# Patient Record
Sex: FEMALE | Race: WHITE | NOT HISPANIC OR LATINO | URBAN - METROPOLITAN AREA
[De-identification: names, ages, dates, MRNs, and addresses within clinical notes are randomized per-mention and may not be internally consistent; named-entity substitution may affect disease eponyms.]

---

## 2021-09-09 NOTE — PROGRESS NOTE ADULT - SUBJECTIVE AND OBJECTIVE BOX
CHIEF COMPLAINT: Transfer to CT ICU for a patient noted to have an esophageal mass at [facility]    PROCEDURE:     - EGD done at outside hospital on 9/9/21 -Large diverticula noted at 28cm filled with blood and blood clots, periphery of diverticula was injected with epinephrine, 5mm distal esophageal tear without bleeding, hiatal hernia.        ISSUES:     - Acute respiratory failure, intubated for airway protection due to hematemesis  - Esophageal diverticular bleed   - Esophageal mass  - COPD  - Afib on eliquis at home  - Hypothyroidism  - HTN      INTERVAL EVENTS:     Transferred from outside hospital for further management of esophageal mass that was noted on CT chest as well as esophageal bleed      HISTORY:   Unable to obtain, intubated, sedated    PHYSICAL EXAM:   Gen: Comfortable, No acute distress, frail elderly, kyphotic  Eyes: Sclera white, Conjunctiva normal, Eyelids normal, Pupils symmetrical   ENT: Mucous membranes moist, blood in oral cavity  Neck: Trachea midline, 7.0 ETT  CV: Rate regular, Rhythm irregular  Resp: Breath sounds clear, No accessory muscles use,   Abd: Soft, Non-distended, Non-tender, Bowel sounds normal,  ,  ,    Skin: Warm, 1+ peripheral edema of lower extremities,  ,    : bearden  Neuro: Moves ext to pain, minimally opens eyes to pain, sedated  Psych: Sedated on propofol      ASSESSMENT AND PLAN:     NEURO:    Keep sedated with fentanyl and propofol to keep RASS -1 to -2          RESPIRATORY:    Mechanical vent - AC/VC, decrease VT to 350ml, RR 18, PEEP 5, Spo2 maintained 96% with 50% fio2. Check ABG. Vent bundle, bronchodilators as needed.  Check CXR, ABG           CARDIOVASCULAR:  Hemodynamically stable - Not on pressors. Continue hemodynamic monitoring.  Eliquis held. Rate controlled afib. Betablockers if RVR.                  RENAL:  Stable - Monitor IOs and electrolytes. Keep K above 4.0 and Mg above 2.0.         GASTROINTESTINAL:  NPO  No NGT  PPI Q12h  GI consult  CT chest/abdomen in AM. CT chest was done at outside hospital however no images sent over.             HEMATOLOGIC:  Check PT/PTT, CBC. Maintain active type and screen. Off anticoagulation.  s/p Kcentra at outside hospital  DVT prophylaxis with SCDs.         INFECTIOUS DISEASE:  No signs of active infection. Will monitor for fever and leukocytosis.           ENDOCRINE:  Stable – Monitor glucose fingersticks for goal 120-180.            Pertinent clinical, laboratory, radiographic, telemetry, hemodynamic, respiratory  data and chart were reviewed by myself and analyzed frequently throughout the course of the day and night by myself.    Plan discussed at length with the CTICU staff and Attending CT Surgeon Dr. Zuñiga    Patient required critical care management.  55 minutes were spent evaluating, managing, providing, coordinating, and documenting care for this patient, exclusive of procedures.       _________________________  VITAL SIGNS:  Vital Signs Last 24 Hrs  T(C): 36.4 (10 Sep 2021 00:00), Max: 36.4 (10 Sep 2021 00:00)  T(F): 97.5 (10 Sep 2021 00:00), Max: 97.5 (10 Sep 2021 00:00)  HR: 81 (10 Sep 2021 00:00) (81 - 81)  BP: 165/93 (10 Sep 2021 00:00) (165/93 - 165/93)  BP(mean): 105 (10 Sep 2021 00:00) (105 - 105)  RR: 18 (10 Sep 2021 00:00) (18 - 18)  SpO2: 98% (10 Sep 2021 00:00) (98% - 98%)  I/Os:   I&O's Detail    09 Sep 2021 07:01  -  10 Sep 2021 01:12  --------------------------------------------------------  IN:    FentaNYL: 21.8 mL    Propofol: 32.4 mL  Total IN: 54.2 mL    OUT:    Indwelling Catheter - Urethral (mL): 150 mL  Total OUT: 150 mL    Total NET: -95.8 mL              MEDICATIONS:  MEDICATIONS  (STANDING):  albuterol/ipratropium for Nebulization 3 milliLiter(s) Nebulizer every 6 hours  chlorhexidine 0.12% Liquid 15 milliLiter(s) Oral Mucosa every 12 hours  fentaNYL   Infusion 0.5 MICROgram(s)/kG/Hr (2.7 mL/Hr) IV Continuous <Continuous>  pantoprazole  Injectable 40 milliGRAM(s) IV Push two times a day  propofol Infusion 40 MICROgram(s)/kG/Min (13 mL/Hr) IV Continuous <Continuous>    MEDICATIONS  (PRN):      LABS:  Laboratory data was independently reviewed by me today.                       RADIOLOGY:   Radiology images were independently reviewed by me today. Reports were reviewed by me today.        ________________________________________________

## 2021-09-10 ENCOUNTER — INPATIENT (INPATIENT)
Facility: HOSPITAL | Age: 81
LOS: 24 days | Discharge: NURSING, REHAB OR LONG-TERM CARE | End: 2021-10-05
Attending: INTERNAL MEDICINE | Admitting: INTERNAL MEDICINE
Payer: MEDICARE

## 2021-09-10 VITALS
HEART RATE: 81 BPM | RESPIRATION RATE: 18 BRPM | SYSTOLIC BLOOD PRESSURE: 165 MMHG | DIASTOLIC BLOOD PRESSURE: 93 MMHG | OXYGEN SATURATION: 98 % | TEMPERATURE: 98 F

## 2021-09-10 DIAGNOSIS — K92.0 HEMATEMESIS: ICD-10-CM

## 2021-09-10 DIAGNOSIS — Z95.828 PRESENCE OF OTHER VASCULAR IMPLANTS AND GRAFTS: Chronic | ICD-10-CM

## 2021-09-10 DIAGNOSIS — R58 HEMORRHAGE, NOT ELSEWHERE CLASSIFIED: ICD-10-CM

## 2021-09-10 DIAGNOSIS — R09.89 OTHER SPECIFIED SYMPTOMS AND SIGNS INVOLVING THE CIRCULATORY AND RESPIRATORY SYSTEMS: ICD-10-CM

## 2021-09-10 LAB
ALBUMIN SERPL ELPH-MCNC: 3.6 G/DL — SIGNIFICANT CHANGE UP (ref 3.3–5)
ALP SERPL-CCNC: 82 U/L — SIGNIFICANT CHANGE UP (ref 40–120)
ALT FLD-CCNC: 7 U/L — SIGNIFICANT CHANGE UP (ref 4–33)
ANION GAP SERPL CALC-SCNC: 10 MMOL/L — SIGNIFICANT CHANGE UP (ref 7–14)
ANION GAP SERPL CALC-SCNC: 10 MMOL/L — SIGNIFICANT CHANGE UP (ref 7–14)
ANION GAP SERPL CALC-SCNC: 14 MMOL/L — SIGNIFICANT CHANGE UP (ref 7–14)
APTT BLD: 28.6 SEC — SIGNIFICANT CHANGE UP (ref 27–36.3)
APTT BLD: 29.2 SEC — SIGNIFICANT CHANGE UP (ref 27–36.3)
AST SERPL-CCNC: 15 U/L — SIGNIFICANT CHANGE UP (ref 4–32)
BILIRUB SERPL-MCNC: 0.4 MG/DL — SIGNIFICANT CHANGE UP (ref 0.2–1.2)
BLD GP AB SCN SERPL QL: NEGATIVE — SIGNIFICANT CHANGE UP
BLOOD GAS ARTERIAL - LYTES,HGB,ICA,LACT RESULT: SIGNIFICANT CHANGE UP
BUN SERPL-MCNC: 25 MG/DL — HIGH (ref 7–23)
BUN SERPL-MCNC: 28 MG/DL — HIGH (ref 7–23)
BUN SERPL-MCNC: 32 MG/DL — HIGH (ref 7–23)
CALCIUM SERPL-MCNC: 8.9 MG/DL — SIGNIFICANT CHANGE UP (ref 8.4–10.5)
CALCIUM SERPL-MCNC: 9.2 MG/DL — SIGNIFICANT CHANGE UP (ref 8.4–10.5)
CALCIUM SERPL-MCNC: 9.2 MG/DL — SIGNIFICANT CHANGE UP (ref 8.4–10.5)
CHLORIDE SERPL-SCNC: 103 MMOL/L — SIGNIFICANT CHANGE UP (ref 98–107)
CHLORIDE SERPL-SCNC: 104 MMOL/L — SIGNIFICANT CHANGE UP (ref 98–107)
CHLORIDE SERPL-SCNC: 106 MMOL/L — SIGNIFICANT CHANGE UP (ref 98–107)
CO2 SERPL-SCNC: 22 MMOL/L — SIGNIFICANT CHANGE UP (ref 22–31)
CO2 SERPL-SCNC: 24 MMOL/L — SIGNIFICANT CHANGE UP (ref 22–31)
CO2 SERPL-SCNC: 25 MMOL/L — SIGNIFICANT CHANGE UP (ref 22–31)
CREAT SERPL-MCNC: 0.46 MG/DL — LOW (ref 0.5–1.3)
CREAT SERPL-MCNC: 0.54 MG/DL — SIGNIFICANT CHANGE UP (ref 0.5–1.3)
CREAT SERPL-MCNC: 0.58 MG/DL — SIGNIFICANT CHANGE UP (ref 0.5–1.3)
GLUCOSE BLDC GLUCOMTR-MCNC: 110 MG/DL — HIGH (ref 70–99)
GLUCOSE SERPL-MCNC: 119 MG/DL — HIGH (ref 70–99)
GLUCOSE SERPL-MCNC: 148 MG/DL — HIGH (ref 70–99)
GLUCOSE SERPL-MCNC: 149 MG/DL — HIGH (ref 70–99)
HCT VFR BLD CALC: 30.7 % — LOW (ref 34.5–45)
HCT VFR BLD CALC: 31.8 % — LOW (ref 34.5–45)
HCT VFR BLD CALC: 32 % — LOW (ref 34.5–45)
HCT VFR BLD CALC: 35.1 % — SIGNIFICANT CHANGE UP (ref 34.5–45)
HGB BLD-MCNC: 10.4 G/DL — LOW (ref 11.5–15.5)
HGB BLD-MCNC: 10.5 G/DL — LOW (ref 11.5–15.5)
HGB BLD-MCNC: 10.8 G/DL — LOW (ref 11.5–15.5)
HGB BLD-MCNC: 12 G/DL — SIGNIFICANT CHANGE UP (ref 11.5–15.5)
INR BLD: 1.16 RATIO — SIGNIFICANT CHANGE UP (ref 0.88–1.16)
INR BLD: 1.2 RATIO — HIGH (ref 0.88–1.16)
LACTATE BLDV-MCNC: 1.1 MMOL/L — SIGNIFICANT CHANGE UP (ref 0.5–2)
LMWH PPP CHRO-ACNC: 0.25 IU/ML — LOW (ref 0.5–1)
MAGNESIUM SERPL-MCNC: 1.9 MG/DL — SIGNIFICANT CHANGE UP (ref 1.6–2.6)
MAGNESIUM SERPL-MCNC: 2 MG/DL — SIGNIFICANT CHANGE UP (ref 1.6–2.6)
MAGNESIUM SERPL-MCNC: 2 MG/DL — SIGNIFICANT CHANGE UP (ref 1.6–2.6)
MCHC RBC-ENTMCNC: 30 PG — SIGNIFICANT CHANGE UP (ref 27–34)
MCHC RBC-ENTMCNC: 30.3 PG — SIGNIFICANT CHANGE UP (ref 27–34)
MCHC RBC-ENTMCNC: 30.6 PG — SIGNIFICANT CHANGE UP (ref 27–34)
MCHC RBC-ENTMCNC: 30.7 PG — SIGNIFICANT CHANGE UP (ref 27–34)
MCHC RBC-ENTMCNC: 33 GM/DL — SIGNIFICANT CHANGE UP (ref 32–36)
MCHC RBC-ENTMCNC: 33.8 GM/DL — SIGNIFICANT CHANGE UP (ref 32–36)
MCHC RBC-ENTMCNC: 33.9 GM/DL — SIGNIFICANT CHANGE UP (ref 32–36)
MCHC RBC-ENTMCNC: 34.2 GM/DL — SIGNIFICANT CHANGE UP (ref 32–36)
MCV RBC AUTO: 89.5 FL — SIGNIFICANT CHANGE UP (ref 80–100)
MCV RBC AUTO: 89.5 FL — SIGNIFICANT CHANGE UP (ref 80–100)
MCV RBC AUTO: 90.9 FL — SIGNIFICANT CHANGE UP (ref 80–100)
MCV RBC AUTO: 90.9 FL — SIGNIFICANT CHANGE UP (ref 80–100)
NRBC # BLD: 0 /100 WBCS — SIGNIFICANT CHANGE UP
NRBC # FLD: 0 K/UL — SIGNIFICANT CHANGE UP
PHOSPHATE SERPL-MCNC: 2.6 MG/DL — SIGNIFICANT CHANGE UP (ref 2.5–4.5)
PHOSPHATE SERPL-MCNC: 3.2 MG/DL — SIGNIFICANT CHANGE UP (ref 2.5–4.5)
PHOSPHATE SERPL-MCNC: 3.3 MG/DL — SIGNIFICANT CHANGE UP (ref 2.5–4.5)
PLATELET # BLD AUTO: 200 K/UL — SIGNIFICANT CHANGE UP (ref 150–400)
PLATELET # BLD AUTO: 205 K/UL — SIGNIFICANT CHANGE UP (ref 150–400)
PLATELET # BLD AUTO: 207 K/UL — SIGNIFICANT CHANGE UP (ref 150–400)
PLATELET # BLD AUTO: 212 K/UL — SIGNIFICANT CHANGE UP (ref 150–400)
POTASSIUM SERPL-MCNC: 3.4 MMOL/L — LOW (ref 3.5–5.3)
POTASSIUM SERPL-MCNC: 3.7 MMOL/L — SIGNIFICANT CHANGE UP (ref 3.5–5.3)
POTASSIUM SERPL-MCNC: 4.3 MMOL/L — SIGNIFICANT CHANGE UP (ref 3.5–5.3)
POTASSIUM SERPL-SCNC: 3.4 MMOL/L — LOW (ref 3.5–5.3)
POTASSIUM SERPL-SCNC: 3.7 MMOL/L — SIGNIFICANT CHANGE UP (ref 3.5–5.3)
POTASSIUM SERPL-SCNC: 4.3 MMOL/L — SIGNIFICANT CHANGE UP (ref 3.5–5.3)
PROT SERPL-MCNC: 5.6 G/DL — LOW (ref 6–8.3)
PROTHROM AB SERPL-ACNC: 13.2 SEC — SIGNIFICANT CHANGE UP (ref 10.6–13.6)
PROTHROM AB SERPL-ACNC: 13.7 SEC — HIGH (ref 10.6–13.6)
RBC # BLD: 3.43 M/UL — LOW (ref 3.8–5.2)
RBC # BLD: 3.5 M/UL — LOW (ref 3.8–5.2)
RBC # BLD: 3.52 M/UL — LOW (ref 3.8–5.2)
RBC # BLD: 3.92 M/UL — SIGNIFICANT CHANGE UP (ref 3.8–5.2)
RBC # FLD: 16.2 % — HIGH (ref 10.3–14.5)
RBC # FLD: 16.3 % — HIGH (ref 10.3–14.5)
RBC # FLD: 16.4 % — HIGH (ref 10.3–14.5)
RBC # FLD: 16.5 % — HIGH (ref 10.3–14.5)
RH IG SCN BLD-IMP: POSITIVE — SIGNIFICANT CHANGE UP
SODIUM SERPL-SCNC: 139 MMOL/L — SIGNIFICANT CHANGE UP (ref 135–145)
SODIUM SERPL-SCNC: 139 MMOL/L — SIGNIFICANT CHANGE UP (ref 135–145)
SODIUM SERPL-SCNC: 140 MMOL/L — SIGNIFICANT CHANGE UP (ref 135–145)
WBC # BLD: 10 K/UL — SIGNIFICANT CHANGE UP (ref 3.8–10.5)
WBC # BLD: 12.09 K/UL — HIGH (ref 3.8–10.5)
WBC # BLD: 12.5 K/UL — HIGH (ref 3.8–10.5)
WBC # BLD: 15.65 K/UL — HIGH (ref 3.8–10.5)
WBC # FLD AUTO: 10 K/UL — SIGNIFICANT CHANGE UP (ref 3.8–10.5)
WBC # FLD AUTO: 12.09 K/UL — HIGH (ref 3.8–10.5)
WBC # FLD AUTO: 12.5 K/UL — HIGH (ref 3.8–10.5)
WBC # FLD AUTO: 15.65 K/UL — HIGH (ref 3.8–10.5)

## 2021-09-10 PROCEDURE — 99222 1ST HOSP IP/OBS MODERATE 55: CPT

## 2021-09-10 PROCEDURE — 36620 INSERTION CATHETER ARTERY: CPT

## 2021-09-10 PROCEDURE — 99292 CRITICAL CARE ADDL 30 MIN: CPT | Mod: 25

## 2021-09-10 PROCEDURE — 71045 X-RAY EXAM CHEST 1 VIEW: CPT | Mod: 26

## 2021-09-10 PROCEDURE — 99291 CRITICAL CARE FIRST HOUR: CPT

## 2021-09-10 PROCEDURE — 71045 X-RAY EXAM CHEST 1 VIEW: CPT | Mod: 26,77

## 2021-09-10 PROCEDURE — 99222 1ST HOSP IP/OBS MODERATE 55: CPT | Mod: GC

## 2021-09-10 RX ORDER — POTASSIUM CHLORIDE 20 MEQ
10 PACKET (EA) ORAL
Refills: 0 | Status: COMPLETED | OUTPATIENT
Start: 2021-09-10 | End: 2021-09-10

## 2021-09-10 RX ORDER — PANTOPRAZOLE SODIUM 20 MG/1
40 TABLET, DELAYED RELEASE ORAL
Refills: 0 | Status: DISCONTINUED | OUTPATIENT
Start: 2021-09-10 | End: 2021-09-10

## 2021-09-10 RX ORDER — PROPOFOL 10 MG/ML
40 INJECTION, EMULSION INTRAVENOUS
Qty: 1000 | Refills: 0 | Status: DISCONTINUED | OUTPATIENT
Start: 2021-09-10 | End: 2021-09-25

## 2021-09-10 RX ORDER — FENTANYL CITRATE 50 UG/ML
0.5 INJECTION INTRAVENOUS
Qty: 2500 | Refills: 0 | Status: DISCONTINUED | OUTPATIENT
Start: 2021-09-10 | End: 2021-09-10

## 2021-09-10 RX ORDER — NOREPINEPHRINE BITARTRATE/D5W 8 MG/250ML
0.05 PLASTIC BAG, INJECTION (ML) INTRAVENOUS
Qty: 8 | Refills: 0 | Status: DISCONTINUED | OUTPATIENT
Start: 2021-09-10 | End: 2021-09-11

## 2021-09-10 RX ORDER — FENTANYL CITRATE 50 UG/ML
25 INJECTION INTRAVENOUS
Refills: 0 | Status: DISCONTINUED | OUTPATIENT
Start: 2021-09-10 | End: 2021-09-11

## 2021-09-10 RX ORDER — PIPERACILLIN AND TAZOBACTAM 4; .5 G/20ML; G/20ML
3.38 INJECTION, POWDER, LYOPHILIZED, FOR SOLUTION INTRAVENOUS EVERY 8 HOURS
Refills: 0 | Status: DISCONTINUED | OUTPATIENT
Start: 2021-09-10 | End: 2021-09-15

## 2021-09-10 RX ORDER — PANTOPRAZOLE SODIUM 20 MG/1
8 TABLET, DELAYED RELEASE ORAL
Qty: 80 | Refills: 0 | Status: DISCONTINUED | OUTPATIENT
Start: 2021-09-10 | End: 2021-09-14

## 2021-09-10 RX ORDER — CHLORHEXIDINE GLUCONATE 213 G/1000ML
15 SOLUTION TOPICAL EVERY 12 HOURS
Refills: 0 | Status: DISCONTINUED | OUTPATIENT
Start: 2021-09-10 | End: 2021-10-05

## 2021-09-10 RX ORDER — ALBUTEROL 90 UG/1
2 AEROSOL, METERED ORAL EVERY 6 HOURS
Refills: 0 | Status: DISCONTINUED | OUTPATIENT
Start: 2021-09-10 | End: 2021-10-05

## 2021-09-10 RX ORDER — PIPERACILLIN AND TAZOBACTAM 4; .5 G/20ML; G/20ML
3.38 INJECTION, POWDER, LYOPHILIZED, FOR SOLUTION INTRAVENOUS ONCE
Refills: 0 | Status: COMPLETED | OUTPATIENT
Start: 2021-09-10 | End: 2021-09-10

## 2021-09-10 RX ORDER — MAGNESIUM SULFATE 500 MG/ML
1 VIAL (ML) INJECTION ONCE
Refills: 0 | Status: DISCONTINUED | OUTPATIENT
Start: 2021-09-10 | End: 2021-09-10

## 2021-09-10 RX ORDER — LEVOTHYROXINE SODIUM 125 MCG
112 TABLET ORAL AT BEDTIME
Refills: 0 | Status: DISCONTINUED | OUTPATIENT
Start: 2021-09-10 | End: 2021-09-26

## 2021-09-10 RX ORDER — DEXMEDETOMIDINE HYDROCHLORIDE IN 0.9% SODIUM CHLORIDE 4 UG/ML
0.2 INJECTION INTRAVENOUS
Qty: 400 | Refills: 0 | Status: DISCONTINUED | OUTPATIENT
Start: 2021-09-10 | End: 2021-09-28

## 2021-09-10 RX ORDER — TIOTROPIUM BROMIDE 18 UG/1
1 CAPSULE ORAL; RESPIRATORY (INHALATION)
Qty: 0 | Refills: 0 | DISCHARGE

## 2021-09-10 RX ORDER — BACLOFEN 100 %
1 POWDER (GRAM) MISCELLANEOUS
Qty: 0 | Refills: 0 | DISCHARGE

## 2021-09-10 RX ORDER — BUDESONIDE AND FORMOTEROL FUMARATE DIHYDRATE 160; 4.5 UG/1; UG/1
2 AEROSOL RESPIRATORY (INHALATION)
Refills: 0 | Status: DISCONTINUED | OUTPATIENT
Start: 2021-09-10 | End: 2021-10-05

## 2021-09-10 RX ORDER — SIMVASTATIN 20 MG/1
1 TABLET, FILM COATED ORAL
Qty: 0 | Refills: 0 | DISCHARGE

## 2021-09-10 RX ORDER — BUDESONIDE AND FORMOTEROL FUMARATE DIHYDRATE 160; 4.5 UG/1; UG/1
2 AEROSOL RESPIRATORY (INHALATION)
Qty: 0 | Refills: 0 | DISCHARGE

## 2021-09-10 RX ORDER — PROPOFOL 10 MG/ML
40 INJECTION, EMULSION INTRAVENOUS
Qty: 500 | Refills: 0 | Status: DISCONTINUED | OUTPATIENT
Start: 2021-09-10 | End: 2021-09-10

## 2021-09-10 RX ORDER — CHLORHEXIDINE GLUCONATE 213 G/1000ML
15 SOLUTION TOPICAL EVERY 12 HOURS
Refills: 0 | Status: DISCONTINUED | OUTPATIENT
Start: 2021-09-10 | End: 2021-09-10

## 2021-09-10 RX ORDER — IPRATROPIUM/ALBUTEROL SULFATE 18-103MCG
3 AEROSOL WITH ADAPTER (GRAM) INHALATION EVERY 6 HOURS
Refills: 0 | Status: DISCONTINUED | OUTPATIENT
Start: 2021-09-10 | End: 2021-09-10

## 2021-09-10 RX ORDER — CISATRACURIUM BESYLATE 2 MG/ML
10 INJECTION INTRAVENOUS ONCE
Refills: 0 | Status: COMPLETED | OUTPATIENT
Start: 2021-09-10 | End: 2021-09-10

## 2021-09-10 RX ADMIN — CHLORHEXIDINE GLUCONATE 15 MILLILITER(S): 213 SOLUTION TOPICAL at 05:31

## 2021-09-10 RX ADMIN — ALBUTEROL 2 PUFF(S): 90 AEROSOL, METERED ORAL at 22:28

## 2021-09-10 RX ADMIN — PANTOPRAZOLE SODIUM 40 MILLIGRAM(S): 20 TABLET, DELAYED RELEASE ORAL at 17:02

## 2021-09-10 RX ADMIN — FENTANYL CITRATE 25 MICROGRAM(S): 50 INJECTION INTRAVENOUS at 19:00

## 2021-09-10 RX ADMIN — CHLORHEXIDINE GLUCONATE 15 MILLILITER(S): 213 SOLUTION TOPICAL at 17:01

## 2021-09-10 RX ADMIN — FENTANYL CITRATE 25 MICROGRAM(S): 50 INJECTION INTRAVENOUS at 18:23

## 2021-09-10 RX ADMIN — PANTOPRAZOLE SODIUM 40 MILLIGRAM(S): 20 TABLET, DELAYED RELEASE ORAL at 06:02

## 2021-09-10 RX ADMIN — PIPERACILLIN AND TAZOBACTAM 200 GRAM(S): 4; .5 INJECTION, POWDER, LYOPHILIZED, FOR SOLUTION INTRAVENOUS at 13:03

## 2021-09-10 RX ADMIN — PIPERACILLIN AND TAZOBACTAM 25 GRAM(S): 4; .5 INJECTION, POWDER, LYOPHILIZED, FOR SOLUTION INTRAVENOUS at 21:44

## 2021-09-10 RX ADMIN — Medication 100 MILLIEQUIVALENT(S): at 23:44

## 2021-09-10 RX ADMIN — BUDESONIDE AND FORMOTEROL FUMARATE DIHYDRATE 2 PUFF(S): 160; 4.5 AEROSOL RESPIRATORY (INHALATION) at 22:28

## 2021-09-10 RX ADMIN — Medication 112 MICROGRAM(S): at 23:43

## 2021-09-10 RX ADMIN — Medication 100 MILLIEQUIVALENT(S): at 21:44

## 2021-09-10 RX ADMIN — Medication 100 MILLIEQUIVALENT(S): at 22:41

## 2021-09-10 RX ADMIN — Medication 100 MILLIEQUIVALENT(S): at 06:43

## 2021-09-10 RX ADMIN — Medication 100 MILLIEQUIVALENT(S): at 08:56

## 2021-09-10 RX ADMIN — DEXMEDETOMIDINE HYDROCHLORIDE IN 0.9% SODIUM CHLORIDE 2.7 MICROGRAM(S)/KG/HR: 4 INJECTION INTRAVENOUS at 08:56

## 2021-09-10 RX ADMIN — CISATRACURIUM BESYLATE 10 MILLIGRAM(S): 2 INJECTION INTRAVENOUS at 18:23

## 2021-09-10 RX ADMIN — Medication 100 MILLIEQUIVALENT(S): at 10:18

## 2021-09-10 NOTE — CONSULT NOTE ADULT - SUBJECTIVE AND OBJECTIVE BOX
HPI:     Allergies:  Sulfur Colliod (Rash)  Tylenol (Swelling)      Home Medications:    Hospital Medications:  albuterol/ipratropium for Nebulization 3 milliLiter(s) Nebulizer every 6 hours  chlorhexidine 0.12% Liquid 15 milliLiter(s) Oral Mucosa every 12 hours  dexMEDEtomidine Infusion 0.2 MICROgram(s)/kG/Hr IV Continuous <Continuous>  norepinephrine Infusion 0.05 MICROgram(s)/kG/Min IV Continuous <Continuous>  pantoprazole  Injectable 40 milliGRAM(s) IV Push two times a day  piperacillin/tazobactam IVPB.. 3.375 Gram(s) IV Intermittent every 8 hours  propofol Infusion 40 MICROgram(s)/kG/Min IV Continuous <Continuous>      PMHX/PSHX:      Family history:      Denies family history of colon cancer/polyps, stomach cancer/polyps, pancreatic cancer/masses, liver cancer/disease, ovarian cancer and endometrial cancer.    Social History:     Tob: Denies  EtOH: Denies  Illicit Drugs: Denies    ROS:     General:  No wt loss, fevers, chills, night sweats, fatigue  Eyes:  Good vision, no reported pain  ENT:  No sore throat, pain, runny nose, dysphagia  CV:  No pain, palpitations, hypo/hypertension  Pulm:  No dyspnea, cough, tachypnea, wheezing  GI:  No pain, No nausea, No vomiting, No diarrhea, No constipation, No weight loss, No fever, No pruritis, No rectal bleeding, No tarry stools, No dysphagia,  :  No pain, bleeding, incontinence, nocturia  Muscle:  No pain, weakness  Neuro:  No weakness, tingling, memory problems  Psych:  No fatigue, insomnia, mood problems, depression  Endocrine:  No polyuria, polydipsia, cold/heat intolerance  Heme:  No petechiae, ecchymosis, easy bruisability  Skin:  No rash, tattoos, scars, edema    PHYSICAL EXAM:     GENERAL:  No acute distress  HEENT:  Normocephalic/atraumatic, no scleral icterus  CHEST:  Clear to auscultation bilaterally, no wheezes/rales/ronchi, no accessory muscle use  HEART:  Regular rate and rhythm, no murmurs/rubs/gallops  ABDOMEN:  Soft, non-tender, non-distended, normoactive bowel sounds,  no masses, no hepato-splenomegaly, no signs of chronic liver disease  EXTREMITIES: No cyanosis, clubbing, or edema  SKIN:  No rash/erythema/ecchymoses/petechiae/wounds/abscess/warm/dry  NEURO:  Alert and oriented x 3, no asterixis    Vital Signs:  Vital Signs Last 24 Hrs  T(C): 36.5 (10 Sep 2021 08:00), Max: 36.5 (10 Sep 2021 08:00)  T(F): 97.7 (10 Sep 2021 08:00), Max: 97.7 (10 Sep 2021 08:00)  HR: 81 (10 Sep 2021 13:00) (81 - 99)  BP: 102/56 (10 Sep 2021 10:00) (83/65 - 165/93)  BP(mean): 67 (10 Sep 2021 10:00) (64 - 105)  RR: 18 (10 Sep 2021 13:00) (18 - 18)  SpO2: 92% (10 Sep 2021 13:00) (92% - 99%)  Daily     Daily     LABS:                        10.8   12.09 )-----------( 200      ( 10 Sep 2021 11:21 )             32.0     Mean Cell Volume: 90.9 fL (09-10-21 @ 11:21)    09-10    139  |  104  |  32<H>  ----------------------------<  119<H>  4.3   |  25  |  0.54    Ca    9.2      10 Sep 2021 11:21  Phos  3.2     09-10  Mg     2.00     09-10    TPro  5.6<L>  /  Alb  3.6  /  TBili  0.4  /  DBili  x   /  AST  15  /  ALT  7   /  AlkPhos  82  09-10    LIVER FUNCTIONS - ( 10 Sep 2021 01:28 )  Alb: 3.6 g/dL / Pro: 5.6 g/dL / ALK PHOS: 82 U/L / ALT: 7 U/L / AST: 15 U/L / GGT: x           PT/INR - ( 10 Sep 2021 11:21 )   PT: 13.2 sec;   INR: 1.16 ratio         PTT - ( 10 Sep 2021 11:21 )  PTT:28.6 sec                            10.8   12.09 )-----------( 200      ( 10 Sep 2021 11:21 )             32.0                         12.0   10.00 )-----------( 212      ( 10 Sep 2021 01:28 )             35.1       Imaging:             HPI: 81F resident @ assisted nursing facility Hx known esophageal diverticulum (since 9/2020) + food impactions, afib on eliquis, IVC filter (further Hx unknown), GERD, COPD, hypothyroidism, HTN, multiple thoracic spine compression fractures who presented as transfer from [facility] after hematemesis 2/2 UGI bleed s/p EGD showing esophageal diverticula w/ large clots s/p epinephrine.     Presented to [facility] on 9/8 having multiple episodes of hematemesis after eating dinner -- no prior Hx of similar episodes.     From OSH notes, pt had been HD stable with Hb stable in 10s --> s/p 2U pRBCs. Underwent CT a/p which showed 1. dilated fluid filled mid esophagus to 3.8cm w/ debris 2. soft tissue mass of mid-lower esophagus 8.1 by 6.5cm w/ right aspect of mass showing with ?area of active bleeding / air anteriorly ? for areas of necrosis.     Underwent EGD (9/9) which showed 1. large clots in esophageal diverticula s/p epi injection 2. small 5mm esophageal tear 3. clots in stomach -- otherwise normal stomach lumen + duodenum. Pt was intubated during EGD 2/2 bleeding that was found.     Per discussion with [facility] + son -- pt has Hx of 2 prior food impactions w/ known Hx of esophageal diverticulum dating back to 9/2020. No mass was seen on EGD (9/2020) reportedly, only esophageal diverticulum.       Currently on levo 0.02 --> increased to 0.04 during the day. Remains intubated + sedated. No melena, hematochezia since hospitalization. Hb 12 --> 10.8 --> 10.4.            Allergies:  Sulfur Colliod (Rash)  Tylenol (Swelling)      Home Medications:    Hospital Medications:  albuterol/ipratropium for Nebulization 3 milliLiter(s) Nebulizer every 6 hours  chlorhexidine 0.12% Liquid 15 milliLiter(s) Oral Mucosa every 12 hours  dexMEDEtomidine Infusion 0.2 MICROgram(s)/kG/Hr IV Continuous <Continuous>  norepinephrine Infusion 0.05 MICROgram(s)/kG/Min IV Continuous <Continuous>  pantoprazole  Injectable 40 milliGRAM(s) IV Push two times a day  piperacillin/tazobactam IVPB.. 3.375 Gram(s) IV Intermittent every 8 hours  propofol Infusion 40 MICROgram(s)/kG/Min IV Continuous <Continuous>      PMHX/PSHX:      Family history:      Denies family history of colon cancer/polyps, stomach cancer/polyps, pancreatic cancer/masses, liver cancer/disease, ovarian cancer and endometrial cancer.    Social History:     Tob: Denies  EtOH: Denies  Illicit Drugs: Denies    ROS:     General:  No wt loss, fevers, chills, night sweats, fatigue  Eyes:  Good vision, no reported pain  ENT:  No sore throat, pain, runny nose, dysphagia  CV:  No pain, palpitations, hypo/hypertension  Pulm:  No dyspnea, cough, tachypnea, wheezing  GI:  see above  :  No pain, bleeding, incontinence, nocturia  Muscle:  No pain, weakness  Neuro:  No weakness, tingling, memory problems  Psych:  No fatigue, insomnia, mood problems, depression  Endocrine:  No polyuria, polydipsia, cold/heat intolerance  Heme:  No petechiae, ecchymosis, easy bruisability  Skin:  No rash, tattoos, scars, edema    PHYSICAL EXAM:     GENERAL:  No acute distress, intubated, sedated, +minimal blood in the in-line tracheal suction   HEENT:  Normocephalic/atraumatic, no scleral icterus  CHEST:  Clear to auscultation bilaterally, no wheezes/rales/ronchi, no accessory muscle use  HEART:  Regular rate and rhythm, no murmurs/rubs/gallops  ABDOMEN:  Soft, non-tender, non-distended, normoactive bowel sounds  EXTREMITIES: No cyanosis, clubbing, or edema  SKIN:  No rash/dry  NEURO:  Alert and oriented x 0    Vital Signs:  Vital Signs Last 24 Hrs  T(C): 36.5 (10 Sep 2021 08:00), Max: 36.5 (10 Sep 2021 08:00)  T(F): 97.7 (10 Sep 2021 08:00), Max: 97.7 (10 Sep 2021 08:00)  HR: 81 (10 Sep 2021 13:00) (81 - 99)  BP: 102/56 (10 Sep 2021 10:00) (83/65 - 165/93)  BP(mean): 67 (10 Sep 2021 10:00) (64 - 105)  RR: 18 (10 Sep 2021 13:00) (18 - 18)  SpO2: 92% (10 Sep 2021 13:00) (92% - 99%)  Daily     Daily     LABS:                        10.8   12.09 )-----------( 200      ( 10 Sep 2021 11:21 )             32.0     Mean Cell Volume: 90.9 fL (09-10-21 @ 11:21)    09-10    139  |  104  |  32<H>  ----------------------------<  119<H>  4.3   |  25  |  0.54    Ca    9.2      10 Sep 2021 11:21  Phos  3.2     09-10  Mg     2.00     09-10    TPro  5.6<L>  /  Alb  3.6  /  TBili  0.4  /  DBili  x   /  AST  15  /  ALT  7   /  AlkPhos  82  09-10    LIVER FUNCTIONS - ( 10 Sep 2021 01:28 )  Alb: 3.6 g/dL / Pro: 5.6 g/dL / ALK PHOS: 82 U/L / ALT: 7 U/L / AST: 15 U/L / GGT: x           PT/INR - ( 10 Sep 2021 11:21 )   PT: 13.2 sec;   INR: 1.16 ratio         PTT - ( 10 Sep 2021 11:21 )  PTT:28.6 sec                            10.8   12.09 )-----------( 200      ( 10 Sep 2021 11:21 )             32.0                         12.0   10.00 )-----------( 212      ( 10 Sep 2021 01:28 )             35.1       Imaging:

## 2021-09-10 NOTE — H&P ADULT - ATTENDING COMMENTS
Patient seen and examined agree with above note as modified, where appropriate, by me. 81 year old with upper GI bleed from esophagus, ?etiology. No evidence of bleeding at this time. Will hold Eliquis for now. will likely need repeat endoscopy.

## 2021-09-10 NOTE — H&P ADULT - NSICDXPASTMEDICALHX_GEN_ALL_CORE_FT
PAST MEDICAL HISTORY:  Afib     Esophageal diverticulum     GERD (gastroesophageal reflux disease)     History of COPD     HTN (hypertension)     Hypothyroid

## 2021-09-10 NOTE — CONSULT NOTE ADULT - ASSESSMENT
Impression:   #UGI bleed 2/2 esophageal diverticulum     Recommendations:   - NPO   - Trend CBC q8hr, transfuse Hb < 7  - Active T&S  - Monitor for further bleeding  - No urgent plans for endoscopy at this time, will possibly need a second look   - Full note to follow       Thank you for involving us in the care of this patient, please reach out if any further questions.     Ankit Martinez MD  Gastroenterology Fellow, PGY5    Available on [employer] Teams  [phone] ([facility])  [id number] ([facility])  Please contact on call fellow weekdays after 5pm-7am and weekends: [phone]   81F resident @ assisted nursing facility Hx known esophageal diverticulum (since 9/2020) + food impactions, afib on eliquis, IVC filter (further Hx unknown), GERD, COPD, hypothyroidism, HTN, multiple thoracic spine compression fractures who presented as transfer from [facility] after hematemesis 2/2 UGI bleed s/p EGD showing esophageal diverticula w/ large clots s/p epinephrine.       Impression:   #UGI bleed: p/w hematemesis to OSH, found to have ?esophageal (soft tissue) mass on CT a/p. EGD showed large diverticula at 28cm with bloody clots s/p epi injection (not removed 2/2 concern regarding what may be underlying clot, ie large vessel potentially) + no other significant sources of hematemesis found. Unclear if 1. bleeding from within diverticula or 2. there is some underlying esophageal mass (as seen on CT a/p) in the area which is underneath the clot.           Recommendations:   - NPO   - Trend CBC q8hr, transfuse Hb < 7  - Active T&S  - PPI gtt  - Monitor for further bleeding  - No urgent plans for endoscopy at this time, pt will need second look endoscopy  - Appreciate CT surgery back up during time of endoscopy   - Tentatively will plan for EGD Monday (if pt remains HD stable without significant bleeding)   - Please call back GI fellow over the weekend if significant bleeding/melena/Hb drop and worsening hemodynamics           Thank you for involving us in the care of this patient, please reach out if any further questions.     Ankit Martinez MD  Gastroenterology Fellow, PGY5    Available on [employer] Teams  [phone] ([facility])  [id number] ([facility])  Please contact on call fellow weekdays after 5pm-7am and weekends: [phone]   81F resident @ assisted nursing facility Hx known esophageal diverticulum (since 9/2020) + food impactions, afib on eliquis, IVC filter (further Hx unknown), GERD, COPD, hypothyroidism, HTN, multiple thoracic spine compression fractures who presented as transfer from [facility] after hematemesis 2/2 UGI bleed s/p EGD showing esophageal diverticula w/ large clots s/p epinephrine.       Impression:   #UGI bleed: p/w hematemesis to OSH, found to have ?esophageal (soft tissue) mass on CT a/p. EGD showed large diverticula at 28cm with bloody clots s/p epi injection (not removed 2/2 concern regarding what may be underlying clot, ie large vessel potentially) + no other significant sources of hematemesis found. Unclear if 1. bleeding from within diverticula or 2. there is some underlying esophageal mass (as seen on CT a/p) in the area which is underneath the clot.           Recommendations:   - NPO   - Trend CBC q8hr, transfuse Hb < 7  - Active T&S  - PPI gtt  - Monitor for further bleeding  - No urgent plans for endoscopy at this time, pt will need second look endoscopy  - Appreciate CT surgery back up during time of endoscopy   - Tentatively will plan for EGD Monday (if pt remains HD stable without significant bleeding)   - Please obtain COVID PCR swab to have on file for potential EGD  - Please call back GI fellow over the weekend if significant bleeding/melena/Hb drop and worsening hemodynamics           Thank you for involving us in the care of this patient, please reach out if any further questions.     Ankit Martinez MD  Gastroenterology Fellow, PGY5    Available on [employer] Teams  [phone] ([facility])  [phone] ([facility])  Please contact on call fellow weekdays after 5pm-7am and weekends: [phone]

## 2021-09-10 NOTE — PROGRESS NOTE ADULT - SUBJECTIVE AND OBJECTIVE BOX
CHIEF COMPLAINT: Follow up in CTICU for intubated for hypoxic respiratory failure with upper GI bleed    PROCEDURE:   - EGD done at outside hospital on 9/9/21 -Large diverticula noted at 28cm filled with blood and blood clots, periphery of diverticula was injected with epinephrine, 5mm distal esophageal tear without bleeding, hiatal hernia.        ISSUES:   Acute hypoxic respiratory failure s/p intubation (9/9)  Esophageal diverticular bleed   Acute blood loss anemia  Hypotension requiring IV pressors  Esophageal mass  COPD  Chronic Afib on Apixaban  Hypothyroidism  HTN      INTERVAL EVENTS:   Received patient on mechanical ventilation.   Called to the bedside for hypoxia while pt on mechanical ventilation PEEP 5, 50%. Acute desaturation. No secretions with suctioning.  PEEP increased to 8 with improvement of saturation. CT chest from outside hospital reviewed and showed LLL atelectasis and PNA. Pt started on zosyn.  CBC repeated with slight decrease. Trending. Discussed case with GI consult team about timing of repeat endoscopy.  Discussed outside CT and outside prior EGD findings with GI fellow.  Received patient on vasopressors - levophed. This was titrated by me to maintain MAP 65.      HISTORY:   Unable to obtain, intubated, sedated    PHYSICAL EXAM:   Gen: Comfortable, No acute distress, frail elderly, kyphotic  Eyes: Sclera white, Conjunctiva normal, Eyelids normal, Pupils symmetrical   ENT: Mucous membranes moist, blood in oral cavity  Neck: Trachea midline, 7.0 ETT  CV: Rate regular, Rhythm irregular  Resp: Breath sounds clear, No accessory muscles use,   Abd: Soft, Non-distended, Non-tender, Bowel sounds normal,  ,  ,    Skin: Warm, 1+ peripheral edema of lower extremities,  ,    : bearden  Neuro: Moves ext to pain, minimally opens eyes to pain, sedated  Psych: RASS -3      ASSESSMENT AND PLAN:     NEURO:  Vent sedation - Continue propofol and precedex for ventilator synchrony.       RESPIRATORY:    Acute respiratory failure secondary to aspiration PNA in setting of hematemesis - Mechanical ventilation. Monitor ABG. Wean FIO2 for goal O2sat above 92. Vent bundle. Zosyn.           CARDIOVASCULAR:  Hypotension requiring IV pressors - wean pressors for MAP goal of 65  Telemetry (medical test) - Reviewed by me today independently. Rate controlled atrial fibrillation.    Chronic Afib - stable. Hold anticoagulation        HTN - currently hypotensive. hold home antihypertensives.            RENAL:  Stable - Monitor IOs and electrolytes. Keep K above 4.0 and Mg above 2.0.         GASTROINTESTINAL:  NPO  No NGT    Esophageal mass vs blood filled diverticulum - Unchanged. Pantoprazole IV Q12h. GI consult for timing of EGD.           HEMATOLOGIC:  Check PT/PTT, CBC. Maintain active type and screen. Off anticoagulation.  s/p Kcentra at outside hospital  DVT prophylaxis with SCDs.         INFECTIOUS DISEASE:  Aspiration PNA - Worsening. Zosyn IV q8h.   Follow up cultures        ENDOCRINE:  Stable – Monitor glucose fingersticks for goal 120-180.     Hypothyroidism - stable. Continue synthroid IV.         Pertinent clinical, laboratory, radiographic, telemetry, hemodynamic, respiratory  data and chart were reviewed by myself and analyzed frequently throughout the course of the day and night by myself.    Plan discussed at length with the CTICU staff and Attending CT Surgeon Dr. Cedrick Zuñiga    Patient required critical care management.  35 minutes were spent evaluating, managing, providing, coordinating, and documenting care for this patient, exclusive of procedures from  7AM to 1159PM.      _________________________  VITAL SIGNS:  Vital Signs Last 24 Hrs  T(C): 36.5 (10 Sep 2021 12:00), Max: 36.5 (10 Sep 2021 08:00)  T(F): 97.7 (10 Sep 2021 12:00), Max: 97.7 (10 Sep 2021 08:00)  HR: 85 (10 Sep 2021 14:00) (81 - 99)  BP: 102/56 (10 Sep 2021 10:00) (83/65 - 165/93)  BP(mean): 67 (10 Sep 2021 10:00) (64 - 105)  RR: 18 (10 Sep 2021 14:00) (18 - 18)  SpO2: 97% (10 Sep 2021 14:00) (92% - 99%)  I/Os:   I&O's Detail    09 Sep 2021 07:01  -  10 Sep 2021 07:00  --------------------------------------------------------  IN:    FentaNYL: 48.8 mL    Norepinephrine: 14 mL    Propofol: 100.4 mL  Total IN: 163.2 mL    OUT:    Indwelling Catheter - Urethral (mL): 380 mL  Total OUT: 380 mL    Total NET: -216.8 mL      10 Sep 2021 07:01  -  10 Sep 2021 15:11  --------------------------------------------------------  IN:    Dexmedetomidine: 16.2 mL    FentaNYL: 5.4 mL    Norepinephrine: 19 mL    Propofol: 87.7 mL  Total IN: 128.3 mL    OUT:    Indwelling Catheter - Urethral (mL): 235 mL  Total OUT: 235 mL    Total NET: -106.7 mL          Mode: AC/ CMV (Assist Control/ Continuous Mandatory Ventilation)  RR (machine): 18  TV (machine): 350  FiO2: 50  PEEP: 5  ITime: 0.76  MAP: 9  PIP: 24      MEDICATIONS:  MEDICATIONS  (STANDING):  albuterol/ipratropium for Nebulization 3 milliLiter(s) Nebulizer every 6 hours  chlorhexidine 0.12% Liquid 15 milliLiter(s) Oral Mucosa every 12 hours  dexMEDEtomidine Infusion 0.2 MICROgram(s)/kG/Hr (2.7 mL/Hr) IV Continuous <Continuous>  norepinephrine Infusion 0.05 MICROgram(s)/kG/Min (5.06 mL/Hr) IV Continuous <Continuous>  pantoprazole  Injectable 40 milliGRAM(s) IV Push two times a day  piperacillin/tazobactam IVPB.. 3.375 Gram(s) IV Intermittent every 8 hours  propofol Infusion 40 MICROgram(s)/kG/Min (13 mL/Hr) IV Continuous <Continuous>    MEDICATIONS  (PRN):      LABS:  Laboratory data was independently reviewed by me today.                           10.8   12.09 )-----------( 200      ( 10 Sep 2021 11:21 )             32.0     09-10    139  |  104  |  32<H>  ----------------------------<  119<H>  4.3   |  25  |  0.54    Ca    9.2      10 Sep 2021 11:21  Phos  3.2     09-10  Mg     2.00     09-10    TPro  5.6<L>  /  Alb  3.6  /  TBili  0.4  /  DBili  x   /  AST  15  /  ALT  7   /  AlkPhos  82  09-10    LIVER FUNCTIONS - ( 10 Sep 2021 01:28 )  Alb: 3.6 g/dL / Pro: 5.6 g/dL / ALK PHOS: 82 U/L / ALT: 7 U/L / AST: 15 U/L / GGT: x           PT/INR - ( 10 Sep 2021 11:21 )   PT: 13.2 sec;   INR: 1.16 ratio         PTT - ( 10 Sep 2021 11:21 )  PTT:28.6 sec  ABG - ( 10 Sep 2021 11:21 )  pH, Arterial: 7.42  pH, Blood: x     /  pCO2: 40    /  pO2: 65    / HCO3: 26    / Base Excess: 1.3   /  SaO2: 91.8                  RADIOLOGY:   Radiology images were independently reviewed by me today. Reports were reviewed by me today.    Xray Chest 1 View- PORTABLE-Urgent:   EXAM:  XR CHEST PORTABLE URGENT 1V        PROCEDURE DATE:  Sep 10 2021         INTERPRETATION:  TIME OF EXAM: September 10, 2021 at 1:59 AM    CLINICAL INFORMATION: Status post intubation; UGI bleed    TECHNIQUE:   Portable chest    INTERPRETATION:    An endotracheal tube is present. The right lung is clear.    On the left side, hemidiaphragm is obscured possibly small effusion with underlying atelectasis. Left upper lobe is clear. The heart is not enlarged.      COMPARISON:  July 10, 2014      IMPRESSION:  Intubated with probable small left effusion.      --- End of Report ---              ONUR AVILES MD; Attending Radiologist  This document has been electronically signed. Sep 10 2021  7:57AM (09-10-21 @ 02:00)

## 2021-09-10 NOTE — H&P ADULT - HISTORY OF PRESENT ILLNESS
81 y. o. female BIBA to [facility] from nursing facility c/o hematemesis.  According to pt it was the first episode and happened while she was eating dinner. Followed by multiple episodes of N/V with bright blood and epigastric cramps.  She was intubated for airway protection and underwent EGD that revealed large diverticula at 28 cm filled with blood and 5 mm tear at distal esophagus, a hiatal hernia and large amount of blood in the fundus.  Diverticula was injected with Epinephrine.  Patient was transferred to [facility] for further management.

## 2021-09-11 LAB
ANION GAP SERPL CALC-SCNC: 11 MMOL/L — SIGNIFICANT CHANGE UP (ref 7–14)
ANION GAP SERPL CALC-SCNC: 12 MMOL/L — SIGNIFICANT CHANGE UP (ref 7–14)
APTT BLD: 27.4 SEC — SIGNIFICANT CHANGE UP (ref 27–36.3)
BASE EXCESS BLDA CALC-SCNC: 1 MMOL/L — SIGNIFICANT CHANGE UP (ref -2–3)
BLOOD GAS ARTERIAL - LYTES,HGB,ICA,LACT RESULT: SIGNIFICANT CHANGE UP
BUN SERPL-MCNC: 20 MG/DL — SIGNIFICANT CHANGE UP (ref 7–23)
BUN SERPL-MCNC: 22 MG/DL — SIGNIFICANT CHANGE UP (ref 7–23)
CALCIUM SERPL-MCNC: 8.9 MG/DL — SIGNIFICANT CHANGE UP (ref 8.4–10.5)
CALCIUM SERPL-MCNC: 9 MG/DL — SIGNIFICANT CHANGE UP (ref 8.4–10.5)
CHLORIDE SERPL-SCNC: 105 MMOL/L — SIGNIFICANT CHANGE UP (ref 98–107)
CHLORIDE SERPL-SCNC: 105 MMOL/L — SIGNIFICANT CHANGE UP (ref 98–107)
CO2 BLDA-SCNC: 26 MMOL/L — HIGH (ref 19–24)
CO2 SERPL-SCNC: 24 MMOL/L — SIGNIFICANT CHANGE UP (ref 22–31)
CO2 SERPL-SCNC: 24 MMOL/L — SIGNIFICANT CHANGE UP (ref 22–31)
COVID-19 SPIKE DOMAIN AB INTERP: POSITIVE
COVID-19 SPIKE DOMAIN ANTIBODY RESULT: >250 U/ML — HIGH
CREAT SERPL-MCNC: 0.5 MG/DL — SIGNIFICANT CHANGE UP (ref 0.5–1.3)
CREAT SERPL-MCNC: 0.56 MG/DL — SIGNIFICANT CHANGE UP (ref 0.5–1.3)
GLUCOSE BLDC GLUCOMTR-MCNC: 103 MG/DL — HIGH (ref 70–99)
GLUCOSE BLDC GLUCOMTR-MCNC: 107 MG/DL — HIGH (ref 70–99)
GLUCOSE BLDC GLUCOMTR-MCNC: 115 MG/DL — HIGH (ref 70–99)
GLUCOSE SERPL-MCNC: 131 MG/DL — HIGH (ref 70–99)
GLUCOSE SERPL-MCNC: 147 MG/DL — HIGH (ref 70–99)
GRAM STN FLD: SIGNIFICANT CHANGE UP
HCO3 BLDA-SCNC: 25 MMOL/L — SIGNIFICANT CHANGE UP (ref 21–28)
HCT VFR BLD CALC: 30.7 % — LOW (ref 34.5–45)
HCT VFR BLD CALC: 31 % — LOW (ref 34.5–45)
HGB BLD-MCNC: 10.2 G/DL — LOW (ref 11.5–15.5)
HGB BLD-MCNC: 10.2 G/DL — LOW (ref 11.5–15.5)
INR BLD: 1.2 RATIO — HIGH (ref 0.88–1.16)
LMWH PPP CHRO-ACNC: 0.18 IU/ML — LOW (ref 0.5–1)
MAGNESIUM SERPL-MCNC: 1.8 MG/DL — SIGNIFICANT CHANGE UP (ref 1.6–2.6)
MAGNESIUM SERPL-MCNC: 2 MG/DL — SIGNIFICANT CHANGE UP (ref 1.6–2.6)
MCHC RBC-ENTMCNC: 30.1 PG — SIGNIFICANT CHANGE UP (ref 27–34)
MCHC RBC-ENTMCNC: 30.2 PG — SIGNIFICANT CHANGE UP (ref 27–34)
MCHC RBC-ENTMCNC: 32.9 GM/DL — SIGNIFICANT CHANGE UP (ref 32–36)
MCHC RBC-ENTMCNC: 33.2 GM/DL — SIGNIFICANT CHANGE UP (ref 32–36)
MCV RBC AUTO: 90.8 FL — SIGNIFICANT CHANGE UP (ref 80–100)
MCV RBC AUTO: 91.4 FL — SIGNIFICANT CHANGE UP (ref 80–100)
NRBC # BLD: 0 /100 WBCS — SIGNIFICANT CHANGE UP
NRBC # BLD: 0 /100 WBCS — SIGNIFICANT CHANGE UP
NRBC # FLD: 0 K/UL — SIGNIFICANT CHANGE UP
NRBC # FLD: 0 K/UL — SIGNIFICANT CHANGE UP
PCO2 BLDA: 38 MMHG — HIGH (ref 32–35)
PH BLDA: 7.43 — SIGNIFICANT CHANGE UP (ref 7.35–7.45)
PHOSPHATE SERPL-MCNC: 2.5 MG/DL — SIGNIFICANT CHANGE UP (ref 2.5–4.5)
PHOSPHATE SERPL-MCNC: 2.6 MG/DL — SIGNIFICANT CHANGE UP (ref 2.5–4.5)
PLATELET # BLD AUTO: 180 K/UL — SIGNIFICANT CHANGE UP (ref 150–400)
PLATELET # BLD AUTO: 189 K/UL — SIGNIFICANT CHANGE UP (ref 150–400)
PO2 BLDA: 96 MMHG — SIGNIFICANT CHANGE UP (ref 83–108)
POTASSIUM SERPL-MCNC: 3.6 MMOL/L — SIGNIFICANT CHANGE UP (ref 3.5–5.3)
POTASSIUM SERPL-MCNC: 4.1 MMOL/L — SIGNIFICANT CHANGE UP (ref 3.5–5.3)
POTASSIUM SERPL-SCNC: 3.6 MMOL/L — SIGNIFICANT CHANGE UP (ref 3.5–5.3)
POTASSIUM SERPL-SCNC: 4.1 MMOL/L — SIGNIFICANT CHANGE UP (ref 3.5–5.3)
PROTHROM AB SERPL-ACNC: 13.7 SEC — HIGH (ref 10.6–13.6)
RBC # BLD: 3.38 M/UL — LOW (ref 3.8–5.2)
RBC # BLD: 3.39 M/UL — LOW (ref 3.8–5.2)
RBC # FLD: 15.9 % — HIGH (ref 10.3–14.5)
RBC # FLD: 15.9 % — HIGH (ref 10.3–14.5)
SAO2 % BLDA: 96.5 % — SIGNIFICANT CHANGE UP (ref 94–98)
SARS-COV-2 IGG+IGM SERPL QL IA: >250 U/ML — HIGH
SARS-COV-2 IGG+IGM SERPL QL IA: POSITIVE
SODIUM SERPL-SCNC: 140 MMOL/L — SIGNIFICANT CHANGE UP (ref 135–145)
SODIUM SERPL-SCNC: 141 MMOL/L — SIGNIFICANT CHANGE UP (ref 135–145)
SPECIMEN SOURCE: SIGNIFICANT CHANGE UP
WBC # BLD: 12.71 K/UL — HIGH (ref 3.8–10.5)
WBC # BLD: 15.82 K/UL — HIGH (ref 3.8–10.5)
WBC # FLD AUTO: 12.71 K/UL — HIGH (ref 3.8–10.5)
WBC # FLD AUTO: 15.82 K/UL — HIGH (ref 3.8–10.5)

## 2021-09-11 PROCEDURE — 71045 X-RAY EXAM CHEST 1 VIEW: CPT | Mod: 26

## 2021-09-11 PROCEDURE — 71045 X-RAY EXAM CHEST 1 VIEW: CPT | Mod: 26,77

## 2021-09-11 PROCEDURE — 99291 CRITICAL CARE FIRST HOUR: CPT

## 2021-09-11 PROCEDURE — 36556 INSERT NON-TUNNEL CV CATH: CPT

## 2021-09-11 RX ORDER — FENTANYL CITRATE 50 UG/ML
50 INJECTION INTRAVENOUS ONCE
Refills: 0 | Status: DISCONTINUED | OUTPATIENT
Start: 2021-09-11 | End: 2021-09-11

## 2021-09-11 RX ORDER — PHENYLEPHRINE HYDROCHLORIDE 10 MG/ML
1 INJECTION INTRAVENOUS
Qty: 40 | Refills: 0 | Status: DISCONTINUED | OUTPATIENT
Start: 2021-09-11 | End: 2021-09-11

## 2021-09-11 RX ORDER — NOREPINEPHRINE BITARTRATE/D5W 8 MG/250ML
0.05 PLASTIC BAG, INJECTION (ML) INTRAVENOUS
Qty: 16 | Refills: 0 | Status: DISCONTINUED | OUTPATIENT
Start: 2021-09-11 | End: 2021-09-26

## 2021-09-11 RX ORDER — SODIUM CHLORIDE 9 MG/ML
10 INJECTION INTRAMUSCULAR; INTRAVENOUS; SUBCUTANEOUS
Refills: 0 | Status: DISCONTINUED | OUTPATIENT
Start: 2021-09-11 | End: 2021-09-14

## 2021-09-11 RX ORDER — PHENYLEPHRINE HYDROCHLORIDE 10 MG/ML
1 INJECTION INTRAVENOUS
Qty: 160 | Refills: 0 | Status: DISCONTINUED | OUTPATIENT
Start: 2021-09-11 | End: 2021-09-11

## 2021-09-11 RX ORDER — CHLORHEXIDINE GLUCONATE 213 G/1000ML
1 SOLUTION TOPICAL
Refills: 0 | Status: DISCONTINUED | OUTPATIENT
Start: 2021-09-11 | End: 2021-10-05

## 2021-09-11 RX ORDER — ALBUMIN HUMAN 25 %
250 VIAL (ML) INTRAVENOUS ONCE
Refills: 0 | Status: COMPLETED | OUTPATIENT
Start: 2021-09-11 | End: 2021-09-11

## 2021-09-11 RX ORDER — FENTANYL CITRATE 50 UG/ML
0.5 INJECTION INTRAVENOUS
Qty: 2500 | Refills: 0 | Status: DISCONTINUED | OUTPATIENT
Start: 2021-09-11 | End: 2021-09-18

## 2021-09-11 RX ADMIN — BUDESONIDE AND FORMOTEROL FUMARATE DIHYDRATE 2 PUFF(S): 160; 4.5 AEROSOL RESPIRATORY (INHALATION) at 23:04

## 2021-09-11 RX ADMIN — DEXMEDETOMIDINE HYDROCHLORIDE IN 0.9% SODIUM CHLORIDE 2.7 MICROGRAM(S)/KG/HR: 4 INJECTION INTRAVENOUS at 15:00

## 2021-09-11 RX ADMIN — PROPOFOL 13 MICROGRAM(S)/KG/MIN: 10 INJECTION, EMULSION INTRAVENOUS at 17:31

## 2021-09-11 RX ADMIN — DEXMEDETOMIDINE HYDROCHLORIDE IN 0.9% SODIUM CHLORIDE 2.7 MICROGRAM(S)/KG/HR: 4 INJECTION INTRAVENOUS at 11:17

## 2021-09-11 RX ADMIN — PIPERACILLIN AND TAZOBACTAM 25 GRAM(S): 4; .5 INJECTION, POWDER, LYOPHILIZED, FOR SOLUTION INTRAVENOUS at 05:01

## 2021-09-11 RX ADMIN — FENTANYL CITRATE 2.7 MICROGRAM(S)/KG/HR: 50 INJECTION INTRAVENOUS at 14:53

## 2021-09-11 RX ADMIN — Medication 112 MICROGRAM(S): at 23:37

## 2021-09-11 RX ADMIN — CHLORHEXIDINE GLUCONATE 15 MILLILITER(S): 213 SOLUTION TOPICAL at 17:28

## 2021-09-11 RX ADMIN — ALBUTEROL 2 PUFF(S): 90 AEROSOL, METERED ORAL at 09:00

## 2021-09-11 RX ADMIN — PHENYLEPHRINE HYDROCHLORIDE 20.3 MICROGRAM(S)/KG/MIN: 10 INJECTION INTRAVENOUS at 13:40

## 2021-09-11 RX ADMIN — BUDESONIDE AND FORMOTEROL FUMARATE DIHYDRATE 2 PUFF(S): 160; 4.5 AEROSOL RESPIRATORY (INHALATION) at 09:03

## 2021-09-11 RX ADMIN — FENTANYL CITRATE 2.7 MICROGRAM(S)/KG/HR: 50 INJECTION INTRAVENOUS at 21:12

## 2021-09-11 RX ADMIN — FENTANYL CITRATE 50 MICROGRAM(S): 50 INJECTION INTRAVENOUS at 12:45

## 2021-09-11 RX ADMIN — ALBUTEROL 2 PUFF(S): 90 AEROSOL, METERED ORAL at 03:26

## 2021-09-11 RX ADMIN — CHLORHEXIDINE GLUCONATE 15 MILLILITER(S): 213 SOLUTION TOPICAL at 05:01

## 2021-09-11 RX ADMIN — PIPERACILLIN AND TAZOBACTAM 25 GRAM(S): 4; .5 INJECTION, POWDER, LYOPHILIZED, FOR SOLUTION INTRAVENOUS at 13:38

## 2021-09-11 RX ADMIN — PROPOFOL 13 MICROGRAM(S)/KG/MIN: 10 INJECTION, EMULSION INTRAVENOUS at 21:11

## 2021-09-11 RX ADMIN — PROPOFOL 13 MICROGRAM(S)/KG/MIN: 10 INJECTION, EMULSION INTRAVENOUS at 11:48

## 2021-09-11 RX ADMIN — ALBUTEROL 2 PUFF(S): 90 AEROSOL, METERED ORAL at 16:14

## 2021-09-11 RX ADMIN — DEXMEDETOMIDINE HYDROCHLORIDE IN 0.9% SODIUM CHLORIDE 2.7 MICROGRAM(S)/KG/HR: 4 INJECTION INTRAVENOUS at 21:12

## 2021-09-11 RX ADMIN — PIPERACILLIN AND TAZOBACTAM 25 GRAM(S): 4; .5 INJECTION, POWDER, LYOPHILIZED, FOR SOLUTION INTRAVENOUS at 21:40

## 2021-09-11 RX ADMIN — PANTOPRAZOLE SODIUM 10 MG/HR: 20 TABLET, DELAYED RELEASE ORAL at 15:21

## 2021-09-11 RX ADMIN — Medication 2.53 MICROGRAM(S)/KG/MIN: at 21:40

## 2021-09-11 RX ADMIN — Medication 125 MILLILITER(S): at 12:45

## 2021-09-11 RX ADMIN — FENTANYL CITRATE 50 MICROGRAM(S): 50 INJECTION INTRAVENOUS at 12:30

## 2021-09-11 NOTE — PROGRESS NOTE ADULT - SUBJECTIVE AND OBJECTIVE BOX
CHIEF COMPLAINT: Transfer to CT ICU for a patient noted to have an esophageal mass at [facility]    PROCEDURE:     - EGD done at outside hospital on 9/9/21 -Large diverticula noted at 28cm filled with blood and blood clots, periphery of diverticula was injected with epinephrine, 5mm distal esophageal tear without bleeding, hiatal hernia.      ISSUES:     - Acute hypoxemic respiratory failure, intubated for airway protection due to hematemesis  - Esophageal diverticular bleed   - Esophageal mass  - COPD  - Afib on eliquis at home  - Hypothyroidism  - HTN      INTERVAL EVENTS:     Transferred from outside hospital for further management of esophageal mass that was noted on CT chest as well as esophageal bleed, esophageal diverticulum      HISTORY:   Unable to obtain, intubated, sedated    PHYSICAL EXAM:   Gen: Comfortable, No acute distress, frail elderly, kyphotic  Eyes: Sclera white, Conjunctiva normal, Eyelids normal, Pupils symmetrical   ENT: Mucous membranes moist, blood in oral cavity  Neck: Trachea midline, 7.0 ETT  CV: Rate regular, Rhythm irregular  Resp: Breath sounds clear, No accessory muscles use,   Abd: Soft, Non-distended, Non-tender, Bowel sounds normal,  ,  ,    Skin: Warm, 1+ peripheral edema of lower extremities,  ,    : bearden  Neuro: Moves ext to pain, minimally opens eyes to pain, sedated  Psych: Sedated on propofol      ASSESSMENT AND PLAN:     NEURO:    Keep sedated with precedex, propofol to keep RASS -1 to -2. Fentanyl PRN.          RESPIRATORY:    Mechanical vent - AC/VC, Vt  350ml, RR 18, PEEP 8, Spo2 maintained 96% with 40% fio2. PIP 22, Pplat 17. Vent bundle, bronchodilators as needed.  Check CXR, ABG.    On Zosyn for likely aspiration PNA.         CARDIOVASCULAR:  Hemodynamically stable - on low dose norepinephrine. Continue hemodynamic monitoring.  Eliquis held. Rate controlled afib.               RENAL:  Stable - Monitor IOs and electrolytes. Keep K above 4.0 and Mg above 2.0.         GASTROINTESTINAL:  NPO  No NGT  PPI Q12h  GI to f/u for EGD Monday in OR with thoracic surgery team for back up. Check COVID swab tomorrow prior to OR.            HEMATOLOGIC:  Check PT/PTT, CBC. Maintain active type and screen. Off anticoagulation.  s/p Kcentra at outside hospital  DVT prophylaxis with SCDs.         INFECTIOUS DISEASE:  No signs of active infection. Will monitor for fever and leukocytosis. Continue zosyn empirically Day 2 today.           ENDOCRINE:  Stable – Monitor glucose fingersticks for goal 120-180.            Pertinent clinical, laboratory, radiographic, telemetry, hemodynamic, respiratory  data and chart were reviewed by myself and analyzed frequently throughout the course of the day and night by myself.    Plan discussed at length with the CTICU staff and Attending CT Surgeon Dr. Lizarraga    Patient required critical care management.  45 minutes were spent evaluating, managing, providing, coordinating, and documenting care for this patient, exclusive of procedures.      _________________________  VITAL SIGNS:  Vital Signs Last 24 Hrs  T(C): 36.6 (11 Sep 2021 10:00), Max: 36.9 (11 Sep 2021 04:00)  T(F): 97.9 (11 Sep 2021 10:00), Max: 98.5 (11 Sep 2021 04:00)  HR: 77 (11 Sep 2021 11:11) (68 - 89)  BP: 94/54 (11 Sep 2021 11:00) (88/58 - 131/71)  BP(mean): 63 (11 Sep 2021 11:00) (59 - 87)  RR: 19 (11 Sep 2021 11:00) (17 - 19)  SpO2: 97% (11 Sep 2021 11:11) (92% - 100%)  I/Os:   I&O's Detail    10 Sep 2021 07:01  -  11 Sep 2021 07:00  --------------------------------------------------------  IN:    Dexmedetomidine: 273.3 mL    FentaNYL: 5.4 mL    IV PiggyBack: 500 mL    Norepinephrine: 80.4 mL    Pantoprazole: 120 mL    Propofol: 327.9 mL  Total IN: 1307 mL    OUT:    Indwelling Catheter - Urethral (mL): 990 mL  Total OUT: 990 mL    Total NET: 317 mL      11 Sep 2021 07:01  -  11 Sep 2021 11:31  --------------------------------------------------------  IN:    Dexmedetomidine: 67.6 mL    Norepinephrine: 14 mL    Pantoprazole: 40 mL    Propofol: 51.8 mL  Total IN: 173.4 mL    OUT:    Indwelling Catheter - Urethral (mL): 380 mL  Total OUT: 380 mL    Total NET: -206.6 mL          Mode: AC/ CMV (Assist Control/ Continuous Mandatory Ventilation)  RR (machine): 18  TV (machine): 350  FiO2: 40  PEEP: 8  ITime: 0.72  MAP: 11  PIP: 24      MEDICATIONS:  MEDICATIONS  (STANDING):  ALBUTerol    90 MICROgram(s) HFA Inhaler 2 Puff(s) Inhalation every 6 hours  budesonide  80 MICROgram(s)/formoterol 4.5 MICROgram(s) Inhaler 2 Puff(s) Inhalation two times a day  chlorhexidine 0.12% Liquid 15 milliLiter(s) Oral Mucosa every 12 hours  dexMEDEtomidine Infusion 0.2 MICROgram(s)/kG/Hr (2.7 mL/Hr) IV Continuous <Continuous>  levothyroxine Injectable 112 MICROGram(s) IV Push at bedtime  norepinephrine Infusion 0.05 MICROgram(s)/kG/Min (5.06 mL/Hr) IV Continuous <Continuous>  pantoprazole Infusion 8 mG/Hr (10 mL/Hr) IV Continuous <Continuous>  piperacillin/tazobactam IVPB.. 3.375 Gram(s) IV Intermittent every 8 hours  propofol Infusion 40 MICROgram(s)/kG/Min (13 mL/Hr) IV Continuous <Continuous>    MEDICATIONS  (PRN):  fentaNYL    Injectable 25 MICROGram(s) IV Push every 2 hours PRN ventilator dysynchrony      LABS:  Laboratory data was independently reviewed by me today.                           10.2   15.82 )-----------( 189      ( 11 Sep 2021 03:21 )             30.7     09-11    140  |  105  |  22  ----------------------------<  147<H>  4.1   |  24  |  0.50    Ca    9.0      11 Sep 2021 03:21  Phos  2.5     09-11  Mg     2.00     09-11    TPro  5.6<L>  /  Alb  3.6  /  TBili  0.4  /  DBili  x   /  AST  15  /  ALT  7   /  AlkPhos  82  09-10    LIVER FUNCTIONS - ( 10 Sep 2021 01:28 )  Alb: 3.6 g/dL / Pro: 5.6 g/dL / ALK PHOS: 82 U/L / ALT: 7 U/L / AST: 15 U/L / GGT: x           PT/INR - ( 11 Sep 2021 03:21 )   PT: 13.7 sec;   INR: 1.20 ratio         PTT - ( 11 Sep 2021 03:21 )  PTT:27.4 sec  ABG - ( 11 Sep 2021 10:40 )  pH, Arterial: 7.43  pH, Blood: x     /  pCO2: 38    /  pO2: 96    / HCO3: 25    / Base Excess: 1.0   /  SaO2: 96.5                  RADIOLOGY:   Radiology images were independently reviewed by me today. Reports were reviewed by me today.    Xray Chest 1 View- PORTABLE-Urgent:   EXAM:  XR CHEST PORTABLE URGENT 1V        PROCEDURE DATE:  Sep 10 2021         INTERPRETATION:  DATE OF STUDY: 9/10/21 at 6:33PM    PRIOR:Earlier 9/10/21 exam    CLINICAL INDICATION: S/P intubation    TECHNIQUE: portable chest.    FINDINGS:  The heart is top normal in size.  ET tube tip appropriately positioned above the mi.  Clear right lung.  Persistent left pleural effusion with associated left basilar atelectasis. No pneumothorax.    IMPRESSION:  ET tube tip appropriately positioned above the mi.    --- End of Report ---              EDYTA HERNANDEZ MD; Attending Radiologist  This document has been electronically signed. Sep 11 2021  8:39AM (09-10-21 @ 18:34)  Xray Chest 1 View- PORTABLE-Urgent:   EXAM:  XR CHEST PORTABLE URGENT 1V        PROCEDURE DATE:  Sep 10 2021         INTERPRETATION:  TIME OF EXAM: September 10, 2021 at 1:59 AM    CLINICAL INFORMATION: Status post intubation; UGI bleed    TECHNIQUE:   Portable chest    INTERPRETATION:    An endotracheal tube is present. The right lung is clear.    On the left side, hemidiaphragm is obscured possibly small effusion with underlying atelectasis. Left upper lobe is clear. The heart is not enlarged.      COMPARISON:  July 10, 2014      IMPRESSION:  Intubated with probable small left effusion.      --- End of Report ---              ONUR AVILES MD; Attending Radiologist  This document has been electronically signed. Sep 10 2021  7:57AM (09-10-21 @ 02:00)   CHIEF COMPLAINT: Transfer to CT ICU for a patient noted to have an esophageal mass at [facility]    PROCEDURE:     - EGD done at outside hospital on 9/9/21 -Large diverticula noted at 28cm filled with blood and blood clots, periphery of diverticula was injected with epinephrine, 5mm distal esophageal tear without bleeding, hiatal hernia.      ISSUES:     - Acute hypoxemic respiratory failure, intubated for airway protection due to hematemesis  - Esophageal diverticular bleed   - Esophageal mass  - COPD  - Afib on eliquis at home  - Hypothyroidism  - HTN      INTERVAL EVENTS:     -Transferred from outside hospital for further management of esophageal mass that was noted on CT chest as well as esophageal bleed, esophageal diverticulum  -Remains intubated, sedated awaiting further work up for esophageal bleeding and mass.  -Noted to desat overnight requiring 100% fio2, nimbex 10mg iv x 1 and increased PEEP. Improved with paralysis, PEEP, suctioning. Fio2 titrated down to 40%, PEEP 8.    HISTORY:   Unable to obtain, intubated, sedated    PHYSICAL EXAM:   Gen: Comfortable, No acute distress, frail elderly, kyphotic  Eyes: Sclera white, Conjunctiva normal, Eyelids normal, Pupils symmetrical   ENT: Mucous membranes moist, blood in oral cavity  Neck: Trachea midline, 7.0 ETT  CV: Rate regular, Rhythm irregular  Resp: Breath sounds clear, No accessory muscles use,   Abd: Soft, Non-distended, Non-tender, Bowel sounds normal,  ,  ,    Skin: Warm, 1+ peripheral edema of lower extremities,  ,    : bearden  Neuro: Moves ext to pain, minimally opens eyes to pain, sedated  Psych: Sedated on propofol      ASSESSMENT AND PLAN:     NEURO:    Keep sedated with precedex, propofol to keep RASS -1 to -2. Fentanyl PRN.          RESPIRATORY:    Mechanical vent - AC/VC, Vt  350ml, RR 18, PEEP 8, Spo2 maintained 96% with 40% fio2. PIP 22, Pplat 17. Vent bundle, bronchodilators as needed.  Check CXR, ABG.    On Zosyn for likely aspiration PNA.         CARDIOVASCULAR:  Hemodynamically stable - on low dose norepinephrine. Continue hemodynamic monitoring.  Eliquis held. Rate controlled afib.               RENAL:  Stable - Monitor IOs and electrolytes. Keep K above 4.0 and Mg above 2.0.         GASTROINTESTINAL:  NPO  No NGT  PPI Q12h  GI to f/u for EGD Monday in OR with thoracic surgery team for back up. Check COVID swab tomorrow prior to OR.            HEMATOLOGIC:  Check PT/PTT, CBC. Maintain active type and screen. Off anticoagulation.  s/p Kcentra at outside hospital  DVT prophylaxis with SCDs.         INFECTIOUS DISEASE:  No signs of active infection. Will monitor for fever and leukocytosis. Continue zosyn empirically Day 2 today.           ENDOCRINE:  Stable – Monitor glucose fingersticks for goal 120-180.            Pertinent clinical, laboratory, radiographic, telemetry, hemodynamic, respiratory  data and chart were reviewed by myself and analyzed frequently throughout the course of the day and night by myself.    Plan discussed at length with the CTICU staff and Attending CT Surgeon Dr. Lizarraga    Patient required critical care management.  45 minutes were spent evaluating, managing, providing, coordinating, and documenting care for this patient, exclusive of procedures.      _________________________  VITAL SIGNS:  Vital Signs Last 24 Hrs  T(C): 36.6 (11 Sep 2021 10:00), Max: 36.9 (11 Sep 2021 04:00)  T(F): 97.9 (11 Sep 2021 10:00), Max: 98.5 (11 Sep 2021 04:00)  HR: 77 (11 Sep 2021 11:11) (68 - 89)  BP: 94/54 (11 Sep 2021 11:00) (88/58 - 131/71)  BP(mean): 63 (11 Sep 2021 11:00) (59 - 87)  RR: 19 (11 Sep 2021 11:00) (17 - 19)  SpO2: 97% (11 Sep 2021 11:11) (92% - 100%)  I/Os:   I&O's Detail    10 Sep 2021 07:01  -  11 Sep 2021 07:00  --------------------------------------------------------  IN:    Dexmedetomidine: 273.3 mL    FentaNYL: 5.4 mL    IV PiggyBack: 500 mL    Norepinephrine: 80.4 mL    Pantoprazole: 120 mL    Propofol: 327.9 mL  Total IN: 1307 mL    OUT:    Indwelling Catheter - Urethral (mL): 990 mL  Total OUT: 990 mL    Total NET: 317 mL      11 Sep 2021 07:01  -  11 Sep 2021 11:31  --------------------------------------------------------  IN:    Dexmedetomidine: 67.6 mL    Norepinephrine: 14 mL    Pantoprazole: 40 mL    Propofol: 51.8 mL  Total IN: 173.4 mL    OUT:    Indwelling Catheter - Urethral (mL): 380 mL  Total OUT: 380 mL    Total NET: -206.6 mL          Mode: AC/ CMV (Assist Control/ Continuous Mandatory Ventilation)  RR (machine): 18  TV (machine): 350  FiO2: 40  PEEP: 8  ITime: 0.72  MAP: 11  PIP: 24      MEDICATIONS:  MEDICATIONS  (STANDING):  ALBUTerol    90 MICROgram(s) HFA Inhaler 2 Puff(s) Inhalation every 6 hours  budesonide  80 MICROgram(s)/formoterol 4.5 MICROgram(s) Inhaler 2 Puff(s) Inhalation two times a day  chlorhexidine 0.12% Liquid 15 milliLiter(s) Oral Mucosa every 12 hours  dexMEDEtomidine Infusion 0.2 MICROgram(s)/kG/Hr (2.7 mL/Hr) IV Continuous <Continuous>  levothyroxine Injectable 112 MICROGram(s) IV Push at bedtime  norepinephrine Infusion 0.05 MICROgram(s)/kG/Min (5.06 mL/Hr) IV Continuous <Continuous>  pantoprazole Infusion 8 mG/Hr (10 mL/Hr) IV Continuous <Continuous>  piperacillin/tazobactam IVPB.. 3.375 Gram(s) IV Intermittent every 8 hours  propofol Infusion 40 MICROgram(s)/kG/Min (13 mL/Hr) IV Continuous <Continuous>    MEDICATIONS  (PRN):  fentaNYL    Injectable 25 MICROGram(s) IV Push every 2 hours PRN ventilator dysynchrony      LABS:  Laboratory data was independently reviewed by me today.                           10.2   15.82 )-----------( 189      ( 11 Sep 2021 03:21 )             30.7     09-11    140  |  105  |  22  ----------------------------<  147<H>  4.1   |  24  |  0.50    Ca    9.0      11 Sep 2021 03:21  Phos  2.5     09-11  Mg     2.00     09-11    TPro  5.6<L>  /  Alb  3.6  /  TBili  0.4  /  DBili  x   /  AST  15  /  ALT  7   /  AlkPhos  82  09-10    LIVER FUNCTIONS - ( 10 Sep 2021 01:28 )  Alb: 3.6 g/dL / Pro: 5.6 g/dL / ALK PHOS: 82 U/L / ALT: 7 U/L / AST: 15 U/L / GGT: x           PT/INR - ( 11 Sep 2021 03:21 )   PT: 13.7 sec;   INR: 1.20 ratio         PTT - ( 11 Sep 2021 03:21 )  PTT:27.4 sec  ABG - ( 11 Sep 2021 10:40 )  pH, Arterial: 7.43  pH, Blood: x     /  pCO2: 38    /  pO2: 96    / HCO3: 25    / Base Excess: 1.0   /  SaO2: 96.5                  RADIOLOGY:   Radiology images were independently reviewed by me today. Reports were reviewed by me today.    Xray Chest 1 View- PORTABLE-Urgent:   EXAM:  XR CHEST PORTABLE URGENT 1V        PROCEDURE DATE:  Sep 10 2021         INTERPRETATION:  DATE OF STUDY: 9/10/21 at 6:33PM    PRIOR:Earlier 9/10/21 exam    CLINICAL INDICATION: S/P intubation    TECHNIQUE: portable chest.    FINDINGS:  The heart is top normal in size.  ET tube tip appropriately positioned above the mi.  Clear right lung.  Persistent left pleural effusion with associated left basilar atelectasis. No pneumothorax.    IMPRESSION:  ET tube tip appropriately positioned above the mi.    --- End of Report ---              EDYTA HERNANDEZ MD; Attending Radiologist  This document has been electronically signed. Sep 11 2021  8:39AM (09-10-21 @ 18:34)  Xray Chest 1 View- PORTABLE-Urgent:   EXAM:  XR CHEST PORTABLE URGENT 1V        PROCEDURE DATE:  Sep 10 2021         INTERPRETATION:  TIME OF EXAM: September 10, 2021 at 1:59 AM    CLINICAL INFORMATION: Status post intubation; UGI bleed    TECHNIQUE:   Portable chest    INTERPRETATION:    An endotracheal tube is present. The right lung is clear.    On the left side, hemidiaphragm is obscured possibly small effusion with underlying atelectasis. Left upper lobe is clear. The heart is not enlarged.      COMPARISON:  July 10, 2014      IMPRESSION:  Intubated with probable small left effusion.      --- End of Report ---              ONUR AVILES MD; Attending Radiologist  This document has been electronically signed. Sep 10 2021  7:57AM (09-10-21 @ 02:00)

## 2021-09-12 LAB
ANION GAP SERPL CALC-SCNC: 12 MMOL/L — SIGNIFICANT CHANGE UP (ref 7–14)
ANION GAP SERPL CALC-SCNC: 12 MMOL/L — SIGNIFICANT CHANGE UP (ref 7–14)
ANION GAP SERPL CALC-SCNC: 9 MMOL/L — SIGNIFICANT CHANGE UP (ref 7–14)
APTT BLD: 27.7 SEC — SIGNIFICANT CHANGE UP (ref 27–36.3)
BASOPHILS # BLD AUTO: 0.08 K/UL — SIGNIFICANT CHANGE UP (ref 0–0.2)
BASOPHILS NFR BLD AUTO: 0.6 % — SIGNIFICANT CHANGE UP (ref 0–2)
BLOOD GAS ARTERIAL COMPREHENSIVE RESULT: SIGNIFICANT CHANGE UP
BLOOD GAS ARTERIAL COMPREHENSIVE RESULT: SIGNIFICANT CHANGE UP
BUN SERPL-MCNC: 10 MG/DL — SIGNIFICANT CHANGE UP (ref 7–23)
BUN SERPL-MCNC: 10 MG/DL — SIGNIFICANT CHANGE UP (ref 7–23)
BUN SERPL-MCNC: 14 MG/DL — SIGNIFICANT CHANGE UP (ref 7–23)
CALCIUM SERPL-MCNC: 8.9 MG/DL — SIGNIFICANT CHANGE UP (ref 8.4–10.5)
CALCIUM SERPL-MCNC: 9 MG/DL — SIGNIFICANT CHANGE UP (ref 8.4–10.5)
CALCIUM SERPL-MCNC: 9 MG/DL — SIGNIFICANT CHANGE UP (ref 8.4–10.5)
CHLORIDE SERPL-SCNC: 103 MMOL/L — SIGNIFICANT CHANGE UP (ref 98–107)
CHLORIDE SERPL-SCNC: 103 MMOL/L — SIGNIFICANT CHANGE UP (ref 98–107)
CHLORIDE SERPL-SCNC: 107 MMOL/L — SIGNIFICANT CHANGE UP (ref 98–107)
CO2 SERPL-SCNC: 23 MMOL/L — SIGNIFICANT CHANGE UP (ref 22–31)
CO2 SERPL-SCNC: 24 MMOL/L — SIGNIFICANT CHANGE UP (ref 22–31)
CO2 SERPL-SCNC: 25 MMOL/L — SIGNIFICANT CHANGE UP (ref 22–31)
CREAT SERPL-MCNC: 0.44 MG/DL — LOW (ref 0.5–1.3)
CREAT SERPL-MCNC: 0.44 MG/DL — LOW (ref 0.5–1.3)
CREAT SERPL-MCNC: 0.46 MG/DL — LOW (ref 0.5–1.3)
CULTURE RESULTS: SIGNIFICANT CHANGE UP
EOSINOPHIL # BLD AUTO: 0.26 K/UL — SIGNIFICANT CHANGE UP (ref 0–0.5)
EOSINOPHIL NFR BLD AUTO: 1.8 % — SIGNIFICANT CHANGE UP (ref 0–6)
GLUCOSE BLDC GLUCOMTR-MCNC: 107 MG/DL — HIGH (ref 70–99)
GLUCOSE BLDC GLUCOMTR-MCNC: 124 MG/DL — HIGH (ref 70–99)
GLUCOSE SERPL-MCNC: 127 MG/DL — HIGH (ref 70–99)
GLUCOSE SERPL-MCNC: 131 MG/DL — HIGH (ref 70–99)
GLUCOSE SERPL-MCNC: 144 MG/DL — HIGH (ref 70–99)
HCT VFR BLD CALC: 30 % — LOW (ref 34.5–45)
HCT VFR BLD CALC: 30 % — LOW (ref 34.5–45)
HCT VFR BLD CALC: 30.5 % — LOW (ref 34.5–45)
HGB BLD-MCNC: 10.2 G/DL — LOW (ref 11.5–15.5)
HGB BLD-MCNC: 9.7 G/DL — LOW (ref 11.5–15.5)
HGB BLD-MCNC: 9.9 G/DL — LOW (ref 11.5–15.5)
IANC: 11.27 K/UL — HIGH (ref 1.5–8.5)
IMM GRANULOCYTES NFR BLD AUTO: 0.4 % — SIGNIFICANT CHANGE UP (ref 0–1.5)
INR BLD: 1.38 RATIO — HIGH (ref 0.88–1.16)
LYMPHOCYTES # BLD AUTO: 1.37 K/UL — SIGNIFICANT CHANGE UP (ref 1–3.3)
LYMPHOCYTES # BLD AUTO: 9.6 % — LOW (ref 13–44)
MAGNESIUM SERPL-MCNC: 1.8 MG/DL — SIGNIFICANT CHANGE UP (ref 1.6–2.6)
MAGNESIUM SERPL-MCNC: 1.9 MG/DL — SIGNIFICANT CHANGE UP (ref 1.6–2.6)
MAGNESIUM SERPL-MCNC: 2 MG/DL — SIGNIFICANT CHANGE UP (ref 1.6–2.6)
MCHC RBC-ENTMCNC: 30 PG — SIGNIFICANT CHANGE UP (ref 27–34)
MCHC RBC-ENTMCNC: 30.8 PG — SIGNIFICANT CHANGE UP (ref 27–34)
MCHC RBC-ENTMCNC: 30.9 PG — SIGNIFICANT CHANGE UP (ref 27–34)
MCHC RBC-ENTMCNC: 32.3 GM/DL — SIGNIFICANT CHANGE UP (ref 32–36)
MCHC RBC-ENTMCNC: 33 GM/DL — SIGNIFICANT CHANGE UP (ref 32–36)
MCHC RBC-ENTMCNC: 33.4 GM/DL — SIGNIFICANT CHANGE UP (ref 32–36)
MCV RBC AUTO: 92.4 FL — SIGNIFICANT CHANGE UP (ref 80–100)
MCV RBC AUTO: 92.9 FL — SIGNIFICANT CHANGE UP (ref 80–100)
MCV RBC AUTO: 93.5 FL — SIGNIFICANT CHANGE UP (ref 80–100)
MONOCYTES # BLD AUTO: 1.25 K/UL — HIGH (ref 0–0.9)
MONOCYTES NFR BLD AUTO: 8.8 % — SIGNIFICANT CHANGE UP (ref 2–14)
NEUTROPHILS # BLD AUTO: 11.27 K/UL — HIGH (ref 1.8–7.4)
NEUTROPHILS NFR BLD AUTO: 78.8 % — HIGH (ref 43–77)
NRBC # BLD: 0 /100 WBCS — SIGNIFICANT CHANGE UP
NRBC # FLD: 0 K/UL — SIGNIFICANT CHANGE UP
PHOSPHATE SERPL-MCNC: 2.1 MG/DL — LOW (ref 2.5–4.5)
PHOSPHATE SERPL-MCNC: 2.4 MG/DL — LOW (ref 2.5–4.5)
PHOSPHATE SERPL-MCNC: 3.5 MG/DL — SIGNIFICANT CHANGE UP (ref 2.5–4.5)
PLATELET # BLD AUTO: 195 K/UL — SIGNIFICANT CHANGE UP (ref 150–400)
PLATELET # BLD AUTO: 206 K/UL — SIGNIFICANT CHANGE UP (ref 150–400)
PLATELET # BLD AUTO: 210 K/UL — SIGNIFICANT CHANGE UP (ref 150–400)
POTASSIUM SERPL-MCNC: 3.2 MMOL/L — LOW (ref 3.5–5.3)
POTASSIUM SERPL-MCNC: 3.3 MMOL/L — LOW (ref 3.5–5.3)
POTASSIUM SERPL-MCNC: 4.5 MMOL/L — SIGNIFICANT CHANGE UP (ref 3.5–5.3)
POTASSIUM SERPL-SCNC: 3.2 MMOL/L — LOW (ref 3.5–5.3)
POTASSIUM SERPL-SCNC: 3.3 MMOL/L — LOW (ref 3.5–5.3)
POTASSIUM SERPL-SCNC: 4.5 MMOL/L — SIGNIFICANT CHANGE UP (ref 3.5–5.3)
PROTHROM AB SERPL-ACNC: 15.5 SEC — HIGH (ref 10.6–13.6)
RBC # BLD: 3.21 M/UL — LOW (ref 3.8–5.2)
RBC # BLD: 3.23 M/UL — LOW (ref 3.8–5.2)
RBC # BLD: 3.3 M/UL — LOW (ref 3.8–5.2)
RBC # FLD: 15.5 % — HIGH (ref 10.3–14.5)
RBC # FLD: 15.6 % — HIGH (ref 10.3–14.5)
RBC # FLD: 15.8 % — HIGH (ref 10.3–14.5)
SARS-COV-2 RNA SPEC QL NAA+PROBE: SIGNIFICANT CHANGE UP
SODIUM SERPL-SCNC: 139 MMOL/L — SIGNIFICANT CHANGE UP (ref 135–145)
SODIUM SERPL-SCNC: 139 MMOL/L — SIGNIFICANT CHANGE UP (ref 135–145)
SODIUM SERPL-SCNC: 140 MMOL/L — SIGNIFICANT CHANGE UP (ref 135–145)
SPECIMEN SOURCE: SIGNIFICANT CHANGE UP
WBC # BLD: 12.39 K/UL — HIGH (ref 3.8–10.5)
WBC # BLD: 12.46 K/UL — HIGH (ref 3.8–10.5)
WBC # BLD: 14.28 K/UL — HIGH (ref 3.8–10.5)
WBC # FLD AUTO: 12.39 K/UL — HIGH (ref 3.8–10.5)
WBC # FLD AUTO: 12.46 K/UL — HIGH (ref 3.8–10.5)
WBC # FLD AUTO: 14.28 K/UL — HIGH (ref 3.8–10.5)

## 2021-09-12 PROCEDURE — 99291 CRITICAL CARE FIRST HOUR: CPT

## 2021-09-12 PROCEDURE — 71045 X-RAY EXAM CHEST 1 VIEW: CPT | Mod: 26

## 2021-09-12 PROCEDURE — 93970 EXTREMITY STUDY: CPT | Mod: 26

## 2021-09-12 RX ORDER — POTASSIUM PHOSPHATE, MONOBASIC POTASSIUM PHOSPHATE, DIBASIC 236; 224 MG/ML; MG/ML
15 INJECTION, SOLUTION INTRAVENOUS ONCE
Refills: 0 | Status: COMPLETED | OUTPATIENT
Start: 2021-09-12 | End: 2021-09-12

## 2021-09-12 RX ORDER — POTASSIUM CHLORIDE 20 MEQ
10 PACKET (EA) ORAL
Refills: 0 | Status: COMPLETED | OUTPATIENT
Start: 2021-09-12 | End: 2021-09-12

## 2021-09-12 RX ORDER — POTASSIUM CHLORIDE 20 MEQ
20 PACKET (EA) ORAL ONCE
Refills: 0 | Status: DISCONTINUED | OUTPATIENT
Start: 2021-09-12 | End: 2021-09-12

## 2021-09-12 RX ORDER — DEXTROSE MONOHYDRATE, SODIUM CHLORIDE, AND POTASSIUM CHLORIDE 50; .745; 4.5 G/1000ML; G/1000ML; G/1000ML
1000 INJECTION, SOLUTION INTRAVENOUS
Refills: 0 | Status: DISCONTINUED | OUTPATIENT
Start: 2021-09-12 | End: 2021-09-14

## 2021-09-12 RX ORDER — MAGNESIUM SULFATE 500 MG/ML
1 VIAL (ML) INJECTION ONCE
Refills: 0 | Status: COMPLETED | OUTPATIENT
Start: 2021-09-12 | End: 2021-09-12

## 2021-09-12 RX ORDER — PHYTONADIONE (VIT K1) 5 MG
10 TABLET ORAL ONCE
Refills: 0 | Status: COMPLETED | OUTPATIENT
Start: 2021-09-12 | End: 2021-09-12

## 2021-09-12 RX ADMIN — CHLORHEXIDINE GLUCONATE 15 MILLILITER(S): 213 SOLUTION TOPICAL at 17:38

## 2021-09-12 RX ADMIN — PANTOPRAZOLE SODIUM 10 MG/HR: 20 TABLET, DELAYED RELEASE ORAL at 09:54

## 2021-09-12 RX ADMIN — Medication 100 MILLIEQUIVALENT(S): at 17:21

## 2021-09-12 RX ADMIN — CHLORHEXIDINE GLUCONATE 15 MILLILITER(S): 213 SOLUTION TOPICAL at 05:16

## 2021-09-12 RX ADMIN — FENTANYL CITRATE 2.7 MICROGRAM(S)/KG/HR: 50 INJECTION INTRAVENOUS at 20:53

## 2021-09-12 RX ADMIN — Medication 100 MILLIEQUIVALENT(S): at 20:53

## 2021-09-12 RX ADMIN — BUDESONIDE AND FORMOTEROL FUMARATE DIHYDRATE 2 PUFF(S): 160; 4.5 AEROSOL RESPIRATORY (INHALATION) at 22:22

## 2021-09-12 RX ADMIN — Medication 100 MILLIEQUIVALENT(S): at 07:40

## 2021-09-12 RX ADMIN — Medication 100 MILLIEQUIVALENT(S): at 11:30

## 2021-09-12 RX ADMIN — ALBUTEROL 2 PUFF(S): 90 AEROSOL, METERED ORAL at 22:22

## 2021-09-12 RX ADMIN — DEXTROSE MONOHYDRATE, SODIUM CHLORIDE, AND POTASSIUM CHLORIDE 20 MILLILITER(S): 50; .745; 4.5 INJECTION, SOLUTION INTRAVENOUS at 20:54

## 2021-09-12 RX ADMIN — Medication 112 MICROGRAM(S): at 22:59

## 2021-09-12 RX ADMIN — Medication 100 MILLIEQUIVALENT(S): at 18:57

## 2021-09-12 RX ADMIN — PROPOFOL 13 MICROGRAM(S)/KG/MIN: 10 INJECTION, EMULSION INTRAVENOUS at 09:14

## 2021-09-12 RX ADMIN — CHLORHEXIDINE GLUCONATE 1 APPLICATION(S): 213 SOLUTION TOPICAL at 05:16

## 2021-09-12 RX ADMIN — ALBUTEROL 2 PUFF(S): 90 AEROSOL, METERED ORAL at 03:20

## 2021-09-12 RX ADMIN — PIPERACILLIN AND TAZOBACTAM 25 GRAM(S): 4; .5 INJECTION, POWDER, LYOPHILIZED, FOR SOLUTION INTRAVENOUS at 22:59

## 2021-09-12 RX ADMIN — ALBUTEROL 2 PUFF(S): 90 AEROSOL, METERED ORAL at 10:46

## 2021-09-12 RX ADMIN — Medication 102 MILLIGRAM(S): at 09:16

## 2021-09-12 RX ADMIN — PANTOPRAZOLE SODIUM 10 MG/HR: 20 TABLET, DELAYED RELEASE ORAL at 20:53

## 2021-09-12 RX ADMIN — BUDESONIDE AND FORMOTEROL FUMARATE DIHYDRATE 2 PUFF(S): 160; 4.5 AEROSOL RESPIRATORY (INHALATION) at 10:46

## 2021-09-12 RX ADMIN — Medication 2.53 MICROGRAM(S)/KG/MIN: at 20:53

## 2021-09-12 RX ADMIN — DEXMEDETOMIDINE HYDROCHLORIDE IN 0.9% SODIUM CHLORIDE 2.7 MICROGRAM(S)/KG/HR: 4 INJECTION INTRAVENOUS at 20:54

## 2021-09-12 RX ADMIN — DEXTROSE MONOHYDRATE, SODIUM CHLORIDE, AND POTASSIUM CHLORIDE 20 MILLILITER(S): 50; .745; 4.5 INJECTION, SOLUTION INTRAVENOUS at 10:30

## 2021-09-12 RX ADMIN — Medication 100 GRAM(S): at 10:31

## 2021-09-12 RX ADMIN — PROPOFOL 13 MICROGRAM(S)/KG/MIN: 10 INJECTION, EMULSION INTRAVENOUS at 17:22

## 2021-09-12 RX ADMIN — ALBUTEROL 2 PUFF(S): 90 AEROSOL, METERED ORAL at 15:06

## 2021-09-12 RX ADMIN — POTASSIUM PHOSPHATE, MONOBASIC POTASSIUM PHOSPHATE, DIBASIC 62.5 MILLIMOLE(S): 236; 224 INJECTION, SOLUTION INTRAVENOUS at 17:38

## 2021-09-12 RX ADMIN — Medication 100 MILLIEQUIVALENT(S): at 09:53

## 2021-09-12 RX ADMIN — PIPERACILLIN AND TAZOBACTAM 25 GRAM(S): 4; .5 INJECTION, POWDER, LYOPHILIZED, FOR SOLUTION INTRAVENOUS at 13:01

## 2021-09-12 RX ADMIN — PROPOFOL 13 MICROGRAM(S)/KG/MIN: 10 INJECTION, EMULSION INTRAVENOUS at 20:53

## 2021-09-12 RX ADMIN — PIPERACILLIN AND TAZOBACTAM 25 GRAM(S): 4; .5 INJECTION, POWDER, LYOPHILIZED, FOR SOLUTION INTRAVENOUS at 05:16

## 2021-09-12 RX ADMIN — DEXMEDETOMIDINE HYDROCHLORIDE IN 0.9% SODIUM CHLORIDE 2.7 MICROGRAM(S)/KG/HR: 4 INJECTION INTRAVENOUS at 13:01

## 2021-09-13 LAB
ANION GAP SERPL CALC-SCNC: 12 MMOL/L — SIGNIFICANT CHANGE UP (ref 7–14)
APTT BLD: 26.4 SEC — LOW (ref 27–36.3)
BLD GP AB SCN SERPL QL: NEGATIVE — SIGNIFICANT CHANGE UP
BLOOD GAS ARTERIAL COMPREHENSIVE RESULT: SIGNIFICANT CHANGE UP
BUN SERPL-MCNC: 9 MG/DL — SIGNIFICANT CHANGE UP (ref 7–23)
CALCIUM SERPL-MCNC: 9.1 MG/DL — SIGNIFICANT CHANGE UP (ref 8.4–10.5)
CHLORIDE SERPL-SCNC: 103 MMOL/L — SIGNIFICANT CHANGE UP (ref 98–107)
CO2 SERPL-SCNC: 24 MMOL/L — SIGNIFICANT CHANGE UP (ref 22–31)
CREAT SERPL-MCNC: 0.4 MG/DL — LOW (ref 0.5–1.3)
GLUCOSE BLDC GLUCOMTR-MCNC: 137 MG/DL — HIGH (ref 70–99)
GLUCOSE BLDC GLUCOMTR-MCNC: 140 MG/DL — HIGH (ref 70–99)
GLUCOSE SERPL-MCNC: 141 MG/DL — HIGH (ref 70–99)
HCT VFR BLD CALC: 29.4 % — LOW (ref 34.5–45)
HGB BLD-MCNC: 9.7 G/DL — LOW (ref 11.5–15.5)
INR BLD: 1.32 RATIO — HIGH (ref 0.88–1.16)
MAGNESIUM SERPL-MCNC: 1.8 MG/DL — SIGNIFICANT CHANGE UP (ref 1.6–2.6)
MCHC RBC-ENTMCNC: 31.2 PG — SIGNIFICANT CHANGE UP (ref 27–34)
MCHC RBC-ENTMCNC: 33 GM/DL — SIGNIFICANT CHANGE UP (ref 32–36)
MCV RBC AUTO: 94.5 FL — SIGNIFICANT CHANGE UP (ref 80–100)
NRBC # BLD: 0 /100 WBCS — SIGNIFICANT CHANGE UP
NRBC # FLD: 0 K/UL — SIGNIFICANT CHANGE UP
PHOSPHATE SERPL-MCNC: 2.6 MG/DL — SIGNIFICANT CHANGE UP (ref 2.5–4.5)
PLATELET # BLD AUTO: 223 K/UL — SIGNIFICANT CHANGE UP (ref 150–400)
POTASSIUM SERPL-MCNC: 4 MMOL/L — SIGNIFICANT CHANGE UP (ref 3.5–5.3)
POTASSIUM SERPL-SCNC: 4 MMOL/L — SIGNIFICANT CHANGE UP (ref 3.5–5.3)
PROTHROM AB SERPL-ACNC: 15 SEC — HIGH (ref 10.6–13.6)
RBC # BLD: 3.11 M/UL — LOW (ref 3.8–5.2)
RBC # FLD: 15.6 % — HIGH (ref 10.3–14.5)
RH IG SCN BLD-IMP: POSITIVE — SIGNIFICANT CHANGE UP
SODIUM SERPL-SCNC: 139 MMOL/L — SIGNIFICANT CHANGE UP (ref 135–145)
WBC # BLD: 10.69 K/UL — HIGH (ref 3.8–10.5)
WBC # FLD AUTO: 10.69 K/UL — HIGH (ref 3.8–10.5)

## 2021-09-13 PROCEDURE — 74177 CT ABD & PELVIS W/CONTRAST: CPT | Mod: 26

## 2021-09-13 PROCEDURE — 99291 CRITICAL CARE FIRST HOUR: CPT

## 2021-09-13 PROCEDURE — 99232 SBSQ HOSP IP/OBS MODERATE 35: CPT | Mod: GC

## 2021-09-13 PROCEDURE — 71260 CT THORAX DX C+: CPT | Mod: 26

## 2021-09-13 PROCEDURE — 71045 X-RAY EXAM CHEST 1 VIEW: CPT | Mod: 26

## 2021-09-13 RX ORDER — ALBUMIN HUMAN 25 %
250 VIAL (ML) INTRAVENOUS ONCE
Refills: 0 | Status: COMPLETED | OUTPATIENT
Start: 2021-09-13 | End: 2021-09-13

## 2021-09-13 RX ORDER — MAGNESIUM SULFATE 500 MG/ML
2 VIAL (ML) INJECTION ONCE
Refills: 0 | Status: COMPLETED | OUTPATIENT
Start: 2021-09-13 | End: 2021-09-13

## 2021-09-13 RX ORDER — POTASSIUM CHLORIDE 20 MEQ
20 PACKET (EA) ORAL
Refills: 0 | Status: DISCONTINUED | OUTPATIENT
Start: 2021-09-13 | End: 2021-09-13

## 2021-09-13 RX ORDER — POTASSIUM CHLORIDE 20 MEQ
10 PACKET (EA) ORAL
Refills: 0 | Status: COMPLETED | OUTPATIENT
Start: 2021-09-13 | End: 2021-09-13

## 2021-09-13 RX ORDER — FUROSEMIDE 40 MG
40 TABLET ORAL ONCE
Refills: 0 | Status: COMPLETED | OUTPATIENT
Start: 2021-09-13 | End: 2021-09-13

## 2021-09-13 RX ADMIN — DEXMEDETOMIDINE HYDROCHLORIDE IN 0.9% SODIUM CHLORIDE 2.7 MICROGRAM(S)/KG/HR: 4 INJECTION INTRAVENOUS at 22:05

## 2021-09-13 RX ADMIN — Medication 2.53 MICROGRAM(S)/KG/MIN: at 22:05

## 2021-09-13 RX ADMIN — PIPERACILLIN AND TAZOBACTAM 25 GRAM(S): 4; .5 INJECTION, POWDER, LYOPHILIZED, FOR SOLUTION INTRAVENOUS at 22:04

## 2021-09-13 RX ADMIN — PANTOPRAZOLE SODIUM 10 MG/HR: 20 TABLET, DELAYED RELEASE ORAL at 17:29

## 2021-09-13 RX ADMIN — Medication 50 GRAM(S): at 06:47

## 2021-09-13 RX ADMIN — ALBUTEROL 2 PUFF(S): 90 AEROSOL, METERED ORAL at 23:35

## 2021-09-13 RX ADMIN — Medication 100 MILLIEQUIVALENT(S): at 08:49

## 2021-09-13 RX ADMIN — CHLORHEXIDINE GLUCONATE 15 MILLILITER(S): 213 SOLUTION TOPICAL at 17:12

## 2021-09-13 RX ADMIN — PANTOPRAZOLE SODIUM 10 MG/HR: 20 TABLET, DELAYED RELEASE ORAL at 22:08

## 2021-09-13 RX ADMIN — PIPERACILLIN AND TAZOBACTAM 25 GRAM(S): 4; .5 INJECTION, POWDER, LYOPHILIZED, FOR SOLUTION INTRAVENOUS at 13:11

## 2021-09-13 RX ADMIN — Medication 112 MICROGRAM(S): at 22:26

## 2021-09-13 RX ADMIN — ALBUTEROL 2 PUFF(S): 90 AEROSOL, METERED ORAL at 15:23

## 2021-09-13 RX ADMIN — BUDESONIDE AND FORMOTEROL FUMARATE DIHYDRATE 2 PUFF(S): 160; 4.5 AEROSOL RESPIRATORY (INHALATION) at 23:35

## 2021-09-13 RX ADMIN — ALBUTEROL 2 PUFF(S): 90 AEROSOL, METERED ORAL at 09:42

## 2021-09-13 RX ADMIN — Medication 2.53 MICROGRAM(S)/KG/MIN: at 17:29

## 2021-09-13 RX ADMIN — PIPERACILLIN AND TAZOBACTAM 25 GRAM(S): 4; .5 INJECTION, POWDER, LYOPHILIZED, FOR SOLUTION INTRAVENOUS at 05:39

## 2021-09-13 RX ADMIN — FENTANYL CITRATE 2.7 MICROGRAM(S)/KG/HR: 50 INJECTION INTRAVENOUS at 18:34

## 2021-09-13 RX ADMIN — FENTANYL CITRATE 2.7 MICROGRAM(S)/KG/HR: 50 INJECTION INTRAVENOUS at 22:06

## 2021-09-13 RX ADMIN — CHLORHEXIDINE GLUCONATE 15 MILLILITER(S): 213 SOLUTION TOPICAL at 05:39

## 2021-09-13 RX ADMIN — Medication 40 MILLIGRAM(S): at 08:49

## 2021-09-13 RX ADMIN — Medication 100 MILLIEQUIVALENT(S): at 10:52

## 2021-09-13 RX ADMIN — DEXTROSE MONOHYDRATE, SODIUM CHLORIDE, AND POTASSIUM CHLORIDE 20 MILLILITER(S): 50; .745; 4.5 INJECTION, SOLUTION INTRAVENOUS at 22:04

## 2021-09-13 RX ADMIN — Medication 125 MILLILITER(S): at 16:00

## 2021-09-13 RX ADMIN — CHLORHEXIDINE GLUCONATE 1 APPLICATION(S): 213 SOLUTION TOPICAL at 05:34

## 2021-09-13 RX ADMIN — Medication 100 MILLIEQUIVALENT(S): at 12:04

## 2021-09-13 RX ADMIN — Medication 1 ENEMA: at 20:00

## 2021-09-13 RX ADMIN — DEXMEDETOMIDINE HYDROCHLORIDE IN 0.9% SODIUM CHLORIDE 2.7 MICROGRAM(S)/KG/HR: 4 INJECTION INTRAVENOUS at 13:11

## 2021-09-13 RX ADMIN — BUDESONIDE AND FORMOTEROL FUMARATE DIHYDRATE 2 PUFF(S): 160; 4.5 AEROSOL RESPIRATORY (INHALATION) at 09:42

## 2021-09-13 RX ADMIN — PROPOFOL 13 MICROGRAM(S)/KG/MIN: 10 INJECTION, EMULSION INTRAVENOUS at 22:05

## 2021-09-13 RX ADMIN — ALBUTEROL 2 PUFF(S): 90 AEROSOL, METERED ORAL at 03:17

## 2021-09-13 RX ADMIN — PROPOFOL 13 MICROGRAM(S)/KG/MIN: 10 INJECTION, EMULSION INTRAVENOUS at 12:11

## 2021-09-13 RX ADMIN — PROPOFOL 13 MICROGRAM(S)/KG/MIN: 10 INJECTION, EMULSION INTRAVENOUS at 18:33

## 2021-09-13 NOTE — DIETITIAN INITIAL EVALUATION ADULT. - OTHER INFO
81 y. o. female BIBA to [facility] from nursing facility c/o hematemesis.  According to pt it was the first episode and happened while she was eating dinner. Followed by multiple episodes of N/V with bright blood and epigastric cramps.  She was intubated for airway protection and underwent EGD that revealed large diverticula at 28 cm filled with blood and 5 mm tear at distal esophagus, a hiatal hernia and large amount of blood in the fundus.  Diverticula was injected with Epinephrine.  Patient was transferred to [facility] for further management.

## 2021-09-13 NOTE — DIETITIAN INITIAL EVALUATION ADULT. - ORAL INTAKE PTA/DIET HISTORY
Unable to obtain nutrition hx at this time.  Pt intubated/sedated.  Extensive chart review conducted to obtain nutrition related information.  Spoke w/RN and PA.  Noted unable to place NGT access at this time.  PN being considered.  Alerted Nutrition Support PA for the initiation of PN.

## 2021-09-13 NOTE — PROGRESS NOTE ADULT - ATTENDING COMMENTS
No overt bleeding reported.   Patient remains intubated and sedated in ICU. Cachectic appearing.   Continue high-dose PPI.   Agree with repeat CT imaging as recommended by CT surgery.   Will coordinate with CT surgery, Dr. Zuñiga, prior to endoscopic evaluation (likely Tuesday AM or Wednesday AM).

## 2021-09-13 NOTE — DIETITIAN INITIAL EVALUATION ADULT. - ADD RECOMMEND
1) Monitor weights, labs, BM's, skin integrity; 2)  Nutrition Support Team consult for initiation of PN

## 2021-09-13 NOTE — PROGRESS NOTE ADULT - ASSESSMENT
81F resident @ assisted nursing facility Hx known esophageal diverticulum (since 9/2020) + food impactions, afib on eliquis, IVC filter (further Hx unknown), GERD, COPD, hypothyroidism, HTN, multiple thoracic spine compression fractures who presented as transfer from [facility] after hematemesis 2/2 UGI bleed s/p EGD showing esophageal diverticula w/ large clots s/p epinephrine.     Impression:   #UGI bleed: p/w hematemesis to OSH, found to have ?esophageal (soft tissue) mass on CT a/p. EGD showed large diverticula at 28cm with bloody clots s/p epi injection (not removed 2/2 concern regarding what may be underlying clot, ie large vessel potentially) + no other significant sources of hematemesis found. Unclear if 1. bleeding from within diverticula or 2. there is some underlying esophageal mass (as seen on CT a/p) in the area which is underneath the clot.     Recommendations:  -trend vitals, CBC, and monitor for clinical signs of bleeding  -maintain active type and screen  -transfusion goal to maintain hemoglobin >/= 7.0  -PPI gtt  -keep NPO at midnight for tentative EGD either Tuesday or Wednesday pending medical optimization, however need to coordinate with CT surgery and perform in the morning should patient require surgical intervention      Harlan Almeida, PGY-4  GI/Hepatology Fellow    MONDAY-FRIDAY 8AM-5PM:  Pager# [phone] ([facility]) or [phone] ([facility])  GI Phone# [phone] ([facility])    NON-URGENT CONSULTS:  Please email [email or web address]  AT NIGHT AND ON WEEKENDS:  Contact on-call GI fellow via answering service ([phone]) from 5pm-8am and on weekends/holidays     81F resident @ assisted nursing facility Hx known esophageal diverticulum (since 9/2020) + food impactions, afib on eliquis, IVC filter (further Hx unknown), GERD, COPD, hypothyroidism, HTN, multiple thoracic spine compression fractures who presented as transfer from [facility] after hematemesis 2/2 UGI bleed s/p EGD showing esophageal diverticula w/ large clots s/p epinephrine.     Impression:   #UGI bleed: p/w hematemesis to OSH, found to have ?esophageal (soft tissue) mass on CT a/p. EGD showed large diverticula at 28cm with bloody clots s/p epi injection (not removed 2/2 concern regarding what may be underlying clot, ie large vessel potentially) + no other significant sources of hematemesis found. Unclear if 1. bleeding from within diverticula or 2. there is some underlying esophageal mass (as seen on CT a/p) in the area which is underneath the clot.     Recommendations:  -trend vitals, CBC, and monitor for clinical signs of bleeding  -maintain active type and screen  -transfusion goal to maintain hemoglobin >/= 7.0  -PPI gtt  -keep NPO at midnight for tentative EGD either Tuesday or Wednesday pending medical optimization and endoscopic schedule, however need to coordinate with CT surgery and perform in the morning should patient require surgical intervention      Harlan Almeida, PGY-4  GI/Hepatology Fellow    MONDAY-FRIDAY 8AM-5PM:  Pager# [phone] ([facility]) or [phone] ([facility])  GI Phone# [phone] ([facility])    NON-URGENT CONSULTS:  Please email [email or web address]  AT NIGHT AND ON WEEKENDS:  Contact on-call GI fellow via answering service ([phone]) from 5pm-8am and on weekends/holidays

## 2021-09-13 NOTE — PROGRESS NOTE ADULT - SUBJECTIVE AND OBJECTIVE BOX
CHIEF COMPLAINT: Transfer to CT ICU for a patient who was noted to have an esophageal mass at [facility]    PROCEDURE:     EGD done at outside hospital on 9/9/21 -Large diverticula noted at 28cm filled with blood and blood clots, periphery of diverticula was injected with epinephrine, 5mm distal esophageal tear without bleeding, hiatal hernia.      ISSUES:     - Acute hypoxemic respiratory failure, intubated for airway protection due to hematemesis  - Esophageal diverticular bleed   - Esophageal mass  - Aspiration PNA  - Hypotension requiring pressors, in setting of sedation.  - COPD  - Afib on eliquis at home  - Hypothyroidism  - HTN      INTERVAL EVENTS:     - Asynchronous when sedation decreased. Remains on mechanical ventilation on 40% fio2 PEEP 8  - On Norepinephrine @0.09mcg/kg/min to maintain MAPs with ongoing sedation  - Afebrile now, fever to 101 on 9/11  - ~1.5liters positive fluid balance    HISTORY:   Unable to obtain, intubated, sedated    PHYSICAL EXAM:   Gen: Comfortable, No acute distress, frail elderly, kyphotic  Eyes: Sclera white, Conjunctiva normal, Eyelids normal, Pupils symmetrical   ENT: Mucous membranes moist, blood in oral cavity  Neck: Trachea midline, 7.0 ETT, LIJ CVC in place  CV: Rate regular, Rhythm irregular  Resp: Breath sounds clear, No accessory muscles use,   Abd: Soft, Non-distended, Non-tender, Bowel sounds normal,  ,  ,    Skin: Warm, 1+ peripheral edema of lower extremities,  ,    : bearden  Neuro: Moves ext to pain, minimally opens eyes to pain, sedated  Psych: Sedated on propofol      ASSESSMENT AND PLAN:     NEURO:    Keep sedated with precedex, propofol, fentanyl to keep RASS -1 to -2.           RESPIRATORY:    Mechanical vent - AC/VC, Vt  350ml, RR 18, PEEP 8, Spo2 maintained 96% with 40% fio2. PIP 22, Pplat 17. Vent bundle, bronchodilators as needed.  Check CXR, ABG.    On Zosyn for likely aspiration PNA.         CARDIOVASCULAR:  Hemodynamically stable - on low dose norepinephrine. Continue hemodynamic monitoring.  Eliquis held. Rate controlled afib.     Give lasix 40mg iv, fluid overloaded.               RENAL:  Stable - Monitor IOs and electrolytes. Keep K above 4.0 and Mg above 2.0.         GASTROINTESTINAL:    NPO  No NGT  PPI Q12h  GI f/u ?EGD  CT chest/abdo pelvis with contrast            HEMATOLOGIC:    Monitor CBC. Maintain active type and screen. Off anticoagulation.  s/p Kcentra at outside hospital  DVT prophylaxis with SCDs.         INFECTIOUS DISEASE:  No signs of active infection. Will monitor for fever and leukocytosis. Continue zosyn empirically.           ENDOCRINE:  Stable – Monitor glucose fingersticks for goal 120-180.    Nutrition: consult nutrition for TPN, NPO without enteral access awaiting further conley for esophageal mass.          Pertinent clinical, laboratory, radiographic, telemetry, hemodynamic, respiratory  data and chart were reviewed by myself and analyzed frequently throughout the course of the day and night by myself.    Plan discussed at length with the CTICU staff and Attending CT Surgeon Dr. Zuñiga    Patient required critical care management.  45 minutes were spent evaluating, managing, providing, coordinating, and documenting care for this patient, exclusive of procedures.     Plan discussed with son at bedside.    _________________________  VITAL SIGNS:  Vital Signs Last 24 Hrs  T(C): 37.4 (13 Sep 2021 08:00), Max: 37.4 (13 Sep 2021 08:00)  T(F): 99.4 (13 Sep 2021 08:00), Max: 99.4 (13 Sep 2021 08:00)  HR: 82 (13 Sep 2021 09:00) (76 - 100)  BP: --  BP(mean): --  RR: 18 (13 Sep 2021 09:00) (17 - 19)  SpO2: 100% (13 Sep 2021 09:00) (99% - 100%)  I/Os:   I&O's Detail    12 Sep 2021 07:01  -  13 Sep 2021 07:00  --------------------------------------------------------  IN:    Dexmedetomidine: 272 mL    dextrose 5% + lactated ringers w/ Additives: 440 mL    FentaNYL: 104.5 mL    IV PiggyBack: 1350 mL    Norepinephrine: 103.4 mL    Pantoprazole: 240 mL    Propofol: 265 mL  Total IN: 2774.9 mL    OUT:    Indwelling Catheter - Urethral (mL): 1320 mL  Total OUT: 1320 mL    Total NET: 1454.9 mL      13 Sep 2021 07:01  -  13 Sep 2021 09:56  --------------------------------------------------------  IN:    Dexmedetomidine: 21.8 mL    dextrose 5% + lactated ringers w/ Additives: 40 mL    FentaNYL: 8.6 mL    IV PiggyBack: 100 mL    Norepinephrine: 9 mL    Pantoprazole: 20 mL    Propofol: 22.6 mL  Total IN: 222 mL    OUT:    Indwelling Catheter - Urethral (mL): 140 mL  Total OUT: 140 mL    Total NET: 82 mL          Mode: AC/ CMV (Assist Control/ Continuous Mandatory Ventilation)  RR (machine): 18  TV (machine): 350  FiO2: 40  PEEP: 8  ITime: 0.73  MAP: 12  PIP: 21      MEDICATIONS:  MEDICATIONS  (STANDING):  ALBUTerol    90 MICROgram(s) HFA Inhaler 2 Puff(s) Inhalation every 6 hours  budesonide  80 MICROgram(s)/formoterol 4.5 MICROgram(s) Inhaler 2 Puff(s) Inhalation two times a day  chlorhexidine 0.12% Liquid 15 milliLiter(s) Oral Mucosa every 12 hours  chlorhexidine 4% Liquid 1 Application(s) Topical <User Schedule>  dexMEDEtomidine Infusion 0.2 MICROgram(s)/kG/Hr (2.7 mL/Hr) IV Continuous <Continuous>  dextrose 5% + lactated ringers with potassium chloride 40 mEq/L 1000 milliLiter(s) (20 mL/Hr) IV Continuous <Continuous>  fentaNYL   Infusion. 0.5 MICROgram(s)/kG/Hr (2.7 mL/Hr) IV Continuous <Continuous>  levothyroxine Injectable 112 MICROGram(s) IV Push at bedtime  norepinephrine Infusion 0.05 MICROgram(s)/kG/Min (2.53 mL/Hr) IV Continuous <Continuous>  pantoprazole Infusion 8 mG/Hr (10 mL/Hr) IV Continuous <Continuous>  piperacillin/tazobactam IVPB.. 3.375 Gram(s) IV Intermittent every 8 hours  potassium chloride  10 mEq/100 mL IVPB 10 milliEquivalent(s) IV Intermittent every 1 hour  propofol Infusion 40 MICROgram(s)/kG/Min (13 mL/Hr) IV Continuous <Continuous>    MEDICATIONS  (PRN):  sodium chloride 0.9% lock flush 10 milliLiter(s) IV Push every 1 hour PRN Pre/post blood products, medications, blood draw, and to maintain line patency      LABS:  Laboratory data was independently reviewed by me today.                           9.7    10.69 )-----------( 223      ( 13 Sep 2021 04:46 )             29.4     09-13    139  |  103  |  9   ----------------------------<  141<H>  4.0   |  24  |  0.40<L>    Ca    9.1      13 Sep 2021 04:46  Phos  2.6     09-13  Mg     1.80     09-13        PT/INR - ( 13 Sep 2021 04:46 )   PT: 15.0 sec;   INR: 1.32 ratio         PTT - ( 13 Sep 2021 04:46 )  PTT:26.4 sec  ABG - ( 13 Sep 2021 04:46 )  pH, Arterial: 7.41  pH, Blood: x     /  pCO2: 47    /  pO2: 152   / HCO3: 30    / Base Excess: 4.5   /  SaO2: 97.2                  RADIOLOGY:   Radiology images were independently reviewed by me today. Reports were reviewed by me today.    Xray Chest 1 View- PORTABLE-Routine:   EXAM:  XR CHEST PORTABLE ROUTINE 1V        PROCEDURE DATE:  Sep 12 2021         INTERPRETATION:  Chest one view    HISTORY: Hematemesis    COMPARISON STUDY: 9/11/2021    Rotated expiratory view of the chest shows the heart to be similar in size. Endotracheal tube, left jugular line, IVC filter and right humerus hardware are again noted.    The lungs show progression of left infiltrate and left effusion and there is no evidence of pneumothorax nor right pleural effusion.    IMPRESSION:  Progression of left infiltrate/effusion.      Thank you for the courtesy of this referral.    --- End of Report ---              JESSICA GAMBOA MD; Attending Interventional Radiologist  This document has been electronically signed. Sep 12 2021 11:04AM (09-12-21 @ 04:45)  Xray Chest 1 View- PORTABLE-Urgent:   EXAM:  XR CHEST PORTABLE URGENT 1V        PROCEDURE DATE:  Sep 11 2021         INTERPRETATION:  DATE OF STUDY: 9/11/21 at 2:44PM    PRIOR:Earlier 9/11/21 exam    CLINICAL INDICATION: Assess CVP line.    TECHNIQUE: portable chest.    IMPRESSION:  ETtube tip appropriately positioned above the mi.  Left IJ central venous catheter tip overlies SVC.  The heart is similar in size.  Small left pleural effusion persists with associated left basilar atelectasis. The right lung is clear.  No pneumothorax.  Right humeral shaft orthopedic hardware in situ.    --- End of Report ---              EDYTA HERNANDEZ MD; Attending Radiologist  This document has been electronically signed. Sep 11 2021  3:06PM (09-11-21 @ 14:47)  Xray Chest 1 View- PORTABLE-Routine:   EXAM:  XR CHEST PORTABLE ROUTINE 1V        PROCEDURE DATE:  Sep 11 2021         INTERPRETATION:  Chest one view    HISTORY: Intubation    COMPARISON STUDY: 9/10/2021    Frontal expiratory view of the chest shows the heart to be similar in size. Endotracheal tube reaches the distal trachea. Right humerus hardware is present.    The lungs show similar left effusion and there is no evidence of pneumothorax nor right pleural effusion. IVC filter is again noted.    IMPRESSION:  Endotracheal tube to distal trachea.      Thank you for the courtesy of this referral.    --- End of Report ---              JESSICA GAMBOA MD; Attending Interventional Radiologist  This document has been electronically signed. Sep 11 2021 12:38PM (09-11-21 @ 05:27)

## 2021-09-13 NOTE — PROGRESS NOTE ADULT - SUBJECTIVE AND OBJECTIVE BOX
Patient is a 81y old  Female who presents with a chief complaint of Esophageal mass (13 Sep 2021 12:01)      Interval Events:   No acute events overnight.  Patient denies any acute symptoms at this time.    ROS:   A 12-point ROS was performed and negative except as noted in HPI.    Hospital Medications:  ALBUTerol    90 MICROgram(s) HFA Inhaler 2 Puff(s) Inhalation every 6 hours  budesonide  80 MICROgram(s)/formoterol 4.5 MICROgram(s) Inhaler 2 Puff(s) Inhalation two times a day  chlorhexidine 0.12% Liquid 15 milliLiter(s) Oral Mucosa every 12 hours  chlorhexidine 4% Liquid 1 Application(s) Topical <User Schedule>  dexMEDEtomidine Infusion 0.2 MICROgram(s)/kG/Hr IV Continuous <Continuous>  dextrose 5% + lactated ringers with potassium chloride 40 mEq/L 1000 milliLiter(s) IV Continuous <Continuous>  fentaNYL   Infusion. 0.5 MICROgram(s)/kG/Hr IV Continuous <Continuous>  levothyroxine Injectable 112 MICROGram(s) IV Push at bedtime  norepinephrine Infusion 0.05 MICROgram(s)/kG/Min IV Continuous <Continuous>  pantoprazole Infusion 8 mG/Hr IV Continuous <Continuous>  piperacillin/tazobactam IVPB.. 3.375 Gram(s) IV Intermittent every 8 hours  propofol Infusion 40 MICROgram(s)/kG/Min IV Continuous <Continuous>  sodium chloride 0.9% lock flush 10 milliLiter(s) IV Push every 1 hour PRN      PHYSICAL EXAM:   Vital Signs:  Vital Signs Last 24 Hrs  T(C): 37.4 (13 Sep 2021 08:00), Max: 37.4 (13 Sep 2021 08:00)  T(F): 99.4 (13 Sep 2021 08:00), Max: 99.4 (13 Sep 2021 08:00)  HR: 79 (13 Sep 2021 15:18) (79 - 98)  BP: --  BP(mean): --  RR: 18 (13 Sep 2021 13:00) (17 - 19)  SpO2: 100% (13 Sep 2021 15:18) (99% - 100%)  Daily     Daily     GENERAL: intubated, in CTICU  NEURO: not alert  HEENT: anicteric sclera, no conjunctival pallor appreciated  CHEST: intubated, mechanical breath sounds  CARDIAC: regular rate, +S1/S2  ABDOMEN: soft, nondistended, nontender, no rebound or guarding  EXTREMITIES: warm, well perfused, no edema  SKIN: no lesions noted    LABS: reviewed                        9.7    10.69 )-----------( 223      ( 13 Sep 2021 04:46 )             29.4     09-13    139  |  103  |  9   ----------------------------<  141<H>  4.0   |  24  |  0.40<L>    Ca    9.1      13 Sep 2021 04:46  Phos  2.6     09-13  Mg     1.80     09-13          Interval Diagnostic Studies: see sunrise for full report   Interval Events:   No acute events overnight.  Patient unable to provide history.     ROS: Unable to obtain    Hospital Medications:  ALBUTerol    90 MICROgram(s) HFA Inhaler 2 Puff(s) Inhalation every 6 hours  budesonide  80 MICROgram(s)/formoterol 4.5 MICROgram(s) Inhaler 2 Puff(s) Inhalation two times a day  chlorhexidine 0.12% Liquid 15 milliLiter(s) Oral Mucosa every 12 hours  chlorhexidine 4% Liquid 1 Application(s) Topical <User Schedule>  dexMEDEtomidine Infusion 0.2 MICROgram(s)/kG/Hr IV Continuous <Continuous>  dextrose 5% + lactated ringers with potassium chloride 40 mEq/L 1000 milliLiter(s) IV Continuous <Continuous>  fentaNYL   Infusion. 0.5 MICROgram(s)/kG/Hr IV Continuous <Continuous>  levothyroxine Injectable 112 MICROGram(s) IV Push at bedtime  norepinephrine Infusion 0.05 MICROgram(s)/kG/Min IV Continuous <Continuous>  pantoprazole Infusion 8 mG/Hr IV Continuous <Continuous>  piperacillin/tazobactam IVPB.. 3.375 Gram(s) IV Intermittent every 8 hours  propofol Infusion 40 MICROgram(s)/kG/Min IV Continuous <Continuous>  sodium chloride 0.9% lock flush 10 milliLiter(s) IV Push every 1 hour PRN      PHYSICAL EXAM:   Vital Signs:  Vital Signs Last 24 Hrs  T(C): 37.4 (13 Sep 2021 08:00), Max: 37.4 (13 Sep 2021 08:00)  T(F): 99.4 (13 Sep 2021 08:00), Max: 99.4 (13 Sep 2021 08:00)  HR: 79 (13 Sep 2021 15:18) (79 - 98)  BP: --  BP(mean): --  RR: 18 (13 Sep 2021 13:00) (17 - 19)  SpO2: 100% (13 Sep 2021 15:18) (99% - 100%)  Daily     Daily     GENERAL: intubated, in CTICU  NEURO: not alert  HEENT: anicteric sclera, no conjunctival pallor appreciated  CHEST: intubated, mechanical breath sounds  CARDIAC: regular rate, +S1/S2  ABDOMEN: soft, nondistended, nontender, no rebound or guarding  EXTREMITIES: warm, well perfused, no edema  SKIN: no lesions noted    LABS: reviewed                        9.7    10.69 )-----------( 223      ( 13 Sep 2021 04:46 )             29.4     09-13    139  |  103  |  9   ----------------------------<  141<H>  4.0   |  24  |  0.40<L>    Ca    9.1      13 Sep 2021 04:46  Phos  2.6     09-13  Mg     1.80     09-13          Interval Diagnostic Studies: see sunrise for full report

## 2021-09-14 LAB
ANION GAP SERPL CALC-SCNC: 10 MMOL/L — SIGNIFICANT CHANGE UP (ref 7–14)
ANION GAP SERPL CALC-SCNC: 12 MMOL/L — SIGNIFICANT CHANGE UP (ref 7–14)
ANION GAP SERPL CALC-SCNC: 14 MMOL/L — SIGNIFICANT CHANGE UP (ref 7–14)
ANION GAP SERPL CALC-SCNC: 9 MMOL/L — SIGNIFICANT CHANGE UP (ref 7–14)
APTT BLD: 29.2 SEC — SIGNIFICANT CHANGE UP (ref 27–36.3)
BASE EXCESS BLDA CALC-SCNC: 5.4 MMOL/L — HIGH (ref -2–3)
BLOOD GAS ARTERIAL - LYTES,HGB,ICA,LACT RESULT: SIGNIFICANT CHANGE UP
BLOOD GAS ARTERIAL - LYTES,HGB,ICA,LACT RESULT: SIGNIFICANT CHANGE UP
BLOOD GAS ARTERIAL COMPREHENSIVE RESULT: SIGNIFICANT CHANGE UP
BUN SERPL-MCNC: 11 MG/DL — SIGNIFICANT CHANGE UP (ref 7–23)
BUN SERPL-MCNC: 12 MG/DL — SIGNIFICANT CHANGE UP (ref 7–23)
BUN SERPL-MCNC: 14 MG/DL — SIGNIFICANT CHANGE UP (ref 7–23)
BUN SERPL-MCNC: 9 MG/DL — SIGNIFICANT CHANGE UP (ref 7–23)
CALCIUM SERPL-MCNC: 9 MG/DL — SIGNIFICANT CHANGE UP (ref 8.4–10.5)
CALCIUM SERPL-MCNC: 9.1 MG/DL — SIGNIFICANT CHANGE UP (ref 8.4–10.5)
CALCIUM SERPL-MCNC: 9.4 MG/DL — SIGNIFICANT CHANGE UP (ref 8.4–10.5)
CALCIUM SERPL-MCNC: 9.5 MG/DL — SIGNIFICANT CHANGE UP (ref 8.4–10.5)
CHLORIDE SERPL-SCNC: 101 MMOL/L — SIGNIFICANT CHANGE UP (ref 98–107)
CHLORIDE SERPL-SCNC: 96 MMOL/L — LOW (ref 98–107)
CHLORIDE SERPL-SCNC: 99 MMOL/L — SIGNIFICANT CHANGE UP (ref 98–107)
CHLORIDE SERPL-SCNC: 99 MMOL/L — SIGNIFICANT CHANGE UP (ref 98–107)
CO2 BLDA-SCNC: 31 MMOL/L — HIGH (ref 19–24)
CO2 SERPL-SCNC: 26 MMOL/L — SIGNIFICANT CHANGE UP (ref 22–31)
CO2 SERPL-SCNC: 27 MMOL/L — SIGNIFICANT CHANGE UP (ref 22–31)
CO2 SERPL-SCNC: 29 MMOL/L — SIGNIFICANT CHANGE UP (ref 22–31)
CO2 SERPL-SCNC: 30 MMOL/L — SIGNIFICANT CHANGE UP (ref 22–31)
CREAT SERPL-MCNC: 0.39 MG/DL — LOW (ref 0.5–1.3)
CREAT SERPL-MCNC: 0.4 MG/DL — LOW (ref 0.5–1.3)
CREAT SERPL-MCNC: 0.41 MG/DL — LOW (ref 0.5–1.3)
CREAT SERPL-MCNC: 0.79 MG/DL — SIGNIFICANT CHANGE UP (ref 0.5–1.3)
GLUCOSE BLDC GLUCOMTR-MCNC: 135 MG/DL — HIGH (ref 70–99)
GLUCOSE SERPL-MCNC: 139 MG/DL — HIGH (ref 70–99)
GLUCOSE SERPL-MCNC: 141 MG/DL — HIGH (ref 70–99)
GLUCOSE SERPL-MCNC: 154 MG/DL — HIGH (ref 70–99)
GLUCOSE SERPL-MCNC: 156 MG/DL — HIGH (ref 70–99)
HCO3 BLDA-SCNC: 30 MMOL/L — HIGH (ref 21–28)
HCT VFR BLD CALC: 30.5 % — LOW (ref 34.5–45)
HCT VFR BLD CALC: 30.7 % — LOW (ref 34.5–45)
HCT VFR BLD CALC: 39 % — SIGNIFICANT CHANGE UP (ref 34.5–45)
HGB BLD-MCNC: 10 G/DL — LOW (ref 11.5–15.5)
HGB BLD-MCNC: 10.1 G/DL — LOW (ref 11.5–15.5)
HGB BLD-MCNC: 12.6 G/DL — SIGNIFICANT CHANGE UP (ref 11.5–15.5)
INR BLD: 1.24 RATIO — HIGH (ref 0.88–1.16)
MAGNESIUM SERPL-MCNC: 1.7 MG/DL — SIGNIFICANT CHANGE UP (ref 1.6–2.6)
MAGNESIUM SERPL-MCNC: 1.8 MG/DL — SIGNIFICANT CHANGE UP (ref 1.6–2.6)
MAGNESIUM SERPL-MCNC: 1.8 MG/DL — SIGNIFICANT CHANGE UP (ref 1.6–2.6)
MAGNESIUM SERPL-MCNC: 2 MG/DL — SIGNIFICANT CHANGE UP (ref 1.6–2.6)
MCHC RBC-ENTMCNC: 28.5 PG — SIGNIFICANT CHANGE UP (ref 27–34)
MCHC RBC-ENTMCNC: 30.2 PG — SIGNIFICANT CHANGE UP (ref 27–34)
MCHC RBC-ENTMCNC: 30.7 PG — SIGNIFICANT CHANGE UP (ref 27–34)
MCHC RBC-ENTMCNC: 32.3 GM/DL — SIGNIFICANT CHANGE UP (ref 32–36)
MCHC RBC-ENTMCNC: 32.6 GM/DL — SIGNIFICANT CHANGE UP (ref 32–36)
MCHC RBC-ENTMCNC: 33.1 GM/DL — SIGNIFICANT CHANGE UP (ref 32–36)
MCV RBC AUTO: 88.2 FL — SIGNIFICANT CHANGE UP (ref 80–100)
MCV RBC AUTO: 92.7 FL — SIGNIFICANT CHANGE UP (ref 80–100)
MCV RBC AUTO: 92.7 FL — SIGNIFICANT CHANGE UP (ref 80–100)
NRBC # BLD: 0 /100 WBCS — SIGNIFICANT CHANGE UP
NRBC # FLD: 0 K/UL — SIGNIFICANT CHANGE UP
PCO2 BLDA: 42 MMHG — HIGH (ref 32–35)
PH BLDA: 7.46 — HIGH (ref 7.35–7.45)
PHOSPHATE SERPL-MCNC: 2.3 MG/DL — LOW (ref 2.5–4.5)
PHOSPHATE SERPL-MCNC: 3 MG/DL — SIGNIFICANT CHANGE UP (ref 2.5–4.5)
PHOSPHATE SERPL-MCNC: 3.8 MG/DL — SIGNIFICANT CHANGE UP (ref 2.5–4.5)
PHOSPHATE SERPL-MCNC: 5 MG/DL — HIGH (ref 2.5–4.5)
PLATELET # BLD AUTO: 148 K/UL — LOW (ref 150–400)
PLATELET # BLD AUTO: 292 K/UL — SIGNIFICANT CHANGE UP (ref 150–400)
PLATELET # BLD AUTO: 298 K/UL — SIGNIFICANT CHANGE UP (ref 150–400)
PO2 BLDA: 182 MMHG — HIGH (ref 83–108)
POTASSIUM SERPL-MCNC: 3.5 MMOL/L — SIGNIFICANT CHANGE UP (ref 3.5–5.3)
POTASSIUM SERPL-MCNC: 3.5 MMOL/L — SIGNIFICANT CHANGE UP (ref 3.5–5.3)
POTASSIUM SERPL-MCNC: 3.8 MMOL/L — SIGNIFICANT CHANGE UP (ref 3.5–5.3)
POTASSIUM SERPL-MCNC: 4.2 MMOL/L — SIGNIFICANT CHANGE UP (ref 3.5–5.3)
POTASSIUM SERPL-SCNC: 3.5 MMOL/L — SIGNIFICANT CHANGE UP (ref 3.5–5.3)
POTASSIUM SERPL-SCNC: 3.5 MMOL/L — SIGNIFICANT CHANGE UP (ref 3.5–5.3)
POTASSIUM SERPL-SCNC: 3.8 MMOL/L — SIGNIFICANT CHANGE UP (ref 3.5–5.3)
POTASSIUM SERPL-SCNC: 4.2 MMOL/L — SIGNIFICANT CHANGE UP (ref 3.5–5.3)
PROTHROM AB SERPL-ACNC: 14.1 SEC — HIGH (ref 10.6–13.6)
RBC # BLD: 3.29 M/UL — LOW (ref 3.8–5.2)
RBC # BLD: 3.31 M/UL — LOW (ref 3.8–5.2)
RBC # BLD: 4.42 M/UL — SIGNIFICANT CHANGE UP (ref 3.8–5.2)
RBC # FLD: 14.5 % — SIGNIFICANT CHANGE UP (ref 10.3–14.5)
RBC # FLD: 15.2 % — HIGH (ref 10.3–14.5)
RBC # FLD: 15.3 % — HIGH (ref 10.3–14.5)
SAO2 % BLDA: 97 % — SIGNIFICANT CHANGE UP (ref 94–98)
SODIUM SERPL-SCNC: 136 MMOL/L — SIGNIFICANT CHANGE UP (ref 135–145)
SODIUM SERPL-SCNC: 138 MMOL/L — SIGNIFICANT CHANGE UP (ref 135–145)
SODIUM SERPL-SCNC: 138 MMOL/L — SIGNIFICANT CHANGE UP (ref 135–145)
SODIUM SERPL-SCNC: 140 MMOL/L — SIGNIFICANT CHANGE UP (ref 135–145)
WBC # BLD: 12.07 K/UL — HIGH (ref 3.8–10.5)
WBC # BLD: 12.61 K/UL — HIGH (ref 3.8–10.5)
WBC # BLD: 13.73 K/UL — HIGH (ref 3.8–10.5)
WBC # FLD AUTO: 12.07 K/UL — HIGH (ref 3.8–10.5)
WBC # FLD AUTO: 12.61 K/UL — HIGH (ref 3.8–10.5)
WBC # FLD AUTO: 13.73 K/UL — HIGH (ref 3.8–10.5)

## 2021-09-14 PROCEDURE — 43235 EGD DIAGNOSTIC BRUSH WASH: CPT | Mod: GC

## 2021-09-14 PROCEDURE — 99232 SBSQ HOSP IP/OBS MODERATE 35: CPT | Mod: 25

## 2021-09-14 PROCEDURE — 71045 X-RAY EXAM CHEST 1 VIEW: CPT | Mod: 26

## 2021-09-14 PROCEDURE — 99291 CRITICAL CARE FIRST HOUR: CPT

## 2021-09-14 PROCEDURE — 99292 CRITICAL CARE ADDL 30 MIN: CPT

## 2021-09-14 RX ORDER — MAGNESIUM SULFATE 500 MG/ML
2 VIAL (ML) INJECTION ONCE
Refills: 0 | Status: COMPLETED | OUTPATIENT
Start: 2021-09-14 | End: 2021-09-14

## 2021-09-14 RX ORDER — KETAMINE HYDROCHLORIDE 100 MG/ML
0.5 INJECTION INTRAMUSCULAR; INTRAVENOUS
Qty: 1000 | Refills: 0 | Status: DISCONTINUED | OUTPATIENT
Start: 2021-09-14 | End: 2021-09-14

## 2021-09-14 RX ORDER — POTASSIUM CHLORIDE 20 MEQ
20 PACKET (EA) ORAL ONCE
Refills: 0 | Status: DISCONTINUED | OUTPATIENT
Start: 2021-09-14 | End: 2021-09-14

## 2021-09-14 RX ORDER — MAGNESIUM SULFATE 500 MG/ML
2 VIAL (ML) INJECTION ONCE
Refills: 0 | Status: DISCONTINUED | OUTPATIENT
Start: 2021-09-14 | End: 2021-09-15

## 2021-09-14 RX ORDER — VANCOMYCIN HCL 1 G
1000 VIAL (EA) INTRAVENOUS ONCE
Refills: 0 | Status: COMPLETED | OUTPATIENT
Start: 2021-09-14 | End: 2021-09-14

## 2021-09-14 RX ORDER — CALCIUM GLUCONATE 100 MG/ML
1 VIAL (ML) INTRAVENOUS ONCE
Refills: 0 | Status: DISCONTINUED | OUTPATIENT
Start: 2021-09-14 | End: 2021-09-14

## 2021-09-14 RX ORDER — ELECTROLYTE SOLUTION,INJ
1 VIAL (ML) INTRAVENOUS
Refills: 0 | Status: DISCONTINUED | OUTPATIENT
Start: 2021-09-14 | End: 2021-09-14

## 2021-09-14 RX ORDER — ALBUMIN HUMAN 25 %
250 VIAL (ML) INTRAVENOUS ONCE
Refills: 0 | Status: COMPLETED | OUTPATIENT
Start: 2021-09-14 | End: 2021-09-14

## 2021-09-14 RX ORDER — MAGNESIUM SULFATE 500 MG/ML
2 VIAL (ML) INJECTION ONCE
Refills: 0 | Status: DISCONTINUED | OUTPATIENT
Start: 2021-09-14 | End: 2021-09-14

## 2021-09-14 RX ORDER — PANTOPRAZOLE SODIUM 20 MG/1
40 TABLET, DELAYED RELEASE ORAL
Refills: 0 | Status: DISCONTINUED | OUTPATIENT
Start: 2021-09-14 | End: 2021-09-27

## 2021-09-14 RX ORDER — CISATRACURIUM BESYLATE 2 MG/ML
20 INJECTION INTRAVENOUS ONCE
Refills: 0 | Status: COMPLETED | OUTPATIENT
Start: 2021-09-14 | End: 2021-09-14

## 2021-09-14 RX ORDER — ALBUMIN HUMAN 25 %
250 VIAL (ML) INTRAVENOUS ONCE
Refills: 0 | Status: DISCONTINUED | OUTPATIENT
Start: 2021-09-14 | End: 2021-09-14

## 2021-09-14 RX ORDER — I.V. FAT EMULSION 20 G/100ML
6.7 EMULSION INTRAVENOUS
Qty: 16 | Refills: 0 | Status: DISCONTINUED | OUTPATIENT
Start: 2021-09-14 | End: 2021-09-15

## 2021-09-14 RX ORDER — POTASSIUM CHLORIDE 20 MEQ
20 PACKET (EA) ORAL
Refills: 0 | Status: COMPLETED | OUTPATIENT
Start: 2021-09-14 | End: 2021-09-15

## 2021-09-14 RX ORDER — POTASSIUM CHLORIDE 20 MEQ
20 PACKET (EA) ORAL ONCE
Refills: 0 | Status: COMPLETED | OUTPATIENT
Start: 2021-09-14 | End: 2021-09-14

## 2021-09-14 RX ADMIN — ALBUTEROL 2 PUFF(S): 90 AEROSOL, METERED ORAL at 15:29

## 2021-09-14 RX ADMIN — BUDESONIDE AND FORMOTEROL FUMARATE DIHYDRATE 2 PUFF(S): 160; 4.5 AEROSOL RESPIRATORY (INHALATION) at 21:18

## 2021-09-14 RX ADMIN — CHLORHEXIDINE GLUCONATE 15 MILLILITER(S): 213 SOLUTION TOPICAL at 05:03

## 2021-09-14 RX ADMIN — Medication 100 MILLIEQUIVALENT(S): at 17:56

## 2021-09-14 RX ADMIN — I.V. FAT EMULSION 6.7 ML/HR: 20 EMULSION INTRAVENOUS at 17:53

## 2021-09-14 RX ADMIN — I.V. FAT EMULSION 6.7 ML/HR: 20 EMULSION INTRAVENOUS at 22:26

## 2021-09-14 RX ADMIN — Medication 125 MILLILITER(S): at 16:41

## 2021-09-14 RX ADMIN — PROPOFOL 13 MICROGRAM(S)/KG/MIN: 10 INJECTION, EMULSION INTRAVENOUS at 22:24

## 2021-09-14 RX ADMIN — PIPERACILLIN AND TAZOBACTAM 25 GRAM(S): 4; .5 INJECTION, POWDER, LYOPHILIZED, FOR SOLUTION INTRAVENOUS at 22:21

## 2021-09-14 RX ADMIN — Medication 2.53 MICROGRAM(S)/KG/MIN: at 17:53

## 2021-09-14 RX ADMIN — PIPERACILLIN AND TAZOBACTAM 25 GRAM(S): 4; .5 INJECTION, POWDER, LYOPHILIZED, FOR SOLUTION INTRAVENOUS at 05:03

## 2021-09-14 RX ADMIN — Medication 50 GRAM(S): at 16:02

## 2021-09-14 RX ADMIN — Medication 2.53 MICROGRAM(S)/KG/MIN: at 22:26

## 2021-09-14 RX ADMIN — PIPERACILLIN AND TAZOBACTAM 25 GRAM(S): 4; .5 INJECTION, POWDER, LYOPHILIZED, FOR SOLUTION INTRAVENOUS at 16:01

## 2021-09-14 RX ADMIN — Medication 112 MICROGRAM(S): at 22:47

## 2021-09-14 RX ADMIN — PANTOPRAZOLE SODIUM 40 MILLIGRAM(S): 20 TABLET, DELAYED RELEASE ORAL at 17:55

## 2021-09-14 RX ADMIN — ALBUTEROL 2 PUFF(S): 90 AEROSOL, METERED ORAL at 10:08

## 2021-09-14 RX ADMIN — CHLORHEXIDINE GLUCONATE 1 APPLICATION(S): 213 SOLUTION TOPICAL at 05:04

## 2021-09-14 RX ADMIN — CHLORHEXIDINE GLUCONATE 15 MILLILITER(S): 213 SOLUTION TOPICAL at 17:54

## 2021-09-14 RX ADMIN — DEXMEDETOMIDINE HYDROCHLORIDE IN 0.9% SODIUM CHLORIDE 2.7 MICROGRAM(S)/KG/HR: 4 INJECTION INTRAVENOUS at 22:26

## 2021-09-14 RX ADMIN — FENTANYL CITRATE 2.7 MICROGRAM(S)/KG/HR: 50 INJECTION INTRAVENOUS at 22:27

## 2021-09-14 RX ADMIN — Medication 250 MILLIGRAM(S): at 09:43

## 2021-09-14 RX ADMIN — Medication 1 EACH: at 17:52

## 2021-09-14 RX ADMIN — ALBUTEROL 2 PUFF(S): 90 AEROSOL, METERED ORAL at 04:13

## 2021-09-14 RX ADMIN — CISATRACURIUM BESYLATE 20 MILLIGRAM(S): 2 INJECTION INTRAVENOUS at 16:04

## 2021-09-14 RX ADMIN — Medication 1 EACH: at 22:25

## 2021-09-14 RX ADMIN — KETAMINE HYDROCHLORIDE 2.7 MG/KG/HR: 100 INJECTION INTRAMUSCULAR; INTRAVENOUS at 09:38

## 2021-09-14 RX ADMIN — BUDESONIDE AND FORMOTEROL FUMARATE DIHYDRATE 2 PUFF(S): 160; 4.5 AEROSOL RESPIRATORY (INHALATION) at 10:09

## 2021-09-14 RX ADMIN — ALBUTEROL 2 PUFF(S): 90 AEROSOL, METERED ORAL at 21:18

## 2021-09-14 RX ADMIN — Medication 125 MILLILITER(S): at 09:39

## 2021-09-14 NOTE — CONSULT NOTE ADULT - SUBJECTIVE AND OBJECTIVE BOX
Nutrition Support Consult Note    HPI:  82 y/o female transferred to CT ICU for a patient who was noted to have an esophageal mass at [facility]. EGD done at outside hospital on 21 -Large diverticula noted at 28cm filled with blood and blood clots, periphery of diverticula was injected with epinephrine, 5mm distal esophageal tear without bleeding, hiatal hernia. Patient meets criteria for severe malnutrition in the context of acute illness. Patient's signs/symptoms as evidenced by severe loss of muscle mass and fat stores, 1+ edema, NPO >3 days.  Nutrition support service consulted for initiation of TPN/lipids via central line in view of malnutrition and anticipated prolonged NPO status.     Allergies  Sulfur Colliod (Rash)  Tylenol (Swelling)    PAST MEDICAL & SURGICAL HISTORY:  Afib  History of COPD  HTN (hypertension)  Hypothyroid  GERD (gastroesophageal reflux disease)  Esophageal diverticulum  Presence of IVC filter    ICU Vital Signs Last 24 Hrs  T(C): 36.6 (14 Sep 2021 08:00), Max: 37.7 (14 Sep 2021 00:00)  T(F): 97.8 (14 Sep 2021 08:00), Max: 99.8 (14 Sep 2021 00:00)  HR: 98 (14 Sep 2021 11:31) (63 - 101)  ABP: 110/70 (14 Sep 2021 11:00) (88/50 - 167/75)  ABP(mean): 84 (14 Sep 2021 11:00) (64 - 109)  RR: 18 (14 Sep 2021 11:00) (18 - 22)  SpO2: 97% (14 Sep 2021 11:31) (97% - 100%)    MEDICATIONS  (STANDING):  albumin human  5% IVPB 250 milliLiter(s) IV Intermittent once  ALBUTerol    90 MICROgram(s) HFA Inhaler 2 Puff(s) Inhalation every 6 hours  artificial tears (preservative free) Ophthalmic Solution 1 Drop(s) Both EYES two times a day  budesonide  80 MICROgram(s)/formoterol 4.5 MICROgram(s) Inhaler 2 Puff(s) Inhalation two times a day  chlorhexidine 0.12% Liquid 15 milliLiter(s) Oral Mucosa every 12 hours  chlorhexidine 4% Liquid 1 Application(s) Topical <User Schedule>  dexMEDEtomidine Infusion 0.2 MICROgram(s)/kG/Hr (2.7 mL/Hr) IV Continuous <Continuous>  dextrose 5% + lactated ringers with potassium chloride 40 mEq/L 1000 milliLiter(s) (20 mL/Hr) IV Continuous <Continuous>  fat emulsion (Fish Oil and Plant Based) 20% Infusion 6.7 mL/Hr (6.7 mL/Hr) IV Continuous <Continuous>  fentaNYL   Infusion. 0.5 MICROgram(s)/kG/Hr (2.7 mL/Hr) IV Continuous <Continuous>  levothyroxine Injectable 112 MICROGram(s) IV Push at bedtime  magnesium sulfate  IVPB 2 Gram(s) IV Intermittent once  norepinephrine Infusion 0.05 MICROgram(s)/kG/Min (2.53 mL/Hr) IV Continuous <Continuous>  pantoprazole Infusion 8 mG/Hr (10 mL/Hr) IV Continuous <Continuous>  Parenteral Nutrition - Adult 1 Each (42 mL/Hr) TPN Continuous <Continuous>  piperacillin/tazobactam IVPB.. 3.375 Gram(s) IV Intermittent every 8 hours  potassium chloride  20 mEq/100 mL IVPB 20 milliEquivalent(s) IV Intermittent once  propofol Infusion 40 MICROgram(s)/kG/Min (13 mL/Hr) IV Continuous <Continuous>    I&O's Detail    13 Sep 2021 07:01  -  14 Sep 2021 07:00  --------------------------------------------------------  IN:    Albumin 5%  - 250 mL: 250 mL    Dexmedetomidine: 280 mL    dextrose 5% + lactated ringers w/ Additives: 480 mL    FentaNYL: 114 mL    IV PiggyBack: 600 mL    Norepinephrine: 203.6 mL    Pantoprazole: 240 mL    Propofol: 298.4 mL  Total IN: 2466 mL    OUT:    Indwelling Catheter - Urethral (mL): 3655 mL  Total OUT: 3655 mL    Total NET: -1189 mL    POCT Blood Glucose.: 135 mg/dL (14 Sep 2021 06:59)  POCT Blood Glucose.: 137 mg/dL (13 Sep 2021 22:11)  POCT Blood Glucose.: 140 mg/dL (13 Sep 2021 12:30)    Daily Weight in k.3 (14 Sep 2021 04:00)    Drug Dosing Weight  Weight (kg): 54 (10 Sep 2021 00:39)    PHYSICAL EXAM:  Gen: Comfortable, No acute distress, frail elderly, sedated   Resp: mechanica breath sounds   Abd: Soft, Non-distended   Skin: Warm, 1+ peripheral edema of lower extremities,  ,      Mode: AC/ CMV (Assist Control/ Continuous Mandatory Ventilation)  RR (machine): 18  TV (machine): 350  FiO2: 40  PEEP: 8  ITime: 0.73  MAP: 11  PIP: 22    LABS:                        10.1   12.61 )-----------( 292      ( 14 Sep 2021 03:05 )             30.5     09-14    136  |  96<L>  |  9   ----------------------------<  154<H>  3.5   |  26  |  0.40<L>    Ca    9.4      14 Sep 2021 03:05  Phos  3.0     09-14  Mg     1.80     09-14    POCT Blood Glucose.: 135 mg/dL (14 Sep 2021 06:59)    PT/INR - ( 14 Sep 2021 03:05 )   PT: 14.1 sec;   INR: 1.24 ratio      PTT - ( 14 Sep 2021 03:05 )  PTT:29.2 sec  ABG - ( 14 Sep 2021 03:05 )  pH, Arterial: 7.46  pH, Blood: x     /  pCO2: 42    /  pO2: 182   / HCO3: 30    / Base Excess: 5.4   /  SaO2: 97.0      Current Weight: 54 kG  Ideal Body Weight: 54 kG  Current Diet: [ x ]NPO    CLINICAL INDICATORS  Severe protein calorie malnutrition in acute illness/ injury; secondary to poor appetite, weight loss >5% in 1 month and/or >7.5% in 3 months, caloric Intake <50% of nutrition needs >= 5 days, temporal wasting, severe loss of muscle mass/atrophy and loss of body fat stores.     Metabolic Requirements:  The patient will require:  ______25_______ kilocalories / kg / day  Diagnosis:  [  ] Mild protein malnutrition  [  ] Moderate protein calorie malnutrition in acute illness/ injury  [ x ] Severe protein calorie malnutrition in acute illness/ injury  [  ] Moderate protein calorie malnutrition in chronic illness  [  ] Severe protein calorie malnutrition in chronic illness    Nutritional Requirements  Carbohydrates: 1 gram = 3.4 kcal   Lipids: 1 gram = 10 kcal  Proteins: 1 gram = 4 kcal     Plan:  [ x ] Initiate TPN formula via central line, will increase infusion volume and calories tomorrow    Carbohydrates:__210____grams, Amino Acid:__80____grams  SMOF Lipids:___32____ grams, Total volume:___1500____mL.  1. Dedicated Central line must be placed for TPN.  2. Strict Intake and Output.  3. Weights three times a week  4. Monitor BMP, Mg+, Ionized Ca++, Phosphorus daily  5. Monitor Triglycerides monthly  6. Pre-albumin weekly.  7. Fingersticks to monitor glucose every 6 hours until stable then may be decreased to twice a day.    Nutrition Support [id number]

## 2021-09-14 NOTE — PROGRESS NOTE ADULT - SUBJECTIVE AND OBJECTIVE BOX
CHIEF COMPLAINT: Follow up in CTICU for intubated for hypoxic respiratory failure with upper GI bleed    PROCEDURE:   - EGD done at outside hospital on 9/9/21 -Large diverticula noted at 28cm filled with blood and blood clots, periphery of diverticula was injected with epinephrine, 5mm distal esophageal tear without bleeding, hiatal hernia.  - EGD, removal of blood clots 9/14      ISSUES:   Acute hypoxic respiratory failure s/p intubation (9/9)  Esophageal diverticular bleed   Acute blood loss anemia  Hypotension requiring IV pressors  COPD  Chronic Afib -- previously on Apixaban  Hypothyroidism  HTN      INTERVAL EVENTS:   Received patient on mechanical ventilation. Maintained on PEEP 8 and 40% FIO2. PEEP decreased to 5 as blood gas improving.  Received patient on levophed pressors via central line. Patient had increased levophed requirements  EGD performed by GI which found large blood clot in diverticulum of esophagus and suggestion of esophageal motility issue. No signs of active bleeding. CBC remains stable.      HISTORY:   Unable to obtain, intubated, sedated    PHYSICAL EXAM:   Gen: Comfortable, No acute distress, frail elderly, kyphotic  Eyes: Sclera white, Conjunctiva normal, Eyelids normal, Pupils symmetrical   ENT: Mucous membranes moist  Neck: Trachea midline, 7.0 ETT  CV: Rate regular, Rhythm irregular  Resp: Breath sounds clear, No accessory muscles use,   Abd: Soft, Non-distended, Non-tender, Bowel sounds normal,  ,  ,    Skin: Warm, 1+ peripheral edema of lower extremities,  ,    : bearden  Neuro: Moves ext to pain, minimally opens eyes to pain, sedated  Psych: RASS -3      ASSESSMENT AND PLAN:     NEURO:  Vent sedation - Continue propofol and precedex and fentanyl for ventilator synchrony.       RESPIRATORY:    Acute respiratory failure secondary to aspiration PNA in setting of hematemesis - Mechanical ventilation. Monitor ABG. Wean FIO2 for goal O2sat above 92. Vent bundle. Zosyn.           CARDIOVASCULAR:  Hypotension requiring IV pressors - wean pressors for MAP goal of 65  Telemetry (medical test) - Reviewed by me today independently. Rate controlled atrial fibrillation.    Chronic Afib - stable. Hold anticoagulation        HTN - currently hypotensive. hold home antihypertensives.            RENAL:  Stable - Monitor IOs and electrolytes. Keep K above 4.0 and Mg above 2.0.         GASTROINTESTINAL:  NPO  No NGT    Blood filled diverticulum - Improved. Pantoprazole IV Q12h. GI consult for timing of EGD.  no signs of esophageal mass on EGD.           HEMATOLOGIC:  Trend PT/PTT, CBC. Maintain active type and screen. Off anticoagulation.  s/p Kcentra at outside hospital  DVT prophylaxis with SCDs.         INFECTIOUS DISEASE:  Aspiration PNA - Improving. Zosyn IV q8h.   Follow up cultures        ENDOCRINE:  Stable – Monitor glucose fingersticks for goal 120-180.     Hypothyroidism - stable. Continue synthroid IV.         Pertinent clinical, laboratory, radiographic, telemetry, hemodynamic, respiratory  data and chart were reviewed by myself and analyzed frequently throughout the course of the day and night by myself.    Plan discussed at length with the CTICU staff and Attending CT Surgeon - Dr. Cedrick Zuñiga    Patient required critical care management.  75 minutes were spent evaluating, managing, providing, coordinating, and documenting care for this patient, exclusive of procedures from  7AM to 1159PM.      _________________________  VITAL SIGNS:  Vital Signs Last 24 Hrs  T(C): 36.6 (14 Sep 2021 08:00), Max: 37.7 (14 Sep 2021 00:00)  T(F): 97.8 (14 Sep 2021 08:00), Max: 99.8 (14 Sep 2021 00:00)  HR: 100 (14 Sep 2021 15:26) (63 - 151)  BP: --  BP(mean): --  RR: 18 (14 Sep 2021 13:48) (18 - 22)  SpO2: 100% (14 Sep 2021 15:26) (97% - 100%)  I/Os:   I&O's Detail    13 Sep 2021 07:01  -  14 Sep 2021 07:00  --------------------------------------------------------  IN:    Albumin 5%  - 250 mL: 250 mL    Dexmedetomidine: 280 mL    dextrose 5% + lactated ringers w/ Additives: 480 mL    FentaNYL: 114 mL    IV PiggyBack: 600 mL    Norepinephrine: 203.6 mL    Pantoprazole: 240 mL    Propofol: 298.4 mL  Total IN: 2466 mL    OUT:    Indwelling Catheter - Urethral (mL): 3655 mL  Total OUT: 3655 mL    Total NET: -1189 mL          Mode: AC/ CMV (Assist Control/ Continuous Mandatory Ventilation)  RR (machine): 18  TV (machine): 350  FiO2: 40  PEEP: 8  ITime: 0.76  MAP: 13  PIP: 27      MEDICATIONS:  MEDICATIONS  (STANDING):  albumin human  5% IVPB 250 milliLiter(s) IV Intermittent once  ALBUTerol    90 MICROgram(s) HFA Inhaler 2 Puff(s) Inhalation every 6 hours  artificial tears (preservative free) Ophthalmic Solution 1 Drop(s) Both EYES two times a day  budesonide  80 MICROgram(s)/formoterol 4.5 MICROgram(s) Inhaler 2 Puff(s) Inhalation two times a day  chlorhexidine 0.12% Liquid 15 milliLiter(s) Oral Mucosa every 12 hours  chlorhexidine 4% Liquid 1 Application(s) Topical <User Schedule>  cisatracurium Injectable 20 milliGRAM(s) IV Push once  dexMEDEtomidine Infusion 0.2 MICROgram(s)/kG/Hr (2.7 mL/Hr) IV Continuous <Continuous>  dextrose 5% + lactated ringers with potassium chloride 40 mEq/L 1000 milliLiter(s) (20 mL/Hr) IV Continuous <Continuous>  fat emulsion (Fish Oil and Plant Based) 20% Infusion 6.7 mL/Hr (6.7 mL/Hr) IV Continuous <Continuous>  fentaNYL   Infusion. 0.5 MICROgram(s)/kG/Hr (2.7 mL/Hr) IV Continuous <Continuous>  levothyroxine Injectable 112 MICROGram(s) IV Push at bedtime  magnesium sulfate  IVPB 2 Gram(s) IV Intermittent once  norepinephrine Infusion 0.05 MICROgram(s)/kG/Min (2.53 mL/Hr) IV Continuous <Continuous>  pantoprazole  Injectable 40 milliGRAM(s) IV Push two times a day  Parenteral Nutrition - Adult 1 Each (42 mL/Hr) TPN Continuous <Continuous>  piperacillin/tazobactam IVPB.. 3.375 Gram(s) IV Intermittent every 8 hours  potassium chloride  20 mEq/100 mL IVPB 20 milliEquivalent(s) IV Intermittent once  propofol Infusion 40 MICROgram(s)/kG/Min (13 mL/Hr) IV Continuous <Continuous>    MEDICATIONS  (PRN):      LABS:  Laboratory data was independently reviewed by me today.                           10.0   12.07 )-----------( 298      ( 14 Sep 2021 14:56 )             30.7     09-14    140  |  99  |  12  ----------------------------<  141<H>  3.8   |  29  |  0.41<L>    Ca    9.5      14 Sep 2021 14:56  Phos  3.8     09-14  Mg     1.80     09-14        PT/INR - ( 14 Sep 2021 03:05 )   PT: 14.1 sec;   INR: 1.24 ratio         PTT - ( 14 Sep 2021 03:05 )  PTT:29.2 sec  ABG - ( 14 Sep 2021 14:56 )  pH, Arterial: 7.44  pH, Blood: x     /  pCO2: 41    /  pO2: 209   / HCO3: 28    / Base Excess: 3.3   /  SaO2: 97.4                  RADIOLOGY:   Radiology images were independently reviewed by me today. Reports were reviewed by me today.    Xray Chest 1 View- PORTABLE-Routine:   EXAM:  XR CHEST PORTABLE ROUTINE 1V        PROCEDURE DATE:  Sep 14 2021         INTERPRETATION:  TIME OF EXAM: September 14, 2021 at 5:14 AM    CLINICAL INFORMATION: Intubated    TECHNIQUE:   Portable chest    INTERPRETATION:    Tip of the endotracheal tube is just above the mi. Left IJ line unchanged.    Bilateral layering pleural effusions left greater than right. Visualized lungs are clear. The heart is not enlarged. No pneumothorax.      COMPARISON:  September 13, 2021      IMPRESSION:Endotracheal tube with tip above the mi. Bilateral pleural effusions.    --- End of Report ---              ONUR AVILES MD; Attending Radiologist  This document has been electronically signed. Sep 14 2021  7:24AM (09-14-21 @ 05:17)  Xray Chest 1 View- PORTABLE-Urgent:   EXAM:  XR CHEST PORTABLE URGENT 1V        PROCEDURE DATE:  Sep 13 2021         INTERPRETATION:  TIME OF EXAM: September 13, 2021 at 6:52 AM    CLINICAL INFORMATION: Intubated    TECHNIQUE:   Portable chest    INTERPRETATION:    Rotation into an LAOprojection. Tip of the endotracheal tube above the im. Left IJ line with its tip at the origin of the SVC.    No focal consolidations. The heart is not enlarged. No effusions or pneumothorax.      COMPARISON:  September 12      IMPRESSION:  Clearlungs with endotracheal and central line in place.    --- End of Report ---              ONUR AVILES MD; Attending Radiologist  This document has been electronically signed. Sep 13 2021 10:21AM (09-13-21 @ 06:53)  Xray Chest 1 View- PORTABLE-Routine:   EXAM:  XR CHEST PORTABLE ROUTINE 1V        PROCEDURE DATE:  Sep 12 2021         INTERPRETATION:  Chest one view    HISTORY: Hematemesis    COMPARISON STUDY: 9/11/2021    Rotated expiratory view of the chest shows the heart to be similar in size. Endotracheal tube, left jugular line, IVC filter and right humerus hardware are again noted.    The lungs show progression of left infiltrate and left effusion and there is no evidence of pneumothorax nor right pleural effusion.    IMPRESSION:  Progression of left infiltrate/effusion.      Thank you for the courtesy of this referral.    --- End of Report ---              JESSICA GAMBOA MD; Attending Interventional Radiologist  This document has been electronically signed. Sep 12 2021 11:04AM (09-12-21 @ 04:45)       CHIEF COMPLAINT: Follow up in CTICU for intubated for hypoxic respiratory failure with upper GI bleed    PROCEDURE:   - EGD done at outside hospital on 9/9/21 -Large diverticula noted at 28cm filled with blood and blood clots, periphery of diverticula was injected with epinephrine, 5mm distal esophageal tear without bleeding, hiatal hernia.  - EGD, removal of blood clots 9/14      ISSUES:   Acute hypoxic respiratory failure s/p intubation (9/9)  Esophageal diverticular bleed   Acute blood loss anemia  Hypotension requiring IV pressors  COPD  Chronic Afib -- previously on Apixaban  Hypothyroidism  HTN      INTERVAL EVENTS:   Received patient on mechanical ventilation. Maintained on PEEP 8 and 40% FIO2. PEEP decreased to 5 as blood gas improving.  Received patient on levophed pressors via central line. Patient had increased levophed requirements  EGD performed by GI which found large blood clot in diverticulum of esophagus and suggestion of esophageal motility issue. No signs of active bleeding. CBC remains stable.      HISTORY:   Unable to obtain, intubated, sedated    PHYSICAL EXAM:   Gen: Comfortable, No acute distress, frail elderly, kyphotic  Eyes: Sclera white, Conjunctiva normal, Eyelids normal, Pupils symmetrical   ENT: Mucous membranes moist  Neck: Trachea midline, 7.0 ETT  CV: Rate regular, Rhythm irregular  Resp: Breath sounds clear, No accessory muscles use,   Abd: Soft, Non-distended, Non-tender, Bowel sounds normal,  ,  ,    Skin: Warm, 1+ peripheral edema of lower extremities,  ,    : bearden  Neuro: Moves ext to pain, minimally opens eyes to pain, sedated  Psych: RASS -3      ASSESSMENT AND PLAN:     NEURO:  Vent sedation - Continue propofol and precedex and fentanyl for ventilator synchrony.       RESPIRATORY:    Acute respiratory failure secondary to aspiration PNA in setting of hematemesis - Mechanical ventilation. Monitor ABG. Wean FIO2 for goal O2sat above 92. Vent bundle. Zosyn.           CARDIOVASCULAR:  Hypotension requiring IV pressors - wean pressors for MAP goal of 65  Telemetry (medical test) - Reviewed by me today independently. Rate controlled atrial fibrillation.    Chronic Afib - stable. Hold anticoagulation        HTN - currently hypotensive. hold home antihypertensives.            RENAL:  Stable - Monitor IOs and electrolytes. Keep K above 4.0 and Mg above 2.0.         GASTROINTESTINAL:  NPO  No NGT  TPN for nutrition    Blood filled diverticulum - Improved. Pantoprazole IV Q12h. GI consult for timing of EGD.  no signs of esophageal mass on EGD.           HEMATOLOGIC:  Trend PT/PTT, CBC. Maintain active type and screen. Off anticoagulation.  s/p Kcentra at outside hospital  DVT prophylaxis with SCDs.         INFECTIOUS DISEASE:  Aspiration PNA - Improving. Zosyn IV q8h.   Follow up cultures        ENDOCRINE:  Stable – Monitor glucose fingersticks for goal 120-180.     Hypothyroidism - stable. Continue synthroid IV.         Pertinent clinical, laboratory, radiographic, telemetry, hemodynamic, respiratory  data and chart were reviewed by myself and analyzed frequently throughout the course of the day and night by myself.    Plan discussed at length with the CTICU staff and Attending CT Surgeon - Dr. Cedrick Zuñiga    Patient required critical care management.  75 minutes were spent evaluating, managing, providing, coordinating, and documenting care for this patient, exclusive of procedures from  7AM to 1159PM.      _________________________  VITAL SIGNS:  Vital Signs Last 24 Hrs  T(C): 36.6 (14 Sep 2021 08:00), Max: 37.7 (14 Sep 2021 00:00)  T(F): 97.8 (14 Sep 2021 08:00), Max: 99.8 (14 Sep 2021 00:00)  HR: 100 (14 Sep 2021 15:26) (63 - 151)  BP: --  BP(mean): --  RR: 18 (14 Sep 2021 13:48) (18 - 22)  SpO2: 100% (14 Sep 2021 15:26) (97% - 100%)  I/Os:   I&O's Detail    13 Sep 2021 07:01  -  14 Sep 2021 07:00  --------------------------------------------------------  IN:    Albumin 5%  - 250 mL: 250 mL    Dexmedetomidine: 280 mL    dextrose 5% + lactated ringers w/ Additives: 480 mL    FentaNYL: 114 mL    IV PiggyBack: 600 mL    Norepinephrine: 203.6 mL    Pantoprazole: 240 mL    Propofol: 298.4 mL  Total IN: 2466 mL    OUT:    Indwelling Catheter - Urethral (mL): 3655 mL  Total OUT: 3655 mL    Total NET: -1189 mL          Mode: AC/ CMV (Assist Control/ Continuous Mandatory Ventilation)  RR (machine): 18  TV (machine): 350  FiO2: 40  PEEP: 8  ITime: 0.76  MAP: 13  PIP: 27      MEDICATIONS:  MEDICATIONS  (STANDING):  albumin human  5% IVPB 250 milliLiter(s) IV Intermittent once  ALBUTerol    90 MICROgram(s) HFA Inhaler 2 Puff(s) Inhalation every 6 hours  artificial tears (preservative free) Ophthalmic Solution 1 Drop(s) Both EYES two times a day  budesonide  80 MICROgram(s)/formoterol 4.5 MICROgram(s) Inhaler 2 Puff(s) Inhalation two times a day  chlorhexidine 0.12% Liquid 15 milliLiter(s) Oral Mucosa every 12 hours  chlorhexidine 4% Liquid 1 Application(s) Topical <User Schedule>  cisatracurium Injectable 20 milliGRAM(s) IV Push once  dexMEDEtomidine Infusion 0.2 MICROgram(s)/kG/Hr (2.7 mL/Hr) IV Continuous <Continuous>  dextrose 5% + lactated ringers with potassium chloride 40 mEq/L 1000 milliLiter(s) (20 mL/Hr) IV Continuous <Continuous>  fat emulsion (Fish Oil and Plant Based) 20% Infusion 6.7 mL/Hr (6.7 mL/Hr) IV Continuous <Continuous>  fentaNYL   Infusion. 0.5 MICROgram(s)/kG/Hr (2.7 mL/Hr) IV Continuous <Continuous>  levothyroxine Injectable 112 MICROGram(s) IV Push at bedtime  magnesium sulfate  IVPB 2 Gram(s) IV Intermittent once  norepinephrine Infusion 0.05 MICROgram(s)/kG/Min (2.53 mL/Hr) IV Continuous <Continuous>  pantoprazole  Injectable 40 milliGRAM(s) IV Push two times a day  Parenteral Nutrition - Adult 1 Each (42 mL/Hr) TPN Continuous <Continuous>  piperacillin/tazobactam IVPB.. 3.375 Gram(s) IV Intermittent every 8 hours  potassium chloride  20 mEq/100 mL IVPB 20 milliEquivalent(s) IV Intermittent once  propofol Infusion 40 MICROgram(s)/kG/Min (13 mL/Hr) IV Continuous <Continuous>    MEDICATIONS  (PRN):      LABS:  Laboratory data was independently reviewed by me today.                           10.0   12.07 )-----------( 298      ( 14 Sep 2021 14:56 )             30.7     09-14    140  |  99  |  12  ----------------------------<  141<H>  3.8   |  29  |  0.41<L>    Ca    9.5      14 Sep 2021 14:56  Phos  3.8     09-14  Mg     1.80     09-14        PT/INR - ( 14 Sep 2021 03:05 )   PT: 14.1 sec;   INR: 1.24 ratio         PTT - ( 14 Sep 2021 03:05 )  PTT:29.2 sec  ABG - ( 14 Sep 2021 14:56 )  pH, Arterial: 7.44  pH, Blood: x     /  pCO2: 41    /  pO2: 209   / HCO3: 28    / Base Excess: 3.3   /  SaO2: 97.4                  RADIOLOGY:   Radiology images were independently reviewed by me today. Reports were reviewed by me today.    Xray Chest 1 View- PORTABLE-Routine:   EXAM:  XR CHEST PORTABLE ROUTINE 1V        PROCEDURE DATE:  Sep 14 2021         INTERPRETATION:  TIME OF EXAM: September 14, 2021 at 5:14 AM    CLINICAL INFORMATION: Intubated    TECHNIQUE:   Portable chest    INTERPRETATION:    Tip of the endotracheal tube is just above the mi. Left IJ line unchanged.    Bilateral layering pleural effusions left greater than right. Visualized lungs are clear. The heart is not enlarged. No pneumothorax.      COMPARISON:  September 13, 2021      IMPRESSION:Endotracheal tube with tip above the mi. Bilateral pleural effusions.    --- End of Report ---              ONUR AVILES MD; Attending Radiologist  This document has been electronically signed. Sep 14 2021  7:24AM (09-14-21 @ 05:17)  Xray Chest 1 View- PORTABLE-Urgent:   EXAM:  XR CHEST PORTABLE URGENT 1V        PROCEDURE DATE:  Sep 13 2021         INTERPRETATION:  TIME OF EXAM: September 13, 2021 at 6:52 AM    CLINICAL INFORMATION: Intubated    TECHNIQUE:   Portable chest    INTERPRETATION:    Rotation into an LAOprojection. Tip of the endotracheal tube above the mi. Left IJ line with its tip at the origin of the SVC.    No focal consolidations. The heart is not enlarged. No effusions or pneumothorax.      COMPARISON:  September 12      IMPRESSION:  Clearlungs with endotracheal and central line in place.    --- End of Report ---              ONUR AVILES MD; Attending Radiologist  This document has been electronically signed. Sep 13 2021 10:21AM (09-13-21 @ 06:53)  Xray Chest 1 View- PORTABLE-Routine:   EXAM:  XR CHEST PORTABLE ROUTINE 1V        PROCEDURE DATE:  Sep 12 2021         INTERPRETATION:  Chest one view    HISTORY: Hematemesis    COMPARISON STUDY: 9/11/2021    Rotated expiratory view of the chest shows the heart to be similar in size. Endotracheal tube, left jugular line, IVC filter and right humerus hardware are again noted.    The lungs show progression of left infiltrate and left effusion and there is no evidence of pneumothorax nor right pleural effusion.    IMPRESSION:  Progression of left infiltrate/effusion.      Thank you for the courtesy of this referral.    --- End of Report ---              JESSICA GAMBOA MD; Attending Interventional Radiologist  This document has been electronically signed. Sep 12 2021 11:04AM (09-12-21 @ 04:45)

## 2021-09-15 LAB
ALBUMIN SERPL ELPH-MCNC: 3.3 G/DL — SIGNIFICANT CHANGE UP (ref 3.3–5)
ALP SERPL-CCNC: 73 U/L — SIGNIFICANT CHANGE UP (ref 40–120)
ALT FLD-CCNC: 5 U/L — SIGNIFICANT CHANGE UP (ref 4–33)
ANION GAP SERPL CALC-SCNC: 12 MMOL/L — SIGNIFICANT CHANGE UP (ref 7–14)
AST SERPL-CCNC: 16 U/L — SIGNIFICANT CHANGE UP (ref 4–32)
BASOPHILS # BLD AUTO: 0.07 K/UL — SIGNIFICANT CHANGE UP (ref 0–0.2)
BASOPHILS NFR BLD AUTO: 0.6 % — SIGNIFICANT CHANGE UP (ref 0–2)
BILIRUB SERPL-MCNC: 0.5 MG/DL — SIGNIFICANT CHANGE UP (ref 0.2–1.2)
BLOOD GAS ARTERIAL - LYTES,HGB,ICA,LACT RESULT: SIGNIFICANT CHANGE UP
BUN SERPL-MCNC: 12 MG/DL — SIGNIFICANT CHANGE UP (ref 7–23)
CA-I BLD-SCNC: 1.22 MMOL/L — SIGNIFICANT CHANGE UP (ref 1.15–1.29)
CALCIUM SERPL-MCNC: 9.3 MG/DL — SIGNIFICANT CHANGE UP (ref 8.4–10.5)
CHLORIDE SERPL-SCNC: 100 MMOL/L — SIGNIFICANT CHANGE UP (ref 98–107)
CO2 SERPL-SCNC: 26 MMOL/L — SIGNIFICANT CHANGE UP (ref 22–31)
CREAT SERPL-MCNC: 0.39 MG/DL — LOW (ref 0.5–1.3)
EOSINOPHIL # BLD AUTO: 0.37 K/UL — SIGNIFICANT CHANGE UP (ref 0–0.5)
EOSINOPHIL NFR BLD AUTO: 3.4 % — SIGNIFICANT CHANGE UP (ref 0–6)
GLUCOSE BLDC GLUCOMTR-MCNC: 152 MG/DL — HIGH (ref 70–99)
GLUCOSE SERPL-MCNC: 179 MG/DL — HIGH (ref 70–99)
GRAM STN FLD: SIGNIFICANT CHANGE UP
HCT VFR BLD CALC: 27.3 % — LOW (ref 34.5–45)
HCT VFR BLD CALC: 29.8 % — LOW (ref 34.5–45)
HGB BLD-MCNC: 9.3 G/DL — LOW (ref 11.5–15.5)
HGB BLD-MCNC: 9.7 G/DL — LOW (ref 11.5–15.5)
IANC: 7.92 K/UL — SIGNIFICANT CHANGE UP (ref 1.5–8.5)
IMM GRANULOCYTES NFR BLD AUTO: 0.6 % — SIGNIFICANT CHANGE UP (ref 0–1.5)
LYMPHOCYTES # BLD AUTO: 1.15 K/UL — SIGNIFICANT CHANGE UP (ref 1–3.3)
LYMPHOCYTES # BLD AUTO: 10.6 % — LOW (ref 13–44)
MAGNESIUM SERPL-MCNC: 2 MG/DL — SIGNIFICANT CHANGE UP (ref 1.6–2.6)
MCHC RBC-ENTMCNC: 30.3 PG — SIGNIFICANT CHANGE UP (ref 27–34)
MCHC RBC-ENTMCNC: 31.2 PG — SIGNIFICANT CHANGE UP (ref 27–34)
MCHC RBC-ENTMCNC: 32.6 GM/DL — SIGNIFICANT CHANGE UP (ref 32–36)
MCHC RBC-ENTMCNC: 34.1 GM/DL — SIGNIFICANT CHANGE UP (ref 32–36)
MCV RBC AUTO: 91.6 FL — SIGNIFICANT CHANGE UP (ref 80–100)
MCV RBC AUTO: 93.1 FL — SIGNIFICANT CHANGE UP (ref 80–100)
MONOCYTES # BLD AUTO: 1.28 K/UL — HIGH (ref 0–0.9)
MONOCYTES NFR BLD AUTO: 11.8 % — SIGNIFICANT CHANGE UP (ref 2–14)
NEUTROPHILS # BLD AUTO: 7.92 K/UL — HIGH (ref 1.8–7.4)
NEUTROPHILS NFR BLD AUTO: 73 % — SIGNIFICANT CHANGE UP (ref 43–77)
NRBC # BLD: 0 /100 WBCS — SIGNIFICANT CHANGE UP
NRBC # BLD: 0 /100 WBCS — SIGNIFICANT CHANGE UP
NRBC # FLD: 0 K/UL — SIGNIFICANT CHANGE UP
NRBC # FLD: 0 K/UL — SIGNIFICANT CHANGE UP
PHOSPHATE SERPL-MCNC: 1.7 MG/DL — LOW (ref 2.5–4.5)
PLATELET # BLD AUTO: 287 K/UL — SIGNIFICANT CHANGE UP (ref 150–400)
PLATELET # BLD AUTO: 306 K/UL — SIGNIFICANT CHANGE UP (ref 150–400)
POTASSIUM SERPL-MCNC: 3.8 MMOL/L — SIGNIFICANT CHANGE UP (ref 3.5–5.3)
POTASSIUM SERPL-SCNC: 3.8 MMOL/L — SIGNIFICANT CHANGE UP (ref 3.5–5.3)
PROT SERPL-MCNC: 5.9 G/DL — LOW (ref 6–8.3)
RBC # BLD: 2.98 M/UL — LOW (ref 3.8–5.2)
RBC # BLD: 3.2 M/UL — LOW (ref 3.8–5.2)
RBC # FLD: 15 % — HIGH (ref 10.3–14.5)
RBC # FLD: 15.1 % — HIGH (ref 10.3–14.5)
SODIUM SERPL-SCNC: 138 MMOL/L — SIGNIFICANT CHANGE UP (ref 135–145)
SPECIMEN SOURCE: SIGNIFICANT CHANGE UP
TRIGL SERPL-MCNC: 103 MG/DL — SIGNIFICANT CHANGE UP
WBC # BLD: 10.33 K/UL — SIGNIFICANT CHANGE UP (ref 3.8–10.5)
WBC # BLD: 10.85 K/UL — HIGH (ref 3.8–10.5)
WBC # FLD AUTO: 10.33 K/UL — SIGNIFICANT CHANGE UP (ref 3.8–10.5)
WBC # FLD AUTO: 10.85 K/UL — HIGH (ref 3.8–10.5)

## 2021-09-15 PROCEDURE — 99232 SBSQ HOSP IP/OBS MODERATE 35: CPT

## 2021-09-15 PROCEDURE — 99291 CRITICAL CARE FIRST HOUR: CPT

## 2021-09-15 PROCEDURE — 71045 X-RAY EXAM CHEST 1 VIEW: CPT | Mod: 26

## 2021-09-15 PROCEDURE — 93306 TTE W/DOPPLER COMPLETE: CPT | Mod: 26

## 2021-09-15 PROCEDURE — 99292 CRITICAL CARE ADDL 30 MIN: CPT

## 2021-09-15 PROCEDURE — 36569 INSJ PICC 5 YR+ W/O IMAGING: CPT

## 2021-09-15 PROCEDURE — 99232 SBSQ HOSP IP/OBS MODERATE 35: CPT | Mod: GC

## 2021-09-15 RX ORDER — POTASSIUM PHOSPHATE, MONOBASIC POTASSIUM PHOSPHATE, DIBASIC 236; 224 MG/ML; MG/ML
15 INJECTION, SOLUTION INTRAVENOUS ONCE
Refills: 0 | Status: COMPLETED | OUTPATIENT
Start: 2021-09-15 | End: 2021-09-15

## 2021-09-15 RX ORDER — POTASSIUM PHOSPHATE, MONOBASIC POTASSIUM PHOSPHATE, DIBASIC 236; 224 MG/ML; MG/ML
30 INJECTION, SOLUTION INTRAVENOUS ONCE
Refills: 0 | Status: COMPLETED | OUTPATIENT
Start: 2021-09-15 | End: 2021-09-15

## 2021-09-15 RX ORDER — VANCOMYCIN HCL 1 G
VIAL (EA) INTRAVENOUS
Refills: 0 | Status: DISCONTINUED | OUTPATIENT
Start: 2021-09-15 | End: 2021-09-17

## 2021-09-15 RX ORDER — MEROPENEM 1 G/30ML
1000 INJECTION INTRAVENOUS ONCE
Refills: 0 | Status: COMPLETED | OUTPATIENT
Start: 2021-09-15 | End: 2021-09-15

## 2021-09-15 RX ORDER — MEROPENEM 1 G/30ML
1000 INJECTION INTRAVENOUS EVERY 8 HOURS
Refills: 0 | Status: COMPLETED | OUTPATIENT
Start: 2021-09-15 | End: 2021-09-22

## 2021-09-15 RX ORDER — MEROPENEM 1 G/30ML
INJECTION INTRAVENOUS
Refills: 0 | Status: COMPLETED | OUTPATIENT
Start: 2021-09-15 | End: 2021-09-22

## 2021-09-15 RX ORDER — VANCOMYCIN HCL 1 G
1000 VIAL (EA) INTRAVENOUS EVERY 12 HOURS
Refills: 0 | Status: DISCONTINUED | OUTPATIENT
Start: 2021-09-15 | End: 2021-09-15

## 2021-09-15 RX ORDER — VANCOMYCIN HCL 1 G
750 VIAL (EA) INTRAVENOUS EVERY 12 HOURS
Refills: 0 | Status: DISCONTINUED | OUTPATIENT
Start: 2021-09-16 | End: 2021-09-17

## 2021-09-15 RX ORDER — VANCOMYCIN HCL 1 G
750 VIAL (EA) INTRAVENOUS ONCE
Refills: 0 | Status: COMPLETED | OUTPATIENT
Start: 2021-09-15 | End: 2021-09-15

## 2021-09-15 RX ORDER — ELECTROLYTE SOLUTION,INJ
1 VIAL (ML) INTRAVENOUS
Refills: 0 | Status: DISCONTINUED | OUTPATIENT
Start: 2021-09-15 | End: 2021-09-15

## 2021-09-15 RX ORDER — VASOPRESSIN 20 [USP'U]/ML
0.04 INJECTION INTRAVENOUS
Qty: 50 | Refills: 0 | Status: DISCONTINUED | OUTPATIENT
Start: 2021-09-15 | End: 2021-09-25

## 2021-09-15 RX ORDER — I.V. FAT EMULSION 20 G/100ML
13.3 EMULSION INTRAVENOUS
Qty: 32 | Refills: 0 | Status: DISCONTINUED | OUTPATIENT
Start: 2021-09-15 | End: 2021-09-16

## 2021-09-15 RX ADMIN — Medication 1 DROP(S): at 17:30

## 2021-09-15 RX ADMIN — CHLORHEXIDINE GLUCONATE 1 APPLICATION(S): 213 SOLUTION TOPICAL at 05:12

## 2021-09-15 RX ADMIN — VASOPRESSIN 2.4 UNIT(S)/MIN: 20 INJECTION INTRAVENOUS at 13:29

## 2021-09-15 RX ADMIN — Medication 50 MILLIEQUIVALENT(S): at 00:56

## 2021-09-15 RX ADMIN — POTASSIUM PHOSPHATE, MONOBASIC POTASSIUM PHOSPHATE, DIBASIC 83.33 MILLIMOLE(S): 236; 224 INJECTION, SOLUTION INTRAVENOUS at 10:53

## 2021-09-15 RX ADMIN — MEROPENEM 100 MILLIGRAM(S): 1 INJECTION INTRAVENOUS at 23:06

## 2021-09-15 RX ADMIN — CHLORHEXIDINE GLUCONATE 15 MILLILITER(S): 213 SOLUTION TOPICAL at 17:29

## 2021-09-15 RX ADMIN — BUDESONIDE AND FORMOTEROL FUMARATE DIHYDRATE 2 PUFF(S): 160; 4.5 AEROSOL RESPIRATORY (INHALATION) at 10:25

## 2021-09-15 RX ADMIN — POTASSIUM PHOSPHATE, MONOBASIC POTASSIUM PHOSPHATE, DIBASIC 62.5 MILLIMOLE(S): 236; 224 INJECTION, SOLUTION INTRAVENOUS at 06:31

## 2021-09-15 RX ADMIN — ALBUTEROL 2 PUFF(S): 90 AEROSOL, METERED ORAL at 16:07

## 2021-09-15 RX ADMIN — Medication 1 EACH: at 18:11

## 2021-09-15 RX ADMIN — ALBUTEROL 2 PUFF(S): 90 AEROSOL, METERED ORAL at 03:10

## 2021-09-15 RX ADMIN — MEROPENEM 100 MILLIGRAM(S): 1 INJECTION INTRAVENOUS at 13:15

## 2021-09-15 RX ADMIN — PANTOPRAZOLE SODIUM 40 MILLIGRAM(S): 20 TABLET, DELAYED RELEASE ORAL at 17:30

## 2021-09-15 RX ADMIN — CHLORHEXIDINE GLUCONATE 15 MILLILITER(S): 213 SOLUTION TOPICAL at 05:07

## 2021-09-15 RX ADMIN — Medication 50 MILLIEQUIVALENT(S): at 03:00

## 2021-09-15 RX ADMIN — FENTANYL CITRATE 2.7 MICROGRAM(S)/KG/HR: 50 INJECTION INTRAVENOUS at 12:41

## 2021-09-15 RX ADMIN — ALBUTEROL 2 PUFF(S): 90 AEROSOL, METERED ORAL at 23:04

## 2021-09-15 RX ADMIN — PIPERACILLIN AND TAZOBACTAM 25 GRAM(S): 4; .5 INJECTION, POWDER, LYOPHILIZED, FOR SOLUTION INTRAVENOUS at 05:11

## 2021-09-15 RX ADMIN — PANTOPRAZOLE SODIUM 40 MILLIGRAM(S): 20 TABLET, DELAYED RELEASE ORAL at 05:08

## 2021-09-15 RX ADMIN — Medication 112 MICROGRAM(S): at 23:05

## 2021-09-15 RX ADMIN — BUDESONIDE AND FORMOTEROL FUMARATE DIHYDRATE 2 PUFF(S): 160; 4.5 AEROSOL RESPIRATORY (INHALATION) at 23:04

## 2021-09-15 RX ADMIN — Medication 250 MILLIGRAM(S): at 13:29

## 2021-09-15 RX ADMIN — ALBUTEROL 2 PUFF(S): 90 AEROSOL, METERED ORAL at 10:25

## 2021-09-15 RX ADMIN — Medication 1 DROP(S): at 05:11

## 2021-09-15 RX ADMIN — DEXMEDETOMIDINE HYDROCHLORIDE IN 0.9% SODIUM CHLORIDE 2.7 MICROGRAM(S)/KG/HR: 4 INJECTION INTRAVENOUS at 05:11

## 2021-09-15 RX ADMIN — Medication 2.53 MICROGRAM(S)/KG/MIN: at 06:26

## 2021-09-15 NOTE — PROGRESS NOTE ADULT - ATTENDING COMMENTS
I agree with the above history, physical examination, chief complaint/diagnosis, and plan, which I have reviewed and edited where appropriate.  I agree with notes/assessment and detailed interval history of health care providers on my service.  I have seen and examined the patient.  I reviewed the laboratory and available data and agree with the history, physical assessment and plan.  I reviewed and discussed with all consultants, house staff and PA's.  The Nutrition Support Team (NST) discusses on an ongoing basis with the primary team and all consultants, House staff and PA's to have a permanent risk benefit analyses on all decisions and coordinating care.  I was physically present for the key portions of the evaluation and management (E/M) service provided.  80 y/o female with esophageal tear without bleeding with severe CP malnutrition in the context of acute illness receiving TPN/lipids v  Frail elderly, sedated   Resp:coarse bs  Abd: Soft, Non-distended   Skin: Warm, 1+ peripheral edema  labs reviewed - electrolytes adjusted  TPN infusion volume increased to 1.5 L/1345 kcal/day

## 2021-09-15 NOTE — CONSULT NOTE ADULT - SUBJECTIVE AND OBJECTIVE BOX
Patient is a 81y old  Female who presents with a chief complaint of Hematemesis (15 Sep 2021 12:44)      HPI:  81 y. o. female BIBA to [facility] from nursing facility c/o hematemesis.  According to pt it was the first episode and happened while she was eating dinner. Followed by multiple episodes of N/V with bright blood and epigastric cramps.  She was intubated for airway protection and underwent EGD that revealed large diverticula at 28 cm filled with blood and 5 mm tear at distal esophagus, a hiatal hernia and large amount of blood in the fundus.  Diverticula was injected with Epinephrine.  Patient was transferred to [facility] for further management. (10 Sep 2021 15:04)    Pt followed by GI, s/p repeat EGD on 9/14 -  showing significant clot in large esophageal diverticula -- without obvious source of etiology of bleeding (ie no vessel) + no esophageal mass seen.  Pt on pressors, thought to be unlikely hemorrhagic given stable Hb.    Pt with fever, on pressors.  Pt on empiric abx, ID consulted for further abx managment.       REVIEW OF SYSTEMS:    CONSTITUTIONAL: No fever, weight loss, or fatigue  EYES: No eye pain, visual disturbances, or discharge  ENMT:  No sore throat  NECK: No pain or stiffness  RESPIRATORY: No cough, wheezing, chills or hemoptysis; No shortness of breath  CARDIOVASCULAR: No chest pain, palpitations, dizziness, or leg swelling  GASTROINTESTINAL: No abdominal or epigastric pain. No nausea, vomiting, or hematemesis; No diarrhea or constipation. No melena or hematochezia.  GENITOURINARY: No dysuria, frequency, hematuria, or incontinence  NEUROLOGICAL: No headaches, memory loss, loss of strength, numbness, or tremors  SKIN: No itching, burning, rashes, or lesions   LYMPH NODES: No enlarged glands  MUSCULOSKELETAL: No joint pain or swelling; No muscle, back, or extremity pain      PAST MEDICAL & SURGICAL HISTORY:  Afib    History of COPD    HTN (hypertension)    Hypothyroid    GERD (gastroesophageal reflux disease)    Esophageal diverticulum    Presence of IVC filter        Allergies    Sulfur Colliod (Rash)  Tylenol (Swelling)    Intolerances        FAMILY HISTORY:      SOCIAL HISTORY:        MEDICATIONS  (STANDING):  ALBUTerol    90 MICROgram(s) HFA Inhaler 2 Puff(s) Inhalation every 6 hours  artificial tears (preservative free) Ophthalmic Solution 1 Drop(s) Both EYES two times a day  budesonide  80 MICROgram(s)/formoterol 4.5 MICROgram(s) Inhaler 2 Puff(s) Inhalation two times a day  chlorhexidine 0.12% Liquid 15 milliLiter(s) Oral Mucosa every 12 hours  chlorhexidine 4% Liquid 1 Application(s) Topical <User Schedule>  dexMEDEtomidine Infusion 0.2 MICROgram(s)/kG/Hr (2.7 mL/Hr) IV Continuous <Continuous>  fat emulsion (Fish Oil and Plant Based) 20% Infusion 13.3 mL/Hr (13.3 mL/Hr) IV Continuous <Continuous>  fentaNYL   Infusion. 0.5 MICROgram(s)/kG/Hr (2.7 mL/Hr) IV Continuous <Continuous>  levothyroxine Injectable 112 MICROGram(s) IV Push at bedtime  meropenem  IVPB      meropenem  IVPB 1000 milliGRAM(s) IV Intermittent every 8 hours  norepinephrine Infusion 0.05 MICROgram(s)/kG/Min (2.53 mL/Hr) IV Continuous <Continuous>  pantoprazole  Injectable 40 milliGRAM(s) IV Push two times a day  Parenteral Nutrition - Adult 1 Each (42 mL/Hr) TPN Continuous <Continuous>  Parenteral Nutrition - Adult 1 Each (63 mL/Hr) TPN Continuous <Continuous>  propofol Infusion 40 MICROgram(s)/kG/Min (13 mL/Hr) IV Continuous <Continuous>  vancomycin  IVPB      vasopressin Infusion 0.04 Unit(s)/Min (2.4 mL/Hr) IV Continuous <Continuous>    MEDICATIONS  (PRN):      Vital Signs Last 24 Hrs  T(C): 38.3 (15 Sep 2021 13:30), Max: 38.9 (15 Sep 2021 02:00)  T(F): 100.9 (15 Sep 2021 13:30), Max: 102.1 (15 Sep 2021 02:00)  HR: 85 (15 Sep 2021 14:00) (85 - 702)  BP: --  BP(mean): --  RR: 18 (15 Sep 2021 14:00) (17 - 21)  SpO2: 100% (15 Sep 2021 14:00) (93% - 100%)    PHYSICAL EXAM:    GENERAL: NAD, well-groomed  HEAD:  Atraumatic, Normocephalic  EYES: EOMI, PERRLA, conjunctiva and sclera clear  ENMT: No tonsillar erythema, exudates, or enlargement; Moist mucous membranes  NECK: Supple, No JVD  CHEST/LUNG: Clear to percussion bilaterally; No rales, rhonchi, wheezing, or rubs  HEART: Regular rate and rhythm; No murmurs, rubs, or gallops  ABDOMEN: Soft, Nontender, Nondistended; Bowel sounds present  EXTREMITIES:  2+ Peripheral Pulses, No clubbing, cyanosis, or edema  LYMPH: No lymphadenopathy noted  SKIN: No rashes or lesions    LABS:  CBC Full  -  ( 15 Sep 2021 04:13 )  WBC Count : 10.85 K/uL  RBC Count : 3.20 M/uL  Hemoglobin : 9.7 g/dL  Hematocrit : 29.8 %  Platelet Count - Automated : 306 K/uL  Mean Cell Volume : 93.1 fL  Mean Cell Hemoglobin : 30.3 pg  Mean Cell Hemoglobin Concentration : 32.6 gm/dL  Auto Neutrophil # : 7.92 K/uL  Auto Lymphocyte # : 1.15 K/uL  Auto Monocyte # : 1.28 K/uL  Auto Eosinophil # : 0.37 K/uL  Auto Basophil # : 0.07 K/uL  Auto Neutrophil % : 73.0 %  Auto Lymphocyte % : 10.6 %  Auto Monocyte % : 11.8 %  Auto Eosinophil % : 3.4 %  Auto Basophil % : 0.6 %      09-15    138  |  100  |  12  ----------------------------<  179<H>  3.8   |  26  |  0.39<L>    Ca    9.3      15 Sep 2021 04:13  Phos  1.7     09-15  Mg     2.00     09-15    TPro  5.9<L>  /  Alb  3.3  /  TBili  0.5  /  DBili  x   /  AST  16  /  ALT  5   /  AlkPhos  73  09-15      LIVER FUNCTIONS - ( 15 Sep 2021 04:13 )  Alb: 3.3 g/dL / Pro: 5.9 g/dL / ALK PHOS: 73 U/L / ALT: 5 U/L / AST: 16 U/L / GGT: x                               MICROBIOLOGY:    Culture - Sputum . (09.10.21 @ 19:12)   Gram Stain:   Rare polymorphonuclear leukocytes per low power field   No Squamous epithelial cells per low power field   No organisms seen per oil power field   Specimen Source: .Sputum Sputum   Culture Results:   Normal Respiratory Consuelo present     COVID-19 PCR . (09.12.21 @ 16:29)   COVID-19 PCR: NotDetec: You can help in the fight against COVID-19. [facility] may contact   you to see if you are interested in voluntarily participating in one of   our clinical trials.   Testing is performed using polymerase chain reaction (PCR) or   transcription mediated amplification (TMA). This COVID-19 (SARS-CoV-2)   nucleic acid amplification test was validated by [facility] and is   in use under the FDA Emergency Use Authorization (EUA) for clinical labs   CLIA-certified to perform high complexity testing. Test results should be   correlated with clinical presentation, patient history, and epidemiology.                 RADIOLOGY:    < from: Xray Chest 1 View- PORTABLE-Routine (Xray Chest 1 View- PORTABLE-Routine in AM.) (09.15.21 @ 06:22) >    IMPRESSION:  Follow-up with endotracheal tube in place and left effusion.    < end of copied text >        < from: CT Chest w/ IV Cont (09.13.21 @ 14:53) >  IMPRESSION:    A 7.2 cm masslike structure distending the mid to distal esophagus, suspicious for malignancy. Recommend tissue sampling.    Bilateral tree-in-bud nodular opacities, predominantly in the left lower lobe, new/increased from 9/9/2021, raising concern for infection. Heterogeneously enhancing left lower lobe consolidative opacity, possible combination of atelectasis and pneumonia.    Scattered subcentimeter hypodensities in the liver are too small to characterize. In the setting of suspicion for malignancy, metastases cannot be entirely excluded. More definitive characterization may be obtained with contrast enhanced liver MR as clinically warranted.    Large rectalstool burden with presacral edema. Correlate clinically for stercoral colitis.    Age indeterminate vertebral body compression deformities including a severe compression deformity of L1. Correlate clinically for point tenderness.    < end of copied text >      < from: CT Abdomen and Pelvis w/ IV Cont (09.13.21 @ 14:52) >    IMPRESSION:    A 7.2 cm masslike structure distending the mid to distal esophagus, suspicious for malignancy. Recommend tissue sampling.    Bilateral tree-in-bud nodular opacities, predominantly in the left lower lobe, new/increased from 9/9/2021, raising concern for infection. Heterogeneously enhancing left lower lobe consolidative opacity, possible combination of atelectasis and pneumonia.    Scattered subcentimeter hypodensities in the liver are too small to characterize. In the setting of suspicion for malignancy, metastases cannot be entirely excluded. More definitive characterization may be obtained with contrast enhanced liver MR as clinically warranted.    Large rectalstool burden with presacral edema. Correlate clinically for stercoral colitis.    Age indeterminate vertebral body compression deformities including a severe compression deformity of L1. Correlate clinically for point tenderness.    < end of copied text >      < from: Xray Chest 1 View- PORTABLE-Urgent (Xray Chest 1 View- PORTABLE-Urgent .) (09.13.21 @ 06:53) >  INTERPRETATION:    Rotation into an LAOprojection. Tip of the endotracheal tube above the mi. Left IJ line with its tip at the origin of the SVC.    No focal consolidations. The heart is not enlarged. No effusions or pneumothorax.      COMPARISON:  September 12      IMPRESSION:  Clearlungs with endotracheal and central line in place.    < end of copied text >             Patient is a 81y old  Female who presents with a chief complaint of Hematemesis (15 Sep 2021 12:44)      HPI:  81 y. o. female BIBA to [facility] from nursing facility c/o hematemesis.  According to pt it was the first episode and happened while she was eating dinner. Followed by multiple episodes of N/V with bright blood and epigastric cramps.  She was intubated for airway protection and underwent EGD that revealed large diverticula at 28 cm filled with blood and 5 mm tear at distal esophagus, a hiatal hernia and large amount of blood in the fundus.  Diverticula was injected with Epinephrine.  Patient was transferred to [facility] for further management. (10 Sep 2021 15:04)    Pt followed by GI, s/p repeat EGD on 9/14 -  showing significant clot in large esophageal diverticula -- without obvious source of etiology of bleeding (ie no vessel) + no esophageal mass seen.  Pt on pressors, thought to be unlikely hemorrhagic given stable Hb.    9/15: WBC 10.8, sputum cx (9/10) with nl resp tawana. CT c/a/p shows a 7.2 cm masslike structure distending the mid to distal esophagus, suspicious for malignancy.  Bilateral tree-in-bud nodular opacities, predominantly in the left lower lobe, new/increased from 9/9/2021, raising concern for infection.  Left lower lobe consolidative opacity, possible combination of atelectasis and pneumonia.    (+) hypodensities in the liver suspicion for malignancy, and stercoral colitis.    Pt with fever, on pressors.  Pt on empiric abx, ID consulted for further abx managment.         REVIEW OF SYSTEMS:    Pt intubated, sedated.  Unable to assess        PAST MEDICAL & SURGICAL HISTORY:  Afib    History of COPD    HTN (hypertension)    Hypothyroid    GERD (gastroesophageal reflux disease)    Esophageal diverticulum    Presence of IVC filter        Allergies    Sulfur Colliod (Rash)  Tylenol (Swelling)    Intolerances        FAMILY HISTORY:  No pertinent fam hx in 1st degree relatives    SOCIAL HISTORY:  No smoking, ivdu, etoh      MEDICATIONS  (STANDING):  ALBUTerol    90 MICROgram(s) HFA Inhaler 2 Puff(s) Inhalation every 6 hours  artificial tears (preservative free) Ophthalmic Solution 1 Drop(s) Both EYES two times a day  budesonide  80 MICROgram(s)/formoterol 4.5 MICROgram(s) Inhaler 2 Puff(s) Inhalation two times a day  chlorhexidine 0.12% Liquid 15 milliLiter(s) Oral Mucosa every 12 hours  chlorhexidine 4% Liquid 1 Application(s) Topical <User Schedule>  dexMEDEtomidine Infusion 0.2 MICROgram(s)/kG/Hr (2.7 mL/Hr) IV Continuous <Continuous>  fat emulsion (Fish Oil and Plant Based) 20% Infusion 13.3 mL/Hr (13.3 mL/Hr) IV Continuous <Continuous>  fentaNYL   Infusion. 0.5 MICROgram(s)/kG/Hr (2.7 mL/Hr) IV Continuous <Continuous>  levothyroxine Injectable 112 MICROGram(s) IV Push at bedtime  meropenem  IVPB      meropenem  IVPB 1000 milliGRAM(s) IV Intermittent every 8 hours  norepinephrine Infusion 0.05 MICROgram(s)/kG/Min (2.53 mL/Hr) IV Continuous <Continuous>  pantoprazole  Injectable 40 milliGRAM(s) IV Push two times a day  Parenteral Nutrition - Adult 1 Each (42 mL/Hr) TPN Continuous <Continuous>  Parenteral Nutrition - Adult 1 Each (63 mL/Hr) TPN Continuous <Continuous>  propofol Infusion 40 MICROgram(s)/kG/Min (13 mL/Hr) IV Continuous <Continuous>  vancomycin  IVPB      vasopressin Infusion 0.04 Unit(s)/Min (2.4 mL/Hr) IV Continuous <Continuous>    MEDICATIONS  (PRN):      Vital Signs Last 24 Hrs  T(C): 38.3 (15 Sep 2021 13:30), Max: 38.9 (15 Sep 2021 02:00)  T(F): 100.9 (15 Sep 2021 13:30), Max: 102.1 (15 Sep 2021 02:00)  HR: 85 (15 Sep 2021 14:00) (85 - 702)  BP: --  BP(mean): --  RR: 18 (15 Sep 2021 14:00) (17 - 21)  SpO2: 100% (15 Sep 2021 14:00) (93% - 100%)    PHYSICAL EXAM:    GENERAL: Pt intubated, sedated on pressor support  HEAD:  Atraumatic, Normocephalic  EYES: EOMI, PERRLA, conjunctiva and sclera clear  ENMT: No tonsillar erythema, exudates, or enlargement; Moist mucous membranes  NECK: Supple, No JVD  CHEST/LUNG: Clear to percussion bilaterally; No rales, rhonchi, wheezing, or rubs  HEART: Regular rate and rhythm; No murmurs, rubs, or gallops  ABDOMEN: Soft, Nontender, Nondistended; Bowel sounds present,   EXTREMITIES:  2+ Peripheral Pulses, No clubbing, cyanosis, or edema  LYMPH: No lymphadenopathy noted  SKIN: L IJ TLC  : bearden    LABS:  CBC Full  -  ( 15 Sep 2021 04:13 )  WBC Count : 10.85 K/uL  RBC Count : 3.20 M/uL  Hemoglobin : 9.7 g/dL  Hematocrit : 29.8 %  Platelet Count - Automated : 306 K/uL  Mean Cell Volume : 93.1 fL  Mean Cell Hemoglobin : 30.3 pg  Mean Cell Hemoglobin Concentration : 32.6 gm/dL  Auto Neutrophil # : 7.92 K/uL  Auto Lymphocyte # : 1.15 K/uL  Auto Monocyte # : 1.28 K/uL  Auto Eosinophil # : 0.37 K/uL  Auto Basophil # : 0.07 K/uL  Auto Neutrophil % : 73.0 %  Auto Lymphocyte % : 10.6 %  Auto Monocyte % : 11.8 %  Auto Eosinophil % : 3.4 %  Auto Basophil % : 0.6 %      09-15    138  |  100  |  12  ----------------------------<  179<H>  3.8   |  26  |  0.39<L>    Ca    9.3      15 Sep 2021 04:13  Phos  1.7     09-15  Mg     2.00     09-15    TPro  5.9<L>  /  Alb  3.3  /  TBili  0.5  /  DBili  x   /  AST  16  /  ALT  5   /  AlkPhos  73  09-15      LIVER FUNCTIONS - ( 15 Sep 2021 04:13 )  Alb: 3.3 g/dL / Pro: 5.9 g/dL / ALK PHOS: 73 U/L / ALT: 5 U/L / AST: 16 U/L / GGT: x                               MICROBIOLOGY:    Culture - Sputum . (09.10.21 @ 19:12)   Gram Stain:   Rare polymorphonuclear leukocytes per low power field   No Squamous epithelial cells per low power field   No organisms seen per oil power field   Specimen Source: .Sputum Sputum   Culture Results:   Normal Respiratory Tawana present     COVID-19 PCR . (09.12.21 @ 16:29)   COVID-19 PCR: NotDetec: You can help in the fight against COVID-19. [facility] may contact   you to see if you are interested in voluntarily participating in one of   our clinical trials.   Testing is performed using polymerase chain reaction (PCR) or   transcription mediated amplification (TMA). This COVID-19 (SARS-CoV-2)   nucleic acid amplification test was validated by [facility] and is   in use under the FDA Emergency Use Authorization (EUA) for clinical labs   CLIA-certified to perform high complexity testing. Test results should be   correlated with clinical presentation, patient history, and epidemiology.                 RADIOLOGY:    < from: Xray Chest 1 View- PORTABLE-Routine (Xray Chest 1 View- PORTABLE-Routine in AM.) (09.15.21 @ 06:22) >    IMPRESSION:  Follow-up with endotracheal tube in place and left effusion.    < end of copied text >        < from: CT Chest w/ IV Cont (09.13.21 @ 14:53) >  IMPRESSION:    A 7.2 cm masslike structure distending the mid to distal esophagus, suspicious for malignancy. Recommend tissue sampling.    Bilateral tree-in-bud nodular opacities, predominantly in the left lower lobe, new/increased from 9/9/2021, raising concern for infection. Heterogeneously enhancing left lower lobe consolidative opacity, possible combination of atelectasis and pneumonia.    Scattered subcentimeter hypodensities in the liver are too small to characterize. In the setting of suspicion for malignancy, metastases cannot be entirely excluded. More definitive characterization may be obtained with contrast enhanced liver MR as clinically warranted.    Large rectalstool burden with presacral edema. Correlate clinically for stercoral colitis.    Age indeterminate vertebral body compression deformities including a severe compression deformity of L1. Correlate clinically for point tenderness.    < end of copied text >      < from: CT Abdomen and Pelvis w/ IV Cont (09.13.21 @ 14:52) >    IMPRESSION:    A 7.2 cm masslike structure distending the mid to distal esophagus, suspicious for malignancy. Recommend tissue sampling.    Bilateral tree-in-bud nodular opacities, predominantly in the left lower lobe, new/increased from 9/9/2021, raising concern for infection. Heterogeneously enhancing left lower lobe consolidative opacity, possible combination of atelectasis and pneumonia.    Scattered subcentimeter hypodensities in the liver are too small to characterize. In the setting of suspicion for malignancy, metastases cannot be entirely excluded. More definitive characterization may be obtained with contrast enhanced liver MR as clinically warranted.    Large rectalstool burden with presacral edema. Correlate clinically for stercoral colitis.    Age indeterminate vertebral body compression deformities including a severe compression deformity of L1. Correlate clinically for point tenderness.    < end of copied text >      < from: Xray Chest 1 View- PORTABLE-Urgent (Xray Chest 1 View- PORTABLE-Urgent .) (09.13.21 @ 06:53) >  INTERPRETATION:    Rotation into an LAOprojection. Tip of the endotracheal tube above the mi. Left IJ line with its tip at the origin of the SVC.    No focal consolidations. The heart is not enlarged. No effusions or pneumothorax.      COMPARISON:  September 12      IMPRESSION:  Clearlungs with endotracheal and central line in place.    < end of copied text >

## 2021-09-15 NOTE — PROGRESS NOTE ADULT - SUBJECTIVE AND OBJECTIVE BOX
NUTRITION NOTE  [id number] NAGI  ===============================    Interval events - Parenteral nutrition was initiated on 9/14/21 via central line, pt was febrile upto 102.1F overnight; awaiting results of blood cultures     ROS - unable to obtain, pt is on vent support     Allergies  Sulfur Colliod (Rash)  Tylenol (Swelling)    PAST MEDICAL & SURGICAL HISTORY:  Afib  History of COPD  HTN (hypertension)  Hypothyroid  GERD (gastroesophageal reflux disease)  Esophageal diverticulum  Presence of IVC filter    ICU Vital Signs Last 24 Hrs  T(C): 38.4 (15 Sep 2021 04:00), Max: 38.9 (15 Sep 2021 02:00)  T(F): 101.2 (15 Sep 2021 04:00), Max: 102.1 (15 Sep 2021 02:00)  HR: 94 (15 Sep 2021 10:25) (90 - 702)  ABP: 112/71 (15 Sep 2021 10:00) (91/53 - 145/87)  ABP(mean): 86 (15 Sep 2021 10:00) (67 - 109)  RR: 18 (15 Sep 2021 10:00) (17 - 21)  SpO2: 100% (15 Sep 2021 10:00) (93% - 100%)    MEDICATIONS  (STANDING):  ALBUTerol    90 MICROgram(s) HFA Inhaler 2 Puff(s) Inhalation every 6 hours  artificial tears (preservative free) Ophthalmic Solution 1 Drop(s) Both EYES two times a day  budesonide  80 MICROgram(s)/formoterol 4.5 MICROgram(s) Inhaler 2 Puff(s) Inhalation two times a day  chlorhexidine 0.12% Liquid 15 milliLiter(s) Oral Mucosa every 12 hours  chlorhexidine 4% Liquid 1 Application(s) Topical <User Schedule>  dexMEDEtomidine Infusion 0.2 MICROgram(s)/kG/Hr (2.7 mL/Hr) IV Continuous <Continuous>  fat emulsion (Fish Oil and Plant Based) 20% Infusion 13.3 mL/Hr (13.3 mL/Hr) IV Continuous <Continuous>  fentaNYL   Infusion. 0.5 MICROgram(s)/kG/Hr (2.7 mL/Hr) IV Continuous <Continuous>  levothyroxine Injectable 112 MICROGram(s) IV Push at bedtime  norepinephrine Infusion 0.05 MICROgram(s)/kG/Min (2.53 mL/Hr) IV Continuous <Continuous>  pantoprazole  Injectable 40 milliGRAM(s) IV Push two times a day  Parenteral Nutrition - Adult 1 Each (42 mL/Hr) TPN Continuous <Continuous>  Parenteral Nutrition - Adult 1 Each (63 mL/Hr) TPN Continuous <Continuous>  piperacillin/tazobactam IVPB.. 3.375 Gram(s) IV Intermittent every 8 hours  propofol Infusion 40 MICROgram(s)/kG/Min (13 mL/Hr) IV Continuous <Continuous>  vancomycin  IVPB 1000 milliGRAM(s) IV Intermittent every 12 hours    I&O's Detail    14 Sep 2021 07:01  -  15 Sep 2021 07:00  --------------------------------------------------------  IN:    Dexmedetomidine: 96 mL    dextrose 5% + lactated ringers w/ Additives: 300 mL    Fat Emulsion (Fish Oil &amp; Plant Based) 20% Infusion: 67 mL    FentaNYL: 78 mL    IV PiggyBack: 350 mL    Norepinephrine: 164.4 mL    Propofol: 38.4 mL  Total IN: 1093.8 mL    OUT:    Indwelling Catheter - Urethral (mL): 740 mL  Total OUT: 740 mL    Total NET: 353.8 mL      15 Sep 2021 07:01  -  15 Sep 2021 11:46  --------------------------------------------------------  IN:    Dexmedetomidine: 12 mL    FentaNYL: 12 mL    Norepinephrine: 34 mL    Propofol: 6 mL  Total IN: 64 mL    OUT:    Indwelling Catheter - Urethral (mL): 100 mL  Total OUT: 100 mL    Total NET: -36 mL    Weight (kg): 54 (10 Sep 2021 00:39)    PHYSICAL EXAM:  Gen: Comfortable, No acute distress, frail elderly, sedated   Resp: mechanica breath sounds   Abd: Soft, Non-distended   Skin: Warm, 1+ peripheral edema of lower extremities    Mode: AC/ CMV (Assist Control/ Continuous Mandatory Ventilation)  RR (machine): 18  TV (machine): 350  FiO2: 40  PEEP: 5  ITime: 0.74  MAP: 9  PIP: 21    Diet: NPO and TPN/lipids (started on 9/14/21)    LABORATORY                        9.7    10.85 )-----------( 306      ( 15 Sep 2021 04:13 )             29.8   09-15    138  |  100  |  12  ----------------------------<  179<H>  3.8   |  26  |  0.39<L>    Ca    9.3      15 Sep 2021 04:13  Phos  1.7     09-15  Mg     2.00     09-15    TPro  5.9<L>  /  Alb  3.3  /  TBili  0.5  /  DBili  x   /  AST  16  /  ALT  5   /  AlkPhos  73  09-15    LIVER FUNCTIONS - ( 15 Sep 2021 04:13 )  Alb: 3.3 g/dL / Pro: 5.9 g/dL / ALK PHOS: 73 U/L / ALT: 5 U/L / AST: 16 U/L / GGT: x           09-15 Chol -- LDL -- HDL -- Trig 103    ASSESSMENT/PLAN:  82 y/o female transferred to CT ICU for a patient who was noted to have an esophageal mass at [facility]. EGD done at outside hospital on 9/9/21 -Large diverticula noted at 28cm filled with blood and blood clots, periphery of diverticula was injected with epinephrine, 5mm distal esophageal tear without bleeding, hiatal hernia. Patient meets criteria for severe malnutrition in the context of acute illness. Patient's signs/symptoms as evidenced by severe loss of muscle mass and fat stores, 1+ edema, NPO >3 days.  Nutrition support service consulted for initiation of TPN/lipids via central line in view of malnutrition and anticipated prolonged NPO status.     TPN infusion volume increased to 1.5 L, TPN will provide 1345 kcal/day    labs reviewed - electrolytes adjusted in TPN bag     monitor fingersticks, obtain daily weights     continue parenteral nutrition at this time, will follow up with primary team on plan - monitor for fevers and follow up blood cultures     1.  Severe protein calorie malnutrition being optimized with TPN: CHO [205] gm.  AA [82] gm. SMOF Lipids [32] gm.  2.  Hyperglycemia managed with: [0] units of regular insulin    3.  Check fluid balance daily.  Strict I/O  [ ] [ ]   4.  Daily BMP, Ionized Calcium, Magnesium and Phosphorous   5.  Triglycerides at initiation of TPN and monthly [ ] [ ]     Nutrition Support [id number]

## 2021-09-15 NOTE — CONSULT NOTE ADULT - ASSESSMENT
81 y. o. female BIBA to [facility] from nursing facility c/o hematemesis.  According to pt it was the first episode and happened while she was eating dinner. Followed by multiple episodes of N/V with bright blood and epigastric cramps.  She was intubated for airway protection and underwent EGD that revealed large diverticula at 28 cm filled with blood and 5 mm tear at distal esophagus, a hiatal hernia and large amount of blood in the fundus.  Diverticula was injected with Epinephrine.  Patient was transferred to [facility] for further management. (10 Sep 2021 15:04)    Pt followed by GI, s/p repeat EGD on 9/14 -  showing significant clot in large esophageal diverticula -- without obvious source of etiology of bleeding (ie no vessel) + no esophageal mass seen.  Pt on pressors, thought to be unlikely hemorrhagic given stable Hb.    9/15: WBC 10.8, sputum cx (9/10) with nl resp tawana. CT c/a/p shows a 7.2 cm masslike structure distending the mid to distal esophagus, suspicious for malignancy.  Bilateral tree-in-bud nodular opacities, predominantly in the left lower lobe, new/increased from 9/9/2021, raising concern for infection.  Left lower lobe consolidative opacity, possible combination of atelectasis and pneumonia.  (+) hypodensities in the liver suspicion for malignancy, and stercoral colitis.    Pt with fever, on pressors.  Pt is NPO on TPN.   Pt on empiric abx, ID consulted for further abx managment.     Fever/sepsis:    - pt with fever, on pressor support.  Abx broadened to vanco and meropenem  - CT c/a/p noted, increased LLL consolidation, possible aspiration.  f/u repeat sputum cx.    - Check bcx x 2  - Check vanco trough  - f/u WBC and fever curve.    Will follow,    Karen Casillas  [phone]

## 2021-09-15 NOTE — PROGRESS NOTE ADULT - SUBJECTIVE AND OBJECTIVE BOX
Interval Events:   - On levo 0.35 (uptitrated from 0.15)   - Hb stable at 9.7 <-- 9.3 <-- 12.6 (likely aberrant lab value) <-- 10  - S/p EGD at bedside (9/14) showing significant clot in large esophageal diverticula -- without obvious source of etiology of bleeding (ie no vessel) + no esophageal mass seen    ROS: Unable to obtain    Hospital Medications:  ALBUTerol    90 MICROgram(s) HFA Inhaler 2 Puff(s) Inhalation every 6 hours  budesonide  80 MICROgram(s)/formoterol 4.5 MICROgram(s) Inhaler 2 Puff(s) Inhalation two times a day  chlorhexidine 0.12% Liquid 15 milliLiter(s) Oral Mucosa every 12 hours  chlorhexidine 4% Liquid 1 Application(s) Topical <User Schedule>  dexMEDEtomidine Infusion 0.2 MICROgram(s)/kG/Hr IV Continuous <Continuous>  dextrose 5% + lactated ringers with potassium chloride 40 mEq/L 1000 milliLiter(s) IV Continuous <Continuous>  fentaNYL   Infusion. 0.5 MICROgram(s)/kG/Hr IV Continuous <Continuous>  levothyroxine Injectable 112 MICROGram(s) IV Push at bedtime  norepinephrine Infusion 0.05 MICROgram(s)/kG/Min IV Continuous <Continuous>  pantoprazole Infusion 8 mG/Hr IV Continuous <Continuous>  piperacillin/tazobactam IVPB.. 3.375 Gram(s) IV Intermittent every 8 hours  propofol Infusion 40 MICROgram(s)/kG/Min IV Continuous <Continuous>  sodium chloride 0.9% lock flush 10 milliLiter(s) IV Push every 1 hour PRN      PHYSICAL EXAM:   Vital Signs Last 24 Hrs  T(C): 38.4 (15 Sep 2021 04:00), Max: 38.9 (15 Sep 2021 02:00)  T(F): 101.2 (15 Sep 2021 04:00), Max: 102.1 (15 Sep 2021 02:00)  HR: 94 (15 Sep 2021 10:25) (90 - 702)  BP: --  BP(mean): --  RR: 18 (15 Sep 2021 10:00) (17 - 21)  SpO2: 100% (15 Sep 2021 10:00) (93% - 100%)    GENERAL: intubated, in CTICU  NEURO: not alert  HEENT: anicteric sclera, no conjunctival pallor appreciated  CHEST: intubated, mechanical breath sounds  CARDIAC: regular rate, +S1/S2  ABDOMEN: soft, nondistended, nontender, no rebound or guarding  EXTREMITIES: warm, well perfused, no edema  SKIN: no lesions noted    LABS:                        9.7    10.85 )-----------( 306      ( 15 Sep 2021 04:13 )             29.8     09-15    138  |  100  |  12  ----------------------------<  179<H>  3.8   |  26  |  0.39<L>    Ca    9.3      15 Sep 2021 04:13  Phos  1.7     09-15  Mg     2.00     09-15    TPro  5.9<L>  /  Alb  3.3  /  TBili  0.5  /  DBili  x   /  AST  16  /  ALT  5   /  AlkPhos  73  09-15    LIVER FUNCTIONS - ( 15 Sep 2021 04:13 )  Alb: 3.3 g/dL / Pro: 5.9 g/dL / ALK PHOS: 73 U/L / ALT: 5 U/L / AST: 16 U/L / GGT: x           PT/INR - ( 14 Sep 2021 03:05 )   PT: 14.1 sec;   INR: 1.24 ratio         PTT - ( 14 Sep 2021 03:05 )  PTT:29.2 sec                    Interval Diagnostic Studies: see sunrise for full report

## 2021-09-15 NOTE — PROGRESS NOTE ADULT - ATTENDING COMMENTS
No overt bleeding.   Hgb downtrended to 9s, but stable on repeat.   Suspect likely bleeding from esophageal diverticulum in setting of A/C as no other source found.   Continue to monitor for further bleeding.

## 2021-09-15 NOTE — PROGRESS NOTE ADULT - ASSESSMENT
81F resident @ assisted nursing facility Hx known esophageal diverticulum (since 9/2020) + food impactions, afib on eliquis, IVC filter (further Hx unknown), GERD, COPD, hypothyroidism, HTN, multiple thoracic spine compression fractures who presented as transfer from [facility] hospital after hematemesis 2/2 UGI bleed s/p repeat EGD (9/14) showing esophageal diverticula w/ large clots.     Impression:   #UGI bleed: p/w hematemesis to OSH, found to have ?esophageal (soft tissue) mass on CT a/p. EGD showed large diverticula at 28cm with bloody clots s/p epi injection (not removed 2/2 concern regarding what may be underlying clot, ie large vessel potentially) + no other significant sources of hematemesis found. Initially unclear if 1. bleeding from within diverticula or 2. there is some underlying esophageal mass (as seen on CT a/p) in the area which is underneath the clot. Underwent repeat EGD (9/14) which only showed large esophageal diverticula with significantly friable tissue w/ pigmented material + large clots within in -- no vessel or mass seen to be the source of bleeding, unclear if pt has been oozing from the tissue within the diverticula itself.   #Shock: remains on pressors -- unlikely hemorrhagic considering endoscopy findings + stable Hb vs ?septic (fever 101 on 9/11 + leukocytosis, on empiric zosyn, no BCx on file) vs in setting of sedation as well.     Recommendations:  - Monitor for further bleeding   - Trend CBC, transfuse Hb < 7  - Active T&S  - Please call GI service back for any further assistance   - Pt too critically ill at this time to discuss offering any long-term endoscopic or surgical tx of esophageal diverticula (also has been reported to have refused x 2 in the past per [facility] GI team)           Thank you for involving us in the care of this patient, please reach out if any further questions.     Ankit Martinez MD  Gastroenterology Fellow, PGY5    Available on Microsoft Teams  [phone] ([facility])  [phone] ([facility])  Please contact on call fellow weekdays after 5pm-7am and weekends: [phone]       81F resident @ assisted nursing facility Hx known esophageal diverticulum (since 9/2020) + food impactions, afib on eliquis, IVC filter (further Hx unknown), GERD, COPD, hypothyroidism, HTN, multiple thoracic spine compression fractures who presented as transfer from [facility] hospital after hematemesis 2/2 UGI bleed s/p repeat EGD (9/14) showing esophageal diverticula w/ large clots.     Impression:   #UGI bleed: p/w hematemesis to OSH, found to have ?esophageal (soft tissue) mass on CT a/p. EGD showed large diverticula at 28cm with bloody clots s/p epi injection (not removed 2/2 concern regarding what may be underlying clot, ie large vessel potentially) + no other significant sources of hematemesis found. Initially unclear if 1. bleeding from within diverticula or 2. there is some underlying esophageal mass (as seen on CT a/p) in the area which is underneath the clot. Underwent repeat EGD (9/14) which only showed large esophageal diverticula with significantly friable tissue w/ pigmented material + large clots within in -- no vessel or mass seen to be the source of bleeding, unclear if pt has been oozing from the tissue within the diverticula itself.   #Shock: remains on pressors -- unlikely hemorrhagic considering endoscopy findings + stable Hb vs ?septic (fever 101 on 9/11 + leukocytosis, on empiric zosyn, no BCx on file) vs in setting of sedation as well.     Recommendations:  - Monitor for further bleeding   - Trend CBC, transfuse Hb < 7  - Active T&S  - Please call GI service back for any further assistance   - Pt too critically ill at this time to discuss offering any long-term endoscopic or surgical tx of esophageal diverticula (also has been reported to have refused x 2 in the past per [facility] GI team)   - Reasonable to continue daily PPI for now        Thank you for involving us in the care of this patient, please reach out if any further questions.     Ankit Martinez MD  Gastroenterology Fellow, PGY5    Available on [employer] Teams  [phone] ([facility])  [phone] ([facility])  Please contact on call fellow weekdays after 5pm-7am and weekends: [phone]

## 2021-09-16 LAB
ANION GAP SERPL CALC-SCNC: 12 MMOL/L — SIGNIFICANT CHANGE UP (ref 7–14)
APPEARANCE UR: CLEAR — SIGNIFICANT CHANGE UP
BACTERIA # UR AUTO: NEGATIVE — SIGNIFICANT CHANGE UP
BASE EXCESS BLDA CALC-SCNC: 5.4 MMOL/L — HIGH (ref -2–3)
BILIRUB UR-MCNC: NEGATIVE — SIGNIFICANT CHANGE UP
BUN SERPL-MCNC: 15 MG/DL — SIGNIFICANT CHANGE UP (ref 7–23)
CA-I BLD-SCNC: 1.18 MMOL/L — SIGNIFICANT CHANGE UP (ref 1.15–1.29)
CALCIUM SERPL-MCNC: 8.8 MG/DL — SIGNIFICANT CHANGE UP (ref 8.4–10.5)
CHLORIDE SERPL-SCNC: 100 MMOL/L — SIGNIFICANT CHANGE UP (ref 98–107)
CO2 BLDA-SCNC: 31 MMOL/L — HIGH (ref 19–24)
CO2 SERPL-SCNC: 25 MMOL/L — SIGNIFICANT CHANGE UP (ref 22–31)
COLOR SPEC: YELLOW — SIGNIFICANT CHANGE UP
CREAT SERPL-MCNC: 0.31 MG/DL — LOW (ref 0.5–1.3)
DIFF PNL FLD: NEGATIVE — SIGNIFICANT CHANGE UP
EPI CELLS # UR: 1 /HPF — SIGNIFICANT CHANGE UP (ref 0–5)
GLUCOSE BLDC GLUCOMTR-MCNC: 191 MG/DL — HIGH (ref 70–99)
GLUCOSE SERPL-MCNC: 224 MG/DL — HIGH (ref 70–99)
GLUCOSE UR QL: ABNORMAL
HCO3 BLDA-SCNC: 30 MMOL/L — HIGH (ref 21–28)
HCT VFR BLD CALC: 27.4 % — LOW (ref 34.5–45)
HGB BLD-MCNC: 9.1 G/DL — LOW (ref 11.5–15.5)
HYALINE CASTS # UR AUTO: 3 /LPF — SIGNIFICANT CHANGE UP (ref 0–7)
KETONES UR-MCNC: NEGATIVE — SIGNIFICANT CHANGE UP
LEUKOCYTE ESTERASE UR-ACNC: NEGATIVE — SIGNIFICANT CHANGE UP
MAGNESIUM SERPL-MCNC: 1.8 MG/DL — SIGNIFICANT CHANGE UP (ref 1.6–2.6)
MCHC RBC-ENTMCNC: 30.8 PG — SIGNIFICANT CHANGE UP (ref 27–34)
MCHC RBC-ENTMCNC: 33.2 GM/DL — SIGNIFICANT CHANGE UP (ref 32–36)
MCV RBC AUTO: 92.9 FL — SIGNIFICANT CHANGE UP (ref 80–100)
NITRITE UR-MCNC: NEGATIVE — SIGNIFICANT CHANGE UP
NRBC # BLD: 0 /100 WBCS — SIGNIFICANT CHANGE UP
NRBC # FLD: 0 K/UL — SIGNIFICANT CHANGE UP
PCO2 BLDA: 42 MMHG — HIGH (ref 32–35)
PH BLDA: 7.46 — HIGH (ref 7.35–7.45)
PH UR: 6 — SIGNIFICANT CHANGE UP (ref 5–8)
PHOSPHATE SERPL-MCNC: 2.4 MG/DL — LOW (ref 2.5–4.5)
PLATELET # BLD AUTO: 303 K/UL — SIGNIFICANT CHANGE UP (ref 150–400)
PO2 BLDA: 88 MMHG — SIGNIFICANT CHANGE UP (ref 83–108)
POTASSIUM SERPL-MCNC: 4.1 MMOL/L — SIGNIFICANT CHANGE UP (ref 3.5–5.3)
POTASSIUM SERPL-SCNC: 4.1 MMOL/L — SIGNIFICANT CHANGE UP (ref 3.5–5.3)
PROT UR-MCNC: ABNORMAL
RBC # BLD: 2.95 M/UL — LOW (ref 3.8–5.2)
RBC # FLD: 15 % — HIGH (ref 10.3–14.5)
RBC CASTS # UR COMP ASSIST: 3 /HPF — SIGNIFICANT CHANGE UP (ref 0–4)
SAO2 % BLDA: 96.6 % — SIGNIFICANT CHANGE UP (ref 94–98)
SARS-COV-2 RNA SPEC QL NAA+PROBE: SIGNIFICANT CHANGE UP
SODIUM SERPL-SCNC: 137 MMOL/L — SIGNIFICANT CHANGE UP (ref 135–145)
SP GR SPEC: 1.03 — SIGNIFICANT CHANGE UP (ref 1–1.05)
UROBILINOGEN FLD QL: SIGNIFICANT CHANGE UP
WBC # BLD: 10.98 K/UL — HIGH (ref 3.8–10.5)
WBC # FLD AUTO: 10.98 K/UL — HIGH (ref 3.8–10.5)
WBC UR QL: 2 /HPF — SIGNIFICANT CHANGE UP (ref 0–5)

## 2021-09-16 PROCEDURE — 99292 CRITICAL CARE ADDL 30 MIN: CPT

## 2021-09-16 PROCEDURE — 99232 SBSQ HOSP IP/OBS MODERATE 35: CPT

## 2021-09-16 PROCEDURE — 99291 CRITICAL CARE FIRST HOUR: CPT

## 2021-09-16 PROCEDURE — 71045 X-RAY EXAM CHEST 1 VIEW: CPT | Mod: 26

## 2021-09-16 RX ORDER — ELECTROLYTE SOLUTION,INJ
1 VIAL (ML) INTRAVENOUS
Refills: 0 | Status: DISCONTINUED | OUTPATIENT
Start: 2021-09-16 | End: 2021-09-16

## 2021-09-16 RX ORDER — I.V. FAT EMULSION 20 G/100ML
13.3 EMULSION INTRAVENOUS
Qty: 32 | Refills: 0 | Status: DISCONTINUED | OUTPATIENT
Start: 2021-09-16 | End: 2021-09-17

## 2021-09-16 RX ORDER — MAGNESIUM SULFATE 500 MG/ML
1 VIAL (ML) INJECTION ONCE
Refills: 0 | Status: COMPLETED | OUTPATIENT
Start: 2021-09-16 | End: 2021-09-16

## 2021-09-16 RX ORDER — POTASSIUM PHOSPHATE, MONOBASIC POTASSIUM PHOSPHATE, DIBASIC 236; 224 MG/ML; MG/ML
15 INJECTION, SOLUTION INTRAVENOUS ONCE
Refills: 0 | Status: COMPLETED | OUTPATIENT
Start: 2021-09-16 | End: 2021-09-16

## 2021-09-16 RX ADMIN — Medication 1 DROP(S): at 17:41

## 2021-09-16 RX ADMIN — CHLORHEXIDINE GLUCONATE 15 MILLILITER(S): 213 SOLUTION TOPICAL at 06:54

## 2021-09-16 RX ADMIN — Medication 250 MILLIGRAM(S): at 06:54

## 2021-09-16 RX ADMIN — Medication 250 MILLIGRAM(S): at 20:18

## 2021-09-16 RX ADMIN — CHLORHEXIDINE GLUCONATE 1 APPLICATION(S): 213 SOLUTION TOPICAL at 06:55

## 2021-09-16 RX ADMIN — ALBUTEROL 2 PUFF(S): 90 AEROSOL, METERED ORAL at 03:59

## 2021-09-16 RX ADMIN — MEROPENEM 100 MILLIGRAM(S): 1 INJECTION INTRAVENOUS at 13:52

## 2021-09-16 RX ADMIN — BUDESONIDE AND FORMOTEROL FUMARATE DIHYDRATE 2 PUFF(S): 160; 4.5 AEROSOL RESPIRATORY (INHALATION) at 07:39

## 2021-09-16 RX ADMIN — POTASSIUM PHOSPHATE, MONOBASIC POTASSIUM PHOSPHATE, DIBASIC 62.5 MILLIMOLE(S): 236; 224 INJECTION, SOLUTION INTRAVENOUS at 07:59

## 2021-09-16 RX ADMIN — Medication 1 DROP(S): at 06:54

## 2021-09-16 RX ADMIN — I.V. FAT EMULSION 13.3 ML/HR: 20 EMULSION INTRAVENOUS at 02:01

## 2021-09-16 RX ADMIN — PANTOPRAZOLE SODIUM 40 MILLIGRAM(S): 20 TABLET, DELAYED RELEASE ORAL at 06:54

## 2021-09-16 RX ADMIN — PROPOFOL 13 MICROGRAM(S)/KG/MIN: 10 INJECTION, EMULSION INTRAVENOUS at 18:03

## 2021-09-16 RX ADMIN — BUDESONIDE AND FORMOTEROL FUMARATE DIHYDRATE 2 PUFF(S): 160; 4.5 AEROSOL RESPIRATORY (INHALATION) at 21:43

## 2021-09-16 RX ADMIN — DEXMEDETOMIDINE HYDROCHLORIDE IN 0.9% SODIUM CHLORIDE 2.7 MICROGRAM(S)/KG/HR: 4 INJECTION INTRAVENOUS at 22:24

## 2021-09-16 RX ADMIN — MEROPENEM 100 MILLIGRAM(S): 1 INJECTION INTRAVENOUS at 22:25

## 2021-09-16 RX ADMIN — ALBUTEROL 2 PUFF(S): 90 AEROSOL, METERED ORAL at 21:43

## 2021-09-16 RX ADMIN — ALBUTEROL 2 PUFF(S): 90 AEROSOL, METERED ORAL at 07:38

## 2021-09-16 RX ADMIN — MEROPENEM 100 MILLIGRAM(S): 1 INJECTION INTRAVENOUS at 06:55

## 2021-09-16 RX ADMIN — PANTOPRAZOLE SODIUM 40 MILLIGRAM(S): 20 TABLET, DELAYED RELEASE ORAL at 17:41

## 2021-09-16 RX ADMIN — CHLORHEXIDINE GLUCONATE 15 MILLILITER(S): 213 SOLUTION TOPICAL at 17:41

## 2021-09-16 RX ADMIN — Medication 1 EACH: at 18:06

## 2021-09-16 RX ADMIN — ALBUTEROL 2 PUFF(S): 90 AEROSOL, METERED ORAL at 16:54

## 2021-09-16 RX ADMIN — Medication 100 GRAM(S): at 07:59

## 2021-09-16 NOTE — PROGRESS NOTE ADULT - ASSESSMENT
81 y. o. female BIBA to [facility] from nursing facility c/o hematemesis.  According to pt it was the first episode and happened while she was eating dinner. Followed by multiple episodes of N/V with bright blood and epigastric cramps.  She was intubated for airway protection and underwent EGD that revealed large diverticula at 28 cm filled with blood and 5 mm tear at distal esophagus, a hiatal hernia and large amount of blood in the fundus.  Diverticula was injected with Epinephrine.  Patient was transferred to [facility] for further management. (10 Sep 2021 15:04)    Pt followed by GI, s/p repeat EGD on 9/14 -  showing significant clot in large esophageal diverticula -- without obvious source of etiology of bleeding (ie no vessel) + no esophageal mass seen.  Pt on pressors, thought to be unlikely hemorrhagic given stable Hb.    9/15: WBC 10.8, sputum cx (9/10) with nl resp tawana. CT c/a/p shows a 7.2 cm masslike structure distending the mid to distal esophagus, suspicious for malignancy.  Bilateral tree-in-bud nodular opacities, predominantly in the left lower lobe, new/increased from 9/9/2021, raising concern for infection.  Left lower lobe consolidative opacity, possible combination of atelectasis and pneumonia.  (+) hypodensities in the liver suspicion for malignancy, and stercoral colitis.    Pt with fever, on pressors.  Pt is NPO on TPN.   Pt on empiric abx, ID consulted for further abx managment.     Fever/sepsis:    - pt with fever, on pressor support.  Abx broadened to vanco and meropenem  - CT c/a/p noted, increased LLL consolidation, possible aspiration.  f/u repeat sputum cx - nl resp tawana    - Check bcx x 2 - NGTD  - Check vanco trough  - f/u WBC and fever curve.    Will follow,    Karen Casillas  [phone]

## 2021-09-16 NOTE — CHART NOTE - NSCHARTNOTEFT_GEN_A_CORE
CT ICU PA Note    discussed with son Favio Villalpando about pt's condition. Explained to him about long term ventilation, and long term feeding management. Explained to son that pt will require tracheostomy and PEG placement.__ As per discussion with son, he believed that he is HCP and he has documentation at home, but he has to confirm with his spouse. Mr Favio Villalpando wants to discuss with his sister and spouse about long term management and GOC.   -- discussed and called palliative consult, f/u recommendations

## 2021-09-16 NOTE — PROGRESS NOTE ADULT - SUBJECTIVE AND OBJECTIVE BOX
Infectious Diseases progress note:    Subjective: (+) Fever, Tmax 102.3.  WBC 10.9.  Pt remains intubated, on pressor support.      ROS:  Unable to assess due to pt clinical condition    Allergies    Sulfur Colliod (Rash)  Tylenol (Swelling)    Intolerances        ANTIBIOTICS/RELEVANT:  antimicrobials  meropenem  IVPB 1000 milliGRAM(s) IV Intermittent every 8 hours  meropenem  IVPB      vancomycin  IVPB 750 milliGRAM(s) IV Intermittent every 12 hours  vancomycin  IVPB        immunologic:    OTHER:  ALBUTerol    90 MICROgram(s) HFA Inhaler 2 Puff(s) Inhalation every 6 hours  artificial tears (preservative free) Ophthalmic Solution 1 Drop(s) Both EYES two times a day  budesonide  80 MICROgram(s)/formoterol 4.5 MICROgram(s) Inhaler 2 Puff(s) Inhalation two times a day  chlorhexidine 0.12% Liquid 15 milliLiter(s) Oral Mucosa every 12 hours  chlorhexidine 4% Liquid 1 Application(s) Topical <User Schedule>  dexMEDEtomidine Infusion 0.2 MICROgram(s)/kG/Hr IV Continuous <Continuous>  fat emulsion (Fish Oil and Plant Based) 20% Infusion 13.3 mL/Hr IV Continuous <Continuous>  fentaNYL   Infusion. 0.5 MICROgram(s)/kG/Hr IV Continuous <Continuous>  levothyroxine Injectable 112 MICROGram(s) IV Push at bedtime  norepinephrine Infusion 0.05 MICROgram(s)/kG/Min IV Continuous <Continuous>  pantoprazole  Injectable 40 milliGRAM(s) IV Push two times a day  Parenteral Nutrition - Adult 1 Each TPN Continuous <Continuous>  Parenteral Nutrition - Adult 1 Each TPN Continuous <Continuous>  propofol Infusion 40 MICROgram(s)/kG/Min IV Continuous <Continuous>  vasopressin Infusion 0.04 Unit(s)/Min IV Continuous <Continuous>      Objective:  Vital Signs Last 24 Hrs  T(C): 37.6 (16 Sep 2021 22:00), Max: 39.1 (16 Sep 2021 17:00)  T(F): 99.6 (16 Sep 2021 22:00), Max: 102.3 (16 Sep 2021 17:00)  HR: 88 (16 Sep 2021 22:00) (86 - 107)  BP: --  BP(mean): --  RR: 21 (16 Sep 2021 22:00) (18 - 23)  SpO2: 91% (16 Sep 2021 22:00) (91% - 100%)    PHYSICAL EXAM:  Constitutional: intubated, sedated  Eyes:LISA, EOMI  Ear/Nose/Throat: no thrush, mucositis.  Moist mucous membranes	  Neck:no JVD, no lymphadenopathy, supple. L IJ tlc  Respiratory: CTA shaila  Cardiovascular: S1S2 RRR, no murmurs  Gastrointestinal:soft, nontender,  nondistended (+) BS  Extremities:no e/e/c  Skin:  no rashes, open wounds or ulcerations  : bearden        LABS:                        9.1    10.98 )-----------( 303      ( 16 Sep 2021 05:19 )             27.4     -    137  |  100  |  15  ----------------------------<  224<H>  4.1   |  25  |  0.31<L>    Ca    8.8      16 Sep 2021 05:19  Phos  2.4       Mg     1.80         TPro  5.9<L>  /  Alb  3.3  /  TBili  0.5  /  DBili  x   /  AST  16  /  ALT  5   /  AlkPhos  73  09-15      Urinalysis Basic - ( 16 Sep 2021 10:25 )    Color: Yellow / Appearance: Clear / S.027 / pH: x  Gluc: x / Ketone: Negative  / Bili: Negative / Urobili: <2 mg/dL   Blood: x / Protein: Trace / Nitrite: Negative   Leuk Esterase: Negative / RBC: 3 /HPF / WBC 2 /HPF   Sq Epi: x / Non Sq Epi: 1 /HPF / Bacteria: Negative            MICROBIOLOGY:    Culture - Blood (09.15.21 @ 18:43)   Specimen Source: .Blood Blood-Peripheral   Culture Results:   No growth to date.     Culture - Blood (09.15.21 @ 14:52)   Specimen Source: .Blood Blood-Peripheral   Culture Results:   No growth to date.     Culture - Sputum . (09.15.21 @ 10:27)   Gram Stain:   Moderate polymorphonuclear leukocytes per low power field   No Squamous epithelial cells per low power field   Rare Yeast like cells per oil power field   Specimen Source: .Sputum Sputum   Culture Results:   Normal Respiratory Consuelo present       RADIOLOGY & ADDITIONAL STUDIES:    < from: Xray Chest 1 View- PORTABLE-Routine (Xray Chest 1 View- PORTABLE-Routine in AM.) (21 @ 07:02) >  INTERPRETATION:    The endotracheal tube and left IJ line are unchanged. Bilateral small layering effusions. No focal consolidations but there are diffuse coarse interstitial opacities. No pneumothorax.      COMPARISON:  September 15 without change allowing for differencein technique.      IMPRESSION:  Small layering effusions with endotracheal tube in place.    < end of copied text >

## 2021-09-16 NOTE — PROGRESS NOTE ADULT - SUBJECTIVE AND OBJECTIVE BOX
CHIEF COMPLAINT: Follow up in CTICU for intubated for hypoxic respiratory failure with upper GI bleed    PROCEDURE:   - EGD done at outside hospital on 21 -Large diverticula noted at 28cm filled with blood and blood clots, periphery of diverticula was injected with epinephrine, 5mm distal esophageal tear without bleeding, hiatal hernia.  - EGD, removal of blood clots       ISSUES:   Acute hypoxic respiratory failure s/p intubation ()  Esophageal diverticular bleed   Acute blood loss anemia  Hypotension requiring IV pressors  COPD  Chronic Afib -- previously on Apixaban  Hypothyroidism  HTN      INTERVAL EVENTS:   Received patient on mechanical ventilation. Maintained on PEEP 5 and 40% FIO2. Unable to tolerate weaning of sedation which leads to ventilator dysynchrony.  Received patient on levophed pressors via central line. Patient had increased levophed requirements.  Febrile yesterday. broaded to meropenem and vancomycin.  Had goals of care discussion with nitza Stahl for 20 minutes on the phone.    HISTORY:   Unable to obtain, intubated, sedated    PHYSICAL EXAM:   Gen: Comfortable, No acute distress, frail elderly, kyphotic  Eyes: Sclera white, Conjunctiva normal, Eyelids normal, Pupils symmetrical   ENT: Mucous membranes moist  Neck: Trachea midline, 7.0 ETT  CV: Rate regular, Rhythm irregular  Resp: Breath sounds clear, No accessory muscles use,   Abd: Soft, Non-distended, Non-tender, Bowel sounds normal,  ,  ,    Skin: Warm, 1+ peripheral edema of lower extremities,  ,    : bearden  Neuro: Moves ext to pain, minimally opens eyes to pain, sedated  Psych: RASS -3      ASSESSMENT AND PLAN:     NEURO:  Vent sedation - Continue propofol and precedex and fentanyl for ventilator synchrony.       RESPIRATORY:    Acute respiratory failure secondary to aspiration PNA in setting of hematemesis - Mechanical ventilation. Monitor ABG. Wean FIO2 for goal O2sat above 92. Vent bundle. Zosyn.           CARDIOVASCULAR:  Hypotension requiring IV pressors - wean pressors for MAP goal of 65  Telemetry (medical test) - Reviewed by me today independently. Rate controlled atrial fibrillation.    Chronic Afib - stable. Hold anticoagulation        HTN - currently hypotensive. hold home antihypertensives.            RENAL:  Stable - Monitor IOs and electrolytes. Keep K above 4.0 and Mg above 2.0.         GASTROINTESTINAL:  NPO  No NGT  TPN for nutrition    Blood filled diverticulum - S/P EGD and removal of clot. Pantoprazole IV Q12h.  no signs of esophageal mass on EGD.           HEMATOLOGIC:  Trend PT/PTT, CBC. Maintain active type and screen. Off anticoagulation.  s/p Kcentra at outside hospital  DVT prophylaxis with SCDs.         INFECTIOUS DISEASE:  Aspiration PNA - Improving. Meropenem IV q8h.  Vancomycin. Monitor vancomycin levels.  Follow up cultures        ENDOCRINE:  Stable – Monitor glucose fingersticks for goal 120-180.     Hypothyroidism - stable. Continue synthroid IV.         Pertinent clinical, laboratory, radiographic, telemetry, hemodynamic, respiratory  data and chart were reviewed by myself and analyzed frequently throughout the course of the day and night by myself.    Plan discussed at length with the CTICU staff and Attending CT Surgeon - Dr. Brenna Sethi    Patient required critical care management.  75 minutes were spent evaluating, managing, providing, coordinating, and documenting care for this patient, exclusive of procedures from  7AM to 1159PM.          _________________________  VITAL SIGNS:  Vital Signs Last 24 Hrs  T(C): 37.1 (16 Sep 2021 08:00), Max: 37.1 (16 Sep 2021 08:00)  T(F): 98.7 (16 Sep 2021 08:00), Max: 98.7 (16 Sep 2021 08:00)  HR: 92 (16 Sep 2021 14:00) (84 - 107)  BP: --  BP(mean): --  RR: 19 (16 Sep 2021 14:00) (16 - 23)  SpO2: 100% (16 Sep 2021 14:00) (59% - 100%)  I/Os:   I&O's Detail    15 Sep 2021 07:01  -  16 Sep 2021 07:00  --------------------------------------------------------  IN:    Dexmedetomidine: 121 mL    Fat Emulsion (Fish Oil &amp; Plant Based) 20% Infusion: 26.8 mL    Fat Emulsion (Fish Oil &amp; Plant Based) 20% Infusion: 79.8 mL    FentaNYL: 161 mL    Norepinephrine: 303 mL    Propofol: 105 mL    TPN (Total Parenteral Nutrition): 819 mL    TPN (Total Parenteral Nutrition): 420 mL    Vasopressin: 43.5 mL  Total IN: 2079.1 mL    OUT:    Indwelling Catheter - Urethral (mL): 815 mL  Total OUT: 815 mL    Total NET: 1264.1 mL      16 Sep 2021 07:01  -  16 Sep 2021 15:15  --------------------------------------------------------  IN:    Dexmedetomidine: 53 mL    Fat Emulsion (Fish Oil &amp; Plant Based) 20% Infusion: 79.8 mL    FentaNYL: 37.7 mL    Norepinephrine: 37.8 mL    Propofol: 68 mL    TPN (Total Parenteral Nutrition): 378 mL    Vasopressin: 14.4 mL  Total IN: 668.7 mL    OUT:    Indwelling Catheter - Urethral (mL): 220 mL  Total OUT: 220 mL    Total NET: 448.7 mL          Mode: AC/ CMV (Assist Control/ Continuous Mandatory Ventilation)  RR (machine): 18  TV (machine): 350  FiO2: 40  PEEP: 5  ITime: 0.7  MAP: 9  PIP: 21      MEDICATIONS:  MEDICATIONS  (STANDING):  ALBUTerol    90 MICROgram(s) HFA Inhaler 2 Puff(s) Inhalation every 6 hours  artificial tears (preservative free) Ophthalmic Solution 1 Drop(s) Both EYES two times a day  budesonide  80 MICROgram(s)/formoterol 4.5 MICROgram(s) Inhaler 2 Puff(s) Inhalation two times a day  chlorhexidine 0.12% Liquid 15 milliLiter(s) Oral Mucosa every 12 hours  chlorhexidine 4% Liquid 1 Application(s) Topical <User Schedule>  dexMEDEtomidine Infusion 0.2 MICROgram(s)/kG/Hr (2.7 mL/Hr) IV Continuous <Continuous>  fat emulsion (Fish Oil and Plant Based) 20% Infusion 13.3 mL/Hr (13.3 mL/Hr) IV Continuous <Continuous>  fentaNYL   Infusion. 0.5 MICROgram(s)/kG/Hr (2.7 mL/Hr) IV Continuous <Continuous>  levothyroxine Injectable 112 MICROGram(s) IV Push at bedtime  meropenem  IVPB      meropenem  IVPB 1000 milliGRAM(s) IV Intermittent every 8 hours  norepinephrine Infusion 0.05 MICROgram(s)/kG/Min (2.53 mL/Hr) IV Continuous <Continuous>  pantoprazole  Injectable 40 milliGRAM(s) IV Push two times a day  Parenteral Nutrition - Adult 1 Each (63 mL/Hr) TPN Continuous <Continuous>  Parenteral Nutrition - Adult 1 Each (63 mL/Hr) TPN Continuous <Continuous>  propofol Infusion 40 MICROgram(s)/kG/Min (13 mL/Hr) IV Continuous <Continuous>  vancomycin  IVPB 750 milliGRAM(s) IV Intermittent every 12 hours  vancomycin  IVPB      vasopressin Infusion 0.04 Unit(s)/Min (2.4 mL/Hr) IV Continuous <Continuous>    MEDICATIONS  (PRN):      LABS:  Laboratory data was independently reviewed by me today.                           9.1    10.98 )-----------( 303      ( 16 Sep 2021 05:19 )             27.4     09-16    137  |  100  |  15  ----------------------------<  224<H>  4.1   |  25  |  0.31<L>    Ca    8.8      16 Sep 2021 05:19  Phos  2.4     09-16  Mg     1.80     09-16    TPro  5.9<L>  /  Alb  3.3  /  TBili  0.5  /  DBili  x   /  AST  16  /  ALT  5   /  AlkPhos  73  09-15    LIVER FUNCTIONS - ( 15 Sep 2021 04:13 )  Alb: 3.3 g/dL / Pro: 5.9 g/dL / ALK PHOS: 73 U/L / ALT: 5 U/L / AST: 16 U/L / GGT: x             ABG - ( 16 Sep 2021 05:19 )  pH, Arterial: 7.46  pH, Blood: x     /  pCO2: 42    /  pO2: 88    / HCO3: 30    / Base Excess: 5.4   /  SaO2: 96.6              Urinalysis Basic - ( 16 Sep 2021 10:25 )    Color: Yellow / Appearance: Clear / S.027 / pH: x  Gluc: x / Ketone: Negative  / Bili: Negative / Urobili: <2 mg/dL   Blood: x / Protein: Trace / Nitrite: Negative   Leuk Esterase: Negative / RBC: 3 /HPF / WBC 2 /HPF   Sq Epi: x / Non Sq Epi: 1 /HPF / Bacteria: Negative        RADIOLOGY:   Radiology images were independently reviewed by me today. Reports were reviewed by me today.    Xray Chest 1 View- PORTABLE-Routine:   EXAM:  XR CHEST PORTABLE ROUTINE 1V        PROCEDURE DATE:  Sep 16 2021         INTERPRETATION:  TIME OF EXAM: 2021 at 6:17 AM    CLINICAL INFORMATION: SICU follow-up; intubated.    TECHNIQUE:   Portable chest    INTERPRETATION:    The endotracheal tube and left IJ line are unchanged. Bilateral small layering effusions. No focal consolidations but there are diffuse coarse interstitial opacities. No pneumothorax.      COMPARISON:  September 15 without change allowing for differencein technique.      IMPRESSION:  Small layering effusions with endotracheal tube in place.    --- End of Report ---              ONUR AVILES MD; Attending Radiologist  This document has been electronically signed. Sep 16 2021 10:29AM (21 @ 07:02)  Xray Chest 1 View- PORTABLE-Routine:   EXAM:  XR CHEST PORTABLE ROUTINE 1V        PROCEDURE DATE:  Sep 15 2021         INTERPRETATION:  TIME OF EXAM: September 15, 2021 at 6:22 AM    CLINICAL INFORMATION: Intubated    TECHNIQUE:   Portable chest    INTERPRETATION:    The endotracheal and left IJ line are unchanged.    Small to moderate layering left effusion on this rotated image. Visible lungs are clear. No pneumothorax.      COMPARISON:        IMPRESSION:  Follow-up with endotracheal tube in place and left effusion.    --- End of Report ---              ONUR AVILES MD; Attending Radiologist  This document has been electronically signed. Sep 15 2021 12:50PM (09-15-21 @ 06:22)  Xray Chest 1 View- PORTABLE-Routine:   EXAM:  XR CHEST PORTABLE ROUTINE 1V        PROCEDURE DATE:  Sep 14 2021         INTERPRETATION:  TIME OF EXAM: 2021 at 5:14 AM    CLINICAL INFORMATION: Intubated    TECHNIQUE:   Portable chest    INTERPRETATION:    Tip of the endotracheal tube is just above the mi. Left IJ line unchanged.    Bilateral layering pleural effusions left greater than right. Visualized lungs are clear. The heart is not enlarged. No pneumothorax.      COMPARISON:  2021      IMPRESSION:Endotracheal tube with tip above the mi. Bilateral pleural effusions.    --- End of Report ---              ONUR AVILES MD; Attending Radiologist  This document has been electronically signed. Sep 14 2021  7:24AM (21 @ 05:17)

## 2021-09-16 NOTE — PROGRESS NOTE ADULT - ATTENDING COMMENTS
I agree with the above history, physical examination, chief complaint/diagnosis, and plan, which I have reviewed and edited where appropriate.  I agree with notes/assessment and detailed interval history of health care providers on my service.  I have seen and examined the patient.  I reviewed the laboratory and available data and agree with the history, physical assessment and plan.  I reviewed and discussed with all consultants, house staff and PA's.  The Nutrition Support Team (NST) discusses on an ongoing basis with the primary team and all consultants, House staff and PA's to have a permanent risk benefit analyses on all decisions and coordinating care.  I was physically present for the key portions of the evaluation and management (E/M) service provided.  82 y/o with distal esophageal tear without bleeding, hiatal hernia receiving TPN/lipids for malnutrition and anticipated prolonged NPO   sedated   Resp: coarse bs  Abd: Soft, Non-distended   Skin: Warm  labs reviewed - electrolytes adjusted  continue TPN with infusion volume of 1.5 L,/1243 kcal/day

## 2021-09-16 NOTE — PROGRESS NOTE ADULT - SUBJECTIVE AND OBJECTIVE BOX
NUTRITION NOTE  [id number] NAGI  ===============================    Interval events - Parenteral nutrition was initiated on 9/14/21 via central line, pt was afebrile overnight, awaiting results of blood cultures     ROS - unable to obtain, pt is on vent support     Allergies  Sulfur Colliod (Rash)  Tylenol (Swelling)    PAST MEDICAL & SURGICAL HISTORY:  Afib  History of COPD  HTN (hypertension)  Hypothyroid  GERD (gastroesophageal reflux disease)  Esophageal diverticulum  Presence of IVC filter    ICU Vital Signs Last 24 Hrs  T(C): 36.8 (16 Sep 2021 04:00), Max: 38.3 (15 Sep 2021 13:30)  T(F): 98.2 (16 Sep 2021 04:00), Max: 100.9 (15 Sep 2021 13:30)  HR: 107 (16 Sep 2021 09:00) (84 - 107)  ABP: 97/62 (16 Sep 2021 09:00) (97/62 - 117/75)  ABP(mean): 76 (16 Sep 2021 09:00) (75 - 300)  RR: 21 (16 Sep 2021 09:00) (16 - 23)  SpO2: 100% (16 Sep 2021 09:00) (59% - 100%)    MEDICATIONS  (STANDING):  ALBUTerol    90 MICROgram(s) HFA Inhaler 2 Puff(s) Inhalation every 6 hours  artificial tears (preservative free) Ophthalmic Solution 1 Drop(s) Both EYES two times a day  budesonide  80 MICROgram(s)/formoterol 4.5 MICROgram(s) Inhaler 2 Puff(s) Inhalation two times a day  chlorhexidine 0.12% Liquid 15 milliLiter(s) Oral Mucosa every 12 hours  chlorhexidine 4% Liquid 1 Application(s) Topical <User Schedule>  dexMEDEtomidine Infusion 0.2 MICROgram(s)/kG/Hr (2.7 mL/Hr) IV Continuous <Continuous>  fat emulsion (Fish Oil and Plant Based) 20% Infusion 13.3 mL/Hr (13.3 mL/Hr) IV Continuous <Continuous>  fentaNYL   Infusion. 0.5 MICROgram(s)/kG/Hr (2.7 mL/Hr) IV Continuous <Continuous>  levothyroxine Injectable 112 MICROGram(s) IV Push at bedtime  meropenem  IVPB      meropenem  IVPB 1000 milliGRAM(s) IV Intermittent every 8 hours  norepinephrine Infusion 0.05 MICROgram(s)/kG/Min (2.53 mL/Hr) IV Continuous <Continuous>  pantoprazole  Injectable 40 milliGRAM(s) IV Push two times a day  Parenteral Nutrition - Adult 1 Each (63 mL/Hr) TPN Continuous <Continuous>  Parenteral Nutrition - Adult 1 Each (63 mL/Hr) TPN Continuous <Continuous>  propofol Infusion 40 MICROgram(s)/kG/Min (13 mL/Hr) IV Continuous <Continuous>  vancomycin  IVPB 750 milliGRAM(s) IV Intermittent every 12 hours  vancomycin  IVPB      vasopressin Infusion 0.04 Unit(s)/Min (2.4 mL/Hr) IV Continuous <Continuous>    I&O's Detail    15 Sep 2021 07:01  -  16 Sep 2021 07:00  --------------------------------------------------------  IN:    Dexmedetomidine: 121 mL    Fat Emulsion (Fish Oil &amp; Plant Based) 20% Infusion: 26.8 mL    Fat Emulsion (Fish Oil &amp; Plant Based) 20% Infusion: 79.8 mL    FentaNYL: 161 mL    Norepinephrine: 303 mL    Propofol: 105 mL    TPN (Total Parenteral Nutrition): 819 mL    TPN (Total Parenteral Nutrition): 420 mL    Vasopressin: 43.5 mL  Total IN: 2079.1 mL    OUT:    Indwelling Catheter - Urethral (mL): 815 mL  Total OUT: 815 mL    Total NET: 1264.1 mL    POCT Blood Glucose.: 152 mg/dL (15 Sep 2021 17:04)    Weight (kg): 54 (10 Sep 2021 00:39)    PHYSICAL EXAM:  Gen: Comfortable, No acute distress, frail elderly, sedated   Resp: mechanica breath sounds   Abd: Soft, Non-distended   Skin: Warm, 1+ peripheral edema of lower extremities    Mode: AC/ CMV (Assist Control/ Continuous Mandatory Ventilation)  RR (machine): 18  TV (machine): 350  FiO2: 40  PEEP: 5  ITime: 0.7  MAP: 9  PIP: 21    Diet: NPO and TPN/lipids (started on 9/14/21)    LABORATORY                                 9.1    10.98 )-----------( 303      ( 16 Sep 2021 05:19 )             27.4   09-16    137  |  100  |  15  ----------------------------<  224<H>  4.1   |  25  |  0.31<L>    Ca    8.8      16 Sep 2021 05:19  Phos  2.4     09-16  Mg     1.80     09-16    TPro  5.9<L>  /  Alb  3.3  /  TBili  0.5  /  DBili  x   /  AST  16  /  ALT  5   /  AlkPhos  73  09-15    LIVER FUNCTIONS - ( 15 Sep 2021 04:13 )  Alb: 3.3 g/dL / Pro: 5.9 g/dL / ALK PHOS: 73 U/L / ALT: 5 U/L / AST: 16 U/L / GGT: x           09-15 Chol -- LDL -- HDL -- Trig 103    RECENT CULTURES  Culture - Sputum (collected 15 Sep 2021 10:27)  Source: .Sputum Sputum  Gram Stain (15 Sep 2021 23:18):    Moderate polymorphonuclear leukocytes per low power field    No Squamous epithelial cells per low power field    Rare Yeast like cells per oil power field  Preliminary Report (16 Sep 2021 10:03):    Normal Respiratory Consuelo present    ASSESSMENT/PLAN:  80 y/o female transferred to CT ICU for a patient who was noted to have an esophageal mass at [facility]. EGD done at outside hospital on 9/9/21 -Large diverticula noted at 28cm filled with blood and blood clots, periphery of diverticula was injected with epinephrine, 5mm distal esophageal tear without bleeding, hiatal hernia. Patient meets criteria for severe malnutrition in the context of acute illness. Patient's signs/symptoms as evidenced by severe loss of muscle mass and fat stores, 1+ edema, NPO >3 days.  Nutrition support service consulted for initiation of TPN/lipids via central line in view of malnutrition and anticipated prolonged NPO status.     continue TPN with infusion volume of 1.5 L, TPN will provide 1243 kcal/day - decreased dextrose calories in view of elevated glucose on BMP (224)    labs reviewed - electrolytes adjusted in TPN bag     monitor fingersticks, obtain daily weights     continue parenteral nutrition at this time, will follow up with primary team on plan - monitor for fevers and follow up blood cultures/ID recommendations      1.  Severe protein calorie malnutrition being optimized with TPN: CHO [175] gm.  AA [82] gm. SMOF Lipids [32] gm.  2.  Hyperglycemia managed with: [0] units of regular insulin    3.  Check fluid balance daily.  Strict I/O  [ ] [ ]   4.  Daily BMP, Ionized Calcium, Magnesium and Phosphorous   5.  Triglycerides at initiation of TPN and monthly [ ] [ ]     Nutrition Support [id number]

## 2021-09-17 DIAGNOSIS — J96.01 ACUTE RESPIRATORY FAILURE WITH HYPOXIA: ICD-10-CM

## 2021-09-17 DIAGNOSIS — Z51.5 ENCOUNTER FOR PALLIATIVE CARE: ICD-10-CM

## 2021-09-17 DIAGNOSIS — A41.01 SEPSIS DUE TO METHICILLIN SUSCEPTIBLE STAPHYLOCOCCUS AUREUS: ICD-10-CM

## 2021-09-17 LAB
ANION GAP SERPL CALC-SCNC: 9 MMOL/L — SIGNIFICANT CHANGE UP (ref 7–14)
APTT BLD: 28.8 SEC — SIGNIFICANT CHANGE UP (ref 27–36.3)
BASOPHILS # BLD AUTO: 0.08 K/UL — SIGNIFICANT CHANGE UP (ref 0–0.2)
BASOPHILS NFR BLD AUTO: 0.7 % — SIGNIFICANT CHANGE UP (ref 0–2)
BLD GP AB SCN SERPL QL: NEGATIVE — SIGNIFICANT CHANGE UP
BLOOD GAS ARTERIAL - LYTES,HGB,ICA,LACT RESULT: SIGNIFICANT CHANGE UP
BUN SERPL-MCNC: 19 MG/DL — SIGNIFICANT CHANGE UP (ref 7–23)
CA-I BLD-SCNC: 1.15 MMOL/L — SIGNIFICANT CHANGE UP (ref 1.15–1.29)
CALCIUM SERPL-MCNC: 8.9 MG/DL — SIGNIFICANT CHANGE UP (ref 8.4–10.5)
CHLORIDE SERPL-SCNC: 98 MMOL/L — SIGNIFICANT CHANGE UP (ref 98–107)
CO2 SERPL-SCNC: 25 MMOL/L — SIGNIFICANT CHANGE UP (ref 22–31)
CREAT SERPL-MCNC: 0.33 MG/DL — LOW (ref 0.5–1.3)
CULTURE RESULTS: SIGNIFICANT CHANGE UP
EOSINOPHIL # BLD AUTO: 0.29 K/UL — SIGNIFICANT CHANGE UP (ref 0–0.5)
EOSINOPHIL NFR BLD AUTO: 2.6 % — SIGNIFICANT CHANGE UP (ref 0–6)
GLUCOSE BLDC GLUCOMTR-MCNC: 150 MG/DL — HIGH (ref 70–99)
GLUCOSE BLDC GLUCOMTR-MCNC: 160 MG/DL — HIGH (ref 70–99)
GLUCOSE SERPL-MCNC: 201 MG/DL — HIGH (ref 70–99)
HCT VFR BLD CALC: 27.8 % — LOW (ref 34.5–45)
HGB BLD-MCNC: 9.1 G/DL — LOW (ref 11.5–15.5)
IANC: 8.84 K/UL — HIGH (ref 1.5–8.5)
IMM GRANULOCYTES NFR BLD AUTO: 0.9 % — SIGNIFICANT CHANGE UP (ref 0–1.5)
INR BLD: 1.28 RATIO — HIGH (ref 0.88–1.16)
LYMPHOCYTES # BLD AUTO: 0.86 K/UL — LOW (ref 1–3.3)
LYMPHOCYTES # BLD AUTO: 7.7 % — LOW (ref 13–44)
MAGNESIUM SERPL-MCNC: 2 MG/DL — SIGNIFICANT CHANGE UP (ref 1.6–2.6)
MCHC RBC-ENTMCNC: 30.8 PG — SIGNIFICANT CHANGE UP (ref 27–34)
MCHC RBC-ENTMCNC: 32.7 GM/DL — SIGNIFICANT CHANGE UP (ref 32–36)
MCV RBC AUTO: 94.2 FL — SIGNIFICANT CHANGE UP (ref 80–100)
MONOCYTES # BLD AUTO: 1.05 K/UL — HIGH (ref 0–0.9)
MONOCYTES NFR BLD AUTO: 9.4 % — SIGNIFICANT CHANGE UP (ref 2–14)
NEUTROPHILS # BLD AUTO: 8.84 K/UL — HIGH (ref 1.8–7.4)
NEUTROPHILS NFR BLD AUTO: 78.7 % — HIGH (ref 43–77)
NRBC # BLD: 0 /100 WBCS — SIGNIFICANT CHANGE UP
NRBC # FLD: 0 K/UL — SIGNIFICANT CHANGE UP
PHOSPHATE SERPL-MCNC: 3.1 MG/DL — SIGNIFICANT CHANGE UP (ref 2.5–4.5)
PLATELET # BLD AUTO: 288 K/UL — SIGNIFICANT CHANGE UP (ref 150–400)
POTASSIUM SERPL-MCNC: 4.6 MMOL/L — SIGNIFICANT CHANGE UP (ref 3.5–5.3)
POTASSIUM SERPL-SCNC: 4.6 MMOL/L — SIGNIFICANT CHANGE UP (ref 3.5–5.3)
PROTHROM AB SERPL-ACNC: 14.6 SEC — HIGH (ref 10.6–13.6)
RBC # BLD: 2.95 M/UL — LOW (ref 3.8–5.2)
RBC # FLD: 15.2 % — HIGH (ref 10.3–14.5)
RH IG SCN BLD-IMP: POSITIVE — SIGNIFICANT CHANGE UP
SODIUM SERPL-SCNC: 132 MMOL/L — LOW (ref 135–145)
SPECIMEN SOURCE: SIGNIFICANT CHANGE UP
VANCOMYCIN FLD-MCNC: 12 UG/ML — SIGNIFICANT CHANGE UP
VANCOMYCIN TROUGH SERPL-MCNC: 7 UG/ML — LOW (ref 10–20)
WBC # BLD: 11.22 K/UL — HIGH (ref 3.8–10.5)
WBC # FLD AUTO: 11.22 K/UL — HIGH (ref 3.8–10.5)

## 2021-09-17 PROCEDURE — 99223 1ST HOSP IP/OBS HIGH 75: CPT | Mod: GC

## 2021-09-17 PROCEDURE — 71045 X-RAY EXAM CHEST 1 VIEW: CPT | Mod: 26

## 2021-09-17 PROCEDURE — 71045 X-RAY EXAM CHEST 1 VIEW: CPT | Mod: 26,77

## 2021-09-17 PROCEDURE — 99292 CRITICAL CARE ADDL 30 MIN: CPT

## 2021-09-17 PROCEDURE — 99232 SBSQ HOSP IP/OBS MODERATE 35: CPT

## 2021-09-17 PROCEDURE — 99291 CRITICAL CARE FIRST HOUR: CPT

## 2021-09-17 PROCEDURE — 36566 INSERT TUNNELED CV CATH: CPT | Mod: RT

## 2021-09-17 RX ORDER — I.V. FAT EMULSION 20 G/100ML
13.3 EMULSION INTRAVENOUS
Qty: 32 | Refills: 0 | Status: DISCONTINUED | OUTPATIENT
Start: 2021-09-17 | End: 2021-09-18

## 2021-09-17 RX ORDER — FLUCONAZOLE 150 MG/1
200 TABLET ORAL ONCE
Refills: 0 | Status: COMPLETED | OUTPATIENT
Start: 2021-09-17 | End: 2021-09-17

## 2021-09-17 RX ORDER — FLUCONAZOLE 150 MG/1
200 TABLET ORAL EVERY 24 HOURS
Refills: 0 | Status: DISCONTINUED | OUTPATIENT
Start: 2021-09-18 | End: 2021-09-23

## 2021-09-17 RX ORDER — VANCOMYCIN HCL 1 G
1000 VIAL (EA) INTRAVENOUS EVERY 12 HOURS
Refills: 0 | Status: DISCONTINUED | OUTPATIENT
Start: 2021-09-17 | End: 2021-09-23

## 2021-09-17 RX ORDER — FLUCONAZOLE 150 MG/1
TABLET ORAL
Refills: 0 | Status: DISCONTINUED | OUTPATIENT
Start: 2021-09-17 | End: 2021-09-23

## 2021-09-17 RX ORDER — FLUCONAZOLE 150 MG/1
200 TABLET ORAL ONCE
Refills: 0 | Status: DISCONTINUED | OUTPATIENT
Start: 2021-09-17 | End: 2021-09-17

## 2021-09-17 RX ORDER — ELECTROLYTE SOLUTION,INJ
1 VIAL (ML) INTRAVENOUS
Refills: 0 | Status: DISCONTINUED | OUTPATIENT
Start: 2021-09-17 | End: 2021-09-18

## 2021-09-17 RX ORDER — FLUCONAZOLE 150 MG/1
TABLET ORAL
Refills: 0 | Status: DISCONTINUED | OUTPATIENT
Start: 2021-09-17 | End: 2021-09-17

## 2021-09-17 RX ADMIN — Medication 112 MICROGRAM(S): at 22:15

## 2021-09-17 RX ADMIN — PANTOPRAZOLE SODIUM 40 MILLIGRAM(S): 20 TABLET, DELAYED RELEASE ORAL at 05:23

## 2021-09-17 RX ADMIN — DEXMEDETOMIDINE HYDROCHLORIDE IN 0.9% SODIUM CHLORIDE 2.7 MICROGRAM(S)/KG/HR: 4 INJECTION INTRAVENOUS at 20:54

## 2021-09-17 RX ADMIN — Medication 1 DROP(S): at 17:09

## 2021-09-17 RX ADMIN — PANTOPRAZOLE SODIUM 40 MILLIGRAM(S): 20 TABLET, DELAYED RELEASE ORAL at 17:09

## 2021-09-17 RX ADMIN — BUDESONIDE AND FORMOTEROL FUMARATE DIHYDRATE 2 PUFF(S): 160; 4.5 AEROSOL RESPIRATORY (INHALATION) at 21:04

## 2021-09-17 RX ADMIN — BUDESONIDE AND FORMOTEROL FUMARATE DIHYDRATE 2 PUFF(S): 160; 4.5 AEROSOL RESPIRATORY (INHALATION) at 07:58

## 2021-09-17 RX ADMIN — ALBUTEROL 2 PUFF(S): 90 AEROSOL, METERED ORAL at 21:04

## 2021-09-17 RX ADMIN — Medication 250 MILLIGRAM(S): at 17:08

## 2021-09-17 RX ADMIN — MEROPENEM 100 MILLIGRAM(S): 1 INJECTION INTRAVENOUS at 14:50

## 2021-09-17 RX ADMIN — Medication 250 MILLIGRAM(S): at 06:05

## 2021-09-17 RX ADMIN — PROPOFOL 13 MICROGRAM(S)/KG/MIN: 10 INJECTION, EMULSION INTRAVENOUS at 22:06

## 2021-09-17 RX ADMIN — DEXMEDETOMIDINE HYDROCHLORIDE IN 0.9% SODIUM CHLORIDE 2.7 MICROGRAM(S)/KG/HR: 4 INJECTION INTRAVENOUS at 08:29

## 2021-09-17 RX ADMIN — Medication 1 DROP(S): at 05:24

## 2021-09-17 RX ADMIN — CHLORHEXIDINE GLUCONATE 15 MILLILITER(S): 213 SOLUTION TOPICAL at 05:24

## 2021-09-17 RX ADMIN — ALBUTEROL 2 PUFF(S): 90 AEROSOL, METERED ORAL at 07:58

## 2021-09-17 RX ADMIN — FLUCONAZOLE 100 MILLIGRAM(S): 150 TABLET ORAL at 14:50

## 2021-09-17 RX ADMIN — DEXMEDETOMIDINE HYDROCHLORIDE IN 0.9% SODIUM CHLORIDE 2.7 MICROGRAM(S)/KG/HR: 4 INJECTION INTRAVENOUS at 17:08

## 2021-09-17 RX ADMIN — DEXMEDETOMIDINE HYDROCHLORIDE IN 0.9% SODIUM CHLORIDE 2.7 MICROGRAM(S)/KG/HR: 4 INJECTION INTRAVENOUS at 22:06

## 2021-09-17 RX ADMIN — MEROPENEM 100 MILLIGRAM(S): 1 INJECTION INTRAVENOUS at 05:24

## 2021-09-17 RX ADMIN — ALBUTEROL 2 PUFF(S): 90 AEROSOL, METERED ORAL at 03:27

## 2021-09-17 RX ADMIN — DEXMEDETOMIDINE HYDROCHLORIDE IN 0.9% SODIUM CHLORIDE 2.7 MICROGRAM(S)/KG/HR: 4 INJECTION INTRAVENOUS at 14:10

## 2021-09-17 RX ADMIN — PROPOFOL 13 MICROGRAM(S)/KG/MIN: 10 INJECTION, EMULSION INTRAVENOUS at 08:29

## 2021-09-17 RX ADMIN — FENTANYL CITRATE 2.7 MICROGRAM(S)/KG/HR: 50 INJECTION INTRAVENOUS at 06:59

## 2021-09-17 RX ADMIN — FENTANYL CITRATE 2.7 MICROGRAM(S)/KG/HR: 50 INJECTION INTRAVENOUS at 17:10

## 2021-09-17 RX ADMIN — PROPOFOL 13 MICROGRAM(S)/KG/MIN: 10 INJECTION, EMULSION INTRAVENOUS at 01:13

## 2021-09-17 RX ADMIN — I.V. FAT EMULSION 13.3 ML/HR: 20 EMULSION INTRAVENOUS at 01:13

## 2021-09-17 RX ADMIN — Medication 112 MICROGRAM(S): at 00:16

## 2021-09-17 RX ADMIN — VASOPRESSIN 2.4 UNIT(S)/MIN: 20 INJECTION INTRAVENOUS at 17:00

## 2021-09-17 RX ADMIN — MEROPENEM 100 MILLIGRAM(S): 1 INJECTION INTRAVENOUS at 22:06

## 2021-09-17 RX ADMIN — CHLORHEXIDINE GLUCONATE 1 APPLICATION(S): 213 SOLUTION TOPICAL at 05:24

## 2021-09-17 RX ADMIN — CHLORHEXIDINE GLUCONATE 15 MILLILITER(S): 213 SOLUTION TOPICAL at 17:09

## 2021-09-17 RX ADMIN — ALBUTEROL 2 PUFF(S): 90 AEROSOL, METERED ORAL at 15:56

## 2021-09-17 NOTE — CONSULT NOTE ADULT - PROBLEM SELECTOR RECOMMENDATION 4
overall prognosis is very poor  spoke with son over the phone  son unrealistic about pt recovery   he has a sister Douglas that lives in [place] that helps with decision making  he states his mother's mother lived until her late 90's so she is not ready to die.  chaplaincy referral made as they are [employer]  will continue to follow

## 2021-09-17 NOTE — CONSULT NOTE ADULT - PROBLEM SELECTOR RECOMMENDATION 2
intubated, sedated  pt with h/o of "agitation"as per son  unsure of mental status, concern for some cognitive impairment  medical management as per primary team

## 2021-09-17 NOTE — CONSULT NOTE ADULT - ASSESSMENT
80 yo female admitted to [facility] for evaluation of hemetemisis- found to have esophageal tear.  Pt intubated, sedated, on pressors.  Asked to see for goc

## 2021-09-17 NOTE — PROGRESS NOTE ADULT - SUBJECTIVE AND OBJECTIVE BOX
NUTRITION NOTE  [id number] NAGI  ===============================    Interval events - Parenteral nutrition was initiated on 9/14/21 via central line; pt was febrile upto 102.3F yesterday; prelim blood cultures are negative. Per CTICU team, plan to replace central line today.     ROS - unable to obtain, pt is on vent support     Allergies  Sulfur Colliod (Rash)  Tylenol (Swelling)    PAST MEDICAL & SURGICAL HISTORY:  Afib  History of COPD  HTN (hypertension)  Hypothyroid  GERD (gastroesophageal reflux disease)  Esophageal diverticulum  Presence of IVC filter    ICU Vital Signs Last 24 Hrs  T(C): 36.2 (17 Sep 2021 04:00), Max: 39.1 (16 Sep 2021 17:00)  T(F): 97.2 (17 Sep 2021 04:00), Max: 102.3 (16 Sep 2021 17:00)  HR: 82 (17 Sep 2021 09:30) (78 - 104)  ABP: 97/60 (17 Sep 2021 09:30) (85/52 - 124/84)  ABP(mean): 73 (17 Sep 2021 09:30) (66 - 99)  RR: 23 (17 Sep 2021 09:30) (18 - 23)  SpO2: 100% (17 Sep 2021 09:30) (65% - 100%)    MEDICATIONS  (STANDING):  ALBUTerol    90 MICROgram(s) HFA Inhaler 2 Puff(s) Inhalation every 6 hours  artificial tears (preservative free) Ophthalmic Solution 1 Drop(s) Both EYES two times a day  budesonide  80 MICROgram(s)/formoterol 4.5 MICROgram(s) Inhaler 2 Puff(s) Inhalation two times a day  chlorhexidine 0.12% Liquid 15 milliLiter(s) Oral Mucosa every 12 hours  chlorhexidine 4% Liquid 1 Application(s) Topical <User Schedule>  dexMEDEtomidine Infusion 0.2 MICROgram(s)/kG/Hr (2.7 mL/Hr) IV Continuous <Continuous>  fat emulsion (Fish Oil and Plant Based) 20% Infusion 13.3 mL/Hr (13.3 mL/Hr) IV Continuous <Continuous>  fentaNYL   Infusion. 0.5 MICROgram(s)/kG/Hr (2.7 mL/Hr) IV Continuous <Continuous>  levothyroxine Injectable 112 MICROGram(s) IV Push at bedtime  meropenem  IVPB 1000 milliGRAM(s) IV Intermittent every 8 hours  meropenem  IVPB      norepinephrine Infusion 0.05 MICROgram(s)/kG/Min (2.53 mL/Hr) IV Continuous <Continuous>  pantoprazole  Injectable 40 milliGRAM(s) IV Push two times a day  Parenteral Nutrition - Adult 1 Each (63 mL/Hr) TPN Continuous <Continuous>  Parenteral Nutrition - Adult 1 Each (63 mL/Hr) TPN Continuous <Continuous>  propofol Infusion 40 MICROgram(s)/kG/Min (13 mL/Hr) IV Continuous <Continuous>  vancomycin  IVPB 1000 milliGRAM(s) IV Intermittent every 12 hours  vasopressin Infusion 0.04 Unit(s)/Min (2.4 mL/Hr) IV Continuous <Continuous>    I&O's Detail    16 Sep 2021 07:01  -  17 Sep 2021 07:00  --------------------------------------------------------  IN:    Dexmedetomidine: 210.5 mL    Fat Emulsion (Fish Oil &amp; Plant Based) 20% Infusion: 79.8 mL    Fat Emulsion (Fish Oil &amp; Plant Based) 20% Infusion: 79.8 mL    FentaNYL: 121 mL    IV PiggyBack: 100 mL    IV PiggyBack: 250 mL    Norepinephrine: 132.4 mL    Propofol: 325.4 mL    TPN (Total Parenteral Nutrition): 1512 mL    Vasopressin: 57.6 mL  Total IN: 2868.5 mL    OUT:    Indwelling Catheter - Urethral (mL): 815 mL  Total OUT: 815 mL    Total NET: 2053.5 mL      17 Sep 2021 07:01  -  17 Sep 2021 11:08  --------------------------------------------------------  IN:    Dexmedetomidine: 32 mL    Fat Emulsion (Fish Oil &amp; Plant Based) 20% Infusion: 26.6 mL    FentaNYL: 9.8 mL    Norepinephrine: 6 mL    Propofol: 25.6 mL    TPN (Total Parenteral Nutrition): 126 mL    Vasopressin: 4.8 mL  Total IN: 230.8 mL    OUT:    Indwelling Catheter - Urethral (mL): 60 mL  Total OUT: 60 mL    Total NET: 170.8 mL    POCT Blood Glucose.: 160 mg/dL (17 Sep 2021 01:09)  POCT Blood Glucose.: 191 mg/dL (16 Sep 2021 17:43)    Weight (kg): 54 (10 Sep 2021 00:39)    PHYSICAL EXAM:  Gen: Comfortable, No acute distress, frail elderly, sedated   Resp: mechanica breath sounds   Abd: Soft, Non-distended   Skin: Warm, 1+ peripheral edema of lower extremities    Mode: AC/ CMV (Assist Control/ Continuous Mandatory Ventilation)  RR (machine): 18  TV (machine): 350  FiO2: 40  PEEP: 5  ITime: 0.7  MAP: 9  PIP: 19    Diet: NPO and TPN/lipids (started on 9/14/21)    LABORATORY                        9.1    11.22 )-----------( 288      ( 17 Sep 2021 05:07 )             27.8     09-17    132<L>  |  98  |  19  ----------------------------<  201<H>  4.6   |  25  |  0.33<L>    Ca    8.9      17 Sep 2021 05:07  Phos  3.1     09-17  Mg     2.00     09-17    TPro  5.9<L>  /  Alb  3.3  /  TBili  0.5  /  DBili  x   /  AST  16  /  ALT  5   /  AlkPhos  73  09-15    LIVER FUNCTIONS - ( 15 Sep 2021 04:13 )  Alb: 3.3 g/dL / Pro: 5.9 g/dL / ALK PHOS: 73 U/L / ALT: 5 U/L / AST: 16 U/L / GGT: x           09-15 Chol -- LDL -- HDL -- Trig 103    RECENT CULTURES    Culture - Blood (collected 15 Sep 2021 18:43)  Source: .Blood Blood-Peripheral  Preliminary Report (16 Sep 2021 19:02):    No growth to date.    Culture - Blood (collected 15 Sep 2021 14:52)  Source: .Blood Blood-Peripheral  Preliminary Report (16 Sep 2021 15:02):    No growth to date.    Culture - Sputum (collected 15 Sep 2021 10:27)  Source: .Sputum Sputum  Gram Stain (15 Sep 2021 23:18):    Moderate polymorphonuclear leukocytes per low power field    No Squamous epithelial cells per low power field    Rare Yeast like cells per oil power field  Preliminary Report (16 Sep 2021 10:03):    Normal Respiratory Consuelo present    ASSESSMENT/PLAN:  80 y/o female transferred to CT ICU for a patient who was noted to have an esophageal mass at [facility]. EGD done at outside hospital on 9/9/21 -Large diverticula noted at 28cm filled with blood and blood clots, periphery of diverticula was injected with epinephrine, 5mm distal esophageal tear without bleeding, hiatal hernia. Patient meets criteria for severe malnutrition in the context of acute illness. Patient's signs/symptoms as evidenced by severe loss of muscle mass and fat stores, 1+ edema, NPO >3 days.  Nutrition support service consulted for initiation of TPN/lipids via central line in view of malnutrition and anticipated prolonged NPO status.     continue TPN with infusion volume of 1.5 L, TPN will provide 1243 kcal/day - decreased dextrose calories in view of elevated glucose on BMP (201)    labs reviewed - electrolytes adjusted in TPN bag     monitor fingersticks, obtain daily weights     continue parenteral nutrition at this time, will follow up with primary team on plan - monitor for fevers and follow up blood cultures/ID recommendations      1.  Severe protein calorie malnutrition being optimized with TPN: CHO [175] gm.  AA [82] gm. SMOF Lipids [32] gm.  2.  Hyperglycemia managed with: [0] units of regular insulin    3.  Check fluid balance daily.  Strict I/O  [ ] [ ]   4.  Daily BMP, Ionized Calcium, Magnesium and Phosphorous   5.  Triglycerides at initiation of TPN and monthly [ ] [ ]     Nutrition Support [id number]

## 2021-09-17 NOTE — CHART NOTE - NSCHARTNOTEFT_GEN_A_CORE
Source: Patient [ ]    Family [ ]     other [x ] RN, chart review    Patient seen in f/u for severe malnutrition. 80 y/o female transferred to CTICU for a patient who was noted to have an esophageal mass at [facility]. EGD done at outside hospital on 9/9/21 -Large diverticula noted at 28cm filled with blood and blood clots, periphery of diverticula was injected with epinephrine, 5mm distal esophageal tear without bleeding, hiatal hernia s/p EGD removal of blood clots 9/14 and no NGT at this time per chart. Noted propofol running @ 11.3 mL/hr. per observation providing additional 298 kcal. Patient currently on TPN for nutrition @ 63 mL/hr. TPN with propofol provides 1,541 kcal (27.4 kcal/kg) and 82 g pro (1.5 g/kg) based on ABW 56.3 kg. RN reports patient has no intolerances to PN. Noted with +2 L/R arm edema and no pressure injuries per RN flow sheet.     Diet : Diet, NPO (09-10-21 @ 06:50)    Current Weight: no new weight documented  56.3 kg (9/14)  58.5 kg (9/12)    Pertinent Medications: MEDICATIONS  (STANDING):  ALBUTerol    90 MICROgram(s) HFA Inhaler 2 Puff(s) Inhalation every 6 hours  artificial tears (preservative free) Ophthalmic Solution 1 Drop(s) Both EYES two times a day  budesonide  80 MICROgram(s)/formoterol 4.5 MICROgram(s) Inhaler 2 Puff(s) Inhalation two times a day  chlorhexidine 0.12% Liquid 15 milliLiter(s) Oral Mucosa every 12 hours  chlorhexidine 4% Liquid 1 Application(s) Topical <User Schedule>  dexMEDEtomidine Infusion 0.2 MICROgram(s)/kG/Hr (2.7 mL/Hr) IV Continuous <Continuous>  fat emulsion (Fish Oil and Plant Based) 20% Infusion 13.3 mL/Hr (13.3 mL/Hr) IV Continuous <Continuous>  fentaNYL   Infusion. 0.5 MICROgram(s)/kG/Hr (2.7 mL/Hr) IV Continuous <Continuous>  levothyroxine Injectable 112 MICROGram(s) IV Push at bedtime  meropenem  IVPB 1000 milliGRAM(s) IV Intermittent every 8 hours  meropenem  IVPB      norepinephrine Infusion 0.05 MICROgram(s)/kG/Min (2.53 mL/Hr) IV Continuous <Continuous>  pantoprazole  Injectable 40 milliGRAM(s) IV Push two times a day  Parenteral Nutrition - Adult 1 Each (63 mL/Hr) TPN Continuous <Continuous>  Parenteral Nutrition - Adult 1 Each (63 mL/Hr) TPN Continuous <Continuous>  propofol Infusion 40 MICROgram(s)/kG/Min (13 mL/Hr) IV Continuous <Continuous>  vancomycin  IVPB 1000 milliGRAM(s) IV Intermittent every 12 hours  vasopressin Infusion 0.04 Unit(s)/Min (2.4 mL/Hr) IV Continuous <Continuous>    MEDICATIONS  (PRN):    Pertinent Labs:  09-17 Na132 mmol/L<L> Glu 201 mg/dL<H> K+ 4.6 mmol/L Cr  0.33 mg/dL<L> BUN 19 mg/dL 09-17 Phos 3.1 mg/dL 09-15 Alb 3.3 g/dL 09-15 Chol --    LDL --    HDL --    Trig 103 mg/dL    Previous Nutrition Diagnosis: Severe malnutrition (acute)    Nutrition Diagnosis is [x ] ongoing  [ ] resolved [ ] not applicable     Interventions:   - Managed with TPN    Recommend  - Continue TPN as ordered  - Obtain weights to monitor for weight changes while on TPN    Monitoring and Evaluation:   [ x] Tolerance to diet prescription [x ] weights [ x] follow up per protocol Source: Patient [ ]    Family [ ]     other [x ] RN, chart review    Patient seen in f/u for severe malnutrition. 82 y/o female transferred to CTICU for a patient who was noted to have an esophageal mass at [facility]. EGD done at outside hospital on 9/9/21 -Large diverticula noted at 28cm filled with blood and blood clots, periphery of diverticula was injected with epinephrine, 5mm distal esophageal tear without bleeding, hiatal hernia s/p EGD removal of blood clots 9/14 and no NGT at this time per chart. Noted propofol running @ 11.3 mL/hr. per observation providing additional 298 kcal. Patient currently on TPN for nutrition @ 63 mL/hr. TPN with propofol provides 1,541 kcal (27.4 kcal/kg) and 82 g pro (1.5 g/kg) based on ABW 56.3 kg. RN reports patient has no intolerances to PN. Noted with +2 L/R arm edema and no pressure injuries per RN flow sheet.     Diet : Diet, NPO (09-10-21 @ 06:50)    Current Weight: no new weight documented  56.3 kg (9/14)  58.5 kg (9/12)    Pertinent Medications: MEDICATIONS  (STANDING):  ALBUTerol    90 MICROgram(s) HFA Inhaler 2 Puff(s) Inhalation every 6 hours  artificial tears (preservative free) Ophthalmic Solution 1 Drop(s) Both EYES two times a day  budesonide  80 MICROgram(s)/formoterol 4.5 MICROgram(s) Inhaler 2 Puff(s) Inhalation two times a day  chlorhexidine 0.12% Liquid 15 milliLiter(s) Oral Mucosa every 12 hours  chlorhexidine 4% Liquid 1 Application(s) Topical <User Schedule>  dexMEDEtomidine Infusion 0.2 MICROgram(s)/kG/Hr (2.7 mL/Hr) IV Continuous <Continuous>  fat emulsion (Fish Oil and Plant Based) 20% Infusion 13.3 mL/Hr (13.3 mL/Hr) IV Continuous <Continuous>  fentaNYL   Infusion. 0.5 MICROgram(s)/kG/Hr (2.7 mL/Hr) IV Continuous <Continuous>  levothyroxine Injectable 112 MICROGram(s) IV Push at bedtime  meropenem  IVPB 1000 milliGRAM(s) IV Intermittent every 8 hours  meropenem  IVPB      norepinephrine Infusion 0.05 MICROgram(s)/kG/Min (2.53 mL/Hr) IV Continuous <Continuous>  pantoprazole  Injectable 40 milliGRAM(s) IV Push two times a day  Parenteral Nutrition - Adult 1 Each (63 mL/Hr) TPN Continuous <Continuous>  Parenteral Nutrition - Adult 1 Each (63 mL/Hr) TPN Continuous <Continuous>  propofol Infusion 40 MICROgram(s)/kG/Min (13 mL/Hr) IV Continuous <Continuous>  vancomycin  IVPB 1000 milliGRAM(s) IV Intermittent every 12 hours  vasopressin Infusion 0.04 Unit(s)/Min (2.4 mL/Hr) IV Continuous <Continuous>    MEDICATIONS  (PRN):    Pertinent Labs:  09-17 Na132 mmol/L<L> Glu 201 mg/dL<H> K+ 4.6 mmol/L Cr  0.33 mg/dL<L> BUN 19 mg/dL 09-17 Phos 3.1 mg/dL 09-15 Alb 3.3 g/dL 09-15 Chol --    LDL --    HDL --    Trig 103 mg/dL  CAPILLARY BLOOD GLUCOSE  POCT Blood Glucose.: 160 mg/dL (17 Sep 2021 01:09)  POCT Blood Glucose.: 191 mg/dL (16 Sep 2021 17:43)    Previous Nutrition Diagnosis: Severe malnutrition (acute)    Nutrition Diagnosis is [x ] ongoing  [ ] resolved [ ] not applicable     Interventions:   - Managed with TPN    Recommend  - Continue TPN as ordered  - Obtain weights 3x/week to monitor for weight changes while on TPN    Monitoring and Evaluation:   [ x] Tolerance to diet prescription [x ] weights [ x] follow up per protocol Source: Patient [ ]    Family [ ]     other [x ] RN, chart review    Patient seen in f/u for severe malnutrition. 82 y/o female transferred to CTICU for a patient who was noted to have an esophageal mass at \[facility]". EGD done at outside hospital on 9/9/21 -Large diverticula noted at 28cm filled with blood and blood clots, periphery of diverticula was injected with epinephrine, 5mm distal esophageal tear without bleeding, hiatal hernia s/p EGD removal of blood clots 9/14, started on PN 9/14 and no NGT at this time per chart. Noted propofol running @ 11.3 mL/hr. per observation providing additional 298 kcal. Patient currently on TPN for nutrition @ 63 mL/hr. TPN with propofol provides 1,541 kcal (27.4 kcal/kg) and 82 g pro (1.5 g/kg) based on ABW 56.3 kg. RN reports patient has no intolerances to PN. Noted with +2 L/R arm edema and no pressure injuries per RN flow sheet.     Diet : Diet, NPO (09-10-21 @ 06:50)    Current Weight: no new weight documented  56.3 kg (9/14)  58.5 kg (9/12)    Pertinent Medications: MEDICATIONS  (STANDING):  ALBUTerol    90 MICROgram(s) HFA Inhaler 2 Puff(s) Inhalation every 6 hours  artificial tears (preservative free) Ophthalmic Solution 1 Drop(s) Both EYES two times a day  budesonide  80 MICROgram(s)/formoterol 4.5 MICROgram(s) Inhaler 2 Puff(s) Inhalation two times a day  chlorhexidine 0.12% Liquid 15 milliLiter(s) Oral Mucosa every 12 hours  chlorhexidine 4% Liquid 1 Application(s) Topical <User Schedule>  dexMEDEtomidine Infusion 0.2 MICROgram(s)/kG/Hr (2.7 mL/Hr) IV Continuous <Continuous>  fat emulsion (Fish Oil and Plant Based) 20% Infusion 13.3 mL/Hr (13.3 mL/Hr) IV Continuous <Continuous>  fentaNYL   Infusion. 0.5 MICROgram(s)/kG/Hr (2.7 mL/Hr) IV Continuous <Continuous>  levothyroxine Injectable 112 MICROGram(s) IV Push at bedtime  meropenem  IVPB 1000 milliGRAM(s) IV Intermittent every 8 hours  meropenem  IVPB      norepinephrine Infusion 0.05 MICROgram(s)/kG/Min (2.53 mL/Hr) IV Continuous <Continuous>  pantoprazole  Injectable 40 milliGRAM(s) IV Push two times a day  Parenteral Nutrition - Adult 1 Each (63 mL/Hr) TPN Continuous <Continuous>  Parenteral Nutrition - Adult 1 Each (63 mL/Hr) TPN Continuous <Continuous>  propofol Infusion 40 MICROgram(s)/kG/Min (13 mL/Hr) IV Continuous <Continuous>  vancomycin  IVPB 1000 milliGRAM(s) IV Intermittent every 12 hours  vasopressin Infusion 0.04 Unit(s)/Min (2.4 mL/Hr) IV Continuous <Continuous>    MEDICATIONS  (PRN):    Pertinent Labs:  09-17 Na132 mmol/L<L> Glu 201 mg/dL<H> K+ 4.6 mmol/L Cr  0.33 mg/dL<L> BUN 19 mg/dL 09-17 Phos 3.1 mg/dL 09-15 Alb 3.3 g/dL 09-15 Chol --    LDL --    HDL --    Trig 103 mg/dL  CAPILLARY BLOOD GLUCOSE  POCT Blood Glucose.: 160 mg/dL (17 Sep 2021 01:09)  POCT Blood Glucose.: 191 mg/dL (16 Sep 2021 17:43)    Previous Nutrition Diagnosis: Severe malnutrition (acute)    Nutrition Diagnosis is [x ] ongoing  [ ] resolved [ ] not applicable     Interventions:   - Managed with TPN    Recommend  - Continue TPN as ordered  - Obtain weights 3x/week to monitor for weight changes while on TPN    Monitoring and Evaluation:   [ x] Tolerance to diet prescription [x ] weights [ x] follow up per protocol

## 2021-09-17 NOTE — CONSULT NOTE ADULT - SUBJECTIVE AND OBJECTIVE BOX
HPI:  81 y. o. female BIBA to [facility] from nursing facility c/o hematemesis.  According to pt it was the first episode and happened while she was eating dinner. Followed by multiple episodes of N/V with bright blood and epigastric cramps.  She was intubated for airway protection and underwent EGD that revealed large diverticula at 28 cm filled with blood and 5 mm tear at distal esophagus, a hiatal hernia and large amount of blood in the fundus.  Diverticula was injected with Epinephrine.  Patient was transferred to [facility] for further management. (10 Sep 2021 15:04)    Pt seen in ICU intubated, sedated on multiple pressors, TPN.  Appears comfortable.     PERTINENT PM/SXH:   Afib    History of COPD    HTN (hypertension)    Hypothyroid    GERD (gastroesophageal reflux disease)    Esophageal diverticulum      Presence of IVC filter      FAMILY HISTORY:    ITEMS NOT CHECKED ARE NOT PRESENT    SOCIAL HISTORY:   Significant other/partner:  [ ]  Children:  [ x]  Mandaeism/Spirituality: Religion  Substance hx:  [ ]   Tobacco hx:  [ ]   Alcohol hx: [ ]   Home Opioid hx:  [ ] I-Stop Reference No:  Living Situation: [ ]Home  [ ]Long term care  [ ]Rehab [x ]Other Assisted Living    ADVANCE DIRECTIVES:    DNR  MOLST  [ ]  Living Will  [ ]   DECISION MAKER(s):  [ ] Health Care Proxy(s)  [ ] Surrogate(s)  [ ] Guardian           Name(s): Phone Number(s): Favio [phone]    BASELINE (I)ADL(s) (prior to admission):  [place]: [ ]Total  [x ] Moderate [ ]Dependent    Allergies    Sulfur Colliod (Rash)  Tylenol (Swelling)    Intolerances    MEDICATIONS  (STANDING):  ALBUTerol    90 MICROgram(s) HFA Inhaler 2 Puff(s) Inhalation every 6 hours  artificial tears (preservative free) Ophthalmic Solution 1 Drop(s) Both EYES two times a day  budesonide  80 MICROgram(s)/formoterol 4.5 MICROgram(s) Inhaler 2 Puff(s) Inhalation two times a day  chlorhexidine 0.12% Liquid 15 milliLiter(s) Oral Mucosa every 12 hours  chlorhexidine 4% Liquid 1 Application(s) Topical <User Schedule>  dexMEDEtomidine Infusion 0.2 MICROgram(s)/kG/Hr (2.7 mL/Hr) IV Continuous <Continuous>  fat emulsion (Fish Oil and Plant Based) 20% Infusion 13.3 mL/Hr (13.3 mL/Hr) IV Continuous <Continuous>  fentaNYL   Infusion. 0.5 MICROgram(s)/kG/Hr (2.7 mL/Hr) IV Continuous <Continuous>  fluconAZOLE IVPB      levothyroxine Injectable 112 MICROGram(s) IV Push at bedtime  meropenem  IVPB      meropenem  IVPB 1000 milliGRAM(s) IV Intermittent every 8 hours  norepinephrine Infusion 0.05 MICROgram(s)/kG/Min (2.53 mL/Hr) IV Continuous <Continuous>  pantoprazole  Injectable 40 milliGRAM(s) IV Push two times a day  Parenteral Nutrition - Adult 1 Each (63 mL/Hr) TPN Continuous <Continuous>  Parenteral Nutrition - Adult 1 Each (63 mL/Hr) TPN Continuous <Continuous>  propofol Infusion 40 MICROgram(s)/kG/Min (13 mL/Hr) IV Continuous <Continuous>  vancomycin  IVPB 1000 milliGRAM(s) IV Intermittent every 12 hours  vasopressin Infusion 0.04 Unit(s)/Min (2.4 mL/Hr) IV Continuous <Continuous>    MEDICATIONS  (PRN):    PRESENT SYMPTOMS: [ x]Unable to obtain due to poor mentation   Source if other than patient:  [ ]Family   [ ]Team     Pain: [ ] yes [ ] no  QOL impact -   Location -                    Aggravating factors -  Quality -  Radiation -  Timing-  Severity (0-10 scale):  Minimal acceptable level (0-10 scale):     PAIN AD Score:     [email or web address] (press ctrl +  left click to view)    Dyspnea:                           [ ]Mild [ ]Moderate [ ]Severe  Anxiety:                             [ ]Mild [ ]Moderate [ ]Severe  Fatigue:                             [ ]Mild [ ]Moderate [ ]Severe  Nausea:                             [ ]Mild [ ]Moderate [ ]Severe  Loss of appetite:              [ ]Mild [ ]Moderate [ ]Severe  Constipation:                    [ ]Mild [ ]Moderate [ ]Severe    Other Symptoms:  [ ]All other review of systems negative     Karnofsky Performance Score/Palliative Performance Status Version 2:   10     %    http://npcrc.org/files/news/palliative_performance_scale_ppsv2.pdf  PHYSICAL EXAM:  Vital Signs Last 24 Hrs  T(C): 37.7 (17 Sep 2021 12:00), Max: 37.7 (17 Sep 2021 12:00)  T(F): 99.9 (17 Sep 2021 12:00), Max: 99.9 (17 Sep 2021 12:00)  HR: 83 (17 Sep 2021 16:00) (76 - 91)  BP: --  BP(mean): --  RR: 19 (17 Sep 2021 15:00) (18 - 23)  SpO2: 98% (17 Sep 2021 15:54) (65% - 100%) I&O's Summary    16 Sep 2021 07:01  -  17 Sep 2021 07:00  --------------------------------------------------------  IN: 2868.5 mL / OUT: 815 mL / NET: 2053.5 mL    17 Sep 2021 07:01  -  17 Sep 2021 18:00  --------------------------------------------------------  IN: 1136.1 mL / OUT: 310 mL / NET: 826.1 mL    GENERAL:  [ ]Alert  [ ]Oriented x   [ ]Lethargic  [ ]Cachexia  [ ]Unarousable  [ ]Verbal  [ x]Non-Verbal  Behavioral:   [ ] Anxiety  [ ] Delirium [ ] Agitation [ ] Other  HEENT:  [ ]Normal   [ ]Dry mouth   [x ]ET Tube/Trach  [ ]Oral lesions  PULMONARY:   [ x]Clear [ ]Tachypnea  [ ]Audible excessive secretions   [ ]Rhonchi        [ ]Right [ ]Left [ ]Bilateral  [ ]Crackles        [ ]Right [ ]Left [ ]Bilateral  [ ]Wheezing     [ ]Right [ ]Left [ ]Bilateral  CARDIOVASCULAR:    [ ]Regular [ ]Irregular [ x]Tachy  [ ]Richard [ ]Murmur [ ]Other  GASTROINTESTINAL:  [x ]Soft  [ ]Distended   [ ]+BS  [ ]Non tender [ ]Tender  [ ]PEG [ x]OGT/ NGT  Last BM: GENITOURINARY:  [ ]Normal [ ] Incontinent   [ ]Oliguria/Anuria   [x ]Ren  MUSCULOSKELETAL:   [ ]Normal   [ ]Weakness  [ ]Bed/Wheelchair bound [ x]Edema  NEUROLOGIC:   [ ]No focal deficits  [ ] Cognitive impairment  [ ] Dysphagia [ ]Dysarthria [ ] Paresis [xOther sedated  SKIN:   [ x]Normal   [ ]Pressure ulcer(s)  [ ]Rash    CRITICAL CARE:  [ ] Shock Present  [ ]Septic [ ]Cardiogenic [ ]Neurologic [ ]Hypovolemic  [ ]  Vasopressors [ ]  Inotropes   [ ] Respiratory failure present [ ] mechanical ventilation [ ] non-invasive ventilatory support [ ] High flow  [ ] Acute  [ ] Chronic [ ] Hypoxic  [ ] Hypercarbic [ ] Other  [ ] Other organ failure     LABS:                        9.1    11.22 )-----------( 288      ( 17 Sep 2021 05:07 )             27.8       132<L>  |  98  |  19  ----------------------------<  201<H>  4.6   |  25  |  0.33<L>    Ca    8.9      17 Sep 2021 05:07  Phos  3.1       Mg     2.00         PT/INR - ( 17 Sep 2021 05:07 )   PT: 14.6 sec;   INR: 1.28 ratio         PTT - ( 17 Sep 2021 05:07 )  PTT:28.8 sec    Urinalysis Basic - ( 16 Sep 2021 10:25 )    Color: Yellow / Appearance: Clear / S.027 / pH: x  Gluc: x / Ketone: Negative  / Bili: Negative / Urobili: <2 mg/dL   Blood: x / Protein: Trace / Nitrite: Negative   Leuk Esterase: Negative / RBC: 3 /HPF / WBC 2 /HPF   Sq Epi: x / Non Sq Epi: 1 /HPF / Bacteria: Negative      RADIOLOGY & ADDITIONAL STUDIES:    PROTEIN CALORIE MALNUTRITION PRESENT: [ ] Yes [ ] No  [ ] PPSV2 < or = to 30% [ ] significant weight loss  [ ] poor nutritional intake [ ] catabolic state [ ] anasarca     Artificial Nutrition [ ]     REFERRALS:   [ ]Chaplaincy  [ ] Hospice  [ ]Child Life  [ ]Social Work  [ ]Case management [ ]Holistic Therapy     Goals of Care Document:

## 2021-09-17 NOTE — PROGRESS NOTE ADULT - ASSESSMENT
81 y. o. female BIBA to [facility] from nursing facility c/o hematemesis.  According to pt it was the first episode and happened while she was eating dinner. Followed by multiple episodes of N/V with bright blood and epigastric cramps.  She was intubated for airway protection and underwent EGD that revealed large diverticula at 28 cm filled with blood and 5 mm tear at distal esophagus, a hiatal hernia and large amount of blood in the fundus.  Diverticula was injected with Epinephrine.  Patient was transferred to [facility] for further management. (10 Sep 2021 15:04)    Pt followed by GI, s/p repeat EGD on 9/14 -  showing significant clot in large esophageal diverticula -- without obvious source of etiology of bleeding (ie no vessel) + no esophageal mass seen.  Pt on pressors, thought to be unlikely hemorrhagic given stable Hb.    9/15: WBC 10.8, sputum cx (9/10) with nl resp tawana. CT c/a/p shows a 7.2 cm masslike structure distending the mid to distal esophagus, suspicious for malignancy.  Bilateral tree-in-bud nodular opacities, predominantly in the left lower lobe, new/increased from 9/9/2021, raising concern for infection.  Left lower lobe consolidative opacity, possible combination of atelectasis and pneumonia.  (+) hypodensities in the liver suspicion for malignancy, and stercoral colitis.    Pt with fever, on pressors.  Pt is NPO on TPN.   Pt on empiric abx, ID consulted for further abx managment.     Fever/sepsis:    - pt with fever, on pressor support.  Abx broadened to vanco and meropenem.  Fluconazole added on 9/17 due to continued fevers.  - S/p central line change on 9/17.   - CT c/a/p noted, increased LLL consolidation, possible aspiration.  f/u repeat sputum cx - nl resp tawana    - Check bcx x 2 - NGTD  - Check vanco trough  - f/u WBC and fever curve.    * Pt seen by Palliative care service for C discussion      Dr. Vero Crawford covering on 9/18 and 9/19.  [phone]

## 2021-09-17 NOTE — PROGRESS NOTE ADULT - SUBJECTIVE AND OBJECTIVE BOX
Infectious Diseases progress note:    Subjective:  Events noted.  Pt s/p central line change due to continued fever.  Fluconazole added.     ROS:  Unable to assess due to pt clinical condition    Allergies    Sulfur Colliod (Rash)  Tylenol (Swelling)    Intolerances        ANTIBIOTICS/RELEVANT:  antimicrobials  fluconAZOLE IVPB      meropenem  IVPB      meropenem  IVPB 1000 milliGRAM(s) IV Intermittent every 8 hours  vancomycin  IVPB 1000 milliGRAM(s) IV Intermittent every 12 hours    immunologic:    OTHER:  ALBUTerol    90 MICROgram(s) HFA Inhaler 2 Puff(s) Inhalation every 6 hours  artificial tears (preservative free) Ophthalmic Solution 1 Drop(s) Both EYES two times a day  budesonide  80 MICROgram(s)/formoterol 4.5 MICROgram(s) Inhaler 2 Puff(s) Inhalation two times a day  chlorhexidine 0.12% Liquid 15 milliLiter(s) Oral Mucosa every 12 hours  chlorhexidine 4% Liquid 1 Application(s) Topical <User Schedule>  dexMEDEtomidine Infusion 0.2 MICROgram(s)/kG/Hr IV Continuous <Continuous>  fat emulsion (Fish Oil and Plant Based) 20% Infusion 13.3 mL/Hr IV Continuous <Continuous>  fentaNYL   Infusion. 0.5 MICROgram(s)/kG/Hr IV Continuous <Continuous>  levothyroxine Injectable 112 MICROGram(s) IV Push at bedtime  norepinephrine Infusion 0.05 MICROgram(s)/kG/Min IV Continuous <Continuous>  pantoprazole  Injectable 40 milliGRAM(s) IV Push two times a day  Parenteral Nutrition - Adult 1 Each TPN Continuous <Continuous>  Parenteral Nutrition - Adult 1 Each TPN Continuous <Continuous>  propofol Infusion 40 MICROgram(s)/kG/Min IV Continuous <Continuous>  vasopressin Infusion 0.04 Unit(s)/Min IV Continuous <Continuous>      Objective:  Vital Signs Last 24 Hrs  T(C): 38.3 (17 Sep 2021 17:00), Max: 38.3 (17 Sep 2021 17:00)  T(F): 100.9 (17 Sep 2021 17:00), Max: 100.9 (17 Sep 2021 17:00)  HR: 85 (17 Sep 2021 22:00) (76 - 91)  BP: --  BP(mean): --  RR: 23 (17 Sep 2021 22:00) (18 - 23)  SpO2: 99% (17 Sep 2021 22:00) (65% - 100%)    PHYSICAL EXAM:  Constitutional: Intubated, sedated  Eyes:LISA, EOMI  Ear/Nose/Throat: no thrush, mucositis.  Moist mucous membranes	  Neck:no JVD, no lymphadenopathy, supple, Rt IJ central line  Respiratory: CTA shaila  Cardiovascular: S1S2 RRR, no murmurs  Gastrointestinal:soft, nontender,  nondistended (+) BS, J tube  Extremities:no e/e/c  Skin:  no rashes, open wounds or ulcerations  : bearden        LABS:                        9.1    11.22 )-----------( 288      ( 17 Sep 2021 05:07 )             27.8         132<L>  |  98  |  19  ----------------------------<  201<H>  4.6   |  25  |  0.33<L>    Ca    8.9      17 Sep 2021 05:07  Phos  3.1       Mg     2.00           PT/INR - ( 17 Sep 2021 05:07 )   PT: 14.6 sec;   INR: 1.28 ratio         PTT - ( 17 Sep 2021 05:07 )  PTT:28.8 sec  Urinalysis Basic - ( 16 Sep 2021 10:25 )    Color: Yellow / Appearance: Clear / S.027 / pH: x  Gluc: x / Ketone: Negative  / Bili: Negative / Urobili: <2 mg/dL   Blood: x / Protein: Trace / Nitrite: Negative   Leuk Esterase: Negative / RBC: 3 /HPF / WBC 2 /HPF   Sq Epi: x / Non Sq Epi: 1 /HPF / Bacteria: Negative          Vancomycin Level, Random:  ug/mL ( @ 15:47)      Vancomycin Level, Trough: 7.0 ug/mL ( @ 05:07)              MICROBIOLOGY:    Culture - Blood (09.15.21 @ 18:43)   Specimen Source: .Blood Blood-Peripheral   Culture Results:   No growth to date.     Culture - Blood (09.15.21 @ 14:52)   Specimen Source: .Blood Blood-Peripheral   Culture Results:   No growth to date.           RADIOLOGY & ADDITIONAL STUDIES:    < from: Xray Chest 1 View- PORTABLE-Urgent (Xray Chest 1 View- PORTABLE-Urgent .) (21 @ 14:49) >  IMPRESSION:  New right IJ line with tip at SVC/right atrial junction.    Question of new minimal lateral right pneumothorax versus a skinfold. A repeatchest x-ray can be obtained if felt to be clinically indicated. Discussed with Dr. Villalobos with read back at 4:29 PM on 2021 by Dr. Delatorre.    Increased right basilar opacity, possibly an increasing layering right pleural effusion with associated passive atelectasis. Atelectasis of other cause, and/or pneumonia is not excluded.    Hazy opacity in lower half of left lung and left basilar/retrocardiac opacity, not significantly changed. Findings may be due to a layering left pleural effusion with associated passive atelectasis. Atelectasis of other cause and/or pneumonia are also possible.    < end of copied text >        < from: Xray Chest 1 View- PORTABLE-Routine (Xray Chest 1 View- PORTABLE-Routine in AM.) (21 @ 06:45) >    EXAM:  XR CHEST PORTABLE ROUTINE 1V        PROCEDURE DATE:  Sep 17 2021         INTERPRETATION:  TIME OF EXAM: 2021 at 5:50 AM    CLINICAL INFORMATION: Intubated    TECHNIQUE:   Portable chest    INTERPRETATION:    The endotracheal tube has its tip in satisfactory position above the mi. Bilateral small layering effusions unchanged. No focal consolidations. Heart size is stable and not enlarged. Retrocardiac opacity partly tortuous aorta but also likely subsegmental lower lobe atelectasis.    No pneumothorax.      COMPARISON:        IMPRESSION:  Endotracheal tube in place with bilateral layering effusions.    < end of copied text >

## 2021-09-17 NOTE — PROGRESS NOTE ADULT - SUBJECTIVE AND OBJECTIVE BOX
LINDA LAZARAN      81y   Female   MRN-[id number]         Sulfur Colliod (Rash)  Tylenol (Swelling)             Daily     Daily Drug Dosing Weight    Weight (kg): 54 (10 Sep 2021 00:39)                81 y. o. female BIBA to [facility] from nursing facility c/o hematemesis.  According to pt it was the first episode and happened while she was eating dinner. Followed by multiple episodes of N/V with bright blood and epigastric cramps.  She was intubated for airway protection and underwent EGD that revealed large diverticula at 28 cm filled with blood and 5 mm tear at distal esophagus, a hiatal hernia and large amount of blood in the fundus.  Diverticula was injected with Epinephrine.  Patient was transferred to [facility] for further management. (10 Sep 2021 15:04)                           Issues:              Acute Hypoxic Respiratory failure              Septic Shock              Esophageal diverticular bleed   Acute blood loss anemia  COPD  Chronic Afib -- previously on Apixaban  Hypothyroidism  GERD  Hypophosphatemia               Home Medications:  Abreva 10% topical cream: Apply topically to affected area once a day (10 Sep 2021 15:54)  Albuterol (Eqv-ProAir HFA) 90 mcg/inh inhalation aerosol: 2 puff(s) inhaled every 6 hours (10 Sep 2021 15:54)  allopurinol 100 mg oral tablet: 2 tab(s) orally 2 times a day (10 Sep 2021 15:54)  Aricept 10 mg oral tablet: 1 tab(s) orally once a day (at bedtime) (10 Sep 2021 15:54)  aspirin 81 mg oral tablet: 1 tab(s) orally once a day (10 Sep 2021 15:54)  baclofen 5 mg oral tablet: 1 tab(s) orally 2 times a day (10 Sep 2021 15:54)  busPIRone 15 mg oral tablet: 1 tab(s) orally once a day (at bedtime) (10 Sep 2021 15:54)  Claritin 10 mg oral tablet: 1 tab(s) orally once a day (10 Sep 2021 15:54)  Colace 100 mg oral capsule: 1 cap(s) orally 3 times a day (10 Sep 2021 15:54)  Cymbalta 60 mg oral delayed release capsule: 1 cap(s) orally once a day (10 Sep 2021 15:54)  Eliquis 2.5 mg oral tablet: 1 tab(s) orally 2 times a day (10 Sep 2021 15:54)  Lasix 40 mg oral tablet: 1 tab(s) orally once a day (10 Sep 2021 15:54)  Linzess 290 mcg oral capsule: 1 cap(s) orally once a day (10 Sep 2021 15:54)  Melatonin 3 mg oral tablet: 1 tab(s) orally once a day (at bedtime) (10 Sep 2021 15:54)  Namenda 10 mg oral tablet: 1 tab(s) orally 2 times a day (10 Sep 2021 15:54)  ondansetron 4 mg oral tablet: 1 tab(s) orally every 8 hours (10 Sep 2021 15:54)  oxyCODONE 5 mg oral tablet: 1 tab(s) orally every 8 hours (10 Sep 2021 15:54)  Senna 8.6 mg oral tablet: 1 tab(s) orally once a day (at bedtime) (10 Sep 2021 15:54)  Spiriva 18 mcg inhalation capsule: 1 cap(s) inhaled once a day (10 Sep 2021 15:54)  Symbicort 80 mcg-4.5 mcg/inh inhalation aerosol: 2 puff(s) inhaled 2 times a day (10 Sep 2021 15:54)  Synthroid 150 mcg (0.15 mg) oral tablet: 1 tab(s) orally once a day (10 Sep 2021 15:54)  traZODone 100 mg oral tablet: 1 tab(s) orally 2 times a day (10 Sep 2021 15:54)  Ventolin HFA 90 mcg/inh inhalation aerosol: 2 puff(s) inhaled every 8 hours (10 Sep 2021 15:54)  Zocor 10 mg oral tablet: 1 tab(s) orally once a day (at bedtime) (10 Sep 2021 15:54)      PAST MEDICAL & SURGICAL HISTORY:  Afib    History of COPD    HTN (hypertension)    Hypothyroid    GERD (gastroesophageal reflux disease)    Esophageal diverticulum    Presence of IVC filter      Vital Signs Last 24 Hrs  T(C): 36.2 (17 Sep 2021 04:00), Max: 39.1 (16 Sep 2021 17:00)  T(F): 97.2 (17 Sep 2021 04:00), Max: 102.3 (16 Sep 2021 17:00)  HR: 80 (17 Sep 2021 12:00) (78 - 104)  BP: --  BP(mean): --  RR: 21 (17 Sep 2021 12:00) (18 - 23)  SpO2: 100% (17 Sep 2021 12:00) (65% - 100%)  I&O's Detail    16 Sep 2021 07:01  -  17 Sep 2021 07:00  --------------------------------------------------------  IN:    Dexmedetomidine: 210.5 mL    Fat Emulsion (Fish Oil &amp; Plant Based) 20% Infusion: 79.8 mL    Fat Emulsion (Fish Oil &amp; Plant Based) 20% Infusion: 79.8 mL    FentaNYL: 121 mL    IV PiggyBack: 100 mL    IV PiggyBack: 250 mL    Norepinephrine: 132.4 mL    Propofol: 325.4 mL    TPN (Total Parenteral Nutrition): 1512 mL    Vasopressin: 57.6 mL  Total IN: 2868.5 mL    OUT:    Indwelling Catheter - Urethral (mL): 815 mL  Total OUT: 815 mL    Total NET: 2053.5 mL      17 Sep 2021 07:01  -  17 Sep 2021 13:00  --------------------------------------------------------  IN:    Dexmedetomidine: 32 mL    Fat Emulsion (Fish Oil &amp; Plant Based) 20% Infusion: 26.6 mL    FentaNYL: 9.8 mL    Norepinephrine: 6 mL    Propofol: 25.6 mL    TPN (Total Parenteral Nutrition): 126 mL    Vasopressin: 4.8 mL  Total IN: 230.8 mL    OUT:    Indwelling Catheter - Urethral (mL): 60 mL  Total OUT: 60 mL    Total NET: 170.8 mL        CAPILLARY BLOOD GLUCOSE      POCT Blood Glucose.: 160 mg/dL (17 Sep 2021 01:09)  POCT Blood Glucose.: 191 mg/dL (16 Sep 2021 17:43)    CAPILLARY BLOOD GLUCOSE      POCT Blood Glucose.: 160 mg/dL (17 Sep 2021 01:09)      Home Medications:  Abreva 10% topical cream: Apply topically to affected area once a day (10 Sep 2021 15:54)  Albuterol (Eqv-ProAir HFA) 90 mcg/inh inhalation aerosol: 2 puff(s) inhaled every 6 hours (10 Sep 2021 15:54)  allopurinol 100 mg oral tablet: 2 tab(s) orally 2 times a day (10 Sep 2021 15:54)  Aricept 10 mg oral tablet: 1 tab(s) orally once a day (at bedtime) (10 Sep 2021 15:54)  aspirin 81 mg oral tablet: 1 tab(s) orally once a day (10 Sep 2021 15:54)  baclofen 5 mg oral tablet: 1 tab(s) orally 2 times a day (10 Sep 2021 15:54)  busPIRone 15 mg oral tablet: 1 tab(s) orally once a day (at bedtime) (10 Sep 2021 15:54)  Claritin 10 mg oral tablet: 1 tab(s) orally once a day (10 Sep 2021 15:54)  Colace 100 mg oral capsule: 1 cap(s) orally 3 times a day (10 Sep 2021 15:54)  Cymbalta 60 mg oral delayed release capsule: 1 cap(s) orally once a day (10 Sep 2021 15:54)  Eliquis 2.5 mg oral tablet: 1 tab(s) orally 2 times a day (10 Sep 2021 15:54)  Lasix 40 mg oral tablet: 1 tab(s) orally once a day (10 Sep 2021 15:54)  Linzess 290 mcg oral capsule: 1 cap(s) orally once a day (10 Sep 2021 15:54)  Melatonin 3 mg oral tablet: 1 tab(s) orally once a day (at bedtime) (10 Sep 2021 15:54)  Namenda 10 mg oral tablet: 1 tab(s) orally 2 times a day (10 Sep 2021 15:54)  ondansetron 4 mg oral tablet: 1 tab(s) orally every 8 hours (10 Sep 2021 15:54)  oxyCODONE 5 mg oral tablet: 1 tab(s) orally every 8 hours (10 Sep 2021 15:54)  Senna 8.6 mg oral tablet: 1 tab(s) orally once a day (at bedtime) (10 Sep 2021 15:54)  Spiriva 18 mcg inhalation capsule: 1 cap(s) inhaled once a day (10 Sep 2021 15:54)  Symbicort 80 mcg-4.5 mcg/inh inhalation aerosol: 2 puff(s) inhaled 2 times a day (10 Sep 2021 15:54)  Synthroid 150 mcg (0.15 mg) oral tablet: 1 tab(s) orally once a day (10 Sep 2021 15:54)  traZODone 100 mg oral tablet: 1 tab(s) orally 2 times a day (10 Sep 2021 15:54)  Ventolin HFA 90 mcg/inh inhalation aerosol: 2 puff(s) inhaled every 8 hours (10 Sep 2021 15:54)  Zocor 10 mg oral tablet: 1 tab(s) orally once a day (at bedtime) (10 Sep 2021 15:54)      MEDICATIONS  (STANDING):  ALBUTerol    90 MICROgram(s) HFA Inhaler 2 Puff(s) Inhalation every 6 hours  artificial tears (preservative free) Ophthalmic Solution 1 Drop(s) Both EYES two times a day  budesonide  80 MICROgram(s)/formoterol 4.5 MICROgram(s) Inhaler 2 Puff(s) Inhalation two times a day  chlorhexidine 0.12% Liquid 15 milliLiter(s) Oral Mucosa every 12 hours  chlorhexidine 4% Liquid 1 Application(s) Topical <User Schedule>  dexMEDEtomidine Infusion 0.2 MICROgram(s)/kG/Hr (2.7 mL/Hr) IV Continuous <Continuous>  fat emulsion (Fish Oil and Plant Based) 20% Infusion 13.3 mL/Hr (13.3 mL/Hr) IV Continuous <Continuous>  fentaNYL   Infusion. 0.5 MICROgram(s)/kG/Hr (2.7 mL/Hr) IV Continuous <Continuous>  fluconAZOLE IVPB      levothyroxine Injectable 112 MICROGram(s) IV Push at bedtime  meropenem  IVPB 1000 milliGRAM(s) IV Intermittent every 8 hours  meropenem  IVPB      norepinephrine Infusion 0.05 MICROgram(s)/kG/Min (2.53 mL/Hr) IV Continuous <Continuous>  pantoprazole  Injectable 40 milliGRAM(s) IV Push two times a day  Parenteral Nutrition - Adult 1 Each (63 mL/Hr) TPN Continuous <Continuous>  Parenteral Nutrition - Adult 1 Each (63 mL/Hr) TPN Continuous <Continuous>  propofol Infusion 40 MICROgram(s)/kG/Min (13 mL/Hr) IV Continuous <Continuous>  vancomycin  IVPB 1000 milliGRAM(s) IV Intermittent every 12 hours  vasopressin Infusion 0.04 Unit(s)/Min (2.4 mL/Hr) IV Continuous <Continuous>    MEDICATIONS  (PRN):      Mode: AC/ CMV (Assist Control/ Continuous Mandatory Ventilation)  RR (machine): 18  TV (machine): 350  FiO2: 40  PEEP: 5  ITime: 0.7  MAP: 10  PIP: 21      Physical exam:     General:               Pt is intubated, sedated                          Neuro:                  Could not assess                          Cardiovascular:   S1 & S2, regular                           Respiratory:         Air entry is fair and equal on both sides, has bilateral conducted sounds                           GI:                          Soft, nondistended and nontender, Bowel sounds active                            Ext:                        No cyanosis, has upper ext  edema                            Labs:                                                                           9.1    11.22 )-----------( 288      ( 17 Sep 2021 05:07 )             27.8             09-17    132<L>  |  98  |  19  ----------------------------<  201<H>  4.6   |  25  |  0.33<L>    Ca    8.9      17 Sep 2021 05:07  Phos  3.1     09-17  Mg     2.00     09-17                    PT/INR - ( 17 Sep 2021 05:07 )   PT: 14.6 sec;   INR: 1.28 ratio         PTT - ( 17 Sep 2021 05:07 )  PTT:28.8 sec    Urinalysis Basic - ( 16 Sep 2021 10:25 )    Color: Yellow / Appearance: Clear / S.027 / pH: x  Gluc: x / Ketone: Negative  / Bili: Negative / Urobili: <2 mg/dL   Blood: x / Protein: Trace / Nitrite: Negative   Leuk Esterase: Negative / RBC: 3 /HPF / WBC 2 /HPF   Sq Epi: x / Non Sq Epi: 1 /HPF / Bacteria: Negative        Culture - Blood (collected 15 Sep 2021 18:43)  Source: .Blood Blood-Peripheral  Preliminary Report (16 Sep 2021 19:02):    No growth to date.    Culture - Blood (collected 15 Sep 2021 14:52)  Source: .Blood Blood-Peripheral  Preliminary Report (16 Sep 2021 15:02):    No growth to date.    Culture - Sputum (collected 15 Sep 2021 07:50)  Source: .Sputum Sputum  Gram Stain (15 Sep 2021 23:18):    Moderate polymorphonuclear leukocytes per low power field    No Squamous epithelial cells per low power field    Rare Yeast like cells per oil power field  Final Report (17 Sep 2021 12:05):    Normal Respiratory Consuelo present        CXR:  < from: Xray Chest 1 View- PORTABLE-Routine (Xray Chest 1 View- PORTABLE-Routine in AM.) (21 @ 06:45) >  The endotracheal tube has its tip in satisfactory position above the mi. Bilateral small layering effusions unchanged. No focal consolidations. Heart size is stable and not enlarged. Retrocardiac opacity partly tortuous aorta but also likely subsegmental lower lobe atelectasis.    No pneumothorax.    COMPARISON:      IMPRESSION:  Endotracheal tube in place with bilateral layering effusions.        Plan:    General: 81 y. o. female BIBA to [facility] from nursing facility c/o hematemesis.  According to pt it was the first episode and happened while she was eating dinner. Followed by multiple episodes of N/V with bright blood and epigastric cramps.  She was intubated for airway protection and underwent EGD that revealed large diverticula at 28 cm filled with blood and 5 mm tear at distal esophagus, a hiatal hernia and large amount of blood in the fundus.  Diverticula was injected with Epinephrine.  Patient was transferred to [facility] for further management. (10 Sep 2021 15:04)                              Neuro:                                         Pain control with Fentanyl  /  Tylenol PRN    Sedated on Propofol. D/C Precedex.                            Cardiovascular:                                           Septic shock: Continue hemodynamic monitoring.  Levo requirement is coming down. Continue Vaso 0.04uts  Adequately fluid resuscitated, IVC is 2.1cm today with minimal respiratory variability                                         Titrate Levo / Vaso to MAP>65.                             Respiratory:                                         Pt is on full mechanical vent support. LP--40-5                                         Appears comfortable, not in any distress.                                         Continue bronchodilators, pulmonary toilet - PRN     Consider bronch     Less likely to be weaned off vent and extubated.   Will discuss with family regarding trach / PEG and goals of care  Palliative care                                                                    GI                                         NPO with TPN                                         Continue Zofran / Reglan for nausea - PRN  	    Monitor Hct and transfuse for Hb<8  G.I f/u                                                                 Renal:     Replace K/Mg/Phos                                        Monitor I/Os and electrolytes                                                                                        Hem/ Onc:                                         Acute blood loss anemia     Monitor Hct and transfuse for Hb<8                                          Follow CBC in AM                           Infectious disease:                                          Sepsis: Continue antibiotics. Added Diflucan                                           Follow cultures      Bronch today                                          Monitor for fever / leukocytosis.       D/C [facility] TLC. Has new [facility] TLC      I.D f/u                                                                    Endocrine                                             DM-2 / Hyperglycemia: Continue Accu-Checks with coverage    Pt is on SQ Heparin / Lovenox  and Venodyne boots for DVT prophylaxis.     Pertinent clinical, laboratory, radiographic, hemodynamic, echocardiographic, respiratory data, microbiologic data and chart were reviewed and analyzed frequently throughout the course of the day and night  Patient seen, examined and plan discussed with Dr. Zuñiga /   CTICU team during rounds.    I have spent  80  minutes of critical care time with this pt between  7am and 11.59pm.             Matty Villalobos MD

## 2021-09-18 LAB
ALBUMIN SERPL ELPH-MCNC: 2.9 G/DL — LOW (ref 3.3–5)
ALP SERPL-CCNC: 72 U/L — SIGNIFICANT CHANGE UP (ref 40–120)
ALT FLD-CCNC: 8 U/L — SIGNIFICANT CHANGE UP (ref 4–33)
ANION GAP SERPL CALC-SCNC: 8 MMOL/L — SIGNIFICANT CHANGE UP (ref 7–14)
ANION GAP SERPL CALC-SCNC: 8 MMOL/L — SIGNIFICANT CHANGE UP (ref 7–14)
AST SERPL-CCNC: 25 U/L — SIGNIFICANT CHANGE UP (ref 4–32)
BILIRUB SERPL-MCNC: 0.4 MG/DL — SIGNIFICANT CHANGE UP (ref 0.2–1.2)
BLOOD GAS ARTERIAL COMPREHENSIVE RESULT: SIGNIFICANT CHANGE UP
BUN SERPL-MCNC: 25 MG/DL — HIGH (ref 7–23)
BUN SERPL-MCNC: 26 MG/DL — HIGH (ref 7–23)
CALCIUM SERPL-MCNC: 8.7 MG/DL — SIGNIFICANT CHANGE UP (ref 8.4–10.5)
CALCIUM SERPL-MCNC: 9 MG/DL — SIGNIFICANT CHANGE UP (ref 8.4–10.5)
CHLORIDE SERPL-SCNC: 100 MMOL/L — SIGNIFICANT CHANGE UP (ref 98–107)
CHLORIDE SERPL-SCNC: 101 MMOL/L — SIGNIFICANT CHANGE UP (ref 98–107)
CO2 SERPL-SCNC: 26 MMOL/L — SIGNIFICANT CHANGE UP (ref 22–31)
CO2 SERPL-SCNC: 26 MMOL/L — SIGNIFICANT CHANGE UP (ref 22–31)
CREAT BLDA-MCNC: SIGNIFICANT CHANGE UP MG/DL (ref 0.5–1.3)
CREAT SERPL-MCNC: 0.41 MG/DL — LOW (ref 0.5–1.3)
CREAT SERPL-MCNC: 0.42 MG/DL — LOW (ref 0.5–1.3)
GLUCOSE BLDC GLUCOMTR-MCNC: 165 MG/DL — HIGH (ref 70–99)
GLUCOSE BLDC GLUCOMTR-MCNC: 176 MG/DL — HIGH (ref 70–99)
GLUCOSE BLDC GLUCOMTR-MCNC: 206 MG/DL — HIGH (ref 70–99)
GLUCOSE SERPL-MCNC: 187 MG/DL — HIGH (ref 70–99)
GLUCOSE SERPL-MCNC: 189 MG/DL — HIGH (ref 70–99)
HCT VFR BLD CALC: 26.9 % — LOW (ref 34.5–45)
HGB BLD-MCNC: 8.7 G/DL — LOW (ref 11.5–15.5)
MAGNESIUM SERPL-MCNC: 2.2 MG/DL — SIGNIFICANT CHANGE UP (ref 1.6–2.6)
MCHC RBC-ENTMCNC: 30.2 PG — SIGNIFICANT CHANGE UP (ref 27–34)
MCHC RBC-ENTMCNC: 32.3 GM/DL — SIGNIFICANT CHANGE UP (ref 32–36)
MCV RBC AUTO: 93.4 FL — SIGNIFICANT CHANGE UP (ref 80–100)
NRBC # BLD: 0 /100 WBCS — SIGNIFICANT CHANGE UP
NRBC # FLD: 0.02 K/UL — HIGH
PHOSPHATE SERPL-MCNC: 3.8 MG/DL — SIGNIFICANT CHANGE UP (ref 2.5–4.5)
PLATELET # BLD AUTO: 325 K/UL — SIGNIFICANT CHANGE UP (ref 150–400)
POTASSIUM SERPL-MCNC: 4.8 MMOL/L — SIGNIFICANT CHANGE UP (ref 3.5–5.3)
POTASSIUM SERPL-MCNC: 5.1 MMOL/L — SIGNIFICANT CHANGE UP (ref 3.5–5.3)
POTASSIUM SERPL-SCNC: 4.8 MMOL/L — SIGNIFICANT CHANGE UP (ref 3.5–5.3)
POTASSIUM SERPL-SCNC: 5.1 MMOL/L — SIGNIFICANT CHANGE UP (ref 3.5–5.3)
PROT SERPL-MCNC: 5.2 G/DL — LOW (ref 6–8.3)
RBC # BLD: 2.88 M/UL — LOW (ref 3.8–5.2)
RBC # FLD: 15.3 % — HIGH (ref 10.3–14.5)
SODIUM SERPL-SCNC: 134 MMOL/L — LOW (ref 135–145)
SODIUM SERPL-SCNC: 135 MMOL/L — SIGNIFICANT CHANGE UP (ref 135–145)
WBC # BLD: 10.27 K/UL — SIGNIFICANT CHANGE UP (ref 3.8–10.5)
WBC # FLD AUTO: 10.27 K/UL — SIGNIFICANT CHANGE UP (ref 3.8–10.5)

## 2021-09-18 PROCEDURE — 99291 CRITICAL CARE FIRST HOUR: CPT

## 2021-09-18 PROCEDURE — 71045 X-RAY EXAM CHEST 1 VIEW: CPT | Mod: 26

## 2021-09-18 PROCEDURE — 99232 SBSQ HOSP IP/OBS MODERATE 35: CPT

## 2021-09-18 PROCEDURE — 99292 CRITICAL CARE ADDL 30 MIN: CPT

## 2021-09-18 RX ORDER — GLUCAGON INJECTION, SOLUTION 0.5 MG/.1ML
1 INJECTION, SOLUTION SUBCUTANEOUS ONCE
Refills: 0 | Status: DISCONTINUED | OUTPATIENT
Start: 2021-09-18 | End: 2021-09-29

## 2021-09-18 RX ORDER — HEPARIN SODIUM 5000 [USP'U]/ML
2500 INJECTION INTRAVENOUS; SUBCUTANEOUS EVERY 12 HOURS
Refills: 0 | Status: DISCONTINUED | OUTPATIENT
Start: 2021-09-18 | End: 2021-09-20

## 2021-09-18 RX ORDER — SODIUM CHLORIDE 9 MG/ML
1000 INJECTION, SOLUTION INTRAVENOUS
Refills: 0 | Status: DISCONTINUED | OUTPATIENT
Start: 2021-09-18 | End: 2021-09-29

## 2021-09-18 RX ORDER — DEXTROSE 50 % IN WATER 50 %
25 SYRINGE (ML) INTRAVENOUS ONCE
Refills: 0 | Status: DISCONTINUED | OUTPATIENT
Start: 2021-09-18 | End: 2021-10-05

## 2021-09-18 RX ORDER — I.V. FAT EMULSION 20 G/100ML
13.3 EMULSION INTRAVENOUS
Qty: 32 | Refills: 0 | Status: DISCONTINUED | OUTPATIENT
Start: 2021-09-18 | End: 2021-09-19

## 2021-09-18 RX ORDER — DEXTROSE 50 % IN WATER 50 %
25 SYRINGE (ML) INTRAVENOUS ONCE
Refills: 0 | Status: DISCONTINUED | OUTPATIENT
Start: 2021-09-18 | End: 2021-09-29

## 2021-09-18 RX ORDER — ACETAMINOPHEN 500 MG
750 TABLET ORAL ONCE
Refills: 0 | Status: COMPLETED | OUTPATIENT
Start: 2021-09-18 | End: 2021-09-18

## 2021-09-18 RX ORDER — ELECTROLYTE SOLUTION,INJ
1 VIAL (ML) INTRAVENOUS
Refills: 0 | Status: DISCONTINUED | OUTPATIENT
Start: 2021-09-18 | End: 2021-09-19

## 2021-09-18 RX ORDER — DOBUTAMINE HCL 250MG/20ML
2.5 VIAL (ML) INTRAVENOUS
Qty: 500 | Refills: 0 | Status: DISCONTINUED | OUTPATIENT
Start: 2021-09-18 | End: 2021-09-27

## 2021-09-18 RX ORDER — INSULIN LISPRO 100/ML
VIAL (ML) SUBCUTANEOUS
Refills: 0 | Status: DISCONTINUED | OUTPATIENT
Start: 2021-09-18 | End: 2021-09-23

## 2021-09-18 RX ORDER — FUROSEMIDE 40 MG
5 TABLET ORAL ONCE
Refills: 0 | Status: COMPLETED | OUTPATIENT
Start: 2021-09-18 | End: 2021-09-18

## 2021-09-18 RX ORDER — DEXTROSE 50 % IN WATER 50 %
12.5 SYRINGE (ML) INTRAVENOUS ONCE
Refills: 0 | Status: DISCONTINUED | OUTPATIENT
Start: 2021-09-18 | End: 2021-09-29

## 2021-09-18 RX ORDER — FUROSEMIDE 40 MG
10 TABLET ORAL ONCE
Refills: 0 | Status: COMPLETED | OUTPATIENT
Start: 2021-09-18 | End: 2021-09-18

## 2021-09-18 RX ORDER — DEXTROSE 50 % IN WATER 50 %
15 SYRINGE (ML) INTRAVENOUS ONCE
Refills: 0 | Status: DISCONTINUED | OUTPATIENT
Start: 2021-09-18 | End: 2021-09-29

## 2021-09-18 RX ADMIN — ALBUTEROL 2 PUFF(S): 90 AEROSOL, METERED ORAL at 03:22

## 2021-09-18 RX ADMIN — FENTANYL CITRATE 2.7 MICROGRAM(S)/KG/HR: 50 INJECTION INTRAVENOUS at 09:51

## 2021-09-18 RX ADMIN — CHLORHEXIDINE GLUCONATE 15 MILLILITER(S): 213 SOLUTION TOPICAL at 17:08

## 2021-09-18 RX ADMIN — DEXMEDETOMIDINE HYDROCHLORIDE IN 0.9% SODIUM CHLORIDE 2.7 MICROGRAM(S)/KG/HR: 4 INJECTION INTRAVENOUS at 19:44

## 2021-09-18 RX ADMIN — PROPOFOL 13 MICROGRAM(S)/KG/MIN: 10 INJECTION, EMULSION INTRAVENOUS at 19:44

## 2021-09-18 RX ADMIN — Medication 4.05 MICROGRAM(S)/KG/MIN: at 09:50

## 2021-09-18 RX ADMIN — PANTOPRAZOLE SODIUM 40 MILLIGRAM(S): 20 TABLET, DELAYED RELEASE ORAL at 05:09

## 2021-09-18 RX ADMIN — DEXMEDETOMIDINE HYDROCHLORIDE IN 0.9% SODIUM CHLORIDE 2.7 MICROGRAM(S)/KG/HR: 4 INJECTION INTRAVENOUS at 09:51

## 2021-09-18 RX ADMIN — MEROPENEM 100 MILLIGRAM(S): 1 INJECTION INTRAVENOUS at 22:50

## 2021-09-18 RX ADMIN — Medication 250 MILLIGRAM(S): at 05:08

## 2021-09-18 RX ADMIN — FENTANYL CITRATE 2.7 MICROGRAM(S)/KG/HR: 50 INJECTION INTRAVENOUS at 19:45

## 2021-09-18 RX ADMIN — HEPARIN SODIUM 2500 UNIT(S): 5000 INJECTION INTRAVENOUS; SUBCUTANEOUS at 17:09

## 2021-09-18 RX ADMIN — Medication 750 MILLIGRAM(S): at 22:14

## 2021-09-18 RX ADMIN — VASOPRESSIN 2.4 UNIT(S)/MIN: 20 INJECTION INTRAVENOUS at 09:51

## 2021-09-18 RX ADMIN — VASOPRESSIN 2.4 UNIT(S)/MIN: 20 INJECTION INTRAVENOUS at 03:52

## 2021-09-18 RX ADMIN — DEXMEDETOMIDINE HYDROCHLORIDE IN 0.9% SODIUM CHLORIDE 2.7 MICROGRAM(S)/KG/HR: 4 INJECTION INTRAVENOUS at 05:02

## 2021-09-18 RX ADMIN — Medication 2.53 MICROGRAM(S)/KG/MIN: at 09:51

## 2021-09-18 RX ADMIN — I.V. FAT EMULSION 13.3 ML/HR: 20 EMULSION INTRAVENOUS at 09:54

## 2021-09-18 RX ADMIN — Medication 1 EACH: at 17:07

## 2021-09-18 RX ADMIN — BUDESONIDE AND FORMOTEROL FUMARATE DIHYDRATE 2 PUFF(S): 160; 4.5 AEROSOL RESPIRATORY (INHALATION) at 10:50

## 2021-09-18 RX ADMIN — CHLORHEXIDINE GLUCONATE 1 APPLICATION(S): 213 SOLUTION TOPICAL at 05:09

## 2021-09-18 RX ADMIN — FLUCONAZOLE 100 MILLIGRAM(S): 150 TABLET ORAL at 16:06

## 2021-09-18 RX ADMIN — MEROPENEM 100 MILLIGRAM(S): 1 INJECTION INTRAVENOUS at 06:28

## 2021-09-18 RX ADMIN — I.V. FAT EMULSION 13.3 ML/HR: 20 EMULSION INTRAVENOUS at 17:07

## 2021-09-18 RX ADMIN — Medication 10 MILLIGRAM(S): at 15:01

## 2021-09-18 RX ADMIN — Medication 112 MICROGRAM(S): at 22:50

## 2021-09-18 RX ADMIN — Medication 250 MILLIGRAM(S): at 17:08

## 2021-09-18 RX ADMIN — Medication 750 MILLIGRAM(S): at 09:55

## 2021-09-18 RX ADMIN — Medication 1: at 16:25

## 2021-09-18 RX ADMIN — Medication 1 DROP(S): at 17:07

## 2021-09-18 RX ADMIN — PANTOPRAZOLE SODIUM 40 MILLIGRAM(S): 20 TABLET, DELAYED RELEASE ORAL at 17:07

## 2021-09-18 RX ADMIN — DEXMEDETOMIDINE HYDROCHLORIDE IN 0.9% SODIUM CHLORIDE 2.7 MICROGRAM(S)/KG/HR: 4 INJECTION INTRAVENOUS at 03:03

## 2021-09-18 RX ADMIN — Medication 300 MILLIGRAM(S): at 09:19

## 2021-09-18 RX ADMIN — ALBUTEROL 2 PUFF(S): 90 AEROSOL, METERED ORAL at 16:59

## 2021-09-18 RX ADMIN — MEROPENEM 100 MILLIGRAM(S): 1 INJECTION INTRAVENOUS at 14:34

## 2021-09-18 RX ADMIN — Medication 5 MILLIGRAM(S): at 10:09

## 2021-09-18 RX ADMIN — ALBUTEROL 2 PUFF(S): 90 AEROSOL, METERED ORAL at 21:10

## 2021-09-18 RX ADMIN — Medication 300 MILLIGRAM(S): at 22:12

## 2021-09-18 RX ADMIN — I.V. FAT EMULSION 13.3 ML/HR: 20 EMULSION INTRAVENOUS at 03:03

## 2021-09-18 RX ADMIN — ALBUTEROL 2 PUFF(S): 90 AEROSOL, METERED ORAL at 10:48

## 2021-09-18 RX ADMIN — CHLORHEXIDINE GLUCONATE 15 MILLILITER(S): 213 SOLUTION TOPICAL at 05:09

## 2021-09-18 RX ADMIN — PROPOFOL 13 MICROGRAM(S)/KG/MIN: 10 INJECTION, EMULSION INTRAVENOUS at 09:50

## 2021-09-18 RX ADMIN — BUDESONIDE AND FORMOTEROL FUMARATE DIHYDRATE 2 PUFF(S): 160; 4.5 AEROSOL RESPIRATORY (INHALATION) at 21:10

## 2021-09-18 RX ADMIN — Medication 1 EACH: at 09:52

## 2021-09-18 RX ADMIN — Medication 1 DROP(S): at 05:09

## 2021-09-18 RX ADMIN — Medication 4.05 MICROGRAM(S)/KG/MIN: at 19:42

## 2021-09-18 RX ADMIN — VASOPRESSIN 2.4 UNIT(S)/MIN: 20 INJECTION INTRAVENOUS at 19:45

## 2021-09-18 NOTE — CONSULT NOTE ADULT - SUBJECTIVE AND OBJECTIVE BOX
CHIEF COMPLAINT:    HISTORY OF PRESENT ILLNESS:81 y. o. female  atrial fibrillation, hypothyroidism BIBA to [facility] from nursing facility c/o hematemesis c/b Acute hypoxemic respiratory failure underwent EGD that revealed large diverticula at 28 cm filled with blood and 5 mm tear at distal esophagus, a hiatal hernia and large amount of blood in the fundus.  Diverticula was injected with Epinephrine . Patient was transferred to [facility] for further management c/b pna .  Echo on 9/15  revealed Ejection Fraction (Visual Estimate): 20%Severe global left ventricular systolic dysfunction. severe right atrial enlargement. Right ventricular enlargement with decreased right ventricular systolic function. On Abx for antibiotic  On IV pressor for Septic  vs cardiogenic shock. Cardiogy consulted for  cardiogenic shock         Allergies    Sulfur Colliod (Rash)  Tylenol (Swelling)    Intolerances	    MEDICATIONS:  DOBUTamine Infusion 2.5 MICROgram(s)/kG/Min IV Continuous <Continuous>  heparin   Injectable 2500 Unit(s) SubCutaneous every 12 hours  norepinephrine Infusion 0.05 MICROgram(s)/kG/Min IV Continuous <Continuous>  fluconAZOLE IVPB 200 milliGRAM(s) IV Intermittent every 24 hours  fluconAZOLE IVPB      meropenem  IVPB 1000 milliGRAM(s) IV Intermittent every 8 hours  meropenem  IVPB      vancomycin  IVPB 1000 milliGRAM(s) IV Intermittent every 12 hours  ALBUTerol    90 MICROgram(s) HFA Inhaler 2 Puff(s) Inhalation every 6 hours  budesonide  80 MICROgram(s)/formoterol 4.5 MICROgram(s) Inhaler 2 Puff(s) Inhalation two times a day  acetaminophen  IVPB .. 750 milliGRAM(s) IV Intermittent once  dexMEDEtomidine Infusion 0.2 MICROgram(s)/kG/Hr IV Continuous <Continuous>  fentaNYL   Infusion. 0.5 MICROgram(s)/kG/Hr IV Continuous <Continuous>  propofol Infusion 40 MICROgram(s)/kG/Min IV Continuous <Continuous>  pantoprazole  Injectable 40 milliGRAM(s) IV Push two times a day  dextrose 40% Gel 15 Gram(s) Oral once  dextrose 50% Injectable 25 Gram(s) IV Push once  dextrose 50% Injectable 12.5 Gram(s) IV Push once  dextrose 50% Injectable 25 Gram(s) IV Push once  glucagon  Injectable 1 milliGRAM(s) IntraMuscular once  insulin lispro (ADMELOG) corrective regimen sliding scale   SubCutaneous three times a day before meals  levothyroxine Injectable 112 MICROGram(s) IV Push at bedtime  vasopressin Infusion 0.04 Unit(s)/Min IV Continuous <Continuous>  artificial tears (preservative free) Ophthalmic Solution 1 Drop(s) Both EYES two times a day  chlorhexidine 0.12% Liquid 15 milliLiter(s) Oral Mucosa every 12 hours  chlorhexidine 4% Liquid 1 Application(s) Topical <User Schedule>  dextrose 5%. 1000 milliLiter(s) IV Continuous <Continuous>  dextrose 5%. 1000 milliLiter(s) IV Continuous <Continuous>  fat emulsion (Fish Oil and Plant Based) 20% Infusion 13.3 mL/Hr IV Continuous <Continuous>  Parenteral Nutrition - Adult 1 Each TPN Continuous <Continuous>  Parenteral Nutrition - Adult 1 Each TPN Continuous <Continuous>      PAST MEDICAL & SURGICAL HISTORY:  Afib  History of COPD  HTN (hypertension)  Hypothyroid  GERD (gastroesophageal reflux disease)  Esophageal diverticulum  Presence of IVC filter        F      REVIEW OF SYSTEMS:  General: no fatigue/malaise, weight loss/gain.  Skin: no rashes.  Ophthalmologic: no blurred vision, no loss of vision. 	  ENT: no sore throat, rhinorrhea, sinus congestion.  Cardiovascular:no chest pain ,no palpitation,no dizziness,no diaphoresis,no edema  Respiratory: no SOB, cough or wheeze.  Gastrointestinal:  no N/V/D, no melena/hematemesis/hematochezia.  Genitourinary: no dysuria/hesitancy or hematuria.  Musculoskeletal: no myalgias or arthralgias.  Neurological: no changes in vision or hearing, no lightheadedness/dizziness, no syncope/near syncope	  Psychiatric: no unusual stress/anxiety.       PHYSICAL EXAM:  T(C): 37.1 (09-18-21 @ 16:00), Max: 39.4 (09-18-21 @ 08:00)  HR: 93 (09-18-21 @ 17:02) (81 - 117)  BP: --  RR: 18 (09-18-21 @ 17:00) (18 - 26)  SpO2: 100% (09-18-21 @ 17:02) (93% - 100%)  Wt(kg): --  I&O's Summary    17 Sep 2021 07:01  -  18 Sep 2021 07:00  --------------------------------------------------------  IN: 3066.4 mL / OUT: 810 mL / NET: 2256.4 mL    18 Sep 2021 07:01  -  18 Sep 2021 18:00  --------------------------------------------------------  IN: 1671.7 mL / OUT: 1145 mL / NET: 526.7 mL        Appearance: Normal	  HEENT:   Normal oral mucosa, PERRL, EOMI	  Lymphatic: No lymphadenopathy  Cardiovascular: Normal S1 S2, No JVD, No murmurs, No edema  Respiratory: Lungs clear to auscultation	  Psychiatry: A & O x 3, Mood & affect appropriate  Gastrointestinal:  Soft, Non-tender, + BS	  Skin: No rashes, No ecchymoses, No cyanosis	  Neurologic: Non-focal  Extremities: Normal range of motion, No clubbing, cyanosis or edema  Vascular: Peripheral pulses palpable 2+ bilaterally        LABS:	 	    CBC Full  -  ( 18 Sep 2021 05:19 )  WBC Count : 10.27 K/uL  Hemoglobin : 8.7 g/dL  Hematocrit : 26.9 %  Platelet Count - Automated : 325 K/uL  Mean Cell Volume : 93.4 fL  Mean Cell Hemoglobin : 30.2 pg  Mean Cell Hemoglobin Concentration : 32.3 gm/dL  Auto Neutrophil # : x  Auto Lymphocyte # : x  Auto Monocyte # : x  Auto Eosinophil # : x  Auto Basophil # : x  Auto Neutrophil % : x  Auto Lymphocyte % : x  Auto Monocyte % : x  Auto Eosinophil % : x  Auto Basophil % : x    09-18    134<L>  |  100  |  26<H>  ----------------------------<  187<H>  4.8   |  26  |  0.42<L>  09-18    135  |  101  |  25<H>  ----------------------------<  189<H>  5.1   |  26  |  0.41<L>    Ca    8.7      18 Sep 2021 12:52  Ca    9.0      18 Sep 2021 05:19  Phos  3.8     09-18  Phos  3.1     09-17  Mg     2.20     09-18  Mg     2.00     09-17    TPro  5.2<L>  /  Alb  2.9<L>  /  TBili  0.4  /  DBili  x   /  AST  25  /  ALT  8   /  AlkPhos  72  09-18      proBNP:   Lipid Profile:   HgA1c:   TSH:     High sensitive trop      CARDIAC MARKERS:          High sensitive trop      TELEMETRY: 	    ECG:  	  RADIOLOGY:  OTHER: 	    PREVIOUS DIAGNOSTIC TESTING:    [ ] Echocardiogram:  [ ]  Catheterization:  [ ] Stress Test:  	  	  ASSESSMENT/PLAN: 	       CHIEF COMPLAINT:    HISTORY OF PRESENT ILLNESS:81 y. o. female  atrial fibrillation, hypothyroidism, COPD, IVC filter , BIBA to [facility] from nursing facility c/o hematemesis c/b Acute hypoxemic respiratory failure underwent EGD that revealed large diverticula at 28 cm filled with blood and 5 mm tear at distal esophagus, a hiatal hernia and large amount of blood in the fundus. Patient was transferred to [facility] for further management c/b pna .  CT c/a/p shows a 7.2 cm masslike structure distending the mid to distal esophagus, suspicious for malignancy and  Bilateral tree-in-bud nodular opacities, predominantly in the left lower lobe, new/increased from 9/9/2021, raising concern for infection.  Left lower lobe consolidative opacity, possible combination of atelectasis and pneumonia.  (+) hypodensities in the liver suspicion for malignancy, and stercoral colitis. On Abx for antibiotic  On IV pressor for Septic  shock. Echo on 9/15  revealed Ejection Fraction (Visual Estimate): 20%Severe global left ventricular systolic dysfunction. Severe right atrial enlargement. Right ventricular enlargement with decreased right ventricular systolic function.  Received lasix 15mg IVP x1 and started on Dobutamine drip today for concern cardiogenic shock and cardiology was consulted. Patient on vasopressin, and dobutamin at 2. 5mcg, currently off vasopressin.       Allergies  Sulfur Colliod (Rash)  Tylenol (Swelling)        MEDICATIONS:  DOBUTamine Infusion 2.5 MICROgram(s)/kG/Min IV Continuous <Continuous>  heparin   Injectable 2500 Unit(s) SubCutaneous every 12 hours  norepinephrine Infusion 0.05 MICROgram(s)/kG/Min IV Continuous <Continuous>  fluconAZOLE IVPB 200 milliGRAM(s) IV Intermittent every 24 hours  fluconAZOLE IVPB      meropenem  IVPB 1000 milliGRAM(s) IV Intermittent every 8 hours  meropenem  IVPB      vancomycin  IVPB 1000 milliGRAM(s) IV Intermittent every 12 hours  ALBUTerol    90 MICROgram(s) HFA Inhaler 2 Puff(s) Inhalation every 6 hours  budesonide  80 MICROgram(s)/formoterol 4.5 MICROgram(s) Inhaler 2 Puff(s) Inhalation two times a day  acetaminophen  IVPB .. 750 milliGRAM(s) IV Intermittent once  dexMEDEtomidine Infusion 0.2 MICROgram(s)/kG/Hr IV Continuous <Continuous>  fentaNYL   Infusion. 0.5 MICROgram(s)/kG/Hr IV Continuous <Continuous>  propofol Infusion 40 MICROgram(s)/kG/Min IV Continuous <Continuous>  pantoprazole  Injectable 40 milliGRAM(s) IV Push two times a day  dextrose 40% Gel 15 Gram(s) Oral once  dextrose 50% Injectable 25 Gram(s) IV Push once  dextrose 50% Injectable 12.5 Gram(s) IV Push once  dextrose 50% Injectable 25 Gram(s) IV Push once  glucagon  Injectable 1 milliGRAM(s) IntraMuscular once  insulin lispro (ADMELOG) corrective regimen sliding scale   SubCutaneous three times a day before meals  levothyroxine Injectable 112 MICROGram(s) IV Push at bedtime  vasopressin Infusion 0.04 Unit(s)/Min IV Continuous <Continuous>  artificial tears (preservative free) Ophthalmic Solution 1 Drop(s) Both EYES two times a day  chlorhexidine 0.12% Liquid 15 milliLiter(s) Oral Mucosa every 12 hours  chlorhexidine 4% Liquid 1 Application(s) Topical <User Schedule>  dextrose 5%. 1000 milliLiter(s) IV Continuous <Continuous>  dextrose 5%. 1000 milliLiter(s) IV Continuous <Continuous>  fat emulsion (Fish Oil and Plant Based) 20% Infusion 13.3 mL/Hr IV Continuous <Continuous>  Parenteral Nutrition - Adult 1 Each TPN Continuous <Continuous>  Parenteral Nutrition - Adult 1 Each TPN Continuous <Continuous>      PAST MEDICAL & SURGICAL HISTORY:  Afib  History of COPD  HTN (hypertension)  Hypothyroid  GERD (gastroesophageal reflux disease)  Esophageal diverticulum  Presence of IVC filter        REVIEW OF SYSTEMS:  unable to obtain 2/2 pt  intubated and sedated      PHYSICAL EXAM:  T(C): 37.1 (09-18-21 @ 16:00), Max: 39.4 (09-18-21 @ 08:00)  HR: 93 (09-18-21 @ 17:02) (81 - 117)  BP: --  RR: 18 (09-18-21 @ 17:00) (18 - 26)  SpO2: 100% (09-18-21 @ 17:02) (93% - 100%)  Wt(kg): --  I&O's Summary    17 Sep 2021 07:01  -  18 Sep 2021 07:00  --------------------------------------------------------  IN: 3066.4 mL / OUT: 810 mL / NET: 2256.4 mL    18 Sep 2021 07:01  -  18 Sep 2021 18:00  --------------------------------------------------------  IN: 1671.7 mL / OUT: 1145 mL / NET: 526.7 mL        Appearance: sedated   HEENT:   Normal oral mucosa, PERRL, EOMI	  Cardiovascular: Normal S1 S2, No JVD, No murmurs, No pedal  edema  Respiratory: b/l Lungs diminished BS	  Psychiatry: sedated  Gastrointestinal:  Soft, Non-tender, + BS	  Skin: No rashes, No ecchymoses, No cyanosis, +warm	  Neurologic: sedated  Extremities: depend no pitting on b/l arm  Vascular: Peripheral pulses palpable 1+ bilaterally        LABS:	 	    CBC Full  -  ( 18 Sep 2021 05:19 )  WBC Count : 10.27 K/uL  Hemoglobin : 8.7 g/dL  Hematocrit : 26.9 %  Platelet Count - Automated : 325 K/uL  Mean Cell Volume : 93.4 fL  Mean Cell Hemoglobin : 30.2 pg  Mean Cell Hemoglobin Concentration : 32.3 gm/dL  Auto Neutrophil # : x  Auto Lymphocyte # : x  Auto Monocyte # : x  Auto Eosinophil # : x  Auto Basophil # : x  Auto Neutrophil % : x  Auto Lymphocyte % : x  Auto Monocyte % : x  Auto Eosinophil % : x  Auto Basophil % : x    09-18    134<L>  |  100  |  26<H>  ----------------------------<  187<H>  4.8   |  26  |  0.42<L>  09-18    135  |  101  |  25<H>  ----------------------------<  189<H>  5.1   |  26  |  0.41<L>    Ca    8.7      18 Sep 2021 12:52  Ca    9.0      18 Sep 2021 05:19  Phos  3.8     09-18  Phos  3.1     09-17  Mg     2.20     09-18  Mg     2.00     09-17    TPro  5.2<L>  /  Alb  2.9<L>  /  TBili  0.4  /  DBili  x   /  AST  25  /  ALT  8   /  AlkPhos  72  09-18      proBNP:   Lipid Profile:   HgA1c:   TSH:           CARDIAC MARKERS:      High sensitive trop      TELEMETRY: 	    ECG:  	  RADIOLOGY:  OTHER: 	    PREVIOUS DIAGNOSTIC TESTING:    [ ] Echocardiogram:< from: Transthoracic Echocardiogram (09.15.21 @ 16:07) >  LA:     5.2 cm    2.0 - 4.0 cm  Ao:     4.1 cm    2.0 - 3.8 cm  SEPTUM: 1.0 cm    0.6 - 1.2 cm  PWT:    0.9 cm    0.6 - 1.1 cm  LVIDd:  4.3 cm    3.0 - 5.6 cm  LVIDs:    ---     1.8 - 4.0 cm  Derived Variables:  LVMI: 87 g/m2  RWT: 0.41  Ejection Fraction (Visual Estimate): 20 %  ------------------------------------------------------------------------  OBSERVATIONS:  Mitral Valve: Mitral annular calcification, tethered mitral  valve. Moderate mitral regurgitation.  Aortic Root: Aortic Root: 4.1 cm.  Aortic Valve: Calcified trileaflet aortic valve with normal  opening.  Left Atrium: Severely dilated left atrium.  LA volume index  = 59 cc/m2.  Left Ventricle: Severe global left ventricular systolic  dysfunction. Basal lateral and basal anterior walls are  best preserved. Normal left ventricular internal dimensions  and wall thicknesses.  Right Heart: Severe right atrial enlargement. Right  ventricular enlargement with decreased right ventricular  systolic function. Normal tricuspid valve. Moderate-severe  tricuspid regurgitation. Normal pulmonic valve.  Minimal  pulmonic regurgitation.  Pericardium/PleuraNormal pericardium with no pericardial  effusion.  Hemodynamic: Estimated right ventricular systolic pressure  equals 40 mm Hg, assuming right atrial pressure equals 10  mm Hg, consistent with mild pulmonary hypertension.    < end of copied text >    [ ]  Catheterization:  [ ] Stress Test:  	  	  ASSESSMENT/PLAN: 81 y. o. female  atrial fibrillation, hypothyroidism, COPD, IVC filter , BIBA to [facility] from nursing facility c/o hematemesis c/b Acute hypoxemic respiratory failure underwent EGD that revealed large diverticula at 28 cm filled with blood and 5 mm tear at distal esophagus, a hiatal hernia and large amount of blood in the fundus c/b   fever/pna/sepsis/ hypotension on IV  pressor and midodrine .Echo showed BiV failure with reduce EF. Started on Dobutamine drip.   Patient warm to touch with good  peripheral perfusion with good pulse pressure, Lactate normal prior to dobutamine drip, creat low 0.4-> less likely cardiogenic shock     Plan  Please transduce CVP to assess fluid status and for further diuresis if need, CVP goal 6-10  - send central venous O2 sat to calculate CO/CI   - cardiology to follow     CHIEF COMPLAINT:    HISTORY OF PRESENT ILLNESS:81 y. o. female  atrial fibrillation, hypothyroidism, COPD, IVC filter , BIBA to [facility] from nursing facility c/o hematemesis c/b Acute hypoxemic respiratory failure underwent EGD that revealed large diverticula at 28 cm filled with blood and 5 mm tear at distal esophagus, a hiatal hernia and large amount of blood in the fundus. Patient was transferred to [facility] for further management c/b pna .  CT c/a/p shows a 7.2 cm masslike structure distending the mid to distal esophagus, suspicious for malignancy and  Bilateral tree-in-bud nodular opacities, predominantly in the left lower lobe, new/increased from 9/9/2021, raising concern for infection.  Left lower lobe consolidative opacity, possible combination of atelectasis and pneumonia.  (+) hypodensities in the liver suspicion for malignancy, and stercoral colitis. On Abx for antibiotic  On IV pressor for Septic  shock. Echo on 9/15  revealed Ejection Fraction (Visual Estimate): 20%Severe global left ventricular systolic dysfunction. Severe right atrial enlargement. Right ventricular enlargement with decreased right ventricular systolic function.  Received lasix 15mg IVP x1 and started on Dobutamine drip today for concern cardiogenic shock and cardiology was consulted. Patient on vasopressin, and dobutamin at 2. 5mcg, currently off vasopressin.       Allergies  Sulfur Colliod (Rash)  Tylenol (Swelling)        MEDICATIONS:  DOBUTamine Infusion 2.5 MICROgram(s)/kG/Min IV Continuous <Continuous>  heparin   Injectable 2500 Unit(s) SubCutaneous every 12 hours  norepinephrine Infusion 0.05 MICROgram(s)/kG/Min IV Continuous <Continuous>  fluconAZOLE IVPB 200 milliGRAM(s) IV Intermittent every 24 hours  fluconAZOLE IVPB      meropenem  IVPB 1000 milliGRAM(s) IV Intermittent every 8 hours  meropenem  IVPB      vancomycin  IVPB 1000 milliGRAM(s) IV Intermittent every 12 hours  ALBUTerol    90 MICROgram(s) HFA Inhaler 2 Puff(s) Inhalation every 6 hours  budesonide  80 MICROgram(s)/formoterol 4.5 MICROgram(s) Inhaler 2 Puff(s) Inhalation two times a day  acetaminophen  IVPB .. 750 milliGRAM(s) IV Intermittent once  dexMEDEtomidine Infusion 0.2 MICROgram(s)/kG/Hr IV Continuous <Continuous>  fentaNYL   Infusion. 0.5 MICROgram(s)/kG/Hr IV Continuous <Continuous>  propofol Infusion 40 MICROgram(s)/kG/Min IV Continuous <Continuous>  pantoprazole  Injectable 40 milliGRAM(s) IV Push two times a day  dextrose 40% Gel 15 Gram(s) Oral once  dextrose 50% Injectable 25 Gram(s) IV Push once  dextrose 50% Injectable 12.5 Gram(s) IV Push once  dextrose 50% Injectable 25 Gram(s) IV Push once  glucagon  Injectable 1 milliGRAM(s) IntraMuscular once  insulin lispro (ADMELOG) corrective regimen sliding scale   SubCutaneous three times a day before meals  levothyroxine Injectable 112 MICROGram(s) IV Push at bedtime  vasopressin Infusion 0.04 Unit(s)/Min IV Continuous <Continuous>  artificial tears (preservative free) Ophthalmic Solution 1 Drop(s) Both EYES two times a day  chlorhexidine 0.12% Liquid 15 milliLiter(s) Oral Mucosa every 12 hours  chlorhexidine 4% Liquid 1 Application(s) Topical <User Schedule>  dextrose 5%. 1000 milliLiter(s) IV Continuous <Continuous>  dextrose 5%. 1000 milliLiter(s) IV Continuous <Continuous>  fat emulsion (Fish Oil and Plant Based) 20% Infusion 13.3 mL/Hr IV Continuous <Continuous>  Parenteral Nutrition - Adult 1 Each TPN Continuous <Continuous>  Parenteral Nutrition - Adult 1 Each TPN Continuous <Continuous>      PAST MEDICAL & SURGICAL HISTORY:  Afib  History of COPD  HTN (hypertension)  Hypothyroid  GERD (gastroesophageal reflux disease)  Esophageal diverticulum  Presence of IVC filter        REVIEW OF SYSTEMS:  unable to obtain 2/2 pt  intubated and sedated      PHYSICAL EXAM:  T(C): 37.1 (09-18-21 @ 16:00), Max: 39.4 (09-18-21 @ 08:00)  HR: 93 (09-18-21 @ 17:02) (81 - 117)  BP: --  RR: 18 (09-18-21 @ 17:00) (18 - 26)  SpO2: 100% (09-18-21 @ 17:02) (93% - 100%)  Wt(kg): --  I&O's Summary    17 Sep 2021 07:01  -  18 Sep 2021 07:00  --------------------------------------------------------  IN: 3066.4 mL / OUT: 810 mL / NET: 2256.4 mL    18 Sep 2021 07:01  -  18 Sep 2021 18:00  --------------------------------------------------------  IN: 1671.7 mL / OUT: 1145 mL / NET: 526.7 mL        Appearance: sedated   HEENT:   Normal oral mucosa, PERRL, EOMI	  Cardiovascular: Normal S1 S2, No JVD, No murmurs, No pedal  edema  Respiratory: b/l Lungs diminished BS	  Psychiatry: sedated  Gastrointestinal:  Soft, Non-tender, + BS	  Skin: No rashes, No ecchymoses, No cyanosis, +warm	  Neurologic: sedated  Extremities: depend no pitting on b/l arm  Vascular: Peripheral pulses palpable 1+ bilaterally        LABS:	 	    CBC Full  -  ( 18 Sep 2021 05:19 )  WBC Count : 10.27 K/uL  Hemoglobin : 8.7 g/dL  Hematocrit : 26.9 %  Platelet Count - Automated : 325 K/uL  Mean Cell Volume : 93.4 fL  Mean Cell Hemoglobin : 30.2 pg  Mean Cell Hemoglobin Concentration : 32.3 gm/dL  Auto Neutrophil # : x  Auto Lymphocyte # : x  Auto Monocyte # : x  Auto Eosinophil # : x  Auto Basophil # : x  Auto Neutrophil % : x  Auto Lymphocyte % : x  Auto Monocyte % : x  Auto Eosinophil % : x  Auto Basophil % : x    09-18    134<L>  |  100  |  26<H>  ----------------------------<  187<H>  4.8   |  26  |  0.42<L>  09-18    135  |  101  |  25<H>  ----------------------------<  189<H>  5.1   |  26  |  0.41<L>    Ca    8.7      18 Sep 2021 12:52  Ca    9.0      18 Sep 2021 05:19  Phos  3.8     09-18  Phos  3.1     09-17  Mg     2.20     09-18  Mg     2.00     09-17    TPro  5.2<L>  /  Alb  2.9<L>  /  TBili  0.4  /  DBili  x   /  AST  25  /  ALT  8   /  AlkPhos  72  09-18      proBNP:   Lipid Profile:   HgA1c:   TSH:           CARDIAC MARKERS:      High sensitive trop      TELEMETRY: 	    ECG:  	  RADIOLOGY:  OTHER: 	    PREVIOUS DIAGNOSTIC TESTING:    [ ] Echocardiogram:< from: Transthoracic Echocardiogram (09.15.21 @ 16:07) >  LA:     5.2 cm    2.0 - 4.0 cm  Ao:     4.1 cm    2.0 - 3.8 cm  SEPTUM: 1.0 cm    0.6 - 1.2 cm  PWT:    0.9 cm    0.6 - 1.1 cm  LVIDd:  4.3 cm    3.0 - 5.6 cm  LVIDs:    ---     1.8 - 4.0 cm  Derived Variables:  LVMI: 87 g/m2  RWT: 0.41  Ejection Fraction (Visual Estimate): 20 %  ------------------------------------------------------------------------  OBSERVATIONS:  Mitral Valve: Mitral annular calcification, tethered mitral  valve. Moderate mitral regurgitation.  Aortic Root: Aortic Root: 4.1 cm.  Aortic Valve: Calcified trileaflet aortic valve with normal  opening.  Left Atrium: Severely dilated left atrium.  LA volume index  = 59 cc/m2.  Left Ventricle: Severe global left ventricular systolic  dysfunction. Basal lateral and basal anterior walls are  best preserved. Normal left ventricular internal dimensions  and wall thicknesses.  Right Heart: Severe right atrial enlargement. Right  ventricular enlargement with decreased right ventricular  systolic function. Normal tricuspid valve. Moderate-severe  tricuspid regurgitation. Normal pulmonic valve.  Minimal  pulmonic regurgitation.  Pericardium/PleuraNormal pericardium with no pericardial  effusion.  Hemodynamic: Estimated right ventricular systolic pressure  equals 40 mm Hg, assuming right atrial pressure equals 10  mm Hg, consistent with mild pulmonary hypertension.    < end of copied text >    [ ]  Catheterization:  [ ] Stress Test:  	  	  ASSESSMENT/PLAN: 81 y. o. female  atrial fibrillation, hypothyroidism, COPD, IVC filter , BIBA to [facility] from nursing facility c/o hematemesis c/b Acute hypoxemic respiratory failure underwent EGD that revealed large diverticula at 28 cm filled with blood and 5 mm tear at distal esophagus, a hiatal hernia and large amount of blood in the fundus c/b   fever/pna/sepsis/ hypotension on IV  pressor and midodrine .Echo showed BiV failure with reduce EF. Started on Dobutamine drip.   Patient warm to touch with good  peripheral perfusion with good pulse pressure, Lactate normal prior to dobutamine drip, creat low 0.4-> less likely cardiogenic shock     Plan  Please transduce CVP to assess fluid status and for further diuresis if need, CVP goal 6-10  - send central venous O2 sat from cental catheter to calculate CO/CI   - cardiology to follow     CHIEF COMPLAINT:    HISTORY OF PRESENT ILLNESS:81 y. o. female  atrial fibrillation, hypothyroidism, COPD, IVC filter , BIBA to [facility] from nursing facility c/o hematemesis c/b Acute hypoxemic respiratory failure underwent EGD that revealed large diverticula at 28 cm filled with blood and 5 mm tear at distal esophagus, a hiatal hernia and large amount of blood in the fundus. Patient was transferred to [facility] for further management c/b pna .  CT c/a/p shows a 7.2 cm masslike structure distending the mid to distal esophagus, suspicious for malignancy and  Bilateral tree-in-bud nodular opacities, predominantly in the left lower lobe, new/increased from 9/9/2021, raising concern for infection.  Left lower lobe consolidative opacity, possible combination of atelectasis and pneumonia.  (+) hypodensities in the liver suspicion for malignancy, and stercoral colitis. On Abx for antibiotic  On IV pressor for Septic  shock. Echo on 9/15  revealed Ejection Fraction (Visual Estimate): 20%Severe global left ventricular systolic dysfunction. Severe right atrial enlargement. Right ventricular enlargement with decreased right ventricular systolic function. Chest Xray showed some pulmonary congestion. Received lasix 15mg IVP x1 and  started on Dobutamine drip today for concern cardiogenic shock and cardiology was consulted. Patient on vasopressin, and dobutamin at 2. 5mcg, currently off vasopressin.       Allergies  Sulfur Colliod (Rash)  Tylenol (Swelling)        MEDICATIONS:  DOBUTamine Infusion 2.5 MICROgram(s)/kG/Min IV Continuous <Continuous>  heparin   Injectable 2500 Unit(s) SubCutaneous every 12 hours  norepinephrine Infusion 0.05 MICROgram(s)/kG/Min IV Continuous <Continuous>  fluconAZOLE IVPB 200 milliGRAM(s) IV Intermittent every 24 hours  fluconAZOLE IVPB      meropenem  IVPB 1000 milliGRAM(s) IV Intermittent every 8 hours  meropenem  IVPB      vancomycin  IVPB 1000 milliGRAM(s) IV Intermittent every 12 hours  ALBUTerol    90 MICROgram(s) HFA Inhaler 2 Puff(s) Inhalation every 6 hours  budesonide  80 MICROgram(s)/formoterol 4.5 MICROgram(s) Inhaler 2 Puff(s) Inhalation two times a day  acetaminophen  IVPB .. 750 milliGRAM(s) IV Intermittent once  dexMEDEtomidine Infusion 0.2 MICROgram(s)/kG/Hr IV Continuous <Continuous>  fentaNYL   Infusion. 0.5 MICROgram(s)/kG/Hr IV Continuous <Continuous>  propofol Infusion 40 MICROgram(s)/kG/Min IV Continuous <Continuous>  pantoprazole  Injectable 40 milliGRAM(s) IV Push two times a day  dextrose 40% Gel 15 Gram(s) Oral once  dextrose 50% Injectable 25 Gram(s) IV Push once  dextrose 50% Injectable 12.5 Gram(s) IV Push once  dextrose 50% Injectable 25 Gram(s) IV Push once  glucagon  Injectable 1 milliGRAM(s) IntraMuscular once  insulin lispro (ADMELOG) corrective regimen sliding scale   SubCutaneous three times a day before meals  levothyroxine Injectable 112 MICROGram(s) IV Push at bedtime  vasopressin Infusion 0.04 Unit(s)/Min IV Continuous <Continuous>  artificial tears (preservative free) Ophthalmic Solution 1 Drop(s) Both EYES two times a day  chlorhexidine 0.12% Liquid 15 milliLiter(s) Oral Mucosa every 12 hours  chlorhexidine 4% Liquid 1 Application(s) Topical <User Schedule>  dextrose 5%. 1000 milliLiter(s) IV Continuous <Continuous>  dextrose 5%. 1000 milliLiter(s) IV Continuous <Continuous>  fat emulsion (Fish Oil and Plant Based) 20% Infusion 13.3 mL/Hr IV Continuous <Continuous>  Parenteral Nutrition - Adult 1 Each TPN Continuous <Continuous>  Parenteral Nutrition - Adult 1 Each TPN Continuous <Continuous>      PAST MEDICAL & SURGICAL HISTORY:  Afib  History of COPD  HTN (hypertension)  Hypothyroid  GERD (gastroesophageal reflux disease)  Esophageal diverticulum  Presence of IVC filter        REVIEW OF SYSTEMS:  unable to obtain 2/2 pt  intubated and sedated      PHYSICAL EXAM:  T(C): 37.1 (09-18-21 @ 16:00), Max: 39.4 (09-18-21 @ 08:00)  HR: 93 (09-18-21 @ 17:02) (81 - 117)  BP: --  RR: 18 (09-18-21 @ 17:00) (18 - 26)  SpO2: 100% (09-18-21 @ 17:02) (93% - 100%)  Wt(kg): --  I&O's Summary    17 Sep 2021 07:01  -  18 Sep 2021 07:00  --------------------------------------------------------  IN: 3066.4 mL / OUT: 810 mL / NET: 2256.4 mL    18 Sep 2021 07:01  -  18 Sep 2021 18:00  --------------------------------------------------------  IN: 1671.7 mL / OUT: 1145 mL / NET: 526.7 mL        Appearance: sedated   HEENT:   Normal oral mucosa, PERRL, EOMI	  Cardiovascular: Normal S1 S2, No JVD, No murmurs, No pedal  edema  Respiratory: b/l Lungs diminished BS	  Psychiatry: sedated  Gastrointestinal:  Soft, Non-tender, + BS	  Skin: No rashes, No ecchymoses, No cyanosis, +warm	  Neurologic: sedated  Extremities: depend no pitting on b/l arm  Vascular: Peripheral pulses palpable 1+ bilaterally        LABS:	 	    CBC Full  -  ( 18 Sep 2021 05:19 )  WBC Count : 10.27 K/uL  Hemoglobin : 8.7 g/dL  Hematocrit : 26.9 %  Platelet Count - Automated : 325 K/uL  Mean Cell Volume : 93.4 fL  Mean Cell Hemoglobin : 30.2 pg  Mean Cell Hemoglobin Concentration : 32.3 gm/dL  Auto Neutrophil # : x  Auto Lymphocyte # : x  Auto Monocyte # : x  Auto Eosinophil # : x  Auto Basophil # : x  Auto Neutrophil % : x  Auto Lymphocyte % : x  Auto Monocyte % : x  Auto Eosinophil % : x  Auto Basophil % : x    09-18    134<L>  |  100  |  26<H>  ----------------------------<  187<H>  4.8   |  26  |  0.42<L>  09-18    135  |  101  |  25<H>  ----------------------------<  189<H>  5.1   |  26  |  0.41<L>    Ca    8.7      18 Sep 2021 12:52  Ca    9.0      18 Sep 2021 05:19  Phos  3.8     09-18  Phos  3.1     09-17  Mg     2.20     09-18  Mg     2.00     09-17    TPro  5.2<L>  /  Alb  2.9<L>  /  TBili  0.4  /  DBili  x   /  AST  25  /  ALT  8   /  AlkPhos  72  09-18      proBNP:   Lipid Profile:   HgA1c:   TSH:           CARDIAC MARKERS:      High sensitive trop      TELEMETRY: 	    ECG:  	  RADIOLOGY:  OTHER: 	    PREVIOUS DIAGNOSTIC TESTING:    [ ] Echocardiogram:< from: Transthoracic Echocardiogram (09.15.21 @ 16:07) >  LA:     5.2 cm    2.0 - 4.0 cm  Ao:     4.1 cm    2.0 - 3.8 cm  SEPTUM: 1.0 cm    0.6 - 1.2 cm  PWT:    0.9 cm    0.6 - 1.1 cm  LVIDd:  4.3 cm    3.0 - 5.6 cm  LVIDs:    ---     1.8 - 4.0 cm  Derived Variables:  LVMI: 87 g/m2  RWT: 0.41  Ejection Fraction (Visual Estimate): 20 %  ------------------------------------------------------------------------  OBSERVATIONS:  Mitral Valve: Mitral annular calcification, tethered mitral  valve. Moderate mitral regurgitation.  Aortic Root: Aortic Root: 4.1 cm.  Aortic Valve: Calcified trileaflet aortic valve with normal  opening.  Left Atrium: Severely dilated left atrium.  LA volume index  = 59 cc/m2.  Left Ventricle: Severe global left ventricular systolic  dysfunction. Basal lateral and basal anterior walls are  best preserved. Normal left ventricular internal dimensions  and wall thicknesses.  Right Heart: Severe right atrial enlargement. Right  ventricular enlargement with decreased right ventricular  systolic function. Normal tricuspid valve. Moderate-severe  tricuspid regurgitation. Normal pulmonic valve.  Minimal  pulmonic regurgitation.  Pericardium/PleuraNormal pericardium with no pericardial  effusion.  Hemodynamic: Estimated right ventricular systolic pressure  equals 40 mm Hg, assuming right atrial pressure equals 10  mm Hg, consistent with mild pulmonary hypertension.    < end of copied text >    [ ]  Catheterization:  [ ] Stress Test:  	  	  ASSESSMENT/PLAN: 81 y. o. female  atrial fibrillation, hypothyroidism, COPD, IVC filter , BIBA to [facility] from nursing facility c/o hematemesis c/b Acute hypoxemic respiratory failure underwent EGD that revealed large diverticula at 28 cm filled with blood and 5 mm tear at distal esophagus, a hiatal hernia and large amount of blood in the fundus c/b  fever/pna/sepsis/ hypotension on IV  pressor and midodrine .Echo showed BiV failure with reduce EF. Started on Dobutamine drip.     Patient warm to touch with good  peripheral perfusion with good pulse pressure, Lactate normal prior to dobutamine drip, creat low 0.4-> less likely cardiogenic shock .Mild  pulmonary congestion noted on chest Xray    Plan  Please transduce CVP to assess fluid status and for further diuresis if need, CVP goal 6-10  - send central venous O2 sat from cental catheter to calculate  CO/CI   - cardiology to follow     CHIEF COMPLAINT:    HISTORY OF PRESENT ILLNESS:81 y. o. female  atrial fibrillation, hypothyroidism, COPD, IVC filter , BIBA to [facility] from nursing facility c/o hematemesis c/b Acute hypoxemic respiratory failure underwent EGD that revealed large diverticula at 28 cm filled with blood and 5 mm tear at distal esophagus, a hiatal hernia and large amount of blood in the fundus. Patient was transferred to [facility] for further management c/b pna .  CT c/a/p shows a 7.2 cm masslike structure distending the mid to distal esophagus, suspicious for malignancy and  Bilateral tree-in-bud nodular opacities, predominantly in the left lower lobe, new/increased from 9/9/2021, raising concern for infection.  Left lower lobe consolidative opacity, possible combination of atelectasis and pneumonia.  (+) hypodensities in the liver suspicion for malignancy, and stercoral colitis. On Abx for antibiotic  On IV pressor for Septic  shock. Echo on 9/15  revealed Ejection Fraction (Visual Estimate): 20%Severe global left ventricular systolic dysfunction. Severe right atrial enlargement. Right ventricular enlargement with decreased right ventricular systolic function. Chest Xray showed some pulmonary congestion. Received lasix 15mg IVP x1 and  started on Dobutamine drip today for concern cardiogenic shock and cardiology was consulted. Patient on vasopressin, and dobutamin at 2. 5mcg, currently off vasopressin.       Allergies  Sulfur Colliod (Rash)  Tylenol (Swelling)        MEDICATIONS:  DOBUTamine Infusion 2.5 MICROgram(s)/kG/Min IV Continuous <Continuous>  heparin   Injectable 2500 Unit(s) SubCutaneous every 12 hours  norepinephrine Infusion 0.05 MICROgram(s)/kG/Min IV Continuous <Continuous>  fluconAZOLE IVPB 200 milliGRAM(s) IV Intermittent every 24 hours  fluconAZOLE IVPB      meropenem  IVPB 1000 milliGRAM(s) IV Intermittent every 8 hours  meropenem  IVPB      vancomycin  IVPB 1000 milliGRAM(s) IV Intermittent every 12 hours  ALBUTerol    90 MICROgram(s) HFA Inhaler 2 Puff(s) Inhalation every 6 hours  budesonide  80 MICROgram(s)/formoterol 4.5 MICROgram(s) Inhaler 2 Puff(s) Inhalation two times a day  acetaminophen  IVPB .. 750 milliGRAM(s) IV Intermittent once  dexMEDEtomidine Infusion 0.2 MICROgram(s)/kG/Hr IV Continuous <Continuous>  fentaNYL   Infusion. 0.5 MICROgram(s)/kG/Hr IV Continuous <Continuous>  propofol Infusion 40 MICROgram(s)/kG/Min IV Continuous <Continuous>  pantoprazole  Injectable 40 milliGRAM(s) IV Push two times a day  dextrose 40% Gel 15 Gram(s) Oral once  dextrose 50% Injectable 25 Gram(s) IV Push once  dextrose 50% Injectable 12.5 Gram(s) IV Push once  dextrose 50% Injectable 25 Gram(s) IV Push once  glucagon  Injectable 1 milliGRAM(s) IntraMuscular once  insulin lispro (ADMELOG) corrective regimen sliding scale   SubCutaneous three times a day before meals  levothyroxine Injectable 112 MICROGram(s) IV Push at bedtime  vasopressin Infusion 0.04 Unit(s)/Min IV Continuous <Continuous>  artificial tears (preservative free) Ophthalmic Solution 1 Drop(s) Both EYES two times a day  chlorhexidine 0.12% Liquid 15 milliLiter(s) Oral Mucosa every 12 hours  chlorhexidine 4% Liquid 1 Application(s) Topical <User Schedule>  dextrose 5%. 1000 milliLiter(s) IV Continuous <Continuous>  dextrose 5%. 1000 milliLiter(s) IV Continuous <Continuous>  fat emulsion (Fish Oil and Plant Based) 20% Infusion 13.3 mL/Hr IV Continuous <Continuous>  Parenteral Nutrition - Adult 1 Each TPN Continuous <Continuous>  Parenteral Nutrition - Adult 1 Each TPN Continuous <Continuous>      PAST MEDICAL & SURGICAL HISTORY:  Afib  History of COPD  HTN (hypertension)  Hypothyroid  GERD (gastroesophageal reflux disease)  Esophageal diverticulum  Presence of IVC filter        REVIEW OF SYSTEMS:  unable to obtain 2/2 pt  intubated and sedated      PHYSICAL EXAM:  T(C): 37.1 (09-18-21 @ 16:00), Max: 39.4 (09-18-21 @ 08:00)  HR: 93 (09-18-21 @ 17:02) (81 - 117)  BP: --  RR: 18 (09-18-21 @ 17:00) (18 - 26)  SpO2: 100% (09-18-21 @ 17:02) (93% - 100%)  Wt(kg): --  I&O's Summary    17 Sep 2021 07:01  -  18 Sep 2021 07:00  --------------------------------------------------------  IN: 3066.4 mL / OUT: 810 mL / NET: 2256.4 mL    18 Sep 2021 07:01  -  18 Sep 2021 18:00  --------------------------------------------------------  IN: 1671.7 mL / OUT: 1145 mL / NET: 526.7 mL        Appearance: sedated   HEENT:   Normal oral mucosa, PERRL, EOMI	  Cardiovascular: Normal S1 S2, No JVD, No murmurs, No pedal  edema  Respiratory: b/l Lungs diminished BS	  Psychiatry: sedated  Gastrointestinal:  Soft, Non-tender, + BS	  Skin: No rashes, No ecchymoses, No cyanosis, +warm	  Neurologic: sedated  Extremities: depend no pitting on b/l arm  Vascular: Peripheral pulses palpable 1+ bilaterally        LABS:	 	    CBC Full  -  ( 18 Sep 2021 05:19 )  WBC Count : 10.27 K/uL  Hemoglobin : 8.7 g/dL  Hematocrit : 26.9 %  Platelet Count - Automated : 325 K/uL  Mean Cell Volume : 93.4 fL  Mean Cell Hemoglobin : 30.2 pg  Mean Cell Hemoglobin Concentration : 32.3 gm/dL  Auto Neutrophil # : x  Auto Lymphocyte # : x  Auto Monocyte # : x  Auto Eosinophil # : x  Auto Basophil # : x  Auto Neutrophil % : x  Auto Lymphocyte % : x  Auto Monocyte % : x  Auto Eosinophil % : x  Auto Basophil % : x    09-18    134<L>  |  100  |  26<H>  ----------------------------<  187<H>  4.8   |  26  |  0.42<L>  09-18    135  |  101  |  25<H>  ----------------------------<  189<H>  5.1   |  26  |  0.41<L>    Ca    8.7      18 Sep 2021 12:52  Ca    9.0      18 Sep 2021 05:19  Phos  3.8     09-18  Phos  3.1     09-17  Mg     2.20     09-18  Mg     2.00     09-17    TPro  5.2<L>  /  Alb  2.9<L>  /  TBili  0.4  /  DBili  x   /  AST  25  /  ALT  8   /  AlkPhos  72  09-18        TELEMETRY:  Afib  ECG:  	  RADIOLOGY:  OTHER: 	    PREVIOUS DIAGNOSTIC TESTING:    [ ] Echocardiogram:< from: Transthoracic Echocardiogram (09.15.21 @ 16:07) >  LA:     5.2 cm    2.0 - 4.0 cm  Ao:     4.1 cm    2.0 - 3.8 cm  SEPTUM: 1.0 cm    0.6 - 1.2 cm  PWT:    0.9 cm    0.6 - 1.1 cm  LVIDd:  4.3 cm    3.0 - 5.6 cm  LVIDs:    ---     1.8 - 4.0 cm  Derived Variables:  LVMI: 87 g/m2  RWT: 0.41  Ejection Fraction (Visual Estimate): 20 %  ------------------------------------------------------------------------  OBSERVATIONS:  Mitral Valve: Mitral annular calcification, tethered mitral  valve. Moderate mitral regurgitation.  Aortic Root: Aortic Root: 4.1 cm.  Aortic Valve: Calcified trileaflet aortic valve with normal  opening.  Left Atrium: Severely dilated left atrium.  LA volume index  = 59 cc/m2.  Left Ventricle: Severe global left ventricular systolic  dysfunction. Basal lateral and basal anterior walls are  best preserved. Normal left ventricular internal dimensions  and wall thicknesses.  Right Heart: Severe right atrial enlargement. Right  ventricular enlargement with decreased right ventricular  systolic function. Normal tricuspid valve. Moderate-severe  tricuspid regurgitation. Normal pulmonic valve.  Minimal  pulmonic regurgitation.  Pericardium/PleuraNormal pericardium with no pericardial  effusion.  Hemodynamic: Estimated right ventricular systolic pressure  equals 40 mm Hg, assuming right atrial pressure equals 10  mm Hg, consistent with mild pulmonary hypertension.    < end of copied text >    [ ]  Catheterization:  [ ] Stress Test:  	  	  ASSESSMENT/PLAN: 81 y. o. female  atrial fibrillation, hypothyroidism, COPD, IVC filter , BIBA to [facility] from nursing facility c/o hematemesis c/b Acute hypoxemic respiratory failure underwent EGD that revealed large diverticula at 28 cm filled with blood and 5 mm tear at distal esophagus, a hiatal hernia and large amount of blood in the fundus c/b  fever/pna/sepsis/ hypotension on IV  pressor and midodrine .Echo showed BiV failure with reduce EF. Started on Dobutamine drip.     Patient warm to touch with good  peripheral perfusion with good pulse pressure, Lactate normal prior to dobutamine drip, creat low 0.4-> less likely cardiogenic shock. [place] VOL 2/2  mutliple IVF drip and medication.  Mild  pulmonary congestion noted on chest Xray     Plan  Please transduce CVP to assess fluid status and for further diuresis if need, CVP goal 6-10  - send central venous O2 sat from cental catheter to calculate  CO/CI   - cardiology to follow     CHIEF COMPLAINT:    HISTORY OF PRESENT ILLNESS:81 y. o. female  atrial fibrillation, hypothyroidism, COPD, IVC filter , BIBA to [facility] from nursing facility c/o hematemesis c/b Acute hypoxemic respiratory failure underwent EGD that revealed large diverticula at 28 cm filled with blood and 5 mm tear at distal esophagus, a hiatal hernia and large amount of blood in the fundus. Patient was transferred to [facility] for further management c/b pna .  CT c/a/p shows a 7.2 cm masslike structure distending the mid to distal esophagus, suspicious for malignancy and  Bilateral tree-in-bud nodular opacities, predominantly in the left lower lobe, new/increased from 9/9/2021, raising concern for infection.  Left lower lobe consolidative opacity, possible combination of atelectasis and pneumonia.  (+) hypodensities in the liver suspicion for malignancy, and stercoral colitis. On Abx for antibiotic  On IV pressor for Septic  shock. Echo on 9/15  revealed Ejection Fraction (Visual Estimate): 20%Severe global left ventricular systolic dysfunction. Severe right atrial enlargement. Right ventricular enlargement with decreased right ventricular systolic function. Chest Xray showed some pulmonary congestion. Received lasix 15mg IVP x1 and  started on Dobutamine drip today for concern cardiogenic shock and cardiology was consulted. Patient on vasopressin, and dobutamin at 2. 5mcg, currently off vasopressin.       Allergies  Sulfur Colliod (Rash)  Tylenol (Swelling)        MEDICATIONS:  DOBUTamine Infusion 2.5 MICROgram(s)/kG/Min IV Continuous <Continuous>  heparin   Injectable 2500 Unit(s) SubCutaneous every 12 hours  norepinephrine Infusion 0.05 MICROgram(s)/kG/Min IV Continuous <Continuous>  fluconAZOLE IVPB 200 milliGRAM(s) IV Intermittent every 24 hours  fluconAZOLE IVPB      meropenem  IVPB 1000 milliGRAM(s) IV Intermittent every 8 hours  meropenem  IVPB      vancomycin  IVPB 1000 milliGRAM(s) IV Intermittent every 12 hours  ALBUTerol    90 MICROgram(s) HFA Inhaler 2 Puff(s) Inhalation every 6 hours  budesonide  80 MICROgram(s)/formoterol 4.5 MICROgram(s) Inhaler 2 Puff(s) Inhalation two times a day  acetaminophen  IVPB .. 750 milliGRAM(s) IV Intermittent once  dexMEDEtomidine Infusion 0.2 MICROgram(s)/kG/Hr IV Continuous <Continuous>  fentaNYL   Infusion. 0.5 MICROgram(s)/kG/Hr IV Continuous <Continuous>  propofol Infusion 40 MICROgram(s)/kG/Min IV Continuous <Continuous>  pantoprazole  Injectable 40 milliGRAM(s) IV Push two times a day  dextrose 40% Gel 15 Gram(s) Oral once  dextrose 50% Injectable 25 Gram(s) IV Push once  dextrose 50% Injectable 12.5 Gram(s) IV Push once  dextrose 50% Injectable 25 Gram(s) IV Push once  glucagon  Injectable 1 milliGRAM(s) IntraMuscular once  insulin lispro (ADMELOG) corrective regimen sliding scale   SubCutaneous three times a day before meals  levothyroxine Injectable 112 MICROGram(s) IV Push at bedtime  vasopressin Infusion 0.04 Unit(s)/Min IV Continuous <Continuous>  artificial tears (preservative free) Ophthalmic Solution 1 Drop(s) Both EYES two times a day  chlorhexidine 0.12% Liquid 15 milliLiter(s) Oral Mucosa every 12 hours  chlorhexidine 4% Liquid 1 Application(s) Topical <User Schedule>  dextrose 5%. 1000 milliLiter(s) IV Continuous <Continuous>  dextrose 5%. 1000 milliLiter(s) IV Continuous <Continuous>  fat emulsion (Fish Oil and Plant Based) 20% Infusion 13.3 mL/Hr IV Continuous <Continuous>  Parenteral Nutrition - Adult 1 Each TPN Continuous <Continuous>  Parenteral Nutrition - Adult 1 Each TPN Continuous <Continuous>      PAST MEDICAL & SURGICAL HISTORY:  Afib  History of COPD  HTN (hypertension)  Hypothyroid  GERD (gastroesophageal reflux disease)  Esophageal diverticulum  Presence of IVC filter        REVIEW OF SYSTEMS:  unable to obtain 2/2 pt  intubated and sedated      PHYSICAL EXAM:  T(C): 37.1 (09-18-21 @ 16:00), Max: 39.4 (09-18-21 @ 08:00)  HR: 93 (09-18-21 @ 17:02) (81 - 117)  BP: --  RR: 18 (09-18-21 @ 17:00) (18 - 26)  SpO2: 100% (09-18-21 @ 17:02) (93% - 100%)  Wt(kg): --  I&O's Summary    17 Sep 2021 07:01  -  18 Sep 2021 07:00  --------------------------------------------------------  IN: 3066.4 mL / OUT: 810 mL / NET: 2256.4 mL    18 Sep 2021 07:01  -  18 Sep 2021 18:00  --------------------------------------------------------  IN: 1671.7 mL / OUT: 1145 mL / NET: 526.7 mL        Appearance: sedated   HEENT:   Normal oral mucosa, PERRL, EOMI	  Cardiovascular: Normal S1 S2, No JVD, No murmurs, No pedal  edema  Respiratory: b/l Lungs diminished BS	  Psychiatry: sedated  Gastrointestinal:  Soft, Non-tender, + BS	  Skin: No rashes, No ecchymoses, No cyanosis, +warm	  Neurologic: sedated  Extremities: depend no pitting on b/l arm  Vascular: Peripheral pulses palpable 1+ bilaterally        LABS:	 	    CBC Full  -  ( 18 Sep 2021 05:19 )  WBC Count : 10.27 K/uL  Hemoglobin : 8.7 g/dL  Hematocrit : 26.9 %  Platelet Count - Automated : 325 K/uL  Mean Cell Volume : 93.4 fL  Mean Cell Hemoglobin : 30.2 pg  Mean Cell Hemoglobin Concentration : 32.3 gm/dL  Auto Neutrophil # : x  Auto Lymphocyte # : x  Auto Monocyte # : x  Auto Eosinophil # : x  Auto Basophil # : x  Auto Neutrophil % : x  Auto Lymphocyte % : x  Auto Monocyte % : x  Auto Eosinophil % : x  Auto Basophil % : x    09-18    134<L>  |  100  |  26<H>  ----------------------------<  187<H>  4.8   |  26  |  0.42<L>  09-18    135  |  101  |  25<H>  ----------------------------<  189<H>  5.1   |  26  |  0.41<L>    Ca    8.7      18 Sep 2021 12:52  Ca    9.0      18 Sep 2021 05:19  Phos  3.8     09-18  Phos  3.1     09-17  Mg     2.20     09-18  Mg     2.00     09-17    TPro  5.2<L>  /  Alb  2.9<L>  /  TBili  0.4  /  DBili  x   /  AST  25  /  ALT  8   /  AlkPhos  72  09-18        TELEMETRY:  Afib  ECG:  	  RADIOLOGY:  OTHER: 	    PREVIOUS DIAGNOSTIC TESTING:    [ ] Echocardiogram:< from: Transthoracic Echocardiogram (09.15.21 @ 16:07) >  LA:     5.2 cm    2.0 - 4.0 cm  Ao:     4.1 cm    2.0 - 3.8 cm  SEPTUM: 1.0 cm    0.6 - 1.2 cm  PWT:    0.9 cm    0.6 - 1.1 cm  LVIDd:  4.3 cm    3.0 - 5.6 cm  LVIDs:    ---     1.8 - 4.0 cm  Derived Variables:  LVMI: 87 g/m2  RWT: 0.41  Ejection Fraction (Visual Estimate): 20 %  ------------------------------------------------------------------------  OBSERVATIONS:  Mitral Valve: Mitral annular calcification, tethered mitral  valve. Moderate mitral regurgitation.  Aortic Root: Aortic Root: 4.1 cm.  Aortic Valve: Calcified trileaflet aortic valve with normal  opening.  Left Atrium: Severely dilated left atrium.  LA volume index  = 59 cc/m2.  Left Ventricle: Severe global left ventricular systolic  dysfunction. Basal lateral and basal anterior walls are  best preserved. Normal left ventricular internal dimensions  and wall thicknesses.  Right Heart: Severe right atrial enlargement. Right  ventricular enlargement with decreased right ventricular  systolic function. Normal tricuspid valve. Moderate-severe  tricuspid regurgitation. Normal pulmonic valve.  Minimal  pulmonic regurgitation.  Pericardium/PleuraNormal pericardium with no pericardial  effusion.  Hemodynamic: Estimated right ventricular systolic pressure  equals 40 mm Hg, assuming right atrial pressure equals 10  mm Hg, consistent with mild pulmonary hypertension.    < end of copied text >    [ ]  Catheterization:  [ ] Stress Test:  	  	  ASSESSMENT/PLAN: 81 y. o. female  atrial fibrillation, hypothyroidism, COPD, IVC filter , BIBA to [facility] from nursing facility c/o hematemesis c/b Acute hypoxemic respiratory failure underwent EGD that revealed large diverticula at 28 cm filled with blood and 5 mm tear at distal esophagus, a hiatal hernia and large amount of blood in the fundus c/b  fever/pna/sepsis/ hypotension on IV  pressor and midodrine .Echo showed BiV failure with reduce EF. Started on Dobutamine drip.     Patient warm to touch with good  peripheral perfusion with good pulse pressure, Lactate normal prior to dobutamine drip, creat low 0.4-> less likely cardiogenic shock. [place] VOL 2/2  mutliple IVF drip and medication.  Mild  pulmonary congestion noted on chest Xray     Plan  Please transduce CVP to assess fluid status and for further diuresis if need, CVP goal 6-10  - send central venous O2 sat from cental catheter to calculate estimated CO/CI   - cardiology to follow

## 2021-09-18 NOTE — PROGRESS NOTE ADULT - SUBJECTIVE AND OBJECTIVE BOX
[facility], Division of Infectious Diseases  JULIANE Edwards Y. Patel, S. Shah  [phone]  after hours and weekends [phone]  for Dr Casillas    Name: ROXIE LAZAR  Age: 81y  Gender: Female  MRN: [id number]    Interval History--  Notes reviewed  intubated    Allergies    Sulfur Colliod (Rash)  Tylenol (Swelling)    Intolerances        Medications--  Antibiotics:  fluconAZOLE IVPB 200 milliGRAM(s) IV Intermittent every 24 hours  fluconAZOLE IVPB      meropenem  IVPB 1000 milliGRAM(s) IV Intermittent every 8 hours  meropenem  IVPB      vancomycin  IVPB 1000 milliGRAM(s) IV Intermittent every 12 hours    Immunologic:    Other:  acetaminophen  IVPB ..  ALBUTerol    90 MICROgram(s) HFA Inhaler  artificial tears (preservative free) Ophthalmic Solution  budesonide  80 MICROgram(s)/formoterol 4.5 MICROgram(s) Inhaler  chlorhexidine 0.12% Liquid  chlorhexidine 4% Liquid  dexMEDEtomidine Infusion  dextrose 40% Gel  dextrose 5%.  dextrose 5%.  dextrose 50% Injectable  dextrose 50% Injectable  dextrose 50% Injectable  DOBUTamine Infusion  fat emulsion (Fish Oil and Plant Based) 20% Infusion  fentaNYL   Infusion.  glucagon  Injectable  heparin   Injectable  insulin lispro (ADMELOG) corrective regimen sliding scale  levothyroxine Injectable  norepinephrine Infusion  pantoprazole  Injectable  Parenteral Nutrition - Adult  Parenteral Nutrition - Adult  propofol Infusion  vasopressin Infusion      Review of Systems--  A 10-point review of systems was obtained.   unable to obtain   Review of systems otherwise negative except as previously noted.    Physical Examination--  Vital Signs: T(F): 102.9 (09-18-21 @ 12:00), Max: 103 (09-18-21 @ 08:00)  HR: 104 (09-18-21 @ 15:00)  BP: --  RR: 21 (09-18-21 @ 15:00)  SpO2: 100% (09-18-21 @ 15:00)  Wt(kg): --  General:vent   HEENT:+Ett   Neck: right ij cvp .  Nodes: None palpable.  Lungs: Clear bilaterally without rales, wheezing or rhonchi  Heart: Regular rate and rhythm.  Abdomen: Bowel sounds present and normoactive. Soft. ..   Extremities: No cyanosis or clubbing. No edema.           Laboratory Studies--  CBC                        8.7    10.27 )-----------( 325      ( 18 Sep 2021 05:19 )             26.9       Chemistries  09-18    134<L>  |  100  |  26<H>  ----------------------------<  187<H>  4.8   |  26  |  0.42<L>    Ca    8.7      18 Sep 2021 12:52  Phos  3.8     09-18  Mg     2.20     09-18    TPro  5.2<L>  /  Alb  2.9<L>  /  TBili  0.4  /  DBili  x   /  AST  25  /  ALT  8   /  AlkPhos  72  09-18      Culture Data    Culture - Blood (collected 15 Sep 2021 18:43)  Source: .Blood Blood-Peripheral  Preliminary Report (16 Sep 2021 19:02):    No growth to date.    Culture - Blood (collected 15 Sep 2021 14:52)  Source: .Blood Blood-Peripheral  Preliminary Report (16 Sep 2021 15:02):    No growth to date.    Culture - Sputum (collected 15 Sep 2021 07:50)  Source: .Sputum Sputum  Gram Stain (15 Sep 2021 23:18):    Moderate polymorphonuclear leukocytes per low power field    No Squamous epithelial cells per low power field    Rare Yeast like cells per oil power field  Final Report (17 Sep 2021 12:05):    Normal Respiratory Consuelo present          vVancomycin Level, Trough: 7.0: Vancomycin Level, Random: 12.0

## 2021-09-18 NOTE — PROGRESS NOTE ADULT - SUBJECTIVE AND OBJECTIVE BOX
NUTRITION NOTE  [id number] NAGI  ===============================    Interval events - Parenteral nutrition was initiated on 9/14/21 via central line; pt was febrile upto 100.9F yesterday; prelim blood cultures remain negative.     ROS - unable to obtain, pt is on vent support     Allergies  Sulfur Colliod (Rash)  Tylenol (Swelling)    PAST MEDICAL & SURGICAL HISTORY:  Afib  History of COPD  HTN (hypertension)  Hypothyroid  GERD (gastroesophageal reflux disease)  Esophageal diverticulum  Presence of IVC filter    ICU Vital Signs Last 24 Hrs  T(C): 36.3 (18 Sep 2021 08:00), Max: 38.3 (17 Sep 2021 17:00)  T(F): 97.4 (18 Sep 2021 08:00), Max: 100.9 (17 Sep 2021 17:00)  HR: 90 (18 Sep 2021 07:30) (76 - 93)  ABP: 92/58 (18 Sep 2021 07:30) (90/57 - 112/68)  ABP(mean): 70 (18 Sep 2021 07:30) (69 - 89)  RR: 23 (18 Sep 2021 07:30) (18 - 26)  SpO2: 100% (18 Sep 2021 07:30) (93% - 100%)    MEDICATIONS  (STANDING):  ALBUTerol    90 MICROgram(s) HFA Inhaler 2 Puff(s) Inhalation every 6 hours  artificial tears (preservative free) Ophthalmic Solution 1 Drop(s) Both EYES two times a day  budesonide  80 MICROgram(s)/formoterol 4.5 MICROgram(s) Inhaler 2 Puff(s) Inhalation two times a day  chlorhexidine 0.12% Liquid 15 milliLiter(s) Oral Mucosa every 12 hours  chlorhexidine 4% Liquid 1 Application(s) Topical <User Schedule>  dexMEDEtomidine Infusion 0.2 MICROgram(s)/kG/Hr (2.7 mL/Hr) IV Continuous <Continuous>  fat emulsion (Fish Oil and Plant Based) 20% Infusion 13.3 mL/Hr (13.3 mL/Hr) IV Continuous <Continuous>  fentaNYL   Infusion. 0.5 MICROgram(s)/kG/Hr (2.7 mL/Hr) IV Continuous <Continuous>  fluconAZOLE IVPB 200 milliGRAM(s) IV Intermittent every 24 hours  fluconAZOLE IVPB      levothyroxine Injectable 112 MICROGram(s) IV Push at bedtime  meropenem  IVPB 1000 milliGRAM(s) IV Intermittent every 8 hours  meropenem  IVPB      norepinephrine Infusion 0.05 MICROgram(s)/kG/Min (2.53 mL/Hr) IV Continuous <Continuous>  pantoprazole  Injectable 40 milliGRAM(s) IV Push two times a day  Parenteral Nutrition - Adult 1 Each (63 mL/Hr) TPN Continuous <Continuous>  Parenteral Nutrition - Adult 1 Each (63 mL/Hr) TPN Continuous <Continuous>  propofol Infusion 40 MICROgram(s)/kG/Min (13 mL/Hr) IV Continuous <Continuous>  vancomycin  IVPB 1000 milliGRAM(s) IV Intermittent every 12 hours  vasopressin Infusion 0.04 Unit(s)/Min (2.4 mL/Hr) IV Continuous <Continuous>    I&O's Detail    17 Sep 2021 07:01  -  18 Sep 2021 07:00  --------------------------------------------------------  IN:    Dexmedetomidine: 487.1 mL    Fat Emulsion (Fish Oil &amp; Plant Based) 20% Infusion: 26.6 mL    Fat Emulsion (Fish Oil &amp; Plant Based) 20% Infusion: 92.2 mL    FentaNYL: 112.7 mL    IV PiggyBack: 150 mL    IV PiggyBack: 250 mL    IV PiggyBack: 100 mL    Norepinephrine: 87 mL    Propofol: 256.6 mL    TPN (Total Parenteral Nutrition): 1449 mL    Vasopressin: 55.2 mL  Total IN: 3066.4 mL    OUT:    Indwelling Catheter - Urethral (mL): 810 mL  Total OUT: 810 mL    Total NET: 2256.4 mL    POCT Blood Glucose.: 165 mg/dL (18 Sep 2021 00:56)  POCT Blood Glucose.: 150 mg/dL (17 Sep 2021 17:12)    Weight (kg): 54 (10 Sep 2021 00:39)    PHYSICAL EXAM:  Gen: Comfortable, No acute distress, frail elderly, sedated   Resp: mechanica breath sounds   Abd: Soft, Non-distended   Skin: Warm, 1+ peripheral edema of lower extremities    Mode: AC/ CMV (Assist Control/ Continuous Mandatory Ventilation)  RR (machine): 18  TV (machine): 350  FiO2: 40  PEEP: 5  ITime: 0.62  MAP: 10  PIP: 26    Diet: NPO and TPN/lipids (started on 9/14/21)    LABORATORY                                 8.7    10.27 )-----------( 325      ( 18 Sep 2021 05:19 )             26.9   09-18    135  |  101  |  25<H>  ----------------------------<  189<H>  5.1   |  26  |  0.41<L>    Ca    9.0      18 Sep 2021 05:19  Phos  3.8     09-18  Mg     2.20     09-18    TPro  5.9<L>  /  Alb  3.3  /  TBili  0.5  /  DBili  x   /  AST  16  /  ALT  5   /  AlkPhos  73  09-15    LIVER FUNCTIONS - ( 15 Sep 2021 04:13 )  Alb: 3.3 g/dL / Pro: 5.9 g/dL / ALK PHOS: 73 U/L / ALT: 5 U/L / AST: 16 U/L / GGT: x           09-15 Chol -- LDL -- HDL -- Trig 103    RECENT CULTURES    Culture - Blood (collected 15 Sep 2021 18:43)  Source: .Blood Blood-Peripheral  Preliminary Report (16 Sep 2021 19:02):    No growth to date.    Culture - Blood (collected 15 Sep 2021 14:52)  Source: .Blood Blood-Peripheral  Preliminary Report (16 Sep 2021 15:02):    No growth to date.    Culture - Sputum (collected 15 Sep 2021 10:27)  Source: .Sputum Sputum  Gram Stain (15 Sep 2021 23:18):    Moderate polymorphonuclear leukocytes per low power field    No Squamous epithelial cells per low power field    Rare Yeast like cells per oil power field  Preliminary Report (16 Sep 2021 10:03):    Normal Respiratory Consuelo present    ASSESSMENT/PLAN:  82 y/o female transferred to CT ICU for a patient who was noted to have an esophageal mass at [facility]. EGD done at outside hospital on 9/9/21 -Large diverticula noted at 28cm filled with blood and blood clots, periphery of diverticula was injected with epinephrine, 5mm distal esophageal tear without bleeding, hiatal hernia. Patient meets criteria for severe malnutrition in the context of acute illness. Patient's signs/symptoms as evidenced by severe loss of muscle mass and fat stores, 1+ edema, NPO >3 days.  Nutrition support service consulted for initiation of TPN/lipids via central line in view of malnutrition and anticipated prolonged NPO status.     continue TPN with infusion volume of 1.5 L, TPN will provide 1243 kcal/day    labs reviewed - K 5.1; electrolytes adjusted in TPN bag     monitor fingersticks, obtain daily weights     continue parenteral nutrition at this time, will follow up with primary team on plan - monitor for fevers and follow up blood cultures/ID recommendations      1.  Severe protein calorie malnutrition being optimized with TPN: CHO [175] gm.  AA [82] gm. SMOF Lipids [32] gm.  2.  Hyperglycemia managed with: [0] units of regular insulin    3.  Check fluid balance daily.  Strict I/O  [ ] [ ]   4.  Daily BMP, Ionized Calcium, Magnesium and Phosphorous   5.  Triglycerides at initiation of TPN and monthly [ ] [ ]     Nutrition Support [id number]

## 2021-09-18 NOTE — PROGRESS NOTE ADULT - ASSESSMENT
81 y. o. female BIBA to [facility] from nursing facility c/o hematemesis.  According to pt it was the first episode and happened while she was eating dinner. Followed by multiple episodes of N/V with bright blood and epigastric cramps.  She was intubated for airway protection and underwent EGD that revealed large diverticula at 28 cm filled with blood and 5 mm tear at distal esophagus, a hiatal hernia and large amount of blood in the fundus.  Diverticula was injected with Epinephrine.  Patient was transferred to [facility] for further management. (10 Sep 2021 15:04)    Pt followed by GI, s/p repeat EGD on 9/14 -  showing significant clot in large esophageal diverticula -- without obvious source of etiology of bleeding (ie no vessel) + no esophageal mass seen.  Pt on pressors, thought to be unlikely hemorrhagic given stable Hb.    9/15: WBC 10.8, sputum cx (9/10) with nl resp tawana. CT c/a/p shows a 7.2 cm masslike structure distending the mid to distal esophagus, suspicious for malignancy.  Bilateral tree-in-bud nodular opacities, predominantly in the left lower lobe, new/increased from 9/9/2021, raising concern for infection.  Left lower lobe consolidative opacity, possible combination of atelectasis and pneumonia.  (+) hypodensities in the liver suspicion for malignancy, and stercoral colitis.    Pt with fever, on pressors.  Pt is NPO on TPN.   Pt on empiric abx, ID consulted for further abx managment.     Fever/sepsis:    - pt with fever, on pressor support.  Abx broadened to vanco and meropenem.  Fluconazole added on 9/17 due to continued fevers.  - S/p central line change on 9/17.   - CT c/a/p noted, increased LLL consolidation, possible aspiration.  f/u repeat sputum cx - nl resp tawana    - Check bcx x 2 - NGTD  - Check vanco trough  - f/u WBC and fever curve.    * Pt seen by Palliative care service for C discussion    9/18 critically ill with resp failure and shock requiring pressors, all cx neg   cont current management   would repeat vanc trough as ordered prematurely prior to third dose  would  obtain with am labs at 4 am  prior to am dose on 9/19 scheduled at 5 am

## 2021-09-18 NOTE — CONSULT NOTE ADULT - ATTENDING COMMENTS
I agree with the above history, physical, and plan, which I have reviewed and edited where appropriate.  I agree with notes/assessment and detailed interval history of health care providers on my service.  I have seen and examined the patient.  I reviewed the laboratory and available data and agree with the history, physical assessment and plan.  I reviewed and discussed with all consultants, house staff and PA's.  The Nutrition Support Team (NST) discusses on an ongoing basis with the primary team and all consultants, House staff and PA's to have a permanent risk benefit analyses on all decisions and coordinating care.  I was physically present for the key portions of the evaluation and management (E/M) service provided.  82 y/o female with distal esophageal tear without bleeding, hiatal hernia. Patient meets criteria for severe malnutrition in the context of acute illness.   Nutrition support service consulted for initiation of TPN/lipids.  sedated   Resp: coarse bs  Abd: Soft, Non-distended   Skin: Warm, 1+ peripheral edema  The patient will require:  25 kilocalories / kg / day  Diagnosis: Severe protein calorie malnutrition in acute illness/ injury
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
Patient seen/examined during morning rounds.  Assessment and recommendations reviewed with Cardiology/CCU NP as well as CTICU team at bedside, and as outlined above.

## 2021-09-18 NOTE — PROGRESS NOTE ADULT - SUBJECTIVE AND OBJECTIVE BOX
ROXIE LAZAR                     [id number]    HPI:  81 y. o. female BIBA to [facility] from nursing facility c/o hematemesis.  According to pt it was the first episode and happened while she was eating dinner. Followed by multiple episodes of N/V with bright blood and epigastric cramps.  She was intubated for airway protection and underwent EGD that revealed large diverticula at 28 cm filled with blood and 5 mm tear at distal esophagus, a hiatal hernia and large amount of blood in the fundus.  Diverticula was injected with Epinephrine.  Patient was transferred to [facility] for further management. 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%, On                Acute Hypoxic Respiratory failure, on vent support              Septic Shock, on double pressors               Esophageal diverticular bleed /GIB  Acute blood loss anemia  COPD  Chronic Afib -- previously on Apixaban  Hypothyroidism  GERD  Hypophosphatemia               Home Medications:  Abreva 10% topical cream: Apply topically to affected area once a day (10 Sep 2021 15:54)  Albuterol (Eqv-ProAir HFA) 90 mcg/inh inhalation aerosol: 2 puff(s) inhaled every 6 hours (10 Sep 2021 15:54)  allopurinol 100 mg oral tablet: 2 tab(s) orally 2 times a day (10 Sep 2021 15:54)  Aricept 10 mg oral tablet: 1 tab(s) orally once a day (at bedtime) (10 Sep 2021 15:54)  aspirin 81 mg oral tablet: 1 tab(s) orally once a day (10 Sep 2021 15:54)  baclofen 5 mg oral tablet: 1 tab(s) orally 2 times a day (10 Sep 2021 15:54)  busPIRone 15 mg oral tablet: 1 tab(s) orally once a day (at bedtime) (10 Sep 2021 15:54)  Claritin 10 mg oral tablet: 1 tab(s) orally once a day (10 Sep 2021 15:54)  Colace 100 mg oral capsule: 1 cap(s) orally 3 times a day (10 Sep 2021 15:54)  Cymbalta 60 mg oral delayed release capsule: 1 cap(s) orally once a day (10 Sep 2021 15:54)  Eliquis 2.5 mg oral tablet: 1 tab(s) orally 2 times a day (10 Sep 2021 15:54)  Lasix 40 mg oral tablet: 1 tab(s) orally once a day (10 Sep 2021 15:54)  Linzess 290 mcg oral capsule: 1 cap(s) orally once a day (10 Sep 2021 15:54)  Melatonin 3 mg oral tablet: 1 tab(s) orally once a day (at bedtime) (10 Sep 2021 15:54)  Namenda 10 mg oral tablet: 1 tab(s) orally 2 times a day (10 Sep 2021 15:54)  ondansetron 4 mg oral tablet: 1 tab(s) orally every 8 hours (10 Sep 2021 15:54)  oxyCODONE 5 mg oral tablet: 1 tab(s) orally every 8 hours (10 Sep 2021 15:54)  Senna 8.6 mg oral tablet: 1 tab(s) orally once a day (at bedtime) (10 Sep 2021 15:54)  Spiriva 18 mcg inhalation capsule: 1 cap(s) inhaled once a day (10 Sep 2021 15:54)  Symbicort 80 mcg-4.5 mcg/inh inhalation aerosol: 2 puff(s) inhaled 2 times a day (10 Sep 2021 15:54)  Synthroid 150 mcg (0.15 mg) oral tablet: 1 tab(s) orally once a day (10 Sep 2021 15:54)  traZODone 100 mg oral tablet: 1 tab(s) orally 2 times a day (10 Sep 2021 15:54)  Ventolin HFA 90 mcg/inh inhalation aerosol: 2 puff(s) inhaled every 8 hours (10 Sep 2021 15:54)  Zocor 10 mg oral tablet: 1 tab(s) orally once a day (at bedtime) (10 Sep 2021 15:54)    PAST MEDICAL & SURGICAL HISTORY:  Afib    History of COPD    HTN (hypertension)    Hypothyroid    GERD (gastroesophageal reflux disease)    Esophageal diverticulum    Presence of IVC filter      VITAL SIGNS:  Vital Signs Last 24 Hrs  T(C): 39.4 (18 Sep 2021 12:00), Max: 39.4 (18 Sep 2021 08:00)  T(F): 102.9 (18 Sep 2021 12:00), Max: 103 (18 Sep 2021 08:00)  HR: 110 (18 Sep 2021 12:00) (76 - 117)  BP: --  BP(mean): --  RR: 23 (18 Sep 2021 12:00) (18 - 26)  SpO2: 100% (18 Sep 2021 12:00) (93% - 100%)    I/Os:   I&O's Detail    17 Sep 2021 07:01  -  18 Sep 2021 07:00  --------------------------------------------------------  IN:    Dexmedetomidine: 487.1 mL    Fat Emulsion (Fish Oil &amp; Plant Based) 20% Infusion: 92.2 mL    Fat Emulsion (Fish Oil &amp; Plant Based) 20% Infusion: 26.6 mL    FentaNYL: 112.7 mL    IV PiggyBack: 150 mL    IV PiggyBack: 250 mL    IV PiggyBack: 100 mL    Norepinephrine: 87 mL    Propofol: 256.6 mL    TPN (Total Parenteral Nutrition): 1449 mL    Vasopressin: 55.2 mL  Total IN: 3066.4 mL    OUT:    Indwelling Catheter - Urethral (mL): 810 mL  Total OUT: 810 mL    Total NET: 2256.4 mL      18 Sep 2021 07:01  -  18 Sep 2021 13:12  --------------------------------------------------------  IN:    Dexmedetomidine: 113.4 mL    DOBUTamine: 16 mL    Fat Emulsion (Fish Oil &amp; Plant Based) 20% Infusion: 79.8 mL    FentaNYL: 29.4 mL    IV PiggyBack: 75 mL    Norepinephrine: 24 mL    Propofol: 67.8 mL    TPN (Total Parenteral Nutrition): 378 mL    Vasopressin: 14.4 mL  Total IN: 797.8 mL    OUT:    Indwelling Catheter - Urethral (mL): 450 mL  Total OUT: 450 mL    Total NET: 347.8 mL          CAPILLARY BLOOD GLUCOSE      POCT Blood Glucose.: 206 mg/dL (18 Sep 2021 08:45)  POCT Blood Glucose.: 165 mg/dL (18 Sep 2021 00:56)  POCT Blood Glucose.: 150 mg/dL (17 Sep 2021 17:12)      =======================MEDICATIONS===================  MEDICATIONS  (STANDING):  ALBUTerol    90 MICROgram(s) HFA Inhaler 2 Puff(s) Inhalation every 6 hours  artificial tears (preservative free) Ophthalmic Solution 1 Drop(s) Both EYES two times a day  budesonide  80 MICROgram(s)/formoterol 4.5 MICROgram(s) Inhaler 2 Puff(s) Inhalation two times a day  chlorhexidine 0.12% Liquid 15 milliLiter(s) Oral Mucosa every 12 hours  chlorhexidine 4% Liquid 1 Application(s) Topical <User Schedule>  dexMEDEtomidine Infusion 0.2 MICROgram(s)/kG/Hr (2.7 mL/Hr) IV Continuous <Continuous>  DOBUTamine Infusion 2.5 MICROgram(s)/kG/Min (4.05 mL/Hr) IV Continuous <Continuous>  fat emulsion (Fish Oil and Plant Based) 20% Infusion 13.3 mL/Hr (13.3 mL/Hr) IV Continuous <Continuous>  fentaNYL   Infusion. 0.5 MICROgram(s)/kG/Hr (2.7 mL/Hr) IV Continuous <Continuous>  fluconAZOLE IVPB 200 milliGRAM(s) IV Intermittent every 24 hours  fluconAZOLE IVPB      heparin   Injectable 2500 Unit(s) SubCutaneous every 12 hours  levothyroxine Injectable 112 MICROGram(s) IV Push at bedtime  meropenem  IVPB 1000 milliGRAM(s) IV Intermittent every 8 hours  meropenem  IVPB      norepinephrine Infusion 0.05 MICROgram(s)/kG/Min (2.53 mL/Hr) IV Continuous <Continuous>  pantoprazole  Injectable 40 milliGRAM(s) IV Push two times a day  Parenteral Nutrition - Adult 1 Each (63 mL/Hr) TPN Continuous <Continuous>  Parenteral Nutrition - Adult 1 Each (63 mL/Hr) TPN Continuous <Continuous>  propofol Infusion 40 MICROgram(s)/kG/Min (13 mL/Hr) IV Continuous <Continuous>  vancomycin  IVPB 1000 milliGRAM(s) IV Intermittent every 12 hours  vasopressin Infusion 0.04 Unit(s)/Min (2.4 mL/Hr) IV Continuous <Continuous>    MEDICATIONS  (PRN):      =======================VENTILATOR SETTINGS===================  Mode: AC/ CMV (Assist Control/ Continuous Mandatory Ventilation)  RR (machine): 18  TV (machine): 350  FiO2: 40  PEEP: 5  ITime: 0.62  MAP: 10  PIP: 25      PHYSICAL EXAM============================  General:                         Awake, alert, not in any distress  Neuro:                            Moving all extremities to commands.   Respiratory:	Air entry fair and  bilateral conducted sounds                                           Effort even and unlabored.  CV:		Regular rate and rhythm. Normal S1/S2                                          Distal pulses present.  Abdomen:	                     Soft, non-distended. Bowel sounds present   Skin:		No rash.  Extremities:	Warm, no cyanosis or edema.  Palpable pulses    ============================LABS=========================                        8.7    10.27 )-----------( 325      ( 18 Sep 2021 05:19 )             26.9     09-18    135  |  101  |  25<H>  ----------------------------<  189<H>  5.1   |  26  |  0.41<L>    Ca    9.0      18 Sep 2021 05:19  Phos  3.8     18  Mg     2.20     -18        PT/INR - ( 17 Sep 2021 05:07 )   PT: 14.6 sec;   INR: 1.28 ratio         PTT - ( 17 Sep 2021 05:07 )  PTT:28.8 sec  ABG - ( 18 Sep 2021 13:02 )  pH, Arterial: 7.39  pH, Blood: x     /  pCO2: 46    /  pO2: 115   / HCO3: 28    / Base Excess: 2.4   /  SaO2: 97.1          Historical Values  Culture - Blood (09.15.21 @ 18:43)   Specimen Source: .Blood Blood-Peripheral   Culture Results:   No growth to date.   Culture - Blood (09.15.21 @ 14:52)   Specimen Source: .Blood Blood-Peripheral   Culture Results:   No growth to date.       Historical Values  Culture Results:   No growth to date. (09.15.21 @ 18:43)   Culture Results:   No growth to date. (09.15.21 @ 14:52)   Culture Results:   Normal Respiratory Consuelo present (09.15.21 @ 07:50)   Culture Results:   Normal Respiratory Consuelo present (09.10.21 @ 19:12)     A/P:  81 y. o. female BIBA to [facility] from nursing facility c/o hematemesis.  According to pt it was the first episode and happened while she was eating dinner. Followed by multiple episodes of N/V with bright blood and epigastric cramps.  She was intubated for airway protection and underwent EGD that revealed large diverticula at 28 cm filled with blood and 5 mm tear at distal esophagus, a hiatal hernia and large amount of blood in the fundus.  Diverticula was injected with Epinephrine.  Patient was transferred to [facility] for further management. (10 Sep 2021 15:04)                              Neuro:                                         Pain control with Fentanyl  /  Tylenol PRN                                     Sedated on Propofol/ Precedex                            Cardiovascular:                                          cardiogenic/ Septic shock: Continue hemodynamic monitoring. ECHO: BiVent dysfunction. Started on  gtt, Cardiology consulted   Levo requirement is coming down. Continue Vaso 0.04uts, titrate pressores to MAP>65  Adequately fluid resuscitated, IVC is 2.1cm today with minimal respiratory variability                                         Titrate Levo / Vaso to MAP>65.                             Respiratory:                                         Pt is on full mechanical vent support. KC--40-5, well sedated                                          Appears comfortable, not in any distress.                                         Continue bronchodilators, pulmonary toilet - PRN     PNA: On IV abxs     Less likely to be weaned off vent and extubated.   Will discuss with family regarding trach / PEG and goals of care  Palliative care on board                                                                   GI                                         NPO with TPN, new central lines                                          Continue Zofran / Reglan for nausea - PRN  	    Monitor Hct and transfuse for Hb<8  G.I f/u                                                                 Renal:     Replace K/Mg/Phos                                        Monitor I/Os and electrolytes                                                                                        Hem/ Onc:                                         Acute blood loss anemia     Monitor Hct and transfuse as indicated                                         Follow CBC in AM                           Infectious disease:                                          Sepsis with shock, on double pressors  Continue support and IV antibiotics. Added Diflucan                                           Follow cultures      Bronch today                                          Monitor for fever / leukocytosis.       D/C J TLC. Has new RIJ TLC      I.D f/u                                                                    Endocrine                                             DM-2 / Hyperglycemia: Continue Accu-Checks with coverage    Pt is on SQ Heparin / Lovenox  and Venodyne boots for DVT prophylaxis.     Pertinent clinical, laboratory, radiographic, hemodynamic, echocardiographic, respiratory data, microbiologic data and chart were reviewed and analyzed frequently throughout the course of the day and night  Patient seen, examined and plan discussed with Dr. Zuñiga /   CTICU team during rounds.    I have spent  75 minutes of critical care time with this pt between  7am and 11.59pm.           Inder Lepe DO, FACEP

## 2021-09-19 LAB
ALBUMIN SERPL ELPH-MCNC: 2.5 G/DL — LOW (ref 3.3–5)
ALP SERPL-CCNC: 75 U/L — SIGNIFICANT CHANGE UP (ref 40–120)
ALT FLD-CCNC: 8 U/L — SIGNIFICANT CHANGE UP (ref 4–33)
ANION GAP SERPL CALC-SCNC: 10 MMOL/L — SIGNIFICANT CHANGE UP (ref 7–14)
AST SERPL-CCNC: 27 U/L — SIGNIFICANT CHANGE UP (ref 4–32)
BASE EXCESS BLDV CALC-SCNC: 4.5 MMOL/L — HIGH (ref -2–3)
BASOPHILS # BLD AUTO: 0.08 K/UL — SIGNIFICANT CHANGE UP (ref 0–0.2)
BASOPHILS NFR BLD AUTO: 0.6 % — SIGNIFICANT CHANGE UP (ref 0–2)
BILIRUB SERPL-MCNC: 0.4 MG/DL — SIGNIFICANT CHANGE UP (ref 0.2–1.2)
BLOOD GAS ARTERIAL COMPREHENSIVE RESULT: SIGNIFICANT CHANGE UP
BLOOD GAS ARTERIAL COMPREHENSIVE RESULT: SIGNIFICANT CHANGE UP
BLOOD GAS VENOUS COMPREHENSIVE RESULT: SIGNIFICANT CHANGE UP
BUN SERPL-MCNC: 23 MG/DL — SIGNIFICANT CHANGE UP (ref 7–23)
CA-I BLD-SCNC: 1.14 MMOL/L — LOW (ref 1.15–1.29)
CALCIUM SERPL-MCNC: 9 MG/DL — SIGNIFICANT CHANGE UP (ref 8.4–10.5)
CHLORIDE BLDV-SCNC: 100 MMOL/L — SIGNIFICANT CHANGE UP (ref 96–108)
CHLORIDE SERPL-SCNC: 97 MMOL/L — LOW (ref 98–107)
CO2 BLDV-SCNC: 33.3 MMOL/L — HIGH (ref 22–26)
CO2 SERPL-SCNC: 26 MMOL/L — SIGNIFICANT CHANGE UP (ref 22–31)
CORTIS AM PEAK SERPL-MCNC: 14.6 UG/DL — SIGNIFICANT CHANGE UP (ref 6–18.4)
CREAT SERPL-MCNC: 0.31 MG/DL — LOW (ref 0.5–1.3)
EOSINOPHIL # BLD AUTO: 0.23 K/UL — SIGNIFICANT CHANGE UP (ref 0–0.5)
EOSINOPHIL NFR BLD AUTO: 1.7 % — SIGNIFICANT CHANGE UP (ref 0–6)
GAS PNL BLDV: 132 MMOL/L — LOW (ref 136–145)
GLUCOSE BLDC GLUCOMTR-MCNC: 148 MG/DL — HIGH (ref 70–99)
GLUCOSE BLDC GLUCOMTR-MCNC: 152 MG/DL — HIGH (ref 70–99)
GLUCOSE BLDC GLUCOMTR-MCNC: 162 MG/DL — HIGH (ref 70–99)
GLUCOSE BLDV-MCNC: 197 MG/DL — HIGH (ref 70–99)
GLUCOSE SERPL-MCNC: 181 MG/DL — HIGH (ref 70–99)
HCO3 BLDV-SCNC: 31 MMOL/L — HIGH (ref 22–29)
HCT VFR BLD CALC: 27.9 % — LOW (ref 34.5–45)
HCT VFR BLDA CALC: 24 % — LOW (ref 34.5–46.5)
HGB BLD CALC-MCNC: 7.9 G/DL — LOW (ref 11.5–15.5)
HGB BLD-MCNC: 8.8 G/DL — LOW (ref 11.5–15.5)
IANC: 10.57 K/UL — HIGH (ref 1.5–8.5)
IMM GRANULOCYTES NFR BLD AUTO: 1.3 % — SIGNIFICANT CHANGE UP (ref 0–1.5)
LACTATE BLDV-MCNC: 1 MMOL/L — SIGNIFICANT CHANGE UP (ref 0.5–2)
LYMPHOCYTES # BLD AUTO: 0.96 K/UL — LOW (ref 1–3.3)
LYMPHOCYTES # BLD AUTO: 7.2 % — LOW (ref 13–44)
MAGNESIUM SERPL-MCNC: 2.2 MG/DL — SIGNIFICANT CHANGE UP (ref 1.6–2.6)
MCHC RBC-ENTMCNC: 30.2 PG — SIGNIFICANT CHANGE UP (ref 27–34)
MCHC RBC-ENTMCNC: 31.5 GM/DL — LOW (ref 32–36)
MCV RBC AUTO: 95.9 FL — SIGNIFICANT CHANGE UP (ref 80–100)
MONOCYTES # BLD AUTO: 1.4 K/UL — HIGH (ref 0–0.9)
MONOCYTES NFR BLD AUTO: 10.4 % — SIGNIFICANT CHANGE UP (ref 2–14)
NEUTROPHILS # BLD AUTO: 10.57 K/UL — HIGH (ref 1.8–7.4)
NEUTROPHILS NFR BLD AUTO: 78.8 % — HIGH (ref 43–77)
NRBC # BLD: 0 /100 WBCS — SIGNIFICANT CHANGE UP
NRBC # FLD: 0.05 K/UL — HIGH
PCO2 BLDV: 61 MMHG — HIGH (ref 39–42)
PH BLDV: 7.32 — SIGNIFICANT CHANGE UP (ref 7.32–7.43)
PHOSPHATE SERPL-MCNC: 3.5 MG/DL — SIGNIFICANT CHANGE UP (ref 2.5–4.5)
PLATELET # BLD AUTO: 278 K/UL — SIGNIFICANT CHANGE UP (ref 150–400)
PO2 BLDV: 38 MMHG — SIGNIFICANT CHANGE UP
POTASSIUM BLDV-SCNC: 3.6 MMOL/L — SIGNIFICANT CHANGE UP (ref 3.5–5.1)
POTASSIUM SERPL-MCNC: 4.8 MMOL/L — SIGNIFICANT CHANGE UP (ref 3.5–5.3)
POTASSIUM SERPL-SCNC: 4.8 MMOL/L — SIGNIFICANT CHANGE UP (ref 3.5–5.3)
PROT SERPL-MCNC: 5.3 G/DL — LOW (ref 6–8.3)
RBC # BLD: 2.91 M/UL — LOW (ref 3.8–5.2)
RBC # FLD: 15.7 % — HIGH (ref 10.3–14.5)
SAO2 % BLDV: 58.9 % — SIGNIFICANT CHANGE UP
SODIUM SERPL-SCNC: 133 MMOL/L — LOW (ref 135–145)
T4 FREE SERPL-MCNC: 1.6 NG/DL — SIGNIFICANT CHANGE UP (ref 0.9–1.8)
TSH SERPL-MCNC: <0.1 UIU/ML — LOW (ref 0.27–4.2)
VANCOMYCIN TROUGH SERPL-MCNC: 9.7 UG/ML — LOW (ref 10–20)
WBC # BLD: 13.42 K/UL — HIGH (ref 3.8–10.5)
WBC # FLD AUTO: 13.42 K/UL — HIGH (ref 3.8–10.5)

## 2021-09-19 PROCEDURE — 99232 SBSQ HOSP IP/OBS MODERATE 35: CPT

## 2021-09-19 PROCEDURE — 71045 X-RAY EXAM CHEST 1 VIEW: CPT | Mod: 26

## 2021-09-19 PROCEDURE — 99292 CRITICAL CARE ADDL 30 MIN: CPT

## 2021-09-19 PROCEDURE — 99291 CRITICAL CARE FIRST HOUR: CPT

## 2021-09-19 RX ORDER — FUROSEMIDE 40 MG
10 TABLET ORAL ONCE
Refills: 0 | Status: COMPLETED | OUTPATIENT
Start: 2021-09-19 | End: 2021-09-19

## 2021-09-19 RX ORDER — ELECTROLYTE SOLUTION,INJ
1 VIAL (ML) INTRAVENOUS
Refills: 0 | Status: DISCONTINUED | OUTPATIENT
Start: 2021-09-19 | End: 2021-09-19

## 2021-09-19 RX ORDER — FUROSEMIDE 40 MG
2.5 TABLET ORAL
Qty: 500 | Refills: 0 | Status: DISCONTINUED | OUTPATIENT
Start: 2021-09-19 | End: 2021-09-21

## 2021-09-19 RX ORDER — I.V. FAT EMULSION 20 G/100ML
13.3 EMULSION INTRAVENOUS
Qty: 32 | Refills: 0 | Status: DISCONTINUED | OUTPATIENT
Start: 2021-09-19 | End: 2021-09-20

## 2021-09-19 RX ORDER — FENTANYL CITRATE 50 UG/ML
0.9 INJECTION INTRAVENOUS
Qty: 2500 | Refills: 0 | Status: DISCONTINUED | OUTPATIENT
Start: 2021-09-19 | End: 2021-09-26

## 2021-09-19 RX ADMIN — VASOPRESSIN 2.4 UNIT(S)/MIN: 20 INJECTION INTRAVENOUS at 09:33

## 2021-09-19 RX ADMIN — MEROPENEM 100 MILLIGRAM(S): 1 INJECTION INTRAVENOUS at 22:24

## 2021-09-19 RX ADMIN — CHLORHEXIDINE GLUCONATE 1 APPLICATION(S): 213 SOLUTION TOPICAL at 05:23

## 2021-09-19 RX ADMIN — Medication 1 EACH: at 07:42

## 2021-09-19 RX ADMIN — ALBUTEROL 2 PUFF(S): 90 AEROSOL, METERED ORAL at 10:20

## 2021-09-19 RX ADMIN — ALBUTEROL 2 PUFF(S): 90 AEROSOL, METERED ORAL at 21:10

## 2021-09-19 RX ADMIN — MEROPENEM 100 MILLIGRAM(S): 1 INJECTION INTRAVENOUS at 14:18

## 2021-09-19 RX ADMIN — FENTANYL CITRATE 4.86 MICROGRAM(S)/KG/HR: 50 INJECTION INTRAVENOUS at 09:20

## 2021-09-19 RX ADMIN — HEPARIN SODIUM 2500 UNIT(S): 5000 INJECTION INTRAVENOUS; SUBCUTANEOUS at 05:23

## 2021-09-19 RX ADMIN — Medication 10 MILLIGRAM(S): at 18:52

## 2021-09-19 RX ADMIN — I.V. FAT EMULSION 13.3 ML/HR: 20 EMULSION INTRAVENOUS at 19:50

## 2021-09-19 RX ADMIN — Medication 1 EACH: at 19:50

## 2021-09-19 RX ADMIN — Medication 1: at 11:40

## 2021-09-19 RX ADMIN — Medication 250 MILLIGRAM(S): at 17:05

## 2021-09-19 RX ADMIN — Medication 1 DROP(S): at 05:24

## 2021-09-19 RX ADMIN — ALBUTEROL 2 PUFF(S): 90 AEROSOL, METERED ORAL at 15:15

## 2021-09-19 RX ADMIN — DEXMEDETOMIDINE HYDROCHLORIDE IN 0.9% SODIUM CHLORIDE 2.7 MICROGRAM(S)/KG/HR: 4 INJECTION INTRAVENOUS at 07:42

## 2021-09-19 RX ADMIN — PROPOFOL 13 MICROGRAM(S)/KG/MIN: 10 INJECTION, EMULSION INTRAVENOUS at 07:44

## 2021-09-19 RX ADMIN — Medication 4.05 MICROGRAM(S)/KG/MIN: at 07:44

## 2021-09-19 RX ADMIN — PROPOFOL 13 MICROGRAM(S)/KG/MIN: 10 INJECTION, EMULSION INTRAVENOUS at 19:51

## 2021-09-19 RX ADMIN — Medication 250 MILLIGRAM(S): at 06:01

## 2021-09-19 RX ADMIN — PANTOPRAZOLE SODIUM 40 MILLIGRAM(S): 20 TABLET, DELAYED RELEASE ORAL at 17:05

## 2021-09-19 RX ADMIN — DEXMEDETOMIDINE HYDROCHLORIDE IN 0.9% SODIUM CHLORIDE 2.7 MICROGRAM(S)/KG/HR: 4 INJECTION INTRAVENOUS at 09:37

## 2021-09-19 RX ADMIN — FLUCONAZOLE 100 MILLIGRAM(S): 150 TABLET ORAL at 15:19

## 2021-09-19 RX ADMIN — BUDESONIDE AND FORMOTEROL FUMARATE DIHYDRATE 2 PUFF(S): 160; 4.5 AEROSOL RESPIRATORY (INHALATION) at 10:19

## 2021-09-19 RX ADMIN — Medication 4.05 MICROGRAM(S)/KG/MIN: at 19:51

## 2021-09-19 RX ADMIN — DEXMEDETOMIDINE HYDROCHLORIDE IN 0.9% SODIUM CHLORIDE 2.7 MICROGRAM(S)/KG/HR: 4 INJECTION INTRAVENOUS at 19:52

## 2021-09-19 RX ADMIN — Medication 1 EACH: at 17:03

## 2021-09-19 RX ADMIN — VASOPRESSIN 2.4 UNIT(S)/MIN: 20 INJECTION INTRAVENOUS at 07:43

## 2021-09-19 RX ADMIN — BUDESONIDE AND FORMOTEROL FUMARATE DIHYDRATE 2 PUFF(S): 160; 4.5 AEROSOL RESPIRATORY (INHALATION) at 21:10

## 2021-09-19 RX ADMIN — VASOPRESSIN 2.4 UNIT(S)/MIN: 20 INJECTION INTRAVENOUS at 19:52

## 2021-09-19 RX ADMIN — MEROPENEM 100 MILLIGRAM(S): 1 INJECTION INTRAVENOUS at 05:24

## 2021-09-19 RX ADMIN — CHLORHEXIDINE GLUCONATE 15 MILLILITER(S): 213 SOLUTION TOPICAL at 05:23

## 2021-09-19 RX ADMIN — FENTANYL CITRATE 4.86 MICROGRAM(S)/KG/HR: 50 INJECTION INTRAVENOUS at 19:52

## 2021-09-19 RX ADMIN — PANTOPRAZOLE SODIUM 40 MILLIGRAM(S): 20 TABLET, DELAYED RELEASE ORAL at 05:24

## 2021-09-19 RX ADMIN — I.V. FAT EMULSION 13.3 ML/HR: 20 EMULSION INTRAVENOUS at 17:04

## 2021-09-19 RX ADMIN — Medication 1 DROP(S): at 17:05

## 2021-09-19 RX ADMIN — ALBUTEROL 2 PUFF(S): 90 AEROSOL, METERED ORAL at 04:36

## 2021-09-19 RX ADMIN — Medication 1: at 05:54

## 2021-09-19 RX ADMIN — Medication 112 MICROGRAM(S): at 22:28

## 2021-09-19 RX ADMIN — HEPARIN SODIUM 2500 UNIT(S): 5000 INJECTION INTRAVENOUS; SUBCUTANEOUS at 17:07

## 2021-09-19 RX ADMIN — CHLORHEXIDINE GLUCONATE 15 MILLILITER(S): 213 SOLUTION TOPICAL at 17:00

## 2021-09-19 NOTE — PROGRESS NOTE ADULT - SUBJECTIVE AND OBJECTIVE BOX
NAGI ROXIE      81y   Female   MRN-[id number]         Sulfur Colliod (Rash)  Tylenol (Swelling)             Daily     Daily Drug Dosing Weight    Weight (kg): 54 (10 Sep 2021 00:39)      81 y. o. female BIBA to [facility] from nursing facility c/o hematemesis.  According to pt it was the first episode and happened while she was eating dinner. Followed by multiple episodes of N/V with bright blood and epigastric cramps.  She was intubated for airway protection and underwent EGD that revealed large diverticula at 28 cm filled with blood and 5 mm tear at distal esophagus, a hiatal hernia and large amount of blood in the fundus.  Diverticula was injected with Epinephrine.  Patient was transferred to [facility] for further management. (10 Sep 2021 15:04)                           Issues:              Acute Hypoxic Respiratory failure  Chronic systolic heart failure  Moderate MR  Severe TR              Septic Shock              Esophageal diverticular bleed.   Acute blood loss anemia  COPD  Chronic Afib -- previously on Apixaban  Hypothyroidism  GERD  Hypophosphatemia                Home Medications:  Abreva 10% topical cream: Apply topically to affected area once a day (10 Sep 2021 15:54)  Albuterol (Eqv-ProAir HFA) 90 mcg/inh inhalation aerosol: 2 puff(s) inhaled every 6 hours (10 Sep 2021 15:54)  allopurinol 100 mg oral tablet: 2 tab(s) orally 2 times a day (10 Sep 2021 15:54)  Aricept 10 mg oral tablet: 1 tab(s) orally once a day (at bedtime) (10 Sep 2021 15:54)  aspirin 81 mg oral tablet: 1 tab(s) orally once a day (10 Sep 2021 15:54)  baclofen 5 mg oral tablet: 1 tab(s) orally 2 times a day (10 Sep 2021 15:54)  busPIRone 15 mg oral tablet: 1 tab(s) orally once a day (at bedtime) (10 Sep 2021 15:54)  Claritin 10 mg oral tablet: 1 tab(s) orally once a day (10 Sep 2021 15:54)  Colace 100 mg oral capsule: 1 cap(s) orally 3 times a day (10 Sep 2021 15:54)  Cymbalta 60 mg oral delayed release capsule: 1 cap(s) orally once a day (10 Sep 2021 15:54)  Eliquis 2.5 mg oral tablet: 1 tab(s) orally 2 times a day (10 Sep 2021 15:54)  Lasix 40 mg oral tablet: 1 tab(s) orally once a day (10 Sep 2021 15:54)  Linzess 290 mcg oral capsule: 1 cap(s) orally once a day (10 Sep 2021 15:54)  Melatonin 3 mg oral tablet: 1 tab(s) orally once a day (at bedtime) (10 Sep 2021 15:54)  Namenda 10 mg oral tablet: 1 tab(s) orally 2 times a day (10 Sep 2021 15:54)  ondansetron 4 mg oral tablet: 1 tab(s) orally every 8 hours (10 Sep 2021 15:54)  oxyCODONE 5 mg oral tablet: 1 tab(s) orally every 8 hours (10 Sep 2021 15:54)  Senna 8.6 mg oral tablet: 1 tab(s) orally once a day (at bedtime) (10 Sep 2021 15:54)  Spiriva 18 mcg inhalation capsule: 1 cap(s) inhaled once a day (10 Sep 2021 15:54)  Symbicort 80 mcg-4.5 mcg/inh inhalation aerosol: 2 puff(s) inhaled 2 times a day (10 Sep 2021 15:54)  Synthroid 150 mcg (0.15 mg) oral tablet: 1 tab(s) orally once a day (10 Sep 2021 15:54)  traZODone 100 mg oral tablet: 1 tab(s) orally 2 times a day (10 Sep 2021 15:54)  Ventolin HFA 90 mcg/inh inhalation aerosol: 2 puff(s) inhaled every 8 hours (10 Sep 2021 15:54)  Zocor 10 mg oral tablet: 1 tab(s) orally once a day (at bedtime) (10 Sep 2021 15:54)    PAST MEDICAL & SURGICAL HISTORY:  Afib    History of COPD    HTN (hypertension)    Hypothyroid    GERD (gastroesophageal reflux disease)    Esophageal diverticulum    Presence of IVC filter      Vital Signs Last 24 Hrs  T(C): 35.9 (19 Sep 2021 08:00), Max: 39.4 (18 Sep 2021 12:00)  T(F): 96.7 (19 Sep 2021 08:00), Max: 102.9 (18 Sep 2021 12:00)  HR: 87 (19 Sep 2021 10:24) (76 - 115)  BP: --  BP(mean): --  RR: 20 (19 Sep 2021 10:00) (18 - 24)  SpO2: 100% (19 Sep 2021 10:24) (90% - 100%)  I&O's Detail    18 Sep 2021 07:01  -  19 Sep 2021 07:00  --------------------------------------------------------  IN:    Dexmedetomidine: 453.6 mL    DOBUTamine: 118.6 mL    Fat Emulsion (Fish Oil &amp; Plant Based) 20% Infusion: 279.3 mL    FentaNYL: 93.1 mL    IV PiggyBack: 500 mL    IV PiggyBack: 50 mL    IV PiggyBack: 100 mL    IV PiggyBack: 250 mL    Norepinephrine: 35.5 mL    Propofol: 271.2 mL    TPN (Total Parenteral Nutrition): 1512 mL    Vasopressin: 75.2 mL  Total IN: 3738.5 mL    OUT:    Indwelling Catheter - Urethral (mL): 2390 mL  Total OUT: 2390 mL    Total NET: 1348.5 mL      19 Sep 2021 07:01  -  19 Sep 2021 10:48  --------------------------------------------------------  IN:    Dexmedetomidine: 18.9 mL    DOBUTamine: 5.7 mL    Propofol: 33.9 mL    TPN (Total Parenteral Nutrition): 189 mL    Vasopressin: 13.8 mL  Total IN: 261.3 mL    OUT:    Indwelling Catheter - Urethral (mL): 220 mL    Norepinephrine: 0 mL  Total OUT: 220 mL    Total NET: 41.3 mL        CAPILLARY BLOOD GLUCOSE      POCT Blood Glucose.: 162 mg/dL (19 Sep 2021 05:21)  POCT Blood Glucose.: 176 mg/dL (18 Sep 2021 16:17)    Home Medications:  Abreva 10% topical cream: Apply topically to affected area once a day (10 Sep 2021 15:54)  Albuterol (Eqv-ProAir HFA) 90 mcg/inh inhalation aerosol: 2 puff(s) inhaled every 6 hours (10 Sep 2021 15:54)  allopurinol 100 mg oral tablet: 2 tab(s) orally 2 times a day (10 Sep 2021 15:54)  Aricept 10 mg oral tablet: 1 tab(s) orally once a day (at bedtime) (10 Sep 2021 15:54)  aspirin 81 mg oral tablet: 1 tab(s) orally once a day (10 Sep 2021 15:54)  baclofen 5 mg oral tablet: 1 tab(s) orally 2 times a day (10 Sep 2021 15:54)  busPIRone 15 mg oral tablet: 1 tab(s) orally once a day (at bedtime) (10 Sep 2021 15:54)  Claritin 10 mg oral tablet: 1 tab(s) orally once a day (10 Sep 2021 15:54)  Colace 100 mg oral capsule: 1 cap(s) orally 3 times a day (10 Sep 2021 15:54)  Cymbalta 60 mg oral delayed release capsule: 1 cap(s) orally once a day (10 Sep 2021 15:54)  Eliquis 2.5 mg oral tablet: 1 tab(s) orally 2 times a day (10 Sep 2021 15:54)  Lasix 40 mg oral tablet: 1 tab(s) orally once a day (10 Sep 2021 15:54)  Linzess 290 mcg oral capsule: 1 cap(s) orally once a day (10 Sep 2021 15:54)  Melatonin 3 mg oral tablet: 1 tab(s) orally once a day (at bedtime) (10 Sep 2021 15:54)  Namenda 10 mg oral tablet: 1 tab(s) orally 2 times a day (10 Sep 2021 15:54)  ondansetron 4 mg oral tablet: 1 tab(s) orally every 8 hours (10 Sep 2021 15:54)  oxyCODONE 5 mg oral tablet: 1 tab(s) orally every 8 hours (10 Sep 2021 15:54)  Senna 8.6 mg oral tablet: 1 tab(s) orally once a day (at bedtime) (10 Sep 2021 15:54)  Spiriva 18 mcg inhalation capsule: 1 cap(s) inhaled once a day (10 Sep 2021 15:54)  Symbicort 80 mcg-4.5 mcg/inh inhalation aerosol: 2 puff(s) inhaled 2 times a day (10 Sep 2021 15:54)  Synthroid 150 mcg (0.15 mg) oral tablet: 1 tab(s) orally once a day (10 Sep 2021 15:54)  traZODone 100 mg oral tablet: 1 tab(s) orally 2 times a day (10 Sep 2021 15:54)  Ventolin HFA 90 mcg/inh inhalation aerosol: 2 puff(s) inhaled every 8 hours (10 Sep 2021 15:54)  Zocor 10 mg oral tablet: 1 tab(s) orally once a day (at bedtime) (10 Sep 2021 15:54)    MEDICATIONS  (STANDING):  ALBUTerol    90 MICROgram(s) HFA Inhaler 2 Puff(s) Inhalation every 6 hours  artificial tears (preservative free) Ophthalmic Solution 1 Drop(s) Both EYES two times a day  budesonide  80 MICROgram(s)/formoterol 4.5 MICROgram(s) Inhaler 2 Puff(s) Inhalation two times a day  chlorhexidine 0.12% Liquid 15 milliLiter(s) Oral Mucosa every 12 hours  chlorhexidine 4% Liquid 1 Application(s) Topical <User Schedule>  dexMEDEtomidine Infusion 0.2 MICROgram(s)/kG/Hr (2.7 mL/Hr) IV Continuous <Continuous>  dextrose 40% Gel 15 Gram(s) Oral once  dextrose 5%. 1000 milliLiter(s) (50 mL/Hr) IV Continuous <Continuous>  dextrose 5%. 1000 milliLiter(s) (100 mL/Hr) IV Continuous <Continuous>  dextrose 50% Injectable 25 Gram(s) IV Push once  dextrose 50% Injectable 12.5 Gram(s) IV Push once  dextrose 50% Injectable 25 Gram(s) IV Push once  DOBUTamine Infusion 2.5 MICROgram(s)/kG/Min (4.05 mL/Hr) IV Continuous <Continuous>  fat emulsion (Fish Oil and Plant Based) 20% Infusion 13.3 mL/Hr (13.3 mL/Hr) IV Continuous <Continuous>  fentaNYL   Infusion. 0.9 MICROgram(s)/kG/Hr (4.86 mL/Hr) IV Continuous <Continuous>  fluconAZOLE IVPB 200 milliGRAM(s) IV Intermittent every 24 hours  fluconAZOLE IVPB      glucagon  Injectable 1 milliGRAM(s) IntraMuscular once  heparin   Injectable 2500 Unit(s) SubCutaneous every 12 hours  insulin lispro (ADMELOG) corrective regimen sliding scale   SubCutaneous three times a day before meals  levothyroxine Injectable 112 MICROGram(s) IV Push at bedtime  meropenem  IVPB 1000 milliGRAM(s) IV Intermittent every 8 hours  meropenem  IVPB      norepinephrine Infusion 0.05 MICROgram(s)/kG/Min (2.53 mL/Hr) IV Continuous <Continuous>  pantoprazole  Injectable 40 milliGRAM(s) IV Push two times a day  Parenteral Nutrition - Adult 1 Each (63 mL/Hr) TPN Continuous <Continuous>  Parenteral Nutrition - Adult 1 Each (63 mL/Hr) TPN Continuous <Continuous>  propofol Infusion 40 MICROgram(s)/kG/Min (13 mL/Hr) IV Continuous <Continuous>  vancomycin  IVPB 1000 milliGRAM(s) IV Intermittent every 12 hours  vasopressin Infusion 0.04 Unit(s)/Min (2.4 mL/Hr) IV Continuous <Continuous>    MEDICATIONS  (PRN):    Mode: AC/ CMV (Assist Control/ Continuous Mandatory Ventilation)  RR (machine): 18  TV (machine): 350  FiO2: 40  PEEP: 5  ITime: 0.62  MAP: 8  PIP: 14      Physical exam:   General:               Pt is intubated, sedated                          Neuro:                  Could not assess                          Cardiovascular:   S1 & S2, regular                           Respiratory:         Air entry is fair and equal on both sides, has bilateral conducted sounds                           GI:                          Soft, nondistended and nontender, Bowel sounds active                            Ext:                        No cyanosis, has upper ext  edema                              Labs:                                                                           8.8    13.42 )-----------( 278      ( 19 Sep 2021 04:21 )             27.9             09-19    133<L>  |  97<L>  |  23  ----------------------------<  181<H>  4.8   |  26  |  0.31<L>    Ca    9.0      19 Sep 2021 04:21  Phos  3.5     09-19  Mg     2.20     09-19    TPro  5.3<L>  /  Alb  2.5<L>  /  TBili  0.4  /  DBili  x   /  AST  27  /  ALT  8   /  AlkPhos  75  09-19                    LIVER FUNCTIONS - ( 19 Sep 2021 04:21 )  Alb: 2.5 g/dL / Pro: 5.3 g/dL / ALK PHOS: 75 U/L / ALT: 8 U/L / AST: 27 U/L / GGT: x               Culture - Blood (collected 17 Sep 2021 20:30)  Source: .Blood Blood  Preliminary Report (18 Sep 2021 21:01):    No growth to date.      CXR: < from: Xray Chest 1 View- PORTABLE-Routine (09.18.21 @ 05:04) >  Rotation into an SCHOFIELD projection limits evaluation. The endotracheal tube has its tip just above the mi.    Left IJ line has been removed and a right IJ placed with tip in the SVC.    Small pneumothorax suggested on the last exam not appreciated onthis study.    Bilateral layering effusions suggested. Mild vascular congestion.      COMPARISON:  September 17      IMPRESSION:  Mild interstitial edema with endotracheal and right IJ line in place. No pneumothorax.          Plan:  General: 81 y. o. female BIBA to [facility] from nursing facility c/o hematemesis.  According to pt it was the first episode and happened while she was eating dinner. Followed by multiple episodes of N/V with bright blood and epigastric cramps.  She was intubated for airway protection and underwent EGD that revealed large diverticula at 28 cm filled with blood and 5 mm tear at distal esophagus, a hiatal hernia and large amount of blood in the fundus.  Diverticula was injected with Epinephrine.  Patient was transferred to [facility] for further management. (10 Sep 2021 15:04)                              Neuro:                                         Pain control with Fentanyl  /  Tylenol PRN    Sedated on Propofol. D/C Precedex.                            Cardiovascular:                                          Chronic systolic heart failure with severe TR / Moderate MR  Continue Dobutamine drip  Gentle diuresis to keep I/Os even to negative  CVP is 12 SVO2  Cardiology f/u      Septic shock: Continue hemodynamic monitoring.  Levo is off and  Continue Vaso 0.08uts                                         Titrate  Vaso to MAP>65.                             Respiratory:                                         Pt is on full mechanical vent support. TX--40-5                                         Appears comfortable, not in any distress.                                         Continue bronchodilators, pulmonary toilet - PRN     Consider bronch     Less likely to be weaned off vent and extubated.   Will discuss with family regarding trach / PEG and goals of care  Palliative care f/u                                                                   GI                                         NPO with TPN                                         Continue Zofran / Reglan for nausea - PRN  	    Monitor Hct and transfuse for Hb<8  G.I f/u                                                                 Renal:     Replace K/Mg/Phos                                        Monitor I/Os and electrolytes                                                                                        Hem/ Onc:                                         Acute blood loss anemia     Monitor Hct and transfuse for Hb<8                                          Follow CBC in AM                           Infectious disease:                                          Sepsis with Enterococci and Candida in the pleural fluid: Continue antibiotics  Vanco / Meropenem /  Diflucan                                           Follow cultures      Monitor for fever / leukocytosis.       Has new RIJ TLC      I.D f/u                                                                    Endocrine                                             DM-2 / Hyperglycemia: Continue Accu-Checks with coverage    Pt is on SQ Heparin / Lovenox  and Venodyne boots for DVT prophylaxis.     Pertinent clinical, laboratory, radiographic, hemodynamic, echocardiographic, respiratory data, microbiologic data and chart were reviewed and analyzed frequently throughout the course of the day and night  Patient seen, examined and plan discussed with Dr. Zuñiga /   CTICU team during rounds.    I have spent  80  minutes of critical care time with this pt between  7am and 11.59pm.           Matty Villalobos MD

## 2021-09-19 NOTE — PROGRESS NOTE ADULT - SUBJECTIVE AND OBJECTIVE BOX
NUTRITION NOTE  [id number] NAGI  ===============================    Interval events - Parenteral nutrition was initiated on 9/14/21 via central line; pt with intermittent fevers, prelim blood cultures remain negative.     ROS - unable to obtain, pt is on vent support     Allergies  Sulfur Colliod (Rash)  Tylenol (Swelling)    PAST MEDICAL & SURGICAL HISTORY:  Afib  History of COPD  HTN (hypertension)  Hypothyroid  GERD (gastroesophageal reflux disease)  Esophageal diverticulum  Presence of IVC filter    ICU Vital Signs Last 24 Hrs  T(C): 35.9 (19 Sep 2021 08:00), Max: 39.4 (18 Sep 2021 12:00)  T(F): 96.7 (19 Sep 2021 08:00), Max: 102.9 (18 Sep 2021 12:00)  HR: 86 (19 Sep 2021 08:00) (76 - 117)  ABP: 117/63 (19 Sep 2021 08:00) (86/71 - 119/64)  ABP(mean): 83 (19 Sep 2021 08:00) (72 - 94)  RR: 19 (19 Sep 2021 08:00) (18 - 25)  SpO2: 98% (19 Sep 2021 08:00) (94% - 100%)    MEDICATIONS  (STANDING):  ALBUTerol    90 MICROgram(s) HFA Inhaler 2 Puff(s) Inhalation every 6 hours  artificial tears (preservative free) Ophthalmic Solution 1 Drop(s) Both EYES two times a day  budesonide  80 MICROgram(s)/formoterol 4.5 MICROgram(s) Inhaler 2 Puff(s) Inhalation two times a day  chlorhexidine 0.12% Liquid 15 milliLiter(s) Oral Mucosa every 12 hours  chlorhexidine 4% Liquid 1 Application(s) Topical <User Schedule>  dexMEDEtomidine Infusion 0.2 MICROgram(s)/kG/Hr (2.7 mL/Hr) IV Continuous <Continuous>  dextrose 40% Gel 15 Gram(s) Oral once  dextrose 5%. 1000 milliLiter(s) (100 mL/Hr) IV Continuous <Continuous>  dextrose 5%. 1000 milliLiter(s) (50 mL/Hr) IV Continuous <Continuous>  dextrose 50% Injectable 25 Gram(s) IV Push once  dextrose 50% Injectable 12.5 Gram(s) IV Push once  dextrose 50% Injectable 25 Gram(s) IV Push once  DOBUTamine Infusion 2.5 MICROgram(s)/kG/Min (4.05 mL/Hr) IV Continuous <Continuous>  fat emulsion (Fish Oil and Plant Based) 20% Infusion 13.3 mL/Hr (13.3 mL/Hr) IV Continuous <Continuous>  fentaNYL   Infusion. 0.9 MICROgram(s)/kG/Hr (4.86 mL/Hr) IV Continuous <Continuous>  fluconAZOLE IVPB 200 milliGRAM(s) IV Intermittent every 24 hours  fluconAZOLE IVPB      glucagon  Injectable 1 milliGRAM(s) IntraMuscular once  heparin   Injectable 2500 Unit(s) SubCutaneous every 12 hours  insulin lispro (ADMELOG) corrective regimen sliding scale   SubCutaneous three times a day before meals  levothyroxine Injectable 112 MICROGram(s) IV Push at bedtime  meropenem  IVPB 1000 milliGRAM(s) IV Intermittent every 8 hours  meropenem  IVPB      norepinephrine Infusion 0.05 MICROgram(s)/kG/Min (2.53 mL/Hr) IV Continuous <Continuous>  pantoprazole  Injectable 40 milliGRAM(s) IV Push two times a day  Parenteral Nutrition - Adult 1 Each (63 mL/Hr) TPN Continuous <Continuous>  Parenteral Nutrition - Adult 1 Each (63 mL/Hr) TPN Continuous <Continuous>  propofol Infusion 40 MICROgram(s)/kG/Min (13 mL/Hr) IV Continuous <Continuous>  vancomycin  IVPB 1000 milliGRAM(s) IV Intermittent every 12 hours  vasopressin Infusion 0.04 Unit(s)/Min (2.4 mL/Hr) IV Continuous <Continuous>    I&O's Detail    18 Sep 2021 07:01  -  19 Sep 2021 07:00  --------------------------------------------------------  IN:    Dexmedetomidine: 453.6 mL    DOBUTamine: 118.6 mL    Fat Emulsion (Fish Oil &amp; Plant Based) 20% Infusion: 279.3 mL    FentaNYL: 93.1 mL    IV PiggyBack: 500 mL    IV PiggyBack: 50 mL    IV PiggyBack: 100 mL    IV PiggyBack: 250 mL    Norepinephrine: 35.5 mL    Propofol: 271.2 mL    TPN (Total Parenteral Nutrition): 1512 mL    Vasopressin: 75.2 mL  Total IN: 3738.5 mL    OUT:    Indwelling Catheter - Urethral (mL): 2390 mL  Total OUT: 2390 mL    Total NET: 1348.5 mL      19 Sep 2021 07:01  -  19 Sep 2021 09:53  --------------------------------------------------------  IN:    Dexmedetomidine: 18.9 mL    DOBUTamine: 5.7 mL    Propofol: 11.3 mL    TPN (Total Parenteral Nutrition): 63 mL    Vasopressin: 4.6 mL  Total IN: 103.5 mL    OUT:    Indwelling Catheter - Urethral (mL): 100 mL    Norepinephrine: 0 mL  Total OUT: 100 mL    Total NET: 3.5 mL    POCT Blood Glucose.: 162 mg/dL (19 Sep 2021 05:21)  POCT Blood Glucose.: 176 mg/dL (18 Sep 2021 16:17)    Weight (kg): 54 (10 Sep 2021 00:39)    PHYSICAL EXAM:  Gen: Comfortable, No acute distress, frail elderly, sedated   Resp: mechanica breath sounds   Abd: Soft, Non-distended   Skin: Warm, 1+ peripheral edema of lower extremities    Mode: AC/ CMV (Assist Control/ Continuous Mandatory Ventilation)  RR (machine): 18  TV (machine): 350  FiO2: 40  PEEP: 5  ITime: 0.62  MAP: 10  PIP: 24    Diet: NPO and TPN/lipids (started on 9/14/21)    LABORATORY                                          8.8    13.42 )-----------( 278      ( 19 Sep 2021 04:21 )             27.9   09-19    133<L>  |  97<L>  |  23  ----------------------------<  181<H>  4.8   |  26  |  0.31<L>    Ca    9.0      19 Sep 2021 04:21  Phos  3.5     09-19  Mg     2.20     09-19    TPro  5.3<L>  /  Alb  2.5<L>  /  TBili  0.4  /  DBili  x   /  AST  27  /  ALT  8   /  AlkPhos  75  09-19    LIVER FUNCTIONS - ( 19 Sep 2021 04:21 )  Alb: 2.5 g/dL / Pro: 5.3 g/dL / ALK PHOS: 75 U/L / ALT: 8 U/L / AST: 27 U/L / GGT: x           09-15 Chol -- LDL -- HDL -- Trig 103    RECENT CULTURES    Culture - Blood (collected 17 Sep 2021 20:30)  Source: .Blood Blood  Preliminary Report (18 Sep 2021 21:01):    No growth to date.    Culture - Sputum (collected 15 Sep 2021 10:27)  Source: .Sputum Sputum  Gram Stain (15 Sep 2021 23:18):    Moderate polymorphonuclear leukocytes per low power field    No Squamous epithelial cells per low power field    Rare Yeast like cells per oil power field  Preliminary Report (16 Sep 2021 10:03):    Normal Respiratory Consuelo present    ASSESSMENT/PLAN:  82 y/o female transferred to CT ICU for a patient who was noted to have an esophageal mass at [facility]. EGD done at outside hospital on 9/9/21 -Large diverticula noted at 28cm filled with blood and blood clots, periphery of diverticula was injected with epinephrine, 5mm distal esophageal tear without bleeding, hiatal hernia. Patient meets criteria for severe malnutrition in the context of acute illness. Patient's signs/symptoms as evidenced by severe loss of muscle mass and fat stores, 1+ edema, NPO >3 days.  Nutrition support service consulted for initiation of TPN/lipids via central line in view of malnutrition and anticipated prolonged NPO status.     continue TPN with infusion volume of 1.5 L, TPN will provide 1243 kcal/day    labs reviewed - electrolytes adjusted in TPN bag     monitor fingersticks, obtain daily weights     continue parenteral nutrition at this time, will follow up with primary team on plan - monitor for fevers and follow up blood cultures/ID recommendations      1.  Severe protein calorie malnutrition being optimized with TPN: CHO [175] gm.  AA [82] gm. SMOF Lipids [32] gm.  2.  Hyperglycemia managed with: [0] units of regular insulin    3.  Check fluid balance daily.  Strict I/O  [ ] [ ]   4.  Daily BMP, Ionized Calcium, Magnesium and Phosphorous   5.  Triglycerides at initiation of TPN and monthly [ ] [ ]     Nutrition Support [id number]

## 2021-09-20 LAB
ALBUMIN SERPL ELPH-MCNC: 2.7 G/DL — LOW (ref 3.3–5)
ALBUMIN SERPL ELPH-MCNC: 2.8 G/DL — LOW (ref 3.3–5)
ALP SERPL-CCNC: 73 U/L — SIGNIFICANT CHANGE UP (ref 40–120)
ALP SERPL-CCNC: 75 U/L — SIGNIFICANT CHANGE UP (ref 40–120)
ALT FLD-CCNC: 11 U/L — SIGNIFICANT CHANGE UP (ref 4–33)
ALT FLD-CCNC: 16 U/L — SIGNIFICANT CHANGE UP (ref 4–33)
ANION GAP SERPL CALC-SCNC: 6 MMOL/L — LOW (ref 7–14)
ANION GAP SERPL CALC-SCNC: 8 MMOL/L — SIGNIFICANT CHANGE UP (ref 7–14)
AST SERPL-CCNC: 32 U/L — SIGNIFICANT CHANGE UP (ref 4–32)
AST SERPL-CCNC: 40 U/L — HIGH (ref 4–32)
BASE EXCESS BLDV CALC-SCNC: 8.2 MMOL/L — HIGH (ref -2–3)
BILIRUB SERPL-MCNC: 0.3 MG/DL — SIGNIFICANT CHANGE UP (ref 0.2–1.2)
BILIRUB SERPL-MCNC: 0.6 MG/DL — SIGNIFICANT CHANGE UP (ref 0.2–1.2)
BLD GP AB SCN SERPL QL: NEGATIVE — SIGNIFICANT CHANGE UP
BLOOD GAS ARTERIAL COMPREHENSIVE RESULT: SIGNIFICANT CHANGE UP
BLOOD GAS ARTERIAL COMPREHENSIVE RESULT: SIGNIFICANT CHANGE UP
BUN SERPL-MCNC: 15 MG/DL — SIGNIFICANT CHANGE UP (ref 7–23)
BUN SERPL-MCNC: 17 MG/DL — SIGNIFICANT CHANGE UP (ref 7–23)
CA-I BLD-SCNC: 1.08 MMOL/L — LOW (ref 1.15–1.29)
CALCIUM SERPL-MCNC: 8.9 MG/DL — SIGNIFICANT CHANGE UP (ref 8.4–10.5)
CALCIUM SERPL-MCNC: 9.1 MG/DL — SIGNIFICANT CHANGE UP (ref 8.4–10.5)
CHLORIDE SERPL-SCNC: 91 MMOL/L — LOW (ref 98–107)
CHLORIDE SERPL-SCNC: 94 MMOL/L — LOW (ref 98–107)
CHOLEST SERPL-MCNC: 115 MG/DL — SIGNIFICANT CHANGE UP
CO2 BLDV-SCNC: 37.3 MMOL/L — HIGH (ref 22–26)
CO2 SERPL-SCNC: 32 MMOL/L — HIGH (ref 22–31)
CO2 SERPL-SCNC: 37 MMOL/L — HIGH (ref 22–31)
CREAT SERPL-MCNC: 0.22 MG/DL — LOW (ref 0.5–1.3)
CREAT SERPL-MCNC: 0.27 MG/DL — LOW (ref 0.5–1.3)
CULTURE RESULTS: SIGNIFICANT CHANGE UP
CULTURE RESULTS: SIGNIFICANT CHANGE UP
FERRITIN SERPL-MCNC: 171 NG/ML — HIGH (ref 15–150)
GAS PNL BLDV: SIGNIFICANT CHANGE UP
GLUCOSE BLDC GLUCOMTR-MCNC: 124 MG/DL — HIGH (ref 70–99)
GLUCOSE BLDC GLUCOMTR-MCNC: 125 MG/DL — HIGH (ref 70–99)
GLUCOSE BLDC GLUCOMTR-MCNC: 153 MG/DL — HIGH (ref 70–99)
GLUCOSE SERPL-MCNC: 167 MG/DL — HIGH (ref 70–99)
GLUCOSE SERPL-MCNC: 193 MG/DL — HIGH (ref 70–99)
HAPTOGLOB SERPL-MCNC: 281 MG/DL — HIGH (ref 34–200)
HCO3 BLDV-SCNC: 36 MMOL/L — HIGH (ref 22–29)
HCT VFR BLD CALC: 24.4 % — LOW (ref 34.5–45)
HCT VFR BLD CALC: 25.5 % — LOW (ref 34.5–45)
HDLC SERPL-MCNC: 20 MG/DL — LOW
HGB BLD-MCNC: 7.9 G/DL — LOW (ref 11.5–15.5)
HGB BLD-MCNC: 8.2 G/DL — LOW (ref 11.5–15.5)
IRON SATN MFR SERPL: 20 UG/DL — LOW (ref 30–160)
IRON SATN MFR SERPL: 8 % — LOW (ref 14–50)
LDH SERPL L TO P-CCNC: 624 U/L — HIGH (ref 135–225)
LIPID PNL WITH DIRECT LDL SERPL: 69 MG/DL — SIGNIFICANT CHANGE UP
MAGNESIUM SERPL-MCNC: 1.7 MG/DL — SIGNIFICANT CHANGE UP (ref 1.6–2.6)
MAGNESIUM SERPL-MCNC: 1.8 MG/DL — SIGNIFICANT CHANGE UP (ref 1.6–2.6)
MCHC RBC-ENTMCNC: 30.6 PG — SIGNIFICANT CHANGE UP (ref 27–34)
MCHC RBC-ENTMCNC: 30.7 PG — SIGNIFICANT CHANGE UP (ref 27–34)
MCHC RBC-ENTMCNC: 32.2 GM/DL — SIGNIFICANT CHANGE UP (ref 32–36)
MCHC RBC-ENTMCNC: 32.4 GM/DL — SIGNIFICANT CHANGE UP (ref 32–36)
MCV RBC AUTO: 94.6 FL — SIGNIFICANT CHANGE UP (ref 80–100)
MCV RBC AUTO: 95.5 FL — SIGNIFICANT CHANGE UP (ref 80–100)
NON HDL CHOLESTEROL: 95 MG/DL — SIGNIFICANT CHANGE UP
NRBC # BLD: 0 /100 WBCS — SIGNIFICANT CHANGE UP
NRBC # BLD: 0 /100 WBCS — SIGNIFICANT CHANGE UP
NRBC # FLD: 0 K/UL — SIGNIFICANT CHANGE UP
NRBC # FLD: 0.02 K/UL — HIGH
PCO2 BLDV: 60 MMHG — HIGH (ref 39–42)
PH BLDV: 7.38 — SIGNIFICANT CHANGE UP (ref 7.32–7.43)
PHOSPHATE SERPL-MCNC: 2.6 MG/DL — SIGNIFICANT CHANGE UP (ref 2.5–4.5)
PHOSPHATE SERPL-MCNC: 2.6 MG/DL — SIGNIFICANT CHANGE UP (ref 2.5–4.5)
PLATELET # BLD AUTO: 273 K/UL — SIGNIFICANT CHANGE UP (ref 150–400)
PLATELET # BLD AUTO: 284 K/UL — SIGNIFICANT CHANGE UP (ref 150–400)
PO2 BLDV: 46 MMHG — SIGNIFICANT CHANGE UP
POTASSIUM SERPL-MCNC: 2.7 MMOL/L — CRITICAL LOW (ref 3.5–5.3)
POTASSIUM SERPL-MCNC: 3 MMOL/L — LOW (ref 3.5–5.3)
POTASSIUM SERPL-SCNC: 2.7 MMOL/L — CRITICAL LOW (ref 3.5–5.3)
POTASSIUM SERPL-SCNC: 3 MMOL/L — LOW (ref 3.5–5.3)
PROT SERPL-MCNC: 5.3 G/DL — LOW (ref 6–8.3)
PROT SERPL-MCNC: 5.5 G/DL — LOW (ref 6–8.3)
RBC # BLD: 2.58 M/UL — LOW (ref 3.8–5.2)
RBC # BLD: 2.67 M/UL — LOW (ref 3.8–5.2)
RBC # FLD: 15.6 % — HIGH (ref 10.3–14.5)
RBC # FLD: 15.7 % — HIGH (ref 10.3–14.5)
RETICS #: 151.4 K/UL — HIGH (ref 25–125)
RETICS/RBC NFR: 5.7 % — HIGH (ref 0.5–2.5)
RH IG SCN BLD-IMP: POSITIVE — SIGNIFICANT CHANGE UP
SAO2 % BLDV: 75.4 % — SIGNIFICANT CHANGE UP
SODIUM SERPL-SCNC: 134 MMOL/L — LOW (ref 135–145)
SODIUM SERPL-SCNC: 134 MMOL/L — LOW (ref 135–145)
SPECIMEN SOURCE: SIGNIFICANT CHANGE UP
SPECIMEN SOURCE: SIGNIFICANT CHANGE UP
TIBC SERPL-MCNC: 256 UG/DL — SIGNIFICANT CHANGE UP (ref 220–430)
TRIGL SERPL-MCNC: 129 MG/DL — SIGNIFICANT CHANGE UP
UIBC SERPL-MCNC: 236 UG/DL — SIGNIFICANT CHANGE UP (ref 110–370)
VANCOMYCIN TROUGH SERPL-MCNC: 8.3 UG/ML — LOW (ref 10–20)
WBC # BLD: 8.47 K/UL — SIGNIFICANT CHANGE UP (ref 3.8–10.5)
WBC # BLD: 9.52 K/UL — SIGNIFICANT CHANGE UP (ref 3.8–10.5)
WBC # FLD AUTO: 8.47 K/UL — SIGNIFICANT CHANGE UP (ref 3.8–10.5)
WBC # FLD AUTO: 9.52 K/UL — SIGNIFICANT CHANGE UP (ref 3.8–10.5)

## 2021-09-20 PROCEDURE — 99291 CRITICAL CARE FIRST HOUR: CPT

## 2021-09-20 PROCEDURE — 71045 X-RAY EXAM CHEST 1 VIEW: CPT | Mod: 26

## 2021-09-20 PROCEDURE — 99232 SBSQ HOSP IP/OBS MODERATE 35: CPT

## 2021-09-20 RX ORDER — I.V. FAT EMULSION 20 G/100ML
13.3 EMULSION INTRAVENOUS
Qty: 32 | Refills: 0 | Status: DISCONTINUED | OUTPATIENT
Start: 2021-09-20 | End: 2021-09-21

## 2021-09-20 RX ORDER — POTASSIUM CHLORIDE 20 MEQ
20 PACKET (EA) ORAL
Refills: 0 | Status: COMPLETED | OUTPATIENT
Start: 2021-09-20 | End: 2021-09-20

## 2021-09-20 RX ORDER — ELECTROLYTE SOLUTION,INJ
1 VIAL (ML) INTRAVENOUS
Refills: 0 | Status: DISCONTINUED | OUTPATIENT
Start: 2021-09-20 | End: 2021-09-20

## 2021-09-20 RX ORDER — MAGNESIUM SULFATE 500 MG/ML
1 VIAL (ML) INJECTION ONCE
Refills: 0 | Status: COMPLETED | OUTPATIENT
Start: 2021-09-20 | End: 2021-09-20

## 2021-09-20 RX ORDER — POTASSIUM CHLORIDE 20 MEQ
20 PACKET (EA) ORAL ONCE
Refills: 0 | Status: COMPLETED | OUTPATIENT
Start: 2021-09-20 | End: 2021-09-20

## 2021-09-20 RX ORDER — HEPARIN SODIUM 5000 [USP'U]/ML
5000 INJECTION INTRAVENOUS; SUBCUTANEOUS EVERY 12 HOURS
Refills: 0 | Status: DISCONTINUED | OUTPATIENT
Start: 2021-09-20 | End: 2021-10-05

## 2021-09-20 RX ORDER — POTASSIUM CHLORIDE 20 MEQ
20 PACKET (EA) ORAL ONCE
Refills: 0 | Status: COMPLETED | OUTPATIENT
Start: 2021-09-20 | End: 2021-09-21

## 2021-09-20 RX ADMIN — MEROPENEM 100 MILLIGRAM(S): 1 INJECTION INTRAVENOUS at 14:36

## 2021-09-20 RX ADMIN — Medication 250 MILLIGRAM(S): at 05:16

## 2021-09-20 RX ADMIN — Medication 1 DROP(S): at 18:06

## 2021-09-20 RX ADMIN — BUDESONIDE AND FORMOTEROL FUMARATE DIHYDRATE 2 PUFF(S): 160; 4.5 AEROSOL RESPIRATORY (INHALATION) at 21:47

## 2021-09-20 RX ADMIN — Medication 100 MILLIEQUIVALENT(S): at 06:18

## 2021-09-20 RX ADMIN — DEXMEDETOMIDINE HYDROCHLORIDE IN 0.9% SODIUM CHLORIDE 2.7 MICROGRAM(S)/KG/HR: 4 INJECTION INTRAVENOUS at 12:17

## 2021-09-20 RX ADMIN — BUDESONIDE AND FORMOTEROL FUMARATE DIHYDRATE 2 PUFF(S): 160; 4.5 AEROSOL RESPIRATORY (INHALATION) at 09:36

## 2021-09-20 RX ADMIN — MEROPENEM 100 MILLIGRAM(S): 1 INJECTION INTRAVENOUS at 21:10

## 2021-09-20 RX ADMIN — MEROPENEM 100 MILLIGRAM(S): 1 INJECTION INTRAVENOUS at 05:16

## 2021-09-20 RX ADMIN — Medication 1 DROP(S): at 05:15

## 2021-09-20 RX ADMIN — ALBUTEROL 2 PUFF(S): 90 AEROSOL, METERED ORAL at 09:36

## 2021-09-20 RX ADMIN — DEXMEDETOMIDINE HYDROCHLORIDE IN 0.9% SODIUM CHLORIDE 2.7 MICROGRAM(S)/KG/HR: 4 INJECTION INTRAVENOUS at 18:33

## 2021-09-20 RX ADMIN — FLUCONAZOLE 100 MILLIGRAM(S): 150 TABLET ORAL at 16:27

## 2021-09-20 RX ADMIN — PROPOFOL 13 MICROGRAM(S)/KG/MIN: 10 INJECTION, EMULSION INTRAVENOUS at 12:16

## 2021-09-20 RX ADMIN — ALBUTEROL 2 PUFF(S): 90 AEROSOL, METERED ORAL at 03:27

## 2021-09-20 RX ADMIN — Medication 250 MILLIGRAM(S): at 22:07

## 2021-09-20 RX ADMIN — CHLORHEXIDINE GLUCONATE 1 APPLICATION(S): 213 SOLUTION TOPICAL at 05:16

## 2021-09-20 RX ADMIN — Medication 100 MILLIEQUIVALENT(S): at 08:35

## 2021-09-20 RX ADMIN — VASOPRESSIN 2.4 UNIT(S)/MIN: 20 INJECTION INTRAVENOUS at 12:17

## 2021-09-20 RX ADMIN — Medication 112 MICROGRAM(S): at 21:10

## 2021-09-20 RX ADMIN — ALBUTEROL 2 PUFF(S): 90 AEROSOL, METERED ORAL at 15:14

## 2021-09-20 RX ADMIN — HEPARIN SODIUM 2500 UNIT(S): 5000 INJECTION INTRAVENOUS; SUBCUTANEOUS at 05:15

## 2021-09-20 RX ADMIN — CHLORHEXIDINE GLUCONATE 15 MILLILITER(S): 213 SOLUTION TOPICAL at 17:46

## 2021-09-20 RX ADMIN — Medication 2.5 MG/HR: at 00:45

## 2021-09-20 RX ADMIN — PANTOPRAZOLE SODIUM 40 MILLIGRAM(S): 20 TABLET, DELAYED RELEASE ORAL at 05:15

## 2021-09-20 RX ADMIN — ALBUTEROL 2 PUFF(S): 90 AEROSOL, METERED ORAL at 21:47

## 2021-09-20 RX ADMIN — Medication 1 EACH: at 17:44

## 2021-09-20 RX ADMIN — HEPARIN SODIUM 5000 UNIT(S): 5000 INJECTION INTRAVENOUS; SUBCUTANEOUS at 17:46

## 2021-09-20 RX ADMIN — Medication 100 MILLIEQUIVALENT(S): at 22:05

## 2021-09-20 RX ADMIN — I.V. FAT EMULSION 13.3 ML/HR: 20 EMULSION INTRAVENOUS at 17:45

## 2021-09-20 RX ADMIN — Medication 100 GRAM(S): at 06:58

## 2021-09-20 RX ADMIN — PANTOPRAZOLE SODIUM 40 MILLIGRAM(S): 20 TABLET, DELAYED RELEASE ORAL at 17:46

## 2021-09-20 RX ADMIN — CHLORHEXIDINE GLUCONATE 15 MILLILITER(S): 213 SOLUTION TOPICAL at 05:15

## 2021-09-20 NOTE — PROGRESS NOTE ADULT - ATTENDING COMMENTS
I agree with the above history, physical examination, chief complaint/diagnosis, and plan, which I have reviewed and edited where appropriate.  I agree with notes/assessment and detailed interval history of health care providers on my service.  I have seen and examined the patient.  I reviewed the laboratory and available data and agree with the history, physical assessment and plan.  I reviewed and discussed with all consultants, house staff and PA's.  The Nutrition Support Team (NST) discusses on an ongoing basis with the primary team and all consultants, House staff and PA's to have a permanent risk benefit analyses on all decisions and coordinating care.  I was physically present for the key portions of the evaluation and management (E/M) service provided.  82 y/o female with severe CP malnutrition in the context of acute illness receiving TPN/lipids for prolonged NPO status.   distress, frail elderly, sedated   Resp: coarse bs  Abd: Soft, Non-distended   Skin: Warm, 1+ peripheral edema   labs reviewed - electrolytes adjusted  continue TPN with infusion volume of 1.5 L/1243 kcal/day

## 2021-09-20 NOTE — PROGRESS NOTE ADULT - SUBJECTIVE AND OBJECTIVE BOX
Patient seen and examined at bedside.    Overnight Events: No events. Patient intubated, sedated. Not responding.     Review Of Systems: No chest pain, shortness of breath, or palpitations            Current Meds:  ALBUTerol    90 MICROgram(s) HFA Inhaler 2 Puff(s) Inhalation every 6 hours  artificial tears (preservative free) Ophthalmic Solution 1 Drop(s) Both EYES two times a day  budesonide  80 MICROgram(s)/formoterol 4.5 MICROgram(s) Inhaler 2 Puff(s) Inhalation two times a day  chlorhexidine 0.12% Liquid 15 milliLiter(s) Oral Mucosa every 12 hours  chlorhexidine 4% Liquid 1 Application(s) Topical <User Schedule>  dexMEDEtomidine Infusion 0.2 MICROgram(s)/kG/Hr IV Continuous <Continuous>  dextrose 40% Gel 15 Gram(s) Oral once  dextrose 5%. 1000 milliLiter(s) IV Continuous <Continuous>  dextrose 5%. 1000 milliLiter(s) IV Continuous <Continuous>  dextrose 50% Injectable 25 Gram(s) IV Push once  dextrose 50% Injectable 12.5 Gram(s) IV Push once  dextrose 50% Injectable 25 Gram(s) IV Push once  DOBUTamine Infusion 2.5 MICROgram(s)/kG/Min IV Continuous <Continuous>  fat emulsion (Fish Oil and Plant Based) 20% Infusion 13.3 mL/Hr IV Continuous <Continuous>  fentaNYL   Infusion. 0.9 MICROgram(s)/kG/Hr IV Continuous <Continuous>  fluconAZOLE IVPB 200 milliGRAM(s) IV Intermittent every 24 hours  fluconAZOLE IVPB      furosemide Infusion 2.5 mG/Hr IV Continuous <Continuous>  glucagon  Injectable 1 milliGRAM(s) IntraMuscular once  heparin   Injectable 5000 Unit(s) SubCutaneous every 12 hours  insulin lispro (ADMELOG) corrective regimen sliding scale   SubCutaneous three times a day before meals  levothyroxine Injectable 112 MICROGram(s) IV Push at bedtime  meropenem  IVPB 1000 milliGRAM(s) IV Intermittent every 8 hours  meropenem  IVPB      norepinephrine Infusion 0.05 MICROgram(s)/kG/Min IV Continuous <Continuous>  pantoprazole  Injectable 40 milliGRAM(s) IV Push two times a day  Parenteral Nutrition - Adult 1 Each TPN Continuous <Continuous>  Parenteral Nutrition - Adult 1 Each TPN Continuous <Continuous>  propofol Infusion 40 MICROgram(s)/kG/Min IV Continuous <Continuous>  vancomycin  IVPB 1000 milliGRAM(s) IV Intermittent every 12 hours  vasopressin Infusion 0.04 Unit(s)/Min IV Continuous <Continuous>      Vitals:  T(F): 98.6 (), Max: 100.4 ()  HR: 79 () (69 - 101)  BP: --  RR: 21 ()  SpO2: 100% ()  I&O's Summary    19 Sep 2021 07:01  -  20 Sep 2021 07:00  --------------------------------------------------------  IN: 3812.8 mL / OUT: 3580 mL / NET: 232.8 mL    20 Sep 2021 07:01  -  20 Sep 2021 18:02  --------------------------------------------------------  IN: 1326.8 mL / OUT: 1780 mL / NET: -453.2 mL        Physical Exam:  HEENT:   Normal oral mucosa, near pinpoint pupils b/l  Cardiovascular: Normal S1 S2, No JVD, No murmurs.  Respiratory: b/l Lungs diminished BS	  Psychiatry: sedated  Gastrointestinal:  Soft, Non-tender, + BS	  Skin: No rashes, No ecchymoses, No cyanosis, +warm	  Neurologic: sedated and unresponsive   Extremities: b/l pitting edema at upper shins  Vascular: Peripheral pulses palpable 1+ bilaterally                          7.9    8.47  )-----------( 273      ( 20 Sep 2021 12:53 )             24.4         134<L>  |  94<L>  |  17  ----------------------------<  167<H>  3.0<L>   |  32<H>  |  0.27<L>    Ca    9.1      20 Sep 2021 04:56  Phos  2.6       Mg     1.80     09-20    TPro  5.5<L>  /  Alb  2.8<L>  /  TBili  0.3  /  DBili  x   /  AST  32  /  ALT  11  /  AlkPhos  73  09-20            Total Cholesterol: 115  LDL: --  HDL: 20  T        New ECG(s): Personally reviewed    Echo:  CONCLUSIONS:  1. Mitral annular calcification, tethered mitral valve.  Moderate mitral regurgitation.  2. Calcified trileaflet aortic valve with normal opening.  3. Severely dilated left atrium. LA volume index = 59  cc/m2.  4. Severe global left ventricular systolic dysfunction.  Basal lateral and basal anterior walls are best preserved.  5. Severe right atrial enlargement.  6. Right ventricular enlargement with decreased right  ventricular systolic function.  7. Normal tricuspid valve. Moderate-severe tricuspid  regurgitation.  8. Estimated pulmonary artery systolic pressure equals 40  mm Hg, assuming right atrial pressure equals 10  mm Hg,  consistent with mild pulmonary hypertension.

## 2021-09-20 NOTE — CHART NOTE - NSCHARTNOTEFT_GEN_A_CORE
MICU PA POCUS NOTE     : AVA Van     INDICATION: Biventricular Failure     PROCEDURE:  [ x] LIMITED ECHO  [ x] LIMITED CHEST  [ ] LIMITED RETROPERITONEAL  [ ] LIMITED ABDOMINAL  [ ] LIMITED DVT  [ ] NEEDLE GUIDANCE VASCULAR  [ ] NEEDLE GUIDANCE THORACENTESIS  [ ] NEEDLE GUIDANCE PARACENTESIS  [ ] NEEDLE GUIDANCE PERICARDIOCENTESIS  [ ] OTHER    FINDINGS:  Thickened irregular pleural bilaterally with B on A line predominant bilaterally with BL LL consolidated lung.   Small bilateral simple pleural effusions noted   Moderate to severe left ventricular systolic dysfunction with slight improvement in overall     INTERPRETATION:  Consolidated lung likely in setting of atelectasis vs aspiration pneumonia   Moderate to severe left ventricular systolic dysfunction     Ben Van PA-C  Department of Critical Care   In house [facility] [id number]

## 2021-09-20 NOTE — CHART NOTE - NSCHARTNOTEFT_GEN_A_CORE
ICU PA NOTE     82 YO F with PMHx of COPD/ Emphysema, Hypothyroidism, HTN, GERD, HF, Moderate MR, Severe TR, AFIB on Eliquis and Esophageal Diverticula from [facility] presented to [facility] with Hematemesis. Patient intubated 9/9 for airway protection. CT CHEST @ OSH noted with questionable esophageal mass and s/p EGD 9/9 with large esophageal blood filled diverticula and esophageal tear. She was started on empiric therapy for possible aspiration, continued on PPI GTT and ultimately transferred to [facility] 9/10 for thoracic evaluation and underwent an EGD 9/14 with GI noted with clot in diverticulum that was cleared with no active bleeding. Course further complicated with high fever spikes and biventricular heart failure with EF of 20%. ABX broadened to Meropenem, Vancomycin and Diflucan. Cultures remained negative. Dobutamine and Lasix GTT started and Palliative care called. Patient current remains FULL CODE.     Case discussed with Son, Favio Villalpando ([phone]) at length today and current medical condition discussed at length. Ms. Villalpando is noted with malnutrition, failure to thrive, severe heart failure, poor lung function given hx of Emphysema and COPD, and overall poor prognosis for recovery. Favio expressed comprehension of overall poor prognosis, but remains hopeful for recovery. He has a sister Douglas that lives in [place] of whom helps with decision making and mutually they wish for Ms. Villalpando to remain FULL CODE. Given prolonged hospitalization and vent/ ETT time, discussion for Tracheostomy and PEG started including but not limited to risk vs benefits. Favio expressed comprehension again and is open to Tracheostomy and PEG placement if necessary. Will continue to monitor patient. Palliative care following.     ROGERS KangC  Department of Critical Care   In house [place] [id number] ICU PA NOTE     82 YO F with PMHx of COPD/ Emphysema, Hypothyroidism, HTN, GERD, HF, Moderate MR, Severe TR, AFIB on Eliquis and Esophageal Diverticula from [facility] presented to [facility] with Hematemesis. Patient intubated 9/9 for airway protection. CT CHEST @ OSH noted with questionable esophageal mass and s/p EGD 9/9 with large esophageal blood filled diverticula s/p epinephrine injection into diverticula and possible esophageal tear. She was started on empiric therapy for possible aspiration, continued on PPI GTT and ultimately transferred to [facility] 9/10 for thoracic evaluation and underwent an EGD 9/14 with GI noted with large clot in diverticulum that was cleared with no source or active bleeding. Course further complicated with high fever spikes and biventricular heart failure with EF of 20%. ABX broadened to Meropenem, Vancomycin and Diflucan. Cultures remained negative. Dobutamine and Lasix GTT started and Palliative care called. Patient current remains FULL CODE.     Case discussed with Son, Favio Villalpando ([phone]) at length today and current medical condition discussed at length. Ms. Villalpando is noted with malnutrition, failure to thrive, severe heart failure, poor lung function given hx of Emphysema and COPD, and overall poor prognosis for recovery. Favio expressed comprehension of overall poor prognosis, but remains hopeful for recovery. He has a sister Douglas that lives in [place] of whom helps with decision making and mutually they wish for Ms. Villalpando to remain FULL CODE. Given prolonged hospitalization and vent/ ETT time, discussion for Tracheostomy and PEG started including but not limited to risk vs benefits. Favio expressed comprehension again and is open to Tracheostomy and PEG placement if necessary. Will continue to monitor patient. Palliative care following.     Ben Van PA-C  Department of Critical Care   In house [place] [phone]

## 2021-09-20 NOTE — PROGRESS NOTE ADULT - SUBJECTIVE AND OBJECTIVE BOX
ROXIE LAZAR                     [id number]    HPI:  81 y. o. female BIBA to [facility] from nursing facility c/o hematemesis.  According to pt it was the first episode and happened while she was eating dinner. Followed by multiple episodes of N/V with bright blood and epigastric cramps.  She was intubated for airway protection and underwent EGD that revealed large diverticula at 28 cm filled with blood and 5 mm tear at distal esophagus, a hiatal hernia and large amount of blood in the fundus.  Diverticula was injected with Epinephrine.  Patient was transferred to [facility] for further management. (10 Sep 2021 15:04)      Issues:              Acute Hypoxic Respiratory failure  Chronic systolic heart failure  Moderate MR  Severe TR              Septic Shock              Esophageal diverticular bleed.   Acute blood loss anemia  COPD  Chronic Afib -- previously on Apixaban  Hypothyroidism  GERD                  PAST MEDICAL & SURGICAL HISTORY:  Afib    History of COPD    HTN (hypertension)    Hypothyroid    GERD (gastroesophageal reflux disease)    Esophageal diverticulum    Presence of IVC filter              VITAL SIGNS:  Vital Signs Last 24 Hrs  T(C): 37.6 (20 Sep 2021 11:00), Max: 38 (20 Sep 2021 04:00)  T(F): 99.7 (20 Sep 2021 11:00), Max: 100.4 (20 Sep 2021 04:00)  HR: 86 (20 Sep 2021 11:11) (79 - 101)  BP: --  BP(mean): --  RR: 21 (20 Sep 2021 11:00) (20 - 28)  SpO2: 98% (20 Sep 2021 11:11) (98% - 100%)    I/Os:   I&O's Detail    19 Sep 2021 07:01  -  20 Sep 2021 07:00  --------------------------------------------------------  IN:    Dexmedetomidine: 453.6 mL    DOBUTamine: 136.8 mL    Fat Emulsion (Fish Oil &amp; Plant Based) 20% Infusion: 133 mL    FentaNYL: 123.6 mL    Furosemide: 16 mL    IV PiggyBack: 700 mL    IV PiggyBack: 100 mL    IV PiggyBack: 250 mL    Propofol: 277.4 mL    TPN (Total Parenteral Nutrition): 1512 mL    Vasopressin: 110.4 mL  Total IN: 3812.8 mL    OUT:    Indwelling Catheter - Urethral (mL): 3580 mL    Norepinephrine: 0 mL  Total OUT: 3580 mL    Total NET: 232.8 mL      20 Sep 2021 07:01  -  20 Sep 2021 11:58  --------------------------------------------------------  IN:    Dexmedetomidine: 75.6 mL    DOBUTamine: 21.2 mL    FentaNYL: 21.6 mL    Furosemide: 5.2 mL    IV PiggyBack: 20 mL    Propofol: 52 mL    TPN (Total Parenteral Nutrition): 252 mL    Vasopressin: 18.4 mL  Total IN: 466 mL    OUT:    Indwelling Catheter - Urethral (mL): 740 mL    Norepinephrine: 0 mL  Total OUT: 740 mL    Total NET: -274 mL          CAPILLARY BLOOD GLUCOSE      POCT Blood Glucose.: 124 mg/dL (20 Sep 2021 08:33)  POCT Blood Glucose.: 148 mg/dL (19 Sep 2021 15:38)      =======================MEDICATIONS===================  MEDICATIONS  (STANDING):  ALBUTerol    90 MICROgram(s) HFA Inhaler 2 Puff(s) Inhalation every 6 hours  artificial tears (preservative free) Ophthalmic Solution 1 Drop(s) Both EYES two times a day  budesonide  80 MICROgram(s)/formoterol 4.5 MICROgram(s) Inhaler 2 Puff(s) Inhalation two times a day  chlorhexidine 0.12% Liquid 15 milliLiter(s) Oral Mucosa every 12 hours  chlorhexidine 4% Liquid 1 Application(s) Topical <User Schedule>  dexMEDEtomidine Infusion 0.2 MICROgram(s)/kG/Hr (2.7 mL/Hr) IV Continuous <Continuous>  dextrose 40% Gel 15 Gram(s) Oral once  dextrose 5%. 1000 milliLiter(s) (50 mL/Hr) IV Continuous <Continuous>  dextrose 5%. 1000 milliLiter(s) (100 mL/Hr) IV Continuous <Continuous>  dextrose 50% Injectable 25 Gram(s) IV Push once  dextrose 50% Injectable 25 Gram(s) IV Push once  dextrose 50% Injectable 12.5 Gram(s) IV Push once  DOBUTamine Infusion 2.5 MICROgram(s)/kG/Min (4.05 mL/Hr) IV Continuous <Continuous>  fat emulsion (Fish Oil and Plant Based) 20% Infusion 13.3 mL/Hr (13.3 mL/Hr) IV Continuous <Continuous>  fentaNYL   Infusion. 0.9 MICROgram(s)/kG/Hr (4.86 mL/Hr) IV Continuous <Continuous>  fluconAZOLE IVPB 200 milliGRAM(s) IV Intermittent every 24 hours  fluconAZOLE IVPB      furosemide Infusion 2.5 mG/Hr (1.25 mL/Hr) IV Continuous <Continuous>  glucagon  Injectable 1 milliGRAM(s) IntraMuscular once  heparin   Injectable 5000 Unit(s) SubCutaneous every 12 hours  insulin lispro (ADMELOG) corrective regimen sliding scale   SubCutaneous three times a day before meals  levothyroxine Injectable 112 MICROGram(s) IV Push at bedtime  meropenem  IVPB      meropenem  IVPB 1000 milliGRAM(s) IV Intermittent every 8 hours  norepinephrine Infusion 0.05 MICROgram(s)/kG/Min (2.53 mL/Hr) IV Continuous <Continuous>  pantoprazole  Injectable 40 milliGRAM(s) IV Push two times a day  Parenteral Nutrition - Adult 1 Each (63 mL/Hr) TPN Continuous <Continuous>  Parenteral Nutrition - Adult 1 Each (63 mL/Hr) TPN Continuous <Continuous>  propofol Infusion 40 MICROgram(s)/kG/Min (13 mL/Hr) IV Continuous <Continuous>  vancomycin  IVPB 1000 milliGRAM(s) IV Intermittent every 12 hours  vasopressin Infusion 0.04 Unit(s)/Min (2.4 mL/Hr) IV Continuous <Continuous>    MEDICATIONS  (PRN):      =======================VENTILATOR SETTINGS===================  Mode: AC/ CMV (Assist Control/ Continuous Mandatory Ventilation)  RR (machine): 18  TV (machine): 350  FiO2: 40  PEEP: 5  ITime: 0.76  MAP: 8  PIP: 13    PHYSICAL EXAM============================                     General:               Pt is intubated, sedated                          Neuro:                  Could not assess                          Cardiovascular:   S1 & S2, regular                           Respiratory:         Air entry is fair and equal on both sides, has bilateral conducted sounds                           GI:                          Soft, nondistended and nontender, Bowel sounds active                            Ext:                        No cyanosis, +  edema, warm, well perfused                             ============================LABS=========================                        8.2    9.52  )-----------( 284      ( 20 Sep 2021 04:56 )             25.5     09-20    134<L>  |  94<L>  |  17  ----------------------------<  167<H>  3.0<L>   |  32<H>  |  0.27<L>    Ca    9.1      20 Sep 2021 04:56  Phos  2.6     09-20  Mg     1.80     09-20    TPro  5.5<L>  /  Alb  2.8<L>  /  TBili  0.3  /  DBili  x   /  AST  32  /  ALT  11  /  AlkPhos  73  09-20    LIVER FUNCTIONS - ( 20 Sep 2021 04:56 )  Alb: 2.8 g/dL / Pro: 5.5 g/dL / ALK PHOS: 73 U/L / ALT: 11 U/L / AST: 32 U/L / GGT: x             ABG - ( 20 Sep 2021 04:56 )  pH, Arterial: 7.45  pH, Blood: x     /  pCO2: 49    /  pO2: 166   / HCO3: 34    / Base Excess: 9.1   /  SaO2: 97.2      Culture - Blood (collected 17 Sep 2021 20:30)  Source: .Blood Blood  Preliminary Report (18 Sep 2021 21:01):    No growth to date.    A/P:   81 y. o. female BIBA to [facility] from nursing facility c/o hematemesis.  According to pt it was the first episode and happened while she was eating dinner. Followed by multiple episodes of N/V with bright blood and epigastric cramps.  She was intubated for airway protection and underwent EGD that revealed large diverticula at 28 cm filled with blood and 5 mm tear at distal esophagus, a hiatal hernia and large amount of blood in the fundus.  Diverticula was injected with Epinephrine.  Patient was transferred to [facility] for further management. (10 Sep 2021 15:04)                              Neuro:                                         Pain control with Fentanyl  /  Tylenol PRN    Sedated on Propofol, sedation weaning/Holiday                            Cardiovascular:                                          Chronic systolic heart failure with severe TR / Moderate MR  Continue Dobutamine drip, wean as tolerated   Gentle diuresis to keep I/Os even to negative  Monitor CVP/ SVO2  Cardiology f/u      Septic shock: Continue hemodynamic monitoring.  Levo is off and  Continue Vaso                                          Titrate  Vaso to MAP>65.                             Respiratory:                                         Pt is on full mechanical vent support. ZU--40-5                                         Appears comfortable, not in any distress.                                         Continue bronchodilators, pulmonary toilet - PRN     Consider bronch     Less likely to be weaned off vent and extubated.   Will discuss with family regarding trach / PEG and goals of care  Palliative care f/u                                                                   GI                                         NPO with TPN                                         Continue Zofran / Reglan for nausea - PRN  	    Monitor Hct and transfuse for Hb<8  G.I f/u                                                                 Renal:     Replace K/Mg/Phos                                        Monitor I/Os and electrolytes                                                                                        Hem/ Onc:                                         Acute blood loss anemia     Monitor Hct and transfuse for Hb<8                                          Follow CBC in AM                           Infectious disease:                                          Sepsis with Enterococci and Candida in the pleural fluid: Continue antibiotics  Vanco / Meropenem /  Diflucan                                           Follow cultures      Monitor for fever / leukocytosis.       Has new RIJ TLC      I.D f/u                                                                    Endocrine                                             DM-2 / Hyperglycemia: Continue Accu-Checks with coverage    Pt is on SQ Heparin / Lovenox  and Venodyne boots for DVT prophylaxis.     Pertinent clinical, laboratory, radiographic, hemodynamic, echocardiographic, respiratory data, microbiologic data and chart were reviewed and analyzed frequently throughout the course of the day and night  Patient seen, examined and plan discussed with Dr. Zuñiga /   CTICU team during rounds.    I have spent 75 minutes of critical care time with this pt between  7am and 11.59pm.     Inder DE PAZP

## 2021-09-20 NOTE — PROGRESS NOTE ADULT - SUBJECTIVE AND OBJECTIVE BOX
NUTRITION NOTE  [id number] NAGI  ===============================    Interval events - Parenteral nutrition was initiated on 9/14/21 via central line; pt with intermittent fevers, prelim blood cultures remain negative.     ROS - unable to obtain, pt is on vent support     Allergies  Sulfur Colliod (Rash)  Tylenol (Swelling)    PAST MEDICAL & SURGICAL HISTORY:  Afib  History of COPD  HTN (hypertension)  Hypothyroid  GERD (gastroesophageal reflux disease)  Esophageal diverticulum  Presence of IVC filter    ICU Vital Signs Last 24 Hrs  T(C): 38 (20 Sep 2021 04:00), Max: 38 (20 Sep 2021 04:00)  T(F): 100.4 (20 Sep 2021 04:00), Max: 100.4 (20 Sep 2021 04:00)  HR: 93 (20 Sep 2021 09:37) (83 - 101)  ABP: 99/81 (20 Sep 2021 07:00) (99/81 - 129/86)  ABP(mean): 89 (20 Sep 2021 07:00) (73 - 101)  RR: 22 (20 Sep 2021 07:00) (20 - 28)  SpO2: 100% (20 Sep 2021 09:37) (98% - 100%)    MEDICATIONS  (STANDING):  ALBUTerol    90 MICROgram(s) HFA Inhaler 2 Puff(s) Inhalation every 6 hours  artificial tears (preservative free) Ophthalmic Solution 1 Drop(s) Both EYES two times a day  budesonide  80 MICROgram(s)/formoterol 4.5 MICROgram(s) Inhaler 2 Puff(s) Inhalation two times a day  chlorhexidine 0.12% Liquid 15 milliLiter(s) Oral Mucosa every 12 hours  chlorhexidine 4% Liquid 1 Application(s) Topical <User Schedule>  dexMEDEtomidine Infusion 0.2 MICROgram(s)/kG/Hr (2.7 mL/Hr) IV Continuous <Continuous>  dextrose 40% Gel 15 Gram(s) Oral once  dextrose 5%. 1000 milliLiter(s) (50 mL/Hr) IV Continuous <Continuous>  dextrose 5%. 1000 milliLiter(s) (100 mL/Hr) IV Continuous <Continuous>  dextrose 50% Injectable 25 Gram(s) IV Push once  dextrose 50% Injectable 25 Gram(s) IV Push once  dextrose 50% Injectable 12.5 Gram(s) IV Push once  DOBUTamine Infusion 2.5 MICROgram(s)/kG/Min (4.05 mL/Hr) IV Continuous <Continuous>  fat emulsion (Fish Oil and Plant Based) 20% Infusion 13.3 mL/Hr (13.3 mL/Hr) IV Continuous <Continuous>  fentaNYL   Infusion. 0.9 MICROgram(s)/kG/Hr (4.86 mL/Hr) IV Continuous <Continuous>  fluconAZOLE IVPB 200 milliGRAM(s) IV Intermittent every 24 hours  fluconAZOLE IVPB      furosemide Infusion 2.5 mG/Hr (1.25 mL/Hr) IV Continuous <Continuous>  glucagon  Injectable 1 milliGRAM(s) IntraMuscular once  heparin   Injectable 5000 Unit(s) SubCutaneous every 12 hours  insulin lispro (ADMELOG) corrective regimen sliding scale   SubCutaneous three times a day before meals  levothyroxine Injectable 112 MICROGram(s) IV Push at bedtime  meropenem  IVPB      meropenem  IVPB 1000 milliGRAM(s) IV Intermittent every 8 hours  norepinephrine Infusion 0.05 MICROgram(s)/kG/Min (2.53 mL/Hr) IV Continuous <Continuous>  pantoprazole  Injectable 40 milliGRAM(s) IV Push two times a day  Parenteral Nutrition - Adult 1 Each (63 mL/Hr) TPN Continuous <Continuous>  Parenteral Nutrition - Adult 1 Each (63 mL/Hr) TPN Continuous <Continuous>  propofol Infusion 40 MICROgram(s)/kG/Min (13 mL/Hr) IV Continuous <Continuous>  vancomycin  IVPB 1000 milliGRAM(s) IV Intermittent every 12 hours  vasopressin Infusion 0.04 Unit(s)/Min (2.4 mL/Hr) IV Continuous <Continuous>    I&O's Detail    19 Sep 2021 07:01  -  20 Sep 2021 07:00  --------------------------------------------------------  IN:    Dexmedetomidine: 453.6 mL    DOBUTamine: 136.8 mL    Fat Emulsion (Fish Oil &amp; Plant Based) 20% Infusion: 133 mL    FentaNYL: 123.6 mL    Furosemide: 16 mL    IV PiggyBack: 100 mL    IV PiggyBack: 250 mL    IV PiggyBack: 695 mL    Propofol: 277.4 mL    TPN (Total Parenteral Nutrition): 1512 mL    Vasopressin: 110.4 mL  Total IN: 3807.8 mL    OUT:    Indwelling Catheter - Urethral (mL): 3395 mL    Norepinephrine: 0 mL  Total OUT: 3395 mL    Total NET: 412.8 mL    POCT Blood Glucose.: 124 mg/dL (20 Sep 2021 08:33)  POCT Blood Glucose.: 148 mg/dL (19 Sep 2021 15:38)  POCT Blood Glucose.: 152 mg/dL (19 Sep 2021 11:37)    Weight (kg): 54 (10 Sep 2021 00:39)    PHYSICAL EXAM:  Gen: Comfortable, No acute distress, frail elderly, sedated   Resp: mechanica breath sounds   Abd: Soft, Non-distended   Skin: Warm, 1+ peripheral edema of lower extremities    Mode: AC/ CMV (Assist Control/ Continuous Mandatory Ventilation)  RR (machine): 18  TV (machine): 350  FiO2: 40  PEEP: 5  ITime: 0.76  MAP: 8  PIP: 13    Diet: NPO and TPN/lipids (started on 9/14/21)    LABORATORY                                          8.2    9.52  )-----------( 284      ( 20 Sep 2021 04:56 )             25.5     09-20    134<L>  |  94<L>  |  17  ----------------------------<  167<H>  3.0<L>   |  32<H>  |  0.27<L>    Ca    9.1      20 Sep 2021 04:56  Phos  2.6     09-20  Mg     1.80     09-20    TPro  5.5<L>  /  Alb  2.8<L>  /  TBili  0.3  /  DBili  x   /  AST  32  /  ALT  11  /  AlkPhos  73  09-20    LIVER FUNCTIONS - ( 20 Sep 2021 04:56 )  Alb: 2.8 g/dL / Pro: 5.5 g/dL / ALK PHOS: 73 U/L / ALT: 11 U/L / AST: 32 U/L / GGT: x           09-20 Chol 115 LDL -- HDL 20<L> Trig 129, 09-15 Chol -- LDL -- HDL -- Trig 103    RECENT CULTURES    Culture - Blood (collected 18 Sep 2021 14:48)  Source: .Blood Blood-Venous  Preliminary Report (19 Sep 2021 15:01):    No growth to date.    Culture - Blood (collected 17 Sep 2021 20:30)  Source: .Blood Blood  Preliminary Report (18 Sep 2021 21:01):    No growth to date.    ASSESSMENT/PLAN:  82 y/o female transferred to CT ICU for a patient who was noted to have an esophageal mass at \[facility]\"". EGD done at outside hospital on 9/9/21 -Large diverticula noted at 28cm filled with blood and blood clots, periphery of diverticula was injected with epinephrine, 5mm distal esophageal tear without bleeding, hiatal hernia. Patient meets criteria for severe malnutrition in the context of acute illness. Patient's signs/symptoms as evidenced by severe loss of muscle mass and fat stores, 1+ edema, NPO >3 days.  Nutrition support service consulted for initiation of TPN/lipids via central line in view of malnutrition and anticipated prolonged NPO status.     continue TPN with infusion volume of 1.5 L, TPN will provide 1243 kcal/day    labs reviewed - electrolytes adjusted in TPN bag     monitor fingersticks, obtain daily weights     continue parenteral nutrition at this time, will follow up with primary team on plan - monitor for fevers and follow up blood cultures/ID recommendations      1.  Severe protein calorie malnutrition being optimized with TPN: CHO [175] gm.  AA [82] gm. SMOF Lipids [32] gm.  2.  Hyperglycemia managed with: [0] units of regular insulin    3.  Check fluid balance daily.  Strict I/O  [ ] [ ]   4.  Daily BMP, Ionized Calcium, Magnesium and Phosphorous   5.  Triglycerides at initiation of TPN and monthly [ ] [ ]     Nutrition Support [id number]

## 2021-09-20 NOTE — CHART NOTE - NSCHARTNOTEFT_GEN_A_CORE
Goals remain the same.  If needed, would be amendable to trach/peg despite overall poor prognosis.  will sign off.  if goals or symptoms change, please reconsult.  Craig Gant DO

## 2021-09-20 NOTE — PROGRESS NOTE ADULT - ASSESSMENT
81F hx of atrial fibrillation, COPD who presents from nursing facility with hematemesis, hospital course c/b acute hypoxic respiratory failure. Cardiology consulted due to concern for cardiogenic shock. Started on dobutamine gtt for inotropy and lasix gtt. SpO2 75% on venous gas, if true mixed venous, then CI 4.5 on dobutamine. Patient is -500cc for shift. Lasix gtt and dobutamine slowly weaning down. Vasopressin for pressor support (weaned off levophed). Overall prognosis guarded. GOC discussions are ongoing but tentative plan appears to be trach/peg.     #Acute hypoxic respiratory failure - plan for trach, patients mental status is unknown with concern for airway protection   #Shock - cardiogenic vs hypovolemic    Plan:  -Can likely wean off dobutamine tomorrow and repeat mixed venous gas from [facility] to reassess CI/CO (please obtain height for accurate measurements).  -Wean vasopressin, goal MAP in such severe HF around 60-65.   -Continue lasix gtt, goal net negative at least 1L daily     Gayathri Ramirez MD  Cardiology Fellow - PGY 4  For all New Consults and Questions:  [email or web address]   Login: [employer]in[employer]

## 2021-09-21 PROBLEM — I48.91 UNSPECIFIED ATRIAL FIBRILLATION: Chronic | Status: ACTIVE | Noted: 2021-09-10

## 2021-09-21 PROBLEM — I10 ESSENTIAL (PRIMARY) HYPERTENSION: Chronic | Status: ACTIVE | Noted: 2021-09-10

## 2021-09-21 PROBLEM — K21.9 GASTRO-ESOPHAGEAL REFLUX DISEASE WITHOUT ESOPHAGITIS: Chronic | Status: ACTIVE | Noted: 2021-09-10

## 2021-09-21 PROBLEM — Q39.6 CONGENITAL DIVERTICULUM OF ESOPHAGUS: Chronic | Status: ACTIVE | Noted: 2021-09-10

## 2021-09-21 PROBLEM — Z87.09 PERSONAL HISTORY OF OTHER DISEASES OF THE RESPIRATORY SYSTEM: Chronic | Status: ACTIVE | Noted: 2021-09-10

## 2021-09-21 PROBLEM — E03.9 HYPOTHYROIDISM, UNSPECIFIED: Chronic | Status: ACTIVE | Noted: 2021-09-10

## 2021-09-21 LAB
ALBUMIN SERPL ELPH-MCNC: 2.8 G/DL — LOW (ref 3.3–5)
ALBUMIN SERPL ELPH-MCNC: 3 G/DL — LOW (ref 3.3–5)
ALP SERPL-CCNC: 79 U/L — SIGNIFICANT CHANGE UP (ref 40–120)
ALP SERPL-CCNC: 80 U/L — SIGNIFICANT CHANGE UP (ref 40–120)
ALT FLD-CCNC: 14 U/L — SIGNIFICANT CHANGE UP (ref 4–33)
ALT FLD-CCNC: 16 U/L — SIGNIFICANT CHANGE UP (ref 4–33)
ANION GAP SERPL CALC-SCNC: 10 MMOL/L — SIGNIFICANT CHANGE UP (ref 7–14)
ANION GAP SERPL CALC-SCNC: 12 MMOL/L — SIGNIFICANT CHANGE UP (ref 7–14)
ANION GAP SERPL CALC-SCNC: 6 MMOL/L — LOW (ref 7–14)
AST SERPL-CCNC: 37 U/L — HIGH (ref 4–32)
AST SERPL-CCNC: 39 U/L — HIGH (ref 4–32)
B PERT IGG+IGM PNL SER: ABNORMAL
BILIRUB DIRECT SERPL-MCNC: 0.2 MG/DL — SIGNIFICANT CHANGE UP (ref 0–0.2)
BILIRUB INDIRECT FLD-MCNC: 0.2 MG/DL — SIGNIFICANT CHANGE UP (ref 0–1)
BILIRUB SERPL-MCNC: 0.4 MG/DL — SIGNIFICANT CHANGE UP (ref 0.2–1.2)
BILIRUB SERPL-MCNC: 0.5 MG/DL — SIGNIFICANT CHANGE UP (ref 0.2–1.2)
BLOOD GAS ARTERIAL COMPREHENSIVE RESULT: SIGNIFICANT CHANGE UP
BUN SERPL-MCNC: 15 MG/DL — SIGNIFICANT CHANGE UP (ref 7–23)
BUN SERPL-MCNC: 17 MG/DL — SIGNIFICANT CHANGE UP (ref 7–23)
BUN SERPL-MCNC: 18 MG/DL — SIGNIFICANT CHANGE UP (ref 7–23)
CALCIUM SERPL-MCNC: 9.3 MG/DL — SIGNIFICANT CHANGE UP (ref 8.4–10.5)
CALCIUM SERPL-MCNC: 9.4 MG/DL — SIGNIFICANT CHANGE UP (ref 8.4–10.5)
CALCIUM SERPL-MCNC: 9.4 MG/DL — SIGNIFICANT CHANGE UP (ref 8.4–10.5)
CHLORIDE SERPL-SCNC: 88 MMOL/L — LOW (ref 98–107)
CHLORIDE SERPL-SCNC: 91 MMOL/L — LOW (ref 98–107)
CHLORIDE SERPL-SCNC: 97 MMOL/L — LOW (ref 98–107)
CO2 SERPL-SCNC: 26 MMOL/L — SIGNIFICANT CHANGE UP (ref 22–31)
CO2 SERPL-SCNC: 33 MMOL/L — HIGH (ref 22–31)
CO2 SERPL-SCNC: 33 MMOL/L — HIGH (ref 22–31)
COLOR FLD: YELLOW
CREAT SERPL-MCNC: 0.21 MG/DL — LOW (ref 0.5–1.3)
CREAT SERPL-MCNC: 0.23 MG/DL — LOW (ref 0.5–1.3)
CREAT SERPL-MCNC: 0.23 MG/DL — LOW (ref 0.5–1.3)
EOSINOPHIL # FLD: 0 % — SIGNIFICANT CHANGE UP
FLUID INTAKE SUBSTANCE CLASS: SIGNIFICANT CHANGE UP
FLUID SEGMENTED GRANULOCYTES: 37 % — SIGNIFICANT CHANGE UP
FOLATE SERPL-MCNC: 20 NG/ML — HIGH (ref 3.1–17.5)
FOLATE+VIT B12 SERBLD-IMP: 0 % — SIGNIFICANT CHANGE UP
GLUCOSE BLDC GLUCOMTR-MCNC: 124 MG/DL — HIGH (ref 70–99)
GLUCOSE BLDC GLUCOMTR-MCNC: 143 MG/DL — HIGH (ref 70–99)
GLUCOSE BLDC GLUCOMTR-MCNC: 144 MG/DL — HIGH (ref 70–99)
GLUCOSE BLDC GLUCOMTR-MCNC: 167 MG/DL — HIGH (ref 70–99)
GLUCOSE FLD-MCNC: 188 MG/DL — SIGNIFICANT CHANGE UP
GLUCOSE SERPL-MCNC: 168 MG/DL — HIGH (ref 70–99)
GLUCOSE SERPL-MCNC: 181 MG/DL — HIGH (ref 70–99)
GLUCOSE SERPL-MCNC: 203 MG/DL — HIGH (ref 70–99)
GRAM STN FLD: SIGNIFICANT CHANGE UP
HCT VFR BLD CALC: 27.9 % — LOW (ref 34.5–45)
HGB BLD-MCNC: 9.2 G/DL — LOW (ref 11.5–15.5)
LDH SERPL L TO P-CCNC: 147 U/L — SIGNIFICANT CHANGE UP
LDH SERPL L TO P-CCNC: 248 U/L — HIGH (ref 135–225)
LYMPHOCYTES # FLD: 60 % — SIGNIFICANT CHANGE UP
MAGNESIUM SERPL-MCNC: 1.7 MG/DL — SIGNIFICANT CHANGE UP (ref 1.6–2.6)
MAGNESIUM SERPL-MCNC: 2.3 MG/DL — SIGNIFICANT CHANGE UP (ref 1.6–2.6)
MAGNESIUM SERPL-MCNC: 2.3 MG/DL — SIGNIFICANT CHANGE UP (ref 1.6–2.6)
MCHC RBC-ENTMCNC: 30.4 PG — SIGNIFICANT CHANGE UP (ref 27–34)
MCHC RBC-ENTMCNC: 33 GM/DL — SIGNIFICANT CHANGE UP (ref 32–36)
MCV RBC AUTO: 92.1 FL — SIGNIFICANT CHANGE UP (ref 80–100)
MESOTHL CELL # FLD: 0 % — SIGNIFICANT CHANGE UP
MONOS+MACROS # FLD: 3 % — SIGNIFICANT CHANGE UP
NRBC # BLD: 0 /100 WBCS — SIGNIFICANT CHANGE UP
NRBC # FLD: 0 K/UL — SIGNIFICANT CHANGE UP
OTHER CELLS FLD MANUAL: 0 % — SIGNIFICANT CHANGE UP
PHOSPHATE SERPL-MCNC: 2.6 MG/DL — SIGNIFICANT CHANGE UP (ref 2.5–4.5)
PHOSPHATE SERPL-MCNC: 2.8 MG/DL — SIGNIFICANT CHANGE UP (ref 2.5–4.5)
PHOSPHATE SERPL-MCNC: 2.8 MG/DL — SIGNIFICANT CHANGE UP (ref 2.5–4.5)
PLATELET # BLD AUTO: 288 K/UL — SIGNIFICANT CHANGE UP (ref 150–400)
POTASSIUM SERPL-MCNC: 2.9 MMOL/L — CRITICAL LOW (ref 3.5–5.3)
POTASSIUM SERPL-MCNC: 3 MMOL/L — LOW (ref 3.5–5.3)
POTASSIUM SERPL-MCNC: 3.4 MMOL/L — LOW (ref 3.5–5.3)
POTASSIUM SERPL-SCNC: 2.9 MMOL/L — CRITICAL LOW (ref 3.5–5.3)
POTASSIUM SERPL-SCNC: 3 MMOL/L — LOW (ref 3.5–5.3)
POTASSIUM SERPL-SCNC: 3.4 MMOL/L — LOW (ref 3.5–5.3)
PREALB SERPL-MCNC: 9 MG/DL — LOW (ref 20–40)
PROT FLD-MCNC: 1.8 G/DL — SIGNIFICANT CHANGE UP
PROT SERPL-MCNC: 5.5 G/DL — LOW (ref 6–8.3)
PROT SERPL-MCNC: 5.7 G/DL — LOW (ref 6–8.3)
RBC # BLD: 3.03 M/UL — LOW (ref 3.8–5.2)
RBC # FLD: 15.8 % — HIGH (ref 10.3–14.5)
RCV VOL RI: <1000 CELLS/UL — HIGH (ref 0–5)
SODIUM SERPL-SCNC: 130 MMOL/L — LOW (ref 135–145)
SODIUM SERPL-SCNC: 133 MMOL/L — LOW (ref 135–145)
SODIUM SERPL-SCNC: 133 MMOL/L — LOW (ref 135–145)
SPECIMEN SOURCE: SIGNIFICANT CHANGE UP
TOTAL CELLS COUNTED, BODY FLUID: 100 CELLS — SIGNIFICANT CHANGE UP
TOTAL NUCLEATED CELL COUNT, BODY FLUID: 298 CELLS/UL — HIGH (ref 0–5)
TRIGL SERPL-MCNC: 113 MG/DL — SIGNIFICANT CHANGE UP
TUBE TYPE: SIGNIFICANT CHANGE UP
VIT B12 SERPL-MCNC: 788 PG/ML — SIGNIFICANT CHANGE UP (ref 200–900)
WBC # BLD: 9.11 K/UL — SIGNIFICANT CHANGE UP (ref 3.8–10.5)
WBC # FLD AUTO: 9.11 K/UL — SIGNIFICANT CHANGE UP (ref 3.8–10.5)

## 2021-09-21 PROCEDURE — 71045 X-RAY EXAM CHEST 1 VIEW: CPT | Mod: 26

## 2021-09-21 PROCEDURE — 32556 INSERT CATH PLEURA W/O IMAGE: CPT

## 2021-09-21 PROCEDURE — 99232 SBSQ HOSP IP/OBS MODERATE 35: CPT

## 2021-09-21 PROCEDURE — 93306 TTE W/DOPPLER COMPLETE: CPT | Mod: 26

## 2021-09-21 PROCEDURE — 99292 CRITICAL CARE ADDL 30 MIN: CPT

## 2021-09-21 PROCEDURE — 99291 CRITICAL CARE FIRST HOUR: CPT

## 2021-09-21 RX ORDER — POTASSIUM CHLORIDE 20 MEQ
20 PACKET (EA) ORAL
Refills: 0 | Status: COMPLETED | OUTPATIENT
Start: 2021-09-21 | End: 2021-09-21

## 2021-09-21 RX ORDER — POTASSIUM CHLORIDE 20 MEQ
20 PACKET (EA) ORAL ONCE
Refills: 0 | Status: COMPLETED | OUTPATIENT
Start: 2021-09-21 | End: 2021-09-21

## 2021-09-21 RX ORDER — I.V. FAT EMULSION 20 G/100ML
13.3 EMULSION INTRAVENOUS
Qty: 32 | Refills: 0 | Status: DISCONTINUED | OUTPATIENT
Start: 2021-09-21 | End: 2021-09-22

## 2021-09-21 RX ORDER — MAGNESIUM SULFATE 500 MG/ML
2 VIAL (ML) INJECTION ONCE
Refills: 0 | Status: COMPLETED | OUTPATIENT
Start: 2021-09-21 | End: 2021-09-21

## 2021-09-21 RX ORDER — ELECTROLYTE SOLUTION,INJ
1 VIAL (ML) INTRAVENOUS
Refills: 0 | Status: DISCONTINUED | OUTPATIENT
Start: 2021-09-21 | End: 2021-09-22

## 2021-09-21 RX ORDER — ACETAZOLAMIDE 250 MG/1
250 TABLET ORAL EVERY 12 HOURS
Refills: 0 | Status: COMPLETED | OUTPATIENT
Start: 2021-09-21 | End: 2021-09-21

## 2021-09-21 RX ADMIN — MEROPENEM 100 MILLIGRAM(S): 1 INJECTION INTRAVENOUS at 05:11

## 2021-09-21 RX ADMIN — Medication 100 MILLIEQUIVALENT(S): at 17:03

## 2021-09-21 RX ADMIN — BUDESONIDE AND FORMOTEROL FUMARATE DIHYDRATE 2 PUFF(S): 160; 4.5 AEROSOL RESPIRATORY (INHALATION) at 09:30

## 2021-09-21 RX ADMIN — CHLORHEXIDINE GLUCONATE 15 MILLILITER(S): 213 SOLUTION TOPICAL at 17:19

## 2021-09-21 RX ADMIN — FLUCONAZOLE 100 MILLIGRAM(S): 150 TABLET ORAL at 14:56

## 2021-09-21 RX ADMIN — MEROPENEM 100 MILLIGRAM(S): 1 INJECTION INTRAVENOUS at 16:20

## 2021-09-21 RX ADMIN — VASOPRESSIN 2.4 UNIT(S)/MIN: 20 INJECTION INTRAVENOUS at 14:57

## 2021-09-21 RX ADMIN — Medication 100 MILLIEQUIVALENT(S): at 06:53

## 2021-09-21 RX ADMIN — Medication 50 GRAM(S): at 06:57

## 2021-09-21 RX ADMIN — PANTOPRAZOLE SODIUM 40 MILLIGRAM(S): 20 TABLET, DELAYED RELEASE ORAL at 17:18

## 2021-09-21 RX ADMIN — CHLORHEXIDINE GLUCONATE 15 MILLILITER(S): 213 SOLUTION TOPICAL at 05:10

## 2021-09-21 RX ADMIN — PANTOPRAZOLE SODIUM 40 MILLIGRAM(S): 20 TABLET, DELAYED RELEASE ORAL at 05:11

## 2021-09-21 RX ADMIN — DEXMEDETOMIDINE HYDROCHLORIDE IN 0.9% SODIUM CHLORIDE 2.7 MICROGRAM(S)/KG/HR: 4 INJECTION INTRAVENOUS at 10:18

## 2021-09-21 RX ADMIN — ALBUTEROL 2 PUFF(S): 90 AEROSOL, METERED ORAL at 21:35

## 2021-09-21 RX ADMIN — Medication 1 DROP(S): at 05:21

## 2021-09-21 RX ADMIN — ALBUTEROL 2 PUFF(S): 90 AEROSOL, METERED ORAL at 09:30

## 2021-09-21 RX ADMIN — MEROPENEM 100 MILLIGRAM(S): 1 INJECTION INTRAVENOUS at 21:20

## 2021-09-21 RX ADMIN — Medication 1 DROP(S): at 17:35

## 2021-09-21 RX ADMIN — I.V. FAT EMULSION 13.3 ML/HR: 20 EMULSION INTRAVENOUS at 17:33

## 2021-09-21 RX ADMIN — BUDESONIDE AND FORMOTEROL FUMARATE DIHYDRATE 2 PUFF(S): 160; 4.5 AEROSOL RESPIRATORY (INHALATION) at 21:35

## 2021-09-21 RX ADMIN — Medication 100 MILLIEQUIVALENT(S): at 00:12

## 2021-09-21 RX ADMIN — HEPARIN SODIUM 5000 UNIT(S): 5000 INJECTION INTRAVENOUS; SUBCUTANEOUS at 05:11

## 2021-09-21 RX ADMIN — CHLORHEXIDINE GLUCONATE 1 APPLICATION(S): 213 SOLUTION TOPICAL at 05:21

## 2021-09-21 RX ADMIN — Medication 1 EACH: at 17:33

## 2021-09-21 RX ADMIN — Medication 250 MILLIGRAM(S): at 21:20

## 2021-09-21 RX ADMIN — Medication 112 MICROGRAM(S): at 21:21

## 2021-09-21 RX ADMIN — Medication 250 MILLIGRAM(S): at 10:18

## 2021-09-21 RX ADMIN — Medication 100 MILLIEQUIVALENT(S): at 22:50

## 2021-09-21 RX ADMIN — Medication 100 MILLIEQUIVALENT(S): at 21:28

## 2021-09-21 RX ADMIN — ACETAZOLAMIDE 250 MILLIGRAM(S): 250 TABLET ORAL at 17:19

## 2021-09-21 RX ADMIN — ALBUTEROL 2 PUFF(S): 90 AEROSOL, METERED ORAL at 03:19

## 2021-09-21 RX ADMIN — ALBUTEROL 2 PUFF(S): 90 AEROSOL, METERED ORAL at 14:56

## 2021-09-21 RX ADMIN — ACETAZOLAMIDE 250 MILLIGRAM(S): 250 TABLET ORAL at 10:18

## 2021-09-21 RX ADMIN — Medication 100 MILLIEQUIVALENT(S): at 19:20

## 2021-09-21 RX ADMIN — HEPARIN SODIUM 5000 UNIT(S): 5000 INJECTION INTRAVENOUS; SUBCUTANEOUS at 17:18

## 2021-09-21 NOTE — CHART NOTE - NSCHARTNOTEFT_GEN_A_CORE
CTICU PA POCUS NOTE     : AVA Carballo and Dr. Villalobos     INDICATION: Follow up     PROCEDURE:  [ x] LIMITED ECHO  [ x] LIMITED CHEST  [ ] LIMITED RETROPERITONEAL  [ ] LIMITED ABDOMINAL  [ ] LIMITED DVT  [ ] NEEDLE GUIDANCE VASCULAR  [ ] NEEDLE GUIDANCE THORACENTESIS  [ ] NEEDLE GUIDANCE PARACENTESIS  [ ] NEEDLE GUIDANCE PERICARDIOCENTESIS  [ ] OTHER    FINDINGS:  BL lung sliding with thickened pleura.   B on A lines predominantly   Consolidated lung bilaterally   Bilateral pleural effusion R>L   IVC 1.6cm   Moderate to Severe LVSD   Poor windows for thorough cardiac evaluation     INTERPRETATION:  Consolidations likely atelectasis vs pneumonia   BL Pleural Effusions   Volume down.     Ben Van PA-C  Department of Critical Care   In house [place] [phone]

## 2021-09-21 NOTE — PROGRESS NOTE ADULT - SUBJECTIVE AND OBJECTIVE BOX
Patient seen and examined at bedside.    Overnight Events: No events. GOC discussion pending. Lasix gtt off, UOP has decreased significantly.     Review Of Systems: unable to assess given mental status.         Current Meds:  acetaZOLAMIDE Injectable 250 milliGRAM(s) IV Push every 12 hours  ALBUTerol    90 MICROgram(s) HFA Inhaler 2 Puff(s) Inhalation every 6 hours  artificial tears (preservative free) Ophthalmic Solution 1 Drop(s) Both EYES two times a day  budesonide  80 MICROgram(s)/formoterol 4.5 MICROgram(s) Inhaler 2 Puff(s) Inhalation two times a day  chlorhexidine 0.12% Liquid 15 milliLiter(s) Oral Mucosa every 12 hours  chlorhexidine 4% Liquid 1 Application(s) Topical <User Schedule>  dexMEDEtomidine Infusion 0.2 MICROgram(s)/kG/Hr IV Continuous <Continuous>  dextrose 40% Gel 15 Gram(s) Oral once  dextrose 5%. 1000 milliLiter(s) IV Continuous <Continuous>  dextrose 5%. 1000 milliLiter(s) IV Continuous <Continuous>  dextrose 50% Injectable 25 Gram(s) IV Push once  dextrose 50% Injectable 12.5 Gram(s) IV Push once  dextrose 50% Injectable 25 Gram(s) IV Push once  DOBUTamine Infusion 2.5 MICROgram(s)/kG/Min IV Continuous <Continuous>  fat emulsion (Fish Oil and Plant Based) 20% Infusion 13.3 mL/Hr IV Continuous <Continuous>  fentaNYL   Infusion. 0.9 MICROgram(s)/kG/Hr IV Continuous <Continuous>  fluconAZOLE IVPB 200 milliGRAM(s) IV Intermittent every 24 hours  fluconAZOLE IVPB      furosemide Infusion 2.5 mG/Hr IV Continuous <Continuous>  glucagon  Injectable 1 milliGRAM(s) IntraMuscular once  heparin   Injectable 5000 Unit(s) SubCutaneous every 12 hours  insulin lispro (ADMELOG) corrective regimen sliding scale   SubCutaneous three times a day before meals  levothyroxine Injectable 112 MICROGram(s) IV Push at bedtime  meropenem  IVPB      meropenem  IVPB 1000 milliGRAM(s) IV Intermittent every 8 hours  norepinephrine Infusion 0.05 MICROgram(s)/kG/Min IV Continuous <Continuous>  pantoprazole  Injectable 40 milliGRAM(s) IV Push two times a day  Parenteral Nutrition - Adult 1 Each TPN Continuous <Continuous>  Parenteral Nutrition - Adult 1 Each TPN Continuous <Continuous>  propofol Infusion 40 MICROgram(s)/kG/Min IV Continuous <Continuous>  vancomycin  IVPB 1000 milliGRAM(s) IV Intermittent every 12 hours  vasopressin Infusion 0.04 Unit(s)/Min IV Continuous <Continuous>      Vitals:  T(F): 100.1 (), Max: 100.1 ()  HR: 73 () (60 - 88)  BP: 124/78 () (112/72 - 125/80)  RR: 21 ()  SpO2: 96% ()  I&O's Summary    20 Sep 2021 07:01  -  21 Sep 2021 07:00  --------------------------------------------------------  IN: 3942.7 mL / OUT: 4535 mL / NET: -592.3 mL    21 Sep 2021 07:01  -  21 Sep 2021 10:52  --------------------------------------------------------  IN: 0 mL / OUT: 225 mL / NET: -225 mL        Physical Exam:  HEENT:   Normal oral mucosa, near pinpoint pupils b/l  Cardiovascular: Normal S1 S2, No JVD, No murmurs.  Respiratory: b/l Lungs diminished BS	  Psychiatry: sedated  Gastrointestinal:  Soft, Non-tender, + BS	  Skin: No rashes, No ecchymoses, No cyanosis, +warm	  Neurologic: sedated and unresponsive   Extremities: b/l pitting edema at upper shins  Vascular: Peripheral pulses palpable 1+ bilaterally                          9.2    9.11  )-----------( 288      ( 21 Sep 2021 04:46 )             27.9         133<L>  |  88<L>  |  15  ----------------------------<  181<H>  3.0<L>   |  33<H>  |  0.21<L>    Ca    9.4      21 Sep 2021 04:46  Phos  2.6       Mg     1.70         TPro  5.5<L>  /  Alb  2.8<L>  /  TBili  0.5  /  DBili  x   /  AST  39<H>  /  ALT  14  /  AlkPhos  79              Total Cholesterol: --  LDL: --  HDL: --  T

## 2021-09-21 NOTE — PROGRESS NOTE ADULT - SUBJECTIVE AND OBJECTIVE BOX
Infectious Diseases progress note:    Subjective: Coverage appreciated, events noted.  Pt with intermittent fever.  WBC 9.1.  Pt remains intubated/sedated on pressor support.  Pt NPO on TPN    ROS:  Unable to assess due to pt clinical condition    Allergies    Sulfur Colliod (Rash)  Tylenol (Swelling)    Intolerances        ANTIBIOTICS/RELEVANT:  antimicrobials  fluconAZOLE IVPB 200 milliGRAM(s) IV Intermittent every 24 hours  fluconAZOLE IVPB      meropenem  IVPB      meropenem  IVPB 1000 milliGRAM(s) IV Intermittent every 8 hours  vancomycin  IVPB 1000 milliGRAM(s) IV Intermittent every 12 hours    immunologic:    OTHER:  acetaZOLAMIDE Injectable 250 milliGRAM(s) IV Push every 12 hours  ALBUTerol    90 MICROgram(s) HFA Inhaler 2 Puff(s) Inhalation every 6 hours  artificial tears (preservative free) Ophthalmic Solution 1 Drop(s) Both EYES two times a day  budesonide  80 MICROgram(s)/formoterol 4.5 MICROgram(s) Inhaler 2 Puff(s) Inhalation two times a day  chlorhexidine 0.12% Liquid 15 milliLiter(s) Oral Mucosa every 12 hours  chlorhexidine 4% Liquid 1 Application(s) Topical <User Schedule>  dexMEDEtomidine Infusion 0.2 MICROgram(s)/kG/Hr IV Continuous <Continuous>  dextrose 40% Gel 15 Gram(s) Oral once  dextrose 5%. 1000 milliLiter(s) IV Continuous <Continuous>  dextrose 5%. 1000 milliLiter(s) IV Continuous <Continuous>  dextrose 50% Injectable 25 Gram(s) IV Push once  dextrose 50% Injectable 12.5 Gram(s) IV Push once  dextrose 50% Injectable 25 Gram(s) IV Push once  DOBUTamine Infusion 2.5 MICROgram(s)/kG/Min IV Continuous <Continuous>  fat emulsion (Fish Oil and Plant Based) 20% Infusion 13.3 mL/Hr IV Continuous <Continuous>  fentaNYL   Infusion. 0.9 MICROgram(s)/kG/Hr IV Continuous <Continuous>  furosemide Infusion 2.5 mG/Hr IV Continuous <Continuous>  glucagon  Injectable 1 milliGRAM(s) IntraMuscular once  heparin   Injectable 5000 Unit(s) SubCutaneous every 12 hours  insulin lispro (ADMELOG) corrective regimen sliding scale   SubCutaneous three times a day before meals  levothyroxine Injectable 112 MICROGram(s) IV Push at bedtime  norepinephrine Infusion 0.05 MICROgram(s)/kG/Min IV Continuous <Continuous>  pantoprazole  Injectable 40 milliGRAM(s) IV Push two times a day  Parenteral Nutrition - Adult 1 Each TPN Continuous <Continuous>  Parenteral Nutrition - Adult 1 Each TPN Continuous <Continuous>  propofol Infusion 40 MICROgram(s)/kG/Min IV Continuous <Continuous>  vasopressin Infusion 0.04 Unit(s)/Min IV Continuous <Continuous>      Objective:  Vital Signs Last 24 Hrs  T(C): 37.8 (21 Sep 2021 07:00), Max: 37.8 (21 Sep 2021 07:00)  T(F): 100.1 (21 Sep 2021 07:00), Max: 100.1 (21 Sep 2021 07:00)  HR: 73 (21 Sep 2021 10:00) (60 - 88)  BP: 124/78 (21 Sep 2021 10:00) (112/72 - 125/80)  BP(mean): 85 (21 Sep 2021 08:00) (77 - 90)  RR: 21 (21 Sep 2021 10:00) (18 - 24)  SpO2: 96% (21 Sep 2021 10:00) (96% - 100%)    PHYSICAL EXAM:    Constitutional: Intubated, sedated  Eyes:LISA, EOMI  Ear/Nose/Throat: no thrush, mucositis.  Moist mucous membranes	  Neck: Rt IJ TLC  Respiratory: CTA shaila  Cardiovascular: S1S2 RRR, no murmurs  Gastrointestinal:soft, nontender,  nondistended (+) BS  Extremities:no e/e/c  Skin:  no rashes, open wounds or ulcerations  :  bearden in place        LABS:                        9.2    9.11  )-----------( 288      ( 21 Sep 2021 04:46 )             27.9     09-21    133<L>  |  88<L>  |  15  ----------------------------<  181<H>  3.0<L>   |  33<H>  |  0.21<L>    Ca    9.4      21 Sep 2021 04:46  Phos  2.6     09-21  Mg     1.70     09-21    TPro  5.5<L>  /  Alb  2.8<L>  /  TBili  0.5  /  DBili  x   /  AST  39<H>  /  ALT  14  /  AlkPhos  79  09-21            Vancomycin Level, Random:  ug/mL (09-17 @ 15:47)      Vancomycin Level, Trough: 8.3 ug/mL (09-20 @ 20:15)  Vancomycin Level, Trough: 9.7 ug/mL (09-19 @ 04:21)  Vancomycin Level, Trough: 7.0 ug/mL (09-17 @ 05:07)              MICROBIOLOGY:    Culture - Blood (09.18.21 @ 14:48)   Specimen Source: .Blood Blood-Venous   Culture Results:   No growth to date.     Culture - Blood (09.17.21 @ 20:30)   Specimen Source: .Blood Blood   Culture Results:   No growth to date.     Culture - Blood (09.15.21 @ 16:15)   Specimen Source: .Blood Blood-Peripheral   Culture Results:   No Growth Final     Culture - Blood (09.15.21 @ 10:12)   Specimen Source: .Blood Blood-Peripheral   Culture Results:   No Growth Final     Culture - Sputum . (09.15.21 @ 07:50)   Gram Stain:   Moderate polymorphonuclear leukocytes per low power field   No Squamous epithelial cells per low power field   Rare Yeast like cells per oil power field   Specimen Source: .Sputum Sputum   Culture Results:   Normal Respiratory Consuelo present       RADIOLOGY & ADDITIONAL STUDIES:    < from: Xray Chest 1 View- PORTABLE-Routine (Xray Chest 1 View- PORTABLE-Routine in AM.) (09.21.21 @ 05:12) >  INTERPRETATION:    Endotracheal tube and right IJ line unchanged. Heart size is stable and bilateral effusions again seen with congestion unchanged. No pneumothoraces appreciated.      COMPARISON:  September 20      IMPRESSION:  CHF unchanged.    < end of copied text >        < from: Xray Chest 1 View- PORTABLE-Routine (Xray Chest 1 View- PORTABLE-Routine in AM.) (09.20.21 @ 05:47) >  COMPARISON:  September 19      IMPRESSION:  Bilateral layering pleural effusions appears decreased on the left side may be due to CHF.    < end of copied text >      < from: Xray Chest 1 View- PORTABLE-Routine (Xray Chest 1 View- PORTABLE-Routine in AM.) (09.19.21 @ 06:17) >  IMPRESSION:  1. No focal consolidation to indicate pneumonia.  2. Interstitial edema with bilateral effusions and endotracheal tube in place.    < end of copied text >

## 2021-09-21 NOTE — PROGRESS NOTE ADULT - SUBJECTIVE AND OBJECTIVE BOX
NUTRITION NOTE  [id number] NAGI  ===============================    Interval events - Parenteral nutrition was initiated on 9/14/21 via central line; pt continues to have intermittent fevers, prelim blood cultures remain negative.     ROS - unable to obtain, pt is on vent support     Allergies  Sulfur Colliod (Rash)  Tylenol (Swelling)    PAST MEDICAL & SURGICAL HISTORY:  Afib  History of COPD  HTN (hypertension)  Hypothyroid  GERD (gastroesophageal reflux disease)  Esophageal diverticulum  Presence of IVC filter    ICU Vital Signs Last 24 Hrs  T(C): 37.8 (21 Sep 2021 11:00), Max: 37.8 (21 Sep 2021 07:00)  T(F): 100 (21 Sep 2021 11:00), Max: 100.1 (21 Sep 2021 07:00)  HR: 74 (21 Sep 2021 11:00) (60 - 82)  BP: 124/71 (21 Sep 2021 11:00) (112/72 - 125/80)  BP(mean): 85 (21 Sep 2021 08:00) (77 - 90)  ABP: 137/68 (21 Sep 2021 04:00) (105/74 - 143/72)  ABP(mean): 118 (21 Sep 2021 06:00) (73 - 118)  RR: 24 (21 Sep 2021 11:00) (18 - 24)  SpO2: 95% (21 Sep 2021 11:00) (95% - 100%)    MEDICATIONS  (STANDING):  acetaZOLAMIDE Injectable 250 milliGRAM(s) IV Push every 12 hours  ALBUTerol    90 MICROgram(s) HFA Inhaler 2 Puff(s) Inhalation every 6 hours  artificial tears (preservative free) Ophthalmic Solution 1 Drop(s) Both EYES two times a day  budesonide  80 MICROgram(s)/formoterol 4.5 MICROgram(s) Inhaler 2 Puff(s) Inhalation two times a day  chlorhexidine 0.12% Liquid 15 milliLiter(s) Oral Mucosa every 12 hours  chlorhexidine 4% Liquid 1 Application(s) Topical <User Schedule>  dexMEDEtomidine Infusion 0.2 MICROgram(s)/kG/Hr (2.7 mL/Hr) IV Continuous <Continuous>  dextrose 40% Gel 15 Gram(s) Oral once  dextrose 5%. 1000 milliLiter(s) (50 mL/Hr) IV Continuous <Continuous>  dextrose 5%. 1000 milliLiter(s) (100 mL/Hr) IV Continuous <Continuous>  dextrose 50% Injectable 25 Gram(s) IV Push once  dextrose 50% Injectable 12.5 Gram(s) IV Push once  dextrose 50% Injectable 25 Gram(s) IV Push once  DOBUTamine Infusion 2.5 MICROgram(s)/kG/Min (4.05 mL/Hr) IV Continuous <Continuous>  fat emulsion (Fish Oil and Plant Based) 20% Infusion 13.3 mL/Hr (13.3 mL/Hr) IV Continuous <Continuous>  fentaNYL   Infusion. 0.9 MICROgram(s)/kG/Hr (4.86 mL/Hr) IV Continuous <Continuous>  fluconAZOLE IVPB 200 milliGRAM(s) IV Intermittent every 24 hours  fluconAZOLE IVPB      furosemide Infusion 2.5 mG/Hr (1.25 mL/Hr) IV Continuous <Continuous>  glucagon  Injectable 1 milliGRAM(s) IntraMuscular once  heparin   Injectable 5000 Unit(s) SubCutaneous every 12 hours  insulin lispro (ADMELOG) corrective regimen sliding scale   SubCutaneous three times a day before meals  levothyroxine Injectable 112 MICROGram(s) IV Push at bedtime  meropenem  IVPB      meropenem  IVPB 1000 milliGRAM(s) IV Intermittent every 8 hours  norepinephrine Infusion 0.05 MICROgram(s)/kG/Min (2.53 mL/Hr) IV Continuous <Continuous>  pantoprazole  Injectable 40 milliGRAM(s) IV Push two times a day  Parenteral Nutrition - Adult 1 Each (63 mL/Hr) TPN Continuous <Continuous>  Parenteral Nutrition - Adult 1 Each (63 mL/Hr) TPN Continuous <Continuous>  propofol Infusion 40 MICROgram(s)/kG/Min (13 mL/Hr) IV Continuous <Continuous>  vancomycin  IVPB 1000 milliGRAM(s) IV Intermittent every 12 hours  vasopressin Infusion 0.04 Unit(s)/Min (2.4 mL/Hr) IV Continuous <Continuous>    I&O's Detail    20 Sep 2021 07:01  -  21 Sep 2021 07:00  --------------------------------------------------------  IN:    Dexmedetomidine: 453.6 mL    DOBUTamine: 103.2 mL    Fat Emulsion (Fish Oil &amp; Plant Based) 20% Infusion: 199.5 mL    FentaNYL: 37.8 mL    Furosemide: 27.3 mL    IV PiggyBack: 515 mL    IV PiggyBack: 100 mL    IV PiggyBack: 250 mL    IV PiggyBack: 200 mL    PRBCs (Packed Red Blood Cells): 300 mL    Propofol: 201.5 mL    TPN (Total Parenteral Nutrition): 1449 mL    Vasopressin: 105.8 mL  Total IN: 3942.7 mL    OUT:    Indwelling Catheter - Urethral (mL): 4535 mL    Norepinephrine: 0 mL  Total OUT: 4535 mL    Total NET: -592.3 mL      21 Sep 2021 07:01  -  21 Sep 2021 11:23  --------------------------------------------------------  IN:    Dexmedetomidine: 75.6 mL    DOBUTamine: 16.4 mL    Fat Emulsion (Fish Oil &amp; Plant Based) 20% Infusion: 13.3 mL    IV PiggyBack: 250 mL    IV PiggyBack: 20 mL    Propofol: 26 mL    TPN (Total Parenteral Nutrition): 252 mL    Vasopressin: 18.4 mL  Total IN: 671.7 mL    OUT:    FentaNYL: 0 mL    Furosemide: 0 mL    Indwelling Catheter - Urethral (mL): 225 mL    Norepinephrine: 0 mL  Total OUT: 225 mL    Total NET: 446.7 mL    POCT Blood Glucose.: 144 mg/dL (21 Sep 2021 08:21)  POCT Blood Glucose.: 143 mg/dL (21 Sep 2021 06:00)  POCT Blood Glucose.: 125 mg/dL (20 Sep 2021 16:29)  POCT Blood Glucose.: 153 mg/dL (20 Sep 2021 12:05)    Weight (kg): 54 (10 Sep 2021 00:39)    PHYSICAL EXAM:  Gen: Comfortable, No acute distress, frail elderly, sedated   Resp: mechanica breath sounds   Abd: Soft, Non-distended   Skin: Warm, 1+ peripheral edema of lower extremities    Mode: AC/ CMV (Assist Control/ Continuous Mandatory Ventilation)  RR (machine): 18  TV (machine): 350  FiO2: 40  PEEP: 5  ITime: 0.76  MAP: 10  PIP: 20    Diet: NPO and TPN/lipids (started on 9/14/21)    LABORATORY                                         9.2    9.11  )-----------( 288      ( 21 Sep 2021 04:46 )             27.9   09-21    133<L>  |  88<L>  |  15  ----------------------------<  181<H>  3.0<L>   |  33<H>  |  0.21<L>    Ca    9.4      21 Sep 2021 04:46  Phos  2.6     09-21  Mg     1.70     09-21    TPro  5.5<L>  /  Alb  2.8<L>  /  TBili  0.5  /  DBili  x   /  AST  39<H>  /  ALT  14  /  AlkPhos  79  09-21    LIVER FUNCTIONS - ( 21 Sep 2021 04:46 )  Alb: 2.8 g/dL / Pro: 5.5 g/dL / ALK PHOS: 79 U/L / ALT: 14 U/L / AST: 39 U/L / GGT: x           09-21 Chol -- LDL -- HDL -- Trig 113, 09-20 Chol 115 LDL -- HDL 20<L> Trig 129, 09-15 Chol -- LDL -- HDL -- Trig 103    RECENT CULTURES  Culture - Blood (collected 18 Sep 2021 14:48)  Source: .Blood Blood-Venous  Preliminary Report (19 Sep 2021 15:01):    No growth to date.    ASSESSMENT/PLAN:  82 y/o female transferred to CT ICU for a patient who was noted to have an esophageal mass at [facility]. EGD done at outside hospital on 9/9/21 -Large diverticula noted at 28cm filled with blood and blood clots, periphery of diverticula was injected with epinephrine, 5mm distal esophageal tear without bleeding, hiatal hernia. Patient meets criteria for severe malnutrition in the context of acute illness. Patient's signs/symptoms as evidenced by severe loss of muscle mass and fat stores, 1+ edema, NPO >3 days.  Nutrition support service consulted for initiation of TPN/lipids via central line in view of malnutrition and anticipated prolonged NPO status. Pt is s/p central line change on 9/17.    continue TPN with infusion volume of 1.5 L, TPN will provide 1243 kcal/day    labs reviewed - electrolytes adjusted in TPN bag, Na is at max in TPN bag      monitor fingersticks, obtain daily weights     continue parenteral nutrition at this time, will follow up with primary team on plan - monitor for fevers and follow up blood cultures/ID recommendations      1.  Severe protein calorie malnutrition being optimized with TPN: CHO [175] gm.  AA [82] gm. SMOF Lipids [32] gm.  2.  Hyperglycemia managed with: [0] units of regular insulin    3.  Check fluid balance daily.  Strict I/O  [ ] [ ]   4.  Daily BMP, Ionized Calcium, Magnesium and Phosphorous   5.  Triglycerides at initiation of TPN and monthly [ ] [ ]     Nutrition Support [id number]

## 2021-09-21 NOTE — PROGRESS NOTE ADULT - SUBJECTIVE AND OBJECTIVE BOX
NAGI ROXIE      81y   Female   MRN-[id number]         Sulfur Colliod (Rash)  Tylenol (Swelling)             Daily     Daily Drug Dosing Weight    Weight (kg): 54 (10 Sep 2021 00:39)    81 y. o. female BIBA to [facility] from nursing facility c/o hematemesis.  According to pt it was the first episode and happened while she was eating dinner. Followed by multiple episodes of N/V with bright blood and epigastric cramps.  She was intubated for airway protection and underwent EGD that revealed large diverticula at 28 cm filled with blood and 5 mm tear at distal esophagus, a hiatal hernia and large amount of blood in the fundus.  Diverticula was injected with Epinephrine.  Patient was transferred to [facility] for further management. (10 Sep 2021 15:04)                           Issues:              Acute Hypoxic Respiratory failure  Chronic systolic heart failure  Moderate MR  Severe TR              Septic Shock              Esophageal diverticular bleed.   Acute blood loss anemia  COPD  Chronic Afib -- previously on Apixaban  Hypothyroidism  GERD  Pleural effusion  Protein calorie malnutrition                Home Medications:  Abreva 10% topical cream: Apply topically to affected area once a day (10 Sep 2021 15:54)  Albuterol (Eqv-ProAir HFA) 90 mcg/inh inhalation aerosol: 2 puff(s) inhaled every 6 hours (10 Sep 2021 15:54)  allopurinol 100 mg oral tablet: 2 tab(s) orally 2 times a day (10 Sep 2021 15:54)  Aricept 10 mg oral tablet: 1 tab(s) orally once a day (at bedtime) (10 Sep 2021 15:54)  aspirin 81 mg oral tablet: 1 tab(s) orally once a day (10 Sep 2021 15:54)  baclofen 5 mg oral tablet: 1 tab(s) orally 2 times a day (10 Sep 2021 15:54)  busPIRone 15 mg oral tablet: 1 tab(s) orally once a day (at bedtime) (10 Sep 2021 15:54)  Claritin 10 mg oral tablet: 1 tab(s) orally once a day (10 Sep 2021 15:54)  Colace 100 mg oral capsule: 1 cap(s) orally 3 times a day (10 Sep 2021 15:54)  Cymbalta 60 mg oral delayed release capsule: 1 cap(s) orally once a day (10 Sep 2021 15:54)  Eliquis 2.5 mg oral tablet: 1 tab(s) orally 2 times a day (10 Sep 2021 15:54)  Lasix 40 mg oral tablet: 1 tab(s) orally once a day (10 Sep 2021 15:54)  Linzess 290 mcg oral capsule: 1 cap(s) orally once a day (10 Sep 2021 15:54)  Melatonin 3 mg oral tablet: 1 tab(s) orally once a day (at bedtime) (10 Sep 2021 15:54)  Namenda 10 mg oral tablet: 1 tab(s) orally 2 times a day (10 Sep 2021 15:54)  ondansetron 4 mg oral tablet: 1 tab(s) orally every 8 hours (10 Sep 2021 15:54)  oxyCODONE 5 mg oral tablet: 1 tab(s) orally every 8 hours (10 Sep 2021 15:54)  Senna 8.6 mg oral tablet: 1 tab(s) orally once a day (at bedtime) (10 Sep 2021 15:54)  Spiriva 18 mcg inhalation capsule: 1 cap(s) inhaled once a day (10 Sep 2021 15:54)  Symbicort 80 mcg-4.5 mcg/inh inhalation aerosol: 2 puff(s) inhaled 2 times a day (10 Sep 2021 15:54)  Synthroid 150 mcg (0.15 mg) oral tablet: 1 tab(s) orally once a day (10 Sep 2021 15:54)  traZODone 100 mg oral tablet: 1 tab(s) orally 2 times a day (10 Sep 2021 15:54)  Ventolin HFA 90 mcg/inh inhalation aerosol: 2 puff(s) inhaled every 8 hours (10 Sep 2021 15:54)  Zocor 10 mg oral tablet: 1 tab(s) orally once a day (at bedtime) (10 Sep 2021 15:54)    PAST MEDICAL & SURGICAL HISTORY:  Afib    History of COPD    HTN (hypertension)    Hypothyroid    GERD (gastroesophageal reflux disease)    Esophageal diverticulum    Presence of IVC filter      Vital Signs Last 24 Hrs  T(C): 37.8 (21 Sep 2021 11:00), Max: 37.8 (21 Sep 2021 07:00)  T(F): 100 (21 Sep 2021 11:00), Max: 100.1 (21 Sep 2021 07:00)  HR: 57 (21 Sep 2021 15:00) (55 - 82)  BP: 124/71 (21 Sep 2021 11:00) (112/72 - 125/80)  BP(mean): 85 (21 Sep 2021 08:00) (77 - 90)  RR: 18 (21 Sep 2021 14:00) (18 - 24)  SpO2: 100% (21 Sep 2021 15:00) (95% - 100%)  I&O's Detail    20 Sep 2021 07:01  -  21 Sep 2021 07:00  --------------------------------------------------------  IN:    Dexmedetomidine: 453.6 mL    DOBUTamine: 103.2 mL    Fat Emulsion (Fish Oil &amp; Plant Based) 20% Infusion: 199.5 mL    FentaNYL: 37.8 mL    Furosemide: 27.3 mL    IV PiggyBack: 515 mL    IV PiggyBack: 100 mL    IV PiggyBack: 250 mL    IV PiggyBack: 200 mL    PRBCs (Packed Red Blood Cells): 300 mL    Propofol: 201.5 mL    TPN (Total Parenteral Nutrition): 1449 mL    Vasopressin: 105.8 mL  Total IN: 3942.7 mL    OUT:    Indwelling Catheter - Urethral (mL): 4535 mL    Norepinephrine: 0 mL  Total OUT: 4535 mL    Total NET: -592.3 mL      21 Sep 2021 07:01  -  21 Sep 2021 15:05  --------------------------------------------------------  IN:    Dexmedetomidine: 132.3 mL    DOBUTamine: 32.8 mL    Fat Emulsion (Fish Oil &amp; Plant Based) 20% Infusion: 13.3 mL    IV PiggyBack: 250 mL    IV PiggyBack: 40 mL    IV PiggyBack: 100 mL    Propofol: 45.5 mL    TPN (Total Parenteral Nutrition): 504 mL    Vasopressin: 36.8 mL  Total IN: 1154.7 mL    OUT:    FentaNYL: 0 mL    Furosemide: 0 mL    Indwelling Catheter - Urethral (mL): 1000 mL    Norepinephrine: 0 mL  Total OUT: 1000 mL    Total NET: 154.7 mL        CAPILLARY BLOOD GLUCOSE      POCT Blood Glucose.: 167 mg/dL (21 Sep 2021 11:25)  POCT Blood Glucose.: 144 mg/dL (21 Sep 2021 08:21)  POCT Blood Glucose.: 143 mg/dL (21 Sep 2021 06:00)  POCT Blood Glucose.: 125 mg/dL (20 Sep 2021 16:29)    Home Medications:  Abreva 10% topical cream: Apply topically to affected area once a day (10 Sep 2021 15:54)  Albuterol (Eqv-ProAir HFA) 90 mcg/inh inhalation aerosol: 2 puff(s) inhaled every 6 hours (10 Sep 2021 15:54)  allopurinol 100 mg oral tablet: 2 tab(s) orally 2 times a day (10 Sep 2021 15:54)  Aricept 10 mg oral tablet: 1 tab(s) orally once a day (at bedtime) (10 Sep 2021 15:54)  aspirin 81 mg oral tablet: 1 tab(s) orally once a day (10 Sep 2021 15:54)  baclofen 5 mg oral tablet: 1 tab(s) orally 2 times a day (10 Sep 2021 15:54)  busPIRone 15 mg oral tablet: 1 tab(s) orally once a day (at bedtime) (10 Sep 2021 15:54)  Claritin 10 mg oral tablet: 1 tab(s) orally once a day (10 Sep 2021 15:54)  Colace 100 mg oral capsule: 1 cap(s) orally 3 times a day (10 Sep 2021 15:54)  Cymbalta 60 mg oral delayed release capsule: 1 cap(s) orally once a day (10 Sep 2021 15:54)  Eliquis 2.5 mg oral tablet: 1 tab(s) orally 2 times a day (10 Sep 2021 15:54)  Lasix 40 mg oral tablet: 1 tab(s) orally once a day (10 Sep 2021 15:54)  Linzess 290 mcg oral capsule: 1 cap(s) orally once a day (10 Sep 2021 15:54)  Melatonin 3 mg oral tablet: 1 tab(s) orally once a day (at bedtime) (10 Sep 2021 15:54)  Namenda 10 mg oral tablet: 1 tab(s) orally 2 times a day (10 Sep 2021 15:54)  ondansetron 4 mg oral tablet: 1 tab(s) orally every 8 hours (10 Sep 2021 15:54)  oxyCODONE 5 mg oral tablet: 1 tab(s) orally every 8 hours (10 Sep 2021 15:54)  Senna 8.6 mg oral tablet: 1 tab(s) orally once a day (at bedtime) (10 Sep 2021 15:54)  Spiriva 18 mcg inhalation capsule: 1 cap(s) inhaled once a day (10 Sep 2021 15:54)  Symbicort 80 mcg-4.5 mcg/inh inhalation aerosol: 2 puff(s) inhaled 2 times a day (10 Sep 2021 15:54)  Synthroid 150 mcg (0.15 mg) oral tablet: 1 tab(s) orally once a day (10 Sep 2021 15:54)  traZODone 100 mg oral tablet: 1 tab(s) orally 2 times a day (10 Sep 2021 15:54)  Ventolin HFA 90 mcg/inh inhalation aerosol: 2 puff(s) inhaled every 8 hours (10 Sep 2021 15:54)  Zocor 10 mg oral tablet: 1 tab(s) orally once a day (at bedtime) (10 Sep 2021 15:54)    MEDICATIONS  (STANDING):  acetaZOLAMIDE Injectable 250 milliGRAM(s) IV Push every 12 hours  ALBUTerol    90 MICROgram(s) HFA Inhaler 2 Puff(s) Inhalation every 6 hours  artificial tears (preservative free) Ophthalmic Solution 1 Drop(s) Both EYES two times a day  budesonide  80 MICROgram(s)/formoterol 4.5 MICROgram(s) Inhaler 2 Puff(s) Inhalation two times a day  chlorhexidine 0.12% Liquid 15 milliLiter(s) Oral Mucosa every 12 hours  chlorhexidine 4% Liquid 1 Application(s) Topical <User Schedule>  dexMEDEtomidine Infusion 0.2 MICROgram(s)/kG/Hr (2.7 mL/Hr) IV Continuous <Continuous>  dextrose 40% Gel 15 Gram(s) Oral once  dextrose 5%. 1000 milliLiter(s) (50 mL/Hr) IV Continuous <Continuous>  dextrose 5%. 1000 milliLiter(s) (100 mL/Hr) IV Continuous <Continuous>  dextrose 50% Injectable 25 Gram(s) IV Push once  dextrose 50% Injectable 12.5 Gram(s) IV Push once  dextrose 50% Injectable 25 Gram(s) IV Push once  DOBUTamine Infusion 2.5 MICROgram(s)/kG/Min (4.05 mL/Hr) IV Continuous <Continuous>  fat emulsion (Fish Oil and Plant Based) 20% Infusion 13.3 mL/Hr (13.3 mL/Hr) IV Continuous <Continuous>  fentaNYL   Infusion. 0.9 MICROgram(s)/kG/Hr (4.86 mL/Hr) IV Continuous <Continuous>  fluconAZOLE IVPB 200 milliGRAM(s) IV Intermittent every 24 hours  fluconAZOLE IVPB      furosemide Infusion 2.5 mG/Hr (1.25 mL/Hr) IV Continuous <Continuous>  glucagon  Injectable 1 milliGRAM(s) IntraMuscular once  heparin   Injectable 5000 Unit(s) SubCutaneous every 12 hours  insulin lispro (ADMELOG) corrective regimen sliding scale   SubCutaneous three times a day before meals  levothyroxine Injectable 112 MICROGram(s) IV Push at bedtime  meropenem  IVPB 1000 milliGRAM(s) IV Intermittent every 8 hours  meropenem  IVPB      norepinephrine Infusion 0.05 MICROgram(s)/kG/Min (2.53 mL/Hr) IV Continuous <Continuous>  pantoprazole  Injectable 40 milliGRAM(s) IV Push two times a day  Parenteral Nutrition - Adult 1 Each (63 mL/Hr) TPN Continuous <Continuous>  Parenteral Nutrition - Adult 1 Each (63 mL/Hr) TPN Continuous <Continuous>  propofol Infusion 40 MICROgram(s)/kG/Min (13 mL/Hr) IV Continuous <Continuous>  vancomycin  IVPB 1000 milliGRAM(s) IV Intermittent every 12 hours  vasopressin Infusion 0.04 Unit(s)/Min (2.4 mL/Hr) IV Continuous <Continuous>    MEDICATIONS  (PRN):    Mode: AC/ CMV (Assist Control/ Continuous Mandatory Ventilation)  RR (machine): 18  TV (machine): 350  FiO2: 40  PEEP: 5  ITime: 0.75  MAP: 8  PIP: 19      Physical exam:     General:               Pt is intubated, sedated                          Neuro:                  Could not assess                          Cardiovascular:   S1 & S2, regular                           Respiratory:         Air entry is fair and equal on both sides, has bilateral conducted sounds                           GI:                          Soft, nondistended and nontender, Bowel sounds active                            Ext:                        No cyanosis, has upper ext  edema                     Labs:                                                                           9.2    9.11  )-----------( 288      ( 21 Sep 2021 04:46 )             27.9             09-21    133<L>  |  88<L>  |  15  ----------------------------<  181<H>  3.0<L>   |  33<H>  |  0.21<L>    Ca    9.4      21 Sep 2021 04:46  Phos  2.6     09-21  Mg     1.70     09-21    TPro  5.5<L>  /  Alb  2.8<L>  /  TBili  0.5  /  DBili  x   /  AST  39<H>  /  ALT  14  /  AlkPhos  79  09-21                    LIVER FUNCTIONS - ( 21 Sep 2021 04:46 )  Alb: 2.8 g/dL / Pro: 5.5 g/dL / ALK PHOS: 79 U/L / ALT: 14 U/L / AST: 39 U/L / GGT: x             CXR:  < from: Xray Chest 1 View- PORTABLE-Routine (Xray Chest 1 View- PORTABLE-Routine in AM.) (09.21.21 @ 05:12) >  Endotracheal tube and right IJ line unchanged. Heart size is stable and bilateral effusions again seen with congestion unchanged. No pneumothoraces appreciated.    COMPARISON:  September 20    IMPRESSION:  CHF unchanged.        Plan:  General: 81 y. o. female BIBA to [facility] from nursing facility c/o hematemesis.  According to pt it was the first episode and happened while she was eating dinner. Followed by multiple episodes of N/V with bright blood and epigastric cramps.  She was intubated for airway protection and underwent EGD that revealed large diverticula at 28 cm filled with blood and 5 mm tear at distal esophagus, a hiatal hernia and large amount of blood in the fundus.  Diverticula was injected with Epinephrine.  Patient was transferred to [facility] for further management. (10 Sep 2021 15:04)                              Neuro:                                         Pain control with Fentanyl  /  Tylenol PRN    Sedated on Propofol /  Precedex.                            Cardiovascular:                                          Chronic systolic heart failure with severe TR / Moderate MR  Continue Dobutamine drip  Gentle diuresis to keep I/Os  negative  Cardiology f/u      Septic shock: Continue hemodynamic monitoring.  Titrate pressors to MAP>65                             Respiratory:                                         Pt is on full mechanical vent support. FP--40-5                                         Appears comfortable, not in any distress.                                         Continue bronchodilators, pulmonary toilet - PRN     Bilateral pleural effusions R>>L  S/P Right pigtail catheter - drained 1000cc serous fluid     Less likely to be weaned off vent and extubated - Scheduled for Trach / PEG tomorrow  Palliative care f/u                                                                   GI                                         NPO with TPN  For PEG tomorrow                                         Continue Zofran / Reglan for nausea - PRN  	    Monitor Hct and transfuse for Hb<8  G.I f/u                                                                 Renal:     Replace K/Mg/Phos                                        Monitor I/Os and electrolytes                                                                                        Hem/ Onc:                                         Acute blood loss anemia     Monitor Hct and transfuse for Hb<8                                          Follow CBC in AM                           Infectious disease:                                          Sepsis / Shock: Continue antibiotics  Vanco / Meropenem /  Diflucan                                           Cultures - ngtd      Monitor for fever / leukocytosis.       Has new RIJ TLC, follow pleural cultures      I.D f/u                                                                    Endocrine                                             DM-2 / Hyperglycemia: Continue Accu-Checks with coverage    Pt is on SQ Heparin / Lovenox  and Venodyne boots for DVT prophylaxis.     Pertinent clinical, laboratory, radiographic, hemodynamic, echocardiographic, respiratory data, microbiologic data and chart were reviewed and analyzed frequently throughout the course of the day and night  Patient seen, examined and plan discussed with Dr. Zuñiga /   CTICU team during rounds.    I have spent  80  minutes of critical care time with this pt between  7am and 11.59pm.             Matty Villalobos MD

## 2021-09-21 NOTE — CHART NOTE - NSCHARTNOTEFT_GEN_A_CORE
Source: Patient [ ]    Family [ ]     other [ x] RN, chart review     Nutrition f/u for severe protein calorie malnutrition. 82 y/o F who underwent EGD that revealed large diverticula at 28 cm filled with blood and 5 mm tear at distal esophagus, a hiatal hernia and large amount of blood in the fundus.  Diverticula was injected with Epinephrine.    Interval nutrition events:  -Past 24 hrs Propofol volume 202 mL = 222 aiden   -Pt has been on TPN since 9/14.  -Per chart, plan for PEG tomorrow.   -Per RN, GI WNL.     Diet, NPO (09-10-21 @ 06:50)        Enteral /Parenteral Nutrition: 9/21 prescription provides 1243 total calories:  CHO [175] gm.  AA [82] gm. SMOF Lipids [32] gm.    Current Weight: n/a (no new weights)   9/14: 56.3 kg   Weight (kg): 54 (09-10)    Edema: 3+ generalized   Pressure Injuries: none noted.     __________________ Pertinent Medications__________________   MEDICATIONS  (STANDING):  acetaZOLAMIDE Injectable 250 milliGRAM(s) IV Push every 12 hours  ALBUTerol    90 MICROgram(s) HFA Inhaler 2 Puff(s) Inhalation every 6 hours  artificial tears (preservative free) Ophthalmic Solution 1 Drop(s) Both EYES two times a day  budesonide  80 MICROgram(s)/formoterol 4.5 MICROgram(s) Inhaler 2 Puff(s) Inhalation two times a day  chlorhexidine 0.12% Liquid 15 milliLiter(s) Oral Mucosa every 12 hours  chlorhexidine 4% Liquid 1 Application(s) Topical <User Schedule>  dexMEDEtomidine Infusion 0.2 MICROgram(s)/kG/Hr (2.7 mL/Hr) IV Continuous <Continuous>  dextrose 40% Gel 15 Gram(s) Oral once  dextrose 5%. 1000 milliLiter(s) (50 mL/Hr) IV Continuous <Continuous>  dextrose 5%. 1000 milliLiter(s) (100 mL/Hr) IV Continuous <Continuous>  dextrose 50% Injectable 25 Gram(s) IV Push once  dextrose 50% Injectable 12.5 Gram(s) IV Push once  dextrose 50% Injectable 25 Gram(s) IV Push once  DOBUTamine Infusion 2.5 MICROgram(s)/kG/Min (4.05 mL/Hr) IV Continuous <Continuous>  fat emulsion (Fish Oil and Plant Based) 20% Infusion 13.3 mL/Hr (13.3 mL/Hr) IV Continuous <Continuous>  fentaNYL   Infusion. 0.9 MICROgram(s)/kG/Hr (4.86 mL/Hr) IV Continuous <Continuous>  fluconAZOLE IVPB 200 milliGRAM(s) IV Intermittent every 24 hours  fluconAZOLE IVPB      furosemide Infusion 2.5 mG/Hr (1.25 mL/Hr) IV Continuous <Continuous>  glucagon  Injectable 1 milliGRAM(s) IntraMuscular once  heparin   Injectable 5000 Unit(s) SubCutaneous every 12 hours  insulin lispro (ADMELOG) corrective regimen sliding scale   SubCutaneous three times a day before meals  levothyroxine Injectable 112 MICROGram(s) IV Push at bedtime  meropenem  IVPB 1000 milliGRAM(s) IV Intermittent every 8 hours  meropenem  IVPB      norepinephrine Infusion 0.05 MICROgram(s)/kG/Min (2.53 mL/Hr) IV Continuous <Continuous>  pantoprazole  Injectable 40 milliGRAM(s) IV Push two times a day  Parenteral Nutrition - Adult 1 Each (63 mL/Hr) TPN Continuous <Continuous>  Parenteral Nutrition - Adult 1 Each (63 mL/Hr) TPN Continuous <Continuous>  potassium chloride  20 mEq/100 mL IVPB 20 milliEquivalent(s) IV Intermittent once  potassium chloride  20 mEq/100 mL IVPB 20 milliEquivalent(s) IV Intermittent once  propofol Infusion 40 MICROgram(s)/kG/Min (13 mL/Hr) IV Continuous <Continuous>  vancomycin  IVPB 1000 milliGRAM(s) IV Intermittent every 12 hours  vasopressin Infusion 0.04 Unit(s)/Min (2.4 mL/Hr) IV Continuous <Continuous>    MEDICATIONS  (PRN):      __________________ Pertinent Labs__________________   09-21 Na130 mmol/L<L> Glu 203 mg/dL<H> K+ 2.9 mmol/L<LL> Cr  0.23 mg/dL<L> BUN 17 mg/dL 09-21 Phos 2.8 mg/dL 09-21 Alb 3.0 g/dL<L> 09-21 PAB 9 mg/dL<L> 09-21 Chol --    LDL --    HDL --    Trig 113 mg/dL            Estimated Needs:   [x ] no change since previous assessment with wt 54 kg   25-30 aiden/kg= 6988-8014 aiden/d  1.2-1.5 gm pro/kg= 65-81 gm pro/d        Previous Nutrition Diagnosis: severe protein calorie malnutrition     Nutrition Diagnosis is [x ] ongoing  [ ] resolved [ ] not applicable       Recommendations:  1. Pt meeting estimated protein-energy needs with current TPN order. Continue TPN as medically feasible.         Monitoring and Evaluation:      [ x] Tolerance to EN/PN  [x ] weights [x ] follow up per protocol  [ ] other:

## 2021-09-21 NOTE — PROGRESS NOTE ADULT - ASSESSMENT
81 y. o. female BIBA to [facility] from nursing facility c/o hematemesis.  According to pt it was the first episode and happened while she was eating dinner. Followed by multiple episodes of N/V with bright blood and epigastric cramps.  She was intubated for airway protection and underwent EGD that revealed large diverticula at 28 cm filled with blood and 5 mm tear at distal esophagus, a hiatal hernia and large amount of blood in the fundus.  Diverticula was injected with Epinephrine.  Patient was transferred to [facility] for further management. (10 Sep 2021 15:04)    Pt followed by GI, s/p repeat EGD on 9/14 -  showing significant clot in large esophageal diverticula -- without obvious source of etiology of bleeding (ie no vessel) + no esophageal mass seen.  Pt on pressors, thought to be unlikely hemorrhagic given stable Hb.    9/15: WBC 10.8, sputum cx (9/10) with nl resp tawana. CT c/a/p shows a 7.2 cm masslike structure distending the mid to distal esophagus, suspicious for malignancy.  Bilateral tree-in-bud nodular opacities, predominantly in the left lower lobe, new/increased from 9/9/2021, raising concern for infection.  Left lower lobe consolidative opacity, possible combination of atelectasis and pneumonia.  (+) hypodensities in the liver suspicion for malignancy, and stercoral colitis.    Pt with fever, on pressors.  Pt is NPO on TPN.   Pt on empiric abx, ID consulted for further abx managment.     Fever/sepsis:    - pt with fever, on pressor support.  Abx broadened to vanco and meropenem.  Fluconazole added on 9/17 due to continued fevers.  - S/p central line change on 9/17.   - CT c/a/p noted, increased LLL consolidation, possible aspiration.  f/u repeat sputum cx - nl resp tawana    - Check bcx x 2 - NGTD.  Sputum cx no resp tawana  - Check vanco trough - subtherapeutic, increase to 1250mg iv bid and repeat next trough prior to 4th dose.   - f/u WBC and fever curve.  If pt respikes, check repeat bcx x 2.  Consider repeat CT c/a/p    * Pt seen by Palliative care service for GOC discussion - pt remains full code.  Pt for possibel trach/peg    Karen Casillas  [phone]

## 2021-09-21 NOTE — PROGRESS NOTE ADULT - ASSESSMENT
81F hx of atrial fibrillation, COPD who presents from nursing facility with hematemesis, hospital course c/b acute hypoxic respiratory failure. Cardiology consulted due to concern for cardiogenic shock. Started on dobutamine gtt for inotropy and lasix gtt. SpO2 75% on venous gas, if true mixed venous, then CI 4.5 on dobutamine. Patient is -500cc for shift. Lasix gtt and dobutamine slowly weaning down. Vasopressin for pressor support (weaned off levophed). Overall prognosis guarded. GOC discussions are ongoing but tentative plan appears to be trach/peg.     #Acute hypoxic respiratory failure - plan for trach, patients mental status is unknown with concern for airway protection   #Shock - cardiogenic vs hypovolemic    Plan:  -Can likely wean off dobutamine and repeat mixed venous (if able to obtain) to reassess CI/CO (please obtain height for accurate measurements).  -Wean vasopressin, goal MAP in such severe HF around 60-65.   -If UOP <75cc/hr, would restart lasix gtt     Gayathri Ramirez MD  Cardiology Fellow - PGY 4  For all New Consults and Questions:  [email or web address]   Login: [employer]

## 2021-09-21 NOTE — PROGRESS NOTE ADULT - ATTENDING COMMENTS
I agree with the above history, physical examination, chief complaint/diagnosis, and plan, which I have reviewed and edited where appropriate.  I agree with notes/assessment and detailed interval history of health care providers on my service.  I have seen and examined the patient.  I reviewed the laboratory and available data and agree with the history, physical assessment and plan.  I reviewed and discussed with all consultants, house staff and PA's.  The Nutrition Support Team (NST) discusses on an ongoing basis with the primary team and all consultants, House staff and PA's to have a permanent risk benefit analyses on all decisions and coordinating care.  I was physically present for the key portions of the evaluation and management (E/M) service provided.  82 y/o female with severe PC malnutrition in the context of acute illness. Receiving TPN/lipids secondary prolonged NPO status.   frail elderly, sedated   Resp: coarse bs  Abd: Soft, Non-distended   Skin: Warm, 1+ peripheral edema   labs reviewed - electrolytes adjusted  continue TPN with infusion volume of 1.5 L/1243 kcal/day

## 2021-09-22 LAB
ALBUMIN SERPL ELPH-MCNC: 2.6 G/DL — LOW (ref 3.3–5)
ALP SERPL-CCNC: 78 U/L — SIGNIFICANT CHANGE UP (ref 40–120)
ALT FLD-CCNC: 15 U/L — SIGNIFICANT CHANGE UP (ref 4–33)
ANION GAP SERPL CALC-SCNC: 12 MMOL/L — SIGNIFICANT CHANGE UP (ref 7–14)
AST SERPL-CCNC: 31 U/L — SIGNIFICANT CHANGE UP (ref 4–32)
BASE EXCESS BLDA CALC-SCNC: 3.4 MMOL/L — HIGH (ref -2–3)
BILIRUB DIRECT SERPL-MCNC: <0.2 MG/DL — SIGNIFICANT CHANGE UP (ref 0–0.2)
BILIRUB INDIRECT FLD-MCNC: >0.2 MG/DL — SIGNIFICANT CHANGE UP (ref 0–1)
BILIRUB SERPL-MCNC: 0.4 MG/DL — SIGNIFICANT CHANGE UP (ref 0.2–1.2)
BUN SERPL-MCNC: 20 MG/DL — SIGNIFICANT CHANGE UP (ref 7–23)
CALCIUM SERPL-MCNC: 9.8 MG/DL — SIGNIFICANT CHANGE UP (ref 8.4–10.5)
CHLORIDE SERPL-SCNC: 97 MMOL/L — LOW (ref 98–107)
CO2 BLDA-SCNC: 30 MMOL/L — HIGH (ref 19–24)
CO2 SERPL-SCNC: 23 MMOL/L — SIGNIFICANT CHANGE UP (ref 22–31)
CREAT SERPL-MCNC: 0.24 MG/DL — LOW (ref 0.5–1.3)
CULTURE RESULTS: SIGNIFICANT CHANGE UP
GLUCOSE BLDC GLUCOMTR-MCNC: 124 MG/DL — HIGH (ref 70–99)
GLUCOSE BLDC GLUCOMTR-MCNC: 156 MG/DL — HIGH (ref 70–99)
GLUCOSE BLDC GLUCOMTR-MCNC: 187 MG/DL — HIGH (ref 70–99)
GLUCOSE SERPL-MCNC: 177 MG/DL — HIGH (ref 70–99)
HCO3 BLDA-SCNC: 28 MMOL/L — SIGNIFICANT CHANGE UP (ref 21–28)
HCT VFR BLD CALC: 27.6 % — LOW (ref 34.5–45)
HGB BLD-MCNC: 8.9 G/DL — LOW (ref 11.5–15.5)
MAGNESIUM SERPL-MCNC: 2.3 MG/DL — SIGNIFICANT CHANGE UP (ref 1.6–2.6)
MCHC RBC-ENTMCNC: 30 PG — SIGNIFICANT CHANGE UP (ref 27–34)
MCHC RBC-ENTMCNC: 32.2 GM/DL — SIGNIFICANT CHANGE UP (ref 32–36)
MCV RBC AUTO: 92.9 FL — SIGNIFICANT CHANGE UP (ref 80–100)
NRBC # BLD: 0 /100 WBCS — SIGNIFICANT CHANGE UP
NRBC # FLD: 0 K/UL — SIGNIFICANT CHANGE UP
PCO2 BLDA: 44 MMHG — HIGH (ref 32–35)
PH BLDA: 7.42 — SIGNIFICANT CHANGE UP (ref 7.35–7.45)
PH FLD: 8.3 — SIGNIFICANT CHANGE UP
PHOSPHATE SERPL-MCNC: 2.9 MG/DL — SIGNIFICANT CHANGE UP (ref 2.5–4.5)
PLATELET # BLD AUTO: 315 K/UL — SIGNIFICANT CHANGE UP (ref 150–400)
PO2 BLDA: 187 MMHG — HIGH (ref 83–108)
POTASSIUM SERPL-MCNC: 3.5 MMOL/L — SIGNIFICANT CHANGE UP (ref 3.5–5.3)
POTASSIUM SERPL-SCNC: 3.5 MMOL/L — SIGNIFICANT CHANGE UP (ref 3.5–5.3)
PROT SERPL-MCNC: 5.4 G/DL — LOW (ref 6–8.3)
RBC # BLD: 2.97 M/UL — LOW (ref 3.8–5.2)
RBC # FLD: 16.2 % — HIGH (ref 10.3–14.5)
SAO2 % BLDA: 97.9 % — SIGNIFICANT CHANGE UP (ref 94–98)
SODIUM SERPL-SCNC: 132 MMOL/L — LOW (ref 135–145)
SPECIMEN SOURCE: SIGNIFICANT CHANGE UP
TRIGL SERPL-MCNC: 99 MG/DL — SIGNIFICANT CHANGE UP
WBC # BLD: 8.55 K/UL — SIGNIFICANT CHANGE UP (ref 3.8–10.5)
WBC # FLD AUTO: 8.55 K/UL — SIGNIFICANT CHANGE UP (ref 3.8–10.5)

## 2021-09-22 PROCEDURE — 71045 X-RAY EXAM CHEST 1 VIEW: CPT | Mod: 26

## 2021-09-22 PROCEDURE — 99232 SBSQ HOSP IP/OBS MODERATE 35: CPT

## 2021-09-22 PROCEDURE — 99291 CRITICAL CARE FIRST HOUR: CPT

## 2021-09-22 RX ORDER — POTASSIUM CHLORIDE 20 MEQ
20 PACKET (EA) ORAL
Refills: 0 | Status: COMPLETED | OUTPATIENT
Start: 2021-09-22 | End: 2021-09-22

## 2021-09-22 RX ORDER — I.V. FAT EMULSION 20 G/100ML
13.3 EMULSION INTRAVENOUS
Qty: 32 | Refills: 0 | Status: DISCONTINUED | OUTPATIENT
Start: 2021-09-22 | End: 2021-09-23

## 2021-09-22 RX ORDER — ELECTROLYTE SOLUTION,INJ
1 VIAL (ML) INTRAVENOUS
Refills: 0 | Status: DISCONTINUED | OUTPATIENT
Start: 2021-09-22 | End: 2021-09-22

## 2021-09-22 RX ADMIN — ALBUTEROL 2 PUFF(S): 90 AEROSOL, METERED ORAL at 22:52

## 2021-09-22 RX ADMIN — I.V. FAT EMULSION 13.3 ML/HR: 20 EMULSION INTRAVENOUS at 17:37

## 2021-09-22 RX ADMIN — Medication 1 EACH: at 17:35

## 2021-09-22 RX ADMIN — Medication 250 MILLIGRAM(S): at 21:43

## 2021-09-22 RX ADMIN — HEPARIN SODIUM 5000 UNIT(S): 5000 INJECTION INTRAVENOUS; SUBCUTANEOUS at 17:19

## 2021-09-22 RX ADMIN — PANTOPRAZOLE SODIUM 40 MILLIGRAM(S): 20 TABLET, DELAYED RELEASE ORAL at 05:16

## 2021-09-22 RX ADMIN — CHLORHEXIDINE GLUCONATE 15 MILLILITER(S): 213 SOLUTION TOPICAL at 17:17

## 2021-09-22 RX ADMIN — ALBUTEROL 2 PUFF(S): 90 AEROSOL, METERED ORAL at 11:49

## 2021-09-22 RX ADMIN — Medication 1 DROP(S): at 05:37

## 2021-09-22 RX ADMIN — Medication 1 DROP(S): at 17:19

## 2021-09-22 RX ADMIN — Medication 1: at 06:06

## 2021-09-22 RX ADMIN — HEPARIN SODIUM 5000 UNIT(S): 5000 INJECTION INTRAVENOUS; SUBCUTANEOUS at 05:17

## 2021-09-22 RX ADMIN — ALBUTEROL 2 PUFF(S): 90 AEROSOL, METERED ORAL at 03:16

## 2021-09-22 RX ADMIN — BUDESONIDE AND FORMOTEROL FUMARATE DIHYDRATE 2 PUFF(S): 160; 4.5 AEROSOL RESPIRATORY (INHALATION) at 22:53

## 2021-09-22 RX ADMIN — CHLORHEXIDINE GLUCONATE 1 APPLICATION(S): 213 SOLUTION TOPICAL at 05:12

## 2021-09-22 RX ADMIN — MEROPENEM 100 MILLIGRAM(S): 1 INJECTION INTRAVENOUS at 15:51

## 2021-09-22 RX ADMIN — Medication 250 MILLIGRAM(S): at 10:23

## 2021-09-22 RX ADMIN — Medication 4.05 MICROGRAM(S)/KG/MIN: at 07:49

## 2021-09-22 RX ADMIN — MEROPENEM 100 MILLIGRAM(S): 1 INJECTION INTRAVENOUS at 05:13

## 2021-09-22 RX ADMIN — PROPOFOL 13 MICROGRAM(S)/KG/MIN: 10 INJECTION, EMULSION INTRAVENOUS at 07:49

## 2021-09-22 RX ADMIN — CHLORHEXIDINE GLUCONATE 15 MILLILITER(S): 213 SOLUTION TOPICAL at 05:12

## 2021-09-22 RX ADMIN — PANTOPRAZOLE SODIUM 40 MILLIGRAM(S): 20 TABLET, DELAYED RELEASE ORAL at 17:17

## 2021-09-22 RX ADMIN — Medication 1 EACH: at 07:50

## 2021-09-22 RX ADMIN — ALBUTEROL 2 PUFF(S): 90 AEROSOL, METERED ORAL at 15:33

## 2021-09-22 RX ADMIN — Medication 1: at 12:00

## 2021-09-22 RX ADMIN — I.V. FAT EMULSION 13.3 ML/HR: 20 EMULSION INTRAVENOUS at 07:50

## 2021-09-22 RX ADMIN — FLUCONAZOLE 100 MILLIGRAM(S): 150 TABLET ORAL at 14:26

## 2021-09-22 RX ADMIN — BUDESONIDE AND FORMOTEROL FUMARATE DIHYDRATE 2 PUFF(S): 160; 4.5 AEROSOL RESPIRATORY (INHALATION) at 11:50

## 2021-09-22 RX ADMIN — Medication 100 MILLIEQUIVALENT(S): at 07:48

## 2021-09-22 RX ADMIN — Medication 112 MICROGRAM(S): at 22:10

## 2021-09-22 RX ADMIN — Medication 100 MILLIEQUIVALENT(S): at 06:37

## 2021-09-22 NOTE — PROGRESS NOTE ADULT - SUBJECTIVE AND OBJECTIVE BOX
ROXIE LAZAR                     [id number]    HPI:  81 y. o. female BIBA to [facility] from nursing facility c/o hematemesis.  According to pt it was the first episode and happened while she was eating dinner. Followed by multiple episodes of N/V with bright blood and epigastric cramps.  She was intubated for airway protection and underwent EGD that revealed large diverticula at 28 cm filled with blood and 5 mm tear at distal esophagus, a hiatal hernia and large amount of blood in the fundus.  Diverticula was injected with Epinephrine.  Patient was transferred to [facility] for further management. (10 Sep 2021 15:04)      Issues:              Acute Hypoxic Respiratory failure  Chronic systolic heart failure  Moderate MR  Severe TR              Septic Shock              Esophageal diverticular bleed.   Acute blood loss anemia  COPD  Chronic Afib -- previously on Apixaban  Hypothyroidism  GERD  Pleural effusion  Protein calorie malnutrition                Home Medications:  Abreva 10% topical cream: Apply topically to affected area once a day (10 Sep 2021 15:54)  Albuterol (Eqv-ProAir HFA) 90 mcg/inh inhalation aerosol: 2 puff(s) inhaled every 6 hours (10 Sep 2021 15:54)  allopurinol 100 mg oral tablet: 2 tab(s) orally 2 times a day (10 Sep 2021 15:54)  Aricept 10 mg oral tablet: 1 tab(s) orally once a day (at bedtime) (10 Sep 2021 15:54)  aspirin 81 mg oral tablet: 1 tab(s) orally once a day (10 Sep 2021 15:54)  baclofen 5 mg oral tablet: 1 tab(s) orally 2 times a day (10 Sep 2021 15:54)  busPIRone 15 mg oral tablet: 1 tab(s) orally once a day (at bedtime) (10 Sep 2021 15:54)  Claritin 10 mg oral tablet: 1 tab(s) orally once a day (10 Sep 2021 15:54)  Colace 100 mg oral capsule: 1 cap(s) orally 3 times a day (10 Sep 2021 15:54)  Cymbalta 60 mg oral delayed release capsule: 1 cap(s) orally once a day (10 Sep 2021 15:54)  Eliquis 2.5 mg oral tablet: 1 tab(s) orally 2 times a day (10 Sep 2021 15:54)  Lasix 40 mg oral tablet: 1 tab(s) orally once a day (10 Sep 2021 15:54)  Linzess 290 mcg oral capsule: 1 cap(s) orally once a day (10 Sep 2021 15:54)    PAST MEDICAL & SURGICAL HISTORY:  Afib    History of COPD    HTN (hypertension)    Hypothyroid    GERD (gastroesophageal reflux disease)    Esophageal diverticulum    Presence of IVC filter              VITAL SIGNS:  Vital Signs Last 24 Hrs  T(C): 36.6 (22 Sep 2021 12:00), Max: 36.8 (22 Sep 2021 04:00)  T(F): 97.8 (22 Sep 2021 12:00), Max: 98.3 (22 Sep 2021 04:00)  HR: 69 (22 Sep 2021 12:00) (55 - 76)  BP: --  BP(mean): --  RR: 26 (22 Sep 2021 12:00) (18 - 26)  SpO2: 100% (22 Sep 2021 12:00) (98% - 100%)    I/Os:   I&O's Detail    21 Sep 2021 07:01  -  22 Sep 2021 07:00  --------------------------------------------------------  IN:    Dexmedetomidine: 267.1 mL    DOBUTamine: 94.3 mL    Fat Emulsion (Fish Oil &amp; Plant Based) 20% Infusion: 146.3 mL    Fat Emulsion (Fish Oil &amp; Plant Based) 20% Infusion: 13.3 mL    IV PiggyBack: 455 mL    IV PiggyBack: 500 mL    IV PiggyBack: 250 mL    IV PiggyBack: 100 mL    Propofol: 117 mL    TPN (Total Parenteral Nutrition): 1449 mL    Vasopressin: 105.8 mL  Total IN: 3497.8 mL    OUT:    Chest Tube (mL): 595 mL    FentaNYL: 0 mL    Furosemide: 0 mL    Indwelling Catheter - Urethral (mL): 2700 mL    Norepinephrine: 0 mL  Total OUT: 3295 mL    Total NET: 202.7 mL      22 Sep 2021 07:01  -  22 Sep 2021 13:08  --------------------------------------------------------  IN:    Dexmedetomidine: 78 mL    DOBUTamine: 24.4 mL    Fat Emulsion (Fish Oil &amp; Plant Based) 20% Infusion: 13.3 mL    Fat Emulsion (Fish Oil &amp; Plant Based) 20% Infusion: 39.3 mL    IV PiggyBack: 250 mL    Propofol: 51.8 mL    TPN (Total Parenteral Nutrition): 378 mL    Vasopressin: 17.5 mL  Total IN: 852.3 mL    OUT:    Indwelling Catheter - Urethral (mL): 585 mL  Total OUT: 585 mL    Total NET: 267.3 mL          CAPILLARY BLOOD GLUCOSE      POCT Blood Glucose.: 187 mg/dL (22 Sep 2021 11:12)  POCT Blood Glucose.: 156 mg/dL (22 Sep 2021 05:24)  POCT Blood Glucose.: 124 mg/dL (21 Sep 2021 17:13)      =======================MEDICATIONS===================  MEDICATIONS  (STANDING):  ALBUTerol    90 MICROgram(s) HFA Inhaler 2 Puff(s) Inhalation every 6 hours  artificial tears (preservative free) Ophthalmic Solution 1 Drop(s) Both EYES two times a day  budesonide  80 MICROgram(s)/formoterol 4.5 MICROgram(s) Inhaler 2 Puff(s) Inhalation two times a day  chlorhexidine 0.12% Liquid 15 milliLiter(s) Oral Mucosa every 12 hours  chlorhexidine 4% Liquid 1 Application(s) Topical <User Schedule>  dexMEDEtomidine Infusion 0.2 MICROgram(s)/kG/Hr (2.7 mL/Hr) IV Continuous <Continuous>  dextrose 40% Gel 15 Gram(s) Oral once  dextrose 5%. 1000 milliLiter(s) (50 mL/Hr) IV Continuous <Continuous>  dextrose 5%. 1000 milliLiter(s) (100 mL/Hr) IV Continuous <Continuous>  dextrose 50% Injectable 25 Gram(s) IV Push once  dextrose 50% Injectable 12.5 Gram(s) IV Push once  dextrose 50% Injectable 25 Gram(s) IV Push once  DOBUTamine Infusion 2.5 MICROgram(s)/kG/Min (4.05 mL/Hr) IV Continuous <Continuous>  fat emulsion (Fish Oil and Plant Based) 20% Infusion 13.3 mL/Hr (13.3 mL/Hr) IV Continuous <Continuous>  fentaNYL   Infusion. 0.9 MICROgram(s)/kG/Hr (4.86 mL/Hr) IV Continuous <Continuous>  fluconAZOLE IVPB 200 milliGRAM(s) IV Intermittent every 24 hours  fluconAZOLE IVPB      glucagon  Injectable 1 milliGRAM(s) IntraMuscular once  heparin   Injectable 5000 Unit(s) SubCutaneous every 12 hours  insulin lispro (ADMELOG) corrective regimen sliding scale   SubCutaneous three times a day before meals  levothyroxine Injectable 112 MICROGram(s) IV Push at bedtime  meropenem  IVPB 1000 milliGRAM(s) IV Intermittent every 8 hours  meropenem  IVPB      norepinephrine Infusion 0.05 MICROgram(s)/kG/Min (2.53 mL/Hr) IV Continuous <Continuous>  pantoprazole  Injectable 40 milliGRAM(s) IV Push two times a day  Parenteral Nutrition - Adult 1 Each (63 mL/Hr) TPN Continuous <Continuous>  Parenteral Nutrition - Adult 1 Each (63 mL/Hr) TPN Continuous <Continuous>  propofol Infusion 40 MICROgram(s)/kG/Min (13 mL/Hr) IV Continuous <Continuous>  vancomycin  IVPB 1000 milliGRAM(s) IV Intermittent every 12 hours  vasopressin Infusion 0.04 Unit(s)/Min (2.4 mL/Hr) IV Continuous <Continuous>    MEDICATIONS  (PRN):      =======================VENTILATOR SETTINGS===================  Mode: AC/ CMV (Assist Control/ Continuous Mandatory Ventilation)  RR (machine): 18  TV (machine): 350  FiO2: 40  PEEP: 5  ITime: 0.72  MAP: 11  PIP: 28    PHYSICAL EXAM============================  General:                    Sedated, Vented  Neuro:                   Sedated  Respiratory:	Air entry fair and  bilateral conducted sounds                                     ++ thick secreations  CV:		Regular rate and rhythm. Normal S1/S2                                          Distal pulses present.  Abdomen:	                     Soft, non-distended. Bowel sounds present   Skin:		No rash.  Extremities:	Warm, no cyanosis or edema.  Palpable pulses    ============================LABS=========================                        8.9    8.55  )-----------( 315      ( 22 Sep 2021 04:36 )             27.6     09-22    132<L>  |  97<L>  |  20  ----------------------------<  177<H>  3.5   |  23  |  0.24<L>    Ca    9.8      22 Sep 2021 04:36  Phos  2.9     09-  Mg     2.30     -    TPro  5.4<L>  /  Alb  2.6<L>  /  TBili  0.4  /  DBili  <0.2  /  AST  31  /  ALT  15  /  AlkPhos  78  09-22    LIVER FUNCTIONS - ( 22 Sep 2021 04:36 )  Alb: 2.6 g/dL / Pro: 5.4 g/dL / ALK PHOS: 78 U/L / ALT: 15 U/L / AST: 31 U/L / GGT: x             ABG - ( 22 Sep 2021 04:36 )  pH, Arterial: 7.42  pH, Blood: x     /  pCO2: 44    /  pO2: 187   / HCO3: 28    / Base Excess: 3.4   /  SaO2: 97.9      A/P:   81 y. o. female BIBA to [facility] from nursing facility c/o hematemesis.  According to pt it was the first episode and happened while she was eating dinner. Followed by multiple episodes of N/V with bright blood and epigastric cramps.  She was intubated for airway protection and underwent EGD that revealed large diverticula at 28 cm filled with blood and 5 mm tear at distal esophagus, a hiatal hernia and large amount of blood in the fundus.  Diverticula was injected with Epinephrine.  Patient was transferred to [facility] for further management. (10 Sep 2021 15:04)                              Neuro:                                         Pain control with Fentanyl  /  Tylenol PRN    Sedated on Propofol /  Precedex.                            Cardiovascular:                                          Chronic systolic heart failure with severe TR / Moderate MR  Continue Dobutamine drip, Repeat ECHO reviewed. EF 25% while on    Gentle diuresis to keep I/Os  negative  Cardiology f/u for severe BiVENT dysfunction     Septic shock: Continue hemodynamic monitoring.  Titrate pressors to MAP>65                             Respiratory:                                         Pt is on full mechanical vent support. CZ--40-5, Plan for Trach/PEG                                         Appears comfortable, not in any distress.                                         Continue bronchodilators, pulmonary toilet - PRN     Bilateral pleural effusions R>>L  S/P Right pigtail catheter - drained 1000cc serous fluid     Less likely to be weaned off vent and extubated - Scheduled for Trach / PEG tomorrow  Palliative care f/u                                                                   GI                                         NPO with TPN  Scheduled for PEG                                          Continue Zofran / Reglan for nausea - PRN  	    Monitor Hct and transfuse for Hb<8  G.I f/u                                                                 Renal:     Replace K/Mg/Phos                                        Monitor I/Os and electrolytes                                                                                        Hem/ Onc:                                         Acute blood loss anemia     Monitor Hct and transfuse for Hb<8                                          Follow CBC in AM                           Infectious disease:                                          Sepsis / Shock: Continue antibiotics  Vanco / Meropenem /  Diflucan                                           Cultures - ngtd      Monitor for fever / leukocytosis.       Has new RIJ TLC, follow pleural cultures      I.D f/u                                                                    Endocrine                                             DM-2 / Hyperglycemia: Continue Accu-Checks with coverage    Pt is on SQ Heparin / Lovenox  and Venodyne boots for DVT prophylaxis.     Pertinent clinical, laboratory, radiographic, hemodynamic, echocardiographic, respiratory data, microbiologic data and chart were reviewed and analyzed frequently throughout the course of the day and night  Patient seen, examined and plan discussed with Dr. Zuñiga /   CTICU team during rounds.    I have spent 35 minutes of critical care time with this pt between  7am and 11.59pm.       Inder Lepe DO, FACEP

## 2021-09-22 NOTE — PROGRESS NOTE ADULT - ASSESSMENT
81F hx of atrial fibrillation, COPD who presents from nursing facility with hematemesis, hospital course c/b acute hypoxic respiratory failure. Cardiology consulted due to concern for cardiogenic shock. Started on dobutamine gtt for inotropy and lasix gtt. SpO2 75% on venous gas, if true mixed venous, then CI 4.5 on dobutamine. Patient is -500cc for shift. Lasix gtt and dobutamine slowly weaning down. Vasopressin for pressor support (weaned off levophed). Overall prognosis guarded. GOC discussions are ongoing but tentative plan appears to be trach/peg.     #Acute hypoxic respiratory failure - plan for trach, patients mental status is unknown with concern for airway protection   #Shock - cardiogenic vs hypovolemic    Plan:  -Continue dobutamine for inotropic support   -goal MAP in such severe HF around 60-65  -If UOP <75cc/hr, would restart lasix gtt   -If any further questions, please contact us    Gayathri Ramirez MD  Cardiology Fellow - PGY 4  For all New Consults and Questions:  [email or web address]   Login: [email or web address]

## 2021-09-22 NOTE — PROGRESS NOTE ADULT - ATTENDING COMMENTS
I agree with the detailed interval history, physical, and plan, which I have reviewed and edited where appropriate'; also agree with notes/assessment with my team on service.  I have personally examined the patient.  I was physically present for the key portions of the evaluation and management (E/M) service provided.  I reviewed all the pertinent data.  The patient is a critical care patient with life threatening hemodynamic and metabolic instability in SICU.  The SICU team has a constant risk benefit analyzes discussion and coordinating care with the primary team and all consultants.   The patient is in SICU with the chief complaint and diagnosis mentioned in the note.   The plan will be specified in the note.  82 y/o female with severe CP malnutrition in the context of acute illness receiving TPN/lipids   sedated   Resp: coarse bs  Abd: Soft, Non-distended   Skin: Warm, 1+ peripheral edema  labs reviewed - electrolytes adjusted  continue TPN with infusion volume of 1.5 L/ 1243 kcal/day

## 2021-09-22 NOTE — PROGRESS NOTE ADULT - SUBJECTIVE AND OBJECTIVE BOX
NUTRITION NOTE  [id number] NAGI  ===============================    Interval events - Parenteral nutrition was initiated on 9/14/21 via central line; pt remains on TPN at this time, plan for PEG placement     ROS - unable to obtain, pt is on vent support     Allergies  Sulfur Colliod (Rash)  Tylenol (Swelling)    PAST MEDICAL & SURGICAL HISTORY:  Afib  History of COPD  HTN (hypertension)  Hypothyroid  GERD (gastroesophageal reflux disease)  Esophageal diverticulum  Presence of IVC filter    ICU Vital Signs Last 24 Hrs  T(C): 36.5 (22 Sep 2021 08:00), Max: 36.8 (22 Sep 2021 04:00)  T(F): 97.7 (22 Sep 2021 08:00), Max: 98.3 (22 Sep 2021 04:00)  HR: 73 (22 Sep 2021 10:00) (55 - 73)  ABP: 144/72 (22 Sep 2021 10:00) (130/63 - 147/73)  ABP(mean): 100 (22 Sep 2021 10:00) (89 - 101)  RR: 20 (22 Sep 2021 10:00) (18 - 26)  SpO2: 99% (22 Sep 2021 10:00) (97% - 100%)    MEDICATIONS  (STANDING):  ALBUTerol    90 MICROgram(s) HFA Inhaler 2 Puff(s) Inhalation every 6 hours  artificial tears (preservative free) Ophthalmic Solution 1 Drop(s) Both EYES two times a day  budesonide  80 MICROgram(s)/formoterol 4.5 MICROgram(s) Inhaler 2 Puff(s) Inhalation two times a day  chlorhexidine 0.12% Liquid 15 milliLiter(s) Oral Mucosa every 12 hours  chlorhexidine 4% Liquid 1 Application(s) Topical <User Schedule>  dexMEDEtomidine Infusion 0.2 MICROgram(s)/kG/Hr (2.7 mL/Hr) IV Continuous <Continuous>  dextrose 40% Gel 15 Gram(s) Oral once  dextrose 5%. 1000 milliLiter(s) (50 mL/Hr) IV Continuous <Continuous>  dextrose 5%. 1000 milliLiter(s) (100 mL/Hr) IV Continuous <Continuous>  dextrose 50% Injectable 25 Gram(s) IV Push once  dextrose 50% Injectable 12.5 Gram(s) IV Push once  dextrose 50% Injectable 25 Gram(s) IV Push once  DOBUTamine Infusion 2.5 MICROgram(s)/kG/Min (4.05 mL/Hr) IV Continuous <Continuous>  fat emulsion (Fish Oil and Plant Based) 20% Infusion 13.3 mL/Hr (13.3 mL/Hr) IV Continuous <Continuous>  fentaNYL   Infusion. 0.9 MICROgram(s)/kG/Hr (4.86 mL/Hr) IV Continuous <Continuous>  fluconAZOLE IVPB 200 milliGRAM(s) IV Intermittent every 24 hours  fluconAZOLE IVPB      glucagon  Injectable 1 milliGRAM(s) IntraMuscular once  heparin   Injectable 5000 Unit(s) SubCutaneous every 12 hours  insulin lispro (ADMELOG) corrective regimen sliding scale   SubCutaneous three times a day before meals  levothyroxine Injectable 112 MICROGram(s) IV Push at bedtime  meropenem  IVPB      meropenem  IVPB 1000 milliGRAM(s) IV Intermittent every 8 hours  norepinephrine Infusion 0.05 MICROgram(s)/kG/Min (2.53 mL/Hr) IV Continuous <Continuous>  pantoprazole  Injectable 40 milliGRAM(s) IV Push two times a day  Parenteral Nutrition - Adult 1 Each (63 mL/Hr) TPN Continuous <Continuous>  Parenteral Nutrition - Adult 1 Each (63 mL/Hr) TPN Continuous <Continuous>  propofol Infusion 40 MICROgram(s)/kG/Min (13 mL/Hr) IV Continuous <Continuous>  vancomycin  IVPB 1000 milliGRAM(s) IV Intermittent every 12 hours  vasopressin Infusion 0.04 Unit(s)/Min (2.4 mL/Hr) IV Continuous <Continuous>    I&O's Detail    21 Sep 2021 07:01  -  22 Sep 2021 07:00  --------------------------------------------------------  IN:    Dexmedetomidine: 267.1 mL    DOBUTamine: 94.3 mL    Fat Emulsion (Fish Oil &amp; Plant Based) 20% Infusion: 146.3 mL    Fat Emulsion (Fish Oil &amp; Plant Based) 20% Infusion: 13.3 mL    IV PiggyBack: 455 mL    IV PiggyBack: 500 mL    IV PiggyBack: 250 mL    IV PiggyBack: 100 mL    Propofol: 117 mL    TPN (Total Parenteral Nutrition): 1449 mL    Vasopressin: 105.8 mL  Total IN: 3497.8 mL    OUT:    Chest Tube (mL): 595 mL    FentaNYL: 0 mL    Furosemide: 0 mL    Indwelling Catheter - Urethral (mL): 2700 mL    Norepinephrine: 0 mL  Total OUT: 3295 mL    Total NET: 202.7 mL      22 Sep 2021 07:01  -  22 Sep 2021 11:00  --------------------------------------------------------  IN:    Dexmedetomidine: 39 mL    DOBUTamine: 12.3 mL    Fat Emulsion (Fish Oil &amp; Plant Based) 20% Infusion: 39.3 mL    Fat Emulsion (Fish Oil &amp; Plant Based) 20% Infusion: 13.3 mL    Propofol: 22.7 mL    TPN (Total Parenteral Nutrition): 189 mL    Vasopressin: 12.7 mL  Total IN: 328.3 mL    OUT:    Indwelling Catheter - Urethral (mL): 435 mL  Total OUT: 435 mL    Total NET: -106.7 mL    POCT Blood Glucose.: 156 mg/dL (22 Sep 2021 05:24)  POCT Blood Glucose.: 124 mg/dL (21 Sep 2021 17:13)  POCT Blood Glucose.: 167 mg/dL (21 Sep 2021 11:25)    Weight (kg): 54 (10 Sep 2021 00:39)    PHYSICAL EXAM:  Gen: Comfortable, No acute distress, frail elderly, sedated   Resp: mechanica breath sounds   Abd: Soft, Non-distended   Skin: Warm, 1+ peripheral edema of lower extremities    Mode: AC/ CMV (Assist Control/ Continuous Mandatory Ventilation)  RR (machine): 18  TV (machine): 350  FiO2: 40  PEEP: 5  ITime: 0.72  MAP: 9  PIP: 23    Diet: NPO and TPN/lipids (started on 9/14/21)    LABORATORY                                                  8.9    8.55  )-----------( 315      ( 22 Sep 2021 04:36 )             27.6   09-22    132<L>  |  97<L>  |  20  ----------------------------<  177<H>  3.5   |  23  |  0.24<L>    Ca    9.8      22 Sep 2021 04:36  Phos  2.9     09-22  Mg     2.30     09-22    TPro  5.4<L>  /  Alb  2.6<L>  /  TBili  0.4  /  DBili  <0.2  /  AST  31  /  ALT  15  /  AlkPhos  78  09-22    LIVER FUNCTIONS - ( 22 Sep 2021 04:36 )  Alb: 2.6 g/dL / Pro: 5.4 g/dL / ALK PHOS: 78 U/L / ALT: 15 U/L / AST: 31 U/L / GGT: x           09-22 Chol -- LDL -- HDL -- Trig 99, 09-21 Chol -- LDL -- HDL -- Trig 113, 09-20 Chol 115 LDL -- HDL 20<L> Trig 129, 09-15 Chol -- LDL -- HDL -- Trig 103    RECENT CULTURES    Culture - Body Fluid with Gram Stain (collected 21 Sep 2021 18:29)  Source: .Body Fluid Pleural Fluid  Gram Stain (21 Sep 2021 22:42):    polymorphonuclear leukocytes seen    No organisms seen    by cytocentrifuge    Culture - Blood (collected 18 Sep 2021 14:48)  Source: .Blood Blood-Venous  Preliminary Report (19 Sep 2021 15:01):    No growth to date.    ASSESSMENT/PLAN:  82 y/o female transferred to CT ICU for a patient who was noted to have an esophageal mass at [facility]". EGD done at outside hospital on 9/9/21 -Large diverticula noted at 28cm filled with blood and blood clots, periphery of diverticula was injected with epinephrine, 5mm distal esophageal tear without bleeding, hiatal hernia. Patient meets criteria for severe malnutrition in the context of acute illness. Patient's signs/symptoms as evidenced by severe loss of muscle mass and fat stores, 1+ edema, NPO >3 days.  Nutrition support service consulted for initiation of TPN/lipids via central line in view of malnutrition and anticipated prolonged NPO status. Pt is s/p central line change on 9/17.    continue TPN with infusion volume of 1.5 L, TPN will provide 1243 kcal/day    labs reviewed - electrolytes adjusted in TPN bag, Na is at max in TPN bag      monitor fingersticks, obtain daily weights     continue parenteral nutrition at this time, will follow up with primary team on plan - monitor for fevers and follow up blood cultures/ID recommendations      1.  Severe protein calorie malnutrition being optimized with TPN: CHO [175] gm.  AA [82] gm. SMOF Lipids [32] gm.  2.  Hyperglycemia managed with: [0] units of regular insulin    3.  Check fluid balance daily.  Strict I/O  [ ] [ ]   4.  Daily BMP, Ionized Calcium, Magnesium and Phosphorous   5.  Triglycerides at initiation of TPN and monthly [ ] [ ]     Nutrition Support [id number]

## 2021-09-22 NOTE — PROGRESS NOTE ADULT - SUBJECTIVE AND OBJECTIVE BOX
Patient seen and examined at bedside.    Overnight Events: No events. Likely PEG trach tomorrow.     Review Of Systems: No chest pain, shortness of breath, or palpitations            Current Meds:  ALBUTerol    90 MICROgram(s) HFA Inhaler 2 Puff(s) Inhalation every 6 hours  artificial tears (preservative free) Ophthalmic Solution 1 Drop(s) Both EYES two times a day  budesonide  80 MICROgram(s)/formoterol 4.5 MICROgram(s) Inhaler 2 Puff(s) Inhalation two times a day  chlorhexidine 0.12% Liquid 15 milliLiter(s) Oral Mucosa every 12 hours  chlorhexidine 4% Liquid 1 Application(s) Topical <User Schedule>  dexMEDEtomidine Infusion 0.2 MICROgram(s)/kG/Hr IV Continuous <Continuous>  dextrose 40% Gel 15 Gram(s) Oral once  dextrose 5%. 1000 milliLiter(s) IV Continuous <Continuous>  dextrose 5%. 1000 milliLiter(s) IV Continuous <Continuous>  dextrose 50% Injectable 25 Gram(s) IV Push once  dextrose 50% Injectable 12.5 Gram(s) IV Push once  dextrose 50% Injectable 25 Gram(s) IV Push once  DOBUTamine Infusion 2.5 MICROgram(s)/kG/Min IV Continuous <Continuous>  fat emulsion (Fish Oil and Plant Based) 20% Infusion 13.3 mL/Hr IV Continuous <Continuous>  fentaNYL   Infusion. 0.9 MICROgram(s)/kG/Hr IV Continuous <Continuous>  fluconAZOLE IVPB 200 milliGRAM(s) IV Intermittent every 24 hours  fluconAZOLE IVPB      glucagon  Injectable 1 milliGRAM(s) IntraMuscular once  heparin   Injectable 5000 Unit(s) SubCutaneous every 12 hours  insulin lispro (ADMELOG) corrective regimen sliding scale   SubCutaneous three times a day before meals  levothyroxine Injectable 112 MICROGram(s) IV Push at bedtime  norepinephrine Infusion 0.05 MICROgram(s)/kG/Min IV Continuous <Continuous>  pantoprazole  Injectable 40 milliGRAM(s) IV Push two times a day  Parenteral Nutrition - Adult 1 Each TPN Continuous <Continuous>  Parenteral Nutrition - Adult 1 Each TPN Continuous <Continuous>  propofol Infusion 40 MICROgram(s)/kG/Min IV Continuous <Continuous>  vancomycin  IVPB 1000 milliGRAM(s) IV Intermittent every 12 hours  vasopressin Infusion 0.04 Unit(s)/Min IV Continuous <Continuous>      Vitals:  T(F): 97.8 (09-22), Max: 98.3 ()  HR: 62 () (60 - 76)  BP: --  RR: 20 ()  SpO2: 100% ()  I&O's Summary    21 Sep 2021 07:01  -  22 Sep 2021 07:00  --------------------------------------------------------  IN: 3497.8 mL / OUT: 3295 mL / NET: 202.7 mL    22 Sep 2021 07:  -  22 Sep 2021 16:13  --------------------------------------------------------  IN: 978.3 mL / OUT: 835 mL / NET: 143.3 mL        Physical Exam:  HEENT:   Normal oral mucosa, near pinpoint pupils b/l  Cardiovascular: Normal S1 S2, No JVD, No murmurs.  Respiratory: b/l Lungs diminished BS	  Psychiatry: sedated  Gastrointestinal:  Soft, Non-tender, + BS	  Skin: No rashes, No ecchymoses, No cyanosis, +warm	  Neurologic: sedated and unresponsive   Extremities: b/l pitting edema at upper shins  Vascular: Peripheral pulses palpable 1+ bilaterally                          8.9    8.55  )-----------( 315      ( 22 Sep 2021 04:36 )             27.6         132<L>  |  97<L>  |  20  ----------------------------<  177<H>  3.5   |  23  |  0.24<L>    Ca    9.8      22 Sep 2021 04:36  Phos  2.9       Mg     2.30         TPro  5.4<L>  /  Alb  2.6<L>  /  TBili  0.4  /  DBili  <0.2  /  AST  31  /  ALT  15  /  AlkPhos  78              Total Cholesterol: --  LDL: --  HDL: --  T        New ECG(s): Personally reviewed    Echo:  CONCLUSIONS:  1. Mitral annular calcification, otherwise normal mitral  valve. Mild mitral regurgitation.  2. Severely dilated left atrium.  LA volume index = 86  cc/m2.  3. Normal left ventricular internal dimensions and wall  thicknesses.  4. Severe segmental left ventricular systolic dysfunction.  Hypokinesis of the apex, mid to distal septum, and mid to  distal anterior wall.  5. Severe right atrial enlargement.  6. The right ventricle is not well visualized; grossly  normal right ventricular systolic function.  7. Estimated right ventricular systolic pressure equals 47  mm Hg, assumingright atrial pressure equals 10 mm Hg,  consistent with mild pulmonary hypertension.  *** Compared with echocardiogram of 9/15/2021, less mitral  regurgitation was seen on the current study.

## 2021-09-23 ENCOUNTER — TRANSCRIPTION ENCOUNTER (OUTPATIENT)
Age: 81
End: 2021-09-23

## 2021-09-23 LAB
ANION GAP SERPL CALC-SCNC: 10 MMOL/L — SIGNIFICANT CHANGE UP (ref 7–14)
ANION GAP SERPL CALC-SCNC: 9 MMOL/L — SIGNIFICANT CHANGE UP (ref 7–14)
BASOPHILS # BLD AUTO: 0.05 K/UL — SIGNIFICANT CHANGE UP (ref 0–0.2)
BASOPHILS NFR BLD AUTO: 0.8 % — SIGNIFICANT CHANGE UP (ref 0–2)
BUN SERPL-MCNC: 21 MG/DL — SIGNIFICANT CHANGE UP (ref 7–23)
BUN SERPL-MCNC: 22 MG/DL — SIGNIFICANT CHANGE UP (ref 7–23)
CA-I BLD-SCNC: 1.39 MMOL/L — HIGH (ref 1.15–1.29)
CALCIUM SERPL-MCNC: 9.8 MG/DL — SIGNIFICANT CHANGE UP (ref 8.4–10.5)
CALCIUM SERPL-MCNC: 9.9 MG/DL — SIGNIFICANT CHANGE UP (ref 8.4–10.5)
CHLORIDE SERPL-SCNC: 100 MMOL/L — SIGNIFICANT CHANGE UP (ref 98–107)
CHLORIDE SERPL-SCNC: 102 MMOL/L — SIGNIFICANT CHANGE UP (ref 98–107)
CO2 SERPL-SCNC: 24 MMOL/L — SIGNIFICANT CHANGE UP (ref 22–31)
CO2 SERPL-SCNC: 27 MMOL/L — SIGNIFICANT CHANGE UP (ref 22–31)
CREAT SERPL-MCNC: 0.23 MG/DL — LOW (ref 0.5–1.3)
CREAT SERPL-MCNC: 0.25 MG/DL — LOW (ref 0.5–1.3)
CULTURE RESULTS: SIGNIFICANT CHANGE UP
EOSINOPHIL # BLD AUTO: 0.33 K/UL — SIGNIFICANT CHANGE UP (ref 0–0.5)
EOSINOPHIL NFR BLD AUTO: 5.5 % — SIGNIFICANT CHANGE UP (ref 0–6)
GLUCOSE BLDC GLUCOMTR-MCNC: 125 MG/DL — HIGH (ref 70–99)
GLUCOSE BLDC GLUCOMTR-MCNC: 136 MG/DL — HIGH (ref 70–99)
GLUCOSE BLDC GLUCOMTR-MCNC: 143 MG/DL — HIGH (ref 70–99)
GLUCOSE SERPL-MCNC: 141 MG/DL — HIGH (ref 70–99)
GLUCOSE SERPL-MCNC: 141 MG/DL — HIGH (ref 70–99)
HCT VFR BLD CALC: 27.5 % — LOW (ref 34.5–45)
HGB BLD-MCNC: 8.9 G/DL — LOW (ref 11.5–15.5)
IANC: 4.43 K/UL — SIGNIFICANT CHANGE UP (ref 1.5–8.5)
IMM GRANULOCYTES NFR BLD AUTO: 0.7 % — SIGNIFICANT CHANGE UP (ref 0–1.5)
LYMPHOCYTES # BLD AUTO: 0.58 K/UL — LOW (ref 1–3.3)
LYMPHOCYTES # BLD AUTO: 9.7 % — LOW (ref 13–44)
MAGNESIUM SERPL-MCNC: 2.2 MG/DL — SIGNIFICANT CHANGE UP (ref 1.6–2.6)
MAGNESIUM SERPL-MCNC: 2.2 MG/DL — SIGNIFICANT CHANGE UP (ref 1.6–2.6)
MCHC RBC-ENTMCNC: 30.4 PG — SIGNIFICANT CHANGE UP (ref 27–34)
MCHC RBC-ENTMCNC: 32.4 GM/DL — SIGNIFICANT CHANGE UP (ref 32–36)
MCV RBC AUTO: 93.9 FL — SIGNIFICANT CHANGE UP (ref 80–100)
MONOCYTES # BLD AUTO: 0.57 K/UL — SIGNIFICANT CHANGE UP (ref 0–0.9)
MONOCYTES NFR BLD AUTO: 9.5 % — SIGNIFICANT CHANGE UP (ref 2–14)
NEUTROPHILS # BLD AUTO: 4.43 K/UL — SIGNIFICANT CHANGE UP (ref 1.8–7.4)
NEUTROPHILS NFR BLD AUTO: 73.8 % — SIGNIFICANT CHANGE UP (ref 43–77)
NRBC # BLD: 0 /100 WBCS — SIGNIFICANT CHANGE UP
NRBC # FLD: 0 K/UL — SIGNIFICANT CHANGE UP
PHOSPHATE SERPL-MCNC: 3.2 MG/DL — SIGNIFICANT CHANGE UP (ref 2.5–4.5)
PHOSPHATE SERPL-MCNC: 3.8 MG/DL — SIGNIFICANT CHANGE UP (ref 2.5–4.5)
PLATELET # BLD AUTO: 311 K/UL — SIGNIFICANT CHANGE UP (ref 150–400)
POTASSIUM SERPL-MCNC: 3.4 MMOL/L — LOW (ref 3.5–5.3)
POTASSIUM SERPL-MCNC: 4.1 MMOL/L — SIGNIFICANT CHANGE UP (ref 3.5–5.3)
POTASSIUM SERPL-SCNC: 3.4 MMOL/L — LOW (ref 3.5–5.3)
POTASSIUM SERPL-SCNC: 4.1 MMOL/L — SIGNIFICANT CHANGE UP (ref 3.5–5.3)
RBC # BLD: 2.93 M/UL — LOW (ref 3.8–5.2)
RBC # FLD: 16.4 % — HIGH (ref 10.3–14.5)
SARS-COV-2 RNA SPEC QL NAA+PROBE: SIGNIFICANT CHANGE UP
SODIUM SERPL-SCNC: 134 MMOL/L — LOW (ref 135–145)
SODIUM SERPL-SCNC: 138 MMOL/L — SIGNIFICANT CHANGE UP (ref 135–145)
SPECIMEN SOURCE: SIGNIFICANT CHANGE UP
WBC # BLD: 6 K/UL — SIGNIFICANT CHANGE UP (ref 3.8–10.5)
WBC # FLD AUTO: 6 K/UL — SIGNIFICANT CHANGE UP (ref 3.8–10.5)

## 2021-09-23 PROCEDURE — 71045 X-RAY EXAM CHEST 1 VIEW: CPT | Mod: 26

## 2021-09-23 PROCEDURE — 99291 CRITICAL CARE FIRST HOUR: CPT

## 2021-09-23 PROCEDURE — 99232 SBSQ HOSP IP/OBS MODERATE 35: CPT

## 2021-09-23 RX ORDER — POTASSIUM CHLORIDE 20 MEQ
10 PACKET (EA) ORAL
Refills: 0 | Status: DISCONTINUED | OUTPATIENT
Start: 2021-09-23 | End: 2021-09-23

## 2021-09-23 RX ORDER — ELECTROLYTE SOLUTION,INJ
1 VIAL (ML) INTRAVENOUS
Refills: 0 | Status: DISCONTINUED | OUTPATIENT
Start: 2021-09-23 | End: 2021-09-23

## 2021-09-23 RX ORDER — I.V. FAT EMULSION 20 G/100ML
13.3 EMULSION INTRAVENOUS
Qty: 32 | Refills: 0 | Status: DISCONTINUED | OUTPATIENT
Start: 2021-09-23 | End: 2021-09-24

## 2021-09-23 RX ORDER — POTASSIUM CHLORIDE 20 MEQ
20 PACKET (EA) ORAL ONCE
Refills: 0 | Status: DISCONTINUED | OUTPATIENT
Start: 2021-09-23 | End: 2021-09-23

## 2021-09-23 RX ORDER — POTASSIUM CHLORIDE 20 MEQ
20 PACKET (EA) ORAL ONCE
Refills: 0 | Status: COMPLETED | OUTPATIENT
Start: 2021-09-23 | End: 2021-09-23

## 2021-09-23 RX ORDER — POTASSIUM CHLORIDE 20 MEQ
20 PACKET (EA) ORAL
Refills: 0 | Status: COMPLETED | OUTPATIENT
Start: 2021-09-23 | End: 2021-09-23

## 2021-09-23 RX ORDER — INSULIN LISPRO 100/ML
VIAL (ML) SUBCUTANEOUS EVERY 6 HOURS
Refills: 0 | Status: DISCONTINUED | OUTPATIENT
Start: 2021-09-23 | End: 2021-10-05

## 2021-09-23 RX ORDER — FUROSEMIDE 40 MG
40 TABLET ORAL ONCE
Refills: 0 | Status: COMPLETED | OUTPATIENT
Start: 2021-09-23 | End: 2021-09-23

## 2021-09-23 RX ADMIN — CHLORHEXIDINE GLUCONATE 15 MILLILITER(S): 213 SOLUTION TOPICAL at 06:49

## 2021-09-23 RX ADMIN — BUDESONIDE AND FORMOTEROL FUMARATE DIHYDRATE 2 PUFF(S): 160; 4.5 AEROSOL RESPIRATORY (INHALATION) at 09:59

## 2021-09-23 RX ADMIN — Medication 112 MICROGRAM(S): at 21:25

## 2021-09-23 RX ADMIN — BUDESONIDE AND FORMOTEROL FUMARATE DIHYDRATE 2 PUFF(S): 160; 4.5 AEROSOL RESPIRATORY (INHALATION) at 22:33

## 2021-09-23 RX ADMIN — ALBUTEROL 2 PUFF(S): 90 AEROSOL, METERED ORAL at 14:57

## 2021-09-23 RX ADMIN — HEPARIN SODIUM 5000 UNIT(S): 5000 INJECTION INTRAVENOUS; SUBCUTANEOUS at 06:48

## 2021-09-23 RX ADMIN — CHLORHEXIDINE GLUCONATE 15 MILLILITER(S): 213 SOLUTION TOPICAL at 17:05

## 2021-09-23 RX ADMIN — Medication 100 MILLIEQUIVALENT(S): at 06:48

## 2021-09-23 RX ADMIN — Medication 1 DROP(S): at 06:49

## 2021-09-23 RX ADMIN — Medication 100 MILLIEQUIVALENT(S): at 09:15

## 2021-09-23 RX ADMIN — PANTOPRAZOLE SODIUM 40 MILLIGRAM(S): 20 TABLET, DELAYED RELEASE ORAL at 06:49

## 2021-09-23 RX ADMIN — PANTOPRAZOLE SODIUM 40 MILLIGRAM(S): 20 TABLET, DELAYED RELEASE ORAL at 17:06

## 2021-09-23 RX ADMIN — ALBUTEROL 2 PUFF(S): 90 AEROSOL, METERED ORAL at 03:48

## 2021-09-23 RX ADMIN — Medication 100 MILLIEQUIVALENT(S): at 11:00

## 2021-09-23 RX ADMIN — Medication 40 MILLIGRAM(S): at 10:14

## 2021-09-23 RX ADMIN — HEPARIN SODIUM 5000 UNIT(S): 5000 INJECTION INTRAVENOUS; SUBCUTANEOUS at 17:05

## 2021-09-23 RX ADMIN — ALBUTEROL 2 PUFF(S): 90 AEROSOL, METERED ORAL at 09:59

## 2021-09-23 RX ADMIN — Medication 1 DROP(S): at 17:05

## 2021-09-23 RX ADMIN — CHLORHEXIDINE GLUCONATE 1 APPLICATION(S): 213 SOLUTION TOPICAL at 07:01

## 2021-09-23 RX ADMIN — ALBUTEROL 2 PUFF(S): 90 AEROSOL, METERED ORAL at 22:33

## 2021-09-23 RX ADMIN — DEXMEDETOMIDINE HYDROCHLORIDE IN 0.9% SODIUM CHLORIDE 2.7 MICROGRAM(S)/KG/HR: 4 INJECTION INTRAVENOUS at 23:30

## 2021-09-23 NOTE — PROGRESS NOTE ADULT - SUBJECTIVE AND OBJECTIVE BOX
CHIEF COMPLAINT: Transfer to CT ICU for a patient who was noted to have an esophageal mass at [facility]    PROCEDURE:     EGD done at outside hospital on 9/9/21 -Large diverticula noted at 28cm filled with blood and blood clots, periphery of diverticula was injected with epinephrine, 5mm distal esophageal tear without bleeding, hiatal hernia.                EGD here  (09.14.21) Diverticulum in the middle third of the esophagus                        filled with large amount of clot. No obvious vessel or                        source of bleed. The clot likely correlates with "mass"                        on recent imaging.        ISSUES:     - Acute hypoxemic respiratory failure, intubated for airway protection due to hematemesis  - Esophageal diverticular bleed, GI bleed  - Biventricular failure  - Aspiration PNA  - Hypotension requiring pressors, in setting of sedation.  - COPD  - Afib on eliquis at home  - Hypothyroidism  - HTN  - Severe protein malnutrition  - Severe kyphosis      INTERVAL EVENTS:     -Remains intubated on minimal vent settings  -On dobutamine @2.5mcg/kg/min and vasopressin 0.03 units/h. Warm ext, making urine  -On TPN, NPO  -Palliative care following, plan for trach/peg tomorrow    HISTORY:   Unable to obtain, intubated, sedated    PHYSICAL EXAM:   Gen: Comfortable, No acute distress, frail elderly, kyphotic  Eyes: Sclera white, Conjunctiva normal, Eyelids normal, Pupils symmetrical   ENT: Mucous membranes moist, blood in oral cavity  Neck: Trachea midline, 7.0 ETT, RIJ CVC in place without redness  CV: Rate regular, Rhythm irregular  Resp: Breath sounds clear, No accessory muscles use,   Abd: Soft, Non-distended, Non-tender, Bowel sounds normal,  ,  ,    Skin: Warm, 2+ peripheral edema of lower extremities,  ,    : bearden  Neuro: Moves ext to pain, minimally opens eyes to pain, sedated  Psych: Sedated on propofol      ASSESSMENT AND PLAN:     NEURO:    Keep sedated with precedex, propofol, to keep RASS -1 to -2.           RESPIRATORY:    Mechanical vent - AC/VC, Vt  350ml, RR 18, PEEP 8, Spo2 maintained 96% with 40% fio2. Vent bundle, bronchodilators as needed.  Check CXR, ABG.    Completed ABX with vanco, fluconazole, meropenem.         CARDIOVASCULAR:    Hemodynamically stable - on low dose norepinephrine. Continue hemodynamic monitoring.  Eliquis held. Rate controlled afib.     Give lasix 40mg iv to maintain even to negative balamce              RENAL:  Stable - Monitor IOs and electrolytes. Keep K above 4.0 and Mg above 2.0.  Repeat BMP to check for hypokalemia, repleted      GASTROINTESTINAL:    NPO  No NGT  PPI Q12h            HEMATOLOGIC:    Monitor CBC. Maintain active type and screen. Off anticoagulation.  s/p Kcentra at outside hospital  DVT prophylaxis with Heparin SC         INFECTIOUS DISEASE:  No signs of active infection. Will monitor for fever and leukocytosis. Monitor off ABX, discussed with ID.            ENDOCRINE:  Stable – Monitor glucose fingersticks for goal 120-180.    Nutrition:  TPN, NPO without enteral access awaiting further conley for esophageal mass.          Pertinent clinical, laboratory, radiographic, telemetry, hemodynamic, respiratory  data and chart were reviewed by myself and analyzed frequently throughout the course of the day and night by myself.    Plan discussed at length with the CTICU staff and Attending CT Surgeon Dr. Zuñiga    Patient required critical care management.  45 minutes were spent evaluating, managing, providing, coordinating, and documenting care for this patient, exclusive of procedures.     _________________________  VITAL SIGNS:  Vital Signs Last 24 Hrs  T(C): 35.6 (23 Sep 2021 08:00), Max: 36.1 (22 Sep 2021 16:00)  T(F): 96 (23 Sep 2021 08:00), Max: 97 (22 Sep 2021 16:00)  HR: 70 (23 Sep 2021 13:00) (62 - 80)  BP: --  BP(mean): --  RR: 21 (23 Sep 2021 13:00) (18 - 26)  SpO2: 100% (23 Sep 2021 13:00) (96% - 100%)  I/Os:   I&O's Detail    22 Sep 2021 07:01  -  23 Sep 2021 07:00  --------------------------------------------------------  IN:    Dexmedetomidine: 299 mL    DOBUTamine: 92.4 mL    Fat Emulsion (Fish Oil &amp; Plant Based) 20% Infusion: 13.3 mL    Fat Emulsion (Fish Oil &amp; Plant Based) 20% Infusion: 198.9 mL    IV PiggyBack: 350 mL    Propofol: 216.7 mL    TPN (Total Parenteral Nutrition): 1449 mL    Vasopressin: 54.4 mL  Total IN: 2673.7 mL    OUT:    Chest Tube (mL): 255 mL    FentaNYL: 0 mL    Indwelling Catheter - Urethral (mL): 2340 mL    Norepinephrine: 0 mL  Total OUT: 2595 mL    Total NET: 78.7 mL      23 Sep 2021 07:01  -  23 Sep 2021 13:15  --------------------------------------------------------  IN:    Dexmedetomidine: 91 mL    DOBUTamine: 28 mL    IV PiggyBack: 35 mL    IV PiggyBack: 200 mL    Propofol: 67.9 mL    TPN (Total Parenteral Nutrition): 441 mL    Vasopressin: 12.6 mL  Total IN: 875.5 mL    OUT:    Chest Tube (mL): 20 mL    Indwelling Catheter - Urethral (mL): 1235 mL  Total OUT: 1255 mL    Total NET: -379.5 mL          Mode: AC/ CMV (Assist Control/ Continuous Mandatory Ventilation)  RR (machine): 18  TV (machine): 350  FiO2: 40  PEEP: 5  ITime: 0.7  MAP: 9  PIP: 19      MEDICATIONS:  MEDICATIONS  (STANDING):  ALBUTerol    90 MICROgram(s) HFA Inhaler 2 Puff(s) Inhalation every 6 hours  artificial tears (preservative free) Ophthalmic Solution 1 Drop(s) Both EYES two times a day  budesonide  80 MICROgram(s)/formoterol 4.5 MICROgram(s) Inhaler 2 Puff(s) Inhalation two times a day  chlorhexidine 0.12% Liquid 15 milliLiter(s) Oral Mucosa every 12 hours  chlorhexidine 4% Liquid 1 Application(s) Topical <User Schedule>  dexMEDEtomidine Infusion 0.2 MICROgram(s)/kG/Hr (2.7 mL/Hr) IV Continuous <Continuous>  dextrose 40% Gel 15 Gram(s) Oral once  dextrose 5%. 1000 milliLiter(s) (50 mL/Hr) IV Continuous <Continuous>  dextrose 5%. 1000 milliLiter(s) (100 mL/Hr) IV Continuous <Continuous>  dextrose 50% Injectable 25 Gram(s) IV Push once  dextrose 50% Injectable 12.5 Gram(s) IV Push once  dextrose 50% Injectable 25 Gram(s) IV Push once  DOBUTamine Infusion 2.5 MICROgram(s)/kG/Min (4.05 mL/Hr) IV Continuous <Continuous>  fat emulsion (Fish Oil and Plant Based) 20% Infusion 13.3 mL/Hr (13.3 mL/Hr) IV Continuous <Continuous>  fentaNYL   Infusion. 0.9 MICROgram(s)/kG/Hr (4.86 mL/Hr) IV Continuous <Continuous>  glucagon  Injectable 1 milliGRAM(s) IntraMuscular once  heparin   Injectable 5000 Unit(s) SubCutaneous every 12 hours  insulin lispro (ADMELOG) corrective regimen sliding scale   SubCutaneous every 6 hours  levothyroxine Injectable 112 MICROGram(s) IV Push at bedtime  norepinephrine Infusion 0.05 MICROgram(s)/kG/Min (2.53 mL/Hr) IV Continuous <Continuous>  pantoprazole  Injectable 40 milliGRAM(s) IV Push two times a day  Parenteral Nutrition - Adult 1 Each (63 mL/Hr) TPN Continuous <Continuous>  Parenteral Nutrition - Adult 1 Each (63 mL/Hr) TPN Continuous <Continuous>  propofol Infusion 40 MICROgram(s)/kG/Min (13 mL/Hr) IV Continuous <Continuous>  vasopressin Infusion 0.04 Unit(s)/Min (2.4 mL/Hr) IV Continuous <Continuous>    MEDICATIONS  (PRN):      LABS:  Laboratory data was independently reviewed by kerry hawkins.                           8.9    6.00  )-----------( 311      ( 23 Sep 2021 05:04 )             27.5     09-23    134<L>  |  100  |  21  ----------------------------<  141<H>  3.4<L>   |  24  |  0.23<L>    Ca    9.9      23 Sep 2021 05:04  Phos  3.2     09-23  Mg     2.20     09-23    TPro  5.4<L>  /  Alb  2.6<L>  /  TBili  0.4  /  DBili  <0.2  /  AST  31  /  ALT  15  /  AlkPhos  78  09-22    LIVER FUNCTIONS - ( 22 Sep 2021 04:36 )  Alb: 2.6 g/dL / Pro: 5.4 g/dL / ALK PHOS: 78 U/L / ALT: 15 U/L / AST: 31 U/L / GGT: x             ABG - ( 22 Sep 2021 04:36 )  pH, Arterial: 7.42  pH, Blood: x     /  pCO2: 44    /  pO2: 187   / HCO3: 28    / Base Excess: 3.4   /  SaO2: 97.9                  RADIOLOGY:   Radiology images were independently reviewed by me today. Reports were reviewed by me today.    Xray Chest 1 View- PORTABLE-Routine:   EXAM:  XR CHEST PORTABLE ROUTINE 1V        PROCEDURE DATE:  Sep 23 2021         INTERPRETATION:  TIME OF EXAM: September 23, 2021 at 4:44 AM    CLINICAL INFORMATION: Follow-up pigtail catheter placement    TECHNIQUE:   Portable chest    INTERPRETATION:    Pigtail catheter again seen overlying the right hemithorax with a more pronounced pigtail at the tip.    Endotracheal tube unchanged. Hazy left hemithorax has the appearance of a moderate layering effusion. Lungs are clear. No pneumothorax.      COMPARISON:  September 22      IMPRESSION:  Follow-up with right-sided pigtail catheter in place, clear lungs and left pleural effusion.    --- End of Report ---              ONUR AVILES MD; Attending Radiologist  This document has been electronically signed. Sep 23 2021 12:34PM (09-23-21 @ 05:22)  Xray Chest 1 View- PORTABLE-Routine:   EXAM:  XR CHEST PORTABLE URGENT 1V      EXAM:  XR CHEST PORTABLE ROUTINE 1V        PROCEDURE DATE:  Sep 22 2021         INTERPRETATION:  TIME OF EXAM: September 21, 2021 at 2:21 PM and September 22 at 5:25 AM    CLINICAL INFORMATION: Follow-up studies post pigtail catheter placement and hematemesis.    TECHNIQUE:   Portable chest    INTERPRETATION:    September 21 at 2:21 PM:  Since the last study, right-sided pigtail catheter has been placed. Decreased right layering effusion. No pneumothorax appreciated. Layering left effusion is present as well. The endotracheal tube remains in position.    September 22 at 5:25 AM:  Endotracheal and right chest tube unchanged. Further decrease in the layering right effusion. Persistent left effusion. No definite pneumothorax.      COMPARISON:  September 21 at 4:53 AM    IMPRESSION: Follow-up studies post right pigtail catheter placement for pleural effusion with decrease on the most recent exam.    --- End of Report ---              ONUR AVILES MD; Attending Radiologist  This document has been electronically signed. Sep 22 2021 12:17PM (09-22-21 @ 05:51)  Xray Chest 1 View- PORTABLE-Urgent:   EXAM:  XR CHEST PORTABLE URGENT 1V      EXAM:  XR CHEST PORTABLE ROUTINE 1V        PROCEDURE DATE:  Sep 22 2021         INTERPRETATION:  TIME OF EXAM: September 21, 2021 at 2:21 PM and September 22 at 5:25 AM    CLINICAL INFORMATION: Follow-up studies post pigtail catheter placement and hematemesis.    TECHNIQUE:   Portable chest    INTERPRETATION:    September 21 at 2:21 PM:  Since the last study, right-sided pigtail catheter has been placed. Decreased right layering effusion. No pneumothorax appreciated. Layering left effusion is present as well. The endotracheal tube remains in position.    September 22 at 5:25 AM:  Endotracheal and right chest tube unchanged. Further decrease in the layering right effusion. Persistent left effusion. No definite pneumothorax.      COMPARISON:  September 21 at 4:53 AM    IMPRESSION: Follow-up studies post right pigtail catheter placement for pleural effusion with decrease on the most recent exam.    --- End of Report ---              ONUR AVILES MD; Attending Radiologist  This document has been electronically signed. Sep 22 2021 12:17PM (09-21-21 @ 15:21)

## 2021-09-23 NOTE — PROVIDER CONTACT NOTE (CHANGE IN STATUS NOTIFICATION) - ASSESSMENT
Pt on 2.5 , .03 u/h vaso, 30mcg/kg/min diprivan, 1 mcg/kg/hr precedex. Receiving TPN and fat emulsion. Pupils are equal and reactive. Pulses palpable in all extremities. Pt does not withdraw from pain in any extremities and does not respond to vigorous stimulation. Cough, gag, and corneal reflexes present. Pt overbreathing the ventilator by 2 breaths per min. HR 72, /66, SPO2 100%, Temp 96.2, RR 20.

## 2021-09-23 NOTE — PROGRESS NOTE ADULT - SUBJECTIVE AND OBJECTIVE BOX
Infectious Diseases progress note:    Subjective:  Events noted, pt remains intubated, sedated and on pressor support.  Afebrile, s/p completion of course of broad spectrum abx    ROS:  Unable to assess due to pt clinical condition    Allergies    Sulfur Colliod (Rash)  Tylenol (Swelling)    Intolerances        ANTIBIOTICS/RELEVANT:  antimicrobials    immunologic:    OTHER:  ALBUTerol    90 MICROgram(s) HFA Inhaler 2 Puff(s) Inhalation every 6 hours  artificial tears (preservative free) Ophthalmic Solution 1 Drop(s) Both EYES two times a day  budesonide  80 MICROgram(s)/formoterol 4.5 MICROgram(s) Inhaler 2 Puff(s) Inhalation two times a day  chlorhexidine 0.12% Liquid 15 milliLiter(s) Oral Mucosa every 12 hours  chlorhexidine 4% Liquid 1 Application(s) Topical <User Schedule>  dexMEDEtomidine Infusion 0.2 MICROgram(s)/kG/Hr IV Continuous <Continuous>  dextrose 40% Gel 15 Gram(s) Oral once  dextrose 5%. 1000 milliLiter(s) IV Continuous <Continuous>  dextrose 5%. 1000 milliLiter(s) IV Continuous <Continuous>  dextrose 50% Injectable 25 Gram(s) IV Push once  dextrose 50% Injectable 12.5 Gram(s) IV Push once  dextrose 50% Injectable 25 Gram(s) IV Push once  DOBUTamine Infusion 2.5 MICROgram(s)/kG/Min IV Continuous <Continuous>  fat emulsion (Fish Oil and Plant Based) 20% Infusion 13.3 mL/Hr IV Continuous <Continuous>  fentaNYL   Infusion. 0.9 MICROgram(s)/kG/Hr IV Continuous <Continuous>  glucagon  Injectable 1 milliGRAM(s) IntraMuscular once  heparin   Injectable 5000 Unit(s) SubCutaneous every 12 hours  insulin lispro (ADMELOG) corrective regimen sliding scale   SubCutaneous every 6 hours  levothyroxine Injectable 112 MICROGram(s) IV Push at bedtime  norepinephrine Infusion 0.05 MICROgram(s)/kG/Min IV Continuous <Continuous>  pantoprazole  Injectable 40 milliGRAM(s) IV Push two times a day  Parenteral Nutrition - Adult 1 Each TPN Continuous <Continuous>  Parenteral Nutrition - Adult 1 Each TPN Continuous <Continuous>  propofol Infusion 40 MICROgram(s)/kG/Min IV Continuous <Continuous>  vasopressin Infusion 0.04 Unit(s)/Min IV Continuous <Continuous>      Objective:  Vital Signs Last 24 Hrs  T(C): 36.7 (23 Sep 2021 16:00), Max: 36.7 (23 Sep 2021 16:00)  T(F): 98 (23 Sep 2021 16:00), Max: 98 (23 Sep 2021 16:00)  HR: 79 (23 Sep 2021 19:00) (63 - 79)  BP: --  BP(mean): --  RR: 19 (23 Sep 2021 19:00) (18 - 23)  SpO2: 100% (23 Sep 2021 19:00) (96% - 100%)    PHYSICAL EXAM:  Constitutional: Intubated, sedated  Eyes:LISA, EOMI  Ear/Nose/Throat: no thrush, mucositis.  Moist mucous membranes, ET tube in place	  Neck:no JVD, no lymphadenopathy, supple, rt IJ TLC  Respiratory: CTA shaila, rt sided chest tube  Cardiovascular: S1S2 RRR, no murmurs  Gastrointestinal:soft, nontender,  nondistended (+) BS  Extremities:no e/e/c  Skin:  no rashes, open wounds or ulcerations  : bearden        LABS:                        8.9    6.00  )-----------( 311      ( 23 Sep 2021 05:04 )             27.5     09-23    138  |  102  |  22  ----------------------------<  141<H>  4.1   |  27  |  0.25<L>    Ca    9.8      23 Sep 2021 17:09  Phos  3.8     09-23  Mg     2.20     09-23    TPro  5.4<L>  /  Alb  2.6<L>  /  TBili  0.4  /  DBili  <0.2  /  AST  31  /  ALT  15  /  AlkPhos  78  09-22                Vancomycin Level, Trough: 8.3 ug/mL (09-20 @ 20:15)  Vancomycin Level, Trough: 9.7 ug/mL (09-19 @ 04:21)              MICROBIOLOGY:    Culture - Body Fluid with Gram Stain (09.21.21 @ 18:29)   Gram Stain:   polymorphonuclear leukocytes seen   No organisms seen   by cytocentrifuge   Specimen Source: .Body Fluid Pleural Fluid   Culture Results:   No growth     Culture - Blood (09.18.21 @ 12:55)   Specimen Source: .Blood Blood-Venous   Culture Results:   No Growth Final     Culture - Blood (09.17.21 @ 13:20)   Specimen Source: .Blood Blood   Culture Results:   No Growth Final       Culture - Blood (09.15.21 @ 16:15)   Specimen Source: .Blood Blood-Peripheral   Culture Results:   No Growth Final     RADIOLOGY & ADDITIONAL STUDIES:    < from: Xray Chest 1 View- PORTABLE-Routine (Xray Chest 1 View- PORTABLE-Routine in AM.) (09.23.21 @ 05:22) >  INTERPRETATION:    Pigtail catheter again seen overlying the right hemithorax with a more pronounced pigtail at the tip.    Endotracheal tube unchanged. Hazy left hemithorax has the appearance of a moderate layering effusion. Lungs are clear. No pneumothorax.      COMPARISON:  September 22      IMPRESSION:  Follow-up with right-sided pigtail catheter in place, clear lungs and left pleural effusion.    < end of copied text >

## 2021-09-23 NOTE — PROGRESS NOTE ADULT - ASSESSMENT
81 y. o. female BIBA to [facility] from nursing facility c/o hematemesis.  According to pt it was the first episode and happened while she was eating dinner. Followed by multiple episodes of N/V with bright blood and epigastric cramps.  She was intubated for airway protection and underwent EGD that revealed large diverticula at 28 cm filled with blood and 5 mm tear at distal esophagus, a hiatal hernia and large amount of blood in the fundus.  Diverticula was injected with Epinephrine.  Patient was transferred to [facility] for further management. (10 Sep 2021 15:04)    Pt followed by GI, s/p repeat EGD on 9/14 -  showing significant clot in large esophageal diverticula -- without obvious source of etiology of bleeding (ie no vessel) + no esophageal mass seen.  Pt on pressors, thought to be unlikely hemorrhagic given stable Hb.    9/15: WBC 10.8, sputum cx (9/10) with nl resp tawana. CT c/a/p shows a 7.2 cm masslike structure distending the mid to distal esophagus, suspicious for malignancy.  Bilateral tree-in-bud nodular opacities, predominantly in the left lower lobe, new/increased from 9/9/2021, raising concern for infection.  Left lower lobe consolidative opacity, possible combination of atelectasis and pneumonia.  (+) hypodensities in the liver suspicion for malignancy, and stercoral colitis.    Pt with fever, on pressors.  Pt is NPO on TPN.   Pt on empiric abx, ID consulted for further abx managment.     Fever/sepsis:    - pt with fever, on pressor support.  Abx broadened to vanco and meropenem.  Fluconazole added on 9/17 due to continued fevers.  - S/p central line change on 9/17.   - CT c/a/p noted, increased LLL consolidation, possible aspiration.  f/u repeat sputum cx - nl resp tawana    - Check bcx x 2 - NGTD.  Sputum cx no resp tawana  - f/u WBC and fever curve.  Improving, abx course completed after 7 days, d/c'd on 9/23.  Cont to monitor pt off abx.     * Pt seen by Palliative care service for GOC discussion - pt remains full code.  Pt for possibel trach/peg    d/w CTU team.     Karen Casillas  [phone]

## 2021-09-23 NOTE — PROVIDER CONTACT NOTE (CHANGE IN STATUS NOTIFICATION) - BACKGROUND
PMHx: COPD, Emphysema, Hypothyroid, Hypertension, GERD, HF, MR, TR, Afib, Esophageal diverticula. Admitted on 9/10 from [facility] after hemoptysis and N/V present, intubated for airway protection at [facility]. At [facility], bronched and underwent thoracic evaluation

## 2021-09-23 NOTE — PROGRESS NOTE ADULT - SUBJECTIVE AND OBJECTIVE BOX
NUTRITION NOTE  [id number] NAGI  ===============================    Interval events - Parenteral nutrition was initiated on 21 via central line; pt remains on TPN at this time, plan for PEG placement     ROS - unable to obtain, pt is on vent support     Allergies  Sulfur Colliod (Rash)  Tylenol (Swelling)    PAST MEDICAL & SURGICAL HISTORY:  Afib  History of COPD  HTN (hypertension)  Hypothyroid  GERD (gastroesophageal reflux disease)  Esophageal diverticulum  Presence of IVC filter    ICU Vital Signs Last 24 Hrs  T(C): 35.6 (23 Sep 2021 08:00), Max: 36.1 (22 Sep 2021 16:00)  T(F): 96 (23 Sep 2021 08:00), Max: 97 (22 Sep 2021 16:00)  HR: 68 (23 Sep 2021 12:00) (62 - 80)  ABP: 118/61 (23 Sep 2021 12:00) (95/62 - 145/73)  ABP(mean): 83 (23 Sep 2021 12:00) (70 - 101)  RR: 20 (23 Sep 2021 12:00) (18 - 26)  SpO2: 100% (23 Sep 2021 12:00) (96% - 100%)    MEDICATIONS  (STANDING):  ALBUTerol    90 MICROgram(s) HFA Inhaler 2 Puff(s) Inhalation every 6 hours  artificial tears (preservative free) Ophthalmic Solution 1 Drop(s) Both EYES two times a day  budesonide  80 MICROgram(s)/formoterol 4.5 MICROgram(s) Inhaler 2 Puff(s) Inhalation two times a day  chlorhexidine 0.12% Liquid 15 milliLiter(s) Oral Mucosa every 12 hours  chlorhexidine 4% Liquid 1 Application(s) Topical <User Schedule>  dexMEDEtomidine Infusion 0.2 MICROgram(s)/kG/Hr (2.7 mL/Hr) IV Continuous <Continuous>  dextrose 40% Gel 15 Gram(s) Oral once  dextrose 5%. 1000 milliLiter(s) (50 mL/Hr) IV Continuous <Continuous>  dextrose 5%. 1000 milliLiter(s) (100 mL/Hr) IV Continuous <Continuous>  dextrose 50% Injectable 25 Gram(s) IV Push once  dextrose 50% Injectable 12.5 Gram(s) IV Push once  dextrose 50% Injectable 25 Gram(s) IV Push once  DOBUTamine Infusion 2.5 MICROgram(s)/kG/Min (4.05 mL/Hr) IV Continuous <Continuous>  fat emulsion (Fish Oil and Plant Based) 20% Infusion 13.3 mL/Hr (13.3 mL/Hr) IV Continuous <Continuous>  fentaNYL   Infusion. 0.9 MICROgram(s)/kG/Hr (4.86 mL/Hr) IV Continuous <Continuous>  glucagon  Injectable 1 milliGRAM(s) IntraMuscular once  heparin   Injectable 5000 Unit(s) SubCutaneous every 12 hours  insulin lispro (ADMELOG) corrective regimen sliding scale   SubCutaneous every 6 hours  levothyroxine Injectable 112 MICROGram(s) IV Push at bedtime  norepinephrine Infusion 0.05 MICROgram(s)/kG/Min (2.53 mL/Hr) IV Continuous <Continuous>  pantoprazole  Injectable 40 milliGRAM(s) IV Push two times a day  Parenteral Nutrition - Adult 1 Each (63 mL/Hr) TPN Continuous <Continuous>  Parenteral Nutrition - Adult 1 Each (63 mL/Hr) TPN Continuous <Continuous>  potassium chloride  20 mEq/100 mL IVPB 20 milliEquivalent(s) IV Intermittent every 2 hours  propofol Infusion 40 MICROgram(s)/kG/Min (13 mL/Hr) IV Continuous <Continuous>  vasopressin Infusion 0.04 Unit(s)/Min (2.4 mL/Hr) IV Continuous <Continuous>    I&O's Detail    22 Sep 2021 07:01  -  23 Sep 2021 07:00  --------------------------------------------------------  IN:    Dexmedetomidine: 299 mL    DOBUTamine: 92.4 mL    Fat Emulsion (Fish Oil &amp; Plant Based) 20% Infusion: 13.3 mL    Fat Emulsion (Fish Oil &amp; Plant Based) 20% Infusion: 198.9 mL    IV PiggyBack: 350 mL    Propofol: 216.7 mL    TPN (Total Parenteral Nutrition): 1449 mL    Vasopressin: 54.4 mL  Total IN: 2673.7 mL    OUT:    Chest Tube (mL): 255 mL    FentaNYL: 0 mL    Indwelling Catheter - Urethral (mL): 2340 mL    Norepinephrine: 0 mL  Total OUT: 2595 mL    Total NET: 78.7 mL      23 Sep 2021 07:01  -  23 Sep 2021 12:15  --------------------------------------------------------  IN:    Dexmedetomidine: 65 mL    DOBUTamine: 20 mL    IV PiggyBack: 25 mL    IV PiggyBack: 200 mL    Propofol: 48.5 mL    TPN (Total Parenteral Nutrition): 315 mL    Vasopressin: 9 mL  Total IN: 682.5 mL    OUT:    Chest Tube (mL): 0 mL    Indwelling Catheter - Urethral (mL): 785 mL  Total OUT: 785 mL    Total NET: -102.5 mL    POCT Blood Glucose.: 143 mg/dL (23 Sep 2021 04:58)  POCT Blood Glucose.: 124 mg/dL (22 Sep 2021 16:21)    Daily Weight in k.1 (23 Sep 2021 04:00)    Weight (kg): 54 (10 Sep 2021 00:39)    PHYSICAL EXAM:  Gen: Comfortable, No acute distress, frail elderly, sedated   Resp: mechanica breath sounds   Abd: Soft, Non-distended   Skin: Warm, 1+ peripheral edema of lower extremities    Mode: AC/ CMV (Assist Control/ Continuous Mandatory Ventilation)  RR (machine): 18  TV (machine): 350  FiO2: 40  PEEP: 5  ITime: 0.7  MAP: 9  PIP: 19    Diet: NPO and TPN/lipids (started on 21)    LABORATORY                                                     8.9    6.00  )-----------( 311      ( 23 Sep 2021 05:04 )             27.5   09-    134<L>  |  100  |  21  ----------------------------<  141<H>  3.4<L>   |  24  |  0.23<L>    Ca    9.9      23 Sep 2021 05:04  Phos  3.2     09-  Mg     2.20     09-23    TPro  5.4<L>  /  Alb  2.6<L>  /  TBili  0.4  /  DBili  <0.2  /  AST  31  /  ALT  15  /  AlkPhos  78  09-22    LIVER FUNCTIONS - ( 22 Sep 2021 04:36 )  Alb: 2.6 g/dL / Pro: 5.4 g/dL / ALK PHOS: 78 U/L / ALT: 15 U/L / AST: 31 U/L / GGT: x            Chol -- LDL -- HDL -- Trig 99,  Chol -- LDL -- HDL -- Trig 113,  Chol 115 LDL -- HDL 20<L> Trig 129, 09-15 Chol -- LDL -- HDL -- Trig 103    RECENT CULTURES    Culture - Body Fluid with Gram Stain (collected 21 Sep 2021 18:29)  Source: .Body Fluid Pleural Fluid  Gram Stain (21 Sep 2021 22:42):    polymorphonuclear leukocytes seen    No organisms seen    by cytocentrifuge  Preliminary Report (22 Sep 2021 18:00):    No growth    Culture - Blood (collected 18 Sep 2021 14:48)  Source: .Blood Blood-Venous  Preliminary Report (19 Sep 2021 15:01):    No growth to date.    ASSESSMENT/PLAN:  80 y/o female transferred to CT ICU for a patient who was noted to have an esophageal mass at [facility]. EGD done at outside hospital on 21 -Large diverticula noted at 28cm filled with blood and blood clots, periphery of diverticula was injected with epinephrine, 5mm distal esophageal tear without bleeding, hiatal hernia. Patient meets criteria for severe malnutrition in the context of acute illness. Patient's signs/symptoms as evidenced by severe loss of muscle mass and fat stores, 1+ edema, NPO >3 days.  Nutrition support service consulted for initiation of TPN/lipids via central line in view of malnutrition and anticipated prolonged NPO status. Pt is s/p central line change on .    continue TPN with infusion volume of 1.5 L, TPN will provide 1243 kcal/day    labs reviewed - electrolytes adjusted in TPN bag, Na is at max in TPN bag      monitor fingersticks, obtain daily weights     continue parenteral nutrition at this time, will follow up with primary team on plan - monitor for fevers and follow up blood cultures/ID recommendations      1.  Severe protein calorie malnutrition being optimized with TPN: CHO [175] gm.  AA [82] gm. SMOF Lipids [32] gm.  2.  Hyperglycemia managed with: [0] units of regular insulin    3.  Check fluid balance daily.  Strict I/O  [ ] [ ]   4.  Daily BMP, Ionized Calcium, Magnesium and Phosphorous   5.  Triglycerides at initiation of TPN and monthly [ ] [ ]     Nutrition Support [id number]

## 2021-09-24 ENCOUNTER — APPOINTMENT (OUTPATIENT)
Dept: THORACIC SURGERY | Facility: HOSPITAL | Age: 81
End: 2021-09-24

## 2021-09-24 LAB
A1C WITH ESTIMATED AVERAGE GLUCOSE RESULT: 5.1 % — SIGNIFICANT CHANGE UP (ref 4–5.6)
ALBUMIN SERPL ELPH-MCNC: 3 G/DL — LOW (ref 3.3–5)
ALP SERPL-CCNC: 89 U/L — SIGNIFICANT CHANGE UP (ref 40–120)
ALT FLD-CCNC: 12 U/L — SIGNIFICANT CHANGE UP (ref 4–33)
ANION GAP SERPL CALC-SCNC: 9 MMOL/L — SIGNIFICANT CHANGE UP (ref 7–14)
ANION GAP SERPL CALC-SCNC: 9 MMOL/L — SIGNIFICANT CHANGE UP (ref 7–14)
AST SERPL-CCNC: 21 U/L — SIGNIFICANT CHANGE UP (ref 4–32)
BASOPHILS # BLD AUTO: 0.05 K/UL — SIGNIFICANT CHANGE UP (ref 0–0.2)
BASOPHILS NFR BLD AUTO: 0.9 % — SIGNIFICANT CHANGE UP (ref 0–2)
BILIRUB SERPL-MCNC: 0.5 MG/DL — SIGNIFICANT CHANGE UP (ref 0.2–1.2)
BLD GP AB SCN SERPL QL: NEGATIVE — SIGNIFICANT CHANGE UP
BUN SERPL-MCNC: 21 MG/DL — SIGNIFICANT CHANGE UP (ref 7–23)
BUN SERPL-MCNC: 22 MG/DL — SIGNIFICANT CHANGE UP (ref 7–23)
CA-I BLD-SCNC: 1.34 MMOL/L — HIGH (ref 1.15–1.29)
CALCIUM SERPL-MCNC: 10 MG/DL — SIGNIFICANT CHANGE UP (ref 8.4–10.5)
CALCIUM SERPL-MCNC: 9.9 MG/DL — SIGNIFICANT CHANGE UP (ref 8.4–10.5)
CHLORIDE SERPL-SCNC: 103 MMOL/L — SIGNIFICANT CHANGE UP (ref 98–107)
CHLORIDE SERPL-SCNC: 105 MMOL/L — SIGNIFICANT CHANGE UP (ref 98–107)
CO2 SERPL-SCNC: 26 MMOL/L — SIGNIFICANT CHANGE UP (ref 22–31)
CO2 SERPL-SCNC: 26 MMOL/L — SIGNIFICANT CHANGE UP (ref 22–31)
CREAT SERPL-MCNC: 0.24 MG/DL — LOW (ref 0.5–1.3)
CREAT SERPL-MCNC: 0.25 MG/DL — LOW (ref 0.5–1.3)
EOSINOPHIL # BLD AUTO: 0.3 K/UL — SIGNIFICANT CHANGE UP (ref 0–0.5)
EOSINOPHIL NFR BLD AUTO: 5.2 % — SIGNIFICANT CHANGE UP (ref 0–6)
ESTIMATED AVERAGE GLUCOSE: 100 — SIGNIFICANT CHANGE UP
GAS PNL BLDA: SIGNIFICANT CHANGE UP
GLUCOSE BLDC GLUCOMTR-MCNC: 133 MG/DL — HIGH (ref 70–99)
GLUCOSE BLDC GLUCOMTR-MCNC: 137 MG/DL — HIGH (ref 70–99)
GLUCOSE BLDC GLUCOMTR-MCNC: 158 MG/DL — HIGH (ref 70–99)
GLUCOSE BLDC GLUCOMTR-MCNC: 168 MG/DL — HIGH (ref 70–99)
GLUCOSE SERPL-MCNC: 140 MG/DL — HIGH (ref 70–99)
GLUCOSE SERPL-MCNC: 186 MG/DL — HIGH (ref 70–99)
HCT VFR BLD CALC: 28 % — LOW (ref 34.5–45)
HCT VFR BLD CALC: 30.8 % — LOW (ref 34.5–45)
HGB BLD-MCNC: 9 G/DL — LOW (ref 11.5–15.5)
HGB BLD-MCNC: 9.8 G/DL — LOW (ref 11.5–15.5)
IANC: 4.22 K/UL — SIGNIFICANT CHANGE UP (ref 1.5–8.5)
IMM GRANULOCYTES NFR BLD AUTO: 0.5 % — SIGNIFICANT CHANGE UP (ref 0–1.5)
LYMPHOCYTES # BLD AUTO: 0.68 K/UL — LOW (ref 1–3.3)
LYMPHOCYTES # BLD AUTO: 11.7 % — LOW (ref 13–44)
MAGNESIUM SERPL-MCNC: 2.2 MG/DL — SIGNIFICANT CHANGE UP (ref 1.6–2.6)
MCHC RBC-ENTMCNC: 30 PG — SIGNIFICANT CHANGE UP (ref 27–34)
MCHC RBC-ENTMCNC: 30.1 PG — SIGNIFICANT CHANGE UP (ref 27–34)
MCHC RBC-ENTMCNC: 31.8 GM/DL — LOW (ref 32–36)
MCHC RBC-ENTMCNC: 32.1 GM/DL — SIGNIFICANT CHANGE UP (ref 32–36)
MCV RBC AUTO: 93.3 FL — SIGNIFICANT CHANGE UP (ref 80–100)
MCV RBC AUTO: 94.5 FL — SIGNIFICANT CHANGE UP (ref 80–100)
MONOCYTES # BLD AUTO: 0.53 K/UL — SIGNIFICANT CHANGE UP (ref 0–0.9)
MONOCYTES NFR BLD AUTO: 9.1 % — SIGNIFICANT CHANGE UP (ref 2–14)
NEUTROPHILS # BLD AUTO: 4.22 K/UL — SIGNIFICANT CHANGE UP (ref 1.8–7.4)
NEUTROPHILS NFR BLD AUTO: 72.6 % — SIGNIFICANT CHANGE UP (ref 43–77)
NRBC # BLD: 0 /100 WBCS — SIGNIFICANT CHANGE UP
NRBC # BLD: 0 /100 WBCS — SIGNIFICANT CHANGE UP
NRBC # FLD: 0 K/UL — SIGNIFICANT CHANGE UP
NRBC # FLD: 0 K/UL — SIGNIFICANT CHANGE UP
PHOSPHATE SERPL-MCNC: 3.7 MG/DL — SIGNIFICANT CHANGE UP (ref 2.5–4.5)
PLATELET # BLD AUTO: 369 K/UL — SIGNIFICANT CHANGE UP (ref 150–400)
PLATELET # BLD AUTO: 405 K/UL — HIGH (ref 150–400)
POTASSIUM SERPL-MCNC: 4.2 MMOL/L — SIGNIFICANT CHANGE UP (ref 3.5–5.3)
POTASSIUM SERPL-MCNC: 4.2 MMOL/L — SIGNIFICANT CHANGE UP (ref 3.5–5.3)
POTASSIUM SERPL-SCNC: 4.2 MMOL/L — SIGNIFICANT CHANGE UP (ref 3.5–5.3)
POTASSIUM SERPL-SCNC: 4.2 MMOL/L — SIGNIFICANT CHANGE UP (ref 3.5–5.3)
PROT SERPL-MCNC: 5.8 G/DL — LOW (ref 6–8.3)
RBC # BLD: 3 M/UL — LOW (ref 3.8–5.2)
RBC # BLD: 3.26 M/UL — LOW (ref 3.8–5.2)
RBC # FLD: 16.3 % — HIGH (ref 10.3–14.5)
RBC # FLD: 16.4 % — HIGH (ref 10.3–14.5)
RH IG SCN BLD-IMP: POSITIVE — SIGNIFICANT CHANGE UP
SODIUM SERPL-SCNC: 138 MMOL/L — SIGNIFICANT CHANGE UP (ref 135–145)
SODIUM SERPL-SCNC: 140 MMOL/L — SIGNIFICANT CHANGE UP (ref 135–145)
WBC # BLD: 5.81 K/UL — SIGNIFICANT CHANGE UP (ref 3.8–10.5)
WBC # BLD: 9.52 K/UL — SIGNIFICANT CHANGE UP (ref 3.8–10.5)
WBC # FLD AUTO: 5.81 K/UL — SIGNIFICANT CHANGE UP (ref 3.8–10.5)
WBC # FLD AUTO: 9.52 K/UL — SIGNIFICANT CHANGE UP (ref 3.8–10.5)

## 2021-09-24 PROCEDURE — 99232 SBSQ HOSP IP/OBS MODERATE 35: CPT

## 2021-09-24 PROCEDURE — 31600 PLANNED TRACHEOSTOMY: CPT

## 2021-09-24 PROCEDURE — 31622 DX BRONCHOSCOPE/WASH: CPT | Mod: 59

## 2021-09-24 PROCEDURE — 43246 EGD PLACE GASTROSTOMY TUBE: CPT

## 2021-09-24 PROCEDURE — 71045 X-RAY EXAM CHEST 1 VIEW: CPT | Mod: 26

## 2021-09-24 PROCEDURE — 71045 X-RAY EXAM CHEST 1 VIEW: CPT | Mod: 26,77

## 2021-09-24 PROCEDURE — 99291 CRITICAL CARE FIRST HOUR: CPT

## 2021-09-24 RX ORDER — I.V. FAT EMULSION 20 G/100ML
13.3 EMULSION INTRAVENOUS
Qty: 32 | Refills: 0 | Status: DISCONTINUED | OUTPATIENT
Start: 2021-09-24 | End: 2021-09-25

## 2021-09-24 RX ORDER — FENTANYL CITRATE 50 UG/ML
50 INJECTION INTRAVENOUS ONCE
Refills: 0 | Status: DISCONTINUED | OUTPATIENT
Start: 2021-09-24 | End: 2021-09-25

## 2021-09-24 RX ORDER — FENTANYL CITRATE 50 UG/ML
50 INJECTION INTRAVENOUS ONCE
Refills: 0 | Status: DISCONTINUED | OUTPATIENT
Start: 2021-09-24 | End: 2021-09-24

## 2021-09-24 RX ORDER — ELECTROLYTE SOLUTION,INJ
1 VIAL (ML) INTRAVENOUS
Refills: 0 | Status: DISCONTINUED | OUTPATIENT
Start: 2021-09-24 | End: 2021-09-25

## 2021-09-24 RX ORDER — CEFAZOLIN SODIUM 1 G
2000 VIAL (EA) INJECTION EVERY 8 HOURS
Refills: 0 | Status: COMPLETED | OUTPATIENT
Start: 2021-09-24 | End: 2021-09-25

## 2021-09-24 RX ORDER — FUROSEMIDE 40 MG
20 TABLET ORAL ONCE
Refills: 0 | Status: COMPLETED | OUTPATIENT
Start: 2021-09-24 | End: 2021-09-24

## 2021-09-24 RX ADMIN — Medication 112 MICROGRAM(S): at 23:37

## 2021-09-24 RX ADMIN — CHLORHEXIDINE GLUCONATE 15 MILLILITER(S): 213 SOLUTION TOPICAL at 05:31

## 2021-09-24 RX ADMIN — DEXMEDETOMIDINE HYDROCHLORIDE IN 0.9% SODIUM CHLORIDE 2.7 MICROGRAM(S)/KG/HR: 4 INJECTION INTRAVENOUS at 21:28

## 2021-09-24 RX ADMIN — HEPARIN SODIUM 5000 UNIT(S): 5000 INJECTION INTRAVENOUS; SUBCUTANEOUS at 05:17

## 2021-09-24 RX ADMIN — Medication 4.05 MICROGRAM(S)/KG/MIN: at 19:36

## 2021-09-24 RX ADMIN — BUDESONIDE AND FORMOTEROL FUMARATE DIHYDRATE 2 PUFF(S): 160; 4.5 AEROSOL RESPIRATORY (INHALATION) at 07:19

## 2021-09-24 RX ADMIN — ALBUTEROL 2 PUFF(S): 90 AEROSOL, METERED ORAL at 03:48

## 2021-09-24 RX ADMIN — ALBUTEROL 2 PUFF(S): 90 AEROSOL, METERED ORAL at 21:59

## 2021-09-24 RX ADMIN — Medication 20 MILLIGRAM(S): at 19:07

## 2021-09-24 RX ADMIN — ALBUTEROL 2 PUFF(S): 90 AEROSOL, METERED ORAL at 16:31

## 2021-09-24 RX ADMIN — PANTOPRAZOLE SODIUM 40 MILLIGRAM(S): 20 TABLET, DELAYED RELEASE ORAL at 19:07

## 2021-09-24 RX ADMIN — BUDESONIDE AND FORMOTEROL FUMARATE DIHYDRATE 2 PUFF(S): 160; 4.5 AEROSOL RESPIRATORY (INHALATION) at 21:59

## 2021-09-24 RX ADMIN — HEPARIN SODIUM 5000 UNIT(S): 5000 INJECTION INTRAVENOUS; SUBCUTANEOUS at 19:07

## 2021-09-24 RX ADMIN — ALBUTEROL 2 PUFF(S): 90 AEROSOL, METERED ORAL at 07:20

## 2021-09-24 RX ADMIN — PROPOFOL 13 MICROGRAM(S)/KG/MIN: 10 INJECTION, EMULSION INTRAVENOUS at 05:18

## 2021-09-24 RX ADMIN — Medication 100 MILLIGRAM(S): at 21:28

## 2021-09-24 RX ADMIN — PROPOFOL 13 MICROGRAM(S)/KG/MIN: 10 INJECTION, EMULSION INTRAVENOUS at 21:29

## 2021-09-24 RX ADMIN — Medication 1 DROP(S): at 05:41

## 2021-09-24 RX ADMIN — CHLORHEXIDINE GLUCONATE 15 MILLILITER(S): 213 SOLUTION TOPICAL at 19:06

## 2021-09-24 RX ADMIN — PANTOPRAZOLE SODIUM 40 MILLIGRAM(S): 20 TABLET, DELAYED RELEASE ORAL at 05:17

## 2021-09-24 RX ADMIN — CHLORHEXIDINE GLUCONATE 1 APPLICATION(S): 213 SOLUTION TOPICAL at 05:18

## 2021-09-24 RX ADMIN — Medication 1 DROP(S): at 19:06

## 2021-09-24 NOTE — PROGRESS NOTE ADULT - SUBJECTIVE AND OBJECTIVE BOX
NUTRITION NOTE  [id number] NGAI  ===============================    Interval events - Parenteral nutrition was initiated on 21 via central line; pt remains on TPN at this time, plan for PEG placement     ROS - unable to obtain, pt is on vent support     Allergies  Sulfur Colliod (Rash)  Tylenol (Swelling)    PAST MEDICAL & SURGICAL HISTORY:  Afib  History of COPD  HTN (hypertension)  Hypothyroid  GERD (gastroesophageal reflux disease)  Esophageal diverticulum  Presence of IVC filter    ICU Vital Signs Last 24 Hrs  T(C): 36.5 (24 Sep 2021 06:00), Max: 37 (24 Sep 2021 03:00)  T(F): 97.7 (24 Sep 2021 06:00), Max: 98.6 (24 Sep 2021 03:00)  HR: 70 (24 Sep 2021 11:00) (63 - 82)  ABP: 137/62 (24 Sep 2021 11:00) (94/40 - 137/62)  ABP(mean): 92 (24 Sep 2021 11:00) (61 - 92)  RR: 18 (24 Sep 2021 11:00) (18 - 22)  SpO2: 100% (24 Sep 2021 11:00) (100% - 100%)    MEDICATIONS  (STANDING):  ALBUTerol    90 MICROgram(s) HFA Inhaler 2 Puff(s) Inhalation every 6 hours  artificial tears (preservative free) Ophthalmic Solution 1 Drop(s) Both EYES two times a day  budesonide  80 MICROgram(s)/formoterol 4.5 MICROgram(s) Inhaler 2 Puff(s) Inhalation two times a day  chlorhexidine 0.12% Liquid 15 milliLiter(s) Oral Mucosa every 12 hours  chlorhexidine 4% Liquid 1 Application(s) Topical <User Schedule>  dexMEDEtomidine Infusion 0.2 MICROgram(s)/kG/Hr (2.7 mL/Hr) IV Continuous <Continuous>  dextrose 40% Gel 15 Gram(s) Oral once  dextrose 5%. 1000 milliLiter(s) (50 mL/Hr) IV Continuous <Continuous>  dextrose 5%. 1000 milliLiter(s) (100 mL/Hr) IV Continuous <Continuous>  dextrose 50% Injectable 25 Gram(s) IV Push once  dextrose 50% Injectable 12.5 Gram(s) IV Push once  dextrose 50% Injectable 25 Gram(s) IV Push once  DOBUTamine Infusion 2.5 MICROgram(s)/kG/Min (4.05 mL/Hr) IV Continuous <Continuous>  fat emulsion (Fish Oil and Plant Based) 20% Infusion 13.3 mL/Hr (13.3 mL/Hr) IV Continuous <Continuous>  fentaNYL   Infusion. 0.9 MICROgram(s)/kG/Hr (4.86 mL/Hr) IV Continuous <Continuous>  glucagon  Injectable 1 milliGRAM(s) IntraMuscular once  heparin   Injectable 5000 Unit(s) SubCutaneous every 12 hours  insulin lispro (ADMELOG) corrective regimen sliding scale   SubCutaneous every 6 hours  levothyroxine Injectable 112 MICROGram(s) IV Push at bedtime  norepinephrine Infusion 0.05 MICROgram(s)/kG/Min (2.53 mL/Hr) IV Continuous <Continuous>  pantoprazole  Injectable 40 milliGRAM(s) IV Push two times a day  Parenteral Nutrition - Adult 1 Each (63 mL/Hr) TPN Continuous <Continuous>  Parenteral Nutrition - Adult 1 Each (63 mL/Hr) TPN Continuous <Continuous>  propofol Infusion 40 MICROgram(s)/kG/Min (13 mL/Hr) IV Continuous <Continuous>  vasopressin Infusion 0.04 Unit(s)/Min (2.4 mL/Hr) IV Continuous <Continuous>    I&O's Detail    23 Sep 2021 07:  -  24 Sep 2021 07:00  --------------------------------------------------------  IN:    Dexmedetomidine: 318 mL    DOBUTamine: 97.2 mL    Fat Emulsion (Fish Oil &amp; Plant Based) 20% Infusion: 159.6 mL    IV PiggyBack: 200 mL    IV PiggyBack: 60 mL    Propofol: 232.8 mL    TPN (Total Parenteral Nutrition): 1512 mL    Vasopressin: 41.4 mL  Total IN: 2621 mL    OUT:    Chest Tube (mL): 150 mL    Indwelling Catheter - Urethral (mL): 3755 mL  Total OUT: 3905 mL    Total NET: -1284 mL      24 Sep 2021 07:  -  24 Sep 2021 11:16  --------------------------------------------------------  IN:  Total IN: 0 mL    OUT:    Chest Tube (mL): 20 mL    Indwelling Catheter - Urethral (mL): 210 mL  Total OUT: 230 mL    Total NET: -230 mL    POCT Blood Glucose.: 137 mg/dL (24 Sep 2021 05:40)  POCT Blood Glucose.: 136 mg/dL (23 Sep 2021 23:28)  POCT Blood Glucose.: 125 mg/dL (23 Sep 2021 13:31)    Daily Weight in k.1 (23 Sep 2021 04:00)    Weight (kg): 54 (10 Sep 2021 00:39)    PHYSICAL EXAM:  Gen: Comfortable, No acute distress, frail elderly, sedated   Resp: mechanica breath sounds   Abd: Soft, Non-distended   Skin: Warm, 1+ peripheral edema of lower extremities    Mode: AC/ CMV (Assist Control/ Continuous Mandatory Ventilation)  RR (machine): 18  TV (machine): 350  FiO2: 40  PEEP: 5  ITime: 0.76  MAP: 9  PIP: 17    Diet: NPO and TPN/lipids (started on 21)    LABORATORY                        9.0    5.81  )-----------( 369      ( 24 Sep 2021 03:51 )             28.0   09-24    138  |  103  |  21  ----------------------------<  140<H>  4.2   |  26  |  0.25<L>    Ca    10.0      24 Sep 2021 03:51  Phos  3.7       Mg     2.20         TPro  5.4<L>  /  Alb  2.6<L>  /  TBili  0.4  /  DBili  <0.2  /  AST  31  /  ALT  15  /  AlkPhos  78      LIVER FUNCTIONS - ( 22 Sep 2021 04:36 )  Alb: 2.6 g/dL / Pro: 5.4 g/dL / ALK PHOS: 78 U/L / ALT: 15 U/L / AST: 31 U/L / GGT: x            Chol -- LDL -- HDL -- Trig 99,  Chol -- LDL -- HDL -- Trig 113,  Chol 115 LDL -- HDL 20<L> Trig 129, 09-15 Chol -- LDL -- HDL -- Trig 103    RECENT CULTURES    Culture - Body Fluid with Gram Stain (collected 21 Sep 2021 18:29)  Source: .Body Fluid Pleural Fluid  Gram Stain (21 Sep 2021 22:42):    polymorphonuclear leukocytes seen    No organisms seen    by cytocentrifuge  Preliminary Report (22 Sep 2021 18:00):    No growth    Culture - Blood (collected 18 Sep 2021 14:48)  Source: .Blood Blood-Venous  Preliminary Report (19 Sep 2021 15:01):    No growth to date.    ASSESSMENT/PLAN:  80 y/o female transferred to CT ICU for a patient who was noted to have an esophageal mass at [facility]. EGD done at outside hospital on 21 -Large diverticula noted at 28cm filled with blood and blood clots, periphery of diverticula was injected with epinephrine, 5mm distal esophageal tear without bleeding, hiatal hernia. Patient meets criteria for severe malnutrition in the context of acute illness. Patient's signs/symptoms as evidenced by severe loss of muscle mass and fat stores, 1+ edema, NPO >3 days.  Nutrition support service consulted for initiation of TPN/lipids via central line in view of malnutrition and anticipated prolonged NPO status. Pt is s/p central line change on .    continue TPN with infusion volume of 1.5 L, TPN will provide 1243 kcal/day    labs reviewed - electrolytes adjusted in TPN bag, Na is at max in TPN bag      monitor fingersticks, obtain daily weights     continue parenteral nutrition at this time, will follow up with primary team on plan - monitor for fevers and follow up blood cultures/ID recommendations      1.  Severe protein calorie malnutrition being optimized with TPN: CHO [175] gm.  AA [82] gm. SMOF Lipids [32] gm.  2.  Hyperglycemia managed with: [0] units of regular insulin    3.  Check fluid balance daily.  Strict I/O  [ ] [ ]   4.  Daily BMP, Ionized Calcium, Magnesium and Phosphorous   5.  Triglycerides at initiation of TPN and monthly [ ] [ ]     Nutrition Support [id number]

## 2021-09-24 NOTE — PROGRESS NOTE ADULT - ATTENDING COMMENTS
I agree with the above history, physical examination, chief complaint/diagnosis, and plan, which I have reviewed and edited where appropriate.  I agree with notes/assessment and detailed interval history of health care providers on my service.  I have seen and examined the patient.  I reviewed the laboratory and available data and agree with the history, physical assessment and plan.  I reviewed and discussed with all consultants, house staff and PA's.  The Nutrition Support Team (NST) discusses on an ongoing basis with the primary team and all consultants, House staff and PA's to have a permanent risk benefit analyses on all decisions and coordinating care.  I was physically present for the key portions of the evaluation and management (E/M) service provided.  80 y/o female with severe CP malnutrition in the context of acute illness receiving TPN/lipids   sedated   Resp: coarse bs  Abd: Soft, Non-distended   Skin: Warm, 1+   labs reviewed - electrolytes adjusted  continue TPN with infusion volume of 1.5 L, TPN will provide 1243 kcal/day I agree with the above history, physical examination, chief complaint/diagnosis, and plan, which I have reviewed and edited where appropriate.  I agree with notes/assessment and detailed interval history of health care providers on my service.  I have seen and examined the patient.  I reviewed the laboratory and available data and agree with the history, physical assessment and plan.  I reviewed and discussed with all consultants, house staff and PA's.  The Nutrition Support Team (NST) discusses on an ongoing basis with the primary team and all consultants, House staff and PA's to have a permanent risk benefit analyses on all decisions and coordinating care.  I was physically present for the key portions of the evaluation and management (E/M) service provided.  80 y/o female with severe CP malnutrition in the context of acute illness receiving TPN/lipids   sedated   Resp: coarse   Abd: Soft, Non-distended   Skin: Warm, 1+   labs reviewed - electrolytes adjusted  continue TPN with infusion volume of 1.5 L, TPN will provide 1243 kcal/day

## 2021-09-24 NOTE — PROGRESS NOTE ADULT - ASSESSMENT
81 y. o. female BIBA to [facility] from nursing facility c/o hematemesis.  According to pt it was the first episode and happened while she was eating dinner. Followed by multiple episodes of N/V with bright blood and epigastric cramps.  She was intubated for airway protection and underwent EGD that revealed large diverticula at 28 cm filled with blood and 5 mm tear at distal esophagus, a hiatal hernia and large amount of blood in the fundus.  Diverticula was injected with Epinephrine.  Patient was transferred to [facility] for further management. (10 Sep 2021 15:04)    Pt followed by GI, s/p repeat EGD on 9/14 -  showing significant clot in large esophageal diverticula -- without obvious source of etiology of bleeding (ie no vessel) + no esophageal mass seen.  Pt on pressors, thought to be unlikely hemorrhagic given stable Hb.    9/15: WBC 10.8, sputum cx (9/10) with nl resp tawana. CT c/a/p shows a 7.2 cm masslike structure distending the mid to distal esophagus, suspicious for malignancy.  Bilateral tree-in-bud nodular opacities, predominantly in the left lower lobe, new/increased from 9/9/2021, raising concern for infection.  Left lower lobe consolidative opacity, possible combination of atelectasis and pneumonia.  (+) hypodensities in the liver suspicion for malignancy, and stercoral colitis.    Pt with fever, on pressors.  Pt is NPO on TPN.   Pt on empiric abx, ID consulted for further abx managment.     Fever/sepsis:    - pt with fever, on pressor support.  Abx broadened to vanco and meropenem.  Fluconazole added on 9/17 due to continued fevers.  - S/p central line change on 9/17.   - CT c/a/p noted, increased LLL consolidation, possible aspiration.  f/u repeat sputum cx - nl resp tawana    - Check bcx x 2 - NGTD.  Sputum cx no resp tawana  - f/u WBC and fever curve.  Improving, abx course completed after 7 days, d/c'd on 9/23.  Cont to monitor pt off abx.     * Pt seen by Palliative care service for GOC discussion - pt remains full code.  Pt for  trach/peg today.  On prophylactic abx    d/w CTU team.     Karen Casillas  [phone]

## 2021-09-24 NOTE — PROGRESS NOTE ADULT - SUBJECTIVE AND OBJECTIVE BOX
ROXIE LAZAR                     MRN-[id number]    HPI:  81 y. o. female BIBA to [facility] from nursing facility c/o hematemesis.  According to pt it was the first episode and happened while she was eating dinner. Followed by multiple episodes of N/V with bright blood and epigastric cramps.  She was intubated for airway protection and underwent EGD that revealed large diverticula at 28 cm filled with blood and 5 mm tear at distal esophagus, a hiatal hernia and large amount of blood in the fundus.  Diverticula was injected with Epinephrine.  Patient was transferred to [facility] for further management. (10 Sep 2021 15:04)    Issues:              Acute Hypoxic Respiratory failure  Chronic systolic heart failure  Moderate MR  Severe TR              Septic Shock              Esophageal diverticular bleed.   Acute blood loss anemia  COPD  Chronic Afib -- previously on Apixaban  Hypothyroidism  GERD  Pleural effusion  Protein calorie malnutrition                Home Medications:  Abreva 10% topical cream: Apply topically to affected area once a day (10 Sep 2021 15:54)  Albuterol (Eqv-ProAir HFA) 90 mcg/inh inhalation aerosol: 2 puff(s) inhaled every 6 hours (10 Sep 2021 15:54)  allopurinol 100 mg oral tablet: 2 tab(s) orally 2 times a day (10 Sep 2021 15:54)  Aricept 10 mg oral tablet: 1 tab(s) orally once a day (at bedtime) (10 Sep 2021 15:54)  aspirin 81 mg oral tablet: 1 tab(s) orally once a day (10 Sep 2021 15:54)  baclofen 5 mg oral tablet: 1 tab(s) orally 2 times a day (10 Sep 2021 15:54)  busPIRone 15 mg oral tablet: 1 tab(s) orally once a day (at bedtime) (10 Sep 2021 15:54)  Claritin 10 mg oral tablet: 1 tab(s) orally once a day (10 Sep 2021 15:54)  Colace 100 mg oral capsule: 1 cap(s) orally 3 times a day (10 Sep 2021 15:54)  Cymbalta 60 mg oral delayed release capsule: 1 cap(s) orally once a day (10 Sep 2021 15:54)  Eliquis 2.5 mg oral tablet: 1 tab(s) orally 2 times a day (10 Sep 2021 15:54)  Lasix 40 mg oral tablet: 1 tab(s) orally once a day (10 Sep 2021 15:54)  Linzess 290 mcg oral capsule: 1 cap(s) orally once a day (10 Sep 2021 15:54)      PAST MEDICAL & SURGICAL HISTORY:  Afib    History of COPD    HTN (hypertension)    Hypothyroid    GERD (gastroesophageal reflux disease)    Esophageal diverticulum    Presence of IVC filter              VITAL SIGNS:  Vital Signs Last 24 Hrs  T(C): 36.1 (24 Sep 2021 12:00), Max: 37 (24 Sep 2021 03:00)  T(F): 97 (24 Sep 2021 12:00), Max: 98.6 (24 Sep 2021 03:00)  HR: 73 (24 Sep 2021 13:00) (63 - 82)  BP: --  BP(mean): --  RR: 20 (24 Sep 2021 13:00) (18 - 22)  SpO2: 100% (24 Sep 2021 13:00) (100% - 100%)    I/Os:   I&O's Detail    23 Sep 2021 07:01  -  24 Sep 2021 07:00  --------------------------------------------------------  IN:    Dexmedetomidine: 318 mL    DOBUTamine: 97.2 mL    Fat Emulsion (Fish Oil &amp; Plant Based) 20% Infusion: 159.6 mL    IV PiggyBack: 200 mL    IV PiggyBack: 60 mL    Propofol: 232.8 mL    TPN (Total Parenteral Nutrition): 1512 mL    Vasopressin: 41.4 mL  Total IN: 2621 mL    OUT:    Chest Tube (mL): 150 mL    Indwelling Catheter - Urethral (mL): 3755 mL  Total OUT: 3905 mL    Total NET: -1284 mL      24 Sep 2021 07:01  -  24 Sep 2021 13:29  --------------------------------------------------------  IN:    Dexmedetomidine: 81 mL    DOBUTamine: 24.6 mL    Propofol: 58.2 mL    TPN (Total Parenteral Nutrition): 378 mL    Vasopressin: 10.8 mL  Total IN: 552.6 mL    OUT:    Chest Tube (mL): 20 mL    Indwelling Catheter - Urethral (mL): 535 mL  Total OUT: 555 mL    Total NET: -2.4 mL          CAPILLARY BLOOD GLUCOSE      POCT Blood Glucose.: 133 mg/dL (24 Sep 2021 13:19)  POCT Blood Glucose.: 137 mg/dL (24 Sep 2021 05:40)  POCT Blood Glucose.: 136 mg/dL (23 Sep 2021 23:28)  POCT Blood Glucose.: 125 mg/dL (23 Sep 2021 13:31)      =======================MEDICATIONS===================  MEDICATIONS  (STANDING):  ALBUTerol    90 MICROgram(s) HFA Inhaler 2 Puff(s) Inhalation every 6 hours  artificial tears (preservative free) Ophthalmic Solution 1 Drop(s) Both EYES two times a day  budesonide  80 MICROgram(s)/formoterol 4.5 MICROgram(s) Inhaler 2 Puff(s) Inhalation two times a day  chlorhexidine 0.12% Liquid 15 milliLiter(s) Oral Mucosa every 12 hours  chlorhexidine 4% Liquid 1 Application(s) Topical <User Schedule>  dexMEDEtomidine Infusion 0.2 MICROgram(s)/kG/Hr (2.7 mL/Hr) IV Continuous <Continuous>  dextrose 40% Gel 15 Gram(s) Oral once  dextrose 5%. 1000 milliLiter(s) (50 mL/Hr) IV Continuous <Continuous>  dextrose 5%. 1000 milliLiter(s) (100 mL/Hr) IV Continuous <Continuous>  dextrose 50% Injectable 25 Gram(s) IV Push once  dextrose 50% Injectable 12.5 Gram(s) IV Push once  dextrose 50% Injectable 25 Gram(s) IV Push once  DOBUTamine Infusion 2.5 MICROgram(s)/kG/Min (4.05 mL/Hr) IV Continuous <Continuous>  fat emulsion (Fish Oil and Plant Based) 20% Infusion 13.3 mL/Hr (13.3 mL/Hr) IV Continuous <Continuous>  fentaNYL   Infusion. 0.9 MICROgram(s)/kG/Hr (4.86 mL/Hr) IV Continuous <Continuous>  glucagon  Injectable 1 milliGRAM(s) IntraMuscular once  heparin   Injectable 5000 Unit(s) SubCutaneous every 12 hours  insulin lispro (ADMELOG) corrective regimen sliding scale   SubCutaneous every 6 hours  levothyroxine Injectable 112 MICROGram(s) IV Push at bedtime  norepinephrine Infusion 0.05 MICROgram(s)/kG/Min (2.53 mL/Hr) IV Continuous <Continuous>  pantoprazole  Injectable 40 milliGRAM(s) IV Push two times a day  Parenteral Nutrition - Adult 1 Each (63 mL/Hr) TPN Continuous <Continuous>  Parenteral Nutrition - Adult 1 Each (63 mL/Hr) TPN Continuous <Continuous>  propofol Infusion 40 MICROgram(s)/kG/Min (13 mL/Hr) IV Continuous <Continuous>  vasopressin Infusion 0.04 Unit(s)/Min (2.4 mL/Hr) IV Continuous <Continuous>    MEDICATIONS  (PRN):      =======================VENTILATOR SETTINGS===================  Mode: AC/ CMV (Assist Control/ Continuous Mandatory Ventilation)  RR (machine): 18  TV (machine): 350  FiO2: 40  PEEP: 5  ITime: 0.76  MAP: 9  PIP: 17    PHYSICAL EXAM============================  General:                     Comfortable, No acute distress,   Neuro:                            Sedated, vented,   Respiratory:	Air entry fair and  bilateral conducted sounds                                           Effort even and unlabored.  CV:		Regular rate and rhythm. Normal S1/S2                                          Distal pulses present.  Abdomen:	                     Soft, non-distended. Bowel sounds present   Skin:		No rash.  Extremities:	Warm, no cyanosis, ++ edema.  Palpable pulses    ============================LABS=========================                        9.0    5.81  )-----------( 369      ( 24 Sep 2021 03:51 )             28.0     09-    138  |  103  |  21  ----------------------------<  140<H>  4.2   |  26  |  0.25<L>    Ca    10.0      24 Sep 2021 03:51  Phos  3.7       Mg     2.20             ASSESSMENT AND PLAN:     NEURO:  Keep sedated with precedex, propofol, to keep RASS -1 to -2.    Trach and Peg today       RESPIRATORY:  Mechanical vent - AC/VC, Vt  350ml, RR 18, PEEP 8, Spo2 maintained 96% with 40% fio2. Vent bundle, bronchodilators as needed.  Check CXR, ABG. CPAP weaning once trached     Completed ABX with vanco, fluconazole, meropenem.         CARDIOVASCULAR:  BiVent dysfunction, TR/MR  EF 25%  Cont , Cardiology F/U  Hemodynamically stable - on low dose norepinephrine. Continue hemodynamic monitoring.  Eliquis held. Rate controlled afib.   Give lasix  iv to maintain even to negative balamce         RENAL:  Stable - Monitor IOs and electrolytes. Keep K above 4.0 and Mg above 2.0.  Repeat BMP to check for hypokalemia, repleted      GASTROINTESTINAL:    NPO  No NGT  PPI Q12h         HEMATOLOGIC:  Monitor CBC. Maintain active type and screen. Off anticoagulation.  s/p Kcentra at outside hospital  DVT prophylaxis with Heparin SC         INFECTIOUS DISEASE:  No signs of active infection. Will monitor for fever and leukocytosis. Monitor off ABX, discussed with ID.           ENDOCRINE:  Stable – Monitor glucose fingersticks for goal 120-180.    Nutrition:  TPN, NPO without enteral access awaiting further conley for esophageal mass.          Pertinent clinical, laboratory, radiographic, telemetry, hemodynamic, respiratory  data and chart were reviewed by myself and analyzed frequently throughout the course of the day and night by myself.    Plan discussed at length with the CTICU staff and Attending CT Surgeon Dr. Zuñiga    Patient required critical care management.  45 minutes were spent evaluating, managing, providing, coordinating, and documenting care for this patient, exclusive of procedures.         Inder DE PAZP

## 2021-09-24 NOTE — PROGRESS NOTE ADULT - SUBJECTIVE AND OBJECTIVE BOX
Infectious Diseases progress note:    Subjective:  Pt intubated/sedated.  On pressor support.  No new fevers.  Pt for trach/peg today.      ROS:  unable to assess due to pt's clinical condition    Allergies    Sulfur Colliod (Rash)  Tylenol (Swelling)    Intolerances        ANTIBIOTICS/RELEVANT:  antimicrobials  ceFAZolin   IVPB 2000 milliGRAM(s) IV Intermittent every 8 hours    immunologic:    OTHER:  ALBUTerol    90 MICROgram(s) HFA Inhaler 2 Puff(s) Inhalation every 6 hours  artificial tears (preservative free) Ophthalmic Solution 1 Drop(s) Both EYES two times a day  budesonide  80 MICROgram(s)/formoterol 4.5 MICROgram(s) Inhaler 2 Puff(s) Inhalation two times a day  chlorhexidine 0.12% Liquid 15 milliLiter(s) Oral Mucosa every 12 hours  chlorhexidine 4% Liquid 1 Application(s) Topical <User Schedule>  dexMEDEtomidine Infusion 0.2 MICROgram(s)/kG/Hr IV Continuous <Continuous>  dextrose 40% Gel 15 Gram(s) Oral once  dextrose 5%. 1000 milliLiter(s) IV Continuous <Continuous>  dextrose 5%. 1000 milliLiter(s) IV Continuous <Continuous>  dextrose 50% Injectable 25 Gram(s) IV Push once  dextrose 50% Injectable 12.5 Gram(s) IV Push once  dextrose 50% Injectable 25 Gram(s) IV Push once  DOBUTamine Infusion 2.5 MICROgram(s)/kG/Min IV Continuous <Continuous>  fat emulsion (Fish Oil and Plant Based) 20% Infusion 13.3 mL/Hr IV Continuous <Continuous>  fentaNYL    Injectable 50 MICROGram(s) IV Push once  fentaNYL   Infusion. 0.9 MICROgram(s)/kG/Hr IV Continuous <Continuous>  glucagon  Injectable 1 milliGRAM(s) IntraMuscular once  heparin   Injectable 5000 Unit(s) SubCutaneous every 12 hours  insulin lispro (ADMELOG) corrective regimen sliding scale   SubCutaneous every 6 hours  levothyroxine Injectable 112 MICROGram(s) IV Push at bedtime  norepinephrine Infusion 0.05 MICROgram(s)/kG/Min IV Continuous <Continuous>  pantoprazole  Injectable 40 milliGRAM(s) IV Push two times a day  Parenteral Nutrition - Adult 1 Each TPN Continuous <Continuous>  propofol Infusion 40 MICROgram(s)/kG/Min IV Continuous <Continuous>  vasopressin Infusion 0.04 Unit(s)/Min IV Continuous <Continuous>      Objective:  Vital Signs Last 24 Hrs  T(C): 36.2 (24 Sep 2021 23:00), Max: 37 (24 Sep 2021 03:00)  T(F): 97.2 (24 Sep 2021 23:00), Max: 98.6 (24 Sep 2021 03:00)  HR: 81 (25 Sep 2021 00:00) (68 - 116)  BP: 123/70 (24 Sep 2021 19:00) (123/70 - 136/73)  BP(mean): 82 (24 Sep 2021 19:00) (82 - 95)  RR: 20 (25 Sep 2021 00:00) (17 - 22)  SpO2: 100% (25 Sep 2021 00:00) (96% - 100%)    PHYSICAL EXAM:  Constitutional: intubated  Eyes:LISA, EOMI  Ear/Nose/Throat: no thrush, mucositis.  Moist mucous membranes	  Neck:no JVD, no lymphadenopathy, supple Rt ij tlc  Respiratory: CTA shaila  Cardiovascular: S1S2 RRR, no murmurs  Gastrointestinal:soft, nontender,  nondistended (+) BS  Extremities:no e/e/c  Skin:  no rashes, open wounds or ulcerations  : bearden in place        LABS:                        9.8    9.52  )-----------( 405      ( 24 Sep 2021 17:34 )             30.8     09-24    140  |  105  |  22  ----------------------------<  186<H>  4.2   |  26  |  0.24<L>    Ca    9.9      24 Sep 2021 17:34  Phos  3.7     09-24  Mg     2.20     09-24    TPro  5.8<L>  /  Alb  3.0<L>  /  TBili  0.5  /  DBili  x   /  AST  21  /  ALT  12  /  AlkPhos  89  09-24        Vancomycin Level, Trough: 8.3 ug/mL (09-20 @ 20:15)              MICROBIOLOGY:          RADIOLOGY & ADDITIONAL STUDIES:    Culture - Body Fluid with Gram Stain (09.21.21 @ 18:29)   Gram Stain:   polymorphonuclear leukocytes seen   No organisms seen   by cytocentrifuge   Specimen Source: .Body Fluid Pleural Fluid   Culture Results:   No growth

## 2021-09-25 LAB
ANION GAP SERPL CALC-SCNC: 10 MMOL/L — SIGNIFICANT CHANGE UP (ref 7–14)
APTT BLD: 28.1 SEC — SIGNIFICANT CHANGE UP (ref 27–36.3)
BASE EXCESS BLDA CALC-SCNC: 8.3 MMOL/L — HIGH (ref -2–3)
BUN SERPL-MCNC: 24 MG/DL — HIGH (ref 7–23)
CA-I BLD-SCNC: 1.29 MMOL/L — SIGNIFICANT CHANGE UP (ref 1.15–1.29)
CALCIUM SERPL-MCNC: 10.1 MG/DL — SIGNIFICANT CHANGE UP (ref 8.4–10.5)
CHLORIDE SERPL-SCNC: 103 MMOL/L — SIGNIFICANT CHANGE UP (ref 98–107)
CO2 BLDA-SCNC: 35 MMOL/L — HIGH (ref 19–24)
CO2 SERPL-SCNC: 28 MMOL/L — SIGNIFICANT CHANGE UP (ref 22–31)
CREAT SERPL-MCNC: 0.26 MG/DL — LOW (ref 0.5–1.3)
GLUCOSE BLDC GLUCOMTR-MCNC: 115 MG/DL — HIGH (ref 70–99)
GLUCOSE BLDC GLUCOMTR-MCNC: 127 MG/DL — HIGH (ref 70–99)
GLUCOSE BLDC GLUCOMTR-MCNC: 149 MG/DL — HIGH (ref 70–99)
GLUCOSE BLDC GLUCOMTR-MCNC: 151 MG/DL — HIGH (ref 70–99)
GLUCOSE SERPL-MCNC: 145 MG/DL — HIGH (ref 70–99)
HCO3 BLDA-SCNC: 33 MMOL/L — HIGH (ref 21–28)
HCT VFR BLD CALC: 28.8 % — LOW (ref 34.5–45)
HGB BLD-MCNC: 9.2 G/DL — LOW (ref 11.5–15.5)
INR BLD: 1.18 RATIO — HIGH (ref 0.88–1.16)
MAGNESIUM SERPL-MCNC: 2.3 MG/DL — SIGNIFICANT CHANGE UP (ref 1.6–2.6)
MCHC RBC-ENTMCNC: 29.9 PG — SIGNIFICANT CHANGE UP (ref 27–34)
MCHC RBC-ENTMCNC: 31.9 GM/DL — LOW (ref 32–36)
MCV RBC AUTO: 93.5 FL — SIGNIFICANT CHANGE UP (ref 80–100)
NRBC # BLD: 0 /100 WBCS — SIGNIFICANT CHANGE UP
NRBC # FLD: 0 K/UL — SIGNIFICANT CHANGE UP
PCO2 BLDA: 47 MMHG — HIGH (ref 32–35)
PH BLDA: 7.46 — HIGH (ref 7.35–7.45)
PHOSPHATE SERPL-MCNC: 3.7 MG/DL — SIGNIFICANT CHANGE UP (ref 2.5–4.5)
PLATELET # BLD AUTO: 398 K/UL — SIGNIFICANT CHANGE UP (ref 150–400)
PO2 BLDA: 101 MMHG — SIGNIFICANT CHANGE UP (ref 83–108)
POTASSIUM SERPL-MCNC: 4.2 MMOL/L — SIGNIFICANT CHANGE UP (ref 3.5–5.3)
POTASSIUM SERPL-SCNC: 4.2 MMOL/L — SIGNIFICANT CHANGE UP (ref 3.5–5.3)
PROTHROM AB SERPL-ACNC: 13.4 SEC — SIGNIFICANT CHANGE UP (ref 10.6–13.6)
RBC # BLD: 3.08 M/UL — LOW (ref 3.8–5.2)
RBC # FLD: 16.3 % — HIGH (ref 10.3–14.5)
SAO2 % BLDA: 96.8 % — SIGNIFICANT CHANGE UP (ref 94–98)
SODIUM SERPL-SCNC: 141 MMOL/L — SIGNIFICANT CHANGE UP (ref 135–145)
WBC # BLD: 7.37 K/UL — SIGNIFICANT CHANGE UP (ref 3.8–10.5)
WBC # FLD AUTO: 7.37 K/UL — SIGNIFICANT CHANGE UP (ref 3.8–10.5)

## 2021-09-25 PROCEDURE — 71045 X-RAY EXAM CHEST 1 VIEW: CPT | Mod: 26,77

## 2021-09-25 PROCEDURE — 99291 CRITICAL CARE FIRST HOUR: CPT

## 2021-09-25 PROCEDURE — 99232 SBSQ HOSP IP/OBS MODERATE 35: CPT

## 2021-09-25 PROCEDURE — 71045 X-RAY EXAM CHEST 1 VIEW: CPT | Mod: 26

## 2021-09-25 RX ORDER — ELECTROLYTE SOLUTION,INJ
1 VIAL (ML) INTRAVENOUS
Refills: 0 | Status: DISCONTINUED | OUTPATIENT
Start: 2021-09-25 | End: 2021-09-25

## 2021-09-25 RX ORDER — SENNA PLUS 8.6 MG/1
2 TABLET ORAL AT BEDTIME
Refills: 0 | Status: DISCONTINUED | OUTPATIENT
Start: 2021-09-25 | End: 2021-10-05

## 2021-09-25 RX ORDER — I.V. FAT EMULSION 20 G/100ML
13.3 EMULSION INTRAVENOUS
Qty: 32 | Refills: 0 | Status: DISCONTINUED | OUTPATIENT
Start: 2021-09-25 | End: 2021-09-25

## 2021-09-25 RX ORDER — FUROSEMIDE 40 MG
20 TABLET ORAL ONCE
Refills: 0 | Status: COMPLETED | OUTPATIENT
Start: 2021-09-25 | End: 2021-09-25

## 2021-09-25 RX ORDER — POLYETHYLENE GLYCOL 3350 17 G/17G
17 POWDER, FOR SOLUTION ORAL AT BEDTIME
Refills: 0 | Status: DISCONTINUED | OUTPATIENT
Start: 2021-09-25 | End: 2021-10-03

## 2021-09-25 RX ADMIN — DEXMEDETOMIDINE HYDROCHLORIDE IN 0.9% SODIUM CHLORIDE 2.7 MICROGRAM(S)/KG/HR: 4 INJECTION INTRAVENOUS at 21:06

## 2021-09-25 RX ADMIN — Medication 1 DROP(S): at 08:34

## 2021-09-25 RX ADMIN — Medication 100 MILLIGRAM(S): at 05:41

## 2021-09-25 RX ADMIN — PANTOPRAZOLE SODIUM 40 MILLIGRAM(S): 20 TABLET, DELAYED RELEASE ORAL at 17:24

## 2021-09-25 RX ADMIN — Medication 20 MILLIGRAM(S): at 12:22

## 2021-09-25 RX ADMIN — PROPOFOL 13 MICROGRAM(S)/KG/MIN: 10 INJECTION, EMULSION INTRAVENOUS at 08:35

## 2021-09-25 RX ADMIN — PANTOPRAZOLE SODIUM 40 MILLIGRAM(S): 20 TABLET, DELAYED RELEASE ORAL at 05:42

## 2021-09-25 RX ADMIN — DEXMEDETOMIDINE HYDROCHLORIDE IN 0.9% SODIUM CHLORIDE 2.7 MICROGRAM(S)/KG/HR: 4 INJECTION INTRAVENOUS at 05:42

## 2021-09-25 RX ADMIN — CHLORHEXIDINE GLUCONATE 15 MILLILITER(S): 213 SOLUTION TOPICAL at 05:42

## 2021-09-25 RX ADMIN — Medication 4.05 MICROGRAM(S)/KG/MIN: at 08:34

## 2021-09-25 RX ADMIN — ALBUTEROL 2 PUFF(S): 90 AEROSOL, METERED ORAL at 07:36

## 2021-09-25 RX ADMIN — ALBUTEROL 2 PUFF(S): 90 AEROSOL, METERED ORAL at 22:46

## 2021-09-25 RX ADMIN — CHLORHEXIDINE GLUCONATE 1 APPLICATION(S): 213 SOLUTION TOPICAL at 02:30

## 2021-09-25 RX ADMIN — ALBUTEROL 2 PUFF(S): 90 AEROSOL, METERED ORAL at 04:27

## 2021-09-25 RX ADMIN — BUDESONIDE AND FORMOTEROL FUMARATE DIHYDRATE 2 PUFF(S): 160; 4.5 AEROSOL RESPIRATORY (INHALATION) at 22:47

## 2021-09-25 RX ADMIN — DEXMEDETOMIDINE HYDROCHLORIDE IN 0.9% SODIUM CHLORIDE 2.7 MICROGRAM(S)/KG/HR: 4 INJECTION INTRAVENOUS at 08:34

## 2021-09-25 RX ADMIN — HEPARIN SODIUM 5000 UNIT(S): 5000 INJECTION INTRAVENOUS; SUBCUTANEOUS at 17:24

## 2021-09-25 RX ADMIN — HEPARIN SODIUM 5000 UNIT(S): 5000 INJECTION INTRAVENOUS; SUBCUTANEOUS at 05:42

## 2021-09-25 RX ADMIN — Medication 112 MICROGRAM(S): at 22:33

## 2021-09-25 RX ADMIN — POLYETHYLENE GLYCOL 3350 17 GRAM(S): 17 POWDER, FOR SOLUTION ORAL at 21:54

## 2021-09-25 RX ADMIN — ALBUTEROL 2 PUFF(S): 90 AEROSOL, METERED ORAL at 15:48

## 2021-09-25 RX ADMIN — Medication 63 EACH: at 08:35

## 2021-09-25 RX ADMIN — SENNA PLUS 2 TABLET(S): 8.6 TABLET ORAL at 21:54

## 2021-09-25 RX ADMIN — CHLORHEXIDINE GLUCONATE 15 MILLILITER(S): 213 SOLUTION TOPICAL at 17:24

## 2021-09-25 RX ADMIN — Medication 1 DROP(S): at 17:24

## 2021-09-25 RX ADMIN — BUDESONIDE AND FORMOTEROL FUMARATE DIHYDRATE 2 PUFF(S): 160; 4.5 AEROSOL RESPIRATORY (INHALATION) at 07:36

## 2021-09-25 NOTE — PROGRESS NOTE ADULT - SUBJECTIVE AND OBJECTIVE BOX
CHIEF COMPLAINT: Transfer to CT ICU for a patient who was noted to have an esophageal mass at [facility]    PROCEDURE:     -EGD done at outside hospital on 9/9/21 -Large diverticula noted at 28cm filled with blood and blood clots, periphery of diverticula was injected with epinephrine, 5mm distal esophageal tear without bleeding, hiatal hernia.                -EGD here  (09.14.21) Diverticulum in the middle third of the esophagus                        filled with large amount of clot. No obvious vessel or                        source of bleed. The clot likely correlates with "mass"                        on recent imaging.               -(9/25/21) Tracheostomy/ PEG        ISSUES:     - Acute hypoxemic respiratory failure, intubated for airway protection due to hematemesis  - Esophageal diverticular bleed, GI bleed  - Biventricular failure  - Aspiration PNA  - Septic shock  - COPD  - Afib on eliquis at home  - Hypothyroidism  - HTN  - Severe protein malnutrition  - Severe kyphosis      INTERVAL EVENTS:     - s/p trach/peg yesterday  - Tolerating PS trial 8/5, 40% fio2, Vt 300ml, RR 21, moderate thin discoloured secretions  - Sedated only with precedex, grimaces and +cough reflex   - TPN to be stopped today, enteral feeds to be started via PEG  - Completed ABX  - POCUS with small left pleural effusion, LLL consolidation/atelectasis. Biatrial dilation, LVEF appeared normal on current dobutamine dosing, RV dilated and decreased function, IVC 2cm.      HISTORY:   Unable to obtain, intubated, sedated    PHYSICAL EXAM:   Gen: Comfortable, No acute distress, frail elderly, kyphotic, cachectic  Eyes: Sclera white, Conjunctiva normal, Eyelids normal, Pupils symmetrical   ENT: Mucous membranes moist, blood in oral cavity  Neck: Trachea midline, 7.0 ETT, RIJ CVC in place without redness  CV: Rate regular, Rhythm irregular  Resp: Breath sounds clear, No accessory muscles use,   Abd: Soft, Non-distended, Non-tender, Bowel sounds normal,  ,  ,    Skin: Warm, 2+ peripheral edema of lower extremities,  ,    : bearden  Neuro: Moves ext to pain, minimally opens eyes to pain, sedated  Psych: Sedated on propofol      ASSESSMENT AND PLAN:     NEURO:    Keep sedated with precedex, propofol, to keep RASS -1 to -2.           RESPIRATORY:    Mechanical vent - AC/VC, Vt  350ml, RR 18, PEEP 8, Spo2 maintained 96% with 40% fio2. Vent bundle, bronchodilators as needed.  Check CXR, ABG. Daily PS trials. Keep on PS 8/5 for the day as tolerated.    Completed ABX with vanco, fluconazole, meropenem.         CARDIOVASCULAR:    Hemodynamically stable - on low dose norepinephrine. Continue hemodynamic monitoring.  Eliquis held. Rate controlled afib.     Give lasix 20mg iv to maintain even to negative balance              RENAL:  Stable - Monitor IOs and electrolytes. Keep K above 4.0 and Mg above 2.0.        GASTROINTESTINAL:    No NGT  PPI Q12h  Start PEG feeds, bowel regimen          HEMATOLOGIC:    Monitor CBC. Maintain active type and screen. Off anticoagulation.  s/p Kcentra at outside hospital  DVT prophylaxis with Heparin SC, holding therapeutic AC with bleeding risk         INFECTIOUS DISEASE:  No signs of active infection. Will monitor for fever and leukocytosis. Monitor off ABX, discussed with ID.            ENDOCRINE:  Stable – Monitor glucose fingersticks for goal 120-180.              Pertinent clinical, laboratory, radiographic, telemetry, hemodynamic, respiratory  data and chart were reviewed by myself and analyzed frequently throughout the course of the day and night by myself.    Plan discussed at length with the CTICU staff and Attending CT Surgeon Dr. Nance    Patient required critical care management.  45 minutes were spent evaluating, managing, providing, coordinating, and documenting care for this patient, exclusive of procedures.    _________________________  VITAL SIGNS:  Vital Signs Last 24 Hrs  T(C): 36.6 (25 Sep 2021 12:00), Max: 36.6 (25 Sep 2021 12:00)  T(F): 97.8 (25 Sep 2021 12:00), Max: 97.8 (25 Sep 2021 12:00)  HR: 83 (25 Sep 2021 12:00) (73 - 116)  BP: 123/70 (24 Sep 2021 19:00) (123/70 - 136/73)  BP(mean): 82 (24 Sep 2021 19:00) (82 - 95)  RR: 13 (25 Sep 2021 12:00) (13 - 22)  SpO2: 100% (25 Sep 2021 12:00) (96% - 100%)  I/Os:   I&O's Detail    24 Sep 2021 07:01  -  25 Sep 2021 07:00  --------------------------------------------------------  IN:    Dexmedetomidine: 321.3 mL    DOBUTamine: 94.3 mL    Fat Emulsion (Fish Oil &amp; Plant Based) 20% Infusion: 172.9 mL    IV PiggyBack: 100 mL    Propofol: 186.3 mL    TPN (Total Parenteral Nutrition): 1449 mL    Vasopressin: 28.2 mL  Total IN: 2352 mL    OUT:    Chest Tube (mL): 100 mL    Indwelling Catheter - Urethral (mL): 3410 mL    PEG (Percutaneous Endoscopic Gastrostomy) Tube (mL): 35 mL  Total OUT: 3545 mL    Total NET: -1193 mL      25 Sep 2021 07:01  -  25 Sep 2021 12:56  --------------------------------------------------------  IN:    Dexmedetomidine: 54.1 mL    DOBUTamine: 24.6 mL    Enteral Tube Flush: 60 mL    Propofol: 14.7 mL    TPN (Total Parenteral Nutrition): 315 mL  Total IN: 468.4 mL    OUT:    Chest Tube (mL): 30 mL    Indwelling Catheter - Urethral (mL): 750 mL    PEG (Percutaneous Endoscopic Gastrostomy) Tube (mL): 35 mL  Total OUT: 815 mL    Total NET: -346.6 mL          Mode: CPAP with PS  FiO2: 40  PEEP: 5  PS: 8  MAP: 8  PIP: 14      MEDICATIONS:  MEDICATIONS  (STANDING):  ALBUTerol    90 MICROgram(s) HFA Inhaler 2 Puff(s) Inhalation every 6 hours  artificial tears (preservative free) Ophthalmic Solution 1 Drop(s) Both EYES two times a day  budesonide  80 MICROgram(s)/formoterol 4.5 MICROgram(s) Inhaler 2 Puff(s) Inhalation two times a day  chlorhexidine 0.12% Liquid 15 milliLiter(s) Oral Mucosa every 12 hours  chlorhexidine 4% Liquid 1 Application(s) Topical <User Schedule>  dexMEDEtomidine Infusion 0.2 MICROgram(s)/kG/Hr (2.7 mL/Hr) IV Continuous <Continuous>  dextrose 40% Gel 15 Gram(s) Oral once  dextrose 5%. 1000 milliLiter(s) (50 mL/Hr) IV Continuous <Continuous>  dextrose 5%. 1000 milliLiter(s) (100 mL/Hr) IV Continuous <Continuous>  dextrose 50% Injectable 25 Gram(s) IV Push once  dextrose 50% Injectable 12.5 Gram(s) IV Push once  dextrose 50% Injectable 25 Gram(s) IV Push once  DOBUTamine Infusion 2.5 MICROgram(s)/kG/Min (4.05 mL/Hr) IV Continuous <Continuous>  fentaNYL   Infusion. 0.9 MICROgram(s)/kG/Hr (4.86 mL/Hr) IV Continuous <Continuous>  glucagon  Injectable 1 milliGRAM(s) IntraMuscular once  heparin   Injectable 5000 Unit(s) SubCutaneous every 12 hours  insulin lispro (ADMELOG) corrective regimen sliding scale   SubCutaneous every 6 hours  levothyroxine Injectable 112 MICROGram(s) IV Push at bedtime  norepinephrine Infusion 0.05 MICROgram(s)/kG/Min (2.53 mL/Hr) IV Continuous <Continuous>  pantoprazole  Injectable 40 milliGRAM(s) IV Push two times a day  propofol Infusion 40 MICROgram(s)/kG/Min (13 mL/Hr) IV Continuous <Continuous>  vasopressin Infusion 0.04 Unit(s)/Min (2.4 mL/Hr) IV Continuous <Continuous>    MEDICATIONS  (PRN):      LABS:  Laboratory data was independently reviewed by me today.                           9.2    7.37  )-----------( 398      ( 25 Sep 2021 03:10 )             28.8     09-25    141  |  103  |  24<H>  ----------------------------<  145<H>  4.2   |  28  |  0.26<L>    Ca    10.1      25 Sep 2021 03:10  Phos  3.7     09-25  Mg     2.30     09-25    TPro  5.8<L>  /  Alb  3.0<L>  /  TBili  0.5  /  DBili  x   /  AST  21  /  ALT  12  /  AlkPhos  89  09-24    LIVER FUNCTIONS - ( 24 Sep 2021 17:34 )  Alb: 3.0 g/dL / Pro: 5.8 g/dL / ALK PHOS: 89 U/L / ALT: 12 U/L / AST: 21 U/L / GGT: x           PT/INR - ( 25 Sep 2021 03:10 )   PT: 13.4 sec;   INR: 1.18 ratio         PTT - ( 25 Sep 2021 03:10 )  PTT:28.1 sec  ABG - ( 25 Sep 2021 03:10 )  pH, Arterial: 7.46  pH, Blood: x     /  pCO2: 47    /  pO2: 101   / HCO3: 33    / Base Excess: 8.3   /  SaO2: 96.8                  RADIOLOGY:   Radiology images were independently reviewed by me today. Reports were reviewed by me today.    Xray Chest 1 View- PORTABLE-Urgent:   EXAM:  XR CHEST PORTABLE URGENT 1V        PROCEDURE DATE:  Sep 24 2021         INTERPRETATION:  Chest radiograph (one view)     CPT 15715    CLINICAL INFORMATION:  Patient is unable to eat. Status post tracheostomy. Status post feeding tube insertion.  Follow-up.    TECHNIQUE:  Single frontal view of the chest was obtained.    FINDINGS:  Prior study of earlier the same day was available for review.    The lungs demonstrate increased pulmonary vascular congestion. Small left pleural effusion is seen. Tracheostomy is noted in place. Right-sided IJ catheter is noted with the distal tip overlying the superior vena cava. Right-sided chest tube pigtail catheter remains unchanged in position. No appreciable pneumothorax is seen. The heart is difficult to evaluate. There is partial visualization of an IVC filter noted subdiaphragmatically. There is partial visualization of metallic fixation hardware overlying the right humerus.          IMPRESSION: Tracheostomy noted in place. Increased pulmonary vascular congestion is noted. Small left pleural effusion noted. Other findings as noted above.    --- End of Report ---              SUJATA RESTREPO MD; Attending Radiologist  This document has been electronically signed. Sep 25 2021  9:26AM (09-24-21 @ 15:00)  Xray Chest 1 View- PORTABLE-Routine:   EXAM:  XR CHEST PORTABLE ROUTINE 1V        PROCEDURE DATE:  Sep 24 2021         INTERPRETATION:  TIME OF EXAM: September 24, 2021 at 5:37 AM    CLINICAL INFORMATION: Post esophageal bleed    TECHNIQUE:   Portable chest    INTERPRETATION:    Right-sided pigtail in place unchanged from the previous exam. Endotracheal tube and right IJ line also unchanged.    Moderate left layering effusion is present likely with underlying atelectasis. Lungs are clear. No pneumothorax.      COMPARISON:  September23      IMPRESSION:  Follow-up with left effusion and right pigtail catheter in place.    --- End of Report ---              ONUR AVILES MD; Attending Radiologist  This document has been electronically signed. Sep 24 2021 12:25PM (09-24-21 @ 06:20)  Xray Chest 1 View- PORTABLE-Routine:   EXAM:  XR CHEST PORTABLE ROUTINE 1V        PROCEDURE DATE:  Sep 23 2021         INTERPRETATION:  TIME OF EXAM: September 23, 2021 at 4:44 AM    CLINICAL INFORMATION: Follow-up pigtail catheter placement    TECHNIQUE:   Portable chest    INTERPRETATION:    Pigtail catheter again seen overlying the right hemithorax with a more pronounced pigtail at the tip.    Endotracheal tube unchanged. Hazy left hemithorax has the appearance of a moderate layering effusion. Lungs are clear. No pneumothorax.      COMPARISON:  September 22      IMPRESSION:  Follow-up with right-sided pigtail catheter in place, clear lungs and left pleural effusion.    --- End of Report ---              ONUR AVILES MD; Attending Radiologist  This document has been electronically signed. Sep 23 2021 12:34PM (09-23-21 @ 05:22)

## 2021-09-25 NOTE — PROGRESS NOTE ADULT - SUBJECTIVE AND OBJECTIVE BOX
POST ANESTHESIA EVALUATION    81y Female POSTOP DAY 1 S/P     MENTAL STATUS: Patient participation [  ] Awake     [  ] Arousable     [ X ] Sedated    AIRWAY PATENCY: [ X ] Satisfactory  [  ] Other:     Vital Signs Last 24 Hrs  T(C): 36.6 (25 Sep 2021 12:00), Max: 36.6 (25 Sep 2021 12:00)  T(F): 97.8 (25 Sep 2021 12:00), Max: 97.8 (25 Sep 2021 12:00)  HR: 98 (25 Sep 2021 14:00) (73 - 116)  BP: 123/70 (24 Sep 2021 19:00) (123/70 - 136/73)  BP(mean): 82 (24 Sep 2021 19:00) (82 - 95)  RR: 14 (25 Sep 2021 14:00) (13 - 22)  SpO2: 100% (25 Sep 2021 14:00) (96% - 100%)  I&O's Summary    24 Sep 2021 07:01  -  25 Sep 2021 07:00  --------------------------------------------------------  IN: 2352 mL / OUT: 3545 mL / NET: -1193 mL    25 Sep 2021 07:01  -  25 Sep 2021 14:22  --------------------------------------------------------  IN: 666.2 mL / OUT: 1965 mL / NET: -1298.8 mL          NAUSEA/ VOMITTING:  [ X ] NONE  [  ] CONTROLLED [  ] OTHER     PAIN: [ X ] CONTROLLED WITH CURRENT REGIMEN  [  ] OTHER    [ X ] NO APPARENT ANESTHESIA COMPLICATIONS      Comments:

## 2021-09-25 NOTE — PROGRESS NOTE ADULT - SUBJECTIVE AND OBJECTIVE BOX
NUTRITION NOTE  [id number] NAGI  ===============================    Interval events - Parenteral nutrition was initiated on 21 via central line; pt remains on TPN at this time, PEG placed and plan to start tube feeds per CT iCU.     ROS - unable to obtain, pt is on vent support     Allergies  Sulfur Colliod (Rash)  Tylenol (Swelling)    PAST MEDICAL & SURGICAL HISTORY:  Afib  History of COPD  HTN (hypertension)  Hypothyroid  GERD (gastroesophageal reflux disease)  Esophageal diverticulum  Presence of IVC filter    ICU Vital Signs Last 24 Hrs  T(C): 36.4 (25 Sep 2021 08:00), Max: 36.4 (25 Sep 2021 08:00)  T(F): 97.6 (25 Sep 2021 08:00), Max: 97.6 (25 Sep 2021 08:00)  HR: 76 (25 Sep 2021 09:00) (70 - 116)  BP: 123/70 (24 Sep 2021 19:00) (123/70 - 136/73)  BP(mean): 82 (24 Sep 2021 19:00) (82 - 95)  ABP: 107/52 (25 Sep 2021 09:00) (93/49 - 138/64)  ABP(mean): 74 (25 Sep 2021 09:00) (66 - 94)  RR: 19 (25 Sep 2021 09:00) (17 - 22)  SpO2: 100% (25 Sep 2021 09:00) (96% - 100%)    MEDICATIONS  (STANDING):  ALBUTerol    90 MICROgram(s) HFA Inhaler 2 Puff(s) Inhalation every 6 hours  artificial tears (preservative free) Ophthalmic Solution 1 Drop(s) Both EYES two times a day  budesonide  80 MICROgram(s)/formoterol 4.5 MICROgram(s) Inhaler 2 Puff(s) Inhalation two times a day  chlorhexidine 0.12% Liquid 15 milliLiter(s) Oral Mucosa every 12 hours  chlorhexidine 4% Liquid 1 Application(s) Topical <User Schedule>  dexMEDEtomidine Infusion 0.2 MICROgram(s)/kG/Hr (2.7 mL/Hr) IV Continuous <Continuous>  dextrose 40% Gel 15 Gram(s) Oral once  dextrose 5%. 1000 milliLiter(s) (50 mL/Hr) IV Continuous <Continuous>  dextrose 5%. 1000 milliLiter(s) (100 mL/Hr) IV Continuous <Continuous>  dextrose 50% Injectable 25 Gram(s) IV Push once  dextrose 50% Injectable 12.5 Gram(s) IV Push once  dextrose 50% Injectable 25 Gram(s) IV Push once  DOBUTamine Infusion 2.5 MICROgram(s)/kG/Min (4.05 mL/Hr) IV Continuous <Continuous>  fat emulsion (Fish Oil and Plant Based) 20% Infusion 13.3 mL/Hr (13.3 mL/Hr) IV Continuous <Continuous>  fentaNYL   Infusion. 0.9 MICROgram(s)/kG/Hr (4.86 mL/Hr) IV Continuous <Continuous>  glucagon  Injectable 1 milliGRAM(s) IntraMuscular once  heparin   Injectable 5000 Unit(s) SubCutaneous every 12 hours  insulin lispro (ADMELOG) corrective regimen sliding scale   SubCutaneous every 6 hours  levothyroxine Injectable 112 MICROGram(s) IV Push at bedtime  norepinephrine Infusion 0.05 MICROgram(s)/kG/Min (2.53 mL/Hr) IV Continuous <Continuous>  pantoprazole  Injectable 40 milliGRAM(s) IV Push two times a day  Parenteral Nutrition - Adult 1 Each (63 mL/Hr) TPN Continuous <Continuous>  Parenteral Nutrition - Adult 1 Each (63 mL/Hr) TPN Continuous <Continuous>  propofol Infusion 40 MICROgram(s)/kG/Min (13 mL/Hr) IV Continuous <Continuous>  vasopressin Infusion 0.04 Unit(s)/Min (2.4 mL/Hr) IV Continuous <Continuous>    I&O's Detail    24 Sep 2021 07:01  -  25 Sep 2021 07:00  --------------------------------------------------------  IN:    Dexmedetomidine: 321.3 mL    DOBUTamine: 94.3 mL    Fat Emulsion (Fish Oil &amp; Plant Based) 20% Infusion: 172.9 mL    IV PiggyBack: 100 mL    Propofol: 186.3 mL    TPN (Total Parenteral Nutrition): 1449 mL    Vasopressin: 28.2 mL  Total IN: 2352 mL    OUT:    Chest Tube (mL): 100 mL    Indwelling Catheter - Urethral (mL): 3410 mL    PEG (Percutaneous Endoscopic Gastrostomy) Tube (mL): 35 mL  Total OUT: 3545 mL    Total NET: -1193 mL      25 Sep 2021 07:01  -  25 Sep 2021 09:25  --------------------------------------------------------  IN:    Dexmedetomidine: 40.5 mL    DOBUTamine: 12.3 mL    Enteral Tube Flush: 30 mL    Propofol: 14.7 mL    TPN (Total Parenteral Nutrition): 189 mL  Total IN: 286.5 mL    OUT:    Chest Tube (mL): 0 mL    Indwelling Catheter - Urethral (mL): 75 mL    PEG (Percutaneous Endoscopic Gastrostomy) Tube (mL): 35 mL  Total OUT: 110 mL    Total NET: 176.5 mL    POCT Blood Glucose.: 127 mg/dL (25 Sep 2021 05:40)  POCT Blood Glucose.: 168 mg/dL (24 Sep 2021 22:58)  POCT Blood Glucose.: 158 mg/dL (24 Sep 2021 18:13)  POCT Blood Glucose.: 133 mg/dL (24 Sep 2021 13:19)    Daily Weight in k.1 (23 Sep 2021 04:00)    Weight (kg): 54 (10 Sep 2021 00:39)    PHYSICAL EXAM:  Gen: Comfortable, No acute distress, frail elderly, sedated   Resp: mechanica breath sounds   Abd: Soft, Non-distended   Skin: Warm, 1+ peripheral edema of lower extremities    Mode: AC/ CMV (Assist Control/ Continuous Mandatory Ventilation)  RR (machine): 18  TV (machine): 350  FiO2: 40  PEEP: 5  ITime: 0.77  MAP: 9  PIP: 20    Diet: NPO and TPN/lipids (started on 21)    LABORATORY                        9.2    7.37  )-----------( 398      ( 25 Sep 2021 03:10 )             28.8       141  |  103  |  24<H>  ----------------------------<  145<H>  4.2   |  28  |  0.26<L>    Ca    10.1      25 Sep 2021 03:10  Phos  3.7       Mg     2.30         TPro  5.8<L>  /  Alb  3.0<L>  /  TBili  0.5  /  DBili  x   /  AST  21  /  ALT  12  /  AlkPhos  89      LIVER FUNCTIONS - ( 24 Sep 2021 17:34 )  Alb: 3.0 g/dL / Pro: 5.8 g/dL / ALK PHOS: 89 U/L / ALT: 12 U/L / AST: 21 U/L / GGT: x            Chol -- LDL -- HDL -- Trig 99,  Chol -- LDL -- HDL -- Trig 113,  Chol 115 LDL -- HDL 20<L> Trig 129, 09-15 Chol -- LDL -- HDL -- Trig 103    RECENT CULTURES  Culture - Body Fluid with Gram Stain (collected 21 Sep 2021 18:29)  Source: .Body Fluid Pleural Fluid  Gram Stain (21 Sep 2021 22:42):    polymorphonuclear leukocytes seen    No organisms seen    by cytocentrifuge  Preliminary Report (22 Sep 2021 18:00):    No growth    Culture - Blood (collected 18 Sep 2021 14:48)  Source: .Blood Blood-Venous  Preliminary Report (19 Sep 2021 15:01):    No growth to date.    ASSESSMENT/PLAN:  82 y/o female transferred to CT ICU for a patient who was noted to have an esophageal mass at [facility]. EGD done at outside hospital on 21 -Large diverticula noted at 28cm filled with blood and blood clots, periphery of diverticula was injected with epinephrine, 5mm distal esophageal tear without bleeding, hiatal hernia. Patient meets criteria for severe malnutrition in the context of acute illness. Patient's signs/symptoms as evidenced by severe loss of muscle mass and fat stores, 1+ edema, NPO >3 days.  Nutrition support service consulted for initiation of TPN/lipids via central line in view of malnutrition and anticipated prolonged NPO status. Pt is s/p central line change on .    continue TPN with infusion volume of 1.5 L, TPN will provide 1243 kcal/day    labs reviewed - stable, continue same TPN formula today     monitor fingersticks, obtain daily weights     continue parenteral nutrition at this time, will follow up with primary team on plan - plan to start tube feeds today, will wean or d/c parenteral nutrition when pt is tolerating tube feeds     1.  Severe protein calorie malnutrition being optimized with TPN: CHO [175] gm.  AA [82] gm. SMOF Lipids [32] gm.  2.  Hyperglycemia managed with: [0] units of regular insulin    3.  Check fluid balance daily.  Strict I/O  [ ] [ ]   4.  Daily BMP, Ionized Calcium, Magnesium and Phosphorous   5.  Triglycerides at initiation of TPN and monthly [ ] [ ]     Nutrition Support [id number]

## 2021-09-26 LAB
ANION GAP SERPL CALC-SCNC: 10 MMOL/L — SIGNIFICANT CHANGE UP (ref 7–14)
APTT BLD: 27.7 SEC — SIGNIFICANT CHANGE UP (ref 27–36.3)
BASE EXCESS BLDA CALC-SCNC: 8.8 MMOL/L — HIGH (ref -2–3)
BUN SERPL-MCNC: 25 MG/DL — HIGH (ref 7–23)
CA-I BLD-SCNC: 1.31 MMOL/L — HIGH (ref 1.15–1.29)
CALCIUM SERPL-MCNC: 10.2 MG/DL — SIGNIFICANT CHANGE UP (ref 8.4–10.5)
CHLORIDE SERPL-SCNC: 106 MMOL/L — SIGNIFICANT CHANGE UP (ref 98–107)
CO2 BLDA-SCNC: 35 MMOL/L — HIGH (ref 19–24)
CO2 SERPL-SCNC: 31 MMOL/L — SIGNIFICANT CHANGE UP (ref 22–31)
CREAT SERPL-MCNC: 0.33 MG/DL — LOW (ref 0.5–1.3)
CULTURE RESULTS: SIGNIFICANT CHANGE UP
GLUCOSE BLDC GLUCOMTR-MCNC: 135 MG/DL — HIGH (ref 70–99)
GLUCOSE BLDC GLUCOMTR-MCNC: 151 MG/DL — HIGH (ref 70–99)
GLUCOSE BLDC GLUCOMTR-MCNC: 161 MG/DL — HIGH (ref 70–99)
GLUCOSE BLDC GLUCOMTR-MCNC: 167 MG/DL — HIGH (ref 70–99)
GLUCOSE SERPL-MCNC: 146 MG/DL — HIGH (ref 70–99)
HCO3 BLDA-SCNC: 34 MMOL/L — HIGH (ref 21–28)
HCT VFR BLD CALC: 30.8 % — LOW (ref 34.5–45)
HGB BLD-MCNC: 10 G/DL — LOW (ref 11.5–15.5)
INR BLD: 1.16 RATIO — SIGNIFICANT CHANGE UP (ref 0.88–1.16)
MAGNESIUM SERPL-MCNC: 2.1 MG/DL — SIGNIFICANT CHANGE UP (ref 1.6–2.6)
MCHC RBC-ENTMCNC: 31 PG — SIGNIFICANT CHANGE UP (ref 27–34)
MCHC RBC-ENTMCNC: 32.5 GM/DL — SIGNIFICANT CHANGE UP (ref 32–36)
MCV RBC AUTO: 95.4 FL — SIGNIFICANT CHANGE UP (ref 80–100)
NRBC # BLD: 0 /100 WBCS — SIGNIFICANT CHANGE UP
NRBC # FLD: 0 K/UL — SIGNIFICANT CHANGE UP
PCO2 BLDA: 45 MMHG — HIGH (ref 32–35)
PH BLDA: 7.48 — HIGH (ref 7.35–7.45)
PHOSPHATE SERPL-MCNC: 3.2 MG/DL — SIGNIFICANT CHANGE UP (ref 2.5–4.5)
PLATELET # BLD AUTO: 445 K/UL — HIGH (ref 150–400)
PO2 BLDA: 136 MMHG — HIGH (ref 83–108)
POTASSIUM SERPL-MCNC: 3.4 MMOL/L — LOW (ref 3.5–5.3)
POTASSIUM SERPL-SCNC: 3.4 MMOL/L — LOW (ref 3.5–5.3)
PROTHROM AB SERPL-ACNC: 13.3 SEC — SIGNIFICANT CHANGE UP (ref 10.6–13.6)
RBC # BLD: 3.23 M/UL — LOW (ref 3.8–5.2)
RBC # FLD: 16.8 % — HIGH (ref 10.3–14.5)
SAO2 % BLDA: 97.2 % — SIGNIFICANT CHANGE UP (ref 94–98)
SODIUM SERPL-SCNC: 147 MMOL/L — HIGH (ref 135–145)
SPECIMEN SOURCE: SIGNIFICANT CHANGE UP
WBC # BLD: 7.41 K/UL — SIGNIFICANT CHANGE UP (ref 3.8–10.5)
WBC # FLD AUTO: 7.41 K/UL — SIGNIFICANT CHANGE UP (ref 3.8–10.5)

## 2021-09-26 PROCEDURE — 36569 INSJ PICC 5 YR+ W/O IMAGING: CPT

## 2021-09-26 PROCEDURE — 99291 CRITICAL CARE FIRST HOUR: CPT

## 2021-09-26 PROCEDURE — 71045 X-RAY EXAM CHEST 1 VIEW: CPT | Mod: 26

## 2021-09-26 RX ORDER — POTASSIUM CHLORIDE 20 MEQ
20 PACKET (EA) ORAL ONCE
Refills: 0 | Status: COMPLETED | OUTPATIENT
Start: 2021-09-26 | End: 2021-09-26

## 2021-09-26 RX ORDER — FUROSEMIDE 40 MG
20 TABLET ORAL ONCE
Refills: 0 | Status: COMPLETED | OUTPATIENT
Start: 2021-09-26 | End: 2021-09-26

## 2021-09-26 RX ORDER — LEVOTHYROXINE SODIUM 125 MCG
150 TABLET ORAL DAILY
Refills: 0 | Status: DISCONTINUED | OUTPATIENT
Start: 2021-09-26 | End: 2021-10-05

## 2021-09-26 RX ADMIN — ALBUTEROL 2 PUFF(S): 90 AEROSOL, METERED ORAL at 21:50

## 2021-09-26 RX ADMIN — HEPARIN SODIUM 5000 UNIT(S): 5000 INJECTION INTRAVENOUS; SUBCUTANEOUS at 17:00

## 2021-09-26 RX ADMIN — SENNA PLUS 2 TABLET(S): 8.6 TABLET ORAL at 23:01

## 2021-09-26 RX ADMIN — Medication 20 MILLIGRAM(S): at 11:30

## 2021-09-26 RX ADMIN — PANTOPRAZOLE SODIUM 40 MILLIGRAM(S): 20 TABLET, DELAYED RELEASE ORAL at 06:12

## 2021-09-26 RX ADMIN — DEXMEDETOMIDINE HYDROCHLORIDE IN 0.9% SODIUM CHLORIDE 2.7 MICROGRAM(S)/KG/HR: 4 INJECTION INTRAVENOUS at 22:50

## 2021-09-26 RX ADMIN — HEPARIN SODIUM 5000 UNIT(S): 5000 INJECTION INTRAVENOUS; SUBCUTANEOUS at 06:13

## 2021-09-26 RX ADMIN — POLYETHYLENE GLYCOL 3350 17 GRAM(S): 17 POWDER, FOR SOLUTION ORAL at 22:50

## 2021-09-26 RX ADMIN — CHLORHEXIDINE GLUCONATE 1 APPLICATION(S): 213 SOLUTION TOPICAL at 05:21

## 2021-09-26 RX ADMIN — ALBUTEROL 2 PUFF(S): 90 AEROSOL, METERED ORAL at 15:42

## 2021-09-26 RX ADMIN — PANTOPRAZOLE SODIUM 40 MILLIGRAM(S): 20 TABLET, DELAYED RELEASE ORAL at 17:00

## 2021-09-26 RX ADMIN — Medication 100 MILLIEQUIVALENT(S): at 06:58

## 2021-09-26 RX ADMIN — Medication 1 DROP(S): at 06:13

## 2021-09-26 RX ADMIN — CHLORHEXIDINE GLUCONATE 15 MILLILITER(S): 213 SOLUTION TOPICAL at 06:12

## 2021-09-26 RX ADMIN — ALBUTEROL 2 PUFF(S): 90 AEROSOL, METERED ORAL at 07:12

## 2021-09-26 RX ADMIN — CHLORHEXIDINE GLUCONATE 15 MILLILITER(S): 213 SOLUTION TOPICAL at 17:00

## 2021-09-26 RX ADMIN — DEXMEDETOMIDINE HYDROCHLORIDE IN 0.9% SODIUM CHLORIDE 2.7 MICROGRAM(S)/KG/HR: 4 INJECTION INTRAVENOUS at 04:38

## 2021-09-26 RX ADMIN — BUDESONIDE AND FORMOTEROL FUMARATE DIHYDRATE 2 PUFF(S): 160; 4.5 AEROSOL RESPIRATORY (INHALATION) at 07:12

## 2021-09-26 RX ADMIN — BUDESONIDE AND FORMOTEROL FUMARATE DIHYDRATE 2 PUFF(S): 160; 4.5 AEROSOL RESPIRATORY (INHALATION) at 21:49

## 2021-09-26 RX ADMIN — Medication 1 DROP(S): at 17:00

## 2021-09-26 RX ADMIN — ALBUTEROL 2 PUFF(S): 90 AEROSOL, METERED ORAL at 04:31

## 2021-09-26 NOTE — PROGRESS NOTE ADULT - SUBJECTIVE AND OBJECTIVE BOX
ROXIE LAZAR                     MRN-[id number]    HPI:  81 y. o. female BIBA to [facility] from nursing facility c/o hematemesis.  According to pt it was the first episode and happened while she was eating dinner. Followed by multiple episodes of N/V with bright blood and epigastric cramps.  She was intubated for airway protection and underwent EGD that revealed large diverticula at 28 cm filled with blood and 5 mm tear at distal esophagus, a hiatal hernia and large amount of blood in the fundus.  Diverticula was injected with Epinephrine.  Patient was transferred to [facility] for further management. (10 Sep 2021 15:04)        PROCEDURE:     -EGD done at outside hospital on 21 -Large diverticula noted at 28cm filled with blood and blood clots, periphery of diverticula was injected with epinephrine, 5mm distal esophageal tear without bleeding, hiatal hernia.                -EGD here  (21) Diverticulum in the middle third of the esophagus                        filled with large amount of clot. No obvious vessel or                        source of bleed. The clot likely correlates with "mass"                        on recent imaging.               -(21) Tracheostomy/ PEG        ISSUES:     - Acute hypoxemic respiratory failure, intubated for airway protection due to hematemesis  - Esophageal diverticular bleed, GI bleed  - Biventricular failure  - Aspiration PNA  - Septic shock  - COPD  - Afib on eliquis at home  - Hypothyroidism  - HTN  - Severe protein malnutrition  - Severe kyphosis    PAST MEDICAL & SURGICAL HISTORY:  Afib    History of COPD    HTN (hypertension)    Hypothyroid    GERD (gastroesophageal reflux disease)    Esophageal diverticulum    Presence of IVC filter              VITAL SIGNS:  Vital Signs Last 24 Hrs  T(C): 36.9 (26 Sep 2021 08:00), Max: 37.4 (25 Sep 2021 20:00)  T(F): 98.4 (26 Sep 2021 08:00), Max: 99.3 (25 Sep 2021 20:00)  HR: 91 (26 Sep 2021 10:46) (80 - 107)  BP: --  BP(mean): --  RR: 23 (26 Sep 2021 09:00) (13 - 24)  SpO2: 100% (26 Sep 2021 10:46) (100% - 100%)    I/Os:   I&O's Detail    25 Sep 2021 07:01  -  26 Sep 2021 07:00  --------------------------------------------------------  IN:    Dexmedetomidine: 290.4 mL    DOBUTamine: 98.4 mL    Enteral Tube Flush: 210 mL    IV PiggyBack: 100 mL    Jevity 1.2: 710 mL    Propofol: 24.5 mL    TPN (Total Parenteral Nutrition): 630 mL  Total IN: 2063.3 mL    OUT:    Chest Tube (mL): 30 mL    Indwelling Catheter - Urethral (mL): 3665 mL    PEG (Percutaneous Endoscopic Gastrostomy) Tube (mL): 35 mL  Total OUT: 3730 mL    Total NET: -1666.7 mL      26 Sep 2021 07:01  -  26 Sep 2021 11:48  --------------------------------------------------------  IN:    Dexmedetomidine: 27 mL    DOBUTamine: 8.2 mL    Enteral Tube Flush: 30 mL    Jevity 1.2: 90 mL  Total IN: 155.2 mL    OUT:    Indwelling Catheter - Urethral (mL): 125 mL  Total OUT: 125 mL    Total NET: 30.2 mL          CAPILLARY BLOOD GLUCOSE      POCT Blood Glucose.: 167 mg/dL (26 Sep 2021 11:32)  POCT Blood Glucose.: 151 mg/dL (26 Sep 2021 05:12)  POCT Blood Glucose.: 151 mg/dL (25 Sep 2021 23:34)  POCT Blood Glucose.: 149 mg/dL (25 Sep 2021 17:22)  POCT Blood Glucose.: 115 mg/dL (25 Sep 2021 12:11)      =======================MEDICATIONS===================  MEDICATIONS  (STANDING):  ALBUTerol    90 MICROgram(s) HFA Inhaler 2 Puff(s) Inhalation every 6 hours  artificial tears (preservative free) Ophthalmic Solution 1 Drop(s) Both EYES two times a day  budesonide  80 MICROgram(s)/formoterol 4.5 MICROgram(s) Inhaler 2 Puff(s) Inhalation two times a day  chlorhexidine 0.12% Liquid 15 milliLiter(s) Oral Mucosa every 12 hours  chlorhexidine 4% Liquid 1 Application(s) Topical <User Schedule>  dexMEDEtomidine Infusion 0.2 MICROgram(s)/kG/Hr (2.7 mL/Hr) IV Continuous <Continuous>  dextrose 40% Gel 15 Gram(s) Oral once  dextrose 5%. 1000 milliLiter(s) (50 mL/Hr) IV Continuous <Continuous>  dextrose 5%. 1000 milliLiter(s) (100 mL/Hr) IV Continuous <Continuous>  dextrose 50% Injectable 25 Gram(s) IV Push once  dextrose 50% Injectable 12.5 Gram(s) IV Push once  dextrose 50% Injectable 25 Gram(s) IV Push once  DOBUTamine Infusion 2.5 MICROgram(s)/kG/Min (4.05 mL/Hr) IV Continuous <Continuous>  glucagon  Injectable 1 milliGRAM(s) IntraMuscular once  heparin   Injectable 5000 Unit(s) SubCutaneous every 12 hours  insulin lispro (ADMELOG) corrective regimen sliding scale   SubCutaneous every 6 hours  levothyroxine 150 MICROGram(s) Oral daily  pantoprazole  Injectable 40 milliGRAM(s) IV Push two times a day  polyethylene glycol 3350 17 Gram(s) Oral at bedtime  senna 2 Tablet(s) Oral at bedtime    MEDICATIONS  (PRN):      =======================VENTILATOR SETTINGS===================  Mode: CPAP with PS  FiO2: 40  PEEP: 5  MAP: 8  PIP: 16    PHYSICAL EXAM============================  General:                      Sedated, weaning off sedattion, not in any distress  Neuro:                        Sedated. Moves ext to pain  ,Respiratory:	Air entry fair and  bilateral conducted sounds                                           Effort even and unlabored.  CV:		Regular rate and rhythm. Normal S1/S2                                          Distal pulses present.  Abdomen:	                     Soft, non-distended. Bowel sounds present   Skin:		No rash.  Extremities:	Warm, no cyanosis or edema.  Palpable pulses    ============================LABS=========================                        10.0   7.41  )-----------( 445      ( 26 Sep 2021 03:47 )             30.8     09-    147<H>  |  106  |  25<H>  ----------------------------<  146<H>  3.4<L>   |  31  |  0.33<L>    Ca    10.2      26 Sep 2021 03:47  Phos  3.2     -  Mg     2.10     -    TPro  5.8<L>  /  Alb  3.0<L>  /  TBili  0.5  /  DBili  x   /  AST  21  /  ALT  12  /  AlkPhos  89      LIVER FUNCTIONS - ( 24 Sep 2021 17:34 )  Alb: 3.0 g/dL / Pro: 5.8 g/dL / ALK PHOS: 89 U/L / ALT: 12 U/L / AST: 21 U/L / GGT: x           PT/INR - ( 26 Sep 2021 03:47 )   PT: 13.3 sec;   INR: 1.16 ratio         PTT - ( 26 Sep 2021 03:47 )  PTT:27.7 sec  ABG - ( 26 Sep 2021 03:47 )  pH, Arterial: 7.48  pH, Blood: x     /  pCO2: 45    /  pO2: 136   / HCO3: 34    / Base Excess: 8.8   /  SaO2: 97.2                ASSESSMENT AND PLAN:     NEURO:  Sedated  Keep sedated with precedex, propofol, to keep RASS -1 to -2.    Wean off sedation, CPAP        RESPIRATORY:    Mechanical vent - AC/VC, Vt  350ml, RR 18, PEEP 8, Spo2 maintained 96% with 40% fio2. Vent bundle, bronchodilators as needed.  Check CXR, ABG. Daily CPAPtrials. TC as tolarated. Keep on PS 8/5 for the day as tolerated.  Completed ABX with vanco, fluconazole, meropenem.      CARDIOVASCULAR:  BiVent Dtsfuntion, EF 25%, MR, TR, Will repeat ECHO , wean    Cardiology f/u  Hemodynamically stable - on low dose norepinephrine. Continue hemodynamic monitoring.  Eliquis held. Rate controlled afib.   Give lasix iv to maintain even to negative balance          RENAL:  Stable - Monitor IOs and electrolytes. Keep K above 4.0 and Mg above 2.0.  Lasix IV      GASTROINTESTINAL:    No NGT  PPI Q12h  Start PEG feeds, titrate to goal, bowel regimen      HEMATOLOGIC:    Monitor CBC. Maintain active type and screen. Off anticoagulation.  s/p Kcentra at outside hospital  DVT prophylaxis with Heparin SC, holding therapeutic AC with bleeding risk    INFECTIOUS DISEASE:  No signs of active infection. Will monitor for fever and leukocytosis. Monitor off ABX, discussed with ID.        ENDOCRINE:  Stable – Monitor glucose fingersticks for goal 120-180.      Pertinent clinical, laboratory, radiographic, telemetry, hemodynamic, respiratory  data and chart were reviewed by myself and analyzed frequently throughout the course of the day and night by myself.    Plan discussed at length with the CTICU staff and Attending CT Surgeon Dr. Nance    Patient required critical care management.  45 minutes were spent evaluating, managing, providing, coordinating, and documenting care for this patient, exclusive of procedures.    Inder DE PAZP

## 2021-09-26 NOTE — CHART NOTE - NSCHARTNOTEFT_GEN_A_CORE
NUTRITION FOLLOW-UP:    Nutrition Consult requested for tube feeding.  Extensive chart review conducted to obtain nutrition related information.  Pt s/p PEG and trach placement.  Pt remains on mechanical ventilation/sedated.  Pt previously receiving parenteral nutrition support from 9/14 until yesterday, when enteral nutrition initiated. PN was providing 1243kcal w/82gm amino acids.  Noted pt ordered for bowel regimen of senna and polyethylene glycol.  Also receiving levothyroxine oral daily.  Enteral feeds should be held 30 minutes before and after levotyroxine dose.   Pt previously assessed with severe malnutrition 2/2 loss of muscle mass and fat stores, + edema- ongoing assessment.      81 y. o. female BIBA to [facility] from nursing facility c/o hematemesis.  According to pt it was the first episode and happened while she was eating dinner. Followed by multiple episodes of N/V with bright blood and epigastric cramps.  She was intubated for airway protection and underwent EGD that revealed large diverticula at 28 cm filled with blood and 5 mm tear at distal esophagus, a hiatal hernia and large amount of blood in the fundus.  Diverticula was injected with Epinephrine.  Patient was transferred to [facility] for further management. (10 Sep 2021 15:04)  PROCEDURE:   -EGD done at outside hospital on 9/9/21 -Large diverticula noted at 28cm filled with blood and blood clots, periphery of diverticula was injected with epinephrine, 5mm distal esophageal tear without bleeding, hiatal hernia.             -EGD here  (09.14.21) Diverticulum in the middle third of the esophagus                        filled with large amount of clot. No obvious vessel or                        source of bleed. The clot likely correlates with "mass"                        on recent imaging.               -(9/25/21) Tracheostomy/ PEG    Noted wt increase of 20kg since admission-pt noted with increased edema of 3+ generalized.  Suggest recheck of wt as this represents a 17.8kg wt increase x 9 days.  Pt w/Stage II pressure injury-coccyx region.      Weight:  9/23 - 74.1kg     9/14 - 56.3kg     Adm - 54kg      Labs:  H/H 10.0/30.8  Na 147  K 3.4  BUN/Cr 25/0.33  Glu 146  -168  HgbA1c 5.1  Trig 99    Current Diet:  Jevity 1.2@45mL/h via Gastrostomy tube+ Free H2O 200mL every 6 hours.  Enteral Recommendations:  TF currently providing 1296kcal w/59gm protein.  Estimated kcal needs =  25-30kcal/kg = 1350-1620kcal/d.  Estimated protein needs = 1.2-1.5gm/kg = 65-81gms/d.  Suggest advance enteral nutrition to goal of 52mL/h x 23h to [id number] =1435kcal w/66gm protein and add 1 pack no carb prosource/d to increase protein intake to 81gms/d to meet current needs.  H2O content of HQ=740.5mL. Adjust free H2O as needed to meet hydration needs.  Spoke with CTICU team regarding enteral nutrition recommendations.      RD to Remain Available:  yes    Additional Recommendations:   1) Monitor weights, labs, BM's, skin integrity, FS, tolerance to enteral nutrition  2) Advance TF as tolerated to meet current kcal needs.    3) Add protein modular to provide adequate protein to achieve protein needs.  4) Adjust Free H2O as needed to maintain hydration  5) Consider MVI to aid in healing of pressure injury

## 2021-09-26 NOTE — PROCEDURE NOTE - NSPROCDETAILS_GEN_ALL_CORE
location identified, draped/prepped, sterile technique used/sterile dressing applied/sterile technique, catheter placed/supine position/ultrasound guidance
location identified, draped/prepped, sterile technique used, needle inserted/introduced/positive blood return obtained via catheter/connected to a pressurized flush line/sutured in place/Seldinger technique/all materials/supplies accounted for at end of procedure
guidewire recovered/lumen(s) aspirated and flushed/sterile dressing applied/sterile technique, catheter placed/ultrasound guidance with use of sterile gel and probe cove
location identified, draped/prepped, sterile technique used, needle inserted/introduced/positive blood return obtained via catheter/connected to a pressurized flush line/sutured in place/hemostasis with direct pressure, dressing applied/Seldinger technique/all materials/supplies accounted for at end of procedure
Seldinger technique/dressing applied/secured in place/sterile dressing applied/Trendelenburg position
guidewire recovered/lumen(s) aspirated and flushed/sterile dressing applied/sterile technique, catheter placed/ultrasound guidance with use of sterile gel and probe cove

## 2021-09-26 NOTE — PROCEDURE NOTE - NSINFORMCONSENT_GEN_A_CORE
patient hypoxic on ventilator and on pressors/This was an emergent procedure.
Benefits, risks, and possible complications of procedure explained to patient/caregiver who verbalized understanding and gave written consent.
Son/Benefits, risks, and possible complications of procedure explained to patient/caregiver who verbalized understanding and gave written consent.
Benefits, risks, and possible complications of procedure explained to patient/caregiver who verbalized understanding and gave written consent.
Benefits, risks, and possible complications of procedure explained to patient/caregiver who verbalized understanding and gave written consent.

## 2021-09-26 NOTE — PROCEDURE NOTE - NSPOSTCAREGUIDE_GEN_A_CORE
Verbal/written post procedure instructions were given to patient/caregiver/Keep the cast/splint/dressing clean and dry
Care for catheter as per unit/ICU protocols

## 2021-09-27 LAB
ANION GAP SERPL CALC-SCNC: 10 MMOL/L — SIGNIFICANT CHANGE UP (ref 7–14)
ANION GAP SERPL CALC-SCNC: 11 MMOL/L — SIGNIFICANT CHANGE UP (ref 7–14)
BASOPHILS # BLD AUTO: 0.07 K/UL — SIGNIFICANT CHANGE UP (ref 0–0.2)
BASOPHILS NFR BLD AUTO: 1 % — SIGNIFICANT CHANGE UP (ref 0–2)
BLOOD GAS ARTERIAL - LYTES,HGB,ICA,LACT RESULT: SIGNIFICANT CHANGE UP
BLOOD GAS ARTERIAL COMPREHENSIVE RESULT: SIGNIFICANT CHANGE UP
BUN SERPL-MCNC: 22 MG/DL — SIGNIFICANT CHANGE UP (ref 7–23)
BUN SERPL-MCNC: 23 MG/DL — SIGNIFICANT CHANGE UP (ref 7–23)
CALCIUM SERPL-MCNC: 10.3 MG/DL — SIGNIFICANT CHANGE UP (ref 8.4–10.5)
CALCIUM SERPL-MCNC: 10.4 MG/DL — SIGNIFICANT CHANGE UP (ref 8.4–10.5)
CHLORIDE SERPL-SCNC: 109 MMOL/L — HIGH (ref 98–107)
CHLORIDE SERPL-SCNC: 110 MMOL/L — HIGH (ref 98–107)
CO2 SERPL-SCNC: 30 MMOL/L — SIGNIFICANT CHANGE UP (ref 22–31)
CO2 SERPL-SCNC: 31 MMOL/L — SIGNIFICANT CHANGE UP (ref 22–31)
CREAT SERPL-MCNC: 0.32 MG/DL — LOW (ref 0.5–1.3)
CREAT SERPL-MCNC: 0.32 MG/DL — LOW (ref 0.5–1.3)
EOSINOPHIL # BLD AUTO: 0.04 K/UL — SIGNIFICANT CHANGE UP (ref 0–0.5)
EOSINOPHIL NFR BLD AUTO: 0.5 % — SIGNIFICANT CHANGE UP (ref 0–6)
GLUCOSE BLDC GLUCOMTR-MCNC: 156 MG/DL — HIGH (ref 70–99)
GLUCOSE BLDC GLUCOMTR-MCNC: 156 MG/DL — HIGH (ref 70–99)
GLUCOSE BLDC GLUCOMTR-MCNC: 166 MG/DL — HIGH (ref 70–99)
GLUCOSE SERPL-MCNC: 161 MG/DL — HIGH (ref 70–99)
GLUCOSE SERPL-MCNC: 197 MG/DL — HIGH (ref 70–99)
HCT VFR BLD CALC: 30.5 % — LOW (ref 34.5–45)
HGB BLD-MCNC: 9.3 G/DL — LOW (ref 11.5–15.5)
IANC: 5.24 K/UL — SIGNIFICANT CHANGE UP (ref 1.5–8.5)
IMM GRANULOCYTES NFR BLD AUTO: 0.5 % — SIGNIFICANT CHANGE UP (ref 0–1.5)
LYMPHOCYTES # BLD AUTO: 1.12 K/UL — SIGNIFICANT CHANGE UP (ref 1–3.3)
LYMPHOCYTES # BLD AUTO: 15.2 % — SIGNIFICANT CHANGE UP (ref 13–44)
MAGNESIUM SERPL-MCNC: 1.9 MG/DL — SIGNIFICANT CHANGE UP (ref 1.6–2.6)
MAGNESIUM SERPL-MCNC: 1.9 MG/DL — SIGNIFICANT CHANGE UP (ref 1.6–2.6)
MCHC RBC-ENTMCNC: 29.2 PG — SIGNIFICANT CHANGE UP (ref 27–34)
MCHC RBC-ENTMCNC: 30.5 GM/DL — LOW (ref 32–36)
MCV RBC AUTO: 95.9 FL — SIGNIFICANT CHANGE UP (ref 80–100)
MONOCYTES # BLD AUTO: 0.85 K/UL — SIGNIFICANT CHANGE UP (ref 0–0.9)
MONOCYTES NFR BLD AUTO: 11.5 % — SIGNIFICANT CHANGE UP (ref 2–14)
NEUTROPHILS # BLD AUTO: 5.24 K/UL — SIGNIFICANT CHANGE UP (ref 1.8–7.4)
NEUTROPHILS NFR BLD AUTO: 71.3 % — SIGNIFICANT CHANGE UP (ref 43–77)
NRBC # BLD: 0 /100 WBCS — SIGNIFICANT CHANGE UP
NRBC # FLD: 0 K/UL — SIGNIFICANT CHANGE UP
PHOSPHATE SERPL-MCNC: 2.6 MG/DL — SIGNIFICANT CHANGE UP (ref 2.5–4.5)
PHOSPHATE SERPL-MCNC: 3.2 MG/DL — SIGNIFICANT CHANGE UP (ref 2.5–4.5)
PLATELET # BLD AUTO: 390 K/UL — SIGNIFICANT CHANGE UP (ref 150–400)
POTASSIUM SERPL-MCNC: 3.2 MMOL/L — LOW (ref 3.5–5.3)
POTASSIUM SERPL-MCNC: 3.3 MMOL/L — LOW (ref 3.5–5.3)
POTASSIUM SERPL-SCNC: 3.2 MMOL/L — LOW (ref 3.5–5.3)
POTASSIUM SERPL-SCNC: 3.3 MMOL/L — LOW (ref 3.5–5.3)
PROCALCITONIN SERPL-MCNC: 0.07 NG/ML — SIGNIFICANT CHANGE UP (ref 0.02–0.1)
RBC # BLD: 3.18 M/UL — LOW (ref 3.8–5.2)
RBC # FLD: 16.4 % — HIGH (ref 10.3–14.5)
SODIUM SERPL-SCNC: 149 MMOL/L — HIGH (ref 135–145)
SODIUM SERPL-SCNC: 152 MMOL/L — HIGH (ref 135–145)
WBC # BLD: 7.32 K/UL — SIGNIFICANT CHANGE UP (ref 3.8–10.5)
WBC # FLD AUTO: 7.32 K/UL — SIGNIFICANT CHANGE UP (ref 3.8–10.5)

## 2021-09-27 PROCEDURE — 93306 TTE W/DOPPLER COMPLETE: CPT | Mod: 26

## 2021-09-27 PROCEDURE — 99291 CRITICAL CARE FIRST HOUR: CPT

## 2021-09-27 PROCEDURE — 71045 X-RAY EXAM CHEST 1 VIEW: CPT | Mod: 26,76

## 2021-09-27 RX ORDER — PANTOPRAZOLE SODIUM 20 MG/1
40 TABLET, DELAYED RELEASE ORAL
Refills: 0 | Status: DISCONTINUED | OUTPATIENT
Start: 2021-09-27 | End: 2021-10-05

## 2021-09-27 RX ORDER — MEMANTINE HYDROCHLORIDE 10 MG/1
10 TABLET ORAL EVERY 12 HOURS
Refills: 0 | Status: DISCONTINUED | OUTPATIENT
Start: 2021-09-27 | End: 2021-10-05

## 2021-09-27 RX ORDER — DONEPEZIL HYDROCHLORIDE 10 MG/1
10 TABLET, FILM COATED ORAL AT BEDTIME
Refills: 0 | Status: DISCONTINUED | OUTPATIENT
Start: 2021-09-27 | End: 2021-10-05

## 2021-09-27 RX ORDER — TIOTROPIUM BROMIDE 18 UG/1
1 CAPSULE ORAL; RESPIRATORY (INHALATION) DAILY
Refills: 0 | Status: DISCONTINUED | OUTPATIENT
Start: 2021-09-27 | End: 2021-09-28

## 2021-09-27 RX ORDER — ACETAZOLAMIDE 250 MG/1
250 TABLET ORAL EVERY 12 HOURS
Refills: 0 | Status: COMPLETED | OUTPATIENT
Start: 2021-09-27 | End: 2021-09-28

## 2021-09-27 RX ORDER — QUETIAPINE FUMARATE 200 MG/1
12.5 TABLET, FILM COATED ORAL EVERY 12 HOURS
Refills: 0 | Status: DISCONTINUED | OUTPATIENT
Start: 2021-09-27 | End: 2021-09-27

## 2021-09-27 RX ORDER — POTASSIUM CHLORIDE 20 MEQ
10 PACKET (EA) ORAL
Refills: 0 | Status: COMPLETED | OUTPATIENT
Start: 2021-09-27 | End: 2021-09-27

## 2021-09-27 RX ORDER — DOBUTAMINE HCL 250MG/20ML
1.25 VIAL (ML) INTRAVENOUS
Qty: 500 | Refills: 0 | Status: DISCONTINUED | OUTPATIENT
Start: 2021-09-27 | End: 2021-09-27

## 2021-09-27 RX ORDER — TRAZODONE HCL 50 MG
100 TABLET ORAL EVERY 12 HOURS
Refills: 0 | Status: DISCONTINUED | OUTPATIENT
Start: 2021-09-27 | End: 2021-10-05

## 2021-09-27 RX ORDER — MAGNESIUM SULFATE 500 MG/ML
1 VIAL (ML) INJECTION ONCE
Refills: 0 | Status: COMPLETED | OUTPATIENT
Start: 2021-09-27 | End: 2021-09-27

## 2021-09-27 RX ORDER — POTASSIUM CHLORIDE 20 MEQ
40 PACKET (EA) ORAL ONCE
Refills: 0 | Status: COMPLETED | OUTPATIENT
Start: 2021-09-27 | End: 2021-09-27

## 2021-09-27 RX ORDER — DULOXETINE HYDROCHLORIDE 30 MG/1
60 CAPSULE, DELAYED RELEASE ORAL DAILY
Refills: 0 | Status: DISCONTINUED | OUTPATIENT
Start: 2021-09-27 | End: 2021-09-30

## 2021-09-27 RX ORDER — ALBUMIN HUMAN 25 %
250 VIAL (ML) INTRAVENOUS ONCE
Refills: 0 | Status: COMPLETED | OUTPATIENT
Start: 2021-09-27 | End: 2021-09-27

## 2021-09-27 RX ADMIN — Medication 2.03 MICROGRAM(S)/KG/MIN: at 08:56

## 2021-09-27 RX ADMIN — Medication 100 GRAM(S): at 06:08

## 2021-09-27 RX ADMIN — Medication 1 DROP(S): at 18:07

## 2021-09-27 RX ADMIN — DULOXETINE HYDROCHLORIDE 60 MILLIGRAM(S): 30 CAPSULE, DELAYED RELEASE ORAL at 11:01

## 2021-09-27 RX ADMIN — Medication 100 MILLIEQUIVALENT(S): at 06:08

## 2021-09-27 RX ADMIN — ALBUTEROL 2 PUFF(S): 90 AEROSOL, METERED ORAL at 15:15

## 2021-09-27 RX ADMIN — ALBUTEROL 2 PUFF(S): 90 AEROSOL, METERED ORAL at 03:26

## 2021-09-27 RX ADMIN — Medication 15 MILLIGRAM(S): at 16:51

## 2021-09-27 RX ADMIN — BUDESONIDE AND FORMOTEROL FUMARATE DIHYDRATE 2 PUFF(S): 160; 4.5 AEROSOL RESPIRATORY (INHALATION) at 09:35

## 2021-09-27 RX ADMIN — Medication 1 DROP(S): at 06:11

## 2021-09-27 RX ADMIN — CHLORHEXIDINE GLUCONATE 1 APPLICATION(S): 213 SOLUTION TOPICAL at 06:11

## 2021-09-27 RX ADMIN — Medication 1 ENEMA: at 06:09

## 2021-09-27 RX ADMIN — HEPARIN SODIUM 5000 UNIT(S): 5000 INJECTION INTRAVENOUS; SUBCUTANEOUS at 06:10

## 2021-09-27 RX ADMIN — HEPARIN SODIUM 5000 UNIT(S): 5000 INJECTION INTRAVENOUS; SUBCUTANEOUS at 16:48

## 2021-09-27 RX ADMIN — ALBUTEROL 2 PUFF(S): 90 AEROSOL, METERED ORAL at 21:31

## 2021-09-27 RX ADMIN — Medication 250 MILLILITER(S): at 18:56

## 2021-09-27 RX ADMIN — PANTOPRAZOLE SODIUM 40 MILLIGRAM(S): 20 TABLET, DELAYED RELEASE ORAL at 06:09

## 2021-09-27 RX ADMIN — MEMANTINE HYDROCHLORIDE 10 MILLIGRAM(S): 10 TABLET ORAL at 16:48

## 2021-09-27 RX ADMIN — Medication 100 MILLIEQUIVALENT(S): at 06:35

## 2021-09-27 RX ADMIN — CHLORHEXIDINE GLUCONATE 15 MILLILITER(S): 213 SOLUTION TOPICAL at 06:09

## 2021-09-27 RX ADMIN — ALBUTEROL 2 PUFF(S): 90 AEROSOL, METERED ORAL at 09:34

## 2021-09-27 RX ADMIN — DEXMEDETOMIDINE HYDROCHLORIDE IN 0.9% SODIUM CHLORIDE 2.7 MICROGRAM(S)/KG/HR: 4 INJECTION INTRAVENOUS at 09:39

## 2021-09-27 RX ADMIN — Medication 40 MILLIEQUIVALENT(S): at 16:58

## 2021-09-27 RX ADMIN — BUDESONIDE AND FORMOTEROL FUMARATE DIHYDRATE 2 PUFF(S): 160; 4.5 AEROSOL RESPIRATORY (INHALATION) at 21:31

## 2021-09-27 RX ADMIN — Medication 100 MILLIGRAM(S): at 17:07

## 2021-09-27 RX ADMIN — DONEPEZIL HYDROCHLORIDE 10 MILLIGRAM(S): 10 TABLET, FILM COATED ORAL at 21:55

## 2021-09-27 RX ADMIN — DEXMEDETOMIDINE HYDROCHLORIDE IN 0.9% SODIUM CHLORIDE 2.7 MICROGRAM(S)/KG/HR: 4 INJECTION INTRAVENOUS at 08:55

## 2021-09-27 RX ADMIN — Medication 100 MILLIEQUIVALENT(S): at 08:54

## 2021-09-27 RX ADMIN — ACETAZOLAMIDE 250 MILLIGRAM(S): 250 TABLET ORAL at 16:48

## 2021-09-27 RX ADMIN — CHLORHEXIDINE GLUCONATE 15 MILLILITER(S): 213 SOLUTION TOPICAL at 16:47

## 2021-09-27 RX ADMIN — Medication 150 MICROGRAM(S): at 06:11

## 2021-09-27 RX ADMIN — PANTOPRAZOLE SODIUM 40 MILLIGRAM(S): 20 TABLET, DELAYED RELEASE ORAL at 16:47

## 2021-09-27 RX ADMIN — Medication 10 MILLIGRAM(S): at 06:21

## 2021-09-27 NOTE — PROGRESS NOTE ADULT - ASSESSMENT
81 y. o. female BIBA to [facility] from nursing facility c/o hematemesis.  According to pt it was the first episode and happened while she was eating dinner. Followed by multiple episodes of N/V with bright blood and epigastric cramps.  She was intubated for airway protection and underwent EGD that revealed large diverticula at 28 cm filled with blood and 5 mm tear at distal esophagus, a hiatal hernia and large amount of blood in the fundus.  Diverticula was injected with Epinephrine.  Patient was transferred to [facility] for further management. (10 Sep 2021 15:04)    Pt followed by GI, s/p repeat EGD on 9/14 -  showing significant clot in large esophageal diverticula -- without obvious source of etiology of bleeding (ie no vessel) + no esophageal mass seen.  Pt on pressors, thought to be unlikely hemorrhagic given stable Hb.    9/15: WBC 10.8, sputum cx (9/10) with nl resp tawana. CT c/a/p shows a 7.2 cm masslike structure distending the mid to distal esophagus, suspicious for malignancy.  Bilateral tree-in-bud nodular opacities, predominantly in the left lower lobe, new/increased from 9/9/2021, raising concern for infection.  Left lower lobe consolidative opacity, possible combination of atelectasis and pneumonia.  (+) hypodensities in the liver suspicion for malignancy, and stercoral colitis.    Pt with fever, on pressors.  Pt is NPO on TPN.   Pt on empiric abx, ID consulted for further abx managment.     Fever/sepsis:    - pt with fever, on pressor support.  Abx broadened to vanco and meropenem.  Fluconazole added on 9/17 due to continued fevers.  - S/p central line change on 9/17.   - CT c/a/p noted, increased LLL consolidation, possible aspiration.  f/u repeat sputum cx - nl resp tawana    - Check bcx x 2 - NGTD.  Sputum cx no resp tawana  - f/u WBC and fever curve.  Improving, abx course completed after 7 days, d/c'd on 9/23.  Cont to monitor pt off abx.     * Pt seen by Palliative care service for GOC discussion - pt remains full code.  s/p trach/peg today.      * Tmax >101 on 9/27.  Recommend repeat bcx x 2.  Repeat cxr with small b/l layering pl effusions,  otherwise clear lungs noted.  s/p removal of pigtail cath    d/w CTU     Karen Casillas  [phone]

## 2021-09-27 NOTE — PROGRESS NOTE ADULT - SUBJECTIVE AND OBJECTIVE BOX
ROXIE LAZAR      81y   Female   MRN-[id number]         Sulfur Colliod (Rash)  Tylenol (Swelling)             Daily     Daily Weight in k.7 (27 Sep 2021 05:00)Drug Dosing Weight  Height (cm): 157.5 (25 Sep 2021 18:00)  Weight (kg): 54 (10 Sep 2021 00:39)  BMI (kg/m2): 21.8 (25 Sep 2021 18:00)  BSA (m2): 1.53 (25 Sep 2021 18:00)    81 y. o. female BIBA to [facility] from nursing facility c/o hematemesis.  According to pt it was the first episode and happened while she was eating dinner. Followed by multiple episodes of N/V with bright blood and epigastric cramps.  She was intubated for airway protection and underwent EGD that revealed large diverticula at 28 cm filled with blood and 5 mm tear at distal esophagus, a hiatal hernia and large amount of blood in the fundus.  Diverticula was injected with Epinephrine.  Patient was transferred to [facility] for further management. (10 Sep 2021 15:04)    Procedure: Trach and PEG                       Issues:              Acute Hypoxic Respiratory failure  Acute on Chronic systolic heart failure  Moderate MR  Severe TR              Septic Shock              Esophageal diverticular bleed.   Acute blood loss anemia  COPD  Chronic Afib -- previously on Apixaban  Hypothyroidism  GERD  Pleural effusion  Protein calorie malnutrition                Home Medications:  Abreva 10% topical cream: Apply topically to affected area once a day (10 Sep 2021 15:54)  Albuterol (Eqv-ProAir HFA) 90 mcg/inh inhalation aerosol: 2 puff(s) inhaled every 6 hours (10 Sep 2021 15:54)  allopurinol 100 mg oral tablet: 2 tab(s) orally 2 times a day (10 Sep 2021 15:54)  Aricept 10 mg oral tablet: 1 tab(s) orally once a day (at bedtime) (10 Sep 2021 15:54)  aspirin 81 mg oral tablet: 1 tab(s) orally once a day (10 Sep 2021 15:54)  baclofen 5 mg oral tablet: 1 tab(s) orally 2 times a day (10 Sep 2021 15:54)  busPIRone 15 mg oral tablet: 1 tab(s) orally once a day (at bedtime) (10 Sep 2021 15:54)  Claritin 10 mg oral tablet: 1 tab(s) orally once a day (10 Sep 2021 15:54)  Colace 100 mg oral capsule: 1 cap(s) orally 3 times a day (10 Sep 2021 15:54)  Cymbalta 60 mg oral delayed release capsule: 1 cap(s) orally once a day (10 Sep 2021 15:54)  Eliquis 2.5 mg oral tablet: 1 tab(s) orally 2 times a day (10 Sep 2021 15:54)  Lasix 40 mg oral tablet: 1 tab(s) orally once a day (10 Sep 2021 15:54)  Linzess 290 mcg oral capsule: 1 cap(s) orally once a day (10 Sep 2021 15:54)  Melatonin 3 mg oral tablet: 1 tab(s) orally once a day (at bedtime) (10 Sep 2021 15:54)  Namenda 10 mg oral tablet: 1 tab(s) orally 2 times a day (10 Sep 2021 15:54)  ondansetron 4 mg oral tablet: 1 tab(s) orally every 8 hours (10 Sep 2021 15:54)  oxyCODONE 5 mg oral tablet: 1 tab(s) orally every 8 hours (10 Sep 2021 15:54)  Senna 8.6 mg oral tablet: 1 tab(s) orally once a day (at bedtime) (10 Sep 2021 15:54)  Spiriva 18 mcg inhalation capsule: 1 cap(s) inhaled once a day (10 Sep 2021 15:54)  Symbicort 80 mcg-4.5 mcg/inh inhalation aerosol: 2 puff(s) inhaled 2 times a day (10 Sep 2021 15:54)  Synthroid 150 mcg (0.15 mg) oral tablet: 1 tab(s) orally once a day (10 Sep 2021 15:54)  traZODone 100 mg oral tablet: 1 tab(s) orally 2 times a day (10 Sep 2021 15:54)  Ventolin HFA 90 mcg/inh inhalation aerosol: 2 puff(s) inhaled every 8 hours (10 Sep 2021 15:54)  Zocor 10 mg oral tablet: 1 tab(s) orally once a day (at bedtime) (10 Sep 2021 15:54)    PAST MEDICAL & SURGICAL HISTORY:  Afib    History of COPD    HTN (hypertension)    Hypothyroid    GERD (gastroesophageal reflux disease)    Esophageal diverticulum    Presence of IVC filter      Vital Signs Last 24 Hrs  T(C): 37.5 (27 Sep 2021 04:00), Max: 38.5 (27 Sep 2021 01:00)  T(F): 99.5 (27 Sep 2021 04:00), Max: 101.3 (27 Sep 2021 01:00)  HR: 88 (27 Sep 2021 10:24) (61 - 99)  BP: 174/94 (27 Sep 2021 03:00) (174/94 - 174/94)  BP(mean): 108 (27 Sep 2021 03:00) (108 - 108)  RR: 19 (27 Sep 2021 10:00) (18 - 25)  SpO2: 100% (27 Sep 2021 10:24) (97% - 100%)  I&O's Detail    26 Sep 2021 07:01  -  27 Sep 2021 07:00  --------------------------------------------------------  IN:    Dexmedetomidine: 453.2 mL    DOBUTamine: 90.2 mL    DOBUTamine: 4 mL    Enteral Tube Flush: 490 mL    IV PiggyBack: 200 mL    Jevity 1.2: 1213 mL  Total IN: 2450.4 mL    OUT:    Indwelling Catheter - Urethral (mL): 2390 mL  Total OUT: 2390 mL    Total NET: 60.4 mL      27 Sep 2021 07:01  -  27 Sep 2021 10:28  --------------------------------------------------------  IN:    Dexmedetomidine: 60.9 mL    DOBUTamine: 6 mL    Enteral Tube Flush: 200 mL    IV PiggyBack: 15 mL    IV PiggyBack: 100 mL    Jevity 1.2: 156 mL  Total IN: 537.9 mL    OUT:    Indwelling Catheter - Urethral (mL): 85 mL  Total OUT: 85 mL    Total NET: 452.9 mL        CAPILLARY BLOOD GLUCOSE      POCT Blood Glucose.: 156 mg/dL (27 Sep 2021 06:33)  POCT Blood Glucose.: 135 mg/dL (26 Sep 2021 22:57)  POCT Blood Glucose.: 161 mg/dL (26 Sep 2021 17:11)  POCT Blood Glucose.: 167 mg/dL (26 Sep 2021 11:32)    Home Medications:  Abreva 10% topical cream: Apply topically to affected area once a day (10 Sep 2021 15:54)  Albuterol (Eqv-ProAir HFA) 90 mcg/inh inhalation aerosol: 2 puff(s) inhaled every 6 hours (10 Sep 2021 15:54)  allopurinol 100 mg oral tablet: 2 tab(s) orally 2 times a day (10 Sep 2021 15:54)  Aricept 10 mg oral tablet: 1 tab(s) orally once a day (at bedtime) (10 Sep 2021 15:54)  aspirin 81 mg oral tablet: 1 tab(s) orally once a day (10 Sep 2021 15:54)  baclofen 5 mg oral tablet: 1 tab(s) orally 2 times a day (10 Sep 2021 15:54)  busPIRone 15 mg oral tablet: 1 tab(s) orally once a day (at bedtime) (10 Sep 2021 15:54)  Claritin 10 mg oral tablet: 1 tab(s) orally once a day (10 Sep 2021 15:54)  Colace 100 mg oral capsule: 1 cap(s) orally 3 times a day (10 Sep 2021 15:54)  Cymbalta 60 mg oral delayed release capsule: 1 cap(s) orally once a day (10 Sep 2021 15:54)  Eliquis 2.5 mg oral tablet: 1 tab(s) orally 2 times a day (10 Sep 2021 15:54)  Lasix 40 mg oral tablet: 1 tab(s) orally once a day (10 Sep 2021 15:54)  Linzess 290 mcg oral capsule: 1 cap(s) orally once a day (10 Sep 2021 15:54)  Melatonin 3 mg oral tablet: 1 tab(s) orally once a day (at bedtime) (10 Sep 2021 15:54)  Namenda 10 mg oral tablet: 1 tab(s) orally 2 times a day (10 Sep 2021 15:54)  ondansetron 4 mg oral tablet: 1 tab(s) orally every 8 hours (10 Sep 2021 15:54)  oxyCODONE 5 mg oral tablet: 1 tab(s) orally every 8 hours (10 Sep 2021 15:54)  Senna 8.6 mg oral tablet: 1 tab(s) orally once a day (at bedtime) (10 Sep 2021 15:54)  Spiriva 18 mcg inhalation capsule: 1 cap(s) inhaled once a day (10 Sep 2021 15:54)  Symbicort 80 mcg-4.5 mcg/inh inhalation aerosol: 2 puff(s) inhaled 2 times a day (10 Sep 2021 15:54)  Synthroid 150 mcg (0.15 mg) oral tablet: 1 tab(s) orally once a day (10 Sep 2021 15:54)  traZODone 100 mg oral tablet: 1 tab(s) orally 2 times a day (10 Sep 2021 15:54)  Ventolin HFA 90 mcg/inh inhalation aerosol: 2 puff(s) inhaled every 8 hours (10 Sep 2021 15:54)  Zocor 10 mg oral tablet: 1 tab(s) orally once a day (at bedtime) (10 Sep 2021 15:54)    MEDICATIONS  (STANDING):  acetaZOLAMIDE    Tablet 250 milliGRAM(s) Oral every 12 hours  ALBUTerol    90 MICROgram(s) HFA Inhaler 2 Puff(s) Inhalation every 6 hours  artificial tears (preservative free) Ophthalmic Solution 1 Drop(s) Both EYES two times a day  budesonide  80 MICROgram(s)/formoterol 4.5 MICROgram(s) Inhaler 2 Puff(s) Inhalation two times a day  busPIRone 15 milliGRAM(s) Oral <User Schedule>  chlorhexidine 0.12% Liquid 15 milliLiter(s) Oral Mucosa every 12 hours  chlorhexidine 4% Liquid 1 Application(s) Topical <User Schedule>  dexMEDEtomidine Infusion 0.2 MICROgram(s)/kG/Hr (2.7 mL/Hr) IV Continuous <Continuous>  dextrose 40% Gel 15 Gram(s) Oral once  dextrose 5%. 1000 milliLiter(s) (100 mL/Hr) IV Continuous <Continuous>  dextrose 5%. 1000 milliLiter(s) (50 mL/Hr) IV Continuous <Continuous>  dextrose 50% Injectable 25 Gram(s) IV Push once  dextrose 50% Injectable 12.5 Gram(s) IV Push once  dextrose 50% Injectable 25 Gram(s) IV Push once  DOBUTamine Infusion 1.25 MICROgram(s)/kG/Min (2.03 mL/Hr) IV Continuous <Continuous>  donepezil 10 milliGRAM(s) Oral at bedtime  DULoxetine 60 milliGRAM(s) Oral daily  glucagon  Injectable 1 milliGRAM(s) IntraMuscular once  heparin   Injectable 5000 Unit(s) SubCutaneous every 12 hours  insulin lispro (ADMELOG) corrective regimen sliding scale   SubCutaneous every 6 hours  levothyroxine 150 MICROGram(s) Oral daily  memantine 10 milliGRAM(s) Oral every 12 hours  pantoprazole   Suspension 40 milliGRAM(s) Oral two times a day  polyethylene glycol 3350 17 Gram(s) Oral at bedtime  senna 2 Tablet(s) Oral at bedtime  tiotropium 18 MICROgram(s) Capsule 1 Capsule(s) Inhalation daily  traZODone 100 milliGRAM(s) Oral every 12 hours    MEDICATIONS  (PRN):  bisacodyl Suppository 10 milliGRAM(s) Rectal daily PRN Constipation    Mode: AC/ CMV (Assist Control/ Continuous Mandatory Ventilation)  RR (machine): 18  TV (machine): 350  FiO2: 40  PEEP: 5  ITime: 0.74  MAP: 8  PIP: 17      Physical exam:     General:               Pt is intubated, sedated                          Neuro:                  Could not assess                          Cardiovascular:   S1 & S2, regular                           Respiratory:         Air entry is fair and equal on both sides, has bilateral conducted sounds                           GI:                          Soft, nondistended and nontender, Bowel sounds active                            Ext:                        No cyanosis, has upper ext  edema                            Labs:                                                                           9.3    7.32  )-----------( 390      ( 27 Sep 2021 03:27 )             30.5             -27    152<H>  |  110<H>  |  23  ----------------------------<  161<H>  3.2<L>   |  31  |  0.32<L>    Ca    10.4      27 Sep 2021 03:27  Phos  3.2     -  Mg     1.90     -                    PT/INR - ( 26 Sep 2021 03:47 )   PT: 13.3 sec;   INR: 1.16 ratio         PTT - ( 26 Sep 2021 03:47 )  PTT:27.7 sec      CXR:  < from: Xray Chest 1 View- PORTABLE-Urgent (Xray Chest 1 View- PORTABLE-Urgent .) (21 @ 13:16) >  , at 5:03AM:  Right chest pigtail catheter in situ.  Stable mild cardiomegaly.  Tracheostomy tube in place.  Right sided central venous catheter with tip in SVC.  Tiny right apical pneumothorax is noted  Persistent pulmonary vascular congestive changes - left more than right.  Small layering left pleural effusion.     at 12:59PM:  S/P removal of right chest pigtail catheter.  Life supporting devices remain in appropriate position.  Stable cardiomegaly.  Mild improvement in pulmonary vascular congestive changes.  Small layering left pleural effusion.  Persistent tiny right apical apical pneumothorax.    IMPRESSION:  Pulmonary vascular congestive changes.  Small layering left pleural effusion.  Tiny right apical pneumothorax.        Plan:  General: 81 y. o. female BIBA to [facility] from nursing facility c/o hematemesis.  According to pt it was the first episode and happened while she was eating dinner. Followed by multiple episodes of N/V with bright blood and epigastric cramps.  She was intubated for airway protection and underwent EGD that revealed large diverticula at 28 cm filled with blood and 5 mm tear at distal esophagus, a hiatal hernia and large amount of blood in the fundus.  Diverticula was injected with Epinephrine.  Patient was transferred to [facility] for further management. (10 Sep 2021 15:04). Pt had trach and PEG last week                              Neuro:                                         Pain control with Fentanyl  /  Tylenol PRN    Sedated on Propofol /  Precedex.                            Cardiovascular:                                          Chronic systolic heart failure with severe TR / Moderate MR  Dobutamine drip is being weand off  Gentle diuresis to keep I/Os  negative  Cardiology f/u                                Respiratory:                                         Pt is on full mechanical vent support. RC--40-5                                         Appears comfortable, not in any distress.                                         Continue bronchodilators, pulmonary toilet - P     s/p drainage of right pleural effusion                                         CPAP wean as tolerated                            GI                                         Tolerating PEG feeds                                         Continue Zofran / Reglan for nausea - PRN  	    Monitor Hct and transfuse for Hb<8                                                                 Renal:     Replace K/Mg/Phos                                        Monitor I/Os and electrolytes     Hypernatremia: Free water deficit 2.5L.   Continue free water via PEG 200CC q4HRS  Check BMP                                           Hypokalemia: Replaced K                                                Hem/ Onc:                                         Acute blood loss anemia     Monitor Hct and transfuse for Hb<8                                          Follow CBC in AM                           Infectious disease:                                          Sepsis / Shock: Completed antibiotics  Vanco / Meropenem /  Diflucan  Still febrile. Repeat blood c/s                                           Monitor for fever / leukocytosis.           I.D f/u                                                                    Endocrine                                             DM-2 / Hyperglycemia: Continue Accu-Checks with coverage    Pt is on SQ Heparin / Lovenox  and Venodyne boots for DVT prophylaxis.     Pertinent clinical, laboratory, radiographic, hemodynamic, echocardiographic, respiratory data, microbiologic data and chart were reviewed and analyzed frequently throughout the course of the day and night  Patient seen, examined and plan discussed with Dr. Zuñiga /   CTICU team during rounds.    I have spent  50  minutes of critical care time with this pt between  7am and 11.59pm.         Matty Villalobos MD

## 2021-09-27 NOTE — ADVANCED PRACTICE NURSE CONSULT - RECOMMEDATIONS
Topical recommendations:     Tracheostomy- Cleanse around trach site with NS. Pat dry. Apply Silicone foam dressing without border beneath trach collar once stitches removed, cut mid dressing to "Y" shape. Change foam dressing every shift and prn. Change trach ties every 3 days.     LUQ PEG- Cleanse q shift with NS, apply liquid barrier film beneath abelino disc.  If redness noted under abelino disc bumper apply thin foam  dressing without border (Mepilex Lite)- cut to mid dressing with a 'Y' shape.   Secondary securement to abdomen taping in 'H' fashion with Steri-strips.     Sacral/gluteal fold extending to left buttock- Cleanse with skin cleanser, pat dry. Apply TRIAD paste twice a day and PRN with incontinent episodes. With episodes of incontinence only remove soiled layer of Triad, then reinforce with thin layer.     Continue low air loss bed therapy,  heel elevation with offloading boots, turn & reposition q2h with Z-flow fluidized pillow, continue moisture management with barrier creams as specified above & single breathable pad, continue measures to decrease friction/shear.   Plan discussed with patient- educated on topical wound therapy to optimize wound healing. Questions answered.     Please contact Wound/Ostomy Care Service Line if we can be of further assistance (ext [phone]).

## 2021-09-27 NOTE — PROGRESS NOTE ADULT - SUBJECTIVE AND OBJECTIVE BOX
Infectious Diseases progress note:    Subjective: Events noted.  Tmax 101.3 o/n.  No leukocytosis.        ROS:    Unable to assess due to pt clinical condition    Allergies    Sulfur Colliod (Rash)  Tylenol (Swelling)    Intolerances        ANTIBIOTICS/RELEVANT:  antimicrobials    immunologic:    OTHER:  acetaZOLAMIDE    Tablet 250 milliGRAM(s) Oral every 12 hours  ALBUTerol    90 MICROgram(s) HFA Inhaler 2 Puff(s) Inhalation every 6 hours  artificial tears (preservative free) Ophthalmic Solution 1 Drop(s) Both EYES two times a day  bisacodyl Suppository 10 milliGRAM(s) Rectal daily PRN  budesonide  80 MICROgram(s)/formoterol 4.5 MICROgram(s) Inhaler 2 Puff(s) Inhalation two times a day  busPIRone 15 milliGRAM(s) Oral <User Schedule>  chlorhexidine 0.12% Liquid 15 milliLiter(s) Oral Mucosa every 12 hours  chlorhexidine 4% Liquid 1 Application(s) Topical <User Schedule>  dexMEDEtomidine Infusion 0.2 MICROgram(s)/kG/Hr IV Continuous <Continuous>  dextrose 40% Gel 15 Gram(s) Oral once  dextrose 5%. 1000 milliLiter(s) IV Continuous <Continuous>  dextrose 5%. 1000 milliLiter(s) IV Continuous <Continuous>  dextrose 50% Injectable 25 Gram(s) IV Push once  dextrose 50% Injectable 12.5 Gram(s) IV Push once  dextrose 50% Injectable 25 Gram(s) IV Push once  DOBUTamine Infusion 1.25 MICROgram(s)/kG/Min IV Continuous <Continuous>  donepezil 10 milliGRAM(s) Oral at bedtime  DULoxetine 60 milliGRAM(s) Oral daily  glucagon  Injectable 1 milliGRAM(s) IntraMuscular once  heparin   Injectable 5000 Unit(s) SubCutaneous every 12 hours  insulin lispro (ADMELOG) corrective regimen sliding scale   SubCutaneous every 6 hours  levothyroxine 150 MICROGram(s) Oral daily  memantine 10 milliGRAM(s) Oral every 12 hours  pantoprazole   Suspension 40 milliGRAM(s) Oral two times a day  polyethylene glycol 3350 17 Gram(s) Oral at bedtime  senna 2 Tablet(s) Oral at bedtime  tiotropium 18 MICROgram(s) Capsule 1 Capsule(s) Inhalation daily  traZODone 100 milliGRAM(s) Oral every 12 hours      Objective:  Vital Signs Last 24 Hrs  T(C): 37.2 (27 Sep 2021 13:00), Max: 38.5 (27 Sep 2021 01:00)  T(F): 99 (27 Sep 2021 13:00), Max: 101.3 (27 Sep 2021 01:00)  HR: 89 (27 Sep 2021 18:53) (55 - 89)  BP: 174/94 (27 Sep 2021 03:00) (174/94 - 174/94)  BP(mean): 108 (27 Sep 2021 03:00) (108 - 108)  RR: 20 (27 Sep 2021 18:00) (16 - 25)  SpO2: 100% (27 Sep 2021 18:53) (97% - 100%)    PHYSICAL EXAM:  Constitutional: Sedated/unresponsive  Eyes:LISA, EOMI  Ear/Nose/Throat: no thrush, mucositis.  Moist mucous membranes	  Neck: tracheostomy, on mechanical ventilation  Respiratory: CTA shaila  Cardiovascular: S1S2 RRR, no murmurs  Gastrointestinal:soft, nontender,  nondistended (+) BS, peg  Extremities:no e/e/c  Skin:  no rashes, open wounds or ulcerations  : bearden        LABS:                        9.3    7.32  )-----------( 390      ( 27 Sep 2021 03:27 )             30.5     09-27    149<H>  |  109<H>  |  22  ----------------------------<  197<H>  3.3<L>   |  30  |  0.32<L>    Ca    10.3      27 Sep 2021 15:40  Phos  2.6     09-27  Mg     1.90     09-27      PT/INR - ( 26 Sep 2021 03:47 )   PT: 13.3 sec;   INR: 1.16 ratio         PTT - ( 26 Sep 2021 03:47 )  PTT:27.7 sec      Procalcitonin, Serum: 0.07 (09-27 @ 03:30)                    MICROBIOLOGY:    Culture - Body Fluid with Gram Stain (09.21.21 @ 16:58)   Gram Stain:   polymorphonuclear leukocytes seen   No organisms seen   by cytocentrifuge   Specimen Source: .Body Fluid Pleural Fluid   Culture Results:   No growth at 5 days     RADIOLOGY & ADDITIONAL STUDIES:    < from: Xray Chest 1 View- PORTABLE-Urgent (Xray Chest 1 View- PORTABLE-Urgent .) (09.27.21 @ 08:48) >    EXAM:  XR CHEST PORTABLE ROUTINE 1V      EXAM:  XR CHEST PORTABLE ROUTINE 1V      EXAM:  XR CHEST PORTABLE ROUTINE 1V      EXAM:  XR CHEST PORTABLE URGENT 1V        PROCEDURE DATE:  Sep 27 2021         INTERPRETATION:  TIME OF EXAM: September 25, 2021 at 5:03 AM; September 26 at 4:29 AM; September 27 at 5:25 AM and 8:33 AM    CLINICAL INFORMATION: Post tracheostomy; pneumothorax? Pigtail catheter    TECHNIQUE:   Portable chest    INTERPRETATION:    September 25:  5:03 AM:  Right-sided pigtail catheter and tracheostomy tube in addition to the right IJ line are unchanged. Bilateral layering effusions left greater than right. Tiny apical pneumothorax on the right may be present. No change from the study of the day before.    September 26:  4:29 AM:  Right-sided pigtail catheter has been removed. Right IJ line and tracheostomy tube remain. Lungs are free of focal consolidations. Apparent decrease of layering left effusion. No definite pneumothorax.    September 2017:  5:26 AM:  Rotation is seen into an Serbian projection. Tracheostomy tube in place. Lungs show no focal consolidations or definite pneumothorax.    8:33 AM:  Right IJ line no longer seen. Tiny right apical pneumothorax. Bilateral small layering effusions.      COMPARISON:  September 24      IMPRESSION:  1. Right-sided pigtail catheter pre and post removal.  2. Tracheostomy tube in place.  3. Clear lungs.  4. Bilateral small layering effusions with tiny apical pneumothorax on the most recent study.    < end of copied text >

## 2021-09-27 NOTE — ADVANCED PRACTICE NURSE CONSULT - ASSESSMENT
General: Patient sedated, on mechanical ventilation via tracheostomy sutured to skin. Multiple gtts, LUQ PEG- peritubular skin intact. Patient bedbound, immobile, indwelling urinary cathter, incontinent of stool- perineal care provided for episode of fecal incontinence. Skin warm, dry with increased moisture in intertriginous folds, poor skin turgor, scattered areas of ecchymosis on bilateral upper extremities. Blanchable erythema on bilateral heels, right first second, third toe and bilateral medial malleolus. Generalized anasarca.     Sacral gluteal fold extending to left buttock- mixed etiology skin changes in the critically ill and Unstageable pressure injury measuring [id number]. Irregular borders. Measurements obtained while patient lying on left side, unable to be fully seen in natural anatomical position. Tissue type presenting as 80% pink moist dermis, 10% firmly attached slough and 10% blanching erythema of epidermal base at 12oclock. Moderate serous drainage, no odor. Periwound skin with blanching erythema and increased moisture to bilateral buttocks consistent with MAD/IAD.      Patient continues to be critically ill- at high risk for skin breakdown. On assessment patient on a low air loss surface, heel offloading boots in place, Z-flow positioning pillow utilized, moisture management in place with use of one incontinence pad. Turning and positioning Q2hrs.

## 2021-09-27 NOTE — CHART NOTE - NSCHARTNOTEFT_GEN_A_CORE
ICU PA POCUS NOTE     : AVA Carballo and Dr. Villalobos     INDICATION: Hypernatremia and BiVentricular Failure Follow Up.     PROCEDURE:  [ x] LIMITED ECHO  [ ] LIMITED CHEST  [ ] LIMITED RETROPERITONEAL  [ ] LIMITED ABDOMINAL  [ ] LIMITED DVT  [ ] NEEDLE GUIDANCE VASCULAR  [ ] NEEDLE GUIDANCE THORACENTESIS  [ ] NEEDLE GUIDANCE PARACENTESIS  [ ] NEEDLE GUIDANCE PERICARDIOCENTESIS  [ ] OTHER    FINDINGS:  Moderate to severe LVSD  Bilateral atrial dilatation   RV Enlargement   IVC 1.69    INTERPRETATION:  Moderate to severe LVSD with intravascular dehydration     Ben Van PA-C  Department of Critical Care   In house [facility] [id number]

## 2021-09-27 NOTE — ADVANCED PRACTICE NURSE CONSULT - REASON FOR CONSULT
Patient seen on skin care rounds after wound care referral received for assessment of skin impairment and recommendations of topical management. Chart reviewed: WBC 7.32, H/H 9.3/30.5,  Serum albumin 2.6, BMI 21.8, Matthew 9.  Patient with H/o atrial fibrillation, hypothyroidism, COPD, IVC filter , BIBA to [facility] from nursing facility c/o hematemesis c/b Acute hypoxemic respiratory failure underwent EGD that revealed large diverticula at 28 cm filled with blood and 5 mm tear at distal esophagus, a hiatal hernia and large amount of blood in the fundus. Patient was transferred to [facility] for further management c/b pna .  CT c/a/p shows a 7.2 cm masslike structure distending the mid to distal esophagus, suspicious for malignancy and  Bilateral tree-in-bud nodular opacities, predominantly in the left lower lobe, new/increased from 9/9/2021, raising concern for infection.  Left lower lobe consolidative opacity, possible combination of atelectasis and pneumonia.  (+) hypodensities in the liver suspicion for malignancy, and stercoral colitis. On Abx for antibiotic  On IV pressor for Septic  shock. Echo on 9/15  revealed Ejection Fraction (Visual Estimate): 20% Severe global left ventricular systolic dysfunction. Severe right atrial enlargement. Right ventricular enlargement with decreased right ventricular systolic function. Chest Xray showed some pulmonary congestion. Pt had trach and PEG last week. Patient seen by Gastroenterology for hematemesis, Infectious disease for anbx management, Palliative care for GOC, and cardiology for cardiogenic shock.

## 2021-09-28 LAB
ALBUMIN SERPL ELPH-MCNC: 3.4 G/DL — SIGNIFICANT CHANGE UP (ref 3.3–5)
ALP SERPL-CCNC: 113 U/L — SIGNIFICANT CHANGE UP (ref 40–120)
ALT FLD-CCNC: 27 U/L — SIGNIFICANT CHANGE UP (ref 4–33)
ANION GAP SERPL CALC-SCNC: 10 MMOL/L — SIGNIFICANT CHANGE UP (ref 7–14)
ANION GAP SERPL CALC-SCNC: 12 MMOL/L — SIGNIFICANT CHANGE UP (ref 7–14)
AST SERPL-CCNC: 36 U/L — HIGH (ref 4–32)
BASOPHILS # BLD AUTO: 0.02 K/UL — SIGNIFICANT CHANGE UP (ref 0–0.2)
BASOPHILS NFR BLD AUTO: 0.3 % — SIGNIFICANT CHANGE UP (ref 0–2)
BILIRUB DIRECT SERPL-MCNC: <0.2 MG/DL — SIGNIFICANT CHANGE UP (ref 0–0.2)
BILIRUB INDIRECT FLD-MCNC: >0.3 MG/DL — SIGNIFICANT CHANGE UP (ref 0–1)
BILIRUB SERPL-MCNC: 0.5 MG/DL — SIGNIFICANT CHANGE UP (ref 0.2–1.2)
BLOOD GAS ARTERIAL COMPREHENSIVE RESULT: SIGNIFICANT CHANGE UP
BUN SERPL-MCNC: 23 MG/DL — SIGNIFICANT CHANGE UP (ref 7–23)
BUN SERPL-MCNC: 23 MG/DL — SIGNIFICANT CHANGE UP (ref 7–23)
CALCIUM SERPL-MCNC: 10.2 MG/DL — SIGNIFICANT CHANGE UP (ref 8.4–10.5)
CALCIUM SERPL-MCNC: 10.6 MG/DL — HIGH (ref 8.4–10.5)
CHLORIDE SERPL-SCNC: 113 MMOL/L — HIGH (ref 98–107)
CHLORIDE SERPL-SCNC: 113 MMOL/L — HIGH (ref 98–107)
CO2 SERPL-SCNC: 27 MMOL/L — SIGNIFICANT CHANGE UP (ref 22–31)
CO2 SERPL-SCNC: 27 MMOL/L — SIGNIFICANT CHANGE UP (ref 22–31)
CREAT SERPL-MCNC: 0.36 MG/DL — LOW (ref 0.5–1.3)
CREAT SERPL-MCNC: 0.42 MG/DL — LOW (ref 0.5–1.3)
EOSINOPHIL # BLD AUTO: 0.01 K/UL — SIGNIFICANT CHANGE UP (ref 0–0.5)
EOSINOPHIL NFR BLD AUTO: 0.1 % — SIGNIFICANT CHANGE UP (ref 0–6)
GLUCOSE BLDC GLUCOMTR-MCNC: 137 MG/DL — HIGH (ref 70–99)
GLUCOSE BLDC GLUCOMTR-MCNC: 166 MG/DL — HIGH (ref 70–99)
GLUCOSE SERPL-MCNC: 138 MG/DL — HIGH (ref 70–99)
GLUCOSE SERPL-MCNC: 147 MG/DL — HIGH (ref 70–99)
HCT VFR BLD CALC: 29.3 % — LOW (ref 34.5–45)
HGB BLD-MCNC: 9 G/DL — LOW (ref 11.5–15.5)
IANC: 5.2 K/UL — SIGNIFICANT CHANGE UP (ref 1.5–8.5)
IMM GRANULOCYTES NFR BLD AUTO: 0.4 % — SIGNIFICANT CHANGE UP (ref 0–1.5)
LYMPHOCYTES # BLD AUTO: 0.93 K/UL — LOW (ref 1–3.3)
LYMPHOCYTES # BLD AUTO: 13.7 % — SIGNIFICANT CHANGE UP (ref 13–44)
MAGNESIUM SERPL-MCNC: 1.8 MG/DL — SIGNIFICANT CHANGE UP (ref 1.6–2.6)
MAGNESIUM SERPL-MCNC: 1.9 MG/DL — SIGNIFICANT CHANGE UP (ref 1.6–2.6)
MCHC RBC-ENTMCNC: 29.5 PG — SIGNIFICANT CHANGE UP (ref 27–34)
MCHC RBC-ENTMCNC: 30.7 GM/DL — LOW (ref 32–36)
MCV RBC AUTO: 96.1 FL — SIGNIFICANT CHANGE UP (ref 80–100)
MONOCYTES # BLD AUTO: 0.61 K/UL — SIGNIFICANT CHANGE UP (ref 0–0.9)
MONOCYTES NFR BLD AUTO: 9 % — SIGNIFICANT CHANGE UP (ref 2–14)
NEUTROPHILS # BLD AUTO: 5.2 K/UL — SIGNIFICANT CHANGE UP (ref 1.8–7.4)
NEUTROPHILS NFR BLD AUTO: 76.5 % — SIGNIFICANT CHANGE UP (ref 43–77)
NRBC # BLD: 0 /100 WBCS — SIGNIFICANT CHANGE UP
NRBC # FLD: 0 K/UL — SIGNIFICANT CHANGE UP
PHOSPHATE SERPL-MCNC: 2.8 MG/DL — SIGNIFICANT CHANGE UP (ref 2.5–4.5)
PHOSPHATE SERPL-MCNC: 3 MG/DL — SIGNIFICANT CHANGE UP (ref 2.5–4.5)
PLATELET # BLD AUTO: 359 K/UL — SIGNIFICANT CHANGE UP (ref 150–400)
POTASSIUM SERPL-MCNC: 3 MMOL/L — LOW (ref 3.5–5.3)
POTASSIUM SERPL-MCNC: 3.2 MMOL/L — LOW (ref 3.5–5.3)
POTASSIUM SERPL-SCNC: 3 MMOL/L — LOW (ref 3.5–5.3)
POTASSIUM SERPL-SCNC: 3.2 MMOL/L — LOW (ref 3.5–5.3)
PROT SERPL-MCNC: 5.8 G/DL — LOW (ref 6–8.3)
RBC # BLD: 3.05 M/UL — LOW (ref 3.8–5.2)
RBC # FLD: 15.9 % — HIGH (ref 10.3–14.5)
SODIUM SERPL-SCNC: 150 MMOL/L — HIGH (ref 135–145)
SODIUM SERPL-SCNC: 152 MMOL/L — HIGH (ref 135–145)
WBC # BLD: 6.8 K/UL — SIGNIFICANT CHANGE UP (ref 3.8–10.5)
WBC # FLD AUTO: 6.8 K/UL — SIGNIFICANT CHANGE UP (ref 3.8–10.5)

## 2021-09-28 PROCEDURE — 99291 CRITICAL CARE FIRST HOUR: CPT

## 2021-09-28 PROCEDURE — 71045 X-RAY EXAM CHEST 1 VIEW: CPT | Mod: 26

## 2021-09-28 PROCEDURE — 93971 EXTREMITY STUDY: CPT | Mod: 26

## 2021-09-28 RX ORDER — POTASSIUM CHLORIDE 20 MEQ
20 PACKET (EA) ORAL
Refills: 0 | Status: COMPLETED | OUTPATIENT
Start: 2021-09-28 | End: 2021-09-28

## 2021-09-28 RX ORDER — QUETIAPINE FUMARATE 200 MG/1
25 TABLET, FILM COATED ORAL
Refills: 0 | Status: DISCONTINUED | OUTPATIENT
Start: 2021-09-28 | End: 2021-09-29

## 2021-09-28 RX ORDER — SODIUM CHLORIDE 9 MG/ML
1000 INJECTION, SOLUTION INTRAVENOUS
Refills: 0 | Status: DISCONTINUED | OUTPATIENT
Start: 2021-09-28 | End: 2021-09-28

## 2021-09-28 RX ORDER — MAGNESIUM SULFATE 500 MG/ML
1 VIAL (ML) INJECTION ONCE
Refills: 0 | Status: COMPLETED | OUTPATIENT
Start: 2021-09-28 | End: 2021-09-28

## 2021-09-28 RX ORDER — DEXMEDETOMIDINE HYDROCHLORIDE IN 0.9% SODIUM CHLORIDE 4 UG/ML
0.5 INJECTION INTRAVENOUS
Qty: 400 | Refills: 0 | Status: DISCONTINUED | OUTPATIENT
Start: 2021-09-28 | End: 2021-09-29

## 2021-09-28 RX ADMIN — Medication 1 DROP(S): at 05:49

## 2021-09-28 RX ADMIN — Medication 100 GRAM(S): at 21:40

## 2021-09-28 RX ADMIN — ALBUTEROL 2 PUFF(S): 90 AEROSOL, METERED ORAL at 16:50

## 2021-09-28 RX ADMIN — ALBUTEROL 2 PUFF(S): 90 AEROSOL, METERED ORAL at 04:14

## 2021-09-28 RX ADMIN — CHLORHEXIDINE GLUCONATE 1 APPLICATION(S): 213 SOLUTION TOPICAL at 06:20

## 2021-09-28 RX ADMIN — DULOXETINE HYDROCHLORIDE 60 MILLIGRAM(S): 30 CAPSULE, DELAYED RELEASE ORAL at 10:20

## 2021-09-28 RX ADMIN — ACETAZOLAMIDE 250 MILLIGRAM(S): 250 TABLET ORAL at 06:18

## 2021-09-28 RX ADMIN — SODIUM CHLORIDE 50 MILLILITER(S): 9 INJECTION, SOLUTION INTRAVENOUS at 06:45

## 2021-09-28 RX ADMIN — PANTOPRAZOLE SODIUM 40 MILLIGRAM(S): 20 TABLET, DELAYED RELEASE ORAL at 16:33

## 2021-09-28 RX ADMIN — Medication 100 MILLIEQUIVALENT(S): at 17:28

## 2021-09-28 RX ADMIN — DEXMEDETOMIDINE HYDROCHLORIDE IN 0.9% SODIUM CHLORIDE 2.7 MICROGRAM(S)/KG/HR: 4 INJECTION INTRAVENOUS at 01:48

## 2021-09-28 RX ADMIN — ALBUTEROL 2 PUFF(S): 90 AEROSOL, METERED ORAL at 22:10

## 2021-09-28 RX ADMIN — CHLORHEXIDINE GLUCONATE 15 MILLILITER(S): 213 SOLUTION TOPICAL at 16:34

## 2021-09-28 RX ADMIN — Medication 150 MICROGRAM(S): at 06:18

## 2021-09-28 RX ADMIN — Medication 0.5 MILLIGRAM(S): at 21:43

## 2021-09-28 RX ADMIN — CHLORHEXIDINE GLUCONATE 15 MILLILITER(S): 213 SOLUTION TOPICAL at 05:48

## 2021-09-28 RX ADMIN — BUDESONIDE AND FORMOTEROL FUMARATE DIHYDRATE 2 PUFF(S): 160; 4.5 AEROSOL RESPIRATORY (INHALATION) at 22:11

## 2021-09-28 RX ADMIN — Medication 1 DROP(S): at 16:34

## 2021-09-28 RX ADMIN — Medication 100 MILLIEQUIVALENT(S): at 07:40

## 2021-09-28 RX ADMIN — Medication 100 MILLIEQUIVALENT(S): at 05:48

## 2021-09-28 RX ADMIN — QUETIAPINE FUMARATE 25 MILLIGRAM(S): 200 TABLET, FILM COATED ORAL at 09:59

## 2021-09-28 RX ADMIN — HEPARIN SODIUM 5000 UNIT(S): 5000 INJECTION INTRAVENOUS; SUBCUTANEOUS at 16:34

## 2021-09-28 RX ADMIN — HEPARIN SODIUM 5000 UNIT(S): 5000 INJECTION INTRAVENOUS; SUBCUTANEOUS at 05:49

## 2021-09-28 RX ADMIN — ALBUTEROL 2 PUFF(S): 90 AEROSOL, METERED ORAL at 08:38

## 2021-09-28 RX ADMIN — BUDESONIDE AND FORMOTEROL FUMARATE DIHYDRATE 2 PUFF(S): 160; 4.5 AEROSOL RESPIRATORY (INHALATION) at 08:36

## 2021-09-28 RX ADMIN — MEMANTINE HYDROCHLORIDE 10 MILLIGRAM(S): 10 TABLET ORAL at 16:33

## 2021-09-28 RX ADMIN — SENNA PLUS 2 TABLET(S): 8.6 TABLET ORAL at 21:42

## 2021-09-28 RX ADMIN — DEXMEDETOMIDINE HYDROCHLORIDE IN 0.9% SODIUM CHLORIDE 6.75 MICROGRAM(S)/KG/HR: 4 INJECTION INTRAVENOUS at 12:36

## 2021-09-28 RX ADMIN — Medication 100 MILLIEQUIVALENT(S): at 15:22

## 2021-09-28 RX ADMIN — Medication 100 MILLIGRAM(S): at 06:18

## 2021-09-28 RX ADMIN — Medication 0.5 MILLIGRAM(S): at 16:32

## 2021-09-28 RX ADMIN — Medication 100 MILLIGRAM(S): at 16:34

## 2021-09-28 RX ADMIN — Medication 15 MILLIGRAM(S): at 16:33

## 2021-09-28 RX ADMIN — POLYETHYLENE GLYCOL 3350 17 GRAM(S): 17 POWDER, FOR SOLUTION ORAL at 21:42

## 2021-09-28 RX ADMIN — MEMANTINE HYDROCHLORIDE 10 MILLIGRAM(S): 10 TABLET ORAL at 06:20

## 2021-09-28 RX ADMIN — DONEPEZIL HYDROCHLORIDE 10 MILLIGRAM(S): 10 TABLET, FILM COATED ORAL at 21:42

## 2021-09-28 RX ADMIN — PANTOPRAZOLE SODIUM 40 MILLIGRAM(S): 20 TABLET, DELAYED RELEASE ORAL at 06:18

## 2021-09-28 NOTE — PROGRESS NOTE ADULT - SUBJECTIVE AND OBJECTIVE BOX
ROXIE LAZAR                     MRN-[id number]    HPI:  81 y. o. female BIBA to [facility] from nursing facility c/o hematemesis.  According to pt it was the first episode and happened while she was eating dinner. Followed by multiple episodes of N/V with bright blood and epigastric cramps.  She was intubated for airway protection and underwent EGD that revealed large diverticula at 28 cm filled with blood and 5 mm tear at distal esophagus, a hiatal hernia and large amount of blood in the fundus.  Diverticula was injected with Epinephrine.  Patient was transferred to [facility] for further management. (10 Sep 2021 15:04)      Procedure: Trach and PEG                       Issues:              Acute Hypoxic Respiratory failure  Acute on Chronic systolic heart failure  Moderate MR  Severe TR              Septic Shock              Esophageal diverticular bleed.   Acute blood loss anemia  COPD  Chronic Afib -- previously on Apixaban  Hypothyroidism  GERD  Pleural effusion  Protein calorie malnutrition                Home Medications:  Abreva 10% topical cream: Apply topically to affected area once a day (10 Sep 2021 15:54)  Albuterol (Eqv-ProAir HFA) 90 mcg/inh inhalation aerosol: 2 puff(s) inhaled every 6 hours (10 Sep 2021 15:54)  allopurinol 100 mg oral tablet: 2 tab(s) orally 2 times a day (10 Sep 2021 15:54)  Aricept 10 mg oral tablet: 1 tab(s) orally once a day (at bedtime) (10 Sep 2021 15:54)  aspirin 81 mg oral tablet: 1 tab(s) orally once a day (10 Sep 2021 15:54)  baclofen 5 mg oral tablet: 1 tab(s) orally 2 times a day (10 Sep 2021 15:54)  busPIRone 15 mg oral tablet: 1 tab(s) orally once a day (at bedtime) (10 Sep 2021 15:54)  Claritin 10 mg oral tablet: 1 tab(s) orally once a day (10 Sep 2021 15:54)  Colace 100 mg oral capsule: 1 cap(s) orally 3 times a day (10 Sep 2021 15:54)  Cymbalta 60 mg oral delayed release capsule: 1 cap(s) orally once a day (10 Sep 2021 15:54)  Eliquis 2.5 mg oral tablet: 1 tab(s) orally 2 times a day (10 Sep 2021 15:54)      PAST MEDICAL & SURGICAL HISTORY:  Afib    History of COPD    HTN (hypertension)    Hypothyroid    GERD (gastroesophageal reflux disease)    Esophageal diverticulum    Presence of IVC filter              VITAL SIGNS:  Vital Signs Last 24 Hrs  T(C): 36.4 (28 Sep 2021 04:00), Max: 37.2 (28 Sep 2021 00:00)  T(F): 97.6 (28 Sep 2021 04:00), Max: 98.9 (28 Sep 2021 00:00)  HR: 109 (28 Sep 2021 12:00) (63 - 130)  BP: --  BP(mean): --  RR: 22 (28 Sep 2021 12:00) (18 - 25)  SpO2: 100% (28 Sep 2021 12:00) (97% - 100%)    I/Os:   I&O's Detail    27 Sep 2021 07:01  -  28 Sep 2021 07:00  --------------------------------------------------------  IN:    Dexmedetomidine: 312.7 mL    dextrose 5%: 50 mL    DOBUTamine: 28 mL    Enteral Tube Flush: 1200 mL    IV PiggyBack: 200 mL    IV PiggyBack: 55 mL    Jevity 1.2: 1196 mL  Total IN: 3041.7 mL    OUT:    Indwelling Catheter - Urethral (mL): 1600 mL  Total OUT: 1600 mL    Total NET: 1441.7 mL      28 Sep 2021 07:01  -  28 Sep 2021 14:06  --------------------------------------------------------  IN:    dextrose 5%: 200 mL    Enteral Tube Flush: 200 mL    IV PiggyBack: 100 mL    Jevity 1.2: 260 mL  Total IN: 760 mL    OUT:  Total OUT: 0 mL    Total NET: 760 mL          CAPILLARY BLOOD GLUCOSE      POCT Blood Glucose.: 156 mg/dL (27 Sep 2021 23:33)  POCT Blood Glucose.: 166 mg/dL (27 Sep 2021 16:13)      =======================MEDICATIONS===================  MEDICATIONS  (STANDING):  ALBUTerol    90 MICROgram(s) HFA Inhaler 2 Puff(s) Inhalation every 6 hours  artificial tears (preservative free) Ophthalmic Solution 1 Drop(s) Both EYES two times a day  budesonide  80 MICROgram(s)/formoterol 4.5 MICROgram(s) Inhaler 2 Puff(s) Inhalation two times a day  busPIRone 15 milliGRAM(s) Oral <User Schedule>  chlorhexidine 0.12% Liquid 15 milliLiter(s) Oral Mucosa every 12 hours  chlorhexidine 4% Liquid 1 Application(s) Topical <User Schedule>  dexMEDEtomidine Infusion 0.5 MICROgram(s)/kG/Hr (6.75 mL/Hr) IV Continuous <Continuous>  dextrose 40% Gel 15 Gram(s) Oral once  dextrose 5%. 1000 milliLiter(s) (100 mL/Hr) IV Continuous <Continuous>  dextrose 5%. 1000 milliLiter(s) (50 mL/Hr) IV Continuous <Continuous>  dextrose 50% Injectable 25 Gram(s) IV Push once  dextrose 50% Injectable 12.5 Gram(s) IV Push once  dextrose 50% Injectable 25 Gram(s) IV Push once  donepezil 10 milliGRAM(s) Oral at bedtime  DULoxetine 60 milliGRAM(s) Oral daily  glucagon  Injectable 1 milliGRAM(s) IntraMuscular once  heparin   Injectable 5000 Unit(s) SubCutaneous every 12 hours  insulin lispro (ADMELOG) corrective regimen sliding scale   SubCutaneous every 6 hours  levothyroxine 150 MICROGram(s) Oral daily  memantine 10 milliGRAM(s) Oral every 12 hours  pantoprazole   Suspension 40 milliGRAM(s) Oral two times a day  polyethylene glycol 3350 17 Gram(s) Oral at bedtime  QUEtiapine 25 milliGRAM(s) Oral two times a day  senna 2 Tablet(s) Oral at bedtime  traZODone 100 milliGRAM(s) Oral every 12 hours    MEDICATIONS  (PRN):  bisacodyl Suppository 10 milliGRAM(s) Rectal daily PRN Constipation      =======================VENTILATOR SETTINGS===================  Mode: AC/ CMV (Assist Control/ Continuous Mandatory Ventilation)  RR (machine): 18  TV (machine): 350  FiO2: 40  PEEP: 5  ITime: 0.62  MAP: 8  PIP: 17    ==================PHYSICAL EXAM============================     General:               Pt is intubated, sedated                          Neuro:                  Could not assess                          Cardiovascular:   S1 & S2, regular                           Respiratory:         Air entry is fair and equal on both sides, has bilateral conducted sounds                           GI:                          Soft, nondistended and nontender, Bowel sounds active                            Ext:                        No cyanosis, has upper ext  edema                                       Skin:		No rash.    ============================LABS=========================                        9.0    6.80  )-----------( 359      ( 28 Sep 2021 04:47 )             29.3     09-28    150<H>  |  113<H>  |  23  ----------------------------<  138<H>  3.2<L>   |  27  |  0.42<L>    Ca    10.2      28 Sep 2021 13:23  Phos  2.8     09-28  Mg     1.80     09-28    TPro  5.8<L>  /  Alb  3.4  /  TBili  0.5  /  DBili  <0.2  /  AST  36<H>  /  ALT  27  /  AlkPhos  113  09-28    LIVER FUNCTIONS - ( 28 Sep 2021 04:47 )  Alb: 3.4 g/dL / Pro: 5.8 g/dL / ALK PHOS: 113 U/L / ALT: 27 U/L / AST: 36 U/L / GGT: x             ABG - ( 28 Sep 2021 04:47 )  pH, Arterial: 7.45  pH, Blood: x     /  pCO2: 42    /  pO2: 158   / HCO3: 29    / Base Excess: 4.7   /  SaO2: 97.6      A/P:   81 y. o. female BIBA to [facility] from nursing facility c/o hematemesis.  According to pt it was the first episode and happened while she was eating dinner. Followed by multiple episodes of N/V with bright blood and epigastric cramps.  She was intubated for airway protection and underwent EGD that revealed large diverticula at 28 cm filled with blood and 5 mm tear at distal esophagus, a hiatal hernia and large amount of blood in the fundus.  Diverticula was injected with Epinephrine.  Patient was transferred to [facility] for further management. (10 Sep 2021 15:04). Pt had trach and PEG last week                              Neuro:                                         Pain control with Fentanyl  /  Tylenol PRN    Sedated on Propofol /  Precedex.                            Cardiovascular:                                          Chronic systolic heart failure with severe TR / Moderate MR  Dobutamine drip is being weand off, monitor CVP/Lactate   Gentle diuresis to keep I/Os  negative  Cardiology f/u                                Respiratory:                                         Pt is on full mechanical vent support. RU--40-5, CPAP weaning as tolarated                                          Comfortable, not in any distress.                                         Continue bronchodilators, pulmonary toilet - P     s/p drainage of right pleural effusion                                         A line, ABGs                            GI                                         Tolerating PEG feeds                                         Continue Zofran / Reglan for nausea - PRN  	    Monitor Hct and transfuse for Hb<8                                                                 Renal:     Replace K/Mg/Phos                                        Monitor I/Os and electrolytes     Hypernatremia: Cont Free water   Continue free water via PEG  Check BMP                                           Hypokalemia: Replaced K                                                Hem/ Onc:                                         Acute blood loss anemia     Monitor Hct and transfuse for Hb<8                                          Follow CBC in AM                           Infectious disease:                                          Sepsis / Shock: Completed antibiotics  Vanco / Meropenem /  Diflucan  Still febrile. Repeat blood c/s                                           Monitor for fever / leukocytosis.           I.D f/u                                                                    Endocrine                                             DM-2 / Hyperglycemia: Continue Accu-Checks with coverage    Pt is on SQ Heparin / Lovenox  and Venodyne boots for DVT prophylaxis.     Pertinent clinical, laboratory, radiographic, hemodynamic, echocardiographic, respiratory data, microbiologic data and chart were reviewed and analyzed frequently throughout the course of the day and night  Patient seen, examined and plan discussed with Dr. Zuñiga /   CTICU team during rounds.    I have spent  30  minutes of critical care time with this pt between  7am and 11.59pm.         Inder Lepe DO, FACEP

## 2021-09-28 NOTE — PROCEDURE NOTE - ADDITIONAL PROCEDURE DETAILS
Double lumen 5 Fr catheter placed at 15 cm length from the insertion site.
Ben Van PA-C  Department of Critical Care   In house [place] [id number]
Ben Van PA-C  Department of Critical Care   In house [place] [id number]
Patient requiring PIV access for medical management. Prior to procedure, patient was properly identified using name and . Site prepped using chlorhexidine and tourniquet applied. Ultrasound was used to identify a MEETA, easily compressible, no thrombus, non-pulsatile. A  20G x 10cm PowerGlide Pro Midline Catheter was introduced into the skin and inserted into the identified vessel. Flash seen in needle housing and placement confirmed on US. Catheter advanced, needle withdrawn, and placement again confirmed w/ US visualization. Clave with saline flush attached and placement again confirmed with positive blood return/ flash. IV flushes easily without resistance, and no swelling appreciated at insertion site. Site cleaned with alcohol, Cavilon skin prep applied, and cath secured with central line dressing. Patient tolerated procedure well with no complications and no blood loss. Nursing staff informed and dressing labeled with initials, date and time. Dressing to be changed Qweekly.     Ben Van PA-C  Department of Critical Care   In house [place] [id number]
CXR reviewed no obvious ptx
CXR after pigtail catheter insertion showing no obvious ptx

## 2021-09-28 NOTE — PROCEDURE NOTE - NSINDICATIONS_GEN_A_CORE
antibiotic therapy/fluid administration
critical patient/monitoring purposes
critical illness
venous access
arterial puncture to obtain ABG's/blood sampling/cannulation purposes/critical patient/monitoring purposes
critical illness
pleural effusion

## 2021-09-28 NOTE — PROGRESS NOTE ADULT - SUBJECTIVE AND OBJECTIVE BOX
Infectious Diseases progress note:    Subjective: Events noted.  Bcx from 9/27 ngtd.  No new fevers today.  Remains on mech ventilation and pressor support.  On TPN.      ROS:  Unable to assess due to pt clinical condition    Allergies    Sulfur Colliod (Rash)  Tylenol (Swelling)    Intolerances        ANTIBIOTICS/RELEVANT:  antimicrobials    immunologic:    OTHER:  ALBUTerol    90 MICROgram(s) HFA Inhaler 2 Puff(s) Inhalation every 6 hours  artificial tears (preservative free) Ophthalmic Solution 1 Drop(s) Both EYES two times a day  bisacodyl Suppository 10 milliGRAM(s) Rectal daily PRN  budesonide  80 MICROgram(s)/formoterol 4.5 MICROgram(s) Inhaler 2 Puff(s) Inhalation two times a day  busPIRone 15 milliGRAM(s) Oral <User Schedule>  chlorhexidine 0.12% Liquid 15 milliLiter(s) Oral Mucosa every 12 hours  chlorhexidine 4% Liquid 1 Application(s) Topical <User Schedule>  dexMEDEtomidine Infusion 0.5 MICROgram(s)/kG/Hr IV Continuous <Continuous>  dextrose 40% Gel 15 Gram(s) Oral once  dextrose 5%. 1000 milliLiter(s) IV Continuous <Continuous>  dextrose 5%. 1000 milliLiter(s) IV Continuous <Continuous>  dextrose 50% Injectable 25 Gram(s) IV Push once  dextrose 50% Injectable 12.5 Gram(s) IV Push once  dextrose 50% Injectable 25 Gram(s) IV Push once  donepezil 10 milliGRAM(s) Oral at bedtime  DULoxetine 60 milliGRAM(s) Oral daily  glucagon  Injectable 1 milliGRAM(s) IntraMuscular once  heparin   Injectable 5000 Unit(s) SubCutaneous every 12 hours  insulin lispro (ADMELOG) corrective regimen sliding scale   SubCutaneous every 6 hours  levothyroxine 150 MICROGram(s) Oral daily  LORazepam   Injectable 0.5 milliGRAM(s) IV Push every 8 hours  magnesium sulfate  IVPB 1 Gram(s) IV Intermittent once  memantine 10 milliGRAM(s) Oral every 12 hours  pantoprazole   Suspension 40 milliGRAM(s) Oral two times a day  polyethylene glycol 3350 17 Gram(s) Oral at bedtime  potassium chloride  20 mEq/100 mL IVPB 20 milliEquivalent(s) IV Intermittent every 1 hour  QUEtiapine 25 milliGRAM(s) Oral two times a day  senna 2 Tablet(s) Oral at bedtime  traZODone 100 milliGRAM(s) Oral every 12 hours      Objective:  Vital Signs Last 24 Hrs  T(C): 36.4 (28 Sep 2021 04:00), Max: 37.2 (28 Sep 2021 00:00)  T(F): 97.6 (28 Sep 2021 04:00), Max: 98.9 (28 Sep 2021 00:00)  HR: 74 (28 Sep 2021 15:00) (63 - 130)  BP: --  BP(mean): --  RR: 15 (28 Sep 2021 15:00) (15 - 25)  SpO2: 100% (28 Sep 2021 15:00) (100% - 100%)    PHYSICAL EXAM:  Constitutional:NAD  Eyes:LISA, EOMI  Ear/Nose/Throat: no thrush, mucositis.  Moist mucous membranes	  Neck:no JVD, no lymphadenopathy, supple, trach, on full mech ventilation  Respiratory: CTA shaila  Cardiovascular: S1S2 RRR, no murmurs  Gastrointestinal:soft, nontender,  nondistended (+) BS, peg  Extremities:no e/e/c  Skin:  no rashes, open wounds or ulcerations        LABS:                        9.0    6.80  )-----------( 359      ( 28 Sep 2021 04:47 )             29.3     09-28    150<H>  |  113<H>  |  23  ----------------------------<  138<H>  3.2<L>   |  27  |  0.42<L>    Ca    10.2      28 Sep 2021 13:23  Phos  2.8     09-28  Mg     1.80     09-28    TPro  5.8<L>  /  Alb  3.4  /  TBili  0.5  /  DBili  <0.2  /  AST  36<H>  /  ALT  27  /  AlkPhos  113  09-28          Procalcitonin, Serum: 0.07 (09-27 @ 03:30)                    MICROBIOLOGY:    Culture - Blood (09.27.21 @ 12:01)   Specimen Source: .Blood Blood-Peripheral   Culture Results:   No growth to date.     Culture - Blood (09.27.21 @ 12:01)   Specimen Source: .Blood Blood-Peripheral   Culture Results:   No growth to date.       RADIOLOGY & ADDITIONAL STUDIES:    < from: Xray Chest 1 View- PORTABLE-Routine (Xray Chest 1 View- PORTABLE-Routine in AM.) (09.28.21 @ 06:23) >  INTERPRETATION:    Tracheostomy tube unchanged. Hazy left lung base obscuring the hemidiaphragm consistent with a small layering effusion. Remainder of both lungs are clear. Heart size is stable on this rotated image. No pneumothorax.      COMPARISON:  September 27      IMPRESSION:  Smallleft pleural effusion.    < end of copied text >

## 2021-09-28 NOTE — PROCEDURE NOTE - NSPROCNAME_GEN_A_CORE
Central Line Insertion
Central Line Insertion
Chest Tube
Arterial Puncture/Cannulation
Peripheral Line Insertion
Midline Insertion
Arterial Puncture/Cannulation

## 2021-09-28 NOTE — PROCEDURE NOTE - NSSITEPREP_SKIN_A_CORE
chlorhexidine
alcohol
chlorhexidine/Adherence to aseptic technique: hand hygiene prior to donning barriers (gown, gloves), don cap and mask, sterile drape over patient

## 2021-09-28 NOTE — PROGRESS NOTE ADULT - ASSESSMENT
81 y. o. female BIBA to [facility] from nursing facility c/o hematemesis.  According to pt it was the first episode and happened while she was eating dinner. Followed by multiple episodes of N/V with bright blood and epigastric cramps.  She was intubated for airway protection and underwent EGD that revealed large diverticula at 28 cm filled with blood and 5 mm tear at distal esophagus, a hiatal hernia and large amount of blood in the fundus.  Diverticula was injected with Epinephrine.  Patient was transferred to [facility] for further management. (10 Sep 2021 15:04)    Pt followed by GI, s/p repeat EGD on 9/14 -  showing significant clot in large esophageal diverticula -- without obvious source of etiology of bleeding (ie no vessel) + no esophageal mass seen.  Pt on pressors, thought to be unlikely hemorrhagic given stable Hb.    9/15: WBC 10.8, sputum cx (9/10) with nl resp tawana. CT c/a/p shows a 7.2 cm masslike structure distending the mid to distal esophagus, suspicious for malignancy.  Bilateral tree-in-bud nodular opacities, predominantly in the left lower lobe, new/increased from 9/9/2021, raising concern for infection.  Left lower lobe consolidative opacity, possible combination of atelectasis and pneumonia.  (+) hypodensities in the liver suspicion for malignancy, and stercoral colitis.    Pt with fever, on pressors.  Pt is NPO on TPN.   Pt on empiric abx, ID consulted for further abx managment.     Fever/sepsis:    - pt with fever, on pressor support.  Abx broadened to vanco and meropenem.  Fluconazole added on 9/17 due to continued fevers.  - S/p central line change on 9/17.   - CT c/a/p noted, increased LLL consolidation, possible aspiration.  f/u repeat sputum cx - nl resp tawana    - Check bcx x 2 - NGTD.  Sputum cx no resp tawana  - f/u WBC and fever curve.  Improving, abx course completed after 7 days, d/c'd on 9/23.  Cont to monitor pt off abx.     * Pt seen by Palliative care service for GOC discussion - pt remains full code.  s/p trach/peg today.      * Tmax >101 on 9/27.  Recommend repeat bcx x 2 - ngtd at 24 hrs on 9/28.  Repeat cxr with small b/l layering pl effusions,  otherwise clear lungs noted.  s/p removal of pigtail cath.  Pt remains on TPN, on mech ventilation.  Currently has completed course of abx.  Cont to monitor off for now.         Karen Casillas  [phone]

## 2021-09-28 NOTE — PROCEDURE NOTE - PROCEDURE DATE TIME, MLM
21-Sep-2021 12:53
28-Sep-2021 15:07
26-Sep-2021 13:31
17-Sep-2021 14:00
10-Sep-2021 12:00
11-Sep-2021 14:21
21-Sep-2021 13:15

## 2021-09-29 LAB
ALBUMIN SERPL ELPH-MCNC: 3 G/DL — LOW (ref 3.3–5)
ALP SERPL-CCNC: 100 U/L — SIGNIFICANT CHANGE UP (ref 40–120)
ALT FLD-CCNC: 32 U/L — SIGNIFICANT CHANGE UP (ref 4–33)
ANION GAP SERPL CALC-SCNC: 10 MMOL/L — SIGNIFICANT CHANGE UP (ref 7–14)
AST SERPL-CCNC: 38 U/L — HIGH (ref 4–32)
BILIRUB DIRECT SERPL-MCNC: <0.2 MG/DL — SIGNIFICANT CHANGE UP (ref 0–0.2)
BILIRUB INDIRECT FLD-MCNC: >0.3 MG/DL — SIGNIFICANT CHANGE UP (ref 0–1)
BILIRUB SERPL-MCNC: 0.5 MG/DL — SIGNIFICANT CHANGE UP (ref 0.2–1.2)
BLOOD GAS ARTERIAL COMPREHENSIVE RESULT: SIGNIFICANT CHANGE UP
BLOOD GAS ARTERIAL COMPREHENSIVE RESULT: SIGNIFICANT CHANGE UP
BUN SERPL-MCNC: 22 MG/DL — SIGNIFICANT CHANGE UP (ref 7–23)
CALCIUM SERPL-MCNC: 10.2 MG/DL — SIGNIFICANT CHANGE UP (ref 8.4–10.5)
CHLORIDE SERPL-SCNC: 111 MMOL/L — HIGH (ref 98–107)
CO2 SERPL-SCNC: 26 MMOL/L — SIGNIFICANT CHANGE UP (ref 22–31)
CREAT SERPL-MCNC: 0.42 MG/DL — LOW (ref 0.5–1.3)
GLUCOSE BLDC GLUCOMTR-MCNC: 122 MG/DL — HIGH (ref 70–99)
GLUCOSE BLDC GLUCOMTR-MCNC: 139 MG/DL — HIGH (ref 70–99)
GLUCOSE BLDC GLUCOMTR-MCNC: 164 MG/DL — HIGH (ref 70–99)
GLUCOSE SERPL-MCNC: 146 MG/DL — HIGH (ref 70–99)
HCT VFR BLD CALC: 29.3 % — LOW (ref 34.5–45)
HGB BLD-MCNC: 9.1 G/DL — LOW (ref 11.5–15.5)
MAGNESIUM SERPL-MCNC: 1.9 MG/DL — SIGNIFICANT CHANGE UP (ref 1.6–2.6)
MCHC RBC-ENTMCNC: 30.1 PG — SIGNIFICANT CHANGE UP (ref 27–34)
MCHC RBC-ENTMCNC: 31.1 GM/DL — LOW (ref 32–36)
MCV RBC AUTO: 97 FL — SIGNIFICANT CHANGE UP (ref 80–100)
NRBC # BLD: 0 /100 WBCS — SIGNIFICANT CHANGE UP
NRBC # FLD: 0 K/UL — SIGNIFICANT CHANGE UP
PHOSPHATE SERPL-MCNC: 2.9 MG/DL — SIGNIFICANT CHANGE UP (ref 2.5–4.5)
PLATELET # BLD AUTO: 343 K/UL — SIGNIFICANT CHANGE UP (ref 150–400)
POTASSIUM SERPL-MCNC: 3.7 MMOL/L — SIGNIFICANT CHANGE UP (ref 3.5–5.3)
POTASSIUM SERPL-SCNC: 3.7 MMOL/L — SIGNIFICANT CHANGE UP (ref 3.5–5.3)
PROT SERPL-MCNC: 5.2 G/DL — LOW (ref 6–8.3)
RBC # BLD: 3.02 M/UL — LOW (ref 3.8–5.2)
RBC # FLD: 15.9 % — HIGH (ref 10.3–14.5)
SODIUM SERPL-SCNC: 147 MMOL/L — HIGH (ref 135–145)
WBC # BLD: 7.4 K/UL — SIGNIFICANT CHANGE UP (ref 3.8–10.5)
WBC # FLD AUTO: 7.4 K/UL — SIGNIFICANT CHANGE UP (ref 3.8–10.5)

## 2021-09-29 PROCEDURE — 71045 X-RAY EXAM CHEST 1 VIEW: CPT | Mod: 26

## 2021-09-29 PROCEDURE — 99291 CRITICAL CARE FIRST HOUR: CPT

## 2021-09-29 RX ORDER — POTASSIUM CHLORIDE 20 MEQ
40 PACKET (EA) ORAL ONCE
Refills: 0 | Status: COMPLETED | OUTPATIENT
Start: 2021-09-29 | End: 2021-09-29

## 2021-09-29 RX ORDER — METOPROLOL TARTRATE 50 MG
2.5 TABLET ORAL ONCE
Refills: 0 | Status: COMPLETED | OUTPATIENT
Start: 2021-09-29 | End: 2021-09-29

## 2021-09-29 RX ORDER — LANOLIN ALCOHOL/MO/W.PET/CERES
3 CREAM (GRAM) TOPICAL AT BEDTIME
Refills: 0 | Status: DISCONTINUED | OUTPATIENT
Start: 2021-09-29 | End: 2021-10-05

## 2021-09-29 RX ORDER — HYDROMORPHONE HYDROCHLORIDE 2 MG/ML
0.5 INJECTION INTRAMUSCULAR; INTRAVENOUS; SUBCUTANEOUS ONCE
Refills: 0 | Status: DISCONTINUED | OUTPATIENT
Start: 2021-09-29 | End: 2021-09-29

## 2021-09-29 RX ORDER — METOPROLOL TARTRATE 50 MG
12.5 TABLET ORAL EVERY 12 HOURS
Refills: 0 | Status: DISCONTINUED | OUTPATIENT
Start: 2021-09-29 | End: 2021-09-30

## 2021-09-29 RX ORDER — SIMVASTATIN 20 MG/1
10 TABLET, FILM COATED ORAL AT BEDTIME
Refills: 0 | Status: DISCONTINUED | OUTPATIENT
Start: 2021-09-29 | End: 2021-10-05

## 2021-09-29 RX ORDER — QUETIAPINE FUMARATE 200 MG/1
25 TABLET, FILM COATED ORAL EVERY 8 HOURS
Refills: 0 | Status: DISCONTINUED | OUTPATIENT
Start: 2021-09-29 | End: 2021-09-29

## 2021-09-29 RX ORDER — QUETIAPINE FUMARATE 200 MG/1
25 TABLET, FILM COATED ORAL EVERY 12 HOURS
Refills: 0 | Status: DISCONTINUED | OUTPATIENT
Start: 2021-09-29 | End: 2021-10-01

## 2021-09-29 RX ORDER — MAGNESIUM SULFATE 500 MG/ML
1 VIAL (ML) INJECTION ONCE
Refills: 0 | Status: COMPLETED | OUTPATIENT
Start: 2021-09-29 | End: 2021-09-29

## 2021-09-29 RX ADMIN — PANTOPRAZOLE SODIUM 40 MILLIGRAM(S): 20 TABLET, DELAYED RELEASE ORAL at 05:25

## 2021-09-29 RX ADMIN — QUETIAPINE FUMARATE 25 MILLIGRAM(S): 200 TABLET, FILM COATED ORAL at 05:25

## 2021-09-29 RX ADMIN — BUDESONIDE AND FORMOTEROL FUMARATE DIHYDRATE 2 PUFF(S): 160; 4.5 AEROSOL RESPIRATORY (INHALATION) at 08:39

## 2021-09-29 RX ADMIN — Medication 2.5 MILLIGRAM(S): at 21:36

## 2021-09-29 RX ADMIN — Medication 40 MILLIEQUIVALENT(S): at 06:21

## 2021-09-29 RX ADMIN — Medication 0.5 MILLIGRAM(S): at 10:15

## 2021-09-29 RX ADMIN — MEMANTINE HYDROCHLORIDE 10 MILLIGRAM(S): 10 TABLET ORAL at 17:04

## 2021-09-29 RX ADMIN — Medication 100 MILLIGRAM(S): at 05:26

## 2021-09-29 RX ADMIN — HEPARIN SODIUM 5000 UNIT(S): 5000 INJECTION INTRAVENOUS; SUBCUTANEOUS at 05:25

## 2021-09-29 RX ADMIN — Medication 100 MILLIGRAM(S): at 17:03

## 2021-09-29 RX ADMIN — CHLORHEXIDINE GLUCONATE 15 MILLILITER(S): 213 SOLUTION TOPICAL at 17:04

## 2021-09-29 RX ADMIN — HYDROMORPHONE HYDROCHLORIDE 0.5 MILLIGRAM(S): 2 INJECTION INTRAMUSCULAR; INTRAVENOUS; SUBCUTANEOUS at 12:35

## 2021-09-29 RX ADMIN — PANTOPRAZOLE SODIUM 40 MILLIGRAM(S): 20 TABLET, DELAYED RELEASE ORAL at 17:03

## 2021-09-29 RX ADMIN — Medication 150 MICROGRAM(S): at 05:26

## 2021-09-29 RX ADMIN — Medication 100 GRAM(S): at 06:21

## 2021-09-29 RX ADMIN — ALBUTEROL 2 PUFF(S): 90 AEROSOL, METERED ORAL at 03:16

## 2021-09-29 RX ADMIN — CHLORHEXIDINE GLUCONATE 15 MILLILITER(S): 213 SOLUTION TOPICAL at 05:26

## 2021-09-29 RX ADMIN — Medication 3 MILLIGRAM(S): at 23:40

## 2021-09-29 RX ADMIN — Medication 15 MILLIGRAM(S): at 17:04

## 2021-09-29 RX ADMIN — Medication 0.5 MILLIGRAM(S): at 16:18

## 2021-09-29 RX ADMIN — BUDESONIDE AND FORMOTEROL FUMARATE DIHYDRATE 2 PUFF(S): 160; 4.5 AEROSOL RESPIRATORY (INHALATION) at 21:55

## 2021-09-29 RX ADMIN — ALBUTEROL 2 PUFF(S): 90 AEROSOL, METERED ORAL at 08:38

## 2021-09-29 RX ADMIN — Medication 0.5 MILLIGRAM(S): at 21:14

## 2021-09-29 RX ADMIN — ALBUTEROL 2 PUFF(S): 90 AEROSOL, METERED ORAL at 15:11

## 2021-09-29 RX ADMIN — Medication 1 DROP(S): at 05:26

## 2021-09-29 RX ADMIN — DONEPEZIL HYDROCHLORIDE 10 MILLIGRAM(S): 10 TABLET, FILM COATED ORAL at 21:14

## 2021-09-29 RX ADMIN — MEMANTINE HYDROCHLORIDE 10 MILLIGRAM(S): 10 TABLET ORAL at 05:26

## 2021-09-29 RX ADMIN — POLYETHYLENE GLYCOL 3350 17 GRAM(S): 17 POWDER, FOR SOLUTION ORAL at 21:14

## 2021-09-29 RX ADMIN — SENNA PLUS 2 TABLET(S): 8.6 TABLET ORAL at 21:14

## 2021-09-29 RX ADMIN — HYDROMORPHONE HYDROCHLORIDE 0.5 MILLIGRAM(S): 2 INJECTION INTRAMUSCULAR; INTRAVENOUS; SUBCUTANEOUS at 13:00

## 2021-09-29 RX ADMIN — Medication 0.5 MILLIGRAM(S): at 12:36

## 2021-09-29 RX ADMIN — SIMVASTATIN 10 MILLIGRAM(S): 20 TABLET, FILM COATED ORAL at 21:14

## 2021-09-29 RX ADMIN — QUETIAPINE FUMARATE 25 MILLIGRAM(S): 200 TABLET, FILM COATED ORAL at 17:04

## 2021-09-29 RX ADMIN — Medication 1 DROP(S): at 17:04

## 2021-09-29 RX ADMIN — CHLORHEXIDINE GLUCONATE 1 APPLICATION(S): 213 SOLUTION TOPICAL at 06:14

## 2021-09-29 RX ADMIN — DULOXETINE HYDROCHLORIDE 60 MILLIGRAM(S): 30 CAPSULE, DELAYED RELEASE ORAL at 10:16

## 2021-09-29 RX ADMIN — ALBUTEROL 2 PUFF(S): 90 AEROSOL, METERED ORAL at 21:55

## 2021-09-29 RX ADMIN — Medication 12.5 MILLIGRAM(S): at 23:40

## 2021-09-29 RX ADMIN — Medication 0.5 MILLIGRAM(S): at 05:25

## 2021-09-29 NOTE — PROGRESS NOTE ADULT - ASSESSMENT
81 y. o. female BIBA to [facility] from nursing facility c/o hematemesis.  According to pt it was the first episode and happened while she was eating dinner. Followed by multiple episodes of N/V with bright blood and epigastric cramps.  She was intubated for airway protection and underwent EGD that revealed large diverticula at 28 cm filled with blood and 5 mm tear at distal esophagus, a hiatal hernia and large amount of blood in the fundus.  Diverticula was injected with Epinephrine.  Patient was transferred to [facility] for further management. (10 Sep 2021 15:04)    Pt followed by GI, s/p repeat EGD on 9/14 -  showing significant clot in large esophageal diverticula -- without obvious source of etiology of bleeding (ie no vessel) + no esophageal mass seen.  Pt on pressors, thought to be unlikely hemorrhagic given stable Hb.    9/15: WBC 10.8, sputum cx (9/10) with nl resp tawana. CT c/a/p shows a 7.2 cm masslike structure distending the mid to distal esophagus, suspicious for malignancy.  Bilateral tree-in-bud nodular opacities, predominantly in the left lower lobe, new/increased from 9/9/2021, raising concern for infection.  Left lower lobe consolidative opacity, possible combination of atelectasis and pneumonia.  (+) hypodensities in the liver suspicion for malignancy, and stercoral colitis.    Pt with fever, on pressors.  Pt is NPO on TPN.   Pt on empiric abx, ID consulted for further abx managment.     Fever/sepsis:    - pt with fever, on pressor support.  Abx broadened to vanco and meropenem.  Fluconazole added on 9/17 due to continued fevers.  - S/p central line change on 9/17.   - CT c/a/p noted, increased LLL consolidation, possible aspiration.  f/u repeat sputum cx - nl resp tawana    - Check bcx x 2 - NGTD.  Sputum cx no resp tawana  - f/u WBC and fever curve.  Improving, abx course completed after 7 days, d/c'd on 9/23.  Cont to monitor pt off abx.     * Pt seen by Palliative care service for GOC discussion - pt remains full code.  s/p trach/peg.      * Tmax >101 on 9/27.  Recommend repeat bcx x 2 - ngtd at 24 hrs on 9/28.  Repeat cxr with small b/l layering pl effusions,  otherwise clear lungs noted.  s/p removal of pigtail cath.  Pt remains on TPN, on mech ventilation.  Currently has completed course of abx.  Cont to monitor off for now.   No active ID issues at this time.  Had Tmax today of 100.    d/w CTU        Karen [facility]  [phone]

## 2021-09-29 NOTE — CHART NOTE - NSCHARTNOTEFT_GEN_A_CORE
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 She was intubated for airway protection on 9/9. CT CHEST @ [facility] with concern for esophageal mass and s/p EGD 9/9 with LARGE blood filled esophageal diverticula and possible esophageal tear. Case with discussed with Thoracic and she was transferred to [facility] on 9/10. s/p bronchoscopy on admission to [facility] and noted with blood in airway. Given poor suction on portable bronchoscopy, bronchoscopy aborted. She was continued on Zosyn from 9/10-9/15 empirically as cultures remained negative, however on 9/15 fever spike to TMAX 102F noted and ABX were broadened to Vancomycin and Meropenem. Fever spikes continued and ECHO on 9/15 noted with EF 20%, mod MR, severely dilated LA, severe LVSD, severe FLAVIO, decreased RVSF, mod-severe TR, mild pHTN. Dobutamine and Lasix GTT started and patient recultured. Cultures remained negative, however fever spikes continued. Diflucan added on 9/17 with ID guidance. R pleural effusion noted and PTC placed on 9/21. Pleural fluid noted to be negative and PTC removed. ABX continued through 9/22 and then monitored off. CVP pressures improved, but course further complicated by contraction alkalosis. Lasix GTT dc'ed and s/p multiple doses of Diamox with improvement in dehydration. Sedation weaned, however poor vent weaning and synchrony with vent noted. s/p Tracheostomy and PEG on 9/24. RPT ECHO 9/27 with improved EF to 60% and LVSD (now normal). LA/ RA dilation, RVSD, and severe TR remains. Dobutamine weaned off. Precedex GTT weaned off, and patient continued on home Dementia and Anxiety medications. Seroquel 25mg BID, Ativan 0.5mg TID and PRN Dilaudid IVPs continued for vent synchrony. Tolerates some PS (~1 hour a day). RUE Midline associated DVT noted on 9/28 and Midline removed. Patient now pending bed in RCU.     Vital Signs Last 24 Hrs  T(C): 37.3 (29 Sep 2021 15:00), Max: 37.8 (29 Sep 2021 05:00)  T(F): 99.1 (29 Sep 2021 15:00), Max: 100 (29 Sep 2021 05:00)  HR: 115 (29 Sep 2021 16:00) (7 - 130)  BP: --  BP(mean): --  RR: 18 (29 Sep 2021 16:00) (17 - 25)  SpO2: 98% (29 Sep 2021 16:00) (98% - 100%)  I&O's Summary    28 Sep 2021 07:01  -  29 Sep 2021 07:00  --------------------------------------------------------  IN: 3430.5 mL / OUT: 1775 mL / NET: 1655.5 mL    29 Sep 2021 07:01  -  29 Sep 2021 17:30  --------------------------------------------------------  IN: 868 mL / OUT: 1150 mL / NET: -282 mL    MEDICATIONS  (STANDING):  ALBUTerol    90 MICROgram(s) HFA Inhaler 2 Puff(s) Inhalation every 6 hours  artificial tears (preservative free) Ophthalmic Solution 1 Drop(s) Both EYES two times a day  budesonide  80 MICROgram(s)/formoterol 4.5 MICROgram(s) Inhaler 2 Puff(s) Inhalation two times a day  busPIRone 15 milliGRAM(s) Oral <User Schedule>  chlorhexidine 0.12% Liquid 15 milliLiter(s) Oral Mucosa every 12 hours  chlorhexidine 4% Liquid 1 Application(s) Topical <User Schedule>  dextrose 50% Injectable 25 Gram(s) IV Push once  donepezil 10 milliGRAM(s) Oral at bedtime  DULoxetine 60 milliGRAM(s) Oral daily  heparin   Injectable 5000 Unit(s) SubCutaneous every 12 hours  insulin lispro (ADMELOG) corrective regimen sliding scale   SubCutaneous every 6 hours  levothyroxine 150 MICROGram(s) Oral daily  LORazepam   Injectable 0.5 milliGRAM(s) IV Push every 8 hours  melatonin 3 milliGRAM(s) Oral at bedtime  memantine 10 milliGRAM(s) Oral every 12 hours  pantoprazole   Suspension 40 milliGRAM(s) Oral two times a day  polyethylene glycol 3350 17 Gram(s) Oral at bedtime  QUEtiapine 25 milliGRAM(s) Oral every 12 hours  senna 2 Tablet(s) Oral at bedtime  simvastatin 10 milliGRAM(s) Oral at bedtime  traZODone 100 milliGRAM(s) Oral every 12 hours    MEDICATIONS  (PRN):  bisacodyl Suppository 10 milliGRAM(s) Rectal daily PRN Constipation  LORazepam   Injectable 0.5 milliGRAM(s) IV Push every 4 hours PRN Agitation    LABS                                          9.1                    (09-29 @ 03:56):    7.40 )-----------(343                                                    29.3                                                     147    |  111    |  22                         Calcium: 10.2     (09-29 @ 03:56)    ----------------------------<  146       Magnesium: 1.90                             3.7     |  26     |  0.42                        Phosphorous: 2.9      TPro  5.2    /  Alb  3.0    /  TBili  0.5    /  DBili  <0.2   /  AST  38     /  ALT  32     /  AlkPhos  100    29 Sep 2021 03:56    ASSESSMENT & PLAN:   Ms Villalpando is an 82 YO Female with past medical history of Dementia, COPD/ Emphysema, Hypothyroidism, HF (unknown EF), Moderate MR and Severe TR, AFIB on Eliquis and Esophageal Diverticula from [facility] presented to [facility] via EMS with Hematemesis. She was intubated for airway protection on 9/9. EGD @ OSH with large blood filled esophageal diverticula and possible esophageal tear. Transferred to [facility] 9/10 and s/p EGD 9/14 with no esophageal tear or bleed, but (+) esophageal diverticula with clot. Course complicated by fevers likely from aspiration, septic shock, biventricular heart failure, and poor vent weaning requiring Tracheostomy and PEG placement on 9/24. Now pending transfer to RCU.     # NEUROLOGY  - Baseline Dementia with mentation AOx1-2 as per Son. Patient reportedly able to eat and ambulate with assistance at [facility] per Son.   - Continue on Buspar, Cymbalta, Trazadone, Aricept and Namenda as per home doses.   - Now with anxiety and agitation and continued on Seroquel 25mg BID, Ativan 0.5mg IV TID and Ativan vs Dilaudid PRN doses.   - Monitor mentation.     # CARDIOVASCULAR  - Baseline HF (unknown EF), Moderate MR and Severe TR and AFIB on Eliquis   - Course complicated with biventricular HF requiring dobutamine. Now weaned off.   - ECHO 9/15 noted with EF 20%, mod MR, severely dilated LA, severe LVSD, severe FLAVIO, decreased RVSF, mod-severe TR, mild pHTN.   - RPT ECHO 9/27 with improved EF to 60% and LVSD (now normal). LA/ RA dilation, RVSD, and severe TR remains.   - Continue holding Eliquis for now given Esophageal Diverticular bleed.   - On no rate control medications at home and remains with rate controlled AFIB.   - Monitor HR/ BP.     # RESPIRATORY  - COPD/ Emphysema at baseline and presented to [facility] with hematemesis. Intubated 9/9 for airway protection and treatment for aspiration. Poor vent weaning and s/p Trach 9/24.   - Continue on AC Vent 18/350/5/40 and PS 15/5 vs 10/5 as tolerated.   - Continue on Proventil and Symbicort with Chest PT Q6H   - Tracheostomy 4 and 10 o clock sutures removed and pending remain suture removal by CTSx on 10/3 (10-14 days from tracheostomy placement).   - Tracheostomy care QD   - Suction PRN     # GI  - FTT requiring TPN during hospitalization. Now s/p PEG 9/24.   - Continue on TF with Jevity @ 52cc/hr goal rate.  - Continue on Miralax, Senna and PPI.   - Monitor BMs.     # RENAL  - Mild dehydration noted with diuresis and third spacing.   - Continue on free H20 250cc Q4H and monitor fluid status.   - Monitor renal function and UOP.     # INFECTIOUS DISEASE  - Aspiration noted and s/p Zosyn (9/10-9/15) with continued fever spikes and broadened  - to Meropenem (9/15 - 9/22), Vancomycin (9/15 - 9/22) and Diflucan (9/17 - 9/22).   - SCx, BCx, UA and COVID PCR remained negative.   - Pleural Fluid 9/21 negative     # HEME  - R Midline associated DVT noted. Midline removed and will monitor.   - Holding home Eliquis for AFIB given diverticular bleed.     # ENDOCRINE  - Hypothyroidism and continued on home Synthroid 150mcg QD.      # LINES/ TUBES  - Tracheostomy and PEG 9/24   - LUE Midline 9/28   - RUE Midline (9/26-9/28)  - R IJ TLC (9/17 - 9/26)   - L IJ TLC (9/11 - 9/17)  - L Radial DEEPAK (9/10 - 9/21)  - L Axillary DEEPAK (9/21 - to be removed on 9/30).     # ETHICS/ GOC/ DISPO   - Case discussed with Son, Favio Villalpando ([phone]) at length with CTI team and Palliative care. Son wishes for mom to be FULL CODE.   - DISPO likely vent facility.     # DVT PPX with HSQ.     For Followup:  [ ] PS trials as tolerated with Dilaudid vs Ativan pushes.   [ ] DISPO for Vent Facility     Ben Van PA-C  Department of Critical Care   In house [place] [id number]

## 2021-09-29 NOTE — PROGRESS NOTE ADULT - SUBJECTIVE AND OBJECTIVE BOX
CHIEF COMPLAINT: FOLLOW UP IN ICU FOR POSTOPERATIVE CARE OF PATIENT FOR VENTILATOR WEANING      PROCEDURE : TRACH AND PEG 9/24/2021      Issues:              Acute Hypoxic Respiratory failure  Acute on Chronic systolic heart failure  Moderate MR  Severe TR              Esophageal diverticular bleed.   Encounter for ventilator weaning  Acute blood loss anemia  COPD  Chronic Afib -- previously on Apixaban  Hypothyroidism  GERD  Pleural effusion  Protein calorie malnutrition      Interval events:  Placed on PS 10/5 for 2 hours before becoming tachypenic.  Weaned off precedex.      HISTORY:   Unable to obtain history or ROS due to delirium    PHYSICAL EXAM:   Gen: Comfortable, No acute distress  Eyes: Sclera white, Conjunctiva normal, Eyelids normal, Pupils symmetrical   ENT: Mucous membranes moist,  ,  ,    Neck: Trachea midline,  ,  , tracheostomy in place,  ,    CV: Rate regular, Rhythm regular,  ,  ,    Resp: Breath sounds clear, No accessory muscles use,  ,  ,    Abd: Soft, Non-distended, Non-tender, Bowel sounds normal, feeding tube in place,  ,    Skin: Warm, No peripheral edema of lower extremities,  ,    : No bearden  Neuro: Moving all 4 extremities,    Psych: RASS -2      ASSESSMENT AND PLAN:  NEURO:  Delirium, dementia - Improved. Continue Seroquel q12h, buspirone PO, donepezil, duloxetine, memantine, melatonin.            RESPIRATORY:  Respiratory failure s/p trach requiring mechanical ventilation - Spontaneous breathing trials with PS. Trach collar trials if does well on CPAP. Wean mechanical ventilation. Monitor ABG. Wean FIO2 for goal O2sat above 92. Vent bundle.       CARDIOVASCULAR:  Hemodynamically stable - Not on pressors. Continue hemodynamic monitoring.  Telemetry (medical test) - Reviewed by me today independently. Normal sinus rhythm.        Chronic Afib - stable. continue rhythm and rate control meds. Hold anticoagulation          RENAL:  Stable - Monitor IOs and electrolytes. Keep K above 4.0 and Mg above 2.0.         GASTROINTESTINAL:  GI prophylaxis not indicated  Zofran and Reglan IV PRN for nausea  Tube feed             HEMATOLOGIC:  Acute post-hemorrhagic anemia - Improved. Monitor CBC. Transfuse PRBC as needed. Monitor chest tube output.  DVT prophylaxis with heparin subQ and SCDs.       INFECTIOUS DISEASE:  All surgical sites appear clean. Will monitor for fever and leukocytosis.           ENDOCRINE:  Stable – Monitor glucose fingersticks for goal 120-180.  Hypothyroidism - stable. Continue Synthroid.       Pertinent clinical, laboratory, radiographic, hemodynamic, echocardiographic, respiratory data, microbiologic data and chart were reviewed by myself and analyzed frequently throughout the course of the day and night by myself.    Plan discussed at length with the CTICU staff and Attending CT Surgeon -   Dr Cedrick Zuñiga.      Patient unable to participate with discussion of plan.    Patient required critical care management.  75 minutes were spent evaluating, managing, providing, coordinating, and documenting care for this patient, exclusive of procedures from  7AM to  1159PM.      ________________________________________________    _________________________  VITAL SIGNS:  Vital Signs Last 24 Hrs  T(C): 37.3 (29 Sep 2021 20:00), Max: 37.8 (29 Sep 2021 05:00)  T(F): 99.1 (29 Sep 2021 20:00), Max: 100 (29 Sep 2021 05:00)  HR: 105 (29 Sep 2021 23:00) (7 - 130)  BP: --  BP(mean): --  RR: 22 (29 Sep 2021 23:00) (18 - 25)  SpO2: 98% (29 Sep 2021 23:00) (98% - 100%)  I/Os:   I&O's Detail    28 Sep 2021 07:01  -  29 Sep 2021 07:00  --------------------------------------------------------  IN:    Dexmedetomidine: 132.5 mL    dextrose 5%: 200 mL    Enteral Tube Flush: 1450 mL    IV PiggyBack: 200 mL    IV PiggyBack: 200 mL    Jevity 1.2: 1248 mL  Total IN: 3430.5 mL    OUT:    Indwelling Catheter - Urethral (mL): 1775 mL  Total OUT: 1775 mL    Total NET: 1655.5 mL      29 Sep 2021 07:01  -  30 Sep 2021 00:45  --------------------------------------------------------  IN:    Enteral Tube Flush: 460 mL    Jevity 1.2: 728 mL  Total IN: 1188 mL    OUT:    Indwelling Catheter - Urethral (mL): 1600 mL  Total OUT: 1600 mL    Total NET: -412 mL          Mode: AC/ CMV (Assist Control/ Continuous Mandatory Ventilation)  RR (machine): 18  TV (machine): 300  FiO2: 40  PEEP: 5  ITime: 0.7  MAP: 8  PIP: 17      MEDICATIONS:  MEDICATIONS  (STANDING):  ALBUTerol    90 MICROgram(s) HFA Inhaler 2 Puff(s) Inhalation every 6 hours  artificial tears (preservative free) Ophthalmic Solution 1 Drop(s) Both EYES two times a day  budesonide  80 MICROgram(s)/formoterol 4.5 MICROgram(s) Inhaler 2 Puff(s) Inhalation two times a day  busPIRone 15 milliGRAM(s) Oral <User Schedule>  chlorhexidine 0.12% Liquid 15 milliLiter(s) Oral Mucosa every 12 hours  chlorhexidine 4% Liquid 1 Application(s) Topical <User Schedule>  dextrose 50% Injectable 25 Gram(s) IV Push once  donepezil 10 milliGRAM(s) Oral at bedtime  DULoxetine 60 milliGRAM(s) Oral daily  heparin   Injectable 5000 Unit(s) SubCutaneous every 12 hours  insulin lispro (ADMELOG) corrective regimen sliding scale   SubCutaneous every 6 hours  levothyroxine 150 MICROGram(s) Oral daily  LORazepam   Injectable 0.5 milliGRAM(s) IV Push every 8 hours  melatonin 3 milliGRAM(s) Oral at bedtime  memantine 10 milliGRAM(s) Oral every 12 hours  metoprolol tartrate 12.5 milliGRAM(s) Enteral Tube every 12 hours  pantoprazole   Suspension 40 milliGRAM(s) Oral two times a day  polyethylene glycol 3350 17 Gram(s) Oral at bedtime  QUEtiapine 25 milliGRAM(s) Oral every 12 hours  senna 2 Tablet(s) Oral at bedtime  simvastatin 10 milliGRAM(s) Oral at bedtime  traZODone 100 milliGRAM(s) Oral every 12 hours    MEDICATIONS  (PRN):  bisacodyl Suppository 10 milliGRAM(s) Rectal daily PRN Constipation      LABS:  Laboratory data was independently reviewed by me today.                           9.1    7.40  )-----------( 343      ( 29 Sep 2021 03:56 )             29.3     09-29    147<H>  |  111<H>  |  22  ----------------------------<  146<H>  3.7   |  26  |  0.42<L>    Ca    10.2      29 Sep 2021 03:56  Phos  2.9     09-29  Mg     1.90     09-29    TPro  5.2<L>  /  Alb  3.0<L>  /  TBili  0.5  /  DBili  <0.2  /  AST  38<H>  /  ALT  32  /  AlkPhos  100  09-29    LIVER FUNCTIONS - ( 29 Sep 2021 03:56 )  Alb: 3.0 g/dL / Pro: 5.2 g/dL / ALK PHOS: 100 U/L / ALT: 32 U/L / AST: 38 U/L / GGT: x             ABG - ( 29 Sep 2021 17:22 )  pH, Arterial: 7.43  pH, Blood: x     /  pCO2: 45    /  pO2: 155   / HCO3: 30    / Base Excess: 4.9   /  SaO2: 97.7                  RADIOLOGY:   Radiology images were independently reviewed by me today. Reports were reviewed by me today.    Xray Chest 1 View- PORTABLE-Routine:   EXAM:  XR CHEST PORTABLE ROUTINE 1V        PROCEDURE DATE:  Sep 29 2021         INTERPRETATION:  TIME OF EXAM: September 29, 2021 at 6:03 AM    CLINICAL INFORMATION: Follow-up    TECHNIQUE:   Portable chest    INTERPRETATION:    The tracheostomy tube is present. Persistent rotation into an [place] projection. Lungs show no focal consolidations but mild interstitial opacities perhaps edema are is seen. Small left effusion unchanged. No pneumothorax.    Right-sided midline catheter terminating in theregion of the axilla as seen on the last exam not visible on this study.      COMPARISON:  September 28      IMPRESSION:  Follow-up study showing no focal consolidations but small layering left effusion and tracheostomy tube in place.    --- End of Report ---              ONUR AVILES MD; Attending Radiologist  This document has been electronically signed. Sep 29 2021 11:13AM (09-29-21 @ 07:44)  Xray Chest 1 View- PORTABLE-Routine:   EXAM:  XR CHEST PORTABLE ROUTINE 1V        PROCEDURE DATE:  Sep 28 2021         INTERPRETATION:  TIME OF EXAM: September 28, 2021 at 5:31 AM    CLINICAL INFORMATION: Follow-up    TECHNIQUE:   Portable chest    INTERPRETATION:    Tracheostomy tube unchanged. Hazy left lung base obscuring the hemidiaphragm consistent with a small layering effusion. Remainder of both lungs are clear. Heart size is stable on this rotated image. No pneumothorax.      COMPARISON:  September 27      IMPRESSION:  Smallleft pleural effusion.    --- End of Report ---              ONUR AVILES MD; Attending Radiologist  This document has been electronically signed. Sep 28 2021 10:13AM (09-28-21 @ 06:23)  Xray Chest 1 View- PORTABLE-Urgent:   EXAM:  XR CHEST PORTABLE ROUTINE 1V      EXAM:  XR CHEST PORTABLE ROUTINE 1V      EXAM:  XR CHEST PORTABLE ROUTINE 1V      EXAM:  XR CHEST PORTABLE URGENT 1V        PROCEDURE DATE:  Sep 27 2021         INTERPRETATION:  TIME OF EXAM: September 25, 2021 at 5:03 AM; September 26 at 4:29 AM; September 27 at 5:25 AM and 8:33 AM    CLINICAL INFORMATION: Post tracheostomy; pneumothorax? Pigtail catheter    TECHNIQUE:   Portable chest    INTERPRETATION:    September 25:  5:03 AM:  Right-sided pigtail catheter and tracheostomy tube in addition to the right IJ line are unchanged. Bilateral layering effusions left greater than right. Tiny apical pneumothorax on the right may be present. No change from the study of the day before.    September 26:  4:29 AM:  Right-sided pigtail catheter has been removed. Right IJ line and tracheostomy tube remain. Lungs are free of focal consolidations. Apparent decrease of layering left effusion. No definite pneumothorax.    September 2017:  5:26 AM:  Rotation is seen into an [place] projection. Tracheostomy tube in place. Lungs show no focal consolidations or definite pneumothorax.    8:33 AM:  Right IJ line no longer seen. Tiny right apical pneumothorax. Bilateral small layering effusions.      COMPARISON:  September 24      IMPRESSION:  1. Right-sided pigtail catheter pre and post removal.  2. Tracheostomy tube in place.  3. Clear lungs.  4. Bilateral small layering effusions with tiny apical pneumothorax on the most recent study.    --- End of Report ---              ONUR AVILES MD; Attending Radiologist  This document has been electronically signed. Sep 27 2021 11:04AM (09-27-21 @ 08:48)       CHIEF COMPLAINT: FOLLOW UP IN ICU FOR POSTOPERATIVE CARE OF PATIENT FOR VENTILATOR WEANING      PROCEDURE : TRACH AND PEG 9/24/2021      Issues:              Acute Hypoxic Respiratory failure  Acute on Chronic systolic heart failure  Moderate MR  Severe TR              Esophageal diverticular bleed.   Encounter for ventilator weaning  Acute blood loss anemia  COPD  Chronic Afib -- previously on Apixaban  Hypothyroidism  GERD  Pleural effusion  Protein calorie malnutrition      Interval events:  Placed on PS 10/5 for 2 hours before becoming tachypenic.  Weaned off precedex.      HISTORY:   Unable to obtain history or ROS due to delirium    PHYSICAL EXAM:   Gen: Comfortable, No acute distress  Eyes: Sclera white, Conjunctiva normal, Eyelids normal, Pupils symmetrical   ENT: Mucous membranes moist,  ,  ,    Neck: Trachea midline,  ,  , tracheostomy in place,  ,    CV: Rate regular, Rhythm regular,  ,  ,    Resp: Breath sounds clear, No accessory muscles use,  ,  ,    Abd: Soft, Non-distended, Non-tender, Bowel sounds normal, feeding tube in place,  ,    Skin: Warm, No peripheral edema of lower extremities,  ,    : No bearden  Neuro: Moving all 4 extremities,    Psych: RASS -2      ASSESSMENT AND PLAN:  NEURO:  Delirium, dementia - Improved. Continue Seroquel q12h, buspirone PO, donepezil, duloxetine, memantine, melatonin.            RESPIRATORY:  Respiratory failure s/p trach requiring mechanical ventilation - Spontaneous breathing trials with PS. Trach collar trials if does well on CPAP. Wean mechanical ventilation. Monitor ABG. Wean FIO2 for goal O2sat above 92. Vent bundle.       CARDIOVASCULAR:  Hemodynamically stable - Not on pressors. Continue hemodynamic monitoring.  Telemetry (medical test) - Reviewed by me today independently. Normal sinus rhythm.        Chronic Afib - stable. continue rhythm and rate control meds. Hold anticoagulation          RENAL:  Stable - Monitor IOs and electrolytes. Keep K above 4.0 and Mg above 2.0.         GASTROINTESTINAL:  GI prophylaxis not indicated  Zofran and Reglan IV PRN for nausea  Tube feed             HEMATOLOGIC:  Acute post-hemorrhagic anemia - Improved. Monitor CBC. Transfuse PRBC as needed. Monitor chest tube output.  DVT prophylaxis with heparin subQ and SCDs.       INFECTIOUS DISEASE:  All surgical sites appear clean. Will monitor for fever and leukocytosis.           ENDOCRINE:  Stable – Monitor glucose fingersticks for goal 120-180.  Hypothyroidism - stable. Continue Synthroid.       Pertinent clinical, laboratory, radiographic, hemodynamic, echocardiographic, respiratory data, microbiologic data and chart were reviewed by myself and analyzed frequently throughout the course of the day and night by myself.    Plan discussed at length with the CTICU staff and Attending CT Surgeon -   Dr Cedrick Zuñiga.      Patient unable to participate with discussion of plan.    Patient required critical care management.  45 minutes were spent evaluating, managing, providing, coordinating, and documenting care for this patient, exclusive of procedures from  7AM to  1159PM.      ________________________________________________    _________________________  VITAL SIGNS:  Vital Signs Last 24 Hrs  T(C): 37.3 (29 Sep 2021 20:00), Max: 37.8 (29 Sep 2021 05:00)  T(F): 99.1 (29 Sep 2021 20:00), Max: 100 (29 Sep 2021 05:00)  HR: 105 (29 Sep 2021 23:00) (7 - 130)  BP: --  BP(mean): --  RR: 22 (29 Sep 2021 23:00) (18 - 25)  SpO2: 98% (29 Sep 2021 23:00) (98% - 100%)  I/Os:   I&O's Detail    28 Sep 2021 07:01  -  29 Sep 2021 07:00  --------------------------------------------------------  IN:    Dexmedetomidine: 132.5 mL    dextrose 5%: 200 mL    Enteral Tube Flush: 1450 mL    IV PiggyBack: 200 mL    IV PiggyBack: 200 mL    Jevity 1.2: 1248 mL  Total IN: 3430.5 mL    OUT:    Indwelling Catheter - Urethral (mL): 1775 mL  Total OUT: 1775 mL    Total NET: 1655.5 mL      29 Sep 2021 07:01  -  30 Sep 2021 00:45  --------------------------------------------------------  IN:    Enteral Tube Flush: 460 mL    Jevity 1.2: 728 mL  Total IN: 1188 mL    OUT:    Indwelling Catheter - Urethral (mL): 1600 mL  Total OUT: 1600 mL    Total NET: -412 mL          Mode: AC/ CMV (Assist Control/ Continuous Mandatory Ventilation)  RR (machine): 18  TV (machine): 300  FiO2: 40  PEEP: 5  ITime: 0.7  MAP: 8  PIP: 17      MEDICATIONS:  MEDICATIONS  (STANDING):  ALBUTerol    90 MICROgram(s) HFA Inhaler 2 Puff(s) Inhalation every 6 hours  artificial tears (preservative free) Ophthalmic Solution 1 Drop(s) Both EYES two times a day  budesonide  80 MICROgram(s)/formoterol 4.5 MICROgram(s) Inhaler 2 Puff(s) Inhalation two times a day  busPIRone 15 milliGRAM(s) Oral <User Schedule>  chlorhexidine 0.12% Liquid 15 milliLiter(s) Oral Mucosa every 12 hours  chlorhexidine 4% Liquid 1 Application(s) Topical <User Schedule>  dextrose 50% Injectable 25 Gram(s) IV Push once  donepezil 10 milliGRAM(s) Oral at bedtime  DULoxetine 60 milliGRAM(s) Oral daily  heparin   Injectable 5000 Unit(s) SubCutaneous every 12 hours  insulin lispro (ADMELOG) corrective regimen sliding scale   SubCutaneous every 6 hours  levothyroxine 150 MICROGram(s) Oral daily  LORazepam   Injectable 0.5 milliGRAM(s) IV Push every 8 hours  melatonin 3 milliGRAM(s) Oral at bedtime  memantine 10 milliGRAM(s) Oral every 12 hours  metoprolol tartrate 12.5 milliGRAM(s) Enteral Tube every 12 hours  pantoprazole   Suspension 40 milliGRAM(s) Oral two times a day  polyethylene glycol 3350 17 Gram(s) Oral at bedtime  QUEtiapine 25 milliGRAM(s) Oral every 12 hours  senna 2 Tablet(s) Oral at bedtime  simvastatin 10 milliGRAM(s) Oral at bedtime  traZODone 100 milliGRAM(s) Oral every 12 hours    MEDICATIONS  (PRN):  bisacodyl Suppository 10 milliGRAM(s) Rectal daily PRN Constipation      LABS:  Laboratory data was independently reviewed by me today.                           9.1    7.40  )-----------( 343      ( 29 Sep 2021 03:56 )             29.3     09-29    147<H>  |  111<H>  |  22  ----------------------------<  146<H>  3.7   |  26  |  0.42<L>    Ca    10.2      29 Sep 2021 03:56  Phos  2.9     09-29  Mg     1.90     09-29    TPro  5.2<L>  /  Alb  3.0<L>  /  TBili  0.5  /  DBili  <0.2  /  AST  38<H>  /  ALT  32  /  AlkPhos  100  09-29    LIVER FUNCTIONS - ( 29 Sep 2021 03:56 )  Alb: 3.0 g/dL / Pro: 5.2 g/dL / ALK PHOS: 100 U/L / ALT: 32 U/L / AST: 38 U/L / GGT: x             ABG - ( 29 Sep 2021 17:22 )  pH, Arterial: 7.43  pH, Blood: x     /  pCO2: 45    /  pO2: 155   / HCO3: 30    / Base Excess: 4.9   /  SaO2: 97.7                  RADIOLOGY:   Radiology images were independently reviewed by me today. Reports were reviewed by me today.    Xray Chest 1 View- PORTABLE-Routine:   EXAM:  XR CHEST PORTABLE ROUTINE 1V        PROCEDURE DATE:  Sep 29 2021         INTERPRETATION:  TIME OF EXAM: September 29, 2021 at 6:03 AM    CLINICAL INFORMATION: Follow-up    TECHNIQUE:   Portable chest    INTERPRETATION:    The tracheostomy tube is present. Persistent rotation into an [place] projection. Lungs show no focal consolidations but mild interstitial opacities perhaps edema are is seen. Small left effusion unchanged. No pneumothorax.    Right-sided midline catheter terminating in theregion of the axilla as seen on the last exam not visible on this study.      COMPARISON:  September 28      IMPRESSION:  Follow-up study showing no focal consolidations but small layering left effusion and tracheostomy tube in place.    --- End of Report ---              ONUR AVILES MD; Attending Radiologist  This document has been electronically signed. Sep 29 2021 11:13AM (09-29-21 @ 07:44)  Xray Chest 1 View- PORTABLE-Routine:   EXAM:  XR CHEST PORTABLE ROUTINE 1V        PROCEDURE DATE:  Sep 28 2021         INTERPRETATION:  TIME OF EXAM: September 28, 2021 at 5:31 AM    CLINICAL INFORMATION: Follow-up    TECHNIQUE:   Portable chest    INTERPRETATION:    Tracheostomy tube unchanged. Hazy left lung base obscuring the hemidiaphragm consistent with a small layering effusion. Remainder of both lungs are clear. Heart size is stable on this rotated image. No pneumothorax.      COMPARISON:  September 27      IMPRESSION:  Smallleft pleural effusion.    --- End of Report ---              ONUR AVILES MD; Attending Radiologist  This document has been electronically signed. Sep 28 2021 10:13AM (09-28-21 @ 06:23)  Xray Chest 1 View- PORTABLE-Urgent:   EXAM:  XR CHEST PORTABLE ROUTINE 1V      EXAM:  XR CHEST PORTABLE ROUTINE 1V      EXAM:  XR CHEST PORTABLE ROUTINE 1V      EXAM:  XR CHEST PORTABLE URGENT 1V        PROCEDURE DATE:  Sep 27 2021         INTERPRETATION:  TIME OF EXAM: September 25, 2021 at 5:03 AM; September 26 at 4:29 AM; September 27 at 5:25 AM and 8:33 AM    CLINICAL INFORMATION: Post tracheostomy; pneumothorax? Pigtail catheter    TECHNIQUE:   Portable chest    INTERPRETATION:    September 25:  5:03 AM:  Right-sided pigtail catheter and tracheostomy tube in addition to the right IJ line are unchanged. Bilateral layering effusions left greater than right. Tiny apical pneumothorax on the right may be present. No change from the study of the day before.    September 26:  4:29 AM:  Right-sided pigtail catheter has been removed. Right IJ line and tracheostomy tube remain. Lungs are free of focal consolidations. Apparent decrease of layering left effusion. No definite pneumothorax.    September 2017:  5:26 AM:  Rotation is seen into an [place] projection. Tracheostomy tube in place. Lungs show no focal consolidations or definite pneumothorax.    8:33 AM:  Right IJ line no longer seen. Tiny right apical pneumothorax. Bilateral small layering effusions.      COMPARISON:  September 24      IMPRESSION:  1. Right-sided pigtail catheter pre and post removal.  2. Tracheostomy tube in place.  3. Clear lungs.  4. Bilateral small layering effusions with tiny apical pneumothorax on the most recent study.    --- End of Report ---              ONUR AVILES MD; Attending Radiologist  This document has been electronically signed. Sep 27 2021 11:04AM (09-27-21 @ 08:48)

## 2021-09-29 NOTE — PROGRESS NOTE ADULT - SUBJECTIVE AND OBJECTIVE BOX
Infectious Diseases progress note:    Subjective:  NAD, afebrile.  Tmax 100.  On decreased pressor support.  No leukocytosis.  On mechanical vent    ROS:  Unable to assess due to pt clinical condition    Allergies    Sulfur Colliod (Rash)  Tylenol (Swelling)    Intolerances        ANTIBIOTICS/RELEVANT:  antimicrobials    immunologic:    OTHER:  ALBUTerol    90 MICROgram(s) HFA Inhaler 2 Puff(s) Inhalation every 6 hours  artificial tears (preservative free) Ophthalmic Solution 1 Drop(s) Both EYES two times a day  bisacodyl Suppository 10 milliGRAM(s) Rectal daily PRN  budesonide  80 MICROgram(s)/formoterol 4.5 MICROgram(s) Inhaler 2 Puff(s) Inhalation two times a day  busPIRone 15 milliGRAM(s) Oral <User Schedule>  chlorhexidine 0.12% Liquid 15 milliLiter(s) Oral Mucosa every 12 hours  chlorhexidine 4% Liquid 1 Application(s) Topical <User Schedule>  dextrose 50% Injectable 25 Gram(s) IV Push once  donepezil 10 milliGRAM(s) Oral at bedtime  DULoxetine 60 milliGRAM(s) Oral daily  heparin   Injectable 5000 Unit(s) SubCutaneous every 12 hours  insulin lispro (ADMELOG) corrective regimen sliding scale   SubCutaneous every 6 hours  levothyroxine 150 MICROGram(s) Oral daily  LORazepam   Injectable 0.5 milliGRAM(s) IV Push every 8 hours  melatonin 3 milliGRAM(s) Oral at bedtime  memantine 10 milliGRAM(s) Oral every 12 hours  pantoprazole   Suspension 40 milliGRAM(s) Oral two times a day  polyethylene glycol 3350 17 Gram(s) Oral at bedtime  QUEtiapine 25 milliGRAM(s) Oral every 12 hours  senna 2 Tablet(s) Oral at bedtime  simvastatin 10 milliGRAM(s) Oral at bedtime  traZODone 100 milliGRAM(s) Oral every 12 hours      Objective:  Vital Signs Last 24 Hrs  T(C): 37.3 (29 Sep 2021 15:00), Max: 37.8 (29 Sep 2021 05:00)  T(F): 99.1 (29 Sep 2021 15:00), Max: 100 (29 Sep 2021 05:00)  HR: 115 (29 Sep 2021 16:00) (7 - 130)  BP: --  BP(mean): --  RR: 18 (29 Sep 2021 16:00) (17 - 25)  SpO2: 98% (29 Sep 2021 16:00) (98% - 100%)    PHYSICAL EXAM:  Constitutional: trached, on mech ventilation.  Sedated  Eyes:LISA, EOMI  Ear/Nose/Throat: no thrush, mucositis.  Moist mucous membranes	  Neck:no JVD, no lymphadenopathy, supple  Respiratory: CTA shaila  Cardiovascular: S1S2 RRR, no murmurs  Gastrointestinal:soft, nontender,  nondistended (+) BS, peg  Extremities:no e/e/c  Skin:  no rashes, open wounds or ulcerations        LABS:                        9.1    7.40  )-----------( 343      ( 29 Sep 2021 03:56 )             29.3     09-29    147<H>  |  111<H>  |  22  ----------------------------<  146<H>  3.7   |  26  |  0.42<L>    Ca    10.2      29 Sep 2021 03:56  Phos  2.9     09-29  Mg     1.90     09-29    TPro  5.2<L>  /  Alb  3.0<L>  /  TBili  0.5  /  DBili  <0.2  /  AST  38<H>  /  ALT  32  /  AlkPhos  100  09-29          Procalcitonin, Serum: 0.07 (09-27 @ 03:30)                    MICROBIOLOGY:    Culture - Blood (09.27.21 @ 12:01)   Specimen Source: .Blood Blood-Peripheral   Culture Results:   No growth to date.     Culture - Blood (09.27.21 @ 12:01)   Specimen Source: .Blood Blood-Peripheral   Culture Results:   No growth to date.     Culture - Body Fluid with Gram Stain (09.21.21 @ 16:58)   Gram Stain:   polymorphonuclear leukocytes seen   No organisms seen   by cytocentrifuge   Specimen Source: .Body Fluid Pleural Fluid   Culture Results:   No growth at 5 days     RADIOLOGY & ADDITIONAL STUDIES:    < from: Xray Chest 1 View- PORTABLE-Routine (Xray Chest 1 View- PORTABLE-Routine in AM.) (09.29.21 @ 07:44) >    EXAM:  XR CHEST PORTABLE ROUTINE 1V        PROCEDURE DATE:  Sep 29 2021         INTERPRETATION:  TIME OF EXAM: September 29, 2021 at 6:03 AM    CLINICAL INFORMATION: Follow-up    TECHNIQUE:   Portable chest    INTERPRETATION:    The tracheostomy tube is present. Persistent rotation into an SCHOFIELD projection. Lungs show no focal consolidations but mild interstitial opacities perhaps edema are is seen. Small left effusion unchanged. No pneumothorax.    Right-sided midline catheter terminating in theregion of the axilla as seen on the last exam not visible on this study.      COMPARISON:  September 28      IMPRESSION:  Follow-up study showing no focal consolidations but small layering left effusion and tracheostomy tube in place.    < end of copied text >

## 2021-09-30 LAB
ANION GAP SERPL CALC-SCNC: 11 MMOL/L — SIGNIFICANT CHANGE UP (ref 7–14)
BASE EXCESS BLDA CALC-SCNC: 4.8 MMOL/L — HIGH (ref -2–3)
BUN SERPL-MCNC: 20 MG/DL — SIGNIFICANT CHANGE UP (ref 7–23)
CALCIUM SERPL-MCNC: 9.9 MG/DL — SIGNIFICANT CHANGE UP (ref 8.4–10.5)
CHLORIDE SERPL-SCNC: 109 MMOL/L — HIGH (ref 98–107)
CO2 BLDA-SCNC: 31 MMOL/L — HIGH (ref 19–24)
CO2 SERPL-SCNC: 25 MMOL/L — SIGNIFICANT CHANGE UP (ref 22–31)
CREAT SERPL-MCNC: 0.36 MG/DL — LOW (ref 0.5–1.3)
GLUCOSE BLDC GLUCOMTR-MCNC: 130 MG/DL — HIGH (ref 70–99)
GLUCOSE BLDC GLUCOMTR-MCNC: 136 MG/DL — HIGH (ref 70–99)
GLUCOSE BLDC GLUCOMTR-MCNC: 141 MG/DL — HIGH (ref 70–99)
GLUCOSE BLDC GLUCOMTR-MCNC: 142 MG/DL — HIGH (ref 70–99)
GLUCOSE SERPL-MCNC: 159 MG/DL — HIGH (ref 70–99)
HCO3 BLDA-SCNC: 30 MMOL/L — HIGH (ref 21–28)
HCT VFR BLD CALC: 33.2 % — LOW (ref 34.5–45)
HGB BLD-MCNC: 10.5 G/DL — LOW (ref 11.5–15.5)
MAGNESIUM SERPL-MCNC: 2 MG/DL — SIGNIFICANT CHANGE UP (ref 1.6–2.6)
MCHC RBC-ENTMCNC: 30 PG — SIGNIFICANT CHANGE UP (ref 27–34)
MCHC RBC-ENTMCNC: 31.6 GM/DL — LOW (ref 32–36)
MCV RBC AUTO: 94.9 FL — SIGNIFICANT CHANGE UP (ref 80–100)
NRBC # BLD: 0 /100 WBCS — SIGNIFICANT CHANGE UP
NRBC # FLD: 0 K/UL — SIGNIFICANT CHANGE UP
PCO2 BLDA: 45 MMHG — HIGH (ref 32–35)
PH BLDA: 7.43 — SIGNIFICANT CHANGE UP (ref 7.35–7.45)
PHOSPHATE SERPL-MCNC: 3 MG/DL — SIGNIFICANT CHANGE UP (ref 2.5–4.5)
PLATELET # BLD AUTO: 408 K/UL — HIGH (ref 150–400)
PO2 BLDA: 146 MMHG — HIGH (ref 83–108)
POTASSIUM SERPL-MCNC: 3.7 MMOL/L — SIGNIFICANT CHANGE UP (ref 3.5–5.3)
POTASSIUM SERPL-SCNC: 3.7 MMOL/L — SIGNIFICANT CHANGE UP (ref 3.5–5.3)
RBC # BLD: 3.5 M/UL — LOW (ref 3.8–5.2)
RBC # FLD: 15.6 % — HIGH (ref 10.3–14.5)
SAO2 % BLDA: 97.6 % — SIGNIFICANT CHANGE UP (ref 94–98)
SODIUM SERPL-SCNC: 145 MMOL/L — SIGNIFICANT CHANGE UP (ref 135–145)
WBC # BLD: 11.65 K/UL — HIGH (ref 3.8–10.5)
WBC # FLD AUTO: 11.65 K/UL — HIGH (ref 3.8–10.5)

## 2021-09-30 PROCEDURE — 99291 CRITICAL CARE FIRST HOUR: CPT

## 2021-09-30 RX ORDER — POTASSIUM CHLORIDE 20 MEQ
40 PACKET (EA) ORAL ONCE
Refills: 0 | Status: COMPLETED | OUTPATIENT
Start: 2021-09-30 | End: 2021-09-30

## 2021-09-30 RX ORDER — ACETAMINOPHEN 500 MG
750 TABLET ORAL ONCE
Refills: 0 | Status: COMPLETED | OUTPATIENT
Start: 2021-09-30 | End: 2021-09-30

## 2021-09-30 RX ORDER — METOPROLOL TARTRATE 50 MG
5 TABLET ORAL EVERY 6 HOURS
Refills: 0 | Status: DISCONTINUED | OUTPATIENT
Start: 2021-09-30 | End: 2021-10-01

## 2021-09-30 RX ORDER — METOPROLOL TARTRATE 50 MG
5 TABLET ORAL EVERY 6 HOURS
Refills: 0 | Status: DISCONTINUED | OUTPATIENT
Start: 2021-09-30 | End: 2021-09-30

## 2021-09-30 RX ORDER — METOPROLOL TARTRATE 50 MG
5 TABLET ORAL ONCE
Refills: 0 | Status: DISCONTINUED | OUTPATIENT
Start: 2021-09-30 | End: 2021-09-30

## 2021-09-30 RX ORDER — METOPROLOL TARTRATE 50 MG
5 TABLET ORAL ONCE
Refills: 0 | Status: COMPLETED | OUTPATIENT
Start: 2021-09-30 | End: 2021-09-30

## 2021-09-30 RX ADMIN — Medication 0: at 23:48

## 2021-09-30 RX ADMIN — Medication 1 DROP(S): at 18:59

## 2021-09-30 RX ADMIN — Medication 40 MILLIEQUIVALENT(S): at 06:29

## 2021-09-30 RX ADMIN — PANTOPRAZOLE SODIUM 40 MILLIGRAM(S): 20 TABLET, DELAYED RELEASE ORAL at 06:27

## 2021-09-30 RX ADMIN — Medication 0.5 MILLIGRAM(S): at 13:55

## 2021-09-30 RX ADMIN — Medication 0.5 MILLIGRAM(S): at 06:25

## 2021-09-30 RX ADMIN — BUDESONIDE AND FORMOTEROL FUMARATE DIHYDRATE 2 PUFF(S): 160; 4.5 AEROSOL RESPIRATORY (INHALATION) at 21:47

## 2021-09-30 RX ADMIN — Medication 0.5 MILLIGRAM(S): at 23:25

## 2021-09-30 RX ADMIN — DONEPEZIL HYDROCHLORIDE 10 MILLIGRAM(S): 10 TABLET, FILM COATED ORAL at 23:24

## 2021-09-30 RX ADMIN — ALBUTEROL 2 PUFF(S): 90 AEROSOL, METERED ORAL at 21:46

## 2021-09-30 RX ADMIN — SIMVASTATIN 10 MILLIGRAM(S): 20 TABLET, FILM COATED ORAL at 23:25

## 2021-09-30 RX ADMIN — Medication 1 DROP(S): at 06:28

## 2021-09-30 RX ADMIN — Medication 15 MILLIGRAM(S): at 23:24

## 2021-09-30 RX ADMIN — CHLORHEXIDINE GLUCONATE 15 MILLILITER(S): 213 SOLUTION TOPICAL at 18:59

## 2021-09-30 RX ADMIN — Medication 300 MILLIGRAM(S): at 11:00

## 2021-09-30 RX ADMIN — BUDESONIDE AND FORMOTEROL FUMARATE DIHYDRATE 2 PUFF(S): 160; 4.5 AEROSOL RESPIRATORY (INHALATION) at 09:30

## 2021-09-30 RX ADMIN — CHLORHEXIDINE GLUCONATE 15 MILLILITER(S): 213 SOLUTION TOPICAL at 06:25

## 2021-09-30 RX ADMIN — Medication 150 MICROGRAM(S): at 06:27

## 2021-09-30 RX ADMIN — MEMANTINE HYDROCHLORIDE 10 MILLIGRAM(S): 10 TABLET ORAL at 23:23

## 2021-09-30 RX ADMIN — HEPARIN SODIUM 5000 UNIT(S): 5000 INJECTION INTRAVENOUS; SUBCUTANEOUS at 18:59

## 2021-09-30 RX ADMIN — SENNA PLUS 2 TABLET(S): 8.6 TABLET ORAL at 23:24

## 2021-09-30 RX ADMIN — ALBUTEROL 2 PUFF(S): 90 AEROSOL, METERED ORAL at 03:38

## 2021-09-30 RX ADMIN — QUETIAPINE FUMARATE 25 MILLIGRAM(S): 200 TABLET, FILM COATED ORAL at 06:28

## 2021-09-30 RX ADMIN — QUETIAPINE FUMARATE 25 MILLIGRAM(S): 200 TABLET, FILM COATED ORAL at 18:59

## 2021-09-30 RX ADMIN — HEPARIN SODIUM 5000 UNIT(S): 5000 INJECTION INTRAVENOUS; SUBCUTANEOUS at 06:27

## 2021-09-30 RX ADMIN — CHLORHEXIDINE GLUCONATE 1 APPLICATION(S): 213 SOLUTION TOPICAL at 06:14

## 2021-09-30 RX ADMIN — ALBUTEROL 2 PUFF(S): 90 AEROSOL, METERED ORAL at 15:51

## 2021-09-30 RX ADMIN — ALBUTEROL 2 PUFF(S): 90 AEROSOL, METERED ORAL at 09:30

## 2021-09-30 RX ADMIN — Medication 100 MILLIGRAM(S): at 18:59

## 2021-09-30 RX ADMIN — Medication 5 MILLIGRAM(S): at 11:36

## 2021-09-30 RX ADMIN — MEMANTINE HYDROCHLORIDE 10 MILLIGRAM(S): 10 TABLET ORAL at 06:27

## 2021-09-30 RX ADMIN — Medication 100 MILLIGRAM(S): at 06:27

## 2021-09-30 RX ADMIN — Medication 3 MILLIGRAM(S): at 23:23

## 2021-09-30 RX ADMIN — Medication 5 MILLIGRAM(S): at 08:47

## 2021-09-30 RX ADMIN — PANTOPRAZOLE SODIUM 40 MILLIGRAM(S): 20 TABLET, DELAYED RELEASE ORAL at 19:09

## 2021-09-30 RX ADMIN — Medication 5 MILLIGRAM(S): at 18:59

## 2021-09-30 RX ADMIN — POLYETHYLENE GLYCOL 3350 17 GRAM(S): 17 POWDER, FOR SOLUTION ORAL at 23:23

## 2021-10-01 LAB
ANION GAP SERPL CALC-SCNC: 11 MMOL/L — SIGNIFICANT CHANGE UP (ref 7–14)
BASE EXCESS BLDV CALC-SCNC: 8.2 MMOL/L — HIGH (ref -2–3)
BUN SERPL-MCNC: 19 MG/DL — SIGNIFICANT CHANGE UP (ref 7–23)
CA-I SERPL-SCNC: 1.4 MMOL/L — HIGH (ref 1.15–1.33)
CALCIUM SERPL-MCNC: 10.2 MG/DL — SIGNIFICANT CHANGE UP (ref 8.4–10.5)
CHLORIDE BLDV-SCNC: 107 MMOL/L — SIGNIFICANT CHANGE UP (ref 96–108)
CHLORIDE SERPL-SCNC: 105 MMOL/L — SIGNIFICANT CHANGE UP (ref 98–107)
CO2 BLDV-SCNC: 34.9 MMOL/L — HIGH (ref 22–26)
CO2 SERPL-SCNC: 28 MMOL/L — SIGNIFICANT CHANGE UP (ref 22–31)
CREAT SERPL-MCNC: 0.33 MG/DL — LOW (ref 0.5–1.3)
GAS PNL BLDV: 141 MMOL/L — SIGNIFICANT CHANGE UP (ref 136–145)
GAS PNL BLDV: SIGNIFICANT CHANGE UP
GLUCOSE BLDC GLUCOMTR-MCNC: 150 MG/DL — HIGH (ref 70–99)
GLUCOSE BLDC GLUCOMTR-MCNC: 151 MG/DL — HIGH (ref 70–99)
GLUCOSE BLDC GLUCOMTR-MCNC: 159 MG/DL — HIGH (ref 70–99)
GLUCOSE BLDC GLUCOMTR-MCNC: 164 MG/DL — HIGH (ref 70–99)
GLUCOSE BLDV-MCNC: 168 MG/DL — HIGH (ref 70–99)
GLUCOSE SERPL-MCNC: 170 MG/DL — HIGH (ref 70–99)
HCO3 BLDV-SCNC: 33 MMOL/L — HIGH (ref 22–29)
HCT VFR BLD CALC: 36.1 % — SIGNIFICANT CHANGE UP (ref 34.5–45)
HCT VFR BLDA CALC: 34 % — LOW (ref 34.5–46.5)
HGB BLD CALC-MCNC: 11.4 G/DL — LOW (ref 11.5–15.5)
HGB BLD-MCNC: 11.1 G/DL — LOW (ref 11.5–15.5)
LACTATE BLDV-MCNC: 2.2 MMOL/L — HIGH (ref 0.5–2)
MCHC RBC-ENTMCNC: 29.6 PG — SIGNIFICANT CHANGE UP (ref 27–34)
MCHC RBC-ENTMCNC: 30.7 GM/DL — LOW (ref 32–36)
MCV RBC AUTO: 96.3 FL — SIGNIFICANT CHANGE UP (ref 80–100)
NRBC # BLD: 0 /100 WBCS — SIGNIFICANT CHANGE UP
NRBC # FLD: 0 K/UL — SIGNIFICANT CHANGE UP
PCO2 BLDV: 48 MMHG — HIGH (ref 39–42)
PH BLDV: 7.45 — HIGH (ref 7.32–7.43)
PLATELET # BLD AUTO: 383 K/UL — SIGNIFICANT CHANGE UP (ref 150–400)
PO2 BLDV: 189 MMHG — SIGNIFICANT CHANGE UP
POTASSIUM BLDV-SCNC: 3.6 MMOL/L — SIGNIFICANT CHANGE UP (ref 3.5–5.1)
POTASSIUM SERPL-MCNC: 3.6 MMOL/L — SIGNIFICANT CHANGE UP (ref 3.5–5.3)
POTASSIUM SERPL-SCNC: 3.6 MMOL/L — SIGNIFICANT CHANGE UP (ref 3.5–5.3)
RBC # BLD: 3.75 M/UL — LOW (ref 3.8–5.2)
RBC # FLD: 15.6 % — HIGH (ref 10.3–14.5)
SAO2 % BLDV: 97.8 % — SIGNIFICANT CHANGE UP
SODIUM SERPL-SCNC: 144 MMOL/L — SIGNIFICANT CHANGE UP (ref 135–145)
WBC # BLD: 11.33 K/UL — HIGH (ref 3.8–10.5)
WBC # FLD AUTO: 11.33 K/UL — HIGH (ref 3.8–10.5)

## 2021-10-01 PROCEDURE — 70450 CT HEAD/BRAIN W/O DYE: CPT | Mod: 26

## 2021-10-01 PROCEDURE — 99233 SBSQ HOSP IP/OBS HIGH 50: CPT

## 2021-10-01 PROCEDURE — 71045 X-RAY EXAM CHEST 1 VIEW: CPT | Mod: 26

## 2021-10-01 RX ORDER — METOPROLOL TARTRATE 50 MG
5 TABLET ORAL ONCE
Refills: 0 | Status: COMPLETED | OUTPATIENT
Start: 2021-10-01 | End: 2021-10-01

## 2021-10-01 RX ORDER — METOPROLOL TARTRATE 50 MG
25 TABLET ORAL EVERY 12 HOURS
Refills: 0 | Status: DISCONTINUED | OUTPATIENT
Start: 2021-10-01 | End: 2021-10-02

## 2021-10-01 RX ADMIN — ALBUTEROL 2 PUFF(S): 90 AEROSOL, METERED ORAL at 04:01

## 2021-10-01 RX ADMIN — SIMVASTATIN 10 MILLIGRAM(S): 20 TABLET, FILM COATED ORAL at 21:20

## 2021-10-01 RX ADMIN — CHLORHEXIDINE GLUCONATE 15 MILLILITER(S): 213 SOLUTION TOPICAL at 06:29

## 2021-10-01 RX ADMIN — SENNA PLUS 2 TABLET(S): 8.6 TABLET ORAL at 21:21

## 2021-10-01 RX ADMIN — CHLORHEXIDINE GLUCONATE 1 APPLICATION(S): 213 SOLUTION TOPICAL at 06:29

## 2021-10-01 RX ADMIN — Medication 1 DROP(S): at 06:13

## 2021-10-01 RX ADMIN — POLYETHYLENE GLYCOL 3350 17 GRAM(S): 17 POWDER, FOR SOLUTION ORAL at 21:29

## 2021-10-01 RX ADMIN — Medication 0.5 MILLIGRAM(S): at 06:15

## 2021-10-01 RX ADMIN — DONEPEZIL HYDROCHLORIDE 10 MILLIGRAM(S): 10 TABLET, FILM COATED ORAL at 21:21

## 2021-10-01 RX ADMIN — HEPARIN SODIUM 5000 UNIT(S): 5000 INJECTION INTRAVENOUS; SUBCUTANEOUS at 18:37

## 2021-10-01 RX ADMIN — CHLORHEXIDINE GLUCONATE 15 MILLILITER(S): 213 SOLUTION TOPICAL at 18:36

## 2021-10-01 RX ADMIN — BUDESONIDE AND FORMOTEROL FUMARATE DIHYDRATE 2 PUFF(S): 160; 4.5 AEROSOL RESPIRATORY (INHALATION) at 20:59

## 2021-10-01 RX ADMIN — Medication 5 MILLIGRAM(S): at 18:37

## 2021-10-01 RX ADMIN — MEMANTINE HYDROCHLORIDE 10 MILLIGRAM(S): 10 TABLET ORAL at 18:38

## 2021-10-01 RX ADMIN — Medication 100 MILLIGRAM(S): at 18:39

## 2021-10-01 RX ADMIN — BUDESONIDE AND FORMOTEROL FUMARATE DIHYDRATE 2 PUFF(S): 160; 4.5 AEROSOL RESPIRATORY (INHALATION) at 12:23

## 2021-10-01 RX ADMIN — PANTOPRAZOLE SODIUM 40 MILLIGRAM(S): 20 TABLET, DELAYED RELEASE ORAL at 06:14

## 2021-10-01 RX ADMIN — ALBUTEROL 2 PUFF(S): 90 AEROSOL, METERED ORAL at 12:23

## 2021-10-01 RX ADMIN — Medication 5 MILLIGRAM(S): at 06:15

## 2021-10-01 RX ADMIN — Medication 1 DROP(S): at 18:37

## 2021-10-01 RX ADMIN — Medication 0.5 MILLIGRAM(S): at 21:21

## 2021-10-01 RX ADMIN — QUETIAPINE FUMARATE 25 MILLIGRAM(S): 200 TABLET, FILM COATED ORAL at 06:15

## 2021-10-01 RX ADMIN — Medication 3 MILLIGRAM(S): at 21:21

## 2021-10-01 RX ADMIN — MEMANTINE HYDROCHLORIDE 10 MILLIGRAM(S): 10 TABLET ORAL at 06:14

## 2021-10-01 RX ADMIN — Medication 5 MILLIGRAM(S): at 00:02

## 2021-10-01 RX ADMIN — ALBUTEROL 2 PUFF(S): 90 AEROSOL, METERED ORAL at 20:59

## 2021-10-01 RX ADMIN — Medication 150 MICROGRAM(S): at 06:14

## 2021-10-01 RX ADMIN — HEPARIN SODIUM 5000 UNIT(S): 5000 INJECTION INTRAVENOUS; SUBCUTANEOUS at 06:14

## 2021-10-01 RX ADMIN — Medication 25 MILLIGRAM(S): at 23:46

## 2021-10-01 RX ADMIN — ALBUTEROL 2 PUFF(S): 90 AEROSOL, METERED ORAL at 17:03

## 2021-10-01 RX ADMIN — Medication 25 MILLIGRAM(S): at 11:13

## 2021-10-01 RX ADMIN — Medication 100 MILLIGRAM(S): at 06:15

## 2021-10-01 RX ADMIN — Medication 0.5 MILLIGRAM(S): at 13:46

## 2021-10-01 RX ADMIN — PANTOPRAZOLE SODIUM 40 MILLIGRAM(S): 20 TABLET, DELAYED RELEASE ORAL at 18:38

## 2021-10-01 NOTE — PROGRESS NOTE ADULT - ASSESSMENT
81 y. o. female BIBA to [facility] from nursing facility c/o hematemesis.  According to pt it was the first episode and happened while she was eating dinner. Followed by multiple episodes of N/V with bright blood and epigastric cramps.  She was intubated for airway protection and underwent EGD that revealed large diverticula at 28 cm filled with blood and 5 mm tear at distal esophagus, a hiatal hernia and large amount of blood in the fundus.  Diverticula was injected with Epinephrine.  Patient was transferred to [facility] for further management. (10 Sep 2021 15:04)    Pt followed by GI, s/p repeat EGD on 9/14 -  showing significant clot in large esophageal diverticula -- without obvious source of etiology of bleeding (ie no vessel) + no esophageal mass seen.  Pt on pressors, thought to be unlikely hemorrhagic given stable Hb.    9/15: WBC 10.8, sputum cx (9/10) with nl resp tawana. CT c/a/p shows a 7.2 cm masslike structure distending the mid to distal esophagus, suspicious for malignancy.  Bilateral tree-in-bud nodular opacities, predominantly in the left lower lobe, new/increased from 9/9/2021, raising concern for infection.  Left lower lobe consolidative opacity, possible combination of atelectasis and pneumonia.  (+) hypodensities in the liver suspicion for malignancy, and stercoral colitis.    Pt with fever, on pressors.  Pt is NPO on TPN.   Pt on empiric abx, ID consulted for further abx managment.     Fever/sepsis:    - pt with fever, on pressor support.  Abx broadened to vanco and meropenem.  Fluconazole added on 9/17 due to continued fevers.  - S/p central line change on 9/17.   - CT c/a/p noted, increased LLL consolidation, possible aspiration.  f/u repeat sputum cx - nl resp tawana    - Check bcx x 2 - NGTD.  Sputum cx no resp tawana  - f/u WBC and fever curve.  Improving, abx course completed after 7 days, d/c'd on 9/23.  Cont to monitor pt off abx.     * Pt seen by Palliative care service for GOC discussion - pt remains full code.  s/p trach/peg.      * Tmax >101 on 9/27.  Recommend repeat bcx x 2 - ngtd at 24 hrs on 9/28.  Repeat cxr with small b/l layering pl effusions,  otherwise clear lungs noted.  s/p removal of pigtail cath.  Pt remains on TPN, on mech ventilation.  Currently has completed course of abx.  Cont to monitor off for now.   No active ID issues at this time.  Pt transferred to [facility].      Karen Casillas  [phone]

## 2021-10-01 NOTE — PROGRESS NOTE ADULT - SUBJECTIVE AND OBJECTIVE BOX
Infectious Diseases progress note:    Subjective:  Pt transferred to [facility], no new fevers.  Events n oted.    ROS:  Nonverbal, unable to assess    Allergies    Sulfur Colliod (Rash)  Tylenol (Swelling)    Intolerances        ANTIBIOTICS/RELEVANT:  antimicrobials    immunologic:    OTHER:  ALBUTerol    90 MICROgram(s) HFA Inhaler 2 Puff(s) Inhalation every 6 hours  artificial tears (preservative free) Ophthalmic Solution 1 Drop(s) Both EYES two times a day  bisacodyl Suppository 10 milliGRAM(s) Rectal daily PRN  budesonide  80 MICROgram(s)/formoterol 4.5 MICROgram(s) Inhaler 2 Puff(s) Inhalation two times a day  chlorhexidine 0.12% Liquid 15 milliLiter(s) Oral Mucosa every 12 hours  chlorhexidine 4% Liquid 1 Application(s) Topical <User Schedule>  dextrose 50% Injectable 25 Gram(s) IV Push once  donepezil 10 milliGRAM(s) Oral at bedtime  heparin   Injectable 5000 Unit(s) SubCutaneous every 12 hours  insulin lispro (ADMELOG) corrective regimen sliding scale   SubCutaneous every 6 hours  levothyroxine 150 MICROGram(s) Oral daily  LORazepam   Injectable 0.5 milliGRAM(s) IV Push every 8 hours  melatonin 3 milliGRAM(s) Oral at bedtime  memantine 10 milliGRAM(s) Oral every 12 hours  metoprolol tartrate 25 milliGRAM(s) Oral every 12 hours  pantoprazole   Suspension 40 milliGRAM(s) Oral two times a day  polyethylene glycol 3350 17 Gram(s) Oral at bedtime  senna 2 Tablet(s) Oral at bedtime  simvastatin 10 milliGRAM(s) Oral at bedtime  traZODone 100 milliGRAM(s) Oral every 12 hours      Objective:  Vital Signs Last 24 Hrs  T(C): 36.4 (01 Oct 2021 18:20), Max: 36.8 (01 Oct 2021 13:40)  T(F): 97.5 (01 Oct 2021 18:20), Max: 98.2 (01 Oct 2021 13:40)  HR: 105 (01 Oct 2021 21:01) (87 - 130)  BP: 141/95 (01 Oct 2021 18:34) (122/70 - 152/80)  BP(mean): --  RR: 26 (01 Oct 2021 18:20) (22 - 26)  SpO2: 100% (01 Oct 2021 20:55) (97% - 100%)    PHYSICAL EXAM:  Constitutional:NAD  Eyes:LISA, EOMI  Ear/Nose/Throat: no thrush, mucositis.  Moist mucous membranes	  Neck: tracheostomy, on full mechanical ventilation  Respiratory: CTA shaila  Cardiovascular: S1S2 RRR, no murmurs  Gastrointestinal:soft, nontender,  nondistended (+) BS, peg  Extremities:no e/e/c  Skin:  no rashes, open wounds or ulcerations        LABS:                        11.1   11.33 )-----------( 383      ( 01 Oct 2021 06:29 )             36.1     10-01    144  |  105  |  19  ----------------------------<  170<H>  3.6   |  28  |  0.33<L>    Ca    10.2      01 Oct 2021 06:29  Phos  3.0     09-30  Mg     2.00     09-30            Procalcitonin, Serum: 0.07 (09-27 @ 03:30)      MICROBIOLOGY:    Culture - Blood (09.27.21 @ 12:01)   Specimen Source: .Blood Blood-Peripheral   Culture Results:   No growth to date.     Culture - Blood (09.27.21 @ 12:01)   Specimen Source: .Blood Blood-Peripheral   Culture Results:   No growth to date.       RADIOLOGY & ADDITIONAL STUDIES:    < from: Xray Chest 1 View- PORTABLE-Routine (Xray Chest 1 View- PORTABLE-Routine in AM.) (09.29.21 @ 07:44) >  IMPRESSION:  Follow-up study showing no focal consolidations but small layering left effusion and tracheostomy tube in place.      < end of copied text >

## 2021-10-01 NOTE — PROGRESS NOTE ADULT - ASSESSMENT
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 She was intubated for airway protection on 9/9. EGD @ OSH with large blood filled esophageal diverticula and possible esophageal tear. Transferred to [facility] 9/10 and s/p EGD 9/14 with no esophageal tear or bleed, but (+) esophageal diverticula with clot. Course complicated by fevers likely from aspiration, septic shock, biventricular heart failure, and poor vent weaning requiring Tracheostomy and PEG placement on 9/24. Transferred to [facility] 9/30/21.    # NEUROLOGY  - Baseline Dementia with mentation AOx1-2 as per Son. Patient reportedly able to eat and ambulate with assistance at ASL per Son.   - Continue on Buspar, Cymbalta, Trazadone, Aricept and Namenda as per home doses.   - Now with anxiety and agitation and continued on Seroquel 25mg BID, Ativan 0.5mg IV TID and Ativan vs Dilaudid PRN doses.   - Monitor mentation.     # CARDIOVASCULAR  - Baseline HF (unknown EF), Moderate MR and Severe TR and AFIB on Eliquis   - Course complicated with biventricular HF requiring dobutamine. Now weaned off.   - ECHO 9/15 noted with EF 20%, mod MR, severely dilated LA, severe LVSD, severe FLAVIO, decreased RVSF, mod-severe TR, mild pHTN.   - RPT ECHO 9/27 with improved EF to 60% and LVSD (now normal). LA/ RA dilation, RVSD, and severe TR remains.   - Continue holding Eliquis for now given Esophageal Diverticular bleed.   - On no rate control medications at home and remains with rate controlled AFIB.   - Monitor HR/ BP.     # RESPIRATORY  - COPD/ Emphysema at baseline and presented to [facility] with hematemesis. Intubated 9/9 for airway protection and treatment for aspiration. Poor vent weaning and s/p Trach 9/24.   - Continue on AC Vent 18/350/5/40 and PS 15/5 vs 10/5 as tolerated.   - Continue on Proventil and Symbicort with Chest PT Q6H   - Tracheostomy 4 and 10 o clock sutures removed and pending remain suture removal by CTSx on 10/3 (10-14 days from tracheostomy placement).   - Tracheostomy care QD   - Suction PRN     # GI  - FTT requiring TPN during hospitalization. Now s/p PEG 9/24.   - Continue on TF with Jevity @ 52cc/hr goal rate.  - Continue on Miralax, Senna and PPI.   - Monitor BMs.     # RENAL  - Mild dehydration noted with diuresis and third spacing.   - Continue on free H20 250cc Q4H and monitor fluid status.   - Monitor renal function and UOP.     # INFECTIOUS DISEASE  - Aspiration noted and s/p Zosyn (9/10-9/15) with continued fever spikes and broadened  - to Meropenem (9/15 - 9/22), Vancomycin (9/15 - 9/22) and Diflucan (9/17 - 9/22).   - SCx, BCx, UA and COVID PCR remained negative.   - Pleural Fluid 9/21 negative     # HEME  - R Midline associated DVT noted. Midline removed and will monitor.   - Holding home Eliquis for AFIB given diverticular bleed.     # ENDOCRINE  - Hypothyroidism and continued on home Synthroid 150mcg QD.      # LINES/ TUBES  - Tracheostomy and PEG 9/24   - LUE Midline 9/28   - RUE Midline (9/26-9/28)  - R IJ TLC (9/17 - 9/26)   - L IJ TLC (9/11 - 9/17)  - L Radial DEEPAK (9/10 - 9/21)  - L Axillary DEEPAK (9/21 - to be removed on 9/30).     # ETHICS/ GOC/ DISPO   - Case discussed with Son, Favio Villalpando ([phone]) at length with CTI team and Palliative care. Son wishes for mom to be FULL CODE.   - DISPO likely vent facility.     # DVT PPX with HSQ.  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 She was intubated for airway protection on 9/9. EGD @ OSH with large blood filled esophageal diverticula and possible esophageal tear. Transferred to [facility] 9/10 and s/p EGD 9/14 with no esophageal tear or bleed, but (+) esophageal diverticula with clot. Course complicated by fevers likely from aspiration, septic shock, biventricular heart failure, and poor vent weaning requiring Tracheostomy and PEG placement on 9/24. Transferred to [facility] 9/30/21.    # NEUROLOGY  - Baseline Dementia with mentation AOx1-2 as per Son. Patient reportedly able to eat and ambulate with assistance at ASL per Son.   - Continue on Buspar, Cymbalta, Trazadone, Aricept and Namenda as per home doses.   - Now with anxiety and agitation and continued on Seroquel 25mg BID, Ativan 0.5mg IV TID and Ativan vs Dilaudid PRN doses.   - Monitor mentation.   - Obtunded- stop Buspar and Seroquel and reassess mental status  - CTH ordered 10/1    # CARDIOVASCULAR  - Baseline HF (unknown EF), Moderate MR and Severe TR and AFIB on Eliquis   - Course complicated with biventricular HF requiring dobutamine. Now weaned off.   - ECHO 9/15 noted with EF 20%, mod MR, severely dilated LA, severe LVSD, severe FLAVIO, decreased RVSF, mod-severe TR, mild pHTN.   - RPT ECHO 9/27 with improved EF to 60% and LVSD (now normal). LA/ RA dilation, RVSD, and severe TR remains.   - Continue holding Eliquis for now given Esophageal Diverticular bleed.   - On no rate control medications at home and remains with rate controlled AFIB.   - Monitor HR/ BP.   - Started Metoprolol 25mg BID on 10/1- IVP Lopressor discontinued    # RESPIRATORY  - COPD/ Emphysema at baseline and presented to [facility] with hematemesis. Intubated 9/9 for airway protection and treatment for aspiration. Poor vent weaning and s/p Trach 9/24.   - Continue on AC Vent 18/350/5/40 and PS 15/5 vs 10/5 as tolerated.   - Continue on Proventil and Symbicort with Chest PT Q6H   - Tracheostomy 4 and 10 o clock sutures removed and pending remain suture removal by CTSx on 10/3 (10-14 days from tracheostomy placement).   - Tracheostomy care QD   - Suction PRN     # GI  - FTT requiring TPN during hospitalization. Now s/p PEG 9/24.   - Continue on TF with Jevity @ 52cc/hr goal rate.  - Continue on Miralax, Senna and PPI.   - Monitor BMs.     # RENAL  - Mild dehydration noted with diuresis and third spacing.   - Continue on free H20 250cc Q4H and monitor fluid status.   - Monitor renal function and UOP.     # INFECTIOUS DISEASE  - Aspiration noted and s/p Zosyn (9/10-9/15) with continued fever spikes and broadened  - to Meropenem (9/15 - 9/22), Vancomycin (9/15 - 9/22) and Diflucan (9/17 - 9/22).   - SCx, BCx, UA and COVID PCR remained negative.   - Pleural Fluid 9/21 negative     # HEME  - R Midline associated DVT noted. Midline removed and will monitor.   - Holding home Eliquis for AFIB given diverticular bleed.     # ENDOCRINE  - Hypothyroidism and continued on home Synthroid 150mcg QD.      # LINES/ TUBES  - Tracheostomy and PEG 9/24   - LUE Midline 9/28   - RUE Midline (9/26-9/28)  - R IJ TLC (9/17 - 9/26)   - L IJ TLC (9/11 - 9/17)  - L Radial DEEPAK (9/10 - 9/21)  - L Axillary DEEPAK (9/21 - to be removed on 9/30).     # ETHICS/ GOC/ DISPO   - Son wishes for mom to be FULL CODE.   - DISPO likely vent facility.     # DVT PPX with HSQ.

## 2021-10-01 NOTE — PHYSICAL THERAPY INITIAL EVALUATION ADULT - PERTINENT HX OF CURRENT PROBLEM, REHAB EVAL
This is an 81 y. o. female BIBA to [facility] from nursing facility c/o hematemesis followed by multiple episodes of N/V with bright blood and epigastric cramps.She was intubated for airway protection and underwent EGD that revealed large diverticula, a hiatal hernia and large amount of blood in the fundus Pt had trach and PEG last week.

## 2021-10-01 NOTE — PROGRESS NOTE ADULT - SUBJECTIVE AND OBJECTIVE BOX
CHIEF COMPLAINT:    INTERVAL EVENTS:     ROS: Seen by bedside during AM rounds     OBJECTIVE:  ICU Vital Signs Last 24 Hrs  T(C): 36.5 (01 Oct 2021 06:09), Max: 37.1 (30 Sep 2021 12:00)  T(F): 97.7 (01 Oct 2021 06:09), Max: 98.7 (30 Sep 2021 12:00)  HR: 101 (01 Oct 2021 07:21) (87 - 117)  BP: 142/96 (01 Oct 2021 06:09) (105/60 - 155/83)  BP(mean): 71 (30 Sep 2021 16:00) (71 - 104)  ABP: 117/69 (30 Sep 2021 12:00) (117/69 - 140/108)  ABP(mean): 89 (30 Sep 2021 12:00) (89 - 124)  RR: 23 (01 Oct 2021 06:09) (17 - 28)  SpO2: 100% (01 Oct 2021 07:21) (94% - 100%)    Mode: AC/ CMV (Assist Control/ Continuous Mandatory Ventilation), RR (machine): 18, TV (machine): 300, FiO2: 50, PEEP: 5, ITime: 0.7, MAP: 10, PIP: 20    09-30 @ 07:01  -  10-01 @ 07:00  --------------------------------------------------------  IN: 1043 mL / OUT: 2000 mL / NET: -957 mL      CAPILLARY BLOOD GLUCOSE      POCT Blood Glucose.: 150 mg/dL (01 Oct 2021 07:11)      PHYSICAL EXAM:  General:   HEENT:   Lymph Nodes:  Neck:   Respiratory:   Cardiovascular:   Abdomen:   Extremities:   Skin:   Neurological:  Psychiatry:    Mode: AC/ CMV (Assist Control/ Continuous Mandatory Ventilation)  RR (machine): 18  TV (machine): 300  FiO2: 50  PEEP: 5  ITime: 0.7  MAP: 10  PIP: 20      HOSPITAL MEDICATIONS:  MEDICATIONS  (STANDING):  ALBUTerol    90 MICROgram(s) HFA Inhaler 2 Puff(s) Inhalation every 6 hours  artificial tears (preservative free) Ophthalmic Solution 1 Drop(s) Both EYES two times a day  budesonide  80 MICROgram(s)/formoterol 4.5 MICROgram(s) Inhaler 2 Puff(s) Inhalation two times a day  busPIRone 15 milliGRAM(s) Oral <User Schedule>  chlorhexidine 0.12% Liquid 15 milliLiter(s) Oral Mucosa every 12 hours  chlorhexidine 4% Liquid 1 Application(s) Topical <User Schedule>  dextrose 50% Injectable 25 Gram(s) IV Push once  donepezil 10 milliGRAM(s) Oral at bedtime  heparin   Injectable 5000 Unit(s) SubCutaneous every 12 hours  insulin lispro (ADMELOG) corrective regimen sliding scale   SubCutaneous every 6 hours  levothyroxine 150 MICROGram(s) Oral daily  LORazepam   Injectable 0.5 milliGRAM(s) IV Push every 8 hours  melatonin 3 milliGRAM(s) Oral at bedtime  memantine 10 milliGRAM(s) Oral every 12 hours  metoprolol tartrate Injectable 5 milliGRAM(s) IV Push every 6 hours  pantoprazole   Suspension 40 milliGRAM(s) Oral two times a day  polyethylene glycol 3350 17 Gram(s) Oral at bedtime  QUEtiapine 25 milliGRAM(s) Oral every 12 hours  senna 2 Tablet(s) Oral at bedtime  simvastatin 10 milliGRAM(s) Oral at bedtime  traZODone 100 milliGRAM(s) Oral every 12 hours    MEDICATIONS  (PRN):  bisacodyl Suppository 10 milliGRAM(s) Rectal daily PRN Constipation      LABS:                        11.1   11.33 )-----------( 383      ( 01 Oct 2021 06:29 )             36.1     10-01    144  |  105  |  19  ----------------------------<  170<H>  3.6   |  28  |  0.33<L>    Ca    10.2      01 Oct 2021 06:29  Phos  3.0     09-30  Mg     2.00     09-30          Arterial Blood Gas:  09-30 @ 03:09  7.43/45/146/30/97.6/4.8  ABG lactate: --  Arterial Blood Gas:  09-29 @ 17:22  7.43/45/155/30/97.7/4.9  ABG lactate: --    Venous Blood Gas:  10-01 @ 06:29  7.45/48/189/33/97.8  VBG Lactate: 2.2   CHIEF COMPLAINT: Patient is a 81y old  Female who presents with a chief complaint of Hematemesis (01 Oct 2021 08:40)    INTERVAL EVENTS: Acute desaturation event overnight responsive to ventilation through BVM and respiratory treatments.     ROS: See above. Remaining ROS negative.     OBJECTIVE:  ICU Vital Signs Last 24 Hrs  T(C): 36.5 (01 Oct 2021 06:09), Max: 37.1 (30 Sep 2021 12:00)  T(F): 97.7 (01 Oct 2021 06:09), Max: 98.7 (30 Sep 2021 12:00)  HR: 101 (01 Oct 2021 07:21) (87 - 117)  BP: 142/96 (01 Oct 2021 06:09) (105/60 - 155/83)  BP(mean): 71 (30 Sep 2021 16:00) (71 - 104)  ABP: 117/69 (30 Sep 2021 12:00) (117/69 - 140/108)  ABP(mean): 89 (30 Sep 2021 12:00) (89 - 124)  RR: 23 (01 Oct 2021 06:09) (17 - 28)  SpO2: 100% (01 Oct 2021 07:21) (94% - 100%)    Mode: AC/ CMV (Assist Control/ Continuous Mandatory Ventilation), RR (machine): 18, TV (machine): 300, FiO2: 50, PEEP: 5, ITime: 0.7, MAP: 10, PIP: 20    09-30 @ 07:01  -  10-01 @ 07:00  --------------------------------------------------------  IN: 1043 mL / OUT: 2000 mL / NET: -957 mL      CAPILLARY BLOOD GLUCOSE      POCT Blood Glucose.: 150 mg/dL (01 Oct 2021 07:11)      PHYSICAL EXAM:  General: NAD   Neck:+ Trach site noted, site c/d/i.   Cards: S1/S2, no murmurs   Pulm: Coarse vent sounds b/l.   Abdomen: Soft, NTND (+)PEG tube noted, site c/d/i.   Extremities: Ext edematous. Ext warm well perfused.  Neurology: Obtunded. Nonresponsive to verbal or noxious stimuli. Pupils dilated but constrict to light b/l.   Skin: as per RN assessment    Mode: AC/ CMV (Assist Control/ Continuous Mandatory Ventilation)  RR (machine): 18  TV (machine): 300  FiO2: 50  PEEP: 5  ITime: 0.7  MAP: 10  PIP: 20      HOSPITAL MEDICATIONS:  MEDICATIONS  (STANDING):  ALBUTerol    90 MICROgram(s) HFA Inhaler 2 Puff(s) Inhalation every 6 hours  artificial tears (preservative free) Ophthalmic Solution 1 Drop(s) Both EYES two times a day  budesonide  80 MICROgram(s)/formoterol 4.5 MICROgram(s) Inhaler 2 Puff(s) Inhalation two times a day  busPIRone 15 milliGRAM(s) Oral <User Schedule>  chlorhexidine 0.12% Liquid 15 milliLiter(s) Oral Mucosa every 12 hours  chlorhexidine 4% Liquid 1 Application(s) Topical <User Schedule>  dextrose 50% Injectable 25 Gram(s) IV Push once  donepezil 10 milliGRAM(s) Oral at bedtime  heparin   Injectable 5000 Unit(s) SubCutaneous every 12 hours  insulin lispro (ADMELOG) corrective regimen sliding scale   SubCutaneous every 6 hours  levothyroxine 150 MICROGram(s) Oral daily  LORazepam   Injectable 0.5 milliGRAM(s) IV Push every 8 hours  melatonin 3 milliGRAM(s) Oral at bedtime  memantine 10 milliGRAM(s) Oral every 12 hours  metoprolol tartrate Injectable 5 milliGRAM(s) IV Push every 6 hours  pantoprazole   Suspension 40 milliGRAM(s) Oral two times a day  polyethylene glycol 3350 17 Gram(s) Oral at bedtime  QUEtiapine 25 milliGRAM(s) Oral every 12 hours  senna 2 Tablet(s) Oral at bedtime  simvastatin 10 milliGRAM(s) Oral at bedtime  traZODone 100 milliGRAM(s) Oral every 12 hours    MEDICATIONS  (PRN):  bisacodyl Suppository 10 milliGRAM(s) Rectal daily PRN Constipation      LABS:                        11.1   11.33 )-----------( 383      ( 01 Oct 2021 06:29 )             36.1     10-01    144  |  105  |  19  ----------------------------<  170<H>  3.6   |  28  |  0.33<L>    Ca    10.2      01 Oct 2021 06:29  Phos  3.0     09-30  Mg     2.00     09-30          Arterial Blood Gas:  09-30 @ 03:09  7.43/45/146/30/97.6/4.8  ABG lactate: --  Arterial Blood Gas:  09-29 @ 17:22  7.43/45/155/30/97.7/4.9  ABG lactate: --    Venous Blood Gas:  10-01 @ 06:29  7.45/48/189/33/97.8  VBG Lactate: 2.2

## 2021-10-01 NOTE — PHYSICAL THERAPY INITIAL EVALUATION ADULT - CRITERIA FOR SKILLED THERAPEUTIC INTERVENTIONS
PT evaluation attempted, however is unable to follow commands/ unable to actively participate in therapy at this time , non verbal and unable to open eyes and to give eye contact at this time, hence pt is not a candidate for Restorative PT, Please put PT reconsult only if pt is appropriate for therapy.

## 2021-10-01 NOTE — PHYSICAL THERAPY INITIAL EVALUATION ADULT - GENERAL OBSERVATIONS, REHAB EVAL
Pt received semi supin ein bed, (+) trach, (+) enterostomy tube, (+) bearden , (+) IV, pt in no apparent distress.

## 2021-10-01 NOTE — CHART NOTE - NSCHARTNOTEFT_GEN_A_CORE
Source: Patient [ ]    Family [ ]     other [x ] ACP, chart review     Nutrition f/u for severe protein calorie malnutrition. 82 y/o F s/p trach and PEG 9/24. Pt seen with Jevity 1.2 running @ goal rate of 52 mL/hr. Per flow sheets, pt has been receiving goal volume since 9/26. No report of any GI distress. Last BM 9/29.     Per chart, transferred to [facility] 9/10 and s/p EGD 9/14 with no esophageal tear or bleed, but (+) esophageal diverticula with clot. Course complicated by fevers likely from aspiration, septic shock, biventricular heart failure, and poor vent weaning requiring Tracheostomy and PEG placement on 9/24. Transferred to [facility] 9/30/21.       Diet, NPO with Tube Feed:   Tube Feeding Modality: Gastrostomy  Jevity 1.2 Toro (JEVITY1.2RTH)  Total Volume for 24 Hours (mL): 1248  Continuous  Starting Tube Feed Rate {mL per Hour}: 52     Every 4 hours  Until Goal Tube Feed Rate (mL per Hour): 52  Tube Feed Duration (in Hours): 24  Tube Feed Start Time: 12:00 (09-26-21 @ 13:35)    Enteral /Parenteral Nutrition: Provides 1248 mL, 1498 toro, 69 gm pro.   Current Weight:   9/28 63.7 kg   Dosing Weight (kg): 54 (09-10)      Edema: 2+ L/R hand; generalized    Pressure Injuries: unstageable sacrum     __________________ Pertinent Medications__________________   MEDICATIONS  (STANDING):  ALBUTerol    90 MICROgram(s) HFA Inhaler 2 Puff(s) Inhalation every 6 hours  artificial tears (preservative free) Ophthalmic Solution 1 Drop(s) Both EYES two times a day  budesonide  80 MICROgram(s)/formoterol 4.5 MICROgram(s) Inhaler 2 Puff(s) Inhalation two times a day  busPIRone 15 milliGRAM(s) Oral <User Schedule>  chlorhexidine 0.12% Liquid 15 milliLiter(s) Oral Mucosa every 12 hours  chlorhexidine 4% Liquid 1 Application(s) Topical <User Schedule>  dextrose 50% Injectable 25 Gram(s) IV Push once  donepezil 10 milliGRAM(s) Oral at bedtime  heparin   Injectable 5000 Unit(s) SubCutaneous every 12 hours  insulin lispro (ADMELOG) corrective regimen sliding scale   SubCutaneous every 6 hours  levothyroxine 150 MICROGram(s) Oral daily  LORazepam   Injectable 0.5 milliGRAM(s) IV Push every 8 hours  melatonin 3 milliGRAM(s) Oral at bedtime  memantine 10 milliGRAM(s) Oral every 12 hours  metoprolol tartrate 25 milliGRAM(s) Oral every 12 hours  pantoprazole   Suspension 40 milliGRAM(s) Oral two times a day  polyethylene glycol 3350 17 Gram(s) Oral at bedtime  QUEtiapine 25 milliGRAM(s) Oral every 12 hours  senna 2 Tablet(s) Oral at bedtime  simvastatin 10 milliGRAM(s) Oral at bedtime  traZODone 100 milliGRAM(s) Oral every 12 hours    MEDICATIONS  (PRN):  bisacodyl Suppository 10 milliGRAM(s) Rectal daily PRN Constipation      __________________ Pertinent Labs__________________   10-01 Na144 mmol/L Glu 170 mg/dL<H> K+ 3.6 mmol/L Cr  0.33 mg/dL<L> BUN 19 mg/dL 09-30 Phos 3.0 mg/dL 09-29 Alb 3.0 g/dL<L> 09-21 PAB 9 mg/dL<L> 09-22 Chol --    LDL --    HDL --    Trig 99 mg/dL  CAPILLARY BLOOD GLUCOSE      POCT Blood Glucose.: 151 mg/dL (01 Oct 2021 12:34)  POCT Blood Glucose.: 150 mg/dL (01 Oct 2021 07:11)  POCT Blood Glucose.: 164 mg/dL (01 Oct 2021 05:10)  POCT Blood Glucose.: 136 mg/dL (30 Sep 2021 23:32)  POCT Blood Glucose.: 130 mg/dL (30 Sep 2021 18:49)      Estimated Needs:   [x ] no change since previous assessment  [ ] recalculated:       Previous Nutrition Diagnosis: severe protein calorie malnutrition     Nutrition Diagnosis is [ x] ongoing  [ ] resolved [ ] not applicable       Recommendations:  1. Add No-carb Prosource 1x daily (15 gm protein, 60 toro)   2. Free water per MD. (Noted 250 mL q 6 hrs order)   3. Bowel regimen PRN.   4. Updated weight.       Monitoring and Evaluation:     [x ] PO intake [ x] Tolerance to diet prescription [x ] weights [x ] follow up per protocol  [ ] other:

## 2021-10-01 NOTE — PROGRESS NOTE ADULT - ATTENDING COMMENTS
82 YO Female with past medical history of Dementia, COPD/ Emphysema, Hypothyroidism, HF (unknown EF), Moderate MR and Severe TR, AFIB on Eliquis and Esophageal Diverticula  p/w Hematemesis and possible esophageal tear. Course w/ aspiration, septic shock, biventricular heart failure, and poor vent weaning requiring Tracheostomy and PEG placement on 9/24. Transferred to [facility] 9/30/21.    Neuro  pt unresponsive to commands   poor fxn w/ baseline severe dementia now likely worsened after critical illness  can CTH if does not improve  on many dementia/behavioral meds    Pulm  cont vent support  can try PS trial as toelrated    CVS   maintain MAP>65  no a/c for AF given life threatening bleeding  BB for rate control    GI  cont TF  cont PPI    DVT ppx SCD

## 2021-10-02 ENCOUNTER — TRANSCRIPTION ENCOUNTER (OUTPATIENT)
Age: 81
End: 2021-10-02

## 2021-10-02 LAB
CULTURE RESULTS: SIGNIFICANT CHANGE UP
CULTURE RESULTS: SIGNIFICANT CHANGE UP
GLUCOSE BLDC GLUCOMTR-MCNC: 126 MG/DL — HIGH (ref 70–99)
GLUCOSE BLDC GLUCOMTR-MCNC: 144 MG/DL — HIGH (ref 70–99)
GLUCOSE BLDC GLUCOMTR-MCNC: 150 MG/DL — HIGH (ref 70–99)
GLUCOSE BLDC GLUCOMTR-MCNC: 153 MG/DL — HIGH (ref 70–99)
GLUCOSE BLDC GLUCOMTR-MCNC: 163 MG/DL — HIGH (ref 70–99)
SPECIMEN SOURCE: SIGNIFICANT CHANGE UP
SPECIMEN SOURCE: SIGNIFICANT CHANGE UP

## 2021-10-02 PROCEDURE — 99232 SBSQ HOSP IP/OBS MODERATE 35: CPT

## 2021-10-02 PROCEDURE — 93010 ELECTROCARDIOGRAM REPORT: CPT

## 2021-10-02 RX ORDER — METOPROLOL TARTRATE 50 MG
37.5 TABLET ORAL
Refills: 0 | Status: DISCONTINUED | OUTPATIENT
Start: 2021-10-02 | End: 2021-10-05

## 2021-10-02 RX ORDER — METOPROLOL TARTRATE 50 MG
12.5 TABLET ORAL ONCE
Refills: 0 | Status: COMPLETED | OUTPATIENT
Start: 2021-10-02 | End: 2021-10-02

## 2021-10-02 RX ADMIN — CHLORHEXIDINE GLUCONATE 15 MILLILITER(S): 213 SOLUTION TOPICAL at 05:14

## 2021-10-02 RX ADMIN — Medication 25 MILLIGRAM(S): at 09:32

## 2021-10-02 RX ADMIN — Medication 100 MILLIGRAM(S): at 17:38

## 2021-10-02 RX ADMIN — MEMANTINE HYDROCHLORIDE 10 MILLIGRAM(S): 10 TABLET ORAL at 05:13

## 2021-10-02 RX ADMIN — Medication 150 MICROGRAM(S): at 05:14

## 2021-10-02 RX ADMIN — MEMANTINE HYDROCHLORIDE 10 MILLIGRAM(S): 10 TABLET ORAL at 17:40

## 2021-10-02 RX ADMIN — DONEPEZIL HYDROCHLORIDE 10 MILLIGRAM(S): 10 TABLET, FILM COATED ORAL at 23:27

## 2021-10-02 RX ADMIN — Medication 1 DROP(S): at 05:14

## 2021-10-02 RX ADMIN — Medication 0.5 MILLIGRAM(S): at 05:15

## 2021-10-02 RX ADMIN — Medication 3 MILLIGRAM(S): at 22:41

## 2021-10-02 RX ADMIN — Medication 12.5 MILLIGRAM(S): at 09:56

## 2021-10-02 RX ADMIN — HEPARIN SODIUM 5000 UNIT(S): 5000 INJECTION INTRAVENOUS; SUBCUTANEOUS at 05:14

## 2021-10-02 RX ADMIN — ALBUTEROL 2 PUFF(S): 90 AEROSOL, METERED ORAL at 10:07

## 2021-10-02 RX ADMIN — SENNA PLUS 2 TABLET(S): 8.6 TABLET ORAL at 22:41

## 2021-10-02 RX ADMIN — Medication 100 MILLIGRAM(S): at 05:13

## 2021-10-02 RX ADMIN — CHLORHEXIDINE GLUCONATE 15 MILLILITER(S): 213 SOLUTION TOPICAL at 17:39

## 2021-10-02 RX ADMIN — BUDESONIDE AND FORMOTEROL FUMARATE DIHYDRATE 2 PUFF(S): 160; 4.5 AEROSOL RESPIRATORY (INHALATION) at 21:55

## 2021-10-02 RX ADMIN — PANTOPRAZOLE SODIUM 40 MILLIGRAM(S): 20 TABLET, DELAYED RELEASE ORAL at 17:39

## 2021-10-02 RX ADMIN — ALBUTEROL 2 PUFF(S): 90 AEROSOL, METERED ORAL at 15:55

## 2021-10-02 RX ADMIN — BUDESONIDE AND FORMOTEROL FUMARATE DIHYDRATE 2 PUFF(S): 160; 4.5 AEROSOL RESPIRATORY (INHALATION) at 10:08

## 2021-10-02 RX ADMIN — Medication 1 DROP(S): at 17:39

## 2021-10-02 RX ADMIN — CHLORHEXIDINE GLUCONATE 1 APPLICATION(S): 213 SOLUTION TOPICAL at 05:15

## 2021-10-02 RX ADMIN — ALBUTEROL 2 PUFF(S): 90 AEROSOL, METERED ORAL at 21:55

## 2021-10-02 RX ADMIN — SIMVASTATIN 10 MILLIGRAM(S): 20 TABLET, FILM COATED ORAL at 22:40

## 2021-10-02 RX ADMIN — HEPARIN SODIUM 5000 UNIT(S): 5000 INJECTION INTRAVENOUS; SUBCUTANEOUS at 17:39

## 2021-10-02 RX ADMIN — POLYETHYLENE GLYCOL 3350 17 GRAM(S): 17 POWDER, FOR SOLUTION ORAL at 22:39

## 2021-10-02 RX ADMIN — PANTOPRAZOLE SODIUM 40 MILLIGRAM(S): 20 TABLET, DELAYED RELEASE ORAL at 05:13

## 2021-10-02 RX ADMIN — ALBUTEROL 2 PUFF(S): 90 AEROSOL, METERED ORAL at 03:53

## 2021-10-02 RX ADMIN — Medication 37.5 MILLIGRAM(S): at 22:40

## 2021-10-02 NOTE — DISCHARGE NOTE PROVIDER - CARE PROVIDERS DIRECT ADDRESSES
,DirectAddress_Unknown ,DirectAddress_Unknown,[email or web address]

## 2021-10-02 NOTE — DISCHARGE NOTE PROVIDER - NSDCCPCAREPLAN_GEN_ALL_CORE_FT
PRINCIPAL DISCHARGE DIAGNOSIS  Diagnosis: Hematemesis of unknown cause  Assessment and Plan of Treatment: Patient was transferred from outside hospital for hematemesis (vomiting blood). Endoscopy here revealed esophageal diverticula (outpouching of esophageal wall) with a clot, no active bleeding source was identified. This has now resolved. Patient was intubated for airway protection and was unable to be weaned from the ventilator and underwent tracheostomy and PEG tube placement during admission. She remained stable on the vent. She tolerated some Pressure Support trials while inpatient, can continue as outpatient at the discretion of the medical staff at the facility she is destined for.      SECONDARY DISCHARGE DIAGNOSES  Diagnosis: Acute respiratory failure with hypoxia  Assessment and Plan of Treatment: As discussed above under 'Hematemesis'. Treated with broad spectrum IV antibiotics given fever-spikes and sepsis. Cultures without growth. Now with tracheostomy tube and ventilator-dependence. Can continue PSV trials as tolerating.    Diagnosis: Acute deep vein thrombosis (DVT)  Assessment and Plan of Treatment: Patient noted with DVT associated with midline catheter which was removed.     PRINCIPAL DISCHARGE DIAGNOSIS  Diagnosis: Hematemesis of unknown cause  Assessment and Plan of Treatment: Patient was transferred from outside hospital for hematemesis (vomiting blood). Endoscopy here revealed esophageal diverticula (outpouching of esophageal wall) with a clot, no active bleeding source was identified. This has now resolved. Patient was intubated for airway protection and was unable to be weaned from the ventilator and underwent tracheostomy and PEG tube placement during admission. She remained stable on the vent. She tolerated some Pressure Support trials while inpatient, can continue as outpatient at the discretion of the medical staff at the facility she is destined for. Please follow up with a gastroenterologist in 1-2 weeks following discharge to ensure continued care of your esophageal diverticula. If the diverticula is stable from a bleeding standpoint, can consider restarting your anticoagulation for your atrial fibrillation in the future.      SECONDARY DISCHARGE DIAGNOSES  Diagnosis: Acute deep vein thrombosis (DVT)  Assessment and Plan of Treatment: Patient noted with DVT associated with midline catheter which was removed. You will not be started on anticoagulation due to the high risk of bleeding from your esophageal diverticula.    Diagnosis: Acute respiratory failure with hypoxia  Assessment and Plan of Treatment: As discussed above under 'Hematemesis'. Treated with broad spectrum IV antibiotics given fever-spikes and sepsis. Cultures without growth. Now with tracheostomy tube and ventilator-dependence. Can continue PSV trials as tolerating.

## 2021-10-02 NOTE — DISCHARGE NOTE PROVIDER - PROVIDER TOKENS
FREE:[LAST:[Your Primary Care Physician],PHONE:[(   )    -],FAX:[(   )    -],FOLLOWUP:[1 week]] FREE:[LAST:[Your Primary Care Physician],PHONE:[(   )    -],FAX:[(   )    -],FOLLOWUP:[1 week]],PROVIDER:[TOKEN:[[id number]]]

## 2021-10-02 NOTE — PROVIDER CONTACT NOTE (OTHER) - ASSESSMENT
No sign of respiratory distress noted, tachycardic
No sign of respiratory distress noted, tachycardic
Pt using accessory muscles

## 2021-10-02 NOTE — PROVIDER CONTACT NOTE (OTHER) - ACTION/TREATMENT ORDERED:
admin IV lopressor
pt bagged and respiratory provided treatment and increased FIO2 to 100, saturation went back above 95  Will continue to monitor patient
Blood cx x2, sputum cx, cool packs, no medications at this time

## 2021-10-02 NOTE — DISCHARGE NOTE PROVIDER - CARE PROVIDER_API CALL
Your Primary Care Physician,   Phone: (   )    -  Fax: (   )    -  Follow Up Time: 1 week   Your Primary Care Physician,   Phone: (   )    -  Fax: (   )    -  Follow Up Time: 1 week    Sacha Carvajal (MD)  Gastroenterology; Internal Medicine  [address] [place] [address]  Phone: [phone]  Fax: [phone]  Follow Up Time:

## 2021-10-02 NOTE — DISCHARGE NOTE PROVIDER - HOSPITAL COURSE
Ms Villalpando is an 80 YO Female with past medical history of COPD/ Emphysema, Hypothyroidism, HF (unknown EF), Moderate MR and Severe TR, AFIB on Eliquis and Esophageal Diverticula from [facility] presented to [facility] via EMS with Hematemesis. She was intubated for airway protection on 9/9. CT CHEST @ [facility] with concern for esophageal mass and s/p EGD 9/9 with LARGE blood filled esophageal diverticula and possible esophageal tear. Case with discussed with Thoracic and she was transferred to [facility] on 9/10. s/p bronchoscopy on admission to [facility] and noted with blood in airway. Given poor suction on portable bronchoscopy, bronchoscopy aborted. She was continued on Zosyn from 9/10-9/15 empirically as cultures remained negative, however on 9/15 fever spike to TMAX 102F noted and ABX were broadened to Vancomycin and Meropenem. Fever spikes continued and ECHO on 9/15 noted with EF 20%, mod MR, severely dilated LA, severe LVSD, severe FLAVIO, decreased RVSF, mod-severe TR, mild pHTN. Dobutamine and Lasix GTT started and patient recultured. Cultures remained negative, however fever spikes continued. Diflucan added on 9/17 with ID guidance. R pleural effusion noted and PTC placed on 9/21. Pleural fluid noted to be negative and PTC removed. ABX continued through 9/22 and then monitored off. CVP pressures improved, but course further complicated by contraction alkalosis. Lasix GTT dc'ed and s/p multiple doses of Diamox with improvement in dehydration. Sedation weaned, however poor vent weaning and synchrony with vent noted. s/p Tracheostomy and PEG on 9/24. RPT ECHO 9/27 with improved EF to 60% and LVSD (now normal). LA/ RA dilation, RVSD, and severe TR remains. Dobutamine weaned off. Precedex GTT weaned off, and patient continued on home Dementia and Anxiety medications. Seroquel 25mg BID, Ativan 0.5mg TID and PRN Dilaudid IVPs continued for vent synchrony. Tolerates some PS (~1 hour a day). RUE Midline associated DVT noted on 9/28 and Midline removed.     Pt transferred to [facility] 9/30/21   Mental status obtunded /unresponsive Buspar/seroquel stopped CTH showing no ICH or infarct Ativan also dc   weaning on pressure support and tolerated 15/5 Fio2 titrated also   TSH ordered ----- Ms Villalpando is an 80 YO Female with past medical history of COPD/ Emphysema, Hypothyroidism, HF (unknown EF), Moderate MR and Severe TR, AFIB on Eliquis and Esophageal Diverticula from [facility] presented to [facility] via EMS with Hematemesis. She was intubated for airway protection on 9/9. CT CHEST @ [facility] with concern for esophageal mass and s/p EGD 9/9 with LARGE blood filled esophageal diverticula and possible esophageal tear. Case with discussed with Thoracic and she was transferred to [facility] on 9/10. s/p bronchoscopy on admission to [facility] and noted with blood in airway. Given poor suction on portable bronchoscopy, bronchoscopy aborted. She was continued on Zosyn from 9/10-9/15 empirically as cultures remained negative, however on 9/15 fever spike to TMAX 102F noted and ABX were broadened to Vancomycin and Meropenem. Fever spikes continued and ECHO on 9/15 noted with EF 20%, mod MR, severely dilated LA, severe LVSD, severe FLAVIO, decreased RVSF, mod-severe TR, mild pHTN. Dobutamine and Lasix GTT started and patient recultured. Cultures remained negative, however fever spikes continued. Diflucan added on 9/17 with ID guidance. R pleural effusion noted and PTC placed on 9/21. Pleural fluid noted to be negative and PTC removed. ABX continued through 9/22 and then monitored off. CVP pressures improved, but course further complicated by contraction alkalosis. Lasix GTT dc'ed and s/p multiple doses of Diamox with improvement in dehydration. Sedation weaned, however poor vent weaning and synchrony with vent noted. s/p Tracheostomy and PEG on 9/24. RPT ECHO 9/27 with improved EF to 60% and LVSD (now normal). LA/ RA dilation, RVSD, and severe TR remains. Dobutamine weaned off. Precedex GTT weaned off, and patient continued on home Dementia and Anxiety medications. Seroquel 25mg BID, Ativan 0.5mg TID and PRN Dilaudid IVPs continued for vent synchrony. Tolerates some PS (~1 hour a day). RUE Midline associated DVT noted on 9/28 and Midline removed.     Pt transferred to [facility] 9/30/21   Patient was obtunded however mental status improved after Buspar, Seroquel and Ativan stopped. Now responsive to verbal stimuli and tactile stimuli with withdrawal to pain and occasionally opening eyes but does not participate in meaningful conversation. CTH showing no ICH or infarct. Vent weaning continued and patient tolerated 15/5. Became febrile on 10/2 and BCx and SCx were negative. Fevers since resolved. Tube feeds continued which patient tolerated. CM/SW assistance appreciated- patient accepted to vent facility. On 10/5/21, patient deemed stable for discharge.

## 2021-10-02 NOTE — PROGRESS NOTE ADULT - ASSESSMENT
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 She was intubated for airway protection on 9/9. EGD @ OSH with large blood filled esophageal diverticula and possible esophageal tear. Transferred to [facility] 9/10 and s/p EGD 9/14 with no esophageal tear or bleed, but (+) esophageal diverticula with clot. Course complicated by fevers likely from aspiration, septic shock, biventricular heart failure, and poor vent weaning requiring Tracheostomy and PEG placement on 9/24. Transferred to [facility] 9/30/21.    # NEUROLOGY  - Baseline Dementia with mentation AOx1-2 as per Son. Patient reportedly able to eat and ambulate with assistance at ASL per Son.   - Continue on Buspar, Cymbalta, Trazadone, Aricept and Namenda as per home doses.   - Now with anxiety and agitation and continued on Seroquel 25mg BID, Ativan 0.5mg IV TID and Ativan vs Dilaudid PRN doses.   - Monitor mentation.   - Obtunded- stop Buspar and Seroquel and reassess mental status  - CTH ordered 10/1    # CARDIOVASCULAR  - Baseline HF (unknown EF), Moderate MR and Severe TR and AFIB on Eliquis   - Course complicated with biventricular HF requiring dobutamine. Now weaned off.   - ECHO 9/15 noted with EF 20%, mod MR, severely dilated LA, severe LVSD, severe FLAVIO, decreased RVSF, mod-severe TR, mild pHTN.   - RPT ECHO 9/27 with improved EF to 60% and LVSD (now normal). LA/ RA dilation, RVSD, and severe TR remains.   - Continue holding Eliquis for now given Esophageal Diverticular bleed.   - On no rate control medications at home and remains with rate controlled AFIB.   - Monitor HR/ BP.   - Started Metoprolol 25mg BID on 10/1- IVP Lopressor discontinued    # RESPIRATORY  - COPD/ Emphysema at baseline and presented to [facility] with hematemesis. Intubated 9/9 for airway protection and treatment for aspiration. Poor vent weaning and s/p Trach 9/24.   - Continue on AC Vent 18/350/5/40 and PS 15/5 vs 10/5 as tolerated.   - Continue on Proventil and Symbicort with Chest PT Q6H   - Tracheostomy 4 and 10 o clock sutures removed and pending remain suture removal by CTSx on 10/3 (10-14 days from tracheostomy placement).   - Tracheostomy care QD   - Suction PRN     # GI  - FTT requiring TPN during hospitalization. Now s/p PEG 9/24.   - Continue on TF with Jevity @ 52cc/hr goal rate.  - Continue on Miralax, Senna and PPI.   - Monitor BMs.     # RENAL  - Mild dehydration noted with diuresis and third spacing.   - Continue on free H20 250cc Q4H and monitor fluid status.   - Monitor renal function and UOP.     # INFECTIOUS DISEASE  - Aspiration noted and s/p Zosyn (9/10-9/15) with continued fever spikes and broadened  - to Meropenem (9/15 - 9/22), Vancomycin (9/15 - 9/22) and Diflucan (9/17 - 9/22).   - SCx, BCx, UA and COVID PCR remained negative.   - Pleural Fluid 9/21 negative     # HEME  - R Midline associated DVT noted. Midline removed and will monitor.   - Holding home Eliquis for AFIB given diverticular bleed.     # ENDOCRINE  - Hypothyroidism and continued on home Synthroid 150mcg QD.      # LINES/ TUBES  - Tracheostomy and PEG 9/24   - LUE Midline 9/28   - RUE Midline (9/26-9/28)  - R IJ TLC (9/17 - 9/26)   - L IJ TLC (9/11 - 9/17)  - L Radial DEEPAK (9/10 - 9/21)  - L Axillary DEEPAK (9/21 - to be removed on 9/30).     # ETHICS/ GOC/ DISPO   - Son wishes for mom to be FULL CODE.   - DISPO likely vent facility.     # DVT PPX with HSQ.  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 via EMS with Hematemesis. She was intubated for airway protection on 9/9. EGD @ OSH with large blood filled esophageal diverticula and possible esophageal tear. Transferred to [facility] 9/10 and s/p EGD 9/14 with no esophageal tear or bleed, but (+) esophageal diverticula with clot. Course complicated by fevers likely from aspiration, septic shock, biventricular heart failure, and poor vent weaning requiring Tracheostomy and PEG placement on 9/24. Transferred to [facility] 9/30/21.    # NEUROLOGY  - Baseline Dementia with mentation AOx1-2 as per Son. Patient reportedly able to eat and ambulate with assistance at ASL per Son.   - Continue on Buspar, Cymbalta, Trazadone, Aricept and Namenda as per home doses.   - Now with anxiety and agitation and continued on Seroquel 25mg BID, Ativan 0.5mg IV TID and Ativan vs Dilaudid PRN doses.   - Monitor mentation.   - Obtunded- stop Buspar and Seroquel and reassess mental status  - CTH ordered 10/1-No acute intracranial hemorrhage or acute territorial infarct.     # CARDIOVASCULAR  - Baseline HF (unknown EF), Moderate MR and Severe TR and AFIB on Eliquis   - Course complicated with biventricular HF requiring dobutamine. Now weaned off.   - ECHO 9/15 noted with EF 20%, mod MR, severely dilated LA, severe LVSD, severe FLAVIO, decreased RVSF, mod-severe TR, mild pHTN.   - RPT ECHO 9/27 with improved EF to 60% and LVSD (now normal). LA/ RA dilation, RVSD, and severe TR remains.   - Continue holding Eliquis for now given Esophageal Diverticular bleed.   - On no rate control medications at home and remains with rate controlled AFIB.   - Monitor HR/ BP.   - Started Metoprolol 25mg BID on 10/1- IVP Lopressor discontinued  - 10/2 still tachy 120 -130 afib on EKG Lopressor increased to 37.5 bid and stable HR improved     # RESPIRATORY  - COPD/ Emphysema at baseline and presented to [facility] with hematemesis. Intubated 9/9 for airway protection and treatment for aspiration. Poor vent weaning and s/p Trach 9/24.   - Continue on AC Vent 18/350/5/40 and PS 15/5 vs 10/5 as tolerated.   - Continue on Proventil and Symbicort with Chest PT Q6H   - Tracheostomy 4 and 10 o clock sutures removed and pending remain suture removal by CTSx on 10/3 (10-14 days from tracheostomy placement).   - Tracheostomy care QD   - Suction PRN   - Tolerating PS today 15/5 and Fi02 decreased to 30%    # GI  - FTT requiring TPN during hospitalization. Now s/p PEG 9/24.   - Continue on TF with Jevity @ 52cc/hr goal rate.  - Continue on Miralax, Senna and PPI.   - Monitor BMs.     # RENAL  - Mild dehydration noted with diuresis and third spacing.   - Continue on free H20 250cc Q4H and monitor fluid status.   - Monitor renal function and UOP.     # INFECTIOUS DISEASE  - Aspiration noted and s/p Zosyn (9/10-9/15) with continued fever spikes and broadened  - to Meropenem (9/15 - 9/22), Vancomycin (9/15 - 9/22) and Diflucan (9/17 - 9/22).   - SCx, BCx, UA and COVID PCR remained negative.   - Pleural Fluid 9/21 negative     # HEME  - R Midline associated DVT noted. Midline removed and will monitor.   - Holding home Eliquis for AFIB given diverticular bleed.   - On heparin 5000u tid    # ENDOCRINE  - Hypothyroidism and continued on home Synthroid 150mcg QD.      # LINES/ TUBES  - Tracheostomy and PEG 9/24   - LUE Midline 9/28   - RUE Midline (9/26-9/28)  - R IJ TLC (9/17 - 9/26)   - L IJ TLC (9/11 - 9/17)  - L Radial DEEPAK (9/10 - 9/21)  - L Axillary DEEPAK (9/21 - to be removed on 9/30).     # ETHICS/ GOC/ DISPO   - Son wishes for mom to be FULL CODE.   - DISPO likely vent facility.     # DVT PPX with HSQ.  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 via EMS with Hematemesis. She was intubated for airway protection on 9/9. EGD @ OSH with large blood filled esophageal diverticula and possible esophageal tear. Transferred to [facility] 9/10 and s/p EGD 9/14 with no esophageal tear or bleed, but (+) esophageal diverticula with clot. Course complicated by fevers likely from aspiration, septic shock, biventricular heart failure, and poor vent weaning requiring Tracheostomy and PEG placement on 9/24. Transferred to [facility] 9/30/21.    # NEUROLOGY  - Baseline Dementia with mentation AOx1-2 as per Son. Patient reportedly able to eat and ambulate with assistance at ASL per Son.   - Continue on Buspar, Cymbalta, Trazadone, Aricept and Namenda as per home doses.   - Now with anxiety and agitation and continued on Seroquel 25mg BID, Ativan 0.5mg IV TID and Ativan vs Dilaudid PRN doses.   - Monitor mentation.   - Obtunded- stop Buspar and Seroquel and reassess mental status  - CTH ordered 10/1-No acute intracranial hemorrhage or acute territorial infarct.     # CARDIOVASCULAR  - Baseline HF (unknown EF), Moderate MR and Severe TR and AFIB on Eliquis   - Course complicated with biventricular HF requiring dobutamine. Now weaned off.   - ECHO 9/15 noted with EF 20%, mod MR, severely dilated LA, severe LVSD, severe FLAVIO, decreased RVSF, mod-severe TR, mild pHTN.   - RPT ECHO 9/27 with improved EF to 60% and LVSD (now normal). LA/ RA dilation, RVSD, and severe TR remains.   - Continue holding Eliquis for now given Esophageal Diverticular bleed.   - On no rate control medications at home and remains with rate controlled AFIB.   - Monitor HR/ BP.   - Started Metoprolol 25mg BID on 10/1- IVP Lopressor discontinued  - 10/2 still tachy 120 -130 afib on EKG Lopressor increased to 37.5 bid and stable HR improved     # RESPIRATORY  - COPD/ Emphysema at baseline and presented to [facility] with hematemesis. Intubated 9/9 for airway protection and treatment for aspiration. Poor vent weaning and s/p Trach 9/24.   - Continue on AC Vent 18/350/5/40 and PS 15/5 vs 10/5 as tolerated.   - Continue on Proventil and Symbicort with Chest PT Q6H   - Tracheostomy 4 and 10 o clock sutures removed and pending remain suture removal by CTSx on 10/3 (10-14 days from tracheostomy placement).   - Tracheostomy care QD   - Suction PRN   - Tolerating PS today 15/5 and Fi02 decreased to 30%    # GI  - FTT requiring TPN during hospitalization. Now s/p PEG 9/24.   - Continue on TF with Jevity @ 52cc/hr goal rate.  - Continue on Miralax, Senna and PPI.   - Monitor BMs.     # RENAL  - Mild dehydration noted with diuresis and third spacing.   - Continue on free H20 250cc Q4H and monitor fluid status.   - Monitor renal function and UOP.     # INFECTIOUS DISEASE  - Aspiration noted and s/p Zosyn (9/10-9/15) with continued fever spikes and broadened  - to Meropenem (9/15 - 9/22), Vancomycin (9/15 - 9/22) and Diflucan (9/17 - 9/22).   - SCx, BCx, UA and COVID PCR remained negative.   - Pleural Fluid 9/21 negative     # HEME  - R Midline associated DVT noted. Midline removed and will monitor.   - Holding home Eliquis for AFIB given diverticular bleed.   - On heparin 5000u tid    # ENDOCRINE  - Hypothyroidism and continued on home Synthroid 150mcg QD.   - FU TSH in am      # LINES/ TUBES  - Tracheostomy and PEG 9/24   - LUE Midline 9/28   - RUE Midline (9/26-9/28)  - R IJ TLC (9/17 - 9/26)   - L IJ TLC (9/11 - 9/17)  - L Radial DEEPAK (9/10 - 9/21)  - L Axillary DEEPAK (9/21 - to be removed on 9/30).     # ETHICS/ GOC/ DISPO   - Son wishes for mom to be FULL CODE.   - DISPO likely vent facility.     # DVT PPX with HSQ.

## 2021-10-02 NOTE — DISCHARGE NOTE PROVIDER - NSDCMRMEDTOKEN_GEN_ALL_CORE_FT
Abreva 10% topical cream: Apply topically to affected area once a day  Albuterol (Eqv-ProAir HFA) 90 mcg/inh inhalation aerosol: 2 puff(s) inhaled every 6 hours  allopurinol 100 mg oral tablet: 2 tab(s) orally 2 times a day  Aricept 10 mg oral tablet: 1 tab(s) orally once a day (at bedtime)  aspirin 81 mg oral tablet: 1 tab(s) orally once a day  baclofen 5 mg oral tablet: 1 tab(s) orally 2 times a day  busPIRone 15 mg oral tablet: 1 tab(s) orally once a day (at bedtime)  Claritin 10 mg oral tablet: 1 tab(s) orally once a day  Colace 100 mg oral capsule: 1 cap(s) orally 3 times a day  Cymbalta 60 mg oral delayed release capsule: 1 cap(s) orally once a day  Eliquis 2.5 mg oral tablet: 1 tab(s) orally 2 times a day  Lasix 40 mg oral tablet: 1 tab(s) orally once a day  Linzess 290 mcg oral capsule: 1 cap(s) orally once a day  Melatonin 3 mg oral tablet: 1 tab(s) orally once a day (at bedtime)  Namenda 10 mg oral tablet: 1 tab(s) orally 2 times a day  ondansetron 4 mg oral tablet: 1 tab(s) orally every 8 hours  oxyCODONE 5 mg oral tablet: 1 tab(s) orally every 8 hours  Senna 8.6 mg oral tablet: 1 tab(s) orally once a day (at bedtime)  Spiriva 18 mcg inhalation capsule: 1 cap(s) inhaled once a day  Symbicort 80 mcg-4.5 mcg/inh inhalation aerosol: 2 puff(s) inhaled 2 times a day  Synthroid 150 mcg (0.15 mg) oral tablet: 1 tab(s) orally once a day  traZODone 100 mg oral tablet: 1 tab(s) orally 2 times a day  Ventolin HFA 90 mcg/inh inhalation aerosol: 2 puff(s) inhaled every 8 hours  Zocor 10 mg oral tablet: 1 tab(s) orally once a day (at bedtime)   Abreva 10% topical cream: Apply topically to affected area once a day  Albuterol (Eqv-ProAir HFA) 90 mcg/inh inhalation aerosol: 2 puff(s) inhaled every 6 hours  aspirin 81 mg oral tablet: 1 tab(s) orally once a day  baclofen 5 mg oral tablet: 1 tab(s) orally 2 times a day  bisacodyl 10 mg rectal suppository: 1 suppository(ies) rectal once a day, As needed, Constipation  busPIRone 15 mg oral tablet: 1 tab(s) orally once a day (at bedtime)  Cymbalta 60 mg oral delayed release capsule: 1 cap(s) orally once a day  donepezil 10 mg oral tablet: 1 tab(s) orally once a day (at bedtime)  Eliquis 2.5 mg oral tablet: 1 tab(s) orally 2 times a day  Lasix 40 mg oral tablet: 1 tab(s) orally once a day  levothyroxine 150 mcg (0.15 mg) oral tablet: 1 tab(s) orally once a day  Linzess 290 mcg oral capsule: 1 cap(s) orally once a day  Melatonin 3 mg oral tablet: 1 tab(s) orally once a day (at bedtime)  memantine 10 mg oral tablet: 1 tab(s) orally every 12 hours  metoprolol tartrate 37.5 mg oral tablet: 1 tab(s) orally 2 times a day  oxyCODONE 5 mg oral tablet: 1 tab(s) orally every 8 hours  polyethylene glycol 3350 oral powder for reconstitution: 17 gram(s) orally 2 times a day  senna oral tablet: 2 tab(s) orally once a day (at bedtime)  Spiriva 18 mcg inhalation capsule: 1 cap(s) inhaled once a day  Symbicort 80 mcg-4.5 mcg/inh inhalation aerosol: 2 puff(s) inhaled 2 times a day  traZODone 100 mg oral tablet: 1 tab(s) orally every 12 hours  Ventolin HFA 90 mcg/inh inhalation aerosol: 2 puff(s) inhaled every 8 hours  Zocor 10 mg oral tablet: 1 tab(s) orally once a day (at bedtime)   Albuterol (Eqv-ProAir HFA) 90 mcg/inh inhalation aerosol: 2 puff(s) inhaled every 6 hours, As Needed for wheezing  baclofen 5 mg oral tablet: 1 tab(s) orally 2 times a day  bisacodyl 10 mg rectal suppository: 1 suppository(ies) rectal once a day, As needed, Constipation  Cymbalta 60 mg oral delayed release capsule: 1 cap(s) orally once a day  donepezil 10 mg oral tablet: 1 tab(s) orally once a day (at bedtime)  levothyroxine 150 mcg (0.15 mg) oral tablet: 1 tab(s) orally once a day  Linzess 290 mcg oral capsule: 1 cap(s) orally once a day  memantine 10 mg oral tablet: 1 tab(s) orally every 12 hours  metoprolol tartrate 37.5 mg oral tablet: 1 tab(s) orally 2 times a day  pantoprazole 40 mg oral delayed release tablet: 1 tab(s) orally once a day  polyethylene glycol 3350 oral powder for reconstitution: 17 gram(s) orally 2 times a day  senna oral tablet: 2 tab(s) orally once a day (at bedtime)  Spiriva 18 mcg inhalation capsule: 1 cap(s) inhaled once a day  Symbicort 80 mcg-4.5 mcg/inh inhalation aerosol: 2 puff(s) inhaled 2 times a day  traZODone 100 mg oral tablet: 1 tab(s) orally every 12 hours  Zocor 10 mg oral tablet: 1 tab(s) orally once a day (at bedtime)

## 2021-10-02 NOTE — PROGRESS NOTE ADULT - SUBJECTIVE AND OBJECTIVE BOX
CHIEF COMPLAINT:    Interval Events:    REVIEW OF SYSTEMS:  Constitutional:   Eyes:  ENT:  CV:  Resp:  GI:  :  MSK:  Integumentary:  Neurological:  Psychiatric:  Endocrine:  Hematologic/Lymphatic:  Allergic/Immunologic:  [ ] All other systems negative  [ ] Unable to assess ROS because ________    OBJECTIVE:  ICU Vital Signs Last 24 Hrs  T(C): 36.2 (02 Oct 2021 05:09), Max: 36.8 (01 Oct 2021 13:40)  T(F): 97.1 (02 Oct 2021 05:09), Max: 98.2 (01 Oct 2021 13:40)  HR: 106 (02 Oct 2021 05:09) (89 - 130)  BP: 115/86 (02 Oct 2021 05:09) (112/67 - 141/95)  BP(mean): --  ABP: --  ABP(mean): --  RR: 23 (02 Oct 2021 05:09) (23 - 28)  SpO2: 100% (02 Oct 2021 05:09) (99% - 100%)    Mode: AC/ CMV (Assist Control/ Continuous Mandatory Ventilation), RR (machine): 18, TV (machine): 300, FiO2: 50, PEEP: 5, ITime: 0.7, MAP: 10, PIP: 23    10-01 @ 07:01  -  10-02 @ 07:00  --------------------------------------------------------  IN: 1624 mL / OUT: 1400 mL / NET: 224 mL      CAPILLARY BLOOD GLUCOSE      POCT Blood Glucose.: 126 mg/dL (02 Oct 2021 06:00)      PHYSICAL EXAM:  General:   HEENT:   Lymph Nodes:  Neck:   Respiratory:   Cardiovascular:   Abdomen:   Extremities:   Skin:   Neurological:  Psychiatry:    HOSPITAL MEDICATIONS:  MEDICATIONS  (STANDING):  ALBUTerol    90 MICROgram(s) HFA Inhaler 2 Puff(s) Inhalation every 6 hours  artificial tears (preservative free) Ophthalmic Solution 1 Drop(s) Both EYES two times a day  budesonide  80 MICROgram(s)/formoterol 4.5 MICROgram(s) Inhaler 2 Puff(s) Inhalation two times a day  chlorhexidine 0.12% Liquid 15 milliLiter(s) Oral Mucosa every 12 hours  chlorhexidine 4% Liquid 1 Application(s) Topical <User Schedule>  dextrose 50% Injectable 25 Gram(s) IV Push once  donepezil 10 milliGRAM(s) Oral at bedtime  heparin   Injectable 5000 Unit(s) SubCutaneous every 12 hours  insulin lispro (ADMELOG) corrective regimen sliding scale   SubCutaneous every 6 hours  levothyroxine 150 MICROGram(s) Oral daily  LORazepam   Injectable 0.5 milliGRAM(s) IV Push every 8 hours  melatonin 3 milliGRAM(s) Oral at bedtime  memantine 10 milliGRAM(s) Oral every 12 hours  metoprolol tartrate 25 milliGRAM(s) Oral every 12 hours  pantoprazole   Suspension 40 milliGRAM(s) Oral two times a day  polyethylene glycol 3350 17 Gram(s) Oral at bedtime  senna 2 Tablet(s) Oral at bedtime  simvastatin 10 milliGRAM(s) Oral at bedtime  traZODone 100 milliGRAM(s) Oral every 12 hours    MEDICATIONS  (PRN):  bisacodyl Suppository 10 milliGRAM(s) Rectal daily PRN Constipation      LABS:                        11.1   11.33 )-----------( 383      ( 01 Oct 2021 06:29 )             36.1     10-01    144  |  105  |  19  ----------------------------<  170<H>  3.6   |  28  |  0.33<L>    Ca    10.2      01 Oct 2021 06:29            Venous Blood Gas:  10-01 @ 06:29  7.45/48/189/33/97.8  VBG Lactate: 2.2      MICROBIOLOGY:     RADIOLOGY:  [ ] Reviewed and interpreted by me    PULMONARY FUNCTION TESTS:    EKG: CHIEF COMPLAINT: Patient is a 81y old  Female who presents with a chief complaint of Hematemesis (02 Oct 2021 09:00)      Interval Events: Pt seen and evaluated at bedside No acute events overnight     REVIEW OF SYSTEMS:  [x ] Unable to assess ROS because AMS    OBJECTIVE:  ICU Vital Signs Last 24 Hrs  T(C): 36.2 (02 Oct 2021 05:09), Max: 36.8 (01 Oct 2021 13:40)  T(F): 97.1 (02 Oct 2021 05:09), Max: 98.2 (01 Oct 2021 13:40)  HR: 106 (02 Oct 2021 05:09) (89 - 130)  BP: 115/86 (02 Oct 2021 05:09) (112/67 - 141/95)  BP(mean): --  ABP: --  ABP(mean): --  RR: 23 (02 Oct 2021 05:09) (23 - 28)  SpO2: 100% (02 Oct 2021 05:09) (99% - 100%)    Mode: AC/ CMV (Assist Control/ Continuous Mandatory Ventilation), RR (machine): 18, TV (machine): 300, FiO2: 50, PEEP: 5, ITime: 0.7, MAP: 10, PIP: 23    10-01 @ 07:01  -  10-02 @ 07:00  --------------------------------------------------------  IN: 1624 mL / OUT: 1400 mL / NET: 224 mL      CAPILLARY BLOOD GLUCOSE      POCT Blood Glucose.: 126 mg/dL (02 Oct 2021 06:00)    HOSPITAL MEDICATIONS:  MEDICATIONS  (STANDING):  ALBUTerol    90 MICROgram(s) HFA Inhaler 2 Puff(s) Inhalation every 6 hours  artificial tears (preservative free) Ophthalmic Solution 1 Drop(s) Both EYES two times a day  budesonide  80 MICROgram(s)/formoterol 4.5 MICROgram(s) Inhaler 2 Puff(s) Inhalation two times a day  chlorhexidine 0.12% Liquid 15 milliLiter(s) Oral Mucosa every 12 hours  chlorhexidine 4% Liquid 1 Application(s) Topical <User Schedule>  dextrose 50% Injectable 25 Gram(s) IV Push once  donepezil 10 milliGRAM(s) Oral at bedtime  heparin   Injectable 5000 Unit(s) SubCutaneous every 12 hours  insulin lispro (ADMELOG) corrective regimen sliding scale   SubCutaneous every 6 hours  levothyroxine 150 MICROGram(s) Oral daily  LORazepam   Injectable 0.5 milliGRAM(s) IV Push every 8 hours  melatonin 3 milliGRAM(s) Oral at bedtime  memantine 10 milliGRAM(s) Oral every 12 hours  metoprolol tartrate 25 milliGRAM(s) Oral every 12 hours  pantoprazole   Suspension 40 milliGRAM(s) Oral two times a day  polyethylene glycol 3350 17 Gram(s) Oral at bedtime  senna 2 Tablet(s) Oral at bedtime  simvastatin 10 milliGRAM(s) Oral at bedtime  traZODone 100 milliGRAM(s) Oral every 12 hours    MEDICATIONS  (PRN):  bisacodyl Suppository 10 milliGRAM(s) Rectal daily PRN Constipation      LABS:                        11.1   11.33 )-----------( 383      ( 01 Oct 2021 06:29 )             36.1     10-01    144  |  105  |  19  ----------------------------<  170<H>  3.6   |  28  |  0.33<L>    Ca    10.2      01 Oct 2021 06:29            Venous Blood Gas:  10-01 @ 06:29  7.45/48/189/33/97.8  VBG Lactate: 2.2      MICROBIOLOGY:     RADIOLOGY:  [ ] Reviewed and interpreted by me    PULMONARY FUNCTION TESTS:    EKG:

## 2021-10-03 LAB
ANION GAP SERPL CALC-SCNC: 13 MMOL/L — SIGNIFICANT CHANGE UP (ref 7–14)
BUN SERPL-MCNC: 18 MG/DL — SIGNIFICANT CHANGE UP (ref 7–23)
CALCIUM SERPL-MCNC: 9.9 MG/DL — SIGNIFICANT CHANGE UP (ref 8.4–10.5)
CHLORIDE SERPL-SCNC: 102 MMOL/L — SIGNIFICANT CHANGE UP (ref 98–107)
CO2 SERPL-SCNC: 26 MMOL/L — SIGNIFICANT CHANGE UP (ref 22–31)
CREAT SERPL-MCNC: 0.33 MG/DL — LOW (ref 0.5–1.3)
GLUCOSE BLDC GLUCOMTR-MCNC: 142 MG/DL — HIGH (ref 70–99)
GLUCOSE BLDC GLUCOMTR-MCNC: 150 MG/DL — HIGH (ref 70–99)
GLUCOSE BLDC GLUCOMTR-MCNC: 160 MG/DL — HIGH (ref 70–99)
GLUCOSE BLDC GLUCOMTR-MCNC: 164 MG/DL — HIGH (ref 70–99)
GLUCOSE SERPL-MCNC: 153 MG/DL — HIGH (ref 70–99)
GRAM STN FLD: SIGNIFICANT CHANGE UP
HCT VFR BLD CALC: 35.1 % — SIGNIFICANT CHANGE UP (ref 34.5–45)
HGB BLD-MCNC: 11.1 G/DL — LOW (ref 11.5–15.5)
MAGNESIUM SERPL-MCNC: 1.8 MG/DL — SIGNIFICANT CHANGE UP (ref 1.6–2.6)
MCHC RBC-ENTMCNC: 30.2 PG — SIGNIFICANT CHANGE UP (ref 27–34)
MCHC RBC-ENTMCNC: 31.6 GM/DL — LOW (ref 32–36)
MCV RBC AUTO: 95.6 FL — SIGNIFICANT CHANGE UP (ref 80–100)
NRBC # BLD: 0 /100 WBCS — SIGNIFICANT CHANGE UP
NRBC # FLD: 0 K/UL — SIGNIFICANT CHANGE UP
PHOSPHATE SERPL-MCNC: 2.9 MG/DL — SIGNIFICANT CHANGE UP (ref 2.5–4.5)
PLATELET # BLD AUTO: 340 K/UL — SIGNIFICANT CHANGE UP (ref 150–400)
POTASSIUM SERPL-MCNC: 3.5 MMOL/L — SIGNIFICANT CHANGE UP (ref 3.5–5.3)
POTASSIUM SERPL-SCNC: 3.5 MMOL/L — SIGNIFICANT CHANGE UP (ref 3.5–5.3)
RBC # BLD: 3.67 M/UL — LOW (ref 3.8–5.2)
RBC # FLD: 15.9 % — HIGH (ref 10.3–14.5)
SODIUM SERPL-SCNC: 141 MMOL/L — SIGNIFICANT CHANGE UP (ref 135–145)
SPECIMEN SOURCE: SIGNIFICANT CHANGE UP
T4 FREE SERPL-MCNC: 1 NG/DL — SIGNIFICANT CHANGE UP (ref 0.9–1.8)
TSH SERPL-MCNC: 3.16 UIU/ML — SIGNIFICANT CHANGE UP (ref 0.27–4.2)
WBC # BLD: 10.12 K/UL — SIGNIFICANT CHANGE UP (ref 3.8–10.5)
WBC # FLD AUTO: 10.12 K/UL — SIGNIFICANT CHANGE UP (ref 3.8–10.5)

## 2021-10-03 PROCEDURE — 99232 SBSQ HOSP IP/OBS MODERATE 35: CPT

## 2021-10-03 RX ORDER — POLYETHYLENE GLYCOL 3350 17 G/17G
17 POWDER, FOR SOLUTION ORAL
Refills: 0 | Status: DISCONTINUED | OUTPATIENT
Start: 2021-10-03 | End: 2021-10-05

## 2021-10-03 RX ADMIN — Medication 100 MILLIGRAM(S): at 18:00

## 2021-10-03 RX ADMIN — ALBUTEROL 2 PUFF(S): 90 AEROSOL, METERED ORAL at 21:53

## 2021-10-03 RX ADMIN — CHLORHEXIDINE GLUCONATE 15 MILLILITER(S): 213 SOLUTION TOPICAL at 06:10

## 2021-10-03 RX ADMIN — MEMANTINE HYDROCHLORIDE 10 MILLIGRAM(S): 10 TABLET ORAL at 06:07

## 2021-10-03 RX ADMIN — ALBUTEROL 2 PUFF(S): 90 AEROSOL, METERED ORAL at 11:42

## 2021-10-03 RX ADMIN — PANTOPRAZOLE SODIUM 40 MILLIGRAM(S): 20 TABLET, DELAYED RELEASE ORAL at 17:56

## 2021-10-03 RX ADMIN — Medication 150 MICROGRAM(S): at 06:09

## 2021-10-03 RX ADMIN — Medication 10 MILLIGRAM(S): at 17:56

## 2021-10-03 RX ADMIN — SENNA PLUS 2 TABLET(S): 8.6 TABLET ORAL at 21:57

## 2021-10-03 RX ADMIN — PANTOPRAZOLE SODIUM 40 MILLIGRAM(S): 20 TABLET, DELAYED RELEASE ORAL at 06:09

## 2021-10-03 RX ADMIN — BUDESONIDE AND FORMOTEROL FUMARATE DIHYDRATE 2 PUFF(S): 160; 4.5 AEROSOL RESPIRATORY (INHALATION) at 21:53

## 2021-10-03 RX ADMIN — POLYETHYLENE GLYCOL 3350 17 GRAM(S): 17 POWDER, FOR SOLUTION ORAL at 17:56

## 2021-10-03 RX ADMIN — CHLORHEXIDINE GLUCONATE 1 APPLICATION(S): 213 SOLUTION TOPICAL at 06:32

## 2021-10-03 RX ADMIN — ALBUTEROL 2 PUFF(S): 90 AEROSOL, METERED ORAL at 03:52

## 2021-10-03 RX ADMIN — Medication 1 DROP(S): at 17:57

## 2021-10-03 RX ADMIN — Medication 100 MILLIGRAM(S): at 06:09

## 2021-10-03 RX ADMIN — ALBUTEROL 2 PUFF(S): 90 AEROSOL, METERED ORAL at 15:40

## 2021-10-03 RX ADMIN — Medication 37.5 MILLIGRAM(S): at 09:59

## 2021-10-03 RX ADMIN — HEPARIN SODIUM 5000 UNIT(S): 5000 INJECTION INTRAVENOUS; SUBCUTANEOUS at 17:57

## 2021-10-03 RX ADMIN — MEMANTINE HYDROCHLORIDE 10 MILLIGRAM(S): 10 TABLET ORAL at 18:00

## 2021-10-03 RX ADMIN — DONEPEZIL HYDROCHLORIDE 10 MILLIGRAM(S): 10 TABLET, FILM COATED ORAL at 21:59

## 2021-10-03 RX ADMIN — HEPARIN SODIUM 5000 UNIT(S): 5000 INJECTION INTRAVENOUS; SUBCUTANEOUS at 06:10

## 2021-10-03 RX ADMIN — Medication 37.5 MILLIGRAM(S): at 21:58

## 2021-10-03 RX ADMIN — CHLORHEXIDINE GLUCONATE 15 MILLILITER(S): 213 SOLUTION TOPICAL at 17:57

## 2021-10-03 RX ADMIN — Medication 3 MILLIGRAM(S): at 21:58

## 2021-10-03 RX ADMIN — Medication 1 DROP(S): at 06:10

## 2021-10-03 RX ADMIN — BUDESONIDE AND FORMOTEROL FUMARATE DIHYDRATE 2 PUFF(S): 160; 4.5 AEROSOL RESPIRATORY (INHALATION) at 11:43

## 2021-10-03 RX ADMIN — SIMVASTATIN 10 MILLIGRAM(S): 20 TABLET, FILM COATED ORAL at 21:59

## 2021-10-03 NOTE — PROGRESS NOTE ADULT - SUBJECTIVE AND OBJECTIVE BOX
Patient is a 81y old  Female who presents with a chief complaint of Hematemesis (02 Oct 2021 13:24)  CHIEF COMPLAINT:    Interval Events:    OBJECTIVE:  ICU Vital Signs Last 24 Hrs  T(C): 36.1 (03 Oct 2021 06:27), Max: 38 (02 Oct 2021 17:24)  T(F): 97 (03 Oct 2021 06:27), Max: 100.4 (02 Oct 2021 17:24)  HR: 114 (03 Oct 2021 07:41) (90 - 126)  BP: 139/79 (03 Oct 2021 06:27) (114/78 - 139/79)  BP(mean): --  ABP: --  ABP(mean): --  RR: 25 (03 Oct 2021 06:27) (14 - 26)  SpO2: 98% (03 Oct 2021 07:41) (92% - 100%)    Mode: AC/ CMV (Assist Control/ Continuous Mandatory Ventilation), RR (machine): 18, TV (machine): 300, FiO2: 30, PEEP: 5, ITime: 0.67, MAP: 8, PIP: 15    10-02 @ 07:01  -  10-03 @ 07:00  --------------------------------------------------------  IN: 1040 mL / OUT: 2000 mL / NET: -960 mL      CAPILLARY BLOOD GLUCOSE      POCT Blood Glucose.: 142 mg/dL (03 Oct 2021 06:25)      PHYSICAL EXAM:  General:   HEENT:   Lymph Nodes:  Neck:   Respiratory:   Cardiovascular:   Abdomen:   Extremities:   Skin:   Neurological:  Psychiatry:    HOSPITAL MEDICATIONS:  MEDICATIONS  (STANDING):  ALBUTerol    90 MICROgram(s) HFA Inhaler 2 Puff(s) Inhalation every 6 hours  artificial tears (preservative free) Ophthalmic Solution 1 Drop(s) Both EYES two times a day  budesonide  80 MICROgram(s)/formoterol 4.5 MICROgram(s) Inhaler 2 Puff(s) Inhalation two times a day  chlorhexidine 0.12% Liquid 15 milliLiter(s) Oral Mucosa every 12 hours  chlorhexidine 4% Liquid 1 Application(s) Topical <User Schedule>  dextrose 50% Injectable 25 Gram(s) IV Push once  donepezil 10 milliGRAM(s) Oral at bedtime  heparin   Injectable 5000 Unit(s) SubCutaneous every 12 hours  insulin lispro (ADMELOG) corrective regimen sliding scale   SubCutaneous every 6 hours  levothyroxine 150 MICROGram(s) Oral daily  melatonin 3 milliGRAM(s) Oral at bedtime  memantine 10 milliGRAM(s) Oral every 12 hours  metoprolol tartrate 37.5 milliGRAM(s) Oral two times a day  pantoprazole   Suspension 40 milliGRAM(s) Oral two times a day  polyethylene glycol 3350 17 Gram(s) Oral at bedtime  senna 2 Tablet(s) Oral at bedtime  simvastatin 10 milliGRAM(s) Oral at bedtime  traZODone 100 milliGRAM(s) Oral every 12 hours    MEDICATIONS  (PRN):  bisacodyl Suppository 10 milliGRAM(s) Rectal daily PRN Constipation      LABS:                        11.1   10.12 )-----------( 340      ( 03 Oct 2021 07:31 )             35.1                     MICROBIOLOGY:     RADIOLOGY:  [ ] Reviewed and interpreted by me    PULMONARY FUNCTION TESTS:    EKG: Patient is a 81y old  Female who presents with a chief complaint of Hematemesis (02 Oct 2021 13:24)  CHIEF COMPLAINT:    Interval Events:    OBJECTIVE:  ICU Vital Signs Last 24 Hrs  T(C): 36.1 (03 Oct 2021 06:27), Max: 38 (02 Oct 2021 17:24)  T(F): 97 (03 Oct 2021 06:27), Max: 100.4 (02 Oct 2021 17:24)  HR: 114 (03 Oct 2021 07:41) (90 - 126)  BP: 139/79 (03 Oct 2021 06:27) (114/78 - 139/79)  BP(mean): --  ABP: --  ABP(mean): --  RR: 25 (03 Oct 2021 06:27) (14 - 26)  SpO2: 98% (03 Oct 2021 07:41) (92% - 100%)    Mode: AC/ CMV (Assist Control/ Continuous Mandatory Ventilation), RR (machine): 18, TV (machine): 300, FiO2: 30, PEEP: 5, ITime: 0.67, MAP: 8, PIP: 15    10-02 @ 07:01  -  10-03 @ 07:00  --------------------------------------------------------  IN: 1040 mL / OUT: 2000 mL / NET: -960 mL      CAPILLARY BLOOD GLUCOSE      POCT Blood Glucose.: 142 mg/dL (03 Oct 2021 06:25)      PHYSICAL EXAM:  General: pt responded to voice by moving eyebrows and partially opening eyes   HEENT: normal   Neck: trach  Respiratory: clear to auscultation, normal chest rise( responded well to pressure support 10/5)  Cardiovascular: regular rate and rhythm  Abdomen: soft, non-distended   Extremities: no edema  Skin: normal, no signs of skin breakdown   Neurological: not verbally responsive but showed attempt to   Psychiatry:    HOSPITAL MEDICATIONS:  MEDICATIONS  (STANDING):  ALBUTerol    90 MICROgram(s) HFA Inhaler 2 Puff(s) Inhalation every 6 hours  artificial tears (preservative free) Ophthalmic Solution 1 Drop(s) Both EYES two times a day  budesonide  80 MICROgram(s)/formoterol 4.5 MICROgram(s) Inhaler 2 Puff(s) Inhalation two times a day  chlorhexidine 0.12% Liquid 15 milliLiter(s) Oral Mucosa every 12 hours  chlorhexidine 4% Liquid 1 Application(s) Topical <User Schedule>  dextrose 50% Injectable 25 Gram(s) IV Push once  donepezil 10 milliGRAM(s) Oral at bedtime  heparin   Injectable 5000 Unit(s) SubCutaneous every 12 hours  insulin lispro (ADMELOG) corrective regimen sliding scale   SubCutaneous every 6 hours  levothyroxine 150 MICROGram(s) Oral daily  melatonin 3 milliGRAM(s) Oral at bedtime  memantine 10 milliGRAM(s) Oral every 12 hours  metoprolol tartrate 37.5 milliGRAM(s) Oral two times a day  pantoprazole   Suspension 40 milliGRAM(s) Oral two times a day  polyethylene glycol 3350 17 Gram(s) Oral at bedtime  senna 2 Tablet(s) Oral at bedtime  simvastatin 10 milliGRAM(s) Oral at bedtime  traZODone 100 milliGRAM(s) Oral every 12 hours    MEDICATIONS  (PRN):  bisacodyl Suppository 10 milliGRAM(s) Rectal daily PRN Constipation      LABS:                        11.1   10.12 )-----------( 340      ( 03 Oct 2021 07:31 )             35.1                     MICROBIOLOGY:     RADIOLOGY:  [ ] Reviewed and interpreted by me    PULMONARY FUNCTION TESTS:    EKG: Patient is a 81y old  Female who presents with a chief complaint of Hematemesis (02 Oct 2021 13:24)  CHIEF COMPLAINT:    Interval Events:    OBJECTIVE:  ICU Vital Signs Last 24 Hrs  T(C): 36.1 (03 Oct 2021 06:27), Max: 38 (02 Oct 2021 17:24)  T(F): 97 (03 Oct 2021 06:27), Max: 100.4 (02 Oct 2021 17:24)  HR: 114 (03 Oct 2021 07:41) (90 - 126)  BP: 139/79 (03 Oct 2021 06:27) (114/78 - 139/79)  BP(mean): --  ABP: --  ABP(mean): --  RR: 25 (03 Oct 2021 06:27) (14 - 26)  SpO2: 98% (03 Oct 2021 07:41) (92% - 100%)    Mode: AC/ CMV (Assist Control/ Continuous Mandatory Ventilation), RR (machine): 18, TV (machine): 300, FiO2: 30, PEEP: 5, ITime: 0.67, MAP: 8, PIP: 15    10-02 @ 07:01  -  10-03 @ 07:00  --------------------------------------------------------  IN: 1040 mL / OUT: 2000 mL / NET: -960 mL      CAPILLARY BLOOD GLUCOSE      POCT Blood Glucose.: 142 mg/dL (03 Oct 2021 06:25)      PHYSICAL EXAM:  General: pt responded to voice by moving eyebrows and partially opening eyes   HEENT: normal   Neck: trach w/ collar  Respiratory: clear to auscultation, normal chest rise( responded well to pressure support 10/5)  Cardiovascular: regular rate and rhythm  Abdomen: soft, non-distended   Extremities: no edema  Skin: normal, no signs of skin breakdown   Musculoskeletal: no strength/tone   Neurological: not verbally responsive but moved eyebrows and slightly opened eye to verbal cues     HOSPITAL MEDICATIONS:  MEDICATIONS  (STANDING):  ALBUTerol    90 MICROgram(s) HFA Inhaler 2 Puff(s) Inhalation every 6 hours  artificial tears (preservative free) Ophthalmic Solution 1 Drop(s) Both EYES two times a day  budesonide  80 MICROgram(s)/formoterol 4.5 MICROgram(s) Inhaler 2 Puff(s) Inhalation two times a day  chlorhexidine 0.12% Liquid 15 milliLiter(s) Oral Mucosa every 12 hours  chlorhexidine 4% Liquid 1 Application(s) Topical <User Schedule>  dextrose 50% Injectable 25 Gram(s) IV Push once  donepezil 10 milliGRAM(s) Oral at bedtime  heparin   Injectable 5000 Unit(s) SubCutaneous every 12 hours  insulin lispro (ADMELOG) corrective regimen sliding scale   SubCutaneous every 6 hours  levothyroxine 150 MICROGram(s) Oral daily  melatonin 3 milliGRAM(s) Oral at bedtime  memantine 10 milliGRAM(s) Oral every 12 hours  metoprolol tartrate 37.5 milliGRAM(s) Oral two times a day  pantoprazole   Suspension 40 milliGRAM(s) Oral two times a day  polyethylene glycol 3350 17 Gram(s) Oral at bedtime  senna 2 Tablet(s) Oral at bedtime  simvastatin 10 milliGRAM(s) Oral at bedtime  traZODone 100 milliGRAM(s) Oral every 12 hours    MEDICATIONS  (PRN):  bisacodyl Suppository 10 milliGRAM(s) Rectal daily PRN Constipation      LABS:                        11.1   10.12 )-----------( 340      ( 03 Oct 2021 07:31 )             35.1                     MICROBIOLOGY:     RADIOLOGY:  [ ] Reviewed and interpreted by me    PULMONARY FUNCTION TESTS:    EKG:

## 2021-10-03 NOTE — PROGRESS NOTE ADULT - ASSESSMENT
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 She was intubated for airway protection on 9/9. EGD @ OSH with large blood filled esophageal diverticula and possible esophageal tear. Transferred to [facility] 9/10 and s/p EGD 9/14 with no esophageal tear or bleed, but (+) esophageal diverticula with clot. Course complicated by fevers likely from aspiration, septic shock, biventricular heart failure, and poor vent weaning requiring Tracheostomy and PEG placement on 9/24. Transferred to [facility] 9/30/21.    # NEUROLOGY  - Baseline Dementia with mentation AOx1-2 as per Son. Patient reportedly able to eat and ambulate with assistance at ASL per Son.   - Continue on Buspar, Cymbalta, Trazadone, Aricept and Namenda as per home doses.   - Now with anxiety and agitation and continued on Seroquel 25mg BID, Ativan 0.5mg IV TID and Ativan vs Dilaudid PRN doses.   - Monitor mentation.   - Obtunded- stop Buspar and Seroquel and reassess mental status  - CTH ordered 10/1-No acute intracranial hemorrhage or acute territorial infarct.     # CARDIOVASCULAR  - Baseline HF (unknown EF), Moderate MR and Severe TR and AFIB on Eliquis   - Course complicated with biventricular HF requiring dobutamine. Now weaned off.   - ECHO 9/15 noted with EF 20%, mod MR, severely dilated LA, severe LVSD, severe FLAVIO, decreased RVSF, mod-severe TR, mild pHTN.   - RPT ECHO 9/27 with improved EF to 60% and LVSD (now normal). LA/ RA dilation, RVSD, and severe TR remains.   - Continue holding Eliquis for now given Esophageal Diverticular bleed.   - On no rate control medications at home and remains with rate controlled AFIB.   - Monitor HR/ BP.   - Started Metoprolol 25mg BID on 10/1- IVP Lopressor discontinued  - 10/2 still tachy 120 -130 afib on EKG Lopressor increased to 37.5 bid and stable HR improved     # RESPIRATORY  - COPD/ Emphysema at baseline and presented to [facility] with hematemesis. Intubated 9/9 for airway protection and treatment for aspiration. Poor vent weaning and s/p Trach 9/24.   - Continue on AC Vent 18/350/5/40 and PS 15/5 vs 10/5 as tolerated.   - Continue on Proventil and Symbicort with Chest PT Q6H   - Tracheostomy 4 and 10 o clock sutures removed and pending remain suture removal by CTSx on 10/3 (10-14 days from tracheostomy placement).   - Tracheostomy care QD   - Suction PRN   - Tolerating PS today 15/5 and Fi02 decreased to 30%    # GI  - FTT requiring TPN during hospitalization. Now s/p PEG 9/24.   - Continue on TF with Jevity @ 52cc/hr goal rate.  - Continue on Miralax, Senna and PPI.   - Monitor BMs.     # RENAL  - Mild dehydration noted with diuresis and third spacing.   - Continue on free H20 250cc Q4H and monitor fluid status.   - Monitor renal function and UOP.     # INFECTIOUS DISEASE  - Aspiration noted and s/p Zosyn (9/10-9/15) with continued fever spikes and broadened  - to Meropenem (9/15 - 9/22), Vancomycin (9/15 - 9/22) and Diflucan (9/17 - 9/22).   - SCx, BCx, UA and COVID PCR remained negative.   - Pleural Fluid 9/21 negative     # HEME  - R Midline associated DVT noted. Midline removed and will monitor.   - Holding home Eliquis for AFIB given diverticular bleed.   - On heparin 5000u tid    # ENDOCRINE  - Hypothyroidism and continued on home Synthroid 150mcg QD.   - FU TSH in am      # LINES/ TUBES  - Tracheostomy and PEG 9/24   - LUE Midline 9/28   - RUE Midline (9/26-9/28)  - R IJ TLC (9/17 - 9/26)   - L IJ TLC (9/11 - 9/17)  - L Radial DEEPAK (9/10 - 9/21)  - L Axillary DEEPAK (9/21 - to be removed on 9/30).     # ETHICS/ GOC/ DISPO   - Son wishes for mom to be FULL CODE.   - DISPO likely vent facility.     # DVT PPX with HSQ.  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 She was intubated for airway protection on 9/9. EGD @ OSH with large blood filled esophageal diverticula and possible esophageal tear. Transferred to [facility] 9/10 and s/p EGD 9/14 with no esophageal tear or bleed, but (+) esophageal diverticula with clot. Course complicated by fevers likely from aspiration, septic shock, biventricular heart failure, and poor vent weaning requiring Tracheostomy and PEG placement on 9/24. Transferred to [facility] 9/30/21.    # NEUROLOGY  - Baseline Dementia with mentation AOx1-2 as per Son. Patient reportedly able to eat and ambulate with assistance at ASL per Son.   - Continue on Buspar, Cymbalta, Trazadone, Aricept and Namenda as per home doses.   - Now with anxiety and agitation and continued on Seroquel 25mg BID, Ativan 0.5mg IV TID and Ativan vs Dilaudid PRN doses.   - Monitor mentation.   - Obtunded- stop Buspar and Seroquel and reassess mental status  - CTH ordered 10/1-No acute intracranial hemorrhage or acute territorial infarct.   - Patient was more responsive to voice; attempted to open eyes, moved eyebrows in response to voice     # CARDIOVASCULAR  - Baseline HF (unknown EF), Moderate MR and Severe TR and AFIB on Eliquis   - Course complicated with biventricular HF requiring dobutamine. Now weaned off.   - ECHO 9/15 noted with EF 20%, mod MR, severely dilated LA, severe LVSD, severe FLAVIO, decreased RVSF, mod-severe TR, mild pHTN.   - RPT ECHO 9/27 with improved EF to 60% and LVSD (now normal). LA/ RA dilation, RVSD, and severe TR remains.   - Continue holding Eliquis for now given Esophageal Diverticular bleed.   - On no rate control medications at home and remains with rate controlled AFIB.   - Monitor HR/ BP.   - Started Metoprolol 25mg BID on 10/1- IVP Lopressor discontinued  - 10/2 still tachy 120 -130 afib on EKG Lopressor increased to 37.5 bid and stable HR improved     # RESPIRATORY  - COPD/ Emphysema at baseline and presented to [facility] with hematemesis. Intubated 9/9 for airway protection and treatment for aspiration. Poor vent weaning and s/p Trach 9/24.   - Continue on AC Vent 18/350/5/40 and PS 15/5 vs 10/5 as tolerated.   - Continue on Proventil and Symbicort with Chest PT Q6H   - Tracheostomy 4 and 10 o clock sutures removed and pending remain suture removal by CTSx on 10/3 (10-14 days from tracheostomy placement).   - Tracheostomy care QD   - Suction PRN   - Tolerating PS today 15/5 and Fi02 decreased to 30%    # GI  - FTT requiring TPN during hospitalization. Now s/p PEG 9/24.   - Continue on TF with Jevity @ 52cc/hr goal rate.  - Continue on Miralax, Senna and PPI.   - Monitor BMs.     # RENAL  - Mild dehydration noted with diuresis and third spacing.   - Continue on free H20 250cc Q4H and monitor fluid status.   - Monitor renal function and UOP.     # INFECTIOUS DISEASE  - Aspiration noted and s/p Zosyn (9/10-9/15) with continued fever spikes and broadened  - to Meropenem (9/15 - 9/22), Vancomycin (9/15 - 9/22) and Diflucan (9/17 - 9/22).   - SCx, BCx, UA and COVID PCR remained negative.   - Pleural Fluid 9/21 negative     # HEME  - R Midline associated DVT noted. Midline removed and will monitor.   - Holding home Eliquis for AFIB given diverticular bleed.   - On heparin 5000u tid    # ENDOCRINE  - Hypothyroidism and continued on home Synthroid 150mcg QD.   - FU TSH in am      # LINES/ TUBES  - Tracheostomy and PEG 9/24   - LUE Midline 9/28   - RUE Midline (9/26-9/28)  - R IJ TLC (9/17 - 9/26)   - L IJ TLC (9/11 - 9/17)  - L Radial DEEPAK (9/10 - 9/21)  - L Axillary DEEPAK (9/21 - to be removed on 9/30).     # ETHICS/ GOC/ DISPO   - Son wishes for mom to be FULL CODE.   - DISPO likely vent facility.     # DVT PPX with HSQ.  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 She was intubated for airway protection on 9/9. EGD @ OSH with large blood filled esophageal diverticula and possible esophageal tear. Transferred to [facility] 9/10 and s/p EGD 9/14 with no esophageal tear or bleed, but (+) esophageal diverticula with clot. Course complicated by fevers likely from aspiration, septic shock, biventricular heart failure, and poor vent weaning requiring Tracheostomy and PEG placement on 9/24. Transferred to [facility] 9/30/21.    # NEUROLOGY  - Baseline Dementia with mentation AOx1-2 as per Son. Patient reportedly able to eat and ambulate with assistance at ASL per Son.   - Continue on Buspar, Cymbalta, Trazadone, Aricept and Namenda as per home doses.   - Now with anxiety and agitation and continued on Seroquel 25mg BID, Ativan 0.5mg IV TID and Ativan vs Dilaudid PRN doses.   - Monitor mentation.   - Obtunded- stop Buspar and Seroquel and reassess mental status  - CTH ordered 10/1-No acute intracranial hemorrhage or acute territorial infarct.   - Patient was more responsive to voice; attempted to open eyes, moved eyebrows in response to voice     # CARDIOVASCULAR  - Baseline HF (unknown EF), Moderate MR and Severe TR and AFIB on Eliquis   - Course complicated with biventricular HF requiring dobutamine. Now weaned off.   - ECHO 9/15 noted with EF 20%, mod MR, severely dilated LA, severe LVSD, severe FLAVIO, decreased RVSF, mod-severe TR, mild pHTN.   - RPT ECHO 9/27 with improved EF to 60% and LVSD (now normal). LA/ RA dilation, RVSD, and severe TR remains.   - Continue holding Eliquis for now given Esophageal Diverticular bleed.   - On no rate control medications at home and remains with rate controlled AFIB.   - Monitor HR/ BP.   - Started Metoprolol 25mg BID on 10/1- IVP Lopressor discontinued  - 10/2 still tachy 120 -130 afib on EKG Lopressor increased to 37.5 bid and stable HR improved     # RESPIRATORY  - COPD/ Emphysema at baseline and presented to [facility] with hematemesis. Intubated 9/9 for airway protection and treatment for aspiration. Poor vent weaning and s/p Trach 9/24.   - Continue on AC Vent 18/350/5/40 and PS 15/5 vs 10/5 as tolerated.   - Continue on Proventil and Symbicort with Chest PT Q6H   - Tracheostomy 4 and 10 o clock sutures removed and pending remain suture removal by CTSx on 10/3 (10-14 days from tracheostomy placement).   - Tracheostomy care QD   - Suction PRN   - Tolerating PS today 15/5 and Fi02 decreased to 30%    # GI  - FTT requiring TPN during hospitalization. Now s/p PEG 9/24.   - Continue on TF with Jevity @ 52cc/hr goal rate.  - Continue on Miralax, Senna and PPI.   - Miralax BID and suppository given today--> monitor for BMs     # RENAL  - Mild dehydration noted with diuresis and third spacing.   - Continue on free H20 250cc Q4H and monitor fluid status.   - Monitor renal function and UOP.     # INFECTIOUS DISEASE  - Aspiration noted and s/p Zosyn (9/10-9/15) with continued fever spikes and broadened  - to Meropenem (9/15 - 9/22), Vancomycin (9/15 - 9/22) and Diflucan (9/17 - 9/22).   - SCx, BCx, UA and COVID PCR remained negative.   - Pleural Fluid 9/21 negative     # HEME  - R Midline associated DVT noted. Midline removed and will monitor.   - Holding home Eliquis for AFIB given diverticular bleed.   - On heparin 5000u tid    # ENDOCRINE  - Hypothyroidism and continued on home Synthroid 150mcg QD.   - FU TSH in am was WNL        # LINES/ TUBES  - Tracheostomy and PEG 9/24   - LUE Midline 9/28   - RUE Midline (9/26-9/28)  - R IJ TLC (9/17 - 9/26)   - L IJ TLC (9/11 - 9/17)  - L Radial DEEPAK (9/10 - 9/21)  - L Axillary DEEPAK (9/21 - to be removed on 9/30).     # ETHICS/ GOC/ DISPO   - Son wishes for mom to be FULL CODE.   - DISPO likely vent facility.     # DVT PPX with HSQ.

## 2021-10-03 NOTE — PROGRESS NOTE ADULT - ASSESSMENT
81 y. o. female BIBA to [facility] from nursing facility c/o hematemesis.  According to pt it was the first episode and happened while she was eating dinner. Followed by multiple episodes of N/V with bright blood and epigastric cramps.  She was intubated for airway protection and underwent EGD that revealed large diverticula at 28 cm filled with blood and 5 mm tear at distal esophagus, a hiatal hernia and large amount of blood in the fundus.  Diverticula was injected with Epinephrine.  Patient was transferred to [facility] for further management. (10 Sep 2021 15:04)    Pt followed by GI, s/p repeat EGD on 9/14 -  showing significant clot in large esophageal diverticula -- without obvious source of etiology of bleeding (ie no vessel) + no esophageal mass seen.  Pt on pressors, thought to be unlikely hemorrhagic given stable Hb.    9/15: WBC 10.8, sputum cx (9/10) with nl resp tawana. CT c/a/p shows a 7.2 cm masslike structure distending the mid to distal esophagus, suspicious for malignancy.  Bilateral tree-in-bud nodular opacities, predominantly in the left lower lobe, new/increased from 9/9/2021, raising concern for infection.  Left lower lobe consolidative opacity, possible combination of atelectasis and pneumonia.  (+) hypodensities in the liver suspicion for malignancy, and stercoral colitis.    Pt with fever, on pressors.  Pt is NPO on TPN.   Pt on empiric abx, ID consulted for further abx managment.     Fever/sepsis:    - pt with fever, on pressor support.  Abx broadened to vanco and meropenem.  Fluconazole added on 9/17 due to continued fevers.  - S/p central line change on 9/17.   - CT c/a/p noted, increased LLL consolidation, possible aspiration.  f/u repeat sputum cx - nl resp tawana    - Check bcx x 2 - NGTD.  Sputum cx no resp tawana  - f/u WBC and fever curve.  Improving, abx course completed after 7 days, d/c'd on 9/23.  Cont to monitor pt off abx.     * Pt seen by Palliative care service for GOC discussion - pt remains full code.  s/p trach/peg.      * Tmax >101 on 9/27.  Recommend repeat bcx x 2 - ngtd at 24 hrs on 9/28.  Repeat cxr with small b/l layering pl effusions,  otherwise clear lungs noted.  s/p removal of pigtail cath.  Pt remains on TPN, on mech ventilation.  Currently has completed course of abx.  Cont to monitor off for now.   Pt transferred to [facility].       * Tmax of 100.4 on 10/2.  Repeat bcx x 2 sent.  Cont to monitor for fever, can reassess need to restart abx pending further culture results, repeat cxr.        Karen Casillas  [phone]

## 2021-10-03 NOTE — PROGRESS NOTE ADULT - SUBJECTIVE AND OBJECTIVE BOX
Infectious Diseases progress note:    Subjective: Events noted, pt with low grade fever 100.4 yesterday.  Repeat bcx sent x 2.    ROS:  Unable to assess due to pt's  clinical condition    Allergies    Sulfur Colliod (Rash)  Tylenol (Swelling)    Intolerances        ANTIBIOTICS/RELEVANT:  antimicrobials    immunologic:    OTHER:  ALBUTerol    90 MICROgram(s) HFA Inhaler 2 Puff(s) Inhalation every 6 hours  artificial tears (preservative free) Ophthalmic Solution 1 Drop(s) Both EYES two times a day  bisacodyl Suppository 10 milliGRAM(s) Rectal daily PRN  budesonide  80 MICROgram(s)/formoterol 4.5 MICROgram(s) Inhaler 2 Puff(s) Inhalation two times a day  chlorhexidine 0.12% Liquid 15 milliLiter(s) Oral Mucosa every 12 hours  chlorhexidine 4% Liquid 1 Application(s) Topical <User Schedule>  dextrose 50% Injectable 25 Gram(s) IV Push once  donepezil 10 milliGRAM(s) Oral at bedtime  heparin   Injectable 5000 Unit(s) SubCutaneous every 12 hours  insulin lispro (ADMELOG) corrective regimen sliding scale   SubCutaneous every 6 hours  levothyroxine 150 MICROGram(s) Oral daily  melatonin 3 milliGRAM(s) Oral at bedtime  memantine 10 milliGRAM(s) Oral every 12 hours  metoprolol tartrate 37.5 milliGRAM(s) Oral two times a day  pantoprazole   Suspension 40 milliGRAM(s) Oral two times a day  polyethylene glycol 3350 17 Gram(s) Oral at bedtime  senna 2 Tablet(s) Oral at bedtime  simvastatin 10 milliGRAM(s) Oral at bedtime  traZODone 100 milliGRAM(s) Oral every 12 hours      Objective:  Vital Signs Last 24 Hrs  T(C): 36.1 (03 Oct 2021 06:27), Max: 38 (02 Oct 2021 17:24)  T(F): 97 (03 Oct 2021 06:27), Max: 100.4 (02 Oct 2021 17:24)  HR: 114 (03 Oct 2021 07:41) (90 - 126)  BP: 139/79 (03 Oct 2021 06:27) (125/70 - 139/79)  BP(mean): --  RR: 25 (03 Oct 2021 06:27) (14 - 26)  SpO2: 98% (03 Oct 2021 07:41) (92% - 100%)    PHYSICAL EXAM:  Constitutional: trached, on ventilation  Eyes:LISA, EOMI  Ear/Nose/Throat: no thrush, mucositis.  Moist mucous membranes	  Neck:no JVD, no lymphadenopathy, supple  Respiratory: CTA shaila  Cardiovascular: S1S2 RRR, no murmurs  Gastrointestinal:soft, nontender,  nondistended (+) BS, peg in place  Extremities:no e/e/c  Skin:  no rashes, open wounds or ulcerations        LABS:                        11.1   10.12 )-----------( 340      ( 03 Oct 2021 07:31 )             35.1     10-03    141  |  102  |  18  ----------------------------<  153<H>  3.5   |  26  |  0.33<L>    Ca    9.9      03 Oct 2021 07:31  Phos  2.9     10-03  Mg     1.80     10-03            Procalcitonin, Serum: 0.07 (09-27 @ 03:30)                    MICROBIOLOGY:    Culture - Blood (09.27.21 @ 09:00)   Specimen Source: .Blood Blood-Peripheral   Culture Results:   No Growth Final     Culture - Blood (09.27.21 @ 09:00)   Specimen Source: .Blood Blood-Peripheral   Culture Results:   No Growth Final     Culture - Body Fluid with Gram Stain (09.21.21 @ 16:58)   Gram Stain:   polymorphonuclear leukocytes seen   No organisms seen   by cytocentrifuge   Specimen Source: .Body Fluid Pleural Fluid   Culture Results:   No growth at 5 days       RADIOLOGY & ADDITIONAL STUDIES:    < from: CT Head No Cont (10.01.21 @ 22:50) >  IMPRESSION:  No acute intracranial hemorrhage or acute territorial infarct.  If symptoms persist, follow-up MRI exam recommended.    < end of copied text >      < from: Xray Chest 1 View- PORTABLE-Routine (Xray Chest 1 View- PORTABLE-Routine in AM.) (10.01.21 @ 07:55) >    INTERPRETATION:  Clinical Information: Dyspnea    Technique: AP chest x-ray(s).    Comparison: 09/29/2021    Findings/  Impression: Status post tracheostomy. Heart sizeunremarkable. No lung consolidations.    < end of copied text >

## 2021-10-04 LAB
ANION GAP SERPL CALC-SCNC: 9 MMOL/L — SIGNIFICANT CHANGE UP (ref 7–14)
BUN SERPL-MCNC: 16 MG/DL — SIGNIFICANT CHANGE UP (ref 7–23)
CALCIUM SERPL-MCNC: 10.1 MG/DL — SIGNIFICANT CHANGE UP (ref 8.4–10.5)
CHLORIDE SERPL-SCNC: 105 MMOL/L — SIGNIFICANT CHANGE UP (ref 98–107)
CO2 SERPL-SCNC: 31 MMOL/L — SIGNIFICANT CHANGE UP (ref 22–31)
CREAT SERPL-MCNC: 0.31 MG/DL — LOW (ref 0.5–1.3)
GLUCOSE BLDC GLUCOMTR-MCNC: 142 MG/DL — HIGH (ref 70–99)
GLUCOSE BLDC GLUCOMTR-MCNC: 143 MG/DL — HIGH (ref 70–99)
GLUCOSE BLDC GLUCOMTR-MCNC: 145 MG/DL — HIGH (ref 70–99)
GLUCOSE BLDC GLUCOMTR-MCNC: 153 MG/DL — HIGH (ref 70–99)
GLUCOSE SERPL-MCNC: 154 MG/DL — HIGH (ref 70–99)
HCT VFR BLD CALC: 34.6 % — SIGNIFICANT CHANGE UP (ref 34.5–45)
HGB BLD-MCNC: 10.7 G/DL — LOW (ref 11.5–15.5)
MAGNESIUM SERPL-MCNC: 1.9 MG/DL — SIGNIFICANT CHANGE UP (ref 1.6–2.6)
MCHC RBC-ENTMCNC: 29.5 PG — SIGNIFICANT CHANGE UP (ref 27–34)
MCHC RBC-ENTMCNC: 30.9 GM/DL — LOW (ref 32–36)
MCV RBC AUTO: 95.3 FL — SIGNIFICANT CHANGE UP (ref 80–100)
NRBC # BLD: 0 /100 WBCS — SIGNIFICANT CHANGE UP
NRBC # FLD: 0 K/UL — SIGNIFICANT CHANGE UP
PHOSPHATE SERPL-MCNC: 3 MG/DL — SIGNIFICANT CHANGE UP (ref 2.5–4.5)
PLATELET # BLD AUTO: 373 K/UL — SIGNIFICANT CHANGE UP (ref 150–400)
POTASSIUM SERPL-MCNC: 3.1 MMOL/L — LOW (ref 3.5–5.3)
POTASSIUM SERPL-SCNC: 3.1 MMOL/L — LOW (ref 3.5–5.3)
RBC # BLD: 3.63 M/UL — LOW (ref 3.8–5.2)
RBC # FLD: 15.9 % — HIGH (ref 10.3–14.5)
SARS-COV-2 RNA SPEC QL NAA+PROBE: SIGNIFICANT CHANGE UP
SODIUM SERPL-SCNC: 145 MMOL/L — SIGNIFICANT CHANGE UP (ref 135–145)
WBC # BLD: 10.14 K/UL — SIGNIFICANT CHANGE UP (ref 3.8–10.5)
WBC # FLD AUTO: 10.14 K/UL — SIGNIFICANT CHANGE UP (ref 3.8–10.5)

## 2021-10-04 PROCEDURE — 99233 SBSQ HOSP IP/OBS HIGH 50: CPT | Mod: GC

## 2021-10-04 RX ORDER — POTASSIUM CHLORIDE 20 MEQ
40 PACKET (EA) ORAL EVERY 4 HOURS
Refills: 0 | Status: COMPLETED | OUTPATIENT
Start: 2021-10-04 | End: 2021-10-04

## 2021-10-04 RX ADMIN — Medication 1 DROP(S): at 23:39

## 2021-10-04 RX ADMIN — Medication 1 DROP(S): at 06:53

## 2021-10-04 RX ADMIN — POLYETHYLENE GLYCOL 3350 17 GRAM(S): 17 POWDER, FOR SOLUTION ORAL at 06:54

## 2021-10-04 RX ADMIN — CHLORHEXIDINE GLUCONATE 1 APPLICATION(S): 213 SOLUTION TOPICAL at 06:53

## 2021-10-04 RX ADMIN — Medication 100 MILLIGRAM(S): at 17:22

## 2021-10-04 RX ADMIN — HEPARIN SODIUM 5000 UNIT(S): 5000 INJECTION INTRAVENOUS; SUBCUTANEOUS at 06:54

## 2021-10-04 RX ADMIN — Medication 40 MILLIEQUIVALENT(S): at 09:49

## 2021-10-04 RX ADMIN — SIMVASTATIN 10 MILLIGRAM(S): 20 TABLET, FILM COATED ORAL at 23:34

## 2021-10-04 RX ADMIN — CHLORHEXIDINE GLUCONATE 15 MILLILITER(S): 213 SOLUTION TOPICAL at 17:23

## 2021-10-04 RX ADMIN — POLYETHYLENE GLYCOL 3350 17 GRAM(S): 17 POWDER, FOR SOLUTION ORAL at 17:26

## 2021-10-04 RX ADMIN — MEMANTINE HYDROCHLORIDE 10 MILLIGRAM(S): 10 TABLET ORAL at 17:26

## 2021-10-04 RX ADMIN — Medication 150 MICROGRAM(S): at 06:54

## 2021-10-04 RX ADMIN — DONEPEZIL HYDROCHLORIDE 10 MILLIGRAM(S): 10 TABLET, FILM COATED ORAL at 23:34

## 2021-10-04 RX ADMIN — PANTOPRAZOLE SODIUM 40 MILLIGRAM(S): 20 TABLET, DELAYED RELEASE ORAL at 06:54

## 2021-10-04 RX ADMIN — BUDESONIDE AND FORMOTEROL FUMARATE DIHYDRATE 2 PUFF(S): 160; 4.5 AEROSOL RESPIRATORY (INHALATION) at 19:48

## 2021-10-04 RX ADMIN — BUDESONIDE AND FORMOTEROL FUMARATE DIHYDRATE 2 PUFF(S): 160; 4.5 AEROSOL RESPIRATORY (INHALATION) at 10:17

## 2021-10-04 RX ADMIN — ALBUTEROL 2 PUFF(S): 90 AEROSOL, METERED ORAL at 15:42

## 2021-10-04 RX ADMIN — SENNA PLUS 2 TABLET(S): 8.6 TABLET ORAL at 23:34

## 2021-10-04 RX ADMIN — Medication 100 MILLIGRAM(S): at 06:54

## 2021-10-04 RX ADMIN — Medication 37.5 MILLIGRAM(S): at 09:49

## 2021-10-04 RX ADMIN — CHLORHEXIDINE GLUCONATE 15 MILLILITER(S): 213 SOLUTION TOPICAL at 06:53

## 2021-10-04 RX ADMIN — HEPARIN SODIUM 5000 UNIT(S): 5000 INJECTION INTRAVENOUS; SUBCUTANEOUS at 17:25

## 2021-10-04 RX ADMIN — Medication 3 MILLIGRAM(S): at 23:33

## 2021-10-04 RX ADMIN — ALBUTEROL 2 PUFF(S): 90 AEROSOL, METERED ORAL at 19:48

## 2021-10-04 RX ADMIN — ALBUTEROL 2 PUFF(S): 90 AEROSOL, METERED ORAL at 10:16

## 2021-10-04 RX ADMIN — Medication 1 DROP(S): at 17:25

## 2021-10-04 RX ADMIN — CHLORHEXIDINE GLUCONATE 15 MILLILITER(S): 213 SOLUTION TOPICAL at 23:39

## 2021-10-04 RX ADMIN — Medication 37.5 MILLIGRAM(S): at 23:33

## 2021-10-04 RX ADMIN — PANTOPRAZOLE SODIUM 40 MILLIGRAM(S): 20 TABLET, DELAYED RELEASE ORAL at 17:26

## 2021-10-04 RX ADMIN — MEMANTINE HYDROCHLORIDE 10 MILLIGRAM(S): 10 TABLET ORAL at 06:54

## 2021-10-04 RX ADMIN — ALBUTEROL 2 PUFF(S): 90 AEROSOL, METERED ORAL at 04:08

## 2021-10-04 RX ADMIN — Medication 40 MILLIEQUIVALENT(S): at 13:41

## 2021-10-04 NOTE — PROGRESS NOTE ADULT - ASSESSMENT
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 She was intubated for airway protection on 9/9. EGD @ OSH with large blood filled esophageal diverticula and possible esophageal tear. Transferred to [place] 9/10 and s/p EGD 9/14 with no esophageal tear or bleed, but (+) esophageal diverticula with clot. Course complicated by fevers likely from aspiration, septic shock, biventricular heart failure, and poor vent weaning requiring Tracheostomy and PEG placement on 9/24. Transferred to [facility] 9/30/21.    # NEUROLOGY  - Baseline Dementia with mentation AOx1-2 as per Son. Patient reportedly able to eat and ambulate with assistance at ASL per Son.   - Continue on Buspar, Cymbalta, Trazadone, Aricept and Namenda as per home doses.   - Now with anxiety and agitation and continued on Seroquel 25mg BID, Ativan 0.5mg IV TID and Ativan vs Dilaudid PRN doses.   - Monitor mentation.   - Obtunded- stop Buspar and Seroquel and reassess mental status  - CTH ordered 10/1-No acute intracranial hemorrhage or acute territorial infarct.   - Patient was more responsive to voice; attempted to open eyes, moved eyebrows in response to voice     # CARDIOVASCULAR  - Baseline HF (unknown EF), Moderate MR and Severe TR and AFIB on Eliquis   - Course complicated with biventricular HF requiring dobutamine. Now weaned off.   - ECHO 9/15 noted with EF 20%, mod MR, severely dilated LA, severe LVSD, severe FLAVIO, decreased RVSF, mod-severe TR, mild pHTN.   - RPT ECHO 9/27 with improved EF to 60% and LVSD (now normal). LA/ RA dilation, RVSD, and severe TR remains.   - Continue holding Eliquis for now given Esophageal Diverticular bleed.   - On no rate control medications at home and remains with rate controlled AFIB.   - Monitor HR/ BP.   - Started Metoprolol 25mg BID on 10/1- IVP Lopressor discontinued  - 10/2 still tachy 120 -130 afib on EKG Lopressor increased to 37.5 bid and stable HR improved     # RESPIRATORY  - COPD/ Emphysema at baseline and presented to [facility] with hematemesis. Intubated 9/9 for airway protection and treatment for aspiration. Poor vent weaning and s/p Trach 9/24.   - Continue on AC Vent 18/350/5/40 and PS 15/5 vs 10/5 as tolerated.   - Continue on Proventil and Symbicort with Chest PT Q6H   - Tracheostomy 4 and 10 o clock sutures removed and pending remain suture removal by CTSx on 10/3 (10-14 days from tracheostomy placement).   - Tracheostomy care QD   - Suction PRN   - Tolerating PS today 15/5 and Fi02 decreased to 30%    # GI  - FTT requiring TPN during hospitalization. Now s/p PEG 9/24.   - Continue on TF with Jevity @ 52cc/hr goal rate.  - Continue on Miralax, Senna and PPI.   - Miralax BID and suppository given today--> monitor for BMs     # RENAL  - Mild dehydration noted with diuresis and third spacing.   - Continue on free H20 250cc Q4H and monitor fluid status.   - Monitor renal function and UOP.     # INFECTIOUS DISEASE  - Aspiration noted and s/p Zosyn (9/10-9/15) with continued fever spikes and broadened  - to Meropenem (9/15 - 9/22), Vancomycin (9/15 - 9/22) and Diflucan (9/17 - 9/22).   - SCx, BCx, UA and COVID PCR remained negative.   - Pleural Fluid 9/21 negative     # HEME  - R Midline associated DVT noted. Midline removed and will monitor.   - Holding home Eliquis for AFIB given diverticular bleed.   - On heparin 5000u tid    # ENDOCRINE  - Hypothyroidism and continued on home Synthroid 150mcg QD.   - FU TSH in am was WNL        # LINES/ TUBES  - Tracheostomy and PEG 9/24   - LUE Midline 9/28   - RUE Midline (9/26-9/28)  - R IJ TLC (9/17 - 9/26)   - L IJ TLC (9/11 - 9/17)  - L Radial DEEPAK (9/10 - 9/21)  - L Axillary DEEPAK (9/21 - to be removed on 9/30).     # ETHICS/ GOC/ DISPO   - Son wishes for mom to be FULL CODE.   - DISPO likely vent facility.     # DVT PPX with HSQ.  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 She was intubated for airway protection on 9/9. EGD @ OSH with large blood filled esophageal diverticula and possible esophageal tear. Transferred to [facility] 9/10 and s/p EGD 9/14 with no esophageal tear or bleed, but (+) esophageal diverticula with clot. Course complicated by fevers likely from aspiration, septic shock, biventricular heart failure, and poor vent weaning requiring Tracheostomy and PEG placement on 9/24. Transferred to [facility] 9/30/21.    # NEUROLOGY  - Baseline Dementia with mentation AOx1-2 as per Son. Patient reportedly able to eat and ambulate with assistance at ASL per Son.   - Continue on Buspar, Cymbalta, Trazadone, Aricept and Namenda as per home doses.   - Now with anxiety and agitation and continued on Seroquel 25mg BID, Ativan 0.5mg IV TID and Ativan vs Dilaudid PRN doses.   - Monitor mentation.   - Obtunded- stop Buspar and Seroquel and reassess mental status  - CTH ordered 10/1-No acute intracranial hemorrhage or acute territorial infarct.   - Opens eyes but not responding.      # CARDIOVASCULAR  - Baseline HF (unknown EF), Moderate MR and Severe TR and AFIB on Eliquis   - Course complicated with biventricular HF requiring dobutamine. Now weaned off.   - ECHO 9/15 noted with EF 20%, mod MR, severely dilated LA, severe LVSD, severe FLAVIO, decreased RVSF, mod-severe TR, mild pHTN.   - RPT ECHO 9/27 with improved EF to 60% and LVSD (now normal). LA/ RA dilation, RVSD, and severe TR remains.   - Continue holding Eliquis for now given Esophageal Diverticular bleed.   - On no rate control medications at home and remains with rate controlled AFIB.   - Monitor HR/ BP.   - Started Metoprolol 25mg BID on 10/1- IVP Lopressor discontinued  - 10/2 tachy 120 -130 afib on EKG Lopressor increased to 37.5 bid and stable HR improved     # RESPIRATORY  - COPD/ Emphysema at baseline and presented to [facility] with hematemesis. Intubated 9/9 for airway protection and treatment for aspiration. Poor vent weaning and s/p Trach 9/24.   - Continue on AC Vent 18/350/5/40 and PS 15/5 vs 10/5 as tolerated.   - Continue on Proventil and Symbicort with Chest PT Q6H   - Tracheostomy 4 and 10 o clock sutures removed and pending remain suture removal by CTSx on 10/3 (10-14 days from tracheostomy placement).   - Tracheostomy care QD   - Suction PRN   - C/w PS trials    # GI  - FTT requiring TPN during hospitalization. Now s/p PEG 9/24.   - Continue on TF with Jevity @ 52cc/hr goal rate.  - Continue on Miralax, Senna and PPI.   - Miralax BID and suppository given today--> monitor for BMs     # RENAL  - Mild dehydration noted with diuresis and third spacing.   - Continue on free H20 250cc Q4H and monitor fluid status.   - Monitor renal function and UOP.     # INFECTIOUS DISEASE  - Aspiration noted and s/p Zosyn (9/10-9/15) with continued fever spikes and broadened  - to Meropenem (9/15 - 9/22), Vancomycin (9/15 - 9/22) and Diflucan (9/17 - 9/22).   - SCx, BCx, UA and COVID PCR remained negative.   - Pleural Fluid 9/21 negative     # HEME  - R Midline associated DVT noted. Midline removed and will monitor.   - Holding home Eliquis for AFIB given diverticular bleed.   - On heparin 5000u tid    # ENDOCRINE  - Hypothyroidism and continued on home Synthroid 150mcg QD.   - FU TSH in am was WNL        # LINES/ TUBES  - Tracheostomy and PEG 9/24     # ETHICS/ GOC/ DISPO   - Son wishes for mom to be FULL CODE.   - DISPO - vent facility. Covid swab sent 10/4    # DVT PPX with HSQ.  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 She was intubated for airway protection on 9/9. EGD @ OSH with large blood filled esophageal diverticula and possible esophageal tear. Transferred to [facility] 9/10 and s/p EGD 9/14 with no esophageal tear or bleed, but (+) esophageal diverticula with clot. Course complicated by fevers likely from aspiration, septic shock, biventricular heart failure, and poor vent weaning requiring Tracheostomy and PEG placement on 9/24. Transferred to [facility] 9/30/21.    # NEUROLOGY  - Baseline Dementia with mentation AOx1-2 as per Son. Patient reportedly able to eat and ambulate with assistance at ASL per Son.   - Continue on Buspar, Cymbalta, Trazadone, Aricept and Namenda as per home doses.   - Now with anxiety and agitation and continued on Seroquel 25mg BID, Ativan 0.5mg IV TID and Ativan vs Dilaudid PRN doses.   - Monitor mentation.   - Obtunded- stop Buspar and Seroquel and reassess mental status  - CTH ordered 10/1-No acute intracranial hemorrhage or acute territorial infarct.   - Opens eyes but not responding.      # CARDIOVASCULAR  - Baseline HF (unknown EF), Moderate MR and Severe TR and AFIB on Eliquis   - Course complicated with biventricular HF requiring dobutamine. Now weaned off.   - ECHO 9/15 noted with EF 20%, mod MR, severely dilated LA, severe LVSD, severe FLAVIO, decreased RVSF, mod-severe TR, mild pHTN.   - RPT ECHO 9/27 with improved EF to 60% and LVSD (now normal). LA/ RA dilation, RVSD, and severe TR remains.   - Continue holding Eliquis for now given Esophageal Diverticular bleed.   - On no rate control medications at home and remains with rate controlled AFIB.   - Monitor HR/ BP.   - Started Metoprolol 25mg BID on 10/1- IVP Lopressor discontinued  - 10/2 tachy 120 -130 afib on EKG Lopressor increased to 37.5 bid and stable HR improved     # RESPIRATORY  - COPD/ Emphysema at baseline and presented to [facility] with hematemesis. Intubated 9/9 for airway protection and treatment for aspiration. Poor vent weaning and s/p Trach 9/24.   - Continue on AC Vent 18/350/5/40 and PS 15/5 vs 10/5 as tolerated.   - Continue on Proventil and Symbicort with Chest PT Q6H   - Tracheostomy 4 and 10 o clock sutures removed and pending remain suture removal by CTSx on 10/3 (10-14 days from tracheostomy placement).   - Rest of Trach sutures removed 10/4 by CTS  - Tracheostomy care QD   - Suction PRN   - C/w PS trials    # GI  - FTT requiring TPN during hospitalization. Now s/p PEG 9/24.   - Continue on TF with Jevity @ 52cc/hr goal rate.  - Continue on Miralax, Senna and PPI.   - Miralax BID and suppository given today--> monitor for BMs     # RENAL  - Mild dehydration noted with diuresis and third spacing.   - Continue on free H20 250cc Q4H and monitor fluid status.   - Monitor renal function and UOP.     # INFECTIOUS DISEASE  - Aspiration noted and s/p Zosyn (9/10-9/15) with continued fever spikes and broadened  - to Meropenem (9/15 - 9/22), Vancomycin (9/15 - 9/22) and Diflucan (9/17 - 9/22).   - SCx, BCx, UA and COVID PCR remained negative.   - Pleural Fluid 9/21 negative     # HEME  - R Midline associated DVT noted. Midline removed and will monitor.   - Holding home Eliquis for AFIB given diverticular bleed.   - On heparin 5000u tid    # ENDOCRINE  - Hypothyroidism and continued on home Synthroid 150mcg QD.   - FU TSH in am was WNL        # LINES/ TUBES  - Tracheostomy and PEG 9/24     # ETHICS/ GOC/ DISPO   - Son wishes for mom to be FULL CODE.   - DISPO - vent facility. Covid swab sent 10/4    # DVT PPX with HSQ.

## 2021-10-04 NOTE — PROGRESS NOTE ADULT - SUBJECTIVE AND OBJECTIVE BOX
CHIEF COMPLAINT: Patient is a 81y old Female who presents with a chief complaint of Hematemesis.    Interval Events:    REVIEW OF SYSTEMS:  [ ] All other systems negative  [ ] Unable to assess ROS because ________    OBJECTIVE:  ICU Vital Signs Last 24 Hrs  T(C): 36.4 (04 Oct 2021 05:07), Max: 36.9 (03 Oct 2021 13:50)  T(F): 97.5 (04 Oct 2021 05:07), Max: 98.4 (03 Oct 2021 13:50)  HR: 103 (04 Oct 2021 05:07) (89 - 119)  BP: 139/83 (04 Oct 2021 05:07) (131/86 - 147/90)  RR: 21 (04 Oct 2021 05:07) (14 - 27)  SpO2: 100% (04 Oct 2021 05:07) (97% - 100%)    Mode: AC/ CMV (Assist Control/ Continuous Mandatory Ventilation), RR (machine): 18, TV (machine): 300, FiO2: 30, PEEP: 5, ITime: 0.7, MAP: 8, PIP: 17    10-03 @ 07:01  -  10-04 @ 07:00  --------------------------------------------------------  IN: 1124 mL / OUT: 1000 mL / NET: 124 mL      CAPILLARY BLOOD GLUCOSE      POCT Blood Glucose.: 143 mg/dL (04 Oct 2021 05:24)      HOSPITAL MEDICATIONS:  MEDICATIONS  (STANDING):  ALBUTerol    90 MICROgram(s) HFA Inhaler 2 Puff(s) Inhalation every 6 hours  artificial tears (preservative free) Ophthalmic Solution 1 Drop(s) Both EYES two times a day  budesonide  80 MICROgram(s)/formoterol 4.5 MICROgram(s) Inhaler 2 Puff(s) Inhalation two times a day  chlorhexidine 0.12% Liquid 15 milliLiter(s) Oral Mucosa every 12 hours  chlorhexidine 4% Liquid 1 Application(s) Topical <User Schedule>  dextrose 50% Injectable 25 Gram(s) IV Push once  donepezil 10 milliGRAM(s) Oral at bedtime  heparin   Injectable 5000 Unit(s) SubCutaneous every 12 hours  insulin lispro (ADMELOG) corrective regimen sliding scale   SubCutaneous every 6 hours  levothyroxine 150 MICROGram(s) Oral daily  melatonin 3 milliGRAM(s) Oral at bedtime  memantine 10 milliGRAM(s) Oral every 12 hours  metoprolol tartrate 37.5 milliGRAM(s) Oral two times a day  pantoprazole   Suspension 40 milliGRAM(s) Oral two times a day  polyethylene glycol 3350 17 Gram(s) Oral two times a day  senna 2 Tablet(s) Oral at bedtime  simvastatin 10 milliGRAM(s) Oral at bedtime  traZODone 100 milliGRAM(s) Oral every 12 hours    MEDICATIONS  (PRN):  bisacodyl Suppository 10 milliGRAM(s) Rectal daily PRN Constipation      LABS:                        10.7   10.14 )-----------( 373      ( 04 Oct 2021 06:29 )             34.6     10-04    145  |  105  |  16  ----------------------------<  154<H>  3.1<L>   |  31  |  0.31<L>    Ca    10.1      04 Oct 2021 06:29  Phos  3.0     10-04  Mg     1.90     10-04        MICROBIOLOGY:     RADIOLOGY:  [ ] Reviewed and interpreted by me    PULMONARY FUNCTION TESTS:    EKG: CHIEF COMPLAINT: Patient is a 81y old Female who presents with a chief complaint of Hematemesis.    Interval Events: Able to tolerate PS trials.    REVIEW OF SYSTEMS:  [x] Unable to assess ROS because vented.    OBJECTIVE:  ICU Vital Signs Last 24 Hrs  T(C): 36.4 (04 Oct 2021 05:07), Max: 36.9 (03 Oct 2021 13:50)  T(F): 97.5 (04 Oct 2021 05:07), Max: 98.4 (03 Oct 2021 13:50)  HR: 103 (04 Oct 2021 05:07) (89 - 119)  BP: 139/83 (04 Oct 2021 05:07) (131/86 - 147/90)  RR: 21 (04 Oct 2021 05:07) (14 - 27)  SpO2: 100% (04 Oct 2021 05:07) (97% - 100%)    Mode: AC/ CMV (Assist Control/ Continuous Mandatory Ventilation), RR (machine): 18, TV (machine): 300, FiO2: 30, PEEP: 5, ITime: 0.7, MAP: 8, PIP: 17    10-03 @ 07:01  -  10-04 @ 07:00  --------------------------------------------------------  IN: 1124 mL / OUT: 1000 mL / NET: 124 mL      CAPILLARY BLOOD GLUCOSE      POCT Blood Glucose.: 143 mg/dL (04 Oct 2021 05:24)      HOSPITAL MEDICATIONS:  MEDICATIONS  (STANDING):  ALBUTerol    90 MICROgram(s) HFA Inhaler 2 Puff(s) Inhalation every 6 hours  artificial tears (preservative free) Ophthalmic Solution 1 Drop(s) Both EYES two times a day  budesonide  80 MICROgram(s)/formoterol 4.5 MICROgram(s) Inhaler 2 Puff(s) Inhalation two times a day  chlorhexidine 0.12% Liquid 15 milliLiter(s) Oral Mucosa every 12 hours  chlorhexidine 4% Liquid 1 Application(s) Topical <User Schedule>  dextrose 50% Injectable 25 Gram(s) IV Push once  donepezil 10 milliGRAM(s) Oral at bedtime  heparin   Injectable 5000 Unit(s) SubCutaneous every 12 hours  insulin lispro (ADMELOG) corrective regimen sliding scale   SubCutaneous every 6 hours  levothyroxine 150 MICROGram(s) Oral daily  melatonin 3 milliGRAM(s) Oral at bedtime  memantine 10 milliGRAM(s) Oral every 12 hours  metoprolol tartrate 37.5 milliGRAM(s) Oral two times a day  pantoprazole   Suspension 40 milliGRAM(s) Oral two times a day  polyethylene glycol 3350 17 Gram(s) Oral two times a day  senna 2 Tablet(s) Oral at bedtime  simvastatin 10 milliGRAM(s) Oral at bedtime  traZODone 100 milliGRAM(s) Oral every 12 hours    MEDICATIONS  (PRN):  bisacodyl Suppository 10 milliGRAM(s) Rectal daily PRN Constipation      LABS:                        10.7   10.14 )-----------( 373      ( 04 Oct 2021 06:29 )             34.6     10-04    145  |  105  |  16  ----------------------------<  154<H>  3.1<L>   |  31  |  0.31<L>    Ca    10.1      04 Oct 2021 06:29  Phos  3.0     10-04  Mg     1.90     10-04      MICROBIOLOGY:     RADIOLOGY:  [ ] Reviewed and interpreted by me    PULMONARY FUNCTION TESTS:    EKG:

## 2021-10-04 NOTE — PROGRESS NOTE ADULT - SUBJECTIVE AND OBJECTIVE BOX
Infectious Diseases progress note:    Subjective:  NAD, afebrile.  No acute o/n events    ROS:  CONSTITUTIONAL:  No fever, chills, rigors  CARDIOVASCULAR:  No chest pain or palpitations  RESPIRATORY:   No SOB, cough, dyspnea on exertion.  No wheezing  GASTROINTESTINAL:  No abd pain, N/V, diarrhea/constipation  EXTREMITIES:  No swelling or joint pain  GENITOURINARY:  No burning on urination, increased frequency or urgency.  No flank pain  NEUROLOGIC:  No HA, visual disturbances  SKIN: No rashes    Allergies    Sulfur Colliod (Rash)  Tylenol (Swelling)    Intolerances        ANTIBIOTICS/RELEVANT:  antimicrobials    immunologic:    OTHER:  ALBUTerol    90 MICROgram(s) HFA Inhaler 2 Puff(s) Inhalation every 6 hours  artificial tears (preservative free) Ophthalmic Solution 1 Drop(s) Both EYES two times a day  bisacodyl Suppository 10 milliGRAM(s) Rectal daily PRN  budesonide  80 MICROgram(s)/formoterol 4.5 MICROgram(s) Inhaler 2 Puff(s) Inhalation two times a day  chlorhexidine 0.12% Liquid 15 milliLiter(s) Oral Mucosa every 12 hours  chlorhexidine 4% Liquid 1 Application(s) Topical <User Schedule>  dextrose 50% Injectable 25 Gram(s) IV Push once  donepezil 10 milliGRAM(s) Oral at bedtime  heparin   Injectable 5000 Unit(s) SubCutaneous every 12 hours  insulin lispro (ADMELOG) corrective regimen sliding scale   SubCutaneous every 6 hours  levothyroxine 150 MICROGram(s) Oral daily  melatonin 3 milliGRAM(s) Oral at bedtime  memantine 10 milliGRAM(s) Oral every 12 hours  metoprolol tartrate 37.5 milliGRAM(s) Oral two times a day  pantoprazole   Suspension 40 milliGRAM(s) Oral two times a day  polyethylene glycol 3350 17 Gram(s) Oral two times a day  senna 2 Tablet(s) Oral at bedtime  simvastatin 10 milliGRAM(s) Oral at bedtime  traZODone 100 milliGRAM(s) Oral every 12 hours      Objective:  Vital Signs Last 24 Hrs  T(C): 36.2 (04 Oct 2021 13:35), Max: 36.8 (03 Oct 2021 17:55)  T(F): 97.2 (04 Oct 2021 13:35), Max: 98.2 (03 Oct 2021 17:55)  HR: 108 (04 Oct 2021 15:40) (95 - 118)  BP: 139/93 (04 Oct 2021 13:35) (136/84 - 147/90)  BP(mean): --  RR: 20 (04 Oct 2021 13:35) (20 - 27)  SpO2: 99% (04 Oct 2021 15:40) (98% - 100%)    PHYSICAL EXAM:  Constitutional:NAD  Eyes:LISA, EOMI  Ear/Nose/Throat: no thrush, mucositis.  Moist mucous membranes	  Neck: tracheostomy, on ventilation  Respiratory: CTA shaila  Cardiovascular: S1S2 RRR, no murmurs  Gastrointestinal:soft, nontender,  nondistended (+) BS, peg  Extremities:no e/e/c  Skin:  no rashes, open wounds or ulcerations        LABS:                        10.7   10.14 )-----------( 373      ( 04 Oct 2021 06:29 )             34.6     10-04    145  |  105  |  16  ----------------------------<  154<H>  3.1<L>   |  31  |  0.31<L>    Ca    10.1      04 Oct 2021 06:29  Phos  3.0     10-04  Mg     1.90     10-04            Procalcitonin, Serum: 0.07 (09-27 @ 03:30)                    MICROBIOLOGY:          RADIOLOGY & ADDITIONAL STUDIES:     Detail Level: Zone Additional Notes: Continue topical compound at night (azelic acid, tretinoin, clindamycin ) Additional Notes: Amzeeq foam as prescribed above

## 2021-10-04 NOTE — PROGRESS NOTE ADULT - ATTENDING COMMENTS
seen and examined with ACP  agree with above  weaning as tolerated  d/c planning to vent facility - family aware, has given us their prefernces

## 2021-10-04 NOTE — PROGRESS NOTE ADULT - RS GEN PE MLT RESP DETAILS PC
bilat coarse bs/airway patent/breath sounds equal
airway patent/breath sounds equal/good air movement

## 2021-10-04 NOTE — CHART NOTE - NSCHARTNOTESELECT_GEN_ALL_CORE
Follow Up/Nutrition Services
Follow Up/Nutrition Services
Off Service Note
Palliative conversation/Event Note
Event Note
Nutrition Consult-follow/up noted/Nutrition Services
Nutrition Services
POCUS NOTE/Event Note
POCUS/Event Note
POCUS/Event Note
Thoracic surgery
Transfer Note/Event Note

## 2021-10-04 NOTE — PROGRESS NOTE ADULT - ASSESSMENT
81 y. o. female BIBA to [facility] from nursing facility c/o hematemesis.  According to pt it was the first episode and happened while she was eating dinner. Followed by multiple episodes of N/V with bright blood and epigastric cramps.  She was intubated for airway protection and underwent EGD that revealed large diverticula at 28 cm filled with blood and 5 mm tear at distal esophagus, a hiatal hernia and large amount of blood in the fundus.  Diverticula was injected with Epinephrine.  Patient was transferred to [facility] for further management. (10 Sep 2021 15:04)    Pt followed by GI, s/p repeat EGD on 9/14 -  showing significant clot in large esophageal diverticula -- without obvious source of etiology of bleeding (ie no vessel) + no esophageal mass seen.  Pt on pressors, thought to be unlikely hemorrhagic given stable Hb.    9/15: WBC 10.8, sputum cx (9/10) with nl resp tawana. CT c/a/p shows a 7.2 cm masslike structure distending the mid to distal esophagus, suspicious for malignancy.  Bilateral tree-in-bud nodular opacities, predominantly in the left lower lobe, new/increased from 9/9/2021, raising concern for infection.  Left lower lobe consolidative opacity, possible combination of atelectasis and pneumonia.  (+) hypodensities in the liver suspicion for malignancy, and stercoral colitis.    Pt with fever, on pressors.  Pt is NPO on TPN.   Pt on empiric abx, ID consulted for further abx managment.     Fever/sepsis:    - pt with fever, on pressor support.  Abx broadened to vanco and meropenem.  Fluconazole added on 9/17 due to continued fevers.  - S/p central line change on 9/17.   - CT c/a/p noted, increased LLL consolidation, possible aspiration.  f/u repeat sputum cx - nl resp tawana    - Check bcx x 2 - NGTD.  Sputum cx no resp tawana  - f/u WBC and fever curve.  Improving, abx course completed after 7 days, d/c'd on 9/23.  Cont to monitor pt off abx.     * Pt seen by Palliative care service for GOC discussion - pt remains full code.  s/p trach/peg.      * Tmax >101 on 9/27.  Recommend repeat bcx x 2 - ngtd at 24 hrs on 9/28.  Repeat cxr with small b/l layering pl effusions,  otherwise clear lungs noted.  s/p removal of pigtail cath.  Pt remains on TPN, on mech ventilation.  Currently has completed course of abx.  Cont to monitor off for now.   Pt transferred to [facility].       * Tmax of 100.4 on 10/2.  Repeat bcx x 2 and sputum cx sent - ngtd.   Cont to monitor off abx.       Karen Casillas  [phone]

## 2021-10-05 ENCOUNTER — TRANSCRIPTION ENCOUNTER (OUTPATIENT)
Age: 81
End: 2021-10-05

## 2021-10-05 VITALS
RESPIRATION RATE: 20 BRPM | OXYGEN SATURATION: 100 % | SYSTOLIC BLOOD PRESSURE: 122 MMHG | HEART RATE: 97 BPM | DIASTOLIC BLOOD PRESSURE: 79 MMHG

## 2021-10-05 LAB
CULTURE RESULTS: SIGNIFICANT CHANGE UP
GLUCOSE BLDC GLUCOMTR-MCNC: 130 MG/DL — HIGH (ref 70–99)
GLUCOSE BLDC GLUCOMTR-MCNC: 142 MG/DL — HIGH (ref 70–99)
SPECIMEN SOURCE: SIGNIFICANT CHANGE UP

## 2021-10-05 PROCEDURE — 99238 HOSP IP/OBS DSCHRG MGMT 30/<: CPT

## 2021-10-05 RX ORDER — DONEPEZIL HYDROCHLORIDE 10 MG/1
1 TABLET, FILM COATED ORAL
Qty: 0 | Refills: 0 | DISCHARGE
Start: 2021-10-05

## 2021-10-05 RX ORDER — MEMANTINE HYDROCHLORIDE 10 MG/1
1 TABLET ORAL
Qty: 0 | Refills: 0 | DISCHARGE

## 2021-10-05 RX ORDER — MEMANTINE HYDROCHLORIDE 10 MG/1
1 TABLET ORAL
Qty: 0 | Refills: 0 | DISCHARGE
Start: 2021-10-05

## 2021-10-05 RX ORDER — POLYETHYLENE GLYCOL 3350 17 G/17G
17 POWDER, FOR SOLUTION ORAL
Qty: 0 | Refills: 0 | DISCHARGE
Start: 2021-10-05

## 2021-10-05 RX ORDER — APIXABAN 2.5 MG/1
1 TABLET, FILM COATED ORAL
Qty: 0 | Refills: 0 | DISCHARGE

## 2021-10-05 RX ORDER — LANOLIN ALCOHOL/MO/W.PET/CERES
1 CREAM (GRAM) TOPICAL
Qty: 0 | Refills: 0 | DISCHARGE

## 2021-10-05 RX ORDER — DOCOSANOL 100 MG/G
1 CREAM TOPICAL
Qty: 0 | Refills: 0 | DISCHARGE

## 2021-10-05 RX ORDER — ALBUTEROL 90 UG/1
2 AEROSOL, METERED ORAL
Qty: 0 | Refills: 0 | DISCHARGE

## 2021-10-05 RX ORDER — ALLOPURINOL 300 MG
2 TABLET ORAL
Qty: 0 | Refills: 0 | DISCHARGE

## 2021-10-05 RX ORDER — OXYCODONE HYDROCHLORIDE 5 MG/1
1 TABLET ORAL
Qty: 0 | Refills: 0 | DISCHARGE

## 2021-10-05 RX ORDER — SENNA PLUS 8.6 MG/1
2 TABLET ORAL
Qty: 0 | Refills: 0 | DISCHARGE
Start: 2021-10-05

## 2021-10-05 RX ORDER — FUROSEMIDE 40 MG
1 TABLET ORAL
Qty: 0 | Refills: 0 | DISCHARGE

## 2021-10-05 RX ORDER — LEVOTHYROXINE SODIUM 125 MCG
1 TABLET ORAL
Qty: 0 | Refills: 0 | DISCHARGE
Start: 2021-10-05

## 2021-10-05 RX ORDER — METOPROLOL TARTRATE 50 MG
1 TABLET ORAL
Qty: 0 | Refills: 0 | DISCHARGE
Start: 2021-10-05

## 2021-10-05 RX ORDER — DONEPEZIL HYDROCHLORIDE 10 MG/1
1 TABLET, FILM COATED ORAL
Qty: 0 | Refills: 0 | DISCHARGE

## 2021-10-05 RX ORDER — TRAZODONE HCL 50 MG
1 TABLET ORAL
Qty: 0 | Refills: 0 | DISCHARGE
Start: 2021-10-05

## 2021-10-05 RX ORDER — LEVOTHYROXINE SODIUM 125 MCG
1 TABLET ORAL
Qty: 0 | Refills: 0 | DISCHARGE

## 2021-10-05 RX ORDER — ASPIRIN/CALCIUM CARB/MAGNESIUM 324 MG
1 TABLET ORAL
Qty: 0 | Refills: 0 | DISCHARGE

## 2021-10-05 RX ORDER — PANTOPRAZOLE SODIUM 20 MG/1
1 TABLET, DELAYED RELEASE ORAL
Qty: 0 | Refills: 0 | DISCHARGE

## 2021-10-05 RX ORDER — LORATADINE 10 MG/1
1 TABLET ORAL
Qty: 0 | Refills: 0 | DISCHARGE

## 2021-10-05 RX ORDER — TRAZODONE HCL 50 MG
1 TABLET ORAL
Qty: 0 | Refills: 0 | DISCHARGE

## 2021-10-05 RX ORDER — ONDANSETRON 8 MG/1
1 TABLET, FILM COATED ORAL
Qty: 0 | Refills: 0 | DISCHARGE

## 2021-10-05 RX ORDER — SENNA PLUS 8.6 MG/1
1 TABLET ORAL
Qty: 0 | Refills: 0 | DISCHARGE

## 2021-10-05 RX ORDER — DOCUSATE SODIUM 100 MG
1 CAPSULE ORAL
Qty: 0 | Refills: 0 | DISCHARGE

## 2021-10-05 RX ADMIN — Medication 150 MICROGRAM(S): at 05:38

## 2021-10-05 RX ADMIN — PANTOPRAZOLE SODIUM 40 MILLIGRAM(S): 20 TABLET, DELAYED RELEASE ORAL at 05:39

## 2021-10-05 RX ADMIN — ALBUTEROL 2 PUFF(S): 90 AEROSOL, METERED ORAL at 03:05

## 2021-10-05 RX ADMIN — Medication 37.5 MILLIGRAM(S): at 10:12

## 2021-10-05 RX ADMIN — MEMANTINE HYDROCHLORIDE 10 MILLIGRAM(S): 10 TABLET ORAL at 05:38

## 2021-10-05 RX ADMIN — Medication 100 MILLIGRAM(S): at 05:39

## 2021-10-05 RX ADMIN — ALBUTEROL 2 PUFF(S): 90 AEROSOL, METERED ORAL at 15:38

## 2021-10-05 RX ADMIN — BUDESONIDE AND FORMOTEROL FUMARATE DIHYDRATE 2 PUFF(S): 160; 4.5 AEROSOL RESPIRATORY (INHALATION) at 10:19

## 2021-10-05 RX ADMIN — POLYETHYLENE GLYCOL 3350 17 GRAM(S): 17 POWDER, FOR SOLUTION ORAL at 05:39

## 2021-10-05 RX ADMIN — ALBUTEROL 2 PUFF(S): 90 AEROSOL, METERED ORAL at 10:18

## 2021-10-05 RX ADMIN — HEPARIN SODIUM 5000 UNIT(S): 5000 INJECTION INTRAVENOUS; SUBCUTANEOUS at 05:38

## 2021-10-05 NOTE — PROGRESS NOTE ADULT - NUTRITIONAL ASSESSMENT
This patient has been assessed with a concern for Malnutrition and has been determined to have a diagnosis/diagnoses of Severe protein-calorie malnutrition.    This patient is being managed with:   Diet NPO with Tube Feed-  Tube Feeding Modality: Gastrostomy  Jevity 1.2 Toro (JEVITY1.2RTH)  Total Volume for 24 Hours (mL): 1248  Continuous  Starting Tube Feed Rate {mL per Hour}: 52     Every 4 hours  Until Goal Tube Feed Rate (mL per Hour): 52  Tube Feed Duration (in Hours): 24  Tube Feed Start Time: 12:00  Entered: Sep 26 2021  1:35PM    
This patient has been assessed with a concern for Malnutrition and has been determined to have a diagnosis/diagnoses of Severe protein-calorie malnutrition.    This patient is being managed with:   Parenteral Nutrition - Adult-  Entered: Sep 15 2021  5:00PM    fat emulsion (Fish Oil and Plant Based) 20% Infusion-[Known as SMOFLIPID 20% Infusion]  32 Gram(s) in IV Solution 160 milliLiter(s) infuse at 13.3 mL/Hr  Dose Rate: 13.3 mL/Hr Infuse Over: 12 Hours  Administration Instructions: Use 1.2 micron in-line filter  Entered: Sep 15 2021  9:15AM    Parenteral Nutrition - Adult-  Entered: Sep 14 2021  5:00PM    Diet NPO-  Entered: Sep 10 2021  6:50AM    
This patient has been assessed with a concern for Malnutrition and has been determined to have a diagnosis/diagnoses of Severe protein-calorie malnutrition.    This patient is being managed with:   Parenteral Nutrition - Adult-  Entered: Sep 16 2021  5:00PM    fat emulsion (Fish Oil and Plant Based) 20% Infusion-[Known as SMOFLIPID 20% Infusion]  32 Gram(s) in IV Solution 160 milliLiter(s) infuse at 13.3 mL/Hr  Dose Rate: 13.3 mL/Hr Infuse Over: 12 Hours  Administration Instructions: Use 1.2 micron in-line filter  Entered: Sep 16 2021  8:59AM    Parenteral Nutrition - Adult-  Entered: Sep 15 2021  5:00PM    Diet NPO-  Entered: Sep 10 2021  6:50AM    
This patient has been assessed with a concern for Malnutrition and has been determined to have a diagnosis/diagnoses of Severe protein-calorie malnutrition.    This patient is being managed with:   Parenteral Nutrition - Adult-  Entered: Sep 20 2021  5:00PM    fat emulsion (Fish Oil and Plant Based) 20% Infusion-[Known as SMOFLIPID 20% Infusion]  32 Gram(s) in IV Solution 160 milliLiter(s) infuse at 13.3 mL/Hr  Dose Rate: 13.3 mL/Hr Infuse Over: 12 Hours  Administration Instructions: Use 1.2 micron in-line filter  Entered: Sep 20 2021  9:07AM    Parenteral Nutrition - Adult-  Entered: Sep 19 2021  5:00PM    Diet NPO-  Entered: Sep 10 2021  6:50AM    
This patient has been assessed with a concern for Malnutrition and has been determined to have a diagnosis/diagnoses of Severe protein-calorie malnutrition.    This patient is being managed with:   Parenteral Nutrition - Adult-  Entered: Sep 22 2021  5:00PM    fat emulsion (Fish Oil and Plant Based) 20% Infusion-[Known as SMOFLIPID 20% Infusion]  32 Gram(s) in IV Solution 160 milliLiter(s) infuse at 13.3 mL/Hr  Dose Rate: 13.3 mL/Hr Infuse Over: 12 Hours  Administration Instructions: Use 1.2 micron in-line filter  Entered: Sep 22 2021  8:45AM    Parenteral Nutrition - Adult-  Entered: Sep 21 2021  5:00PM    Diet NPO-  Entered: Sep 10 2021  6:50AM    
Severe protein calorie malnutrition in acute illness/ injury; secondary to poor appetite, weight loss >5% in 1 month and/or >7.5% in 3 months, caloric Intake <50% of nutrition needs >= 5 days, temporal wasting, severe loss of muscle mass/atrophy and loss of body fat stores.     Diet, NPO (09-10-21 @ 06:50) [Active]    fat emulsion (Fish Oil and Plant Based) 20% Infusion 13.3 mL/Hr (13.3 mL/Hr) IV Continuous <Continuous>, 09-17-21 @ 09:40,   Parenteral Nutrition - Adult 1 Each (63 mL/Hr) TPN Continuous <Continuous>, 09-17-21 @ 17:00, , Stop order after: 1 Days      **Greater than 50% of the encounter was spent counseling and/coordination of care on parenteral nutrition. 25 minutes were spent face to face with the patient.
Severe protein calorie malnutrition in acute illness/ injury; secondary to poor appetite, weight loss >5% in 1 month and/or >7.5% in 3 months, caloric Intake <50% of nutrition needs >= 5 days, temporal wasting, severe loss of muscle mass/atrophy and loss of body fat stores.     Diet, NPO (09-10-21 @ 06:50) [Active]    fat emulsion (Fish Oil and Plant Based) 20% Infusion 13.3 mL/Hr (13.3 mL/Hr) IV Continuous <Continuous>, 09-18-21 @ 08:13,   Parenteral Nutrition - Adult 1 Each (63 mL/Hr) TPN Continuous <Continuous>, 09-18-21 @ 17:00, , Stop order after: 1 Days        **Greater than 50% of the encounter was spent counseling and/coordination of care on parenteral nutrition. 25 minutes were spent face to face with the patient.
Severe protein calorie malnutrition in acute illness/ injury; secondary to poor appetite, weight loss >5% in 1 month and/or >7.5% in 3 months, caloric Intake <50% of nutrition needs >= 5 days, temporal wasting, severe loss of muscle mass/atrophy and loss of body fat stores.     Diet, NPO (09-10-21 @ 06:50) [Active]    fat emulsion (Fish Oil and Plant Based) 20% Infusion 13.3 mL/Hr (13.3 mL/Hr) IV Continuous <Continuous>, 09-21-21 @ 08:42,   Parenteral Nutrition - Adult 1 Each (63 mL/Hr) TPN Continuous <Continuous>, 09-21-21 @ 17:00, , Stop order after: 1 Days        **Greater than 50% of the encounter was spent counseling and/coordination of care on parenteral nutrition. 25 minutes were spent face to face with the patient.
Severe protein calorie malnutrition in acute illness/ injury; secondary to poor appetite, weight loss >5% in 1 month and/or >7.5% in 3 months, caloric Intake <50% of nutrition needs >= 5 days, temporal wasting, severe loss of muscle mass/atrophy and loss of body fat stores.     Diet, NPO (09-10-21 @ 06:50) [Active]    fat emulsion (Fish Oil and Plant Based) 20% Infusion 13.3 mL/Hr (13.3 mL/Hr) IV Continuous <Continuous>, 09-24-21 @ 09:24,   Parenteral Nutrition - Adult 1 Each (63 mL/Hr) TPN Continuous <Continuous>, 09-24-21 @ 17:00, , Stop order after: 1 Days      **Greater than 50% of the encounter was spent counseling and/coordination of care on parenteral nutrition. 25 minutes were spent face to face with the patient.
Severe protein calorie malnutrition in acute illness/ injury; secondary to poor appetite, weight loss >5% in 1 month and/or >7.5% in 3 months, caloric Intake <50% of nutrition needs >= 5 days, temporal wasting, severe loss of muscle mass/atrophy and loss of body fat stores.     Diet, NPO (09-10-21 @ 06:50) [Active]    fat emulsion (Fish Oil and Plant Based) 20% Infusion 13.3 mL/Hr (13.3 mL/Hr) IV Continuous <Continuous>, 09-25-21 @ 09:23,   Parenteral Nutrition - Adult 1 Each (63 mL/Hr) TPN Continuous <Continuous>, 09-25-21 @ 17:00, , Stop order after: 1 Days      **Greater than 50% of the encounter was spent counseling and/coordination of care on parenteral nutrition. 25 minutes were spent face to face with the patient.
This patient has been assessed with a concern for Malnutrition and has been determined to have a diagnosis/diagnoses of Severe protein-calorie malnutrition.    This patient is being managed with:   Diet NPO with Tube Feed-  Tube Feeding Modality: Gastrostomy  Jevity 1.2 Toro (JEVITY1.2RTH)  Total Volume for 24 Hours (mL): 1248  Continuous  Starting Tube Feed Rate {mL per Hour}: 52     Every 4 hours  Until Goal Tube Feed Rate (mL per Hour): 52  Tube Feed Duration (in Hours): 24  Tube Feed Start Time: 12:00  Entered: Sep 26 2021  1:35PM    
This patient has been assessed with a concern for Malnutrition and has been determined to have a diagnosis/diagnoses of Severe protein-calorie malnutrition.    This patient is being managed with:   Parenteral Nutrition - Adult-  Entered: Sep 17 2021  5:00PM    fat emulsion (Fish Oil and Plant Based) 20% Infusion-[Known as SMOFLIPID 20% Infusion]  32 Gram(s) in IV Solution 160 milliLiter(s) infuse at 13.3 mL/Hr  Dose Rate: 13.3 mL/Hr Infuse Over: 12 Hours  Administration Instructions: Use 1.2 micron in-line filter  Entered: Sep 17 2021  9:40AM    Parenteral Nutrition - Adult-  Entered: Sep 16 2021  5:00PM    Diet NPO-  Entered: Sep 10 2021  6:50AM    
This patient has been assessed with a concern for Malnutrition and has been determined to have a diagnosis/diagnoses of Severe protein-calorie malnutrition.    This patient is being managed with:   Parenteral Nutrition - Adult-  Entered: Sep 18 2021  5:00PM    fat emulsion (Fish Oil and Plant Based) 20% Infusion-[Known as SMOFLIPID 20% Infusion]  32 Gram(s) in IV Solution 160 milliLiter(s) infuse at 13.3 mL/Hr  Dose Rate: 13.3 mL/Hr Infuse Over: 12 Hours  Administration Instructions: Use 1.2 micron in-line filter  Entered: Sep 18 2021  8:13AM    Parenteral Nutrition - Adult-  Entered: Sep 17 2021  5:00PM    Diet NPO-  Entered: Sep 10 2021  6:50AM    
Severe protein calorie malnutrition in acute illness/ injury; secondary to poor appetite, weight loss >5% in 1 month and/or >7.5% in 3 months, caloric Intake <50% of nutrition needs >= 5 days, temporal wasting, severe loss of muscle mass/atrophy and loss of body fat stores.     Diet, NPO (09-10-21 @ 06:50) [Active]    fat emulsion (Fish Oil and Plant Based) 20% Infusion 13.3 mL/Hr (13.3 mL/Hr) IV Continuous <Continuous>, 09-19-21 @ 09:51,   Parenteral Nutrition - Adult 1 Each (63 mL/Hr) TPN Continuous <Continuous>, 09-19-21 @ 17:00, , Stop order after: 1 Days        **Greater than 50% of the encounter was spent counseling and/coordination of care on parenteral nutrition. 25 minutes were spent face to face with the patient.
Severe protein calorie malnutrition in acute illness/ injury; secondary to poor appetite, weight loss >5% in 1 month and/or >7.5% in 3 months, caloric Intake <50% of nutrition needs >= 5 days, temporal wasting, severe loss of muscle mass/atrophy and loss of body fat stores.     Diet, NPO (09-10-21 @ 06:50) [Active]    fat emulsion (Fish Oil and Plant Based) 20% Infusion 13.3 mL/Hr (13.3 mL/Hr) IV Continuous <Continuous>, 09-20-21 @ 09:07,   Parenteral Nutrition - Adult 1 Each (63 mL/Hr) TPN Continuous <Continuous>, 09-20-21 @ 17:00, , Stop order after: 1 Days        **Greater than 50% of the encounter was spent counseling and/coordination of care on parenteral nutrition. 25 minutes were spent face to face with the patient.
Severe protein calorie malnutrition in acute illness/ injury; secondary to poor appetite, weight loss >5% in 1 month and/or >7.5% in 3 months, caloric Intake <50% of nutrition needs >= 5 days, temporal wasting, severe loss of muscle mass/atrophy and loss of body fat stores.     Diet, NPO (09-10-21 @ 06:50) [Active]    fat emulsion (Fish Oil and Plant Based) 20% Infusion 13.3 mL/Hr (13.3 mL/Hr) IV Continuous <Continuous>, 09-22-21 @ 08:45,   Parenteral Nutrition - Adult 1 Each (63 mL/Hr) TPN Continuous <Continuous>, 09-22-21 @ 17:00, , Stop order after: 1 Days      **Greater than 50% of the encounter was spent counseling and/coordination of care on parenteral nutrition. 25 minutes were spent face to face with the patient.
This patient has been assessed with a concern for Malnutrition and has been determined to have a diagnosis/diagnoses of Severe protein-calorie malnutrition.    This patient is being managed with:   Diet NPO with Tube Feed-  Tube Feeding Modality: Gastrostomy  Jevity 1.2 Toro (JEVITY1.2RTH)  Total Volume for 24 Hours (mL): 1248  Continuous  Starting Tube Feed Rate {mL per Hour}: 52     Every 4 hours  Until Goal Tube Feed Rate (mL per Hour): 52  Tube Feed Duration (in Hours): 24  Tube Feed Start Time: 12:00  Entered: Sep 26 2021  1:35PM    
This patient has been assessed with a concern for Malnutrition and has been determined to have a diagnosis/diagnoses of Severe protein-calorie malnutrition.    This patient is being managed with:   Diet NPO with Tube Feed-  Tube Feeding Modality: Gastrostomy  Jevity 1.2 Toro (JEVITY1.2RTH)  Total Volume for 24 Hours (mL): 1248  Continuous  Starting Tube Feed Rate {mL per Hour}: 52     Every 4 hours  Until Goal Tube Feed Rate (mL per Hour): 52  Tube Feed Duration (in Hours): 24  Tube Feed Start Time: 12:00  Entered: Sep 26 2021  1:35PM    
This patient has been assessed with a concern for Malnutrition and has been determined to have a diagnosis/diagnoses of Severe protein-calorie malnutrition.    This patient is being managed with:   Parenteral Nutrition - Adult-  Entered: Sep 19 2021  5:00PM    fat emulsion (Fish Oil and Plant Based) 20% Infusion-[Known as SMOFLIPID 20% Infusion]  32 Gram(s) in IV Solution 160 milliLiter(s) infuse at 13.3 mL/Hr  Dose Rate: 13.3 mL/Hr Infuse Over: 12 Hours  Administration Instructions: Use 1.2 micron in-line filter  Entered: Sep 19 2021  9:51AM    Parenteral Nutrition - Adult-  Entered: Sep 18 2021  5:00PM    Diet NPO-  Entered: Sep 10 2021  6:50AM    
This patient has been assessed with a concern for Malnutrition and has been determined to have a diagnosis/diagnoses of Severe protein-calorie malnutrition.    This patient is being managed with:   Parenteral Nutrition - Adult-  Entered: Sep 21 2021  5:00PM    fat emulsion (Fish Oil and Plant Based) 20% Infusion-[Known as SMOFLIPID 20% Infusion]  32 Gram(s) in IV Solution 160 milliLiter(s) infuse at 13.3 mL/Hr  Dose Rate: 13.3 mL/Hr Infuse Over: 12 Hours  Administration Instructions: Use 1.2 micron in-line filter  Entered: Sep 21 2021  8:42AM    Parenteral Nutrition - Adult-  Entered: Sep 20 2021  5:00PM    Diet NPO-  Entered: Sep 10 2021  6:50AM    
Severe protein calorie malnutrition in acute illness/ injury; secondary to poor appetite, weight loss >5% in 1 month and/or >7.5% in 3 months, caloric Intake <50% of nutrition needs >= 5 days, temporal wasting, severe loss of muscle mass/atrophy and loss of body fat stores.     Diet, NPO (09-10-21 @ 06:50) [Active]    fat emulsion (Fish Oil and Plant Based) 20% Infusion 13.3 mL/Hr (13.3 mL/Hr) IV Continuous <Continuous>, 09-15-21 @ 09:15,   Parenteral Nutrition - Adult 1 Each (63 mL/Hr) TPN Continuous <Continuous>, 09-15-21 @ 17:00, , Stop order after: 1 Days      **Greater than 50% of the encounter was spent counseling and/coordination of care on parenteral nutrition. 25 minutes were spent face to face with the patient.
Severe protein calorie malnutrition in acute illness/ injury; secondary to poor appetite, weight loss >5% in 1 month and/or >7.5% in 3 months, caloric Intake <50% of nutrition needs >= 5 days, temporal wasting, severe loss of muscle mass/atrophy and loss of body fat stores.     Diet, NPO (09-10-21 @ 06:50) [Active]    fat emulsion (Fish Oil and Plant Based) 20% Infusion 13.3 mL/Hr (13.3 mL/Hr) IV Continuous <Continuous>, 09-16-21 @ 08:59,   Parenteral Nutrition - Adult 1 Each (63 mL/Hr) TPN Continuous <Continuous>, 09-16-21 @ 17:00, , Stop order after: 1 Days      **Greater than 50% of the encounter was spent counseling and/coordination of care on parenteral nutrition. 25 minutes were spent face to face with the patient.
Severe protein calorie malnutrition in acute illness/ injury; secondary to poor appetite, weight loss >5% in 1 month and/or >7.5% in 3 months, caloric Intake <50% of nutrition needs >= 5 days, temporal wasting, severe loss of muscle mass/atrophy and loss of body fat stores.     Diet, NPO (09-10-21 @ 06:50) [Active]    fat emulsion (Fish Oil and Plant Based) 20% Infusion 13.3 mL/Hr (13.3 mL/Hr) IV Continuous <Continuous>, 09-23-21 @ 09:16,   Parenteral Nutrition - Adult 1 Each (63 mL/Hr) TPN Continuous <Continuous>, 09-23-21 @ 17:00, , Stop order after: 1 Days      **Greater than 50% of the encounter was spent counseling and/coordination of care on parenteral nutrition. 25 minutes were spent face to face with the patient.
This patient has been assessed with a concern for Malnutrition and has been determined to have a diagnosis/diagnoses of Severe protein-calorie malnutrition.    This patient is being managed with:   Diet NPO with Tube Feed-  Tube Feeding Modality: Gastrostomy  Jevity 1.2 Toro (JEVITY1.2RTH)  Total Volume for 24 Hours (mL): 1080  Continuous  Starting Tube Feed Rate {mL per Hour}: 20  Increase Tube Feed Rate by (mL): 20     Every 4 hours  Until Goal Tube Feed Rate (mL per Hour): 45  Tube Feed Duration (in Hours): 24  Tube Feed Start Time: 12:00  Entered: Sep 25 2021 12:06PM    
This patient has been assessed with a concern for Malnutrition and has been determined to have a diagnosis/diagnoses of Severe protein-calorie malnutrition.    This patient is being managed with:   Diet NPO with Tube Feed-  Tube Feeding Modality: Gastrostomy  Jevity 1.2 Toro (JEVITY1.2RTH)  Total Volume for 24 Hours (mL): 1080  Continuous  Starting Tube Feed Rate {mL per Hour}: 20  Increase Tube Feed Rate by (mL): 20     Every 4 hours  Until Goal Tube Feed Rate (mL per Hour): 45  Tube Feed Duration (in Hours): 24  Tube Feed Start Time: 12:00  Entered: Sep 26 2021  6:26AM    
This patient has been assessed with a concern for Malnutrition and has been determined to have a diagnosis/diagnoses of Severe protein-calorie malnutrition.    This patient is being managed with:   Diet NPO with Tube Feed-  Tube Feeding Modality: Gastrostomy  Jevity 1.2 Toro (JEVITY1.2RTH)  Total Volume for 24 Hours (mL): 1248  Continuous  Starting Tube Feed Rate {mL per Hour}: 52     Every 4 hours  Until Goal Tube Feed Rate (mL per Hour): 52  Tube Feed Duration (in Hours): 24  Tube Feed Start Time: 12:00  Entered: Sep 26 2021  1:35PM    
This patient has been assessed with a concern for Malnutrition and has been determined to have a diagnosis/diagnoses of Severe protein-calorie malnutrition.    This patient is being managed with:   Parenteral Nutrition - Adult-  Entered: Sep 14 2021  5:00PM    fat emulsion (Fish Oil and Plant Based) 20% Infusion-[Known as SMOFLIPID 20% Infusion]  16 Gram(s) in IV Solution 80 milliLiter(s) infuse at 6.7 mL/Hr  Dose Rate: 6.7 mL/Hr Infuse Over: 12 Hours  Administration Instructions: Use 1.2 micron in-line filter  Entered: Sep 14 2021  5:00PM    Diet NPO-  Entered: Sep 10 2021  6:50AM    
This patient has been assessed with a concern for Malnutrition and has been determined to have a diagnosis/diagnoses of Severe protein-calorie malnutrition.    This patient is being managed with:   Parenteral Nutrition - Adult-  Entered: Sep 23 2021  5:00PM    fat emulsion (Fish Oil and Plant Based) 20% Infusion-[Known as SMOFLIPID 20% Infusion]  32 Gram(s) in IV Solution 160 milliLiter(s) infuse at 13.3 mL/Hr  Dose Rate: 13.3 mL/Hr Infuse Over: 12 Hours  Administration Instructions: Use 1.2 micron in-line filter  Entered: Sep 23 2021  9:16AM    Parenteral Nutrition - Adult-  Entered: Sep 22 2021  5:00PM    Diet NPO-  Entered: Sep 10 2021  6:50AM    
This patient has been assessed with a concern for Malnutrition and has been determined to have a diagnosis/diagnoses of Severe protein-calorie malnutrition.    This patient is being managed with:   Parenteral Nutrition - Adult-  Entered: Sep 24 2021  5:00PM    fat emulsion (Fish Oil and Plant Based) 20% Infusion-[Known as SMOFLIPID 20% Infusion]  32 Gram(s) in IV Solution 160 milliLiter(s) infuse at 13.3 mL/Hr  Dose Rate: 13.3 mL/Hr Infuse Over: 12 Hours  Administration Instructions: Use 1.2 micron in-line filter  Entered: Sep 24 2021  9:24AM    Parenteral Nutrition - Adult-  Entered: Sep 23 2021  5:00PM    Diet NPO-  Entered: Sep 10 2021  6:50AM    
This patient has been assessed with a concern for Malnutrition and has been determined to have a diagnosis/diagnoses of Severe protein-calorie malnutrition.    This patient is being managed with:   Diet NPO with Tube Feed-  Tube Feeding Modality: Gastrostomy  Jevity 1.2 Toro (JEVITY1.2RTH)  Total Volume for 24 Hours (mL): 1248  Continuous  Starting Tube Feed Rate {mL per Hour}: 52     Every 4 hours  Until Goal Tube Feed Rate (mL per Hour): 52  Tube Feed Duration (in Hours): 24  Tube Feed Start Time: 12:00  Entered: Sep 26 2021  1:35PM

## 2021-10-05 NOTE — PROGRESS NOTE ADULT - SUBJECTIVE AND OBJECTIVE BOX
CHIEF COMPLAINT: Patient is a 81y old  Female who presents with a chief complaint of Hematemesis (04 Oct 2021 17:29)      INTERVAL EVENTS:     ROS: Seen by bedside during AM rounds     OBJECTIVE:  ICU Vital Signs Last 24 Hrs  T(C): 36.1 (05 Oct 2021 05:36), Max: 36.4 (04 Oct 2021 17:18)  T(F): 97 (05 Oct 2021 05:36), Max: 97.5 (04 Oct 2021 17:18)  HR: 85 (05 Oct 2021 06:43) (85 - 116)  BP: 116/76 (05 Oct 2021 05:36) (111/66 - 146/94)  BP(mean): --  ABP: --  ABP(mean): --  RR: 22 (05 Oct 2021 05:36) (20 - 24)  SpO2: 100% (05 Oct 2021 06:43) (98% - 100%)    Mode: AC/ CMV (Assist Control/ Continuous Mandatory Ventilation), RR (machine): 18, TV (machine): 300, FiO2: 40, PEEP: 5, ITime: 0.6, MAP: 8, PIP: 17    10-04 @ 07:01  -  10-05 @ 07:00  --------------------------------------------------------  IN: 1374 mL / OUT: 2700 mL / NET: -1326 mL      CAPILLARY BLOOD GLUCOSE      POCT Blood Glucose.: 130 mg/dL (05 Oct 2021 06:30)      PHYSICAL EXAM:  General:   HEENT:   Lymph Nodes:  Neck:   Respiratory:   Cardiovascular:   Abdomen:   Extremities:   Skin:   Neurological:  Psychiatry:    Mode: AC/ CMV (Assist Control/ Continuous Mandatory Ventilation)  RR (machine): 18  TV (machine): 300  FiO2: 40  PEEP: 5  ITime: 0.6  MAP: 8  PIP: 17      HOSPITAL MEDICATIONS:  MEDICATIONS  (STANDING):  ALBUTerol    90 MICROgram(s) HFA Inhaler 2 Puff(s) Inhalation every 6 hours  artificial tears (preservative free) Ophthalmic Solution 1 Drop(s) Both EYES two times a day  budesonide  80 MICROgram(s)/formoterol 4.5 MICROgram(s) Inhaler 2 Puff(s) Inhalation two times a day  chlorhexidine 0.12% Liquid 15 milliLiter(s) Oral Mucosa every 12 hours  chlorhexidine 4% Liquid 1 Application(s) Topical <User Schedule>  dextrose 50% Injectable 25 Gram(s) IV Push once  donepezil 10 milliGRAM(s) Oral at bedtime  heparin   Injectable 5000 Unit(s) SubCutaneous every 12 hours  insulin lispro (ADMELOG) corrective regimen sliding scale   SubCutaneous every 6 hours  levothyroxine 150 MICROGram(s) Oral daily  melatonin 3 milliGRAM(s) Oral at bedtime  memantine 10 milliGRAM(s) Oral every 12 hours  metoprolol tartrate 37.5 milliGRAM(s) Oral two times a day  pantoprazole   Suspension 40 milliGRAM(s) Oral two times a day  polyethylene glycol 3350 17 Gram(s) Oral two times a day  senna 2 Tablet(s) Oral at bedtime  simvastatin 10 milliGRAM(s) Oral at bedtime  traZODone 100 milliGRAM(s) Oral every 12 hours    MEDICATIONS  (PRN):  bisacodyl Suppository 10 milliGRAM(s) Rectal daily PRN Constipation      LABS:                        10.7   10.14 )-----------( 373      ( 04 Oct 2021 06:29 )             34.6     10-04    145  |  105  |  16  ----------------------------<  154<H>  3.1<L>   |  31  |  0.31<L>    Ca    10.1      04 Oct 2021 06:29  Phos  3.0     10-04  Mg     1.90     10-04             CHIEF COMPLAINT: Patient is a 81y old  Female who presents with a chief complaint of Hematemesis (04 Oct 2021 17:29)    INTERVAL EVENTS: no acute events overnight.     ROS: Unable to assess given nonverbal/encephalopathic    OBJECTIVE:  ICU Vital Signs Last 24 Hrs  T(C): 36.1 (05 Oct 2021 05:36), Max: 36.4 (04 Oct 2021 17:18)  T(F): 97 (05 Oct 2021 05:36), Max: 97.5 (04 Oct 2021 17:18)  HR: 85 (05 Oct 2021 06:43) (85 - 116)  BP: 116/76 (05 Oct 2021 05:36) (111/66 - 146/94)  BP(mean): --  ABP: --  ABP(mean): --  RR: 22 (05 Oct 2021 05:36) (20 - 24)  SpO2: 100% (05 Oct 2021 06:43) (98% - 100%)    Mode: AC/ CMV (Assist Control/ Continuous Mandatory Ventilation), RR (machine): 18, TV (machine): 300, FiO2: 40, PEEP: 5, ITime: 0.6, MAP: 8, PIP: 17    10-04 @ 07:01  -  10-05 @ 07:00  --------------------------------------------------------  IN: 1374 mL / OUT: 2700 mL / NET: -1326 mL      CAPILLARY BLOOD GLUCOSE      POCT Blood Glucose.: 130 mg/dL (05 Oct 2021 06:30)      PHYSICAL EXAM:  General: NAD   Neck: + Trach tube noted with site c/d/i. No SC crepitus.   Cards: S1/S2, no murmurs   Pulm: Coarse vent sounds b/l.   Abdomen: Soft, NTND.. (+) PEG tube noted, site c/d/i.   Extremities: No pedal edema. Ext warm well perfused.   Neuro: lethargic. Rouses and mumbles to loud verbal stimuli and sternal rub but does not engage with examiner or follow commands.   Skin: as per RN assessment.     Mode: AC/ CMV (Assist Control/ Continuous Mandatory Ventilation)  RR (machine): 18  TV (machine): 300  FiO2: 40  PEEP: 5  ITime: 0.6  MAP: 8  PIP: 17      HOSPITAL MEDICATIONS:  MEDICATIONS  (STANDING):  ALBUTerol    90 MICROgram(s) HFA Inhaler 2 Puff(s) Inhalation every 6 hours  artificial tears (preservative free) Ophthalmic Solution 1 Drop(s) Both EYES two times a day  budesonide  80 MICROgram(s)/formoterol 4.5 MICROgram(s) Inhaler 2 Puff(s) Inhalation two times a day  chlorhexidine 0.12% Liquid 15 milliLiter(s) Oral Mucosa every 12 hours  chlorhexidine 4% Liquid 1 Application(s) Topical <User Schedule>  dextrose 50% Injectable 25 Gram(s) IV Push once  donepezil 10 milliGRAM(s) Oral at bedtime  heparin   Injectable 5000 Unit(s) SubCutaneous every 12 hours  insulin lispro (ADMELOG) corrective regimen sliding scale   SubCutaneous every 6 hours  levothyroxine 150 MICROGram(s) Oral daily  melatonin 3 milliGRAM(s) Oral at bedtime  memantine 10 milliGRAM(s) Oral every 12 hours  metoprolol tartrate 37.5 milliGRAM(s) Oral two times a day  pantoprazole   Suspension 40 milliGRAM(s) Oral two times a day  polyethylene glycol 3350 17 Gram(s) Oral two times a day  senna 2 Tablet(s) Oral at bedtime  simvastatin 10 milliGRAM(s) Oral at bedtime  traZODone 100 milliGRAM(s) Oral every 12 hours    MEDICATIONS  (PRN):  bisacodyl Suppository 10 milliGRAM(s) Rectal daily PRN Constipation      LABS:                        10.7   10.14 )-----------( 373      ( 04 Oct 2021 06:29 )             34.6     10-04    145  |  105  |  16  ----------------------------<  154<H>  3.1<L>   |  31  |  0.31<L>    Ca    10.1      04 Oct 2021 06:29  Phos  3.0     10-04  Mg     1.90     10-04             CHIEF COMPLAINT: Patient is a 81y old  Female who presents with a chief complaint of Hematemesis (04 Oct 2021 17:29)    INTERVAL EVENTS: no acute events overnight.     ROS: Unable to assess given nonverbal, lethargic.     OBJECTIVE:  ICU Vital Signs Last 24 Hrs  T(C): 36.1 (05 Oct 2021 05:36), Max: 36.4 (04 Oct 2021 17:18)  T(F): 97 (05 Oct 2021 05:36), Max: 97.5 (04 Oct 2021 17:18)  HR: 85 (05 Oct 2021 06:43) (85 - 116)  BP: 116/76 (05 Oct 2021 05:36) (111/66 - 146/94)  BP(mean): --  ABP: --  ABP(mean): --  RR: 22 (05 Oct 2021 05:36) (20 - 24)  SpO2: 100% (05 Oct 2021 06:43) (98% - 100%)    Mode: AC/ CMV (Assist Control/ Continuous Mandatory Ventilation), RR (machine): 18, TV (machine): 300, FiO2: 40, PEEP: 5, ITime: 0.6, MAP: 8, PIP: 17    10-04 @ 07:01  -  10-05 @ 07:00  --------------------------------------------------------  IN: 1374 mL / OUT: 2700 mL / NET: -1326 mL      CAPILLARY BLOOD GLUCOSE      POCT Blood Glucose.: 130 mg/dL (05 Oct 2021 06:30)      PHYSICAL EXAM:  General: NAD   Neck: + Trach tube noted with site c/d/i. No SC crepitus.   Cards: S1/S2, no murmurs   Pulm: Coarse vent sounds b/l.   Abdomen: Soft, NTND.. (+) PEG tube noted, site c/d/i.   Extremities: No pedal edema. Ext warm well perfused.   Neuro: lethargic. Rouses and mumbles to loud verbal stimuli and sternal rub but does not engage with examiner or follow commands.   Skin: as per RN assessment.     Mode: AC/ CMV (Assist Control/ Continuous Mandatory Ventilation)  RR (machine): 18  TV (machine): 300  FiO2: 40  PEEP: 5  ITime: 0.6  MAP: 8  PIP: 17      HOSPITAL MEDICATIONS:  MEDICATIONS  (STANDING):  ALBUTerol    90 MICROgram(s) HFA Inhaler 2 Puff(s) Inhalation every 6 hours  artificial tears (preservative free) Ophthalmic Solution 1 Drop(s) Both EYES two times a day  budesonide  80 MICROgram(s)/formoterol 4.5 MICROgram(s) Inhaler 2 Puff(s) Inhalation two times a day  chlorhexidine 0.12% Liquid 15 milliLiter(s) Oral Mucosa every 12 hours  chlorhexidine 4% Liquid 1 Application(s) Topical <User Schedule>  dextrose 50% Injectable 25 Gram(s) IV Push once  donepezil 10 milliGRAM(s) Oral at bedtime  heparin   Injectable 5000 Unit(s) SubCutaneous every 12 hours  insulin lispro (ADMELOG) corrective regimen sliding scale   SubCutaneous every 6 hours  levothyroxine 150 MICROGram(s) Oral daily  melatonin 3 milliGRAM(s) Oral at bedtime  memantine 10 milliGRAM(s) Oral every 12 hours  metoprolol tartrate 37.5 milliGRAM(s) Oral two times a day  pantoprazole   Suspension 40 milliGRAM(s) Oral two times a day  polyethylene glycol 3350 17 Gram(s) Oral two times a day  senna 2 Tablet(s) Oral at bedtime  simvastatin 10 milliGRAM(s) Oral at bedtime  traZODone 100 milliGRAM(s) Oral every 12 hours    MEDICATIONS  (PRN):  bisacodyl Suppository 10 milliGRAM(s) Rectal daily PRN Constipation      LABS:                        10.7   10.14 )-----------( 373      ( 04 Oct 2021 06:29 )             34.6     10-04    145  |  105  |  16  ----------------------------<  154<H>  3.1<L>   |  31  |  0.31<L>    Ca    10.1      04 Oct 2021 06:29  Phos  3.0     10-04  Mg     1.90     10-04

## 2021-10-05 NOTE — PROGRESS NOTE ADULT - REASON FOR ADMISSION
Esophageal mass, upper GI bleed
Hematemesis

## 2021-10-05 NOTE — PROGRESS NOTE ADULT - SUBJECTIVE AND OBJECTIVE BOX
Infectious Diseases progress note:    Subjective: NAD, afebrile.  Repeat bcx ngtd.      ROS:  Unable to assess due to pt clinical condition    Allergies    Sulfur Colliod (Rash)  Tylenol (Swelling)    Intolerances        ANTIBIOTICS/RELEVANT:  antimicrobials    immunologic:    OTHER:  ALBUTerol    90 MICROgram(s) HFA Inhaler 2 Puff(s) Inhalation every 6 hours  artificial tears (preservative free) Ophthalmic Solution 1 Drop(s) Both EYES two times a day  bisacodyl Suppository 10 milliGRAM(s) Rectal daily PRN  budesonide  80 MICROgram(s)/formoterol 4.5 MICROgram(s) Inhaler 2 Puff(s) Inhalation two times a day  chlorhexidine 0.12% Liquid 15 milliLiter(s) Oral Mucosa every 12 hours  chlorhexidine 4% Liquid 1 Application(s) Topical <User Schedule>  dextrose 50% Injectable 25 Gram(s) IV Push once  donepezil 10 milliGRAM(s) Oral at bedtime  heparin   Injectable 5000 Unit(s) SubCutaneous every 12 hours  insulin lispro (ADMELOG) corrective regimen sliding scale   SubCutaneous every 6 hours  levothyroxine 150 MICROGram(s) Oral daily  melatonin 3 milliGRAM(s) Oral at bedtime  memantine 10 milliGRAM(s) Oral every 12 hours  metoprolol tartrate 37.5 milliGRAM(s) Oral two times a day  pantoprazole   Suspension 40 milliGRAM(s) Oral two times a day  polyethylene glycol 3350 17 Gram(s) Oral two times a day  senna 2 Tablet(s) Oral at bedtime  simvastatin 10 milliGRAM(s) Oral at bedtime  traZODone 100 milliGRAM(s) Oral every 12 hours      Objective:  Vital Signs Last 24 Hrs  T(C): 36.9 (05 Oct 2021 14:01), Max: 36.9 (05 Oct 2021 14:01)  T(F): 98.4 (05 Oct 2021 14:01), Max: 98.4 (05 Oct 2021 14:01)  HR: 96 (05 Oct 2021 14:01) (79 - 116)  BP: 121/81 (05 Oct 2021 14:01) (111/66 - 131/85)  BP(mean): --  RR: 20 (05 Oct 2021 14:01) (20 - 24)  SpO2: 100% (05 Oct 2021 14:01) (97% - 100%)    PHYSICAL EXAM:  Constitutional:NAD  Eyes:LISA, EOMI  Ear/Nose/Throat: no thrush, mucositis.  Moist mucous membranes	  Neck: trachestomy, on ventilator  Respiratory: CTA shaila  Cardiovascular: S1S2 RRR, no murmurs  Gastrointestinal:soft, nontender,  nondistended (+) BS, peg  Extremities:no e/e/c  Skin:  no rashes, open wounds or ulcerations        LABS:                        10.7   10.14 )-----------( 373      ( 04 Oct 2021 06:29 )             34.6     10-04    145  |  105  |  16  ----------------------------<  154<H>  3.1<L>   |  31  |  0.31<L>    Ca    10.1      04 Oct 2021 06:29  Phos  3.0     10-04  Mg     1.90     10-04            Procalcitonin, Serum: 0.07 (09-27 @ 03:30)                    MICROBIOLOGY:    Culture - Sputum . (10.03.21 @ 11:36)   Gram Stain:   Rare polymorphonuclear leukocytes per low power field   No squamous epithelial cells per low power field   Rare Gram Positive Rods per oil power field   Specimen Source: .Sputum Sputum   Culture Results:   Normal Respiratory Consuelo present       RADIOLOGY & ADDITIONAL STUDIES:    < from: CT Head No Cont (10.01.21 @ 22:50) >  FINDINGS:  There is moderate diffuse parenchymal volume loss. There are areas of low attenuation in the periventricular white matter likely related to mild chronic microvascular ischemic changes.    There is no acute intracranial hemorrhage, parenchymal mass, mass effect or midline shift. There is no acute territorial infarct. There is no hydrocephalus.    The cranium is intact. The visualized paranasal sinuses are well-aerated. Partial opacification mastoid air cells    IMPRESSION:  No acute intracranial hemorrhage or acute territorial infarct.  If symptoms persist, follow-up MRI exam recommended.    < end of copied text >        < from: Xray Chest 1 View- PORTABLE-Routine (Xray Chest 1 View- PORTABLE-Routine in AM.) (10.01.21 @ 07:55) >  INTERPRETATION:  Clinical Information: Dyspnea    Technique: AP chest x-ray(s).    Comparison: 09/29/2021    Findings/  Impression: Status post tracheostomy. Heart sizeunremarkable. No lung consolidations.    < end of copied text >

## 2021-10-05 NOTE — PROGRESS NOTE ADULT - ASSESSMENT
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 She was intubated for airway protection on 9/9. EGD @ OSH with large blood filled esophageal diverticula and possible esophageal tear. Transferred to [facility] 9/10 and s/p EGD 9/14 with no esophageal tear or bleed, but (+) esophageal diverticula with clot. Course complicated by fevers likely from aspiration, septic shock, biventricular heart failure, and poor vent weaning requiring Tracheostomy and PEG placement on 9/24. Transferred to [facility] 9/30/21.    # NEUROLOGY  - Baseline Dementia with mentation AOx1-2 as per Son. Patient reportedly able to eat and ambulate with assistance at ASL per Son.   - Continue on Buspar, Cymbalta, Trazadone, Aricept and Namenda as per home doses.   - Now with anxiety and agitation and continued on Seroquel 25mg BID, Ativan 0.5mg IV TID and Ativan vs Dilaudid PRN doses.   - Monitor mentation.   - Obtunded- stop Buspar and Seroquel and reassess mental status  - CTH ordered 10/1-No acute intracranial hemorrhage or acute territorial infarct.   - Opens eyes but not responding.      # CARDIOVASCULAR  - Baseline HF (unknown EF), Moderate MR and Severe TR and AFIB on Eliquis   - Course complicated with biventricular HF requiring dobutamine. Now weaned off.   - ECHO 9/15 noted with EF 20%, mod MR, severely dilated LA, severe LVSD, severe FLAVIO, decreased RVSF, mod-severe TR, mild pHTN.   - RPT ECHO 9/27 with improved EF to 60% and LVSD (now normal). LA/ RA dilation, RVSD, and severe TR remains.   - Continue holding Eliquis for now given Esophageal Diverticular bleed.   - On no rate control medications at home and remains with rate controlled AFIB.   - Monitor HR/ BP.   - Started Metoprolol 25mg BID on 10/1- IVP Lopressor discontinued  - 10/2 tachy 120 -130 afib on EKG Lopressor increased to 37.5 bid and stable HR improved     # RESPIRATORY  - COPD/ Emphysema at baseline and presented to [facility] with hematemesis. Intubated 9/9 for airway protection and treatment for aspiration. Poor vent weaning and s/p Trach 9/24.   - Continue on AC Vent 18/350/5/40 and PS 15/5 vs 10/5 as tolerated.   - Continue on Proventil and Symbicort with Chest PT Q6H   - Tracheostomy 4 and 10 o clock sutures removed and pending remain suture removal by CTSx on 10/3 (10-14 days from tracheostomy placement).   - Rest of Trach sutures removed 10/4 by CTS  - Tracheostomy care QD   - Suction PRN   - C/w PS trials    # GI  - FTT requiring TPN during hospitalization. Now s/p PEG 9/24.   - Continue on TF with Jevity @ 52cc/hr goal rate.  - Continue on Miralax, Senna and PPI.   - Miralax BID and suppository given today--> monitor for BMs     # RENAL  - Mild dehydration noted with diuresis and third spacing.   - Continue on free H20 250cc Q4H and monitor fluid status.   - Monitor renal function and UOP.     # INFECTIOUS DISEASE  - Aspiration noted and s/p Zosyn (9/10-9/15) with continued fever spikes and broadened  - to Meropenem (9/15 - 9/22), Vancomycin (9/15 - 9/22) and Diflucan (9/17 - 9/22).   - SCx, BCx, UA and COVID PCR remained negative.   - Pleural Fluid 9/21 negative     # HEME  - R Midline associated DVT noted. Midline removed and will monitor.   - Holding home Eliquis for AFIB given diverticular bleed.   - On heparin 5000u tid    # ENDOCRINE  - Hypothyroidism and continued on home Synthroid 150mcg QD.   - FU TSH in am was WNL        # LINES/ TUBES  - Tracheostomy and PEG 9/24     # ETHICS/ GOC/ DISPO   - Son wishes for mom to be FULL CODE.   - DISPO - vent facility. Covid swab sent 10/4    # DVT PPX with HSQ.  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 She was intubated for airway protection on 9/9. EGD @ OSH with large blood filled esophageal diverticula and possible esophageal tear. Transferred to [facility] 9/10 and s/p EGD 9/14 with no esophageal tear or bleed, but (+) esophageal diverticula with clot. Course complicated by fevers likely from aspiration, septic shock, biventricular heart failure, and poor vent weaning requiring Tracheostomy and PEG placement on 9/24. Transferred to [facility] 9/30/21.    # NEUROLOGY  - Baseline Dementia with mentation AOx1-2 as per Son. Patient reportedly able to eat and ambulate with assistance at ASL per Son.   - Continue on Buspar, Cymbalta, Trazadone, Aricept and Namenda as per home doses.   - Now with anxiety and agitation and continued on Seroquel 25mg BID, Ativan 0.5mg IV TID and Ativan vs Dilaudid PRN doses.   - Monitor mentation.   - Obtunded- stop Buspar and Seroquel and reassess mental status  - CTH ordered 10/1-No acute intracranial hemorrhage or acute territorial infarct.   - Opens eyes but not responding.      # CARDIOVASCULAR  - Baseline HF (unknown EF), Moderate MR and Severe TR and AFIB on Eliquis   - Course complicated with biventricular HF requiring dobutamine. Now weaned off.   - ECHO 9/15 noted with EF 20%, mod MR, severely dilated LA, severe LVSD, severe FLAVIO, decreased RVSF, mod-severe TR, mild pHTN.   - RPT ECHO 9/27 with improved EF to 60% and LVSD (now normal). LA/ RA dilation, RVSD, and severe TR remains.   - Continue holding Eliquis for now given Esophageal Diverticular bleed.   - On no rate control medications at home and remains with rate controlled AFIB.   - Monitor HR/ BP.   - Started Metoprolol 25mg BID on 10/1- IVP Lopressor discontinued  - 10/2 tachy 120 -130 afib on EKG Lopressor increased to 37.5 bid and stable HR improved     # RESPIRATORY  - COPD/ Emphysema at baseline and presented to [facility] with hematemesis. Intubated 9/9 for airway protection and treatment for aspiration. Poor vent weaning and s/p Trach 9/24.   - Continue on AC Vent 18/350/5/40 and PS 15/5 vs 10/5 as tolerated.   - Continue on Proventil and Symbicort with Chest PT Q6H   - Tracheostomy 4 and 10 o clock sutures removed and pending remain suture removal by CTSx on 10/3 (10-14 days from tracheostomy placement).   - Rest of Trach sutures removed 10/4 by CTS  - Tracheostomy care QD   - Suction PRN   - C/w PS trials    # GI  - FTT requiring TPN during hospitalization. Now s/p PEG 9/24.   - Continue on TF with Jevity @ 52cc/hr goal rate.  - Continue on Miralax, Senna and PPI.   - Miralax BID and suppository given today--> monitor for BMs     # RENAL  - Mild dehydration noted with diuresis and third spacing.   - Continue on free H20 250cc Q4H and monitor fluid status.   - Monitor renal function and UOP.     # INFECTIOUS DISEASE  - Aspiration noted and s/p Zosyn (9/10-9/15) with continued fever spikes and broadened  - to Meropenem (9/15 - 9/22), Vancomycin (9/15 - 9/22) and Diflucan (9/17 - 9/22).   - SCx, BCx, UA and COVID PCR remained negative.   - Pleural Fluid 9/21 negative     # HEME  - R Midline associated DVT noted. Midline removed and will monitor.   - Holding home Eliquis for AFIB given diverticular bleed.   - On heparin 5000u tid    # ENDOCRINE  - Hypothyroidism and continued on home Synthroid 150mcg QD.   - FU TSH in am was WNL        # LINES/ TUBES  - Tracheostomy and PEG 9/24     # ETHICS/ GOC/ DISPO   - Son wishes for mom to be FULL CODE. Spoke with son Favio today 10/5/21.   - DISPO - vent facility. Covid swab sent 10/4    # DVT PPX with HSQ.  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 She was intubated for airway protection on 9/9. EGD @ OSH with large blood filled esophageal diverticula and possible esophageal tear. Transferred to [facility] 9/10 and s/p EGD 9/14 with no esophageal tear or bleed, but (+) esophageal diverticula with clot. Course complicated by fevers likely from aspiration, septic shock, biventricular heart failure, and poor vent weaning requiring Tracheostomy and PEG placement on 9/24. Transferred to [facility] 9/30/21.    # NEUROLOGY  - Baseline Dementia with mentation AOx1-2 as per Son. Patient reportedly able to eat and ambulate with assistance at ASL per Son.   - Continue on Buspar, Cymbalta, Trazadone, Aricept and Namenda as per home doses.   - Now with anxiety and agitation and continued on Seroquel 25mg BID, Ativan 0.5mg IV TID and Ativan vs Dilaudid PRN doses.   - Monitor mentation.   - Mental status improving off Buspar, Seroquel and Ativan  - CTH 10/1-No acute intracranial hemorrhage or acute territorial infarct.        # CARDIOVASCULAR  - Baseline HF (unknown EF), Moderate MR and Severe TR and AFIB on Eliquis   - Course complicated with biventricular HF requiring dobutamine. Now weaned off.   - ECHO 9/15 noted with EF 20%, mod MR, severely dilated LA, severe LVSD, severe FLAVIO, decreased RVSF, mod-severe TR, mild pHTN.   - RPT ECHO 9/27 with improved EF to 60% and LVSD (now normal). LA/ RA dilation, RVSD, and severe TR remains.   - Continue holding Eliquis for now given Esophageal Diverticular bleed.   - On no rate control medications at home and remains with rate controlled AFIB.   - IVP Lopressor discontinued & started PO Lopressor 37.5 bid and HR improved     # RESPIRATORY  - COPD/ Emphysema at baseline and presented to [facility] with hematemesis. Intubated 9/9 for airway protection and treatment for aspiration. Poor vent weaning and s/p Trach 9/24.   - Continue on AC Vent 18/350/5/40 and PS 15/5 vs 10/5 as tolerated.   - Continue on Proventil and Symbicort with Chest PT Q6H   - Tracheostomy 4 and 10 o clock sutures removed and pending remain suture removal by CTSx on 10/3 (10-14 days from tracheostomy placement).   - Rest of Trach sutures removed 10/4 by CTS  - Tracheostomy care QD   - Suction PRN   - C/w PS trials    # GI  - FTT requiring TPN during hospitalization. Now s/p PEG 9/24.   - Continue on TF with Jevity @ 52cc/hr goal rate.  - Continue on Miralax, Senna and PPI.   - Miralax BID and suppository given today--> monitor for BMs     # RENAL  - Mild dehydration noted with diuresis and third spacing.   - Continue on free H20 250cc Q4H and monitor fluid status.   - Monitor renal function and UOP.     # INFECTIOUS DISEASE  - Aspiration noted and s/p Zosyn (9/10-9/15) with continued fever spikes and broadened  - to Meropenem (9/15 - 9/22), Vancomycin (9/15 - 9/22) and Diflucan (9/17 - 9/22).   - SCx, BCx, UA and COVID PCR remained negative.   - Pleural Fluid 9/21 negative     # HEME  - R Midline associated DVT noted. Midline removed and will monitor.   - Holding home Eliquis for AFIB given diverticular bleed.   - On heparin 5000u tid    # ENDOCRINE  - Hypothyroidism and continued on home Synthroid 150mcg QD.   - TSH in am was WNL        # LINES/ TUBES  - Tracheostomy and PEG 9/24     # ETHICS/ GOC/ DISPO   - Son wishes for mom to be FULL CODE. Spoke with son Favio today 10/5/21.   - DISPO - DC today to vent facility. Covid swab 10/4 negative.     # DVT PPX with HSQ.

## 2021-10-05 NOTE — PROGRESS NOTE ADULT - ASSESSMENT
81 y. o. female BIBA to [facility] from nursing facility c/o hematemesis.  According to pt it was the first episode and happened while she was eating dinner. Followed by multiple episodes of N/V with bright blood and epigastric cramps.  She was intubated for airway protection and underwent EGD that revealed large diverticula at 28 cm filled with blood and 5 mm tear at distal esophagus, a hiatal hernia and large amount of blood in the fundus.  Diverticula was injected with Epinephrine.  Patient was transferred to [facility] for further management. (10 Sep 2021 15:04)    Pt followed by GI, s/p repeat EGD on 9/14 -  showing significant clot in large esophageal diverticula -- without obvious source of etiology of bleeding (ie no vessel) + no esophageal mass seen.  Pt on pressors, thought to be unlikely hemorrhagic given stable Hb.    9/15: WBC 10.8, sputum cx (9/10) with nl resp tawana. CT c/a/p shows a 7.2 cm masslike structure distending the mid to distal esophagus, suspicious for malignancy.  Bilateral tree-in-bud nodular opacities, predominantly in the left lower lobe, new/increased from 9/9/2021, raising concern for infection.  Left lower lobe consolidative opacity, possible combination of atelectasis and pneumonia.  (+) hypodensities in the liver suspicion for malignancy, and stercoral colitis.    Pt with fever, on pressors.  Pt is NPO on TPN.   Pt on empiric abx, ID consulted for further abx managment.     Fever/sepsis:    - pt with fever, on pressor support.  Abx broadened to vanco and meropenem.  Fluconazole added on 9/17 due to continued fevers.  - S/p central line change on 9/17.   - CT c/a/p noted, increased LLL consolidation, possible aspiration.  f/u repeat sputum cx - nl resp tawana    - Check bcx x 2 - NGTD.  Sputum cx no resp tawana  - f/u WBC and fever curve.  Improving, abx course completed after 7 days, d/c'd on 9/23.  Cont to monitor pt off abx.     * Pt seen by Palliative care service for GOC discussion - pt remains full code.  s/p trach/peg.      * Tmax >101 on 9/27.  Recommend repeat bcx x 2 - ngtd at 24 hrs on 9/28.  Repeat cxr with small b/l layering pl effusions,  otherwise clear lungs noted.  s/p removal of pigtail cath.  Pt remains on TPN, on mech ventilation.  Currently has completed course of abx.  Cont to monitor off for now.   Pt transferred to [facility].       * Tmax of 100.4 on 10/2.  Repeat bcx x 2 and sputum cx sent - ngtd.   No acute ID issues at this time.  Pt planned for d/c to [facility] today.  d/w Pulmonary PA.       Karen Casillas  [phone]

## 2021-10-05 NOTE — DISCHARGE NOTE NURSING/CASE MANAGEMENT/SOCIAL WORK - PATIENT PORTAL LINK FT
You can access the FollowMyHealth Patient Portal offered by [facility] by registering at the following website: [email or web address]. By joining [employer]’s FollowMyHealth portal, you will also be able to view your health information using other applications (apps) compatible with our system.

## 2021-10-05 NOTE — PROGRESS NOTE ADULT - PROVIDER SPECIALTY LIST ADULT
Anesthesia
Cardiology
Critical Care
Infectious Disease
Nutrition Support
Pulmonology
Critical Care
Infectious Disease
Nutrition Support
Nutrition Support
Pulmonology
Pulmonology
Cardiology
Critical Care
Infectious Disease
Nutrition Support
Pulmonology
Cardiology
Critical Care
Gastroenterology
Gastroenterology
Infectious Disease
Nutrition Support
Pulmonology

## 2021-10-07 LAB
CULTURE RESULTS: SIGNIFICANT CHANGE UP
CULTURE RESULTS: SIGNIFICANT CHANGE UP
SPECIMEN SOURCE: SIGNIFICANT CHANGE UP
SPECIMEN SOURCE: SIGNIFICANT CHANGE UP

## 2022-10-20 ENCOUNTER — INPATIENT (INPATIENT)
Facility: HOSPITAL | Age: 82
LOS: 11 days | Discharge: NURSING, REHAB OR LONG-TERM CARE | DRG: 189 | End: 2022-11-01
Attending: INTERNAL MEDICINE | Admitting: INTERNAL MEDICINE
Payer: MEDICARE

## 2022-10-20 VITALS
DIASTOLIC BLOOD PRESSURE: 63 MMHG | RESPIRATION RATE: 20 BRPM | HEART RATE: 82 BPM | WEIGHT: 115.08 LBS | HEIGHT: 61 IN | OXYGEN SATURATION: 97 % | SYSTOLIC BLOOD PRESSURE: 106 MMHG | TEMPERATURE: 98 F

## 2022-10-20 DIAGNOSIS — Z95.828 PRESENCE OF OTHER VASCULAR IMPLANTS AND GRAFTS: Chronic | ICD-10-CM

## 2022-10-20 DIAGNOSIS — F39 UNSPECIFIED MOOD [AFFECTIVE] DISORDER: ICD-10-CM

## 2022-10-20 DIAGNOSIS — E03.9 HYPOTHYROIDISM, UNSPECIFIED: ICD-10-CM

## 2022-10-20 DIAGNOSIS — K21.9 GASTRO-ESOPHAGEAL REFLUX DISEASE WITHOUT ESOPHAGITIS: ICD-10-CM

## 2022-10-20 DIAGNOSIS — R06.02 SHORTNESS OF BREATH: ICD-10-CM

## 2022-10-20 DIAGNOSIS — I50.9 HEART FAILURE, UNSPECIFIED: ICD-10-CM

## 2022-10-20 DIAGNOSIS — I10 ESSENTIAL (PRIMARY) HYPERTENSION: ICD-10-CM

## 2022-10-20 DIAGNOSIS — K59.00 CONSTIPATION, UNSPECIFIED: ICD-10-CM

## 2022-10-20 DIAGNOSIS — F03.90 UNSPECIFIED DEMENTIA, UNSPECIFIED SEVERITY, WITHOUT BEHAVIORAL DISTURBANCE, PSYCHOTIC DISTURBANCE, MOOD DISTURBANCE, AND ANXIETY: ICD-10-CM

## 2022-10-20 DIAGNOSIS — Z29.9 ENCOUNTER FOR PROPHYLACTIC MEASURES, UNSPECIFIED: ICD-10-CM

## 2022-10-20 DIAGNOSIS — J96.01 ACUTE RESPIRATORY FAILURE WITH HYPOXIA: ICD-10-CM

## 2022-10-20 DIAGNOSIS — J44.1 CHRONIC OBSTRUCTIVE PULMONARY DISEASE WITH (ACUTE) EXACERBATION: ICD-10-CM

## 2022-10-20 DIAGNOSIS — G47.00 INSOMNIA, UNSPECIFIED: ICD-10-CM

## 2022-10-20 LAB
ALBUMIN SERPL ELPH-MCNC: 3.7 G/DL — SIGNIFICANT CHANGE UP (ref 3.3–5)
ALP SERPL-CCNC: 94 U/L — SIGNIFICANT CHANGE UP (ref 40–120)
ALT FLD-CCNC: 15 U/L — SIGNIFICANT CHANGE UP (ref 12–78)
ANION GAP SERPL CALC-SCNC: 9 MMOL/L — SIGNIFICANT CHANGE UP (ref 5–17)
APTT BLD: 35.8 SEC — HIGH (ref 27.5–35.5)
AST SERPL-CCNC: 17 U/L — SIGNIFICANT CHANGE UP (ref 15–37)
BASOPHILS # BLD AUTO: 0.02 K/UL — SIGNIFICANT CHANGE UP (ref 0–0.2)
BASOPHILS NFR BLD AUTO: 0.5 % — SIGNIFICANT CHANGE UP (ref 0–2)
BILIRUB SERPL-MCNC: 0.4 MG/DL — SIGNIFICANT CHANGE UP (ref 0.2–1.2)
BUN SERPL-MCNC: 8 MG/DL — SIGNIFICANT CHANGE UP (ref 7–23)
CALCIUM SERPL-MCNC: 9.9 MG/DL — SIGNIFICANT CHANGE UP (ref 8.5–10.1)
CHLORIDE SERPL-SCNC: 101 MMOL/L — SIGNIFICANT CHANGE UP (ref 96–108)
CO2 SERPL-SCNC: 33 MMOL/L — HIGH (ref 22–31)
CREAT SERPL-MCNC: 0.7 MG/DL — SIGNIFICANT CHANGE UP (ref 0.5–1.3)
D DIMER BLD IA.RAPID-MCNC: 463 NG/ML DDU — HIGH
EGFR: 85 ML/MIN/1.73M2 — SIGNIFICANT CHANGE UP
EOSINOPHIL # BLD AUTO: 0.05 K/UL — SIGNIFICANT CHANGE UP (ref 0–0.5)
EOSINOPHIL NFR BLD AUTO: 1.1 % — SIGNIFICANT CHANGE UP (ref 0–6)
GLUCOSE SERPL-MCNC: 141 MG/DL — HIGH (ref 70–99)
HCT VFR BLD CALC: 45.6 % — HIGH (ref 34.5–45)
HGB BLD-MCNC: 14.3 G/DL — SIGNIFICANT CHANGE UP (ref 11.5–15.5)
IMM GRANULOCYTES NFR BLD AUTO: 0.5 % — SIGNIFICANT CHANGE UP (ref 0–0.9)
INR BLD: 1.14 RATIO — SIGNIFICANT CHANGE UP (ref 0.88–1.16)
LACTATE SERPL-SCNC: 3.5 MMOL/L — HIGH (ref 0.7–2)
LACTATE SERPL-SCNC: 3.6 MMOL/L — HIGH (ref 0.7–2)
LYMPHOCYTES # BLD AUTO: 0.99 K/UL — LOW (ref 1–3.3)
LYMPHOCYTES # BLD AUTO: 22.4 % — SIGNIFICANT CHANGE UP (ref 13–44)
MCHC RBC-ENTMCNC: 28.7 PG — SIGNIFICANT CHANGE UP (ref 27–34)
MCHC RBC-ENTMCNC: 31.4 GM/DL — LOW (ref 32–36)
MCV RBC AUTO: 91.4 FL — SIGNIFICANT CHANGE UP (ref 80–100)
MONOCYTES # BLD AUTO: 0.31 K/UL — SIGNIFICANT CHANGE UP (ref 0–0.9)
MONOCYTES NFR BLD AUTO: 7 % — SIGNIFICANT CHANGE UP (ref 2–14)
NEUTROPHILS # BLD AUTO: 3.03 K/UL — SIGNIFICANT CHANGE UP (ref 1.8–7.4)
NEUTROPHILS NFR BLD AUTO: 68.5 % — SIGNIFICANT CHANGE UP (ref 43–77)
NRBC # BLD: 0 /100 WBCS — SIGNIFICANT CHANGE UP (ref 0–0)
NT-PROBNP SERPL-SCNC: 2505 PG/ML — HIGH (ref 0–450)
PLATELET # BLD AUTO: 236 K/UL — SIGNIFICANT CHANGE UP (ref 150–400)
POTASSIUM SERPL-MCNC: 4 MMOL/L — SIGNIFICANT CHANGE UP (ref 3.5–5.3)
POTASSIUM SERPL-SCNC: 4 MMOL/L — SIGNIFICANT CHANGE UP (ref 3.5–5.3)
PROCALCITONIN SERPL-MCNC: 0.04 NG/ML — SIGNIFICANT CHANGE UP
PROT SERPL-MCNC: 8.2 G/DL — SIGNIFICANT CHANGE UP (ref 6–8.3)
PROTHROM AB SERPL-ACNC: 13.4 SEC — SIGNIFICANT CHANGE UP (ref 10.5–13.4)
RAPID RVP RESULT: SIGNIFICANT CHANGE UP
RBC # BLD: 4.99 M/UL — SIGNIFICANT CHANGE UP (ref 3.8–5.2)
RBC # FLD: 14.6 % — HIGH (ref 10.3–14.5)
SARS-COV-2 RNA SPEC QL NAA+PROBE: SIGNIFICANT CHANGE UP
SODIUM SERPL-SCNC: 143 MMOL/L — SIGNIFICANT CHANGE UP (ref 135–145)
WBC # BLD: 4.42 K/UL — SIGNIFICANT CHANGE UP (ref 3.8–10.5)
WBC # FLD AUTO: 4.42 K/UL — SIGNIFICANT CHANGE UP (ref 3.8–10.5)

## 2022-10-20 PROCEDURE — 99233 SBSQ HOSP IP/OBS HIGH 50: CPT

## 2022-10-20 PROCEDURE — 93010 ELECTROCARDIOGRAM REPORT: CPT

## 2022-10-20 PROCEDURE — 99285 EMERGENCY DEPT VISIT HI MDM: CPT

## 2022-10-20 PROCEDURE — 73560 X-RAY EXAM OF KNEE 1 OR 2: CPT | Mod: 26,LT

## 2022-10-20 PROCEDURE — 71045 X-RAY EXAM CHEST 1 VIEW: CPT | Mod: 26

## 2022-10-20 PROCEDURE — 74176 CT ABD & PELVIS W/O CONTRAST: CPT | Mod: 26

## 2022-10-20 PROCEDURE — 71250 CT THORAX DX C-: CPT | Mod: 26

## 2022-10-20 PROCEDURE — 93970 EXTREMITY STUDY: CPT | Mod: 26

## 2022-10-20 RX ORDER — SENNA PLUS 8.6 MG/1
2 TABLET ORAL AT BEDTIME
Refills: 0 | Status: DISCONTINUED | OUTPATIENT
Start: 2022-10-20 | End: 2022-10-21

## 2022-10-20 RX ORDER — SODIUM CHLORIDE 9 MG/ML
1000 INJECTION INTRAMUSCULAR; INTRAVENOUS; SUBCUTANEOUS
Refills: 0 | Status: DISCONTINUED | OUTPATIENT
Start: 2022-10-20 | End: 2022-10-20

## 2022-10-20 RX ORDER — PIPERACILLIN AND TAZOBACTAM 4; .5 G/20ML; G/20ML
3.38 INJECTION, POWDER, LYOPHILIZED, FOR SOLUTION INTRAVENOUS EVERY 8 HOURS
Refills: 0 | Status: DISCONTINUED | OUTPATIENT
Start: 2022-10-20 | End: 2022-10-20

## 2022-10-20 RX ORDER — PIPERACILLIN AND TAZOBACTAM 4; .5 G/20ML; G/20ML
3.38 INJECTION, POWDER, LYOPHILIZED, FOR SOLUTION INTRAVENOUS ONCE
Refills: 0 | Status: COMPLETED | OUTPATIENT
Start: 2022-10-20 | End: 2022-10-20

## 2022-10-20 RX ORDER — BACLOFEN 100 %
5 POWDER (GRAM) MISCELLANEOUS EVERY 12 HOURS
Refills: 0 | Status: DISCONTINUED | OUTPATIENT
Start: 2022-10-20 | End: 2022-11-01

## 2022-10-20 RX ORDER — IPRATROPIUM/ALBUTEROL SULFATE 18-103MCG
3 AEROSOL WITH ADAPTER (GRAM) INHALATION ONCE
Refills: 0 | Status: COMPLETED | OUTPATIENT
Start: 2022-10-20 | End: 2022-10-20

## 2022-10-20 RX ORDER — IBUPROFEN 200 MG
400 TABLET ORAL
Refills: 0 | Status: DISCONTINUED | OUTPATIENT
Start: 2022-10-20 | End: 2022-10-27

## 2022-10-20 RX ORDER — PANTOPRAZOLE SODIUM 20 MG/1
40 TABLET, DELAYED RELEASE ORAL
Refills: 0 | Status: DISCONTINUED | OUTPATIENT
Start: 2022-10-20 | End: 2022-10-22

## 2022-10-20 RX ORDER — MAGNESIUM HYDROXIDE 400 MG/1
30 TABLET, CHEWABLE ORAL DAILY
Refills: 0 | Status: DISCONTINUED | OUTPATIENT
Start: 2022-10-20 | End: 2022-10-21

## 2022-10-20 RX ORDER — SODIUM CHLORIDE 9 MG/ML
1000 INJECTION INTRAMUSCULAR; INTRAVENOUS; SUBCUTANEOUS ONCE
Refills: 0 | Status: COMPLETED | OUTPATIENT
Start: 2022-10-20 | End: 2022-10-20

## 2022-10-20 RX ORDER — BUDESONIDE, MICRONIZED 100 %
0.5 POWDER (GRAM) MISCELLANEOUS
Refills: 0 | Status: DISCONTINUED | OUTPATIENT
Start: 2022-10-20 | End: 2022-11-01

## 2022-10-20 RX ORDER — LANOLIN ALCOHOL/MO/W.PET/CERES
3 CREAM (GRAM) TOPICAL AT BEDTIME
Refills: 0 | Status: DISCONTINUED | OUTPATIENT
Start: 2022-10-20 | End: 2022-11-01

## 2022-10-20 RX ORDER — SODIUM CHLORIDE 9 MG/ML
1000 INJECTION, SOLUTION INTRAVENOUS
Refills: 0 | Status: DISCONTINUED | OUTPATIENT
Start: 2022-10-20 | End: 2022-10-21

## 2022-10-20 RX ORDER — DULOXETINE HYDROCHLORIDE 30 MG/1
60 CAPSULE, DELAYED RELEASE ORAL DAILY
Refills: 0 | Status: DISCONTINUED | OUTPATIENT
Start: 2022-10-20 | End: 2022-11-01

## 2022-10-20 RX ORDER — MEMANTINE HYDROCHLORIDE 10 MG/1
10 TABLET ORAL
Refills: 0 | Status: DISCONTINUED | OUTPATIENT
Start: 2022-10-20 | End: 2022-11-01

## 2022-10-20 RX ORDER — ENOXAPARIN SODIUM 100 MG/ML
40 INJECTION SUBCUTANEOUS EVERY 24 HOURS
Refills: 0 | Status: DISCONTINUED | OUTPATIENT
Start: 2022-10-20 | End: 2022-11-01

## 2022-10-20 RX ORDER — TRAMADOL HYDROCHLORIDE 50 MG/1
50 TABLET ORAL THREE TIMES A DAY
Refills: 0 | Status: DISCONTINUED | OUTPATIENT
Start: 2022-10-20 | End: 2022-10-20

## 2022-10-20 RX ORDER — LEVOTHYROXINE SODIUM 125 MCG
150 TABLET ORAL DAILY
Refills: 0 | Status: DISCONTINUED | OUTPATIENT
Start: 2022-10-20 | End: 2022-10-22

## 2022-10-20 RX ORDER — TRAZODONE HCL 50 MG
100 TABLET ORAL AT BEDTIME
Refills: 0 | Status: DISCONTINUED | OUTPATIENT
Start: 2022-10-20 | End: 2022-11-01

## 2022-10-20 RX ORDER — IPRATROPIUM/ALBUTEROL SULFATE 18-103MCG
3 AEROSOL WITH ADAPTER (GRAM) INHALATION EVERY 6 HOURS
Refills: 0 | Status: DISCONTINUED | OUTPATIENT
Start: 2022-10-20 | End: 2022-11-01

## 2022-10-20 RX ORDER — METOPROLOL TARTRATE 50 MG
37.5 TABLET ORAL
Refills: 0 | Status: DISCONTINUED | OUTPATIENT
Start: 2022-10-20 | End: 2022-10-22

## 2022-10-20 RX ORDER — ONDANSETRON 8 MG/1
4 TABLET, FILM COATED ORAL EVERY 8 HOURS
Refills: 0 | Status: DISCONTINUED | OUTPATIENT
Start: 2022-10-20 | End: 2022-11-01

## 2022-10-20 RX ORDER — SIMVASTATIN 20 MG/1
10 TABLET, FILM COATED ORAL AT BEDTIME
Refills: 0 | Status: DISCONTINUED | OUTPATIENT
Start: 2022-10-20 | End: 2022-11-01

## 2022-10-20 RX ORDER — DONEPEZIL HYDROCHLORIDE 10 MG/1
10 TABLET, FILM COATED ORAL AT BEDTIME
Refills: 0 | Status: DISCONTINUED | OUTPATIENT
Start: 2022-10-20 | End: 2022-11-01

## 2022-10-20 RX ADMIN — Medication 3 MILLILITER(S): at 21:36

## 2022-10-20 RX ADMIN — Medication 3 MILLILITER(S): at 12:02

## 2022-10-20 RX ADMIN — SODIUM CHLORIDE 1000 MILLILITER(S): 9 INJECTION INTRAMUSCULAR; INTRAVENOUS; SUBCUTANEOUS at 13:52

## 2022-10-20 RX ADMIN — Medication 0.5 MILLIGRAM(S): at 21:41

## 2022-10-20 RX ADMIN — Medication 125 MILLIGRAM(S): at 11:09

## 2022-10-20 RX ADMIN — Medication 3 MILLILITER(S): at 11:09

## 2022-10-20 RX ADMIN — Medication 100 MILLIGRAM(S): at 21:36

## 2022-10-20 RX ADMIN — SODIUM CHLORIDE 80 MILLILITER(S): 9 INJECTION, SOLUTION INTRAVENOUS at 22:30

## 2022-10-20 RX ADMIN — Medication 5 MILLIGRAM(S): at 20:21

## 2022-10-20 RX ADMIN — SODIUM CHLORIDE 1000 MILLILITER(S): 9 INJECTION INTRAMUSCULAR; INTRAVENOUS; SUBCUTANEOUS at 16:33

## 2022-10-20 RX ADMIN — PIPERACILLIN AND TAZOBACTAM 25 GRAM(S): 4; .5 INJECTION, POWDER, LYOPHILIZED, FOR SOLUTION INTRAVENOUS at 22:25

## 2022-10-20 RX ADMIN — ENOXAPARIN SODIUM 40 MILLIGRAM(S): 100 INJECTION SUBCUTANEOUS at 20:21

## 2022-10-20 RX ADMIN — Medication 600 MILLIGRAM(S): at 20:20

## 2022-10-20 RX ADMIN — SIMVASTATIN 10 MILLIGRAM(S): 20 TABLET, FILM COATED ORAL at 21:41

## 2022-10-20 RX ADMIN — SENNA PLUS 2 TABLET(S): 8.6 TABLET ORAL at 21:36

## 2022-10-20 RX ADMIN — PIPERACILLIN AND TAZOBACTAM 200 GRAM(S): 4; .5 INJECTION, POWDER, LYOPHILIZED, FOR SOLUTION INTRAVENOUS at 13:55

## 2022-10-20 RX ADMIN — MEMANTINE HYDROCHLORIDE 10 MILLIGRAM(S): 10 TABLET ORAL at 20:20

## 2022-10-20 RX ADMIN — DONEPEZIL HYDROCHLORIDE 10 MILLIGRAM(S): 10 TABLET, FILM COATED ORAL at 21:35

## 2022-10-20 NOTE — H&P ADULT - NSICDXPASTMEDICALHX_GEN_ALL_CORE_FT
PAST MEDICAL HISTORY:  CHF, chronic     Constipation     Dementia     GERD (gastroesophageal reflux disease)     History of COPD     HTN (hypertension)     Hypothyroidism     Insomnia     Mood disorder     Spasm of muscle

## 2022-10-20 NOTE — H&P ADULT - HISTORY OF PRESENT ILLNESS
Patient brought in by EMS from [facility] for shortness of breath cough wheezing for 2 days.  Patient denies fevers chills headache chest pain abdominal pain vomiting. Patient brought in by EMS from [facility] for shortness of breath cough wheezing for 2 days.  Patient denies fevers chills headache chest pain abdominal pain vomiting Admit for antibacterial managmnet ,intravenous steroids,nebulised bronchodilators and pulmonology evaluation,O2 supply,serial labs and chest xrays,obtain ECHO to evaluate LVEF and rule out chf component Admitted  to telemetry unit for monitoring , send 3 sets of cardiac enzymes to rule out acute coronary event, obtain ECHO to evaluate LVEF, cardiology consult  ,continue current management, O2 supply, anticoagulation plan as per cardiology consult Palliative care consult requested ,to discuss advance directives and complete MOLST

## 2022-10-20 NOTE — CONSULT NOTE ADULT - SUBJECTIVE AND OBJECTIVE BOX
Date/Time Patient Seen:  		  Referring MD:   Data Reviewed	       Patient is a 82y old  Female who presents with a chief complaint of SOB AND COUGH (20 Oct 2022 13:54)      Subjective/HPI   · Chief Complaint: The patient is a 82y Female complaining of shortness of breath.  · HPI Objective Statement: Patient brought in by EMS from [facility] for shortness of breath cough wheezing for 2 days.  Patient denies fevers chills headache chest pain abdominal pain vomiting.  PMD Luke    PAST MEDICAL & SURGICAL HISTORY:  History of COPD    Insomnia    Mood disorder    Dementia    Spasm of muscle    Constipation    Hypothyroidism    GERD (gastroesophageal reflux disease)    HTN (hypertension)    CHF, chronic          Medication list         MEDICATIONS  (STANDING):  albuterol/ipratropium for Nebulization 3 milliLiter(s) Nebulizer every 6 hours  baclofen 5 milliGRAM(s) Oral every 12 hours  buDESOnide    Inhalation Suspension 0.5 milliGRAM(s) Inhalation two times a day  donepezil 10 milliGRAM(s) Oral at bedtime  DULoxetine 60 milliGRAM(s) Oral daily  levothyroxine 150 MICROGram(s) Oral daily  memantine 10 milliGRAM(s) Oral two times a day  metoprolol tartrate 37.5 milliGRAM(s) Oral two times a day  pantoprazole    Tablet 40 milliGRAM(s) Oral before breakfast  senna 2 Tablet(s) Oral at bedtime  simvastatin 10 milliGRAM(s) Oral at bedtime  sodium chloride 0.9% Bolus 1000 milliLiter(s) IV Bolus once  traZODone 100 milliGRAM(s) Oral at bedtime    MEDICATIONS  (PRN):  aluminum hydroxide/magnesium hydroxide/simethicone Suspension 30 milliLiter(s) Oral every 4 hours PRN Dyspepsia  bisacodyl Suppository 10 milliGRAM(s) Rectal daily PRN Constipation  ibuprofen  Tablet. 400 milliGRAM(s) Oral four times a day PRN Temp greater or equal to 38C (100.4F), Mild Pain (1 - 3)  magnesium hydroxide Suspension 30 milliLiter(s) Oral daily PRN Constipation  melatonin 3 milliGRAM(s) Oral at bedtime PRN Insomnia  ondansetron Injectable 4 milliGRAM(s) IV Push every 8 hours PRN Nausea and/or Vomiting  traMADol 50 milliGRAM(s) Oral three times a day PRN Moderate Pain (4 - 6)         Vitals log        ICU Vital Signs Last 24 Hrs  T(C): 37 (20 Oct 2022 12:06), Max: 37 (20 Oct 2022 12:06)  T(F): 98.6 (20 Oct 2022 12:06), Max: 98.6 (20 Oct 2022 12:06)  HR: 82 (20 Oct 2022 12:06) (82 - 82)  BP: 107/64 (20 Oct 2022 12:06) (106/63 - 107/64)  BP(mean): --  ABP: --  ABP(mean): --  RR: 21 (20 Oct 2022 12:06) (20 - 21)  SpO2: 96% (20 Oct 2022 12:06) (96% - 97%)    O2 Parameters below as of 20 Oct 2022 12:06  Patient On (Oxygen Delivery Method): nasal cannula,nebulizre                  Input and Output:  I&O's Detail      Lab Data                        14.3   4.42  )-----------( 236      ( 20 Oct 2022 10:30 )             45.6     10-20    143  |  101  |  8   ----------------------------<  141<H>  4.0   |  33<H>  |  0.70    Ca    9.9      20 Oct 2022 10:30    TPro  8.2  /  Alb  3.7  /  TBili  0.4  /  DBili  x   /  AST  17  /  ALT  15  /  AlkPhos  94  10-20            Review of Systems	  weakness  sob      Objective     Physical Examination        Pertinent Lab findings & Imaging      Gela:  NO   Adequate UO     I&O's Detail           Discussed with:     Cultures:	        Radiology                             Date/Time Patient Seen:  		  Referring MD:   Data Reviewed	       Patient is a 82y old  Female who presents with a chief complaint of SOB AND COUGH (20 Oct 2022 13:54)      Subjective/HPI  vs noted  labs reviewed  molst reviewed     · Chief Complaint: The patient is a 82y Female complaining of shortness of breath.  · HPI Objective Statement: Patient brought in by EMS from [facility] for shortness of breath cough wheezing for 2 days.  Patient denies fevers chills headache chest pain abdominal pain vomiting.  PMD Luke    PAST MEDICAL & SURGICAL HISTORY:  History of COPD    Insomnia    Mood disorder    Dementia    Spasm of muscle    Constipation    Hypothyroidism    GERD (gastroesophageal reflux disease)    HTN (hypertension)    CHF, chronic          Medication list         MEDICATIONS  (STANDING):  albuterol/ipratropium for Nebulization 3 milliLiter(s) Nebulizer every 6 hours  baclofen 5 milliGRAM(s) Oral every 12 hours  buDESOnide    Inhalation Suspension 0.5 milliGRAM(s) Inhalation two times a day  donepezil 10 milliGRAM(s) Oral at bedtime  DULoxetine 60 milliGRAM(s) Oral daily  levothyroxine 150 MICROGram(s) Oral daily  memantine 10 milliGRAM(s) Oral two times a day  metoprolol tartrate 37.5 milliGRAM(s) Oral two times a day  pantoprazole    Tablet 40 milliGRAM(s) Oral before breakfast  senna 2 Tablet(s) Oral at bedtime  simvastatin 10 milliGRAM(s) Oral at bedtime  sodium chloride 0.9% Bolus 1000 milliLiter(s) IV Bolus once  traZODone 100 milliGRAM(s) Oral at bedtime    MEDICATIONS  (PRN):  aluminum hydroxide/magnesium hydroxide/simethicone Suspension 30 milliLiter(s) Oral every 4 hours PRN Dyspepsia  bisacodyl Suppository 10 milliGRAM(s) Rectal daily PRN Constipation  ibuprofen  Tablet. 400 milliGRAM(s) Oral four times a day PRN Temp greater or equal to 38C (100.4F), Mild Pain (1 - 3)  magnesium hydroxide Suspension 30 milliLiter(s) Oral daily PRN Constipation  melatonin 3 milliGRAM(s) Oral at bedtime PRN Insomnia  ondansetron Injectable 4 milliGRAM(s) IV Push every 8 hours PRN Nausea and/or Vomiting  traMADol 50 milliGRAM(s) Oral three times a day PRN Moderate Pain (4 - 6)         Vitals log        ICU Vital Signs Last 24 Hrs  T(C): 37 (20 Oct 2022 12:06), Max: 37 (20 Oct 2022 12:06)  T(F): 98.6 (20 Oct 2022 12:06), Max: 98.6 (20 Oct 2022 12:06)  HR: 82 (20 Oct 2022 12:06) (82 - 82)  BP: 107/64 (20 Oct 2022 12:06) (106/63 - 107/64)  BP(mean): --  ABP: --  ABP(mean): --  RR: 21 (20 Oct 2022 12:06) (20 - 21)  SpO2: 96% (20 Oct 2022 12:06) (96% - 97%)    O2 Parameters below as of 20 Oct 2022 12:06  Patient On (Oxygen Delivery Method): nasal cannula,nebulizre                  Input and Output:  I&O's Detail      Lab Data                        14.3   4.42  )-----------( 236      ( 20 Oct 2022 10:30 )             45.6     10-20    143  |  101  |  8   ----------------------------<  141<H>  4.0   |  33<H>  |  0.70    Ca    9.9      20 Oct 2022 10:30    TPro  8.2  /  Alb  3.7  /  TBili  0.4  /  DBili  x   /  AST  17  /  ALT  15  /  AlkPhos  94  10-20            Review of Systems	  weakness  sob      Objective     Physical Examination    heart s1s2  lung dec BS  head nc      Pertinent Lab findings & Imaging      Gela:  NO   Adequate UO     I&O's Detail           Discussed with:     Cultures:	        Radiology

## 2022-10-20 NOTE — H&P ADULT - TIME BILLING
75minutes spent on this visit, 50% visit time spent in care co-ordination with other attendings and counselling patient ,writing admission orders ( see complete and current orders and order section) ,requesting necessary consults ,informing family about status & plan of care .I have discussed care plan with [facility] wellness/admitting /nursing   department ,outpatient PCP , hospital consultants , ER physician & med staff .

## 2022-10-20 NOTE — ED PROVIDER NOTE - LAB INTERPRETATION
Respiratory Viral Panel with COVID-19 by MANA (10.20.22 @ 10:47)   Rapid RVP Result: [facility]   SARS-CoV-2: [facility]: This Respiratory Panel uses polymerase chain reaction (PCR) to detect for   adenovirus; coronavirus (HKU1, NL63, 229E, OC43); human metapneumovirus   (hMPV); human enterovirus/rhinovirus (Entero/RV); influenza A; influenza   A/H1; influenza A/H3; influenza A/H1-2009; influenza B; parainfluenza   viruses 1, 2, 3, 4; respiratory syncytial virus; Mycoplasma pneumoniae;   Chlamydophila pneumoniae; and SARS-CoV-2.

## 2022-10-20 NOTE — CONSULT NOTE ADULT - SUBJECTIVE AND OBJECTIVE BOX
ROXIE LAZAR    PLV APER 08    Allergies    sulfa drugs (Unknown)  Tylenol (Unknown)    Intolerances        PAST MEDICAL & SURGICAL HISTORY:  History of COPD      Insomnia      Mood disorder      Dementia      Spasm of muscle      Constipation      Hypothyroidism      GERD (gastroesophageal reflux disease)      HTN (hypertension)      CHF, chronic          FAMILY HISTORY:      Home Medications:  acetaminophen 325 mg oral tablet: 2 tab(s) orally every 6 hours, As Needed for pain  (20 Oct 2022 14:11)  baclofen 10 mg oral tablet: 0.5 tab(s) orally 2 times a day (20 Oct 2022 14:11)  Cymbalta 60 mg oral delayed release capsule: 1 cap(s) orally once a day (20 Oct 2022 14:11)  donepezil 10 mg oral tablet: 1 tab(s) orally once a day (at bedtime) (20 Oct 2022 14:11)  Eucerin topical cream: Apply topically to affected area once a day (at bedtime) (20 Oct 2022 14:11)  furosemide 40 mg oral tablet: 0.5 tab(s) orally once a day (20 Oct 2022 14:11)  ipratropium-albuterol 0.5 mg-2.5 mg/3 mL inhalation solution: 3 milliliter(s) inhaled 4 times a day (20 Oct 2022 14:11)  levothyroxine 150 mcg (0.15 mg) oral capsule: 1 cap(s) orally once a day (20 Oct 2022 14:11)  Linzess 290 mcg oral capsule: 1 cap(s) orally once a day (20 Oct 2022 14:11)  melatonin 3 mg oral tablet: 1 tab(s) orally once a day (at bedtime) (20 Oct 2022 14:11)  Metoprolol Tartrate 25 mg oral tablet: 1.5 tab(s) orally 2 times a day (20 Oct 2022 14:11)  Namenda 10 mg oral tablet: 2 tab(s) orally once a day (at bedtime) (20 Oct 2022 14:11)  omeprazole 40 mg oral delayed release capsule: 1 cap(s) orally once a day (20 Oct 2022 14:11)  ondansetron 4 mg oral tablet: 1 tab(s) orally once a day (20 Oct 2022 14:11)  ProAir HFA 90 mcg/inh inhalation aerosol: 2 puff(s) inhaled every 6 hours, As Needed (20 Oct 2022 14:11)  Senna 8.6 mg oral tablet: 2 tab(s) orally once a day (at bedtime) (20 Oct 2022 14:11)  traZODone 100 mg oral tablet: 1 tab(s) orally once a day (at bedtime) (20 Oct 2022 14:11)  zinc oxide topical ointment: Apply topically to affected area once a day and as needed  (20 Oct 2022 14:11)  Zocor 10 mg oral tablet: 1 tab(s) orally once a day (at bedtime) (20 Oct 2022 14:11)  ZyrTEC 10 mg oral tablet: 1 tab(s) orally once a day (20 Oct 2022 14:11)      MEDICATIONS  (STANDING):  albuterol/ipratropium for Nebulization 3 milliLiter(s) Nebulizer every 6 hours  baclofen 5 milliGRAM(s) Oral every 12 hours  buDESOnide    Inhalation Suspension 0.5 milliGRAM(s) Inhalation two times a day  donepezil 10 milliGRAM(s) Oral at bedtime  DULoxetine 60 milliGRAM(s) Oral daily  levothyroxine 150 MICROGram(s) Oral daily  memantine 10 milliGRAM(s) Oral two times a day  metoprolol tartrate 37.5 milliGRAM(s) Oral two times a day  pantoprazole    Tablet 40 milliGRAM(s) Oral before breakfast  senna 2 Tablet(s) Oral at bedtime  simvastatin 10 milliGRAM(s) Oral at bedtime  sodium chloride 0.9% Bolus 1000 milliLiter(s) IV Bolus once  traZODone 100 milliGRAM(s) Oral at bedtime    MEDICATIONS  (PRN):  aluminum hydroxide/magnesium hydroxide/simethicone Suspension 30 milliLiter(s) Oral every 4 hours PRN Dyspepsia  bisacodyl Suppository 10 milliGRAM(s) Rectal daily PRN Constipation  ibuprofen  Tablet. 400 milliGRAM(s) Oral four times a day PRN Temp greater or equal to 38C (100.4F), Mild Pain (1 - 3)  magnesium hydroxide Suspension 30 milliLiter(s) Oral daily PRN Constipation  melatonin 3 milliGRAM(s) Oral at bedtime PRN Insomnia  ondansetron Injectable 4 milliGRAM(s) IV Push every 8 hours PRN Nausea and/or Vomiting  traMADol 50 milliGRAM(s) Oral three times a day PRN Moderate Pain (4 - 6)      Diet, NPO:   Except Medications (10-20-22 @ 13:45) [Active]          Vital Signs Last 24 Hrs  T(C): 37 (20 Oct 2022 12:06), Max: 37 (20 Oct 2022 12:06)  T(F): 98.6 (20 Oct 2022 12:06), Max: 98.6 (20 Oct 2022 12:06)  HR: 82 (20 Oct 2022 12:06) (82 - 82)  BP: 107/64 (20 Oct 2022 12:06) (106/63 - 107/64)  BP(mean): --  RR: 21 (20 Oct 2022 12:06) (20 - 21)  SpO2: 96% (20 Oct 2022 12:06) (96% - 97%)    Parameters below as of 20 Oct 2022 12:06  Patient On (Oxygen Delivery Method): nasal cannula,nebulizre                   LABS:                        14.3   4.42  )-----------( 236      ( 20 Oct 2022 10:30 )             45.6     10-20    143  |  101  |  8   ----------------------------<  141<H>  4.0   |  33<H>  |  0.70    Ca    9.9      20 Oct 2022 10:30    TPro  8.2  /  Alb  3.7  /  TBili  0.4  /  DBili  x   /  AST  17  /  ALT  15  /  AlkPhos  94  10-20    PT/INR - ( 20 Oct 2022 10:30 )   PT: 13.4 sec;   INR: 1.14 ratio         PTT - ( 20 Oct 2022 10:30 )  PTT:35.8 sec          WBC:  WBC Count: 4.42 K/uL (10-20 @ 10:30)      MICROBIOLOGY:  RECENT CULTURES:              PT/INR - ( 20 Oct 2022 10:30 )   PT: 13.4 sec;   INR: 1.14 ratio         PTT - ( 20 Oct 2022 10:30 )  PTT:35.8 sec    Sodium:  Sodium, Serum: 143 mmol/L (10-20 @ 10:30)      0.70 mg/dL 10-20 @ 10:30      Hemoglobin:  Hemoglobin: 14.3 g/dL (10-20 @ 10:30)      Platelets: Platelet Count - Automated: 236 K/uL (10-20 @ 10:30)      LIVER FUNCTIONS - ( 20 Oct 2022 10:30 )  Alb: 3.7 g/dL / Pro: 8.2 g/dL / ALK PHOS: 94 U/L / ALT: 15 U/L / AST: 17 U/L / GGT: x                 RADIOLOGY & ADDITIONAL STUDIES:      MICROBIOLOGY:  RECENT CULTURES:

## 2022-10-20 NOTE — ED PROVIDER NOTE - CONSTITUTIONAL, MLM
, normal... Well appearing, awake, alert, oriented to person, place, time/situation and in no apparent distress.

## 2022-10-20 NOTE — CONSULT NOTE ADULT - ASSESSMENT
Initial evaluation/Pulmonary Critical Care consultation requested on   by Dr     from Dr Snow   Patient examined chart reviewed    HOSPITAL ADMISSION   PATIENT CAME  FROM (if information available)      REVIEW OF SYMPTOMS      Able to give (reliable) ROS  NO     PHYSICAL EXAM    HEENT Unremarkable  atraumatic   RESP Fair air entry EXP prolonged    Harsh breath sound Resp distres mild   CARDIAC S1 S2 No S3     NO JVD    ABDOMEN SOFT BS PRESENT NOT DISTENDED No hepatosplenomegaly   PEDAL EDEMA present No calf tenderness  NO rash       PATIENT PRESENTATION.  Patient brought in by EMS from [facility] for shortness of breath cough wheezing for 2 days.  Patient denies fevers chills headache chest pain abdominal pain vomiting.  Pulm consultd 10/20/2022                                                                AGE/SEX.   82 f  DOA.  10/20/2022  CC .  10/20/2022 · from [facility] for cough and SOB x4 days. Uses home O2 2l atc for COPD    PRESENTING PROBLEMS .   RESP DISTRESS 10/20/2022   COPD ex 10/20/2022  COUGH 10/20/2022   RESP TRACT INFECTN 10/20/2022   SEPSIS SOURCE NOT CLEAR 10/20/2022   ALZHEIMERS   DEPRESSN  ER MEDS GIVEN SO FAR 10/20/2022 4:23 PM.  SOLUMED 125 11a   ZOSYN 1P   NS 1000 1P   DUONEB 11A   DUONEB 12P       COURSE.     PROCEDURES.    PMH .  pmh COPD  pmh  dementia   pmh  hytn  pmh  hypothyroid   pmh       NOTABLE HOME MEDS.   Proair  cymbalta 60   donepezil 10   metoprolol 25   namenda 10   lasix 40     GENERAL DATA .   GOC.  10/20/2022 full code       ALLGY.  tylenol sulfa                           WT.  10/20/2022 52  BMI.    10/20/2022 21                           ICU STAY. none  COVID.  scv2 10/20/2022 (-)     BEST PRACTICE ISSUES.    HOB ELEVATN. Yes  DVT PPLX.      HALL PPLX. 10/20/2022 protonix 40      INFN PPLX.    SP SW MELISSA.        DIET. 10/20/2022 npo                  VS/ PO/IO/ VENT/ DRIPS.   10/20/2022 afeb 80 100/60   10/20/2022 4l 97%         ASSESSMENT/RECOMMENDATIONS .   RESP.  -- RESP DISTRESS.  10/20/2022 check venous duplx d-dimr   10/20/2022 check echi  -- GAS EXCHANGE.  target po 90-95%  10/20/2022 check abg am   -- COUGH.  10/20/2022 Guaifenesin   -- COPD ex.  10/20/2022 duoneb.4  10/20/2022 pulmicort .5x2   10/20/2022 solumed 40  contrx   steroids x 5d   INFECTION.  -- RESP TRACT INFECTN.  -- SEPSIS 10/20/2022   W 10/20/2022 w 4.4   CT cap 10/20/2022 dialetd esoph enphysemaiapical scarring  NMo consolidatn   RVP 10/20/2022 (-)   10/20/2022 zosyn   10/20/2022 empiric abio till infection excluded   Monitor  foillow cs procalc   CARDIAC.  -- Lacticemia 10/20/2022  La 10/20-10/20/2022 la 3.5-3.6   -- CAD.   10/20/2022 simvastat 10   -- HYTN.  10/20/2022 metoprolol 37.5x2   GI.  LFTS 10/20/2022   AP 94  ast 17  alt 15   no active issues   -- RO DYSPHAGIA.  10/20/2022 speech eval   -- RO ACHALASIA.  ct 10/20/2022 ct ap dilated esophagus with retained food abruptly tapering distally   10/20/2022 will need semielective gi eval   HEMAT.  Hb 10/20/2022 Hb 14   INR 10/20/2022 INR 1.1   no active issues   RENAL.  Na 10/20/2022 Na 143   CO2 10/20/2022 CO2 33   Cr 10/20/2022 Cr .7   no active issues  IV fl.  -- 10/20/2022 LR 80   ENDOCRINE.   -- HYPOTHYROID.  10/20/2022 levoxyl 150   NEURO.  10/20/2022 baclofen 5.2   -- ALZHEIMERS.  10/20/2022 donepezil 10  -- DEPRESSN.  10/20/2022 duloxetoine 60     TIME SPENT   Over 55 minutes aggregate care time spent on encounter; activities included   direct patient care, counseling and/or coordinating care reviewing notes, lab data/ imaging , discussion with multidisciplinary team/ patient  /family and explaining in detail risks, benefits, alternatives  of the recommendations     NAGI FAUSTIN 82 F 10/20/2022 1938 DR LUIS CASTRO

## 2022-10-20 NOTE — H&P ADULT - RESPIRATORY
normal/clear to auscultation bilaterally/diminished breath sounds, L/diminished breath sounds, R/wheezes

## 2022-10-20 NOTE — CONSULT NOTE ADULT - ASSESSMENT
82y Female complaining of shortness of breath.   Patient brought in by EMS from [facility] for shortness of breath cough wheezing for 2 days.         82y Female complaining of shortness of breath.   Patient brought in by EMS from [facility] for shortness of breath cough wheezing for 2 days.     GOC documented  son HCP  full code  ROHINI updated and signed

## 2022-10-20 NOTE — H&P ADULT - PROBLEM SELECTOR PLAN 2
Admit for antibacterial management ,intravenous steroids ,nebulized bronchodilators and pulmonology evaluation,O2 supply, serial labs and chest xrays ,obtain ECHO to evaluate LVEF and rule out chf component

## 2022-10-20 NOTE — CONSULT NOTE ADULT - SUBJECTIVE AND OBJECTIVE BOX
Chief Complaint:  Patient is a 82y old  Female who presents with a chief complaint of SOB AND COUGH   Patient brought in by EMS from [facility] for shortness of breath cough wheezing for 2 days.  Patient denies fevers chills headache chest pain abdominal pain vomiting Admit for antibacterial managmnet ,intravenous steroids,nebulised bronchodilators and pulmonology evaluation,O2 supply,serial labs and chest xrays,obtain ECHO to evaluate LVEF and rule out chf component Admitted  to telemetry unit for monitoring , send 3 sets of cardiac enzymes to rule out acute coronary event, obtain ECHO to evaluate LVEF, cardiology consult  ,continue current management, O2 supply, anticoagulation plan as per cardiology consult Palliative care consult requested ,to discuss [facility] directives and [facility]      Review of Systems:  · General Symptoms	fever; malaise; fatigue; weakness  · Skin/Breast	negative  · Ophthalmologic	negative  · ENMT	negative  · Respiratory and Thorax Symptoms	wheezing  dyspnea  cough   · Negative Cardiovascular Symptoms	no chest pain; no palpitations; no peripheral edema; no claudication  · Cardiovascular Symptoms	dyspnea on exertion  · Negative Gastrointestinal Symptoms	no nausea; no vomiting; no diarrhea; no constipation; no change in bowel habits  · Genitourinary	negative  · Musculoskeletal	negative  · Neurological	negative  · Negative Psychiatric Symptoms	no paranoia  · Psychiatric Symptoms	insomnia; memory loss; mood swings  · Hematology/Lymphatics	negative  · Endocrine	negative  · Allergic/Immunologic	negative    Allergies:  sulfa drugs (Unknown)  Tylenol (Unknown)      Medications:  albuterol/ipratropium for Nebulization 3 milliLiter(s) Nebulizer every 6 hours  aluminum hydroxide/magnesium hydroxide/simethicone Suspension 30 milliLiter(s) Oral every 4 hours PRN  baclofen 5 milliGRAM(s) Oral every 12 hours  bisacodyl Suppository 10 milliGRAM(s) Rectal daily PRN  buDESOnide    Inhalation Suspension 0.5 milliGRAM(s) Inhalation two times a day  donepezil 10 milliGRAM(s) Oral at bedtime  DULoxetine 60 milliGRAM(s) Oral daily  enoxaparin Injectable 40 milliGRAM(s) SubCutaneous every 24 hours  guaiFENesin  milliGRAM(s) Oral every 12 hours  ibuprofen  Tablet. 400 milliGRAM(s) Oral four times a day PRN  lactated ringers. 1000 milliLiter(s) IV Continuous <Continuous>  levothyroxine 150 MICROGram(s) Oral daily  magnesium hydroxide Suspension 30 milliLiter(s) Oral daily PRN  melatonin 3 milliGRAM(s) Oral at bedtime PRN  memantine 10 milliGRAM(s) Oral two times a day  methylPREDNISolone sodium succinate Injectable 40 milliGRAM(s) IV Push daily  metoprolol tartrate 37.5 milliGRAM(s) Oral two times a day  ondansetron Injectable 4 milliGRAM(s) IV Push every 8 hours PRN  pantoprazole    Tablet 40 milliGRAM(s) Oral before breakfast  piperacillin/tazobactam IVPB.. 3.375 Gram(s) IV Intermittent every 8 hours  senna 2 Tablet(s) Oral at bedtime  simvastatin 10 milliGRAM(s) Oral at bedtime  traMADol 50 milliGRAM(s) Oral three times a day PRN  traZODone 100 milliGRAM(s) Oral at bedtime      PMHX/PSHX:  History of COPD    Insomnia    Mood disorder    Dementia    Spasm of muscle    Constipation    Hypothyroidism    GERD (gastroesophageal reflux disease)    HTN (hypertension)    CHF, chronic        Family history:      no uc no cd    Social History:     no etoh no cigs no ivda    ROS:     General:  No wt loss, fevers, chills, night sweats, fatigue,   Eyes:  Good vision, no reported pain  ENT:  No sore throat, pain, runny nose, dysphagia  CV:  No pain, palpitations, hypo/hypertension  Resp:  No dyspnea, cough, tachypnea, wheezing  GI:  No pain, No nausea, No vomiting, No diarrhea, No constipation, No weight loss, No fever, No pruritis, No rectal bleeding, No tarry stools, No dysphagia,  :  No pain, bleeding, incontinence, nocturia  Muscle:  No pain, weakness  Neuro:  No weakness, tingling, memory problems  Psych:  No fatigue, insomnia, mood problems, depression  Endocrine:  No polyuria, polydipsia, cold/heat intolerance  Heme:  No petechiae, ecchymosis, easy bruisability  Skin:  No rash, tattoos, scars, edema      PHYSICAL EXAM:   Vital Signs:  Vital Signs Last 24 Hrs  T(C): 37 (20 Oct 2022 12:06), Max: 37 (20 Oct 2022 12:06)  T(F): 98.6 (20 Oct 2022 12:06), Max: 98.6 (20 Oct 2022 12:06)  HR: 78 (20 Oct 2022 16:47) (78 - 82)  BP: 106/71 (20 Oct 2022 16:47) (92/56 - 107/64)  BP(mean): --  RR: 18 (20 Oct 2022 16:47) (18 - 21)  SpO2: 97% (20 Oct 2022 16:47) (96% - 97%)    Parameters below as of 20 Oct 2022 16:47  Patient On (Oxygen Delivery Method): nasal cannula  O2 Flow (L/min): 3    Daily Height in cm: 154.94 (20 Oct 2022 10:22)    Daily     GENERAL:  Appears stated age, well-groomed, well-nourished, no distress  HEENT:  NC/AT,  conjunctivae clear and pink, no thyromegaly, nodules, adenopathy, no JVD, sclera -anicteric  CHEST:  Full & symmetric excursion, no increased effort, breath sounds clear  HEART:  Regular rhythm, S1, S2, no murmur/rub/S3/S4, no abdominal bruit, no edema  ABDOMEN:  Soft, non-tender, non-distended, normoactive bowel sounds,  no masses ,no hepato-splenomegaly, no signs of chronic liver disease  EXTEREMITIES:  no cyanosis,clubbing or edema  SKIN:  No rash/erythema/ecchymoses/petechiae/wounds/abscess/warm/dry  NEURO:  Alert, oriented, no asterixis, no tremor, no encephalopathy    LABS:                        14.3   4.42  )-----------( 236      ( 20 Oct 2022 10:30 )             45.6     10-20    143  |  101  |  8   ----------------------------<  141<H>  4.0   |  33<H>  |  0.70    Ca    9.9      20 Oct 2022 10:30    TPro  8.2  /  Alb  3.7  /  TBili  0.4  /  DBili  x   /  AST  17  /  ALT  15  /  AlkPhos  94  10-20    LIVER FUNCTIONS - ( 20 Oct 2022 10:30 )  Alb: 3.7 g/dL / Pro: 8.2 g/dL / ALK PHOS: 94 U/L / ALT: 15 U/L / AST: 17 U/L / GGT: x           PT/INR - ( 20 Oct 2022 10:30 )   PT: 13.4 sec;   INR: 1.14 ratio         PTT - ( 20 Oct 2022 10:30 )  PTT:35.8 sec        Imaging:

## 2022-10-20 NOTE — CONSULT NOTE ADULT - ASSESSMENT
abn imaging  ? achalasia   reflux  stricture    plan    gerd precautions w/PO intake  ppi once a day, may increase to twice a day   maalox as needed   will monitor clinically and if no improvement may need EGD  diet as tolerated  consider barium esophagogram    Advanced care planning was discussed with patient and family.  Advanced care planning forms were reviewed and discussed.  Risks, benefits and alternatives of gastroenterologic procedures were discussed in detail and all questions were answered.    30 minutes spent.

## 2022-10-20 NOTE — CONSULT NOTE ADULT - ASSESSMENT
copd exacerabation- on bronchodilator and steroid  hypertension  hypothyroidism  chf  gerd  dementia

## 2022-10-20 NOTE — ED ADULT NURSE NOTE - OBJECTIVE STATEMENT
the patient presents to the er from [facility] for productive cough and sob.  she states that she developed a cough about a week ago.  Poor historian, unable to tell me pmh or medication list.  iv access obtained, labs collected and sent, swab sent.  awaiting further orders.

## 2022-10-20 NOTE — H&P ADULT - ASSESSMENT
Patient brought in by EMS from [facility] for shortness of breath cough wheezing for 2 days.  Patient denies fevers chills headache chest pain abdominal pain vomiting Admit for antibacterial managmnet ,intravenous steroids,nebulised bronchodilators and pulmonology evaluation,O2 supply,serial labs and chest xrays,obtain ECHO to evaluate LVEF and rule out chf component Admitted  to telemetry unit for monitoring , send 3 sets of cardiac enzymes to rule out acute coronary event, obtain ECHO to evaluate LVEF, cardiology consult  ,continue current management, O2 supply, anticoagulation plan as per cardiology consult Palliative care consult requested ,to discuss advance directives and complete [facility]

## 2022-10-20 NOTE — ED PROVIDER NOTE - CLINICAL SUMMARY MEDICAL DECISION MAKING FREE TEXT BOX
Patient brought in by EMS from [facility] for shortness of breath cough wheezing for 2 days.  Patient denies fevers chills headache chest pain abdominal pain vomiting.  Plan labs EKG x-ray DuoNeb Solu-Medrol

## 2022-10-20 NOTE — CONSULT NOTE ADULT - SUBJECTIVE AND OBJECTIVE BOX
Patient is a 82y old  Female who presents with a chief complaint of SOB AND COUGH (20 Oct 2022 17:43)      HPI:  Patient brought in by EMS from [facility] for shortness of breath cough wheezing for 2 days.  Patient denies fevers chills headache chest pain abdominal pain vomiting Admit for antibacterial managmnet ,intravenous steroids,nebulised bronchodilators and pulmonology evaluation,O2 supply,serial labs and chest xrays,obtain ECHO to evaluate LVEF and rule out chf component Admitted  to telemetry unit for monitoring , send 3 sets of cardiac enzymes to rule out acute coronary event, obtain ECHO to evaluate LVEF, cardiology consult  ,continue current management, O2 supply, anticoagulation plan as per cardiology consult Palliative care consult requested ,to discuss advance directives and complete MOL  (20 Oct 2022 13:54)      Asked to see patient for ID Consult    PAST MEDICAL & SURGICAL HISTORY:  History of COPD      Insomnia      Mood disorder      Dementia      Spasm of muscle      Constipation      Hypothyroidism      GERD (gastroesophageal reflux disease)      HTN (hypertension)      CHF, chronic          Allergies    sulfa drugs (Unknown)  Tylenol (Unknown)    Intolerances        REVIEW OF SYSTEMS:  All systems below were reviewed and are negative [  ]  HEENT:  ID:  Pulmonary:  Cardiac:  GI:  Renal:  Musculoskeletal:  All other systems above were reviewed and are negative   [  ]    HOME MEDICATIONS:    MEDICATIONS  (STANDING):  albuterol/ipratropium for Nebulization 3 milliLiter(s) Nebulizer every 6 hours  baclofen 5 milliGRAM(s) Oral every 12 hours  buDESOnide    Inhalation Suspension 0.5 milliGRAM(s) Inhalation two times a day  donepezil 10 milliGRAM(s) Oral at bedtime  DULoxetine 60 milliGRAM(s) Oral daily  enoxaparin Injectable 40 milliGRAM(s) SubCutaneous every 24 hours  guaiFENesin  milliGRAM(s) Oral every 12 hours  lactated ringers. 1000 milliLiter(s) (80 mL/Hr) IV Continuous <Continuous>  levothyroxine 150 MICROGram(s) Oral daily  memantine 10 milliGRAM(s) Oral two times a day  methylPREDNISolone sodium succinate Injectable 40 milliGRAM(s) IV Push daily  metoprolol tartrate 37.5 milliGRAM(s) Oral two times a day  pantoprazole    Tablet 40 milliGRAM(s) Oral before breakfast  piperacillin/tazobactam IVPB.. 3.375 Gram(s) IV Intermittent every 8 hours  senna 2 Tablet(s) Oral at bedtime  simvastatin 10 milliGRAM(s) Oral at bedtime  traZODone 100 milliGRAM(s) Oral at bedtime    MEDICATIONS  (PRN):  aluminum hydroxide/magnesium hydroxide/simethicone Suspension 30 milliLiter(s) Oral every 4 hours PRN Dyspepsia  bisacodyl Suppository 10 milliGRAM(s) Rectal daily PRN Constipation  ibuprofen  Tablet. 400 milliGRAM(s) Oral four times a day PRN Temp greater or equal to 38C (100.4F), Mild Pain (1 - 3)  magnesium hydroxide Suspension 30 milliLiter(s) Oral daily PRN Constipation  melatonin 3 milliGRAM(s) Oral at bedtime PRN Insomnia  ondansetron Injectable 4 milliGRAM(s) IV Push every 8 hours PRN Nausea and/or Vomiting  traMADol 50 milliGRAM(s) Oral three times a day PRN Moderate Pain (4 - 6)      Vital Signs Last 24 Hrs  T(C): 37 (20 Oct 2022 12:06), Max: 37 (20 Oct 2022 12:06)  T(F): 98.6 (20 Oct 2022 12:06), Max: 98.6 (20 Oct 2022 12:06)  HR: 78 (20 Oct 2022 16:47) (78 - 82)  BP: 106/71 (20 Oct 2022 16:47) (92/56 - 107/64)  BP(mean): --  RR: 18 (20 Oct 2022 16:47) (18 - 21)  SpO2: 97% (20 Oct 2022 16:47) (96% - 97%)    Parameters below as of 20 Oct 2022 16:47  Patient On (Oxygen Delivery Method): nasal cannula  O2 Flow (L/min): 3      PHYSICAL EXAM:  HEENT: NC/At  Neck: Soft  Lungs: Coarse BS bilaterally, no wheezing  Heart: RRR., no murmurs  Abdomen: Soft, no tenderness  Genital/ Rectal:  Extremities: Left knee swelling and tenderness     Neurologic: Awake  Vascular:    I&O's Summary      LABORATORY:                          14.3   4.42  )-----------( 236      ( 20 Oct 2022 10:30 )             45.6           10-20    143  |  101  |  8   ----------------------------<  141<H>  4.0   |  33<H>  |  0.70    Ca    9.9      20 Oct 2022 10:30    TPro  8.2  /  Alb  3.7  /  TBili  0.4  /  DBili  x   /  AST  17  /  ALT  15  /  AlkPhos  94  10-20      Rapid Respiratory Viral Panel Result        10-20 @ 10:47  Rapid RVP [facility]  Coronovirus --  Adenovirus --  Bordetella Pertussis --  Chlamydia Pneumonia --  Entero/Rhinovirus--  HKU1 Coronovirus --  HMPV Coronovirus --  Influenza A --  Influenza AH1 --  Influenza AH1 2009 --  Influenza AH3 --  Influenza B --  Mycoplasma Pneumoniae --  NL63 Coronovirus --  OC43 Coronovirus --  Parainfluenza 1 --  Parainfluenza 2 --  Parainfluenza 3 --  Parainfluenza 4 --  Resp Syncytial Virus --      LABORATORY:    CBC Full  -  ( 20 Oct 2022 10:30 )  WBC Count : 4.42 K/uL  RBC Count : 4.99 M/uL  Hemoglobin : 14.3 g/dL  Hematocrit : 45.6 %  Platelet Count - Automated : 236 K/uL  Mean Cell Volume : 91.4 fl  Mean Cell Hemoglobin : 28.7 pg  Mean Cell Hemoglobin Concentration : 31.4 gm/dL  Auto Neutrophil # : 3.03 K/uL  Auto Lymphocyte # : 0.99 K/uL  Auto Monocyte # : 0.31 K/uL  Auto Eosinophil # : 0.05 K/uL  Auto Basophil # : 0.02 K/uL  Auto Neutrophil % : 68.5 %  Auto Lymphocyte % : 22.4 %  Auto Monocyte % : 7.0 %  Auto Eosinophil % : 1.1 %  Auto Basophil % : 0.5 %          10-20    143  |  101  |  8   ----------------------------<  141<H>  4.0   |  33<H>  |  0.70    Ca    9.9      20 Oct 2022 10:30    TPro  8.2  /  Alb  3.7  /  TBili  0.4  /  DBili  x   /  AST  17  /  ALT  15  /  AlkPhos  94  10-20        Assessment and plan:    1. Dyspnea  2. COPD.  3. Dilated esophagus    , Unclear sources of infection.  No pneumonia on  chest CT. Will get procal  . Unclear sources of dyspnea. Unclear if the patient has COPD exacerbation, Will get D dimer. If positive, consider evaluation for PE.  . Get BNP  . Continue IV Zosyn for now    Thank you                                 Patient is a 82y old  Female who presents with a chief complaint of SOB AND COUGH (20 Oct 2022 17:43)        The patient is an 82 year old female with a PMH of COPD, dementia, GERD, hypothyroidism, CHF and HTN who was brought in by EMS from [facility] for shortness of breath with cough and  wheezing for 2 days. In the ED she was afebrile and was not in respiratory distress. She reported she has SOB and chest pressure with mild exertion. Chest CT, abdomen and pelvis  did not show pneumonia but there was a dilated esophagus with tapered end above the diaphragm. PCT was negative. Lactic acid was high at 3.5. US doppler of lower extremities was negative for DVT. The patient was seen by pulmonary and was admitted for COPD exacerbation. She was placed on IV Zosyn and IV Solumedrol.        PAST MEDICAL & SURGICAL HISTORY:  History of COPD      Insomnia      Mood disorder      Dementia      Spasm of muscle      Constipation      Hypothyroidism      GERD (gastroesophageal reflux disease)      HTN (hypertension)      CHF, chronic          Allergies    sulfa drugs (Unknown)  Tylenol (Unknown)    Intolerances        REVIEW OF SYSTEMS:  No abdominal pain  No dysuria.    HOME MEDICATIONS:    MEDICATIONS  (STANDING):  albuterol/ipratropium for Nebulization 3 milliLiter(s) Nebulizer every 6 hours  baclofen 5 milliGRAM(s) Oral every 12 hours  buDESOnide    Inhalation Suspension 0.5 milliGRAM(s) Inhalation two times a day  donepezil 10 milliGRAM(s) Oral at bedtime  DULoxetine 60 milliGRAM(s) Oral daily  enoxaparin Injectable 40 milliGRAM(s) SubCutaneous every 24 hours  guaiFENesin  milliGRAM(s) Oral every 12 hours  lactated ringers. 1000 milliLiter(s) (80 mL/Hr) IV Continuous <Continuous>  levothyroxine 150 MICROGram(s) Oral daily  memantine 10 milliGRAM(s) Oral two times a day  methylPREDNISolone sodium succinate Injectable 40 milliGRAM(s) IV Push daily  metoprolol tartrate 37.5 milliGRAM(s) Oral two times a day  pantoprazole    Tablet 40 milliGRAM(s) Oral before breakfast  piperacillin/tazobactam IVPB.. 3.375 Gram(s) IV Intermittent every 8 hours  senna 2 Tablet(s) Oral at bedtime  simvastatin 10 milliGRAM(s) Oral at bedtime  traZODone 100 milliGRAM(s) Oral at bedtime    MEDICATIONS  (PRN):  aluminum hydroxide/magnesium hydroxide/simethicone Suspension 30 milliLiter(s) Oral every 4 hours PRN Dyspepsia  bisacodyl Suppository 10 milliGRAM(s) Rectal daily PRN Constipation  ibuprofen  Tablet. 400 milliGRAM(s) Oral four times a day PRN Temp greater or equal to 38C (100.4F), Mild Pain (1 - 3)  magnesium hydroxide Suspension 30 milliLiter(s) Oral daily PRN Constipation  melatonin 3 milliGRAM(s) Oral at bedtime PRN Insomnia  ondansetron Injectable 4 milliGRAM(s) IV Push every 8 hours PRN Nausea and/or Vomiting  traMADol 50 milliGRAM(s) Oral three times a day PRN Moderate Pain (4 - 6)      Vital Signs Last 24 Hrs  T(C): 37 (20 Oct 2022 12:06), Max: 37 (20 Oct 2022 12:06)  T(F): 98.6 (20 Oct 2022 12:06), Max: 98.6 (20 Oct 2022 12:06)  HR: 78 (20 Oct 2022 16:47) (78 - 82)  BP: 106/71 (20 Oct 2022 16:47) (92/56 - 107/64)  BP(mean): --  RR: 18 (20 Oct 2022 16:47) (18 - 21)  SpO2: 97% (20 Oct 2022 16:47) (96% - 97%)    Parameters below as of 20 Oct 2022 16:47  Patient On (Oxygen Delivery Method): nasal cannula  O2 Flow (L/min): 3      PHYSICAL EXAM:  HEENT: NC/At  Neck: Soft  Lungs: Coarse BS bilaterally, no wheezing  Heart: RRR., no murmurs  Abdomen: Soft, no tenderness  Genital/ Rectal:  Extremities: Left knee swelling and tenderness     Neurologic: Awake  Vascular:    I&O's Summary      LABORATORY:                          14.3   4.42  )-----------( 236      ( 20 Oct 2022 10:30 )             45.6           10-20    143  |  101  |  8   ----------------------------<  141<H>  4.0   |  33<H>  |  0.70    Ca    9.9      20 Oct 2022 10:30    TPro  8.2  /  Alb  3.7  /  TBili  0.4  /  DBili  x   /  AST  17  /  ALT  15  /  AlkPhos  94  10-20      Rapid Respiratory Viral Panel Result        10-20 @ 10:47  Rapid RVP [facility]  Coronovirus --  Adenovirus --  Bordetella Pertussis --  Chlamydia Pneumonia --  Entero/Rhinovirus--  HKU1 Coronovirus --  HMPV Coronovirus --  Influenza A --  Influenza AH1 --  Influenza AH1 2009 --  Influenza AH3 --  Influenza B --  Mycoplasma Pneumoniae --  NL63 Coronovirus --  OC43 Coronovirus --  Parainfluenza 1 --  Parainfluenza 2 --  Parainfluenza 3 --  Parainfluenza 4 --  Resp Syncytial Virus --      LABORATORY:    CBC Full  -  ( 20 Oct 2022 10:30 )  WBC Count : 4.42 K/uL  RBC Count : 4.99 M/uL  Hemoglobin : 14.3 g/dL  Hematocrit : 45.6 %  Platelet Count - Automated : 236 K/uL  Mean Cell Volume : 91.4 fl  Mean Cell Hemoglobin : 28.7 pg  Mean Cell Hemoglobin Concentration : 31.4 gm/dL  Auto Neutrophil # : 3.03 K/uL  Auto Lymphocyte # : 0.99 K/uL  Auto Monocyte # : 0.31 K/uL  Auto Eosinophil # : 0.05 K/uL  Auto Basophil # : 0.02 K/uL  Auto Neutrophil % : 68.5 %  Auto Lymphocyte % : 22.4 %  Auto Monocyte % : 7.0 %  Auto Eosinophil % : 1.1 %  Auto Basophil % : 0.5 %          10-20    143  |  101  |  8   ----------------------------<  141<H>  4.0   |  33<H>  |  0.70    Ca    9.9      20 Oct 2022 10:30    TPro  8.2  /  Alb  3.7  /  TBili  0.4  /  DBili  x   /  AST  17  /  ALT  15  /  AlkPhos  94  10-20        Assessment and plan:    1. Dyspnea  2. COPD.  3. Dilated esophagus, r/p achalasia.   4. Left knee swelling.    , Unclear sources of infection.  No pneumonia on  chest CT.   . Unclear sources of dyspnea. Unclear if the patient has COPD exacerbation as she has no wheezing or respiratory distress, Will get D dimer. If elevated,  consider evaluation for PE if still no clear sources of dyspnea.   . Get BNP  . Discontinue IV Zosyn. Monitor off antibiotics.     Thank you

## 2022-10-20 NOTE — ED PROVIDER NOTE - OBJECTIVE STATEMENT
Patient brought in by EMS from [facility] for shortness of breath cough wheezing for 2 days.  Patient denies fevers chills headache chest pain abdominal pain vomiting.  MELINA Butler

## 2022-10-20 NOTE — ED PROVIDER NOTE - PROGRESS NOTE DETAILS
inge (pulm) seen eval pt, requests ct chest/abd pelvis non con. rhonda (med) seen eval pt will admit

## 2022-10-20 NOTE — CONSULT NOTE ADULT - SUBJECTIVE AND OBJECTIVE BOX
CARDIOLOGY CONSULT NOTE    Patient is a 82y Female with a known history of : admitted with sob and cough   Mood disorder [F39]    COPD exacerbation [J44.1]    Acute respiratory failure with hypoxia [J96.01]    Insomnia [G47.00]    Dementia [F03.90]    Constipation [K59.00]    GERD (gastroesophageal reflux disease) [K21.9]    CHF, chronic [I50.9]    HTN (hypertension) [I10]    Hypothyroidism [E03.9]    Prophylactic measure [Z29.9]      HPI:  Patient brought in by EMS from [facility] for shortness of breath cough wheezing for 2 days.  Patient denies fevers chills headache chest pain abdominal pain vomiting Admit for antibacterial managmnet ,intravenous steroids,nebulised bronchodilators and pulmonology evaluation,O2 supply,serial labs and chest xrays,obtain ECHO to evaluate LVEF and rule out chf component Admitted  to telemetry unit for monitoring , send 3 sets of cardiac enzymes to rule out acute coronary event, obtain ECHO to evaluate LVEF, cardiology consult  ,continue current management, O2 supply, anticoagulation plan as per cardiology consult Palliative care consult requested ,to discuss advance directives and complete MOLST  (20 Oct 2022 13:54)      REVIEW OF SYSTEMS:    CONSTITUTIONAL: No fever, weight loss, or fatigue  EYES: No eye pain, visual disturbances, or discharge  ENMT:  No difficulty hearing, tinnitus, vertigo; No sinus or throat pain  NECK: No pain or stiffness  BREASTS: No pain, masses, or nipple discharge  RESPIRATORY: No cough, wheezing, chills or hemoptysis; No shortness of breath  CARDIOVASCULAR: No chest pain, palpitations, dizziness, or leg swelling  GASTROINTESTINAL: No abdominal or epigastric pain. No nausea, vomiting, or hematemesis; No diarrhea or constipation. No melena or hematochezia.  GENITOURINARY: No dysuria, frequency, hematuria, or incontinence  NEUROLOGICAL: No headaches, memory loss, loss of strength, numbness, or tremors  SKIN: No itching, burning, rashes, or lesions   LYMPH NODES: No enlarged glands  ENDOCRINE: No heat or cold intolerance; No hair loss  MUSCULOSKELETAL: No joint pain or swelling; No muscle, back, or extremity pain  PSYCHIATRIC: No depression, anxiety, mood swings, or difficulty sleeping  HEME/LYMPH: No easy bruising, or bleeding gums  ALLERGY AND IMMUNOLOGIC: No hives or eczema    MEDICATIONS  (STANDING):  albuterol/ipratropium for Nebulization 3 milliLiter(s) Nebulizer every 6 hours  baclofen 5 milliGRAM(s) Oral every 12 hours  buDESOnide    Inhalation Suspension 0.5 milliGRAM(s) Inhalation two times a day  donepezil 10 milliGRAM(s) Oral at bedtime  DULoxetine 60 milliGRAM(s) Oral daily  enoxaparin Injectable 40 milliGRAM(s) SubCutaneous every 24 hours  guaiFENesin  milliGRAM(s) Oral every 12 hours  lactated ringers. 1000 milliLiter(s) (80 mL/Hr) IV Continuous <Continuous>  levothyroxine 150 MICROGram(s) Oral daily  memantine 10 milliGRAM(s) Oral two times a day  methylPREDNISolone sodium succinate Injectable 40 milliGRAM(s) IV Push daily  metoprolol tartrate 37.5 milliGRAM(s) Oral two times a day  pantoprazole    Tablet 40 milliGRAM(s) Oral before breakfast  piperacillin/tazobactam IVPB.. 3.375 Gram(s) IV Intermittent every 8 hours  senna 2 Tablet(s) Oral at bedtime  simvastatin 10 milliGRAM(s) Oral at bedtime  traZODone 100 milliGRAM(s) Oral at bedtime    MEDICATIONS  (PRN):  aluminum hydroxide/magnesium hydroxide/simethicone Suspension 30 milliLiter(s) Oral every 4 hours PRN Dyspepsia  bisacodyl Suppository 10 milliGRAM(s) Rectal daily PRN Constipation  ibuprofen  Tablet. 400 milliGRAM(s) Oral four times a day PRN Temp greater or equal to 38C (100.4F), Mild Pain (1 - 3)  magnesium hydroxide Suspension 30 milliLiter(s) Oral daily PRN Constipation  melatonin 3 milliGRAM(s) Oral at bedtime PRN Insomnia  ondansetron Injectable 4 milliGRAM(s) IV Push every 8 hours PRN Nausea and/or Vomiting  traMADol 50 milliGRAM(s) Oral three times a day PRN Moderate Pain (4 - 6)      ALLERGIES: sulfa drugs (Unknown)  Tylenol (Unknown)      Social History:     FAMILY HISTORY:      PAST MEDICAL & SURGICAL HISTORY:  History of COPD      Insomnia      Mood disorder      Dementia      Spasm of muscle      Constipation      Hypothyroidism      GERD (gastroesophageal reflux disease)      HTN (hypertension)      CHF, chronic            PHYSICAL EXAMINATION:  -----------------------------  T(C): 37 (10-20-22 @ 12:06), Max: 37 (10-20-22 @ 12:06)  HR: 78 (10-20-22 @ 16:47) (78 - 82)  BP: 106/71 (10-20-22 @ 16:47) (92/56 - 107/64)  RR: 18 (10-20-22 @ 16:47) (18 - 21)  SpO2: 97% (10-20-22 @ 16:47) (96% - 97%)  Wt(kg): --    Height (cm): 154.9 (10-20 @ 10:22)  Weight (kg): 52.2 (10-20 @ 10:22)  BMI (kg/m2): 21.8 (10-20 @ 10:22)  BSA (m2): 1.49 (10-20 @ 10:22)    Constitutional: well developed, normal appearance, well groomed, well nourished, no deformities and no acute distress.   Eyes: the conjunctiva exhibited no abnormalities and the eyelids demonstrated no xanthelasmas.   HEENT: normal oral mucosa, no oral pallor and no oral cyanosis.   Neck: normal jugular venous A waves present, normal jugular venous V waves present and no jugular venous oliveira A waves.   Pulmonary: no respiratory distress, normal respiratory rhythm and effort, no accessory muscle use and lungs were clear to auscultation bilaterally.   Cardiovascular: heart rate and rhythm were normal, normal S1 and S2 and no murmur, gallop, rub, heave or thrill are present.   Abdomen: soft, non-tender, no hepato-splenomegaly and no abdominal mass palpated.   Musculoskeletal: the gait could not be assessed..   Extremities: no clubbing of the fingernails, no localized cyanosis, no petechial hemorrhages and no ischemic changes.   Skin: normal skin color and pigmentation, no rash, no venous stasis, no skin lesions, no skin ulcer and no xanthoma was observed.   Psychiatric: oriented to person, place, and time, the affect was normal, the mood was normal and not feeling anxious.     LABS:   --------  10-20    143  |  101  |  8   ----------------------------<  141<H>  4.0   |  33<H>  |  0.70    Ca    9.9      20 Oct 2022 10:30    TPro  8.2  /  Alb  3.7  /  TBili  0.4  /  DBili  x   /  AST  17  /  ALT  15  /  AlkPhos  94  10-20                         14.3   4.42  )-----------( 236      ( 20 Oct 2022 10:30 )             45.6     PT/INR - ( 20 Oct 2022 10:30 )   PT: 13.4 sec;   INR: 1.14 ratio         PTT - ( 20 Oct 2022 10:30 )  PTT:35.8 sec            RADIOLOGY:  -----------------        ECG:     ECHO

## 2022-10-20 NOTE — ED ADULT TRIAGE NOTE - CHIEF COMPLAINT QUOTE
from [facility] for cough and SOB x4 days. Uses home O2 @2l atc for COPD

## 2022-10-21 DIAGNOSIS — Q39.6 CONGENITAL DIVERTICULUM OF ESOPHAGUS: ICD-10-CM

## 2022-10-21 LAB
ALBUMIN SERPL ELPH-MCNC: 3 G/DL — LOW (ref 3.3–5)
ALP SERPL-CCNC: 71 U/L — SIGNIFICANT CHANGE UP (ref 40–120)
ALT FLD-CCNC: 14 U/L — SIGNIFICANT CHANGE UP (ref 12–78)
ANION GAP SERPL CALC-SCNC: 3 MMOL/L — LOW (ref 5–17)
APPEARANCE UR: CLEAR — SIGNIFICANT CHANGE UP
AST SERPL-CCNC: 19 U/L — SIGNIFICANT CHANGE UP (ref 15–37)
BACTERIA # UR AUTO: ABNORMAL
BASE EXCESS BLDA CALC-SCNC: 9.1 MMOL/L — HIGH (ref -2–3)
BILIRUB SERPL-MCNC: 0.3 MG/DL — SIGNIFICANT CHANGE UP (ref 0.2–1.2)
BILIRUB UR-MCNC: NEGATIVE — SIGNIFICANT CHANGE UP
BLOOD GAS COMMENTS ARTERIAL: SIGNIFICANT CHANGE UP
BUN SERPL-MCNC: 9 MG/DL — SIGNIFICANT CHANGE UP (ref 7–23)
CALCIUM SERPL-MCNC: 9.2 MG/DL — SIGNIFICANT CHANGE UP (ref 8.5–10.1)
CHLORIDE SERPL-SCNC: 108 MMOL/L — SIGNIFICANT CHANGE UP (ref 96–108)
CO2 SERPL-SCNC: 34 MMOL/L — HIGH (ref 22–31)
COLOR SPEC: SIGNIFICANT CHANGE UP
CREAT SERPL-MCNC: 0.41 MG/DL — LOW (ref 0.5–1.3)
D DIMER BLD IA.RAPID-MCNC: 370 NG/ML DDU — HIGH
DIFF PNL FLD: ABNORMAL
EGFR: 97 ML/MIN/1.73M2 — SIGNIFICANT CHANGE UP
EPI CELLS # UR: SIGNIFICANT CHANGE UP
GAS PNL BLDA: SIGNIFICANT CHANGE UP
GLUCOSE SERPL-MCNC: 105 MG/DL — HIGH (ref 70–99)
GLUCOSE UR QL: NEGATIVE — SIGNIFICANT CHANGE UP
HCO3 BLDA-SCNC: 33 MMOL/L — HIGH (ref 21–28)
HCT VFR BLD CALC: 37.9 % — SIGNIFICANT CHANGE UP (ref 34.5–45)
HGB BLD-MCNC: 11.9 G/DL — SIGNIFICANT CHANGE UP (ref 11.5–15.5)
HOROWITZ INDEX BLDA+IHG-RTO: 30 — SIGNIFICANT CHANGE UP
HYALINE CASTS # UR AUTO: NEGATIVE — SIGNIFICANT CHANGE UP
KETONES UR-MCNC: ABNORMAL
LACTATE SERPL-SCNC: 1.3 MMOL/L — SIGNIFICANT CHANGE UP (ref 0.7–2)
LEUKOCYTE ESTERASE UR-ACNC: ABNORMAL
MCHC RBC-ENTMCNC: 28.7 PG — SIGNIFICANT CHANGE UP (ref 27–34)
MCHC RBC-ENTMCNC: 31.4 GM/DL — LOW (ref 32–36)
MCV RBC AUTO: 91.3 FL — SIGNIFICANT CHANGE UP (ref 80–100)
NITRITE UR-MCNC: NEGATIVE — SIGNIFICANT CHANGE UP
NRBC # BLD: 0 /100 WBCS — SIGNIFICANT CHANGE UP (ref 0–0)
PCO2 BLDA: 49 MMHG — HIGH (ref 32–35)
PH BLDA: 7.44 — SIGNIFICANT CHANGE UP (ref 7.35–7.45)
PH UR: 6 — SIGNIFICANT CHANGE UP (ref 5–8)
PLATELET # BLD AUTO: 210 K/UL — SIGNIFICANT CHANGE UP (ref 150–400)
PO2 BLDA: 177 MMHG — HIGH (ref 83–108)
POTASSIUM SERPL-MCNC: 4.1 MMOL/L — SIGNIFICANT CHANGE UP (ref 3.5–5.3)
POTASSIUM SERPL-SCNC: 4.1 MMOL/L — SIGNIFICANT CHANGE UP (ref 3.5–5.3)
PROCALCITONIN SERPL-MCNC: 0.03 NG/ML — SIGNIFICANT CHANGE UP
PROT SERPL-MCNC: 6.5 G/DL — SIGNIFICANT CHANGE UP (ref 6–8.3)
PROT UR-MCNC: NEGATIVE — SIGNIFICANT CHANGE UP
RBC # BLD: 4.15 M/UL — SIGNIFICANT CHANGE UP (ref 3.8–5.2)
RBC # FLD: 14.6 % — HIGH (ref 10.3–14.5)
RBC CASTS # UR COMP ASSIST: SIGNIFICANT CHANGE UP /HPF (ref 0–4)
SAO2 % BLDA: 99 % — HIGH (ref 94–98)
SARS-COV-2 RNA SPEC QL NAA+PROBE: SIGNIFICANT CHANGE UP
SODIUM SERPL-SCNC: 145 MMOL/L — SIGNIFICANT CHANGE UP (ref 135–145)
SP GR SPEC: 1.01 — SIGNIFICANT CHANGE UP (ref 1.01–1.02)
UROBILINOGEN FLD QL: 1
WBC # BLD: 3 K/UL — LOW (ref 3.8–10.5)
WBC # FLD AUTO: 3 K/UL — LOW (ref 3.8–10.5)
WBC UR QL: ABNORMAL

## 2022-10-21 PROCEDURE — 99233 SBSQ HOSP IP/OBS HIGH 50: CPT

## 2022-10-21 PROCEDURE — 74220 X-RAY XM ESOPHAGUS 1CNTRST: CPT | Mod: 26

## 2022-10-21 RX ORDER — SODIUM CHLORIDE 9 MG/ML
1000 INJECTION, SOLUTION INTRAVENOUS
Refills: 0 | Status: DISCONTINUED | OUTPATIENT
Start: 2022-10-21 | End: 2022-10-21

## 2022-10-21 RX ORDER — SODIUM CHLORIDE 9 MG/ML
1000 INJECTION, SOLUTION INTRAVENOUS
Refills: 0 | Status: DISCONTINUED | OUTPATIENT
Start: 2022-10-21 | End: 2022-10-22

## 2022-10-21 RX ORDER — LOPERAMIDE HCL 2 MG
2 TABLET ORAL
Refills: 0 | Status: DISCONTINUED | OUTPATIENT
Start: 2022-10-21 | End: 2022-11-01

## 2022-10-21 RX ADMIN — Medication 3 MILLILITER(S): at 02:28

## 2022-10-21 RX ADMIN — Medication 3 MILLIGRAM(S): at 23:44

## 2022-10-21 RX ADMIN — SODIUM CHLORIDE 50 MILLILITER(S): 9 INJECTION, SOLUTION INTRAVENOUS at 19:07

## 2022-10-21 RX ADMIN — DONEPEZIL HYDROCHLORIDE 10 MILLIGRAM(S): 10 TABLET, FILM COATED ORAL at 22:34

## 2022-10-21 RX ADMIN — ENOXAPARIN SODIUM 40 MILLIGRAM(S): 100 INJECTION SUBCUTANEOUS at 22:34

## 2022-10-21 RX ADMIN — MEMANTINE HYDROCHLORIDE 10 MILLIGRAM(S): 10 TABLET ORAL at 18:52

## 2022-10-21 RX ADMIN — Medication 40 MILLIGRAM(S): at 06:11

## 2022-10-21 RX ADMIN — Medication 150 MICROGRAM(S): at 06:13

## 2022-10-21 RX ADMIN — Medication 600 MILLIGRAM(S): at 06:12

## 2022-10-21 RX ADMIN — Medication 5 MILLIGRAM(S): at 18:52

## 2022-10-21 RX ADMIN — DULOXETINE HYDROCHLORIDE 60 MILLIGRAM(S): 30 CAPSULE, DELAYED RELEASE ORAL at 12:03

## 2022-10-21 RX ADMIN — SIMVASTATIN 10 MILLIGRAM(S): 20 TABLET, FILM COATED ORAL at 22:34

## 2022-10-21 RX ADMIN — Medication 600 MILLIGRAM(S): at 18:52

## 2022-10-21 RX ADMIN — Medication 3 MILLILITER(S): at 20:27

## 2022-10-21 RX ADMIN — Medication 37.5 MILLIGRAM(S): at 18:53

## 2022-10-21 RX ADMIN — Medication 2 MILLIGRAM(S): at 19:04

## 2022-10-21 RX ADMIN — Medication 0.5 MILLIGRAM(S): at 08:19

## 2022-10-21 RX ADMIN — Medication 37.5 MILLIGRAM(S): at 06:12

## 2022-10-21 RX ADMIN — Medication 0.5 MILLIGRAM(S): at 20:25

## 2022-10-21 RX ADMIN — PANTOPRAZOLE SODIUM 40 MILLIGRAM(S): 20 TABLET, DELAYED RELEASE ORAL at 06:15

## 2022-10-21 RX ADMIN — Medication 5 MILLIGRAM(S): at 06:12

## 2022-10-21 RX ADMIN — Medication 3 MILLILITER(S): at 08:19

## 2022-10-21 RX ADMIN — MEMANTINE HYDROCHLORIDE 10 MILLIGRAM(S): 10 TABLET ORAL at 06:11

## 2022-10-21 RX ADMIN — Medication 100 MILLIGRAM(S): at 22:34

## 2022-10-21 RX ADMIN — Medication 3 MILLILITER(S): at 12:54

## 2022-10-21 NOTE — PROGRESS NOTE ADULT - PROBLEM SELECTOR PLAN 3
gerd precautions w/PO intake ,case d/w GI TEAM   ppi once a day, may increase to twice a day   maalox as needed  ,clear liquid diet   will monitor clinically , EGD planned 10/24/22

## 2022-10-21 NOTE — PROGRESS NOTE ADULT - SUBJECTIVE AND OBJECTIVE BOX
ROXIE LAZAR    [facility] 1EAS 114 W1    Allergies    sulfa drugs (Unknown)  Tylenol (Unknown)    Intolerances        PAST MEDICAL & SURGICAL HISTORY:  History of COPD      Insomnia      Mood disorder      Dementia      Spasm of muscle      Constipation      Hypothyroidism      GERD (gastroesophageal reflux disease)      HTN (hypertension)      CHF, chronic          FAMILY HISTORY:      Home Medications:  acetaminophen 325 mg oral tablet: 2 tab(s) orally every 6 hours, As Needed for pain  (20 Oct 2022 16:34)  baclofen 10 mg oral tablet: 0.5 tab(s) orally 2 times a day (20 Oct 2022 16:34)  Cymbalta 60 mg oral delayed release capsule: 1 cap(s) orally once a day (20 Oct 2022 16:34)  donepezil 10 mg oral tablet: 1 tab(s) orally once a day (at bedtime) (20 Oct 2022 16:34)  Eucerin topical cream: Apply topically to affected area once a day (at bedtime) (20 Oct 2022 16:34)  furosemide 40 mg oral tablet: 0.5 tab(s) orally once a day (20 Oct 2022 16:34)  ipratropium-albuterol 0.5 mg-2.5 mg/3 mL inhalation solution: 3 milliliter(s) inhaled 4 times a day (20 Oct 2022 16:34)  levothyroxine 150 mcg (0.15 mg) oral capsule: 1 cap(s) orally once a day (20 Oct 2022 16:34)  Linzess 290 mcg oral capsule: 1 cap(s) orally once a day (20 Oct 2022 16:34)  melatonin 3 mg oral tablet: 1 tab(s) orally once a day (at bedtime) (20 Oct 2022 16:34)  Metoprolol Tartrate 25 mg oral tablet: 1.5 tab(s) orally 2 times a day (20 Oct 2022 16:34)  Namenda 10 mg oral tablet: 2 tab(s) orally once a day (at bedtime) (20 Oct 2022 16:34)  omeprazole 40 mg oral delayed release capsule: 1 cap(s) orally once a day (20 Oct 2022 16:34)  ondansetron 4 mg oral tablet: 1 tab(s) orally once a day (20 Oct 2022 16:34)  ProAir HFA 90 mcg/inh inhalation aerosol: 2 puff(s) inhaled every 6 hours, As Needed (20 Oct 2022 16:34)  Senna 8.6 mg oral tablet: 2 tab(s) orally once a day (at bedtime) (20 Oct 2022 16:34)  traZODone 100 mg oral tablet: 1 tab(s) orally once a day (at bedtime) (20 Oct 2022 16:34)  zinc oxide topical ointment: Apply topically to affected area once a day and as needed  (20 Oct 2022 16:34)  Zocor 10 mg oral tablet: 1 tab(s) orally once a day (at bedtime) (20 Oct 2022 16:34)  ZyrTEC 10 mg oral tablet: 1 tab(s) orally once a day (20 Oct 2022 16:34)      MEDICATIONS  (STANDING):  albuterol/ipratropium for Nebulization 3 milliLiter(s) Nebulizer every 6 hours  baclofen 5 milliGRAM(s) Oral every 12 hours  buDESOnide    Inhalation Suspension 0.5 milliGRAM(s) Inhalation two times a day  dextrose 5% + sodium chloride 0.45%. 1000 milliLiter(s) (50 mL/Hr) IV Continuous <Continuous>  donepezil 10 milliGRAM(s) Oral at bedtime  DULoxetine 60 milliGRAM(s) Oral daily  enoxaparin Injectable 40 milliGRAM(s) SubCutaneous every 24 hours  guaiFENesin  milliGRAM(s) Oral every 12 hours  levothyroxine 150 MICROGram(s) Oral daily  memantine 10 milliGRAM(s) Oral two times a day  methylPREDNISolone sodium succinate Injectable 40 milliGRAM(s) IV Push daily  metoprolol tartrate 37.5 milliGRAM(s) Oral two times a day  pantoprazole    Tablet 40 milliGRAM(s) Oral before breakfast  simvastatin 10 milliGRAM(s) Oral at bedtime  traZODone 100 milliGRAM(s) Oral at bedtime    MEDICATIONS  (PRN):  aluminum hydroxide/magnesium hydroxide/simethicone Suspension 30 milliLiter(s) Oral every 4 hours PRN Dyspepsia  ibuprofen  Tablet. 400 milliGRAM(s) Oral four times a day PRN Temp greater or equal to 38C (100.4F), Mild Pain (1 - 3)  loperamide 2 milliGRAM(s) Oral four times a day PRN Diarrhea  melatonin 3 milliGRAM(s) Oral at bedtime PRN Insomnia  ondansetron Injectable 4 milliGRAM(s) IV Push every 8 hours PRN Nausea and/or Vomiting  traMADol 50 milliGRAM(s) Oral three times a day PRN Moderate Pain (4 - 6)      Diet, Clear Liquid (10-21-22 @ 13:16) [Active]          Vital Signs Last 24 Hrs  T(C): 36.7 (21 Oct 2022 18:44), Max: 36.9 (20 Oct 2022 22:30)  T(F): 98 (21 Oct 2022 18:44), Max: 98.5 (20 Oct 2022 22:30)  HR: 59 (21 Oct 2022 13:20) (59 - 74)  BP: 98/60 (21 Oct 2022 13:20) (94/63 - 108/71)  BP(mean): --  RR: 17 (21 Oct 2022 13:20) (16 - 18)  SpO2: 96% (21 Oct 2022 13:20) (96% - 100%)    Parameters below as of 21 Oct 2022 12:55  Patient On (Oxygen Delivery Method): nasal cannula          10-20-22 @ 07:01  -  10-21-22 @ 07:00  --------------------------------------------------------  IN: 80 mL / OUT: 0 mL / NET: 80 mL    10-21-22 @ 07:01  -  10-21-22 @ 20:10  --------------------------------------------------------  IN: 720 mL / OUT: 0 mL / NET: 720 mL              LABS:                        11.9   3.00  )-----------( 210      ( 21 Oct 2022 04:26 )             37.9     10-21    145  |  108  |  9   ----------------------------<  105<H>  4.1   |  34<H>  |  0.41<L>    Ca    9.2      21 Oct 2022 04:26    TPro  6.5  /  Alb  3.0<L>  /  TBili  0.3  /  DBili  x   /  AST  19  /  ALT  14  /  AlkPhos  71  10-21    PT/INR - ( 20 Oct 2022 10:30 )   PT: 13.4 sec;   INR: 1.14 ratio         PTT - ( 20 Oct 2022 10:30 )  PTT:35.8 sec  Urinalysis Basic - ( 21 Oct 2022 07:40 )    Color: Pale Yellow / Appearance: Clear / S.015 / pH: x  Gluc: x / Ketone: Trace  / Bili: Negative / Urobili: 1   Blood: x / Protein: Negative / Nitrite: Negative   Leuk Esterase: Trace / RBC: 0-2 /HPF / WBC 6-10   Sq Epi: x / Non Sq Epi: Few / Bacteria: Occasional        ABG - ( 21 Oct 2022 06:00 )  pH, Arterial: 7.44  pH, Blood: x     /  pCO2: 49    /  pO2: 177   / HCO3: 33    / Base Excess: 9.1   /  SaO2: 99.0                WBC:  WBC Count: 3.00 K/uL (10-21 @ 04:26)  WBC Count: 4.42 K/uL (10-20 @ 10:30)      MICROBIOLOGY:  RECENT CULTURES:  10-20 .Blood Blood-Peripheral XXXX XXXX   No growth to date.    10-20 .Blood Blood-Peripheral XXXX XXXX   No growth to date.                PT/INR - ( 20 Oct 2022 10:30 )   PT: 13.4 sec;   INR: 1.14 ratio         PTT - ( 20 Oct 2022 10:30 )  PTT:35.8 sec    Sodium:  Sodium, Serum: 145 mmol/L (10-21 @ 04:26)  Sodium, Serum: 143 mmol/L (10-20 @ 10:30)      0.41 mg/dL 10-21 @ 04:26  0.70 mg/dL 10-20 @ 10:30      Hemoglobin:  Hemoglobin: 11.9 g/dL (10-21 @ 04:26)  Hemoglobin: 14.3 g/dL (10-20 @ 10:30)      Platelets: Platelet Count - Automated: 210 K/uL (10-21 @ 04:26)  Platelet Count - Automated: 236 K/uL (10-20 @ 10:30)      LIVER FUNCTIONS - ( 21 Oct 2022 04:26 )  Alb: 3.0 g/dL / Pro: 6.5 g/dL / ALK PHOS: 71 U/L / ALT: 14 U/L / AST: 19 U/L / GGT: x             Urinalysis Basic - ( 21 Oct 2022 07:40 )    Color: Pale Yellow / Appearance: Clear / S.015 / pH: x  Gluc: x / Ketone: Trace  / Bili: Negative / Urobili: 1   Blood: x / Protein: Negative / Nitrite: Negative   Leuk Esterase: Trace / RBC: 0-2 /HPF / WBC 6-10   Sq Epi: x / Non Sq Epi: Few / Bacteria: Occasional        RADIOLOGY & ADDITIONAL STUDIES:      MICROBIOLOGY:  RECENT CULTURES:  10-20 .Blood Blood-Peripheral XXXX XXXX   No growth to date.    10-20 .Blood Blood-Peripheral XXXX XXXX   No growth to date.

## 2022-10-21 NOTE — DIETITIAN INITIAL EVALUATION ADULT - PERTINENT MEDS FT
MEDICATIONS  (STANDING):  albuterol/ipratropium for Nebulization 3 milliLiter(s) Nebulizer every 6 hours  baclofen 5 milliGRAM(s) Oral every 12 hours  buDESOnide    Inhalation Suspension 0.5 milliGRAM(s) Inhalation two times a day  donepezil 10 milliGRAM(s) Oral at bedtime  DULoxetine 60 milliGRAM(s) Oral daily  enoxaparin Injectable 40 milliGRAM(s) SubCutaneous every 24 hours  guaiFENesin  milliGRAM(s) Oral every 12 hours  lactated ringers. 1000 milliLiter(s) (80 mL/Hr) IV Continuous <Continuous>  levothyroxine 150 MICROGram(s) Oral daily  memantine 10 milliGRAM(s) Oral two times a day  methylPREDNISolone sodium succinate Injectable 40 milliGRAM(s) IV Push daily  metoprolol tartrate 37.5 milliGRAM(s) Oral two times a day  pantoprazole    Tablet 40 milliGRAM(s) Oral before breakfast  senna 2 Tablet(s) Oral at bedtime  simvastatin 10 milliGRAM(s) Oral at bedtime  traZODone 100 milliGRAM(s) Oral at bedtime    MEDICATIONS  (PRN):  aluminum hydroxide/magnesium hydroxide/simethicone Suspension 30 milliLiter(s) Oral every 4 hours PRN Dyspepsia  bisacodyl Suppository 10 milliGRAM(s) Rectal daily PRN Constipation  ibuprofen  Tablet. 400 milliGRAM(s) Oral four times a day PRN Temp greater or equal to 38C (100.4F), Mild Pain (1 - 3)  magnesium hydroxide Suspension 30 milliLiter(s) Oral daily PRN Constipation  melatonin 3 milliGRAM(s) Oral at bedtime PRN Insomnia  ondansetron Injectable 4 milliGRAM(s) IV Push every 8 hours PRN Nausea and/or Vomiting  traMADol 50 milliGRAM(s) Oral three times a day PRN Moderate Pain (4 - 6)

## 2022-10-21 NOTE — DIETITIAN INITIAL EVALUATION ADULT - IDEAL BODY WEIGHT (LBS)
CC:  Flora Wilson is here today for Office Visit (Patient complains of constant cough and phlem . )      Medications: medications verified, no change  Added preferred pharmacy  Refills needed today?  NO    denies Latex allergy or sensitivity    Health Maintenance Due   Topic Date Due   • Medicare Advantage- Medicare Wellness Visit  01/01/2022       Patient is due for topics as listed above but is not proceeding with MWV (Medicare Wellness Visit) at this time. MD to discuss.    Patient would like communication of their results via:        Cell Phone:   Telephone Information:   Mobile [phone]     Okay to leave a message containing results? Yes       105

## 2022-10-21 NOTE — PATIENT PROFILE ADULT - FALL HARM RISK - HARM RISK INTERVENTIONS

## 2022-10-21 NOTE — PROGRESS NOTE ADULT - SUBJECTIVE AND OBJECTIVE BOX
Date/Time Patient Seen:  		  Referring MD:   Data Reviewed	       Patient is a 82y old  Female who presents with a chief complaint of SOB AND COUGH (20 Oct 2022 20:19)      Subjective/HPI     PAST MEDICAL & SURGICAL HISTORY:  History of COPD    Insomnia    Mood disorder    Dementia    Spasm of muscle    Constipation    Hypothyroidism    GERD (gastroesophageal reflux disease)    HTN (hypertension)    CHF, chronic          Medication list         MEDICATIONS  (STANDING):  albuterol/ipratropium for Nebulization 3 milliLiter(s) Nebulizer every 6 hours  baclofen 5 milliGRAM(s) Oral every 12 hours  buDESOnide    Inhalation Suspension 0.5 milliGRAM(s) Inhalation two times a day  donepezil 10 milliGRAM(s) Oral at bedtime  DULoxetine 60 milliGRAM(s) Oral daily  enoxaparin Injectable 40 milliGRAM(s) SubCutaneous every 24 hours  guaiFENesin  milliGRAM(s) Oral every 12 hours  lactated ringers. 1000 milliLiter(s) (80 mL/Hr) IV Continuous <Continuous>  levothyroxine 150 MICROGram(s) Oral daily  memantine 10 milliGRAM(s) Oral two times a day  methylPREDNISolone sodium succinate Injectable 40 milliGRAM(s) IV Push daily  metoprolol tartrate 37.5 milliGRAM(s) Oral two times a day  pantoprazole    Tablet 40 milliGRAM(s) Oral before breakfast  senna 2 Tablet(s) Oral at bedtime  simvastatin 10 milliGRAM(s) Oral at bedtime  traZODone 100 milliGRAM(s) Oral at bedtime    MEDICATIONS  (PRN):  aluminum hydroxide/magnesium hydroxide/simethicone Suspension 30 milliLiter(s) Oral every 4 hours PRN Dyspepsia  bisacodyl Suppository 10 milliGRAM(s) Rectal daily PRN Constipation  ibuprofen  Tablet. 400 milliGRAM(s) Oral four times a day PRN Temp greater or equal to 38C (100.4F), Mild Pain (1 - 3)  magnesium hydroxide Suspension 30 milliLiter(s) Oral daily PRN Constipation  melatonin 3 milliGRAM(s) Oral at bedtime PRN Insomnia  ondansetron Injectable 4 milliGRAM(s) IV Push every 8 hours PRN Nausea and/or Vomiting  traMADol 50 milliGRAM(s) Oral three times a day PRN Moderate Pain (4 - 6)         Vitals log        ICU Vital Signs Last 24 Hrs  T(C): 36.9 (20 Oct 2022 22:30), Max: 37 (20 Oct 2022 12:06)  T(F): 98.5 (20 Oct 2022 22:30), Max: 98.6 (20 Oct 2022 12:06)  HR: 68 (21 Oct 2022 00:26) (68 - 82)  BP: 104/60 (21 Oct 2022 00:26) (92/56 - 107/64)  BP(mean): --  ABP: --  ABP(mean): --  RR: 16 (21 Oct 2022 00:26) (16 - 21)  SpO2: 98% (21 Oct 2022 00:26) (96% - 98%)    O2 Parameters below as of 21 Oct 2022 00:26  Patient On (Oxygen Delivery Method): nasal cannula  O2 Flow (L/min): 3               Input and Output:  I&O's Detail      Lab Data                        14.3   4.42  )-----------( 236      ( 20 Oct 2022 10:30 )             45.6     10-20    143  |  101  |  8   ----------------------------<  141<H>  4.0   |  33<H>  |  0.70    Ca    9.9      20 Oct 2022 10:30    TPro  8.2  /  Alb  3.7  /  TBili  0.4  /  DBili  x   /  AST  17  /  ALT  15  /  AlkPhos  94  10-20            Review of Systems	      Objective     Physical Examination    heart s1s2  lung dec bS  head nc      Pertinent Lab findings & Imaging      Gela:  NO   Adequate UO     I&O's Detail           Discussed with:     Cultures:	        Radiology

## 2022-10-21 NOTE — DIETITIAN INITIAL EVALUATION ADULT - SIGNS/SYMPTOMS
evidenced by NFPE findings, 20# wt loss of unknown time frame evidenced by chopped diet PTA, pt c/o difficulty swallowing

## 2022-10-21 NOTE — PROGRESS NOTE ADULT - SUBJECTIVE AND OBJECTIVE BOX
[facility]  Alberto Lepe PA-C  [address] [place] [address]  [phone]      INTERVAL HPI/OVERNIGHT EVENTS:  Pt s/e  Reports recently feeling food "doesn't move well" down her esophagus  Planned for esophagram    MEDICATIONS  (STANDING):  albuterol/ipratropium for Nebulization 3 milliLiter(s) Nebulizer every 6 hours  baclofen 5 milliGRAM(s) Oral every 12 hours  buDESOnide    Inhalation Suspension 0.5 milliGRAM(s) Inhalation two times a day  donepezil 10 milliGRAM(s) Oral at bedtime  DULoxetine 60 milliGRAM(s) Oral daily  enoxaparin Injectable 40 milliGRAM(s) SubCutaneous every 24 hours  guaiFENesin  milliGRAM(s) Oral every 12 hours  lactated ringers. 1000 milliLiter(s) (80 mL/Hr) IV Continuous <Continuous>  levothyroxine 150 MICROGram(s) Oral daily  memantine 10 milliGRAM(s) Oral two times a day  methylPREDNISolone sodium succinate Injectable 40 milliGRAM(s) IV Push daily  metoprolol tartrate 37.5 milliGRAM(s) Oral two times a day  pantoprazole    Tablet 40 milliGRAM(s) Oral before breakfast  senna 2 Tablet(s) Oral at bedtime  simvastatin 10 milliGRAM(s) Oral at bedtime  traZODone 100 milliGRAM(s) Oral at bedtime    MEDICATIONS  (PRN):  aluminum hydroxide/magnesium hydroxide/simethicone Suspension 30 milliLiter(s) Oral every 4 hours PRN Dyspepsia  bisacodyl Suppository 10 milliGRAM(s) Rectal daily PRN Constipation  ibuprofen  Tablet. 400 milliGRAM(s) Oral four times a day PRN Temp greater or equal to 38C (100.4F), Mild Pain (1 - 3)  magnesium hydroxide Suspension 30 milliLiter(s) Oral daily PRN Constipation  melatonin 3 milliGRAM(s) Oral at bedtime PRN Insomnia  ondansetron Injectable 4 milliGRAM(s) IV Push every 8 hours PRN Nausea and/or Vomiting  traMADol 50 milliGRAM(s) Oral three times a day PRN Moderate Pain (4 - 6)      Allergies    sulfa drugs (Unknown)  Tylenol (Unknown)      PHYSICAL EXAM:   Vital Signs:  Vital Signs Last 24 Hrs  T(C): 36.3 (21 Oct 2022 04:00), Max: 37 (20 Oct 2022 12:06)  T(F): 97.3 (21 Oct 2022 04:00), Max: 98.6 (20 Oct 2022 12:06)  HR: 69 (21 Oct 2022 04:00) (68 - 82)  BP: 108/71 (21 Oct 2022 04:00) (92/56 - 108/71)  BP(mean): --  RR: 17 (21 Oct 2022 04:00) (16 - 21)  SpO2: 100% (21 Oct 2022 04:00) (96% - 100%)    Parameters below as of 21 Oct 2022 04:00    O2 Flow (L/min): 3    GENERAL:  Appears stated age  ABDOMEN:  Soft, non-tender, non-distended  NEURO:  Alert    LABS:                        11.9   3.00  )-----------( 210      ( 21 Oct 2022 04:26 )             37.9     10-21    145  |  108  |  9   ----------------------------<  105<H>  4.1   |  34<H>  |  0.41<L>    Ca    9.2      21 Oct 2022 04:26    TPro  6.5  /  Alb  3.0<L>  /  TBili  0.3  /  DBili  x   /  AST  19  /  ALT  14  /  AlkPhos  71  10-21    PT/INR - ( 20 Oct 2022 10:30 )   PT: 13.4 sec;   INR: 1.14 ratio         PTT - ( 20 Oct 2022 10:30 )  PTT:35.8 sec  Urinalysis Basic - ( 21 Oct 2022 07:40 )    Color: Pale Yellow / Appearance: Clear / S.015 / pH: x  Gluc: x / Ketone: Trace  / Bili: Negative / Urobili: 1   Blood: x / Protein: Negative / Nitrite: Negative   Leuk Esterase: Trace / RBC: 0-2 /HPF / WBC 6-10   Sq Epi: x / Non Sq Epi: Few / Bacteria: Occasional

## 2022-10-21 NOTE — PATIENT PROFILE ADULT - PATIENT'S PREFERRED PRONOUN
Anesthesia Pre Eval Note    Anesthesia ROS/Med Hx    Overall Review:  EKG was reviewed and Echo was reviewed     Anesthetic Complication History:  Patient does not have a history of anesthetic complications      Pulmonary Review:  Positive for pneumonia  Positive for COPD - Severity: moderate  History of: asthma -   The patient is a former smoker.     Neuro/Psych Review:    Positive for psychiatric history    Cardiovascular Review:    Positive for hypertension    GI/HEPATIC/RENAL Review:    Positive for renal disease    End/Other Review:    Positive for obesity class III - 40.00 - 49.99    Overall Review of Systems Comments:  MRSA, MDRO  Additional Results:     ALLERGIES:   -- Codeine -- HIVES and SHORTNESS OF BREATH   -- Hydrochlorothiazide -- HIVES and Other (See Comments)    --  Laryngeal edema.       Lab Results       Component                Value               Date                       WBC                      15.7 (H)            03/24/2020                 WBC                      7.3                 01/28/2020                 RBC                      1.95 (L)            03/24/2020                 RBC                      3.50 (L)            01/28/2020                 HCT                      18.9 (L)            03/24/2020                 HCT                      35.7 (L)            01/28/2020                 MCHC                     32.3                03/24/2020                 MCHC                     34.2                01/28/2020                 SODIUM                   143                 03/24/2020                 SODIUM                   133 (L)             01/28/2020                 POTASSIUM                3.9                 03/24/2020                 POTASSIUM                3.4                 01/28/2020                 CHLORIDE                 110 (H)             03/24/2020                 CHLORIDE                 97 (L)              01/28/2020                 CO2                      28                   03/24/2020                 CO2                      27                  01/28/2020                 GLUCOSE                  254 (H)             03/24/2020                 GLUCOSE                  93                  01/28/2020                 BUN                      25 (H)              03/24/2020                 BUN                      10                  01/28/2020                 CALCIUM                  8.2 (L)             03/24/2020                 CALCIUM                  9.7                 01/28/2020                 PLT                      189                 03/24/2020                 PLT                      327                 01/28/2020                 PLT                      253                 08/18/2011                 PTT                      27                  03/20/2020                 INR                      1.0                 03/23/2020               Past Medical History:  No date: Allergy  No date: Chronic pain  No date: COPD (chronic obstructive pulmonary disease) (CMS/[facility])  04/29/2011: Disorder of bone and cartilage, unspecified  No date: Essential (primary) hypertension  No date: Pleurisy  No date: RAD (reactive airway disease)  No date: Urinary tract infection    Past Surgical History:  No date: Abdomen surgery      Comment:  stomach stapling  No date: Appendectomy  No date: Cholecystectomy  No date: Cyst removal      Comment:  under arms  09/17/2014: Epidural block injection  No date: Hysterectomy       Prior to Admission medications :  Medication bisacodyl (DULCOLAX) 5 MG EC tablet, Sig Take 5 mg by mouth daily as needed for Constipation., Start Date , End Date , Taking? Yes, Authorizing Provider Outside Provider    Medication lisinopril (ZESTRIL) 10 MG tablet, Sig Take 10 mg by mouth daily., Start Date , End Date , Taking? Yes, Authorizing Provider Outside Provider    Medication enoxaparin (LOVENOX) 60 MG/0.6ML injectable solution, Sig Inject 60 mg into the skin daily.,  Start Date , End Date , Taking? Yes, Authorizing Provider Outside Provider    Medication polyethylene glycol (MIRALAX) packet, Sig Take 17 g by mouth daily., Start Date , End Date , Taking? Yes, Authorizing Provider Outside Provider    Medication docusate sodium-sennosides (SENOKOT S) 50-8.6 MG per tablet, Sig Take 2 tablets by mouth daily., Start Date , End Date , Taking? Yes, Authorizing Provider Outside Provider    Medication Lidocaine HCl (MAGIC MOUTHWASH) Suspension, Sig Take 10 mLs by mouth 4 times daily. Swish and swallow for 10 days, Start Date , End Date , Taking? Yes, Authorizing Provider Outside Provider    Medication nystatin (MYCOSTATIN) 113670 UNIT/ML suspension, Sig Take 500,000 Units by mouth 4 times daily. For 2 weeks swish and swallow, Start Date , End Date , Taking? Yes, Authorizing Provider Outside Provider    Medication albuterol-ipratropium (COMBIVENT RESPIMAT) 100-20 MCG/ACT inhaler, Sig Inhale 1 puff into the lungs daily as needed for Shortness of Breath., Start Date , End Date , Taking? Yes, Authorizing Provider Outside Provider    Medication acetaminophen (TYLENOL) 325 MG tablet, Sig Take 325-650 mg by mouth every 4 hours as needed for Pain., Start Date , End Date , Taking? Yes, Authorizing Provider Outside Provider    Medication busPIRone (BUSPAR) 10 MG tablet, Sig Take 10 mg by mouth 2 times daily., Start Date , End Date , Taking? Yes, Authorizing Provider Outside Provider    Medication folic acid (FOLATE) 1 MG tablet, Sig Take 1 tablet by mouth daily., Start Date 3/6/20, End Date , Taking? Yes, Authorizing Provider Hailey Pearl MD    Medication Cyanocobalamin (B-12) 500 MCG Tab, Sig Take 1 tablet by mouth daily., Start Date 3/6/20, End Date , Taking? Yes, Authorizing Provider Hailey Pearl MD    Medication lactobacillus acidophilus (BACID), Sig Take 1 tablet by mouth daily., Start Date 2/19/20, End Date , Taking? Yes, Authorizing Provider John Kang MD    Medication  rOPINIRole (REQUIP) 0.25 MG tablet, Sig Take 0.25 mg by mouth nightly. , Start Date , End Date , Taking? Yes, Authorizing Provider Outside Provider    Medication loratadine (CLARITIN) 10 MG tablet, Sig Take 1 tablet by mouth daily., Start Date 3/29/16, End Date , Taking? Yes, Authorizing Provider Trevin Campos MD    Medication Cranberry 450 MG Tab, Sig Take 2 tablets by mouth daily. , Start Date , End Date , Taking? Yes, Authorizing Provider Outside Provider    Medication bisacodyl (DULCOLAX) 10 MG suppository, Sig Place 10 mg rectally daily as needed for Constipation., Start Date , End Date , Taking? , Authorizing Provider Outside Provider    Medication sodium biphosphate (FLEET) 7-19 GM/118ML enema, Sig Place 1 enema rectally daily as needed for Constipation., Start Date , End Date , Taking? , Authorizing Provider Outside Provider    Medication magnesium hydroxide (MILK OF MAGNESIA) 400 MG/5ML suspension, Sig Take 30 mLs by mouth daily as needed for Constipation., Start Date , End Date , Taking? , Authorizing Provider Outside Provider    Medication Cholecalciferol (VITAMIN D3) 1.25 mg (50,000 units) capsule, Sig Take 1 capsule by mouth 1 day a week., Start Date 3/9/20, End Date , Taking? , Authorizing Provider Hailey Pearl MD    Medication cyanocobalamin 1000 MCG/ML injection, Sig Inject 1 mL into the muscle 1 day a week for 4 weeks, THEN 1 mL every once monthly for 11 months. Do not start before March 9, 2020., Start Date 3/9/20, End Date 3/2/21, Taking? , Authorizing Provider Hailey Pearl MD    Medication cyclobenzaprine (FLEXERIL) 10 MG tablet, Sig Take 1 tablet by mouth 3 times daily as needed for Muscle spasms., Start Date 3/5/20, End Date , Taking? , Authorizing Provider Stephon Sanchez MD    Medication benzonatate (TESSALON PERLES) 100 MG capsule, Sig TAKE 1 CAPSULE BY MOUTH THREE TIMES DAILY AS NEEDED FOR COUGH, Start Date 1/30/20, End Date , Taking? , Authorizing Provider Trevin Campos,  MD            Relevant Problems   No relevant active problems       Physical Exam     Airway     TM Distance: >3 FB  Neck: Short  Patient has airway adjunct and supplemental O2 and oral ET tube    Cardiovascular  Cardiovascular exam normal  Cardio Rhythm: Regular  Cardio Rate: Normal    Head Assessment  Head assessment: Normocephalic and Atraumatic    Pulmonary Exam    Breath sounds clear to auscultation:  No  Patient Demonstrates:  Rhonchi and Decreased breath sounds    Abdominal Exam    Patient Demonstrates:  Obese    Other Findings  Patient intubated on vent. She opens eyes but not following command.      Anesthesia Plan  No phone call attempted due to:  Inpatient    ASA Status: 3    Anesthesia Type: MAC    Maintenance: TIVA  Premedication: None      Post-op Pain Management: Per Surgeon      Checklist  Reviewed: Lab Results, EKG, Chest X-Rays, NPO Status, Problem list, Medications, Past Med History, Allergies and Patient Summary    Informed Consent    Blood Products: Not Anticipated     Her/She

## 2022-10-21 NOTE — PROGRESS NOTE ADULT - SUBJECTIVE AND OBJECTIVE BOX
ROXIE LAZAR    [facility] [address]    Allergies    sulfa drugs (Unknown)  Tylenol (Unknown)    Intolerances        PAST MEDICAL & SURGICAL HISTORY:  History of COPD      Insomnia      Mood disorder      Dementia      Spasm of muscle      Constipation      Hypothyroidism      GERD (gastroesophageal reflux disease)      HTN (hypertension)      CHF, chronic          FAMILY HISTORY:      Home Medications:  acetaminophen 325 mg oral tablet: 2 tab(s) orally every 6 hours, As Needed for pain  (20 Oct 2022 16:34)  baclofen 10 mg oral tablet: 0.5 tab(s) orally 2 times a day (20 Oct 2022 16:34)  Cymbalta 60 mg oral delayed release capsule: 1 cap(s) orally once a day (20 Oct 2022 16:34)  donepezil 10 mg oral tablet: 1 tab(s) orally once a day (at bedtime) (20 Oct 2022 16:34)  Eucerin topical cream: Apply topically to affected area once a day (at bedtime) (20 Oct 2022 16:34)  furosemide 40 mg oral tablet: 0.5 tab(s) orally once a day (20 Oct 2022 16:34)  ipratropium-albuterol 0.5 mg-2.5 mg/3 mL inhalation solution: 3 milliliter(s) inhaled 4 times a day (20 Oct 2022 16:34)  levothyroxine 150 mcg (0.15 mg) oral capsule: 1 cap(s) orally once a day (20 Oct 2022 16:34)  Linzess 290 mcg oral capsule: 1 cap(s) orally once a day (20 Oct 2022 16:34)  melatonin 3 mg oral tablet: 1 tab(s) orally once a day (at bedtime) (20 Oct 2022 16:34)  Metoprolol Tartrate 25 mg oral tablet: 1.5 tab(s) orally 2 times a day (20 Oct 2022 16:34)  Namenda 10 mg oral tablet: 2 tab(s) orally once a day (at bedtime) (20 Oct 2022 16:34)  omeprazole 40 mg oral delayed release capsule: 1 cap(s) orally once a day (20 Oct 2022 16:34)  ondansetron 4 mg oral tablet: 1 tab(s) orally once a day (20 Oct 2022 16:34)  ProAir HFA 90 mcg/inh inhalation aerosol: 2 puff(s) inhaled every 6 hours, As Needed (20 Oct 2022 16:34)  Senna 8.6 mg oral tablet: 2 tab(s) orally once a day (at bedtime) (20 Oct 2022 16:34)  traZODone 100 mg oral tablet: 1 tab(s) orally once a day (at bedtime) (20 Oct 2022 16:34)  zinc oxide topical ointment: Apply topically to affected area once a day and as needed  (20 Oct 2022 16:34)  Zocor 10 mg oral tablet: 1 tab(s) orally once a day (at bedtime) (20 Oct 2022 16:34)  ZyrTEC 10 mg oral tablet: 1 tab(s) orally once a day (20 Oct 2022 16:34)      MEDICATIONS  (STANDING):  albuterol/ipratropium for Nebulization 3 milliLiter(s) Nebulizer every 6 hours  baclofen 5 milliGRAM(s) Oral every 12 hours  buDESOnide    Inhalation Suspension 0.5 milliGRAM(s) Inhalation two times a day  dextrose 5% + sodium chloride 0.45%. 1000 milliLiter(s) (50 mL/Hr) IV Continuous <Continuous>  donepezil 10 milliGRAM(s) Oral at bedtime  DULoxetine 60 milliGRAM(s) Oral daily  enoxaparin Injectable 40 milliGRAM(s) SubCutaneous every 24 hours  guaiFENesin  milliGRAM(s) Oral every 12 hours  levothyroxine 150 MICROGram(s) Oral daily  memantine 10 milliGRAM(s) Oral two times a day  methylPREDNISolone sodium succinate Injectable 40 milliGRAM(s) IV Push daily  metoprolol tartrate 37.5 milliGRAM(s) Oral two times a day  pantoprazole    Tablet 40 milliGRAM(s) Oral before breakfast  simvastatin 10 milliGRAM(s) Oral at bedtime  traZODone 100 milliGRAM(s) Oral at bedtime    MEDICATIONS  (PRN):  aluminum hydroxide/magnesium hydroxide/simethicone Suspension 30 milliLiter(s) Oral every 4 hours PRN Dyspepsia  ibuprofen  Tablet. 400 milliGRAM(s) Oral four times a day PRN Temp greater or equal to 38C (100.4F), Mild Pain (1 - 3)  loperamide 2 milliGRAM(s) Oral four times a day PRN Diarrhea  melatonin 3 milliGRAM(s) Oral at bedtime PRN Insomnia  ondansetron Injectable 4 milliGRAM(s) IV Push every 8 hours PRN Nausea and/or Vomiting  traMADol 50 milliGRAM(s) Oral three times a day PRN Moderate Pain (4 - 6)      Diet, Clear Liquid (10-21-22 @ 13:16) [Active]          Vital Signs Last 24 Hrs  T(C): 36.7 (21 Oct 2022 18:44), Max: 36.9 (20 Oct 2022 22:30)  T(F): 98 (21 Oct 2022 18:44), Max: 98.5 (20 Oct 2022 22:30)  HR: 59 (21 Oct 2022 13:20) (59 - 74)  BP: 98/60 (21 Oct 2022 13:20) (94/63 - 108/71)  BP(mean): --  RR: 17 (21 Oct 2022 13:20) (16 - 18)  SpO2: 96% (21 Oct 2022 13:20) (96% - 100%)    Parameters below as of 21 Oct 2022 12:55  Patient On (Oxygen Delivery Method): nasal cannula          10-20-22 @ 07:  -  10-21-22 @ 07:00  --------------------------------------------------------  IN: 80 mL / OUT: 0 mL / NET: 80 mL    10-21-22 @ 07:01  -  10-21-22 @ 19:58  --------------------------------------------------------  IN: 720 mL / OUT: 0 mL / NET: 720 mL              LABS:                        11.9   3.00  )-----------( 210      ( 21 Oct 2022 04:26 )             37.9     10-21    145  |  108  |  9   ----------------------------<  105<H>  4.1   |  34<H>  |  0.41<L>    Ca    9.2      21 Oct 2022 04:26    TPro  6.5  /  Alb  3.0<L>  /  TBili  0.3  /  DBili  x   /  AST  19  /  ALT  14  /  AlkPhos  71  10-21    PT/INR - ( 20 Oct 2022 10:30 )   PT: 13.4 sec;   INR: 1.14 ratio         PTT - ( 20 Oct 2022 10:30 )  PTT:35.8 sec  Urinalysis Basic - ( 21 Oct 2022 07:40 )    Color: Pale Yellow / Appearance: Clear / S.015 / pH: x  Gluc: x / Ketone: Trace  / Bili: Negative / Urobili: 1   Blood: x / Protein: Negative / Nitrite: Negative   Leuk Esterase: Trace / RBC: 0-2 /HPF / WBC 6-10   Sq Epi: x / Non Sq Epi: Few / Bacteria: Occasional        ABG - ( 21 Oct 2022 06:00 )  pH, Arterial: 7.44  pH, Blood: x     /  pCO2: 49    /  pO2: 177   / HCO3: 33    / Base Excess: 9.1   /  SaO2: 99.0                WBC:  WBC Count: 3.00 K/uL (10-21 @ 04:26)  WBC Count: 4.42 K/uL (10-20 @ 10:30)      MICROBIOLOGY:  RECENT CULTURES:  10-20 .Blood Blood-Peripheral XXXX XXXX   No growth to date.    10-20 .Blood Blood-Peripheral XXXX XXXX   No growth to date.                PT/INR - ( 20 Oct 2022 10:30 )   PT: 13.4 sec;   INR: 1.14 ratio         PTT - ( 20 Oct 2022 10:30 )  PTT:35.8 sec    Sodium:  Sodium, Serum: 145 mmol/L (10-21 @ 04:26)  Sodium, Serum: 143 mmol/L (10-20 @ 10:30)      0.41 mg/dL 10-21 @ 04:26  0.70 mg/dL 10-20 @ 10:30      Hemoglobin:  Hemoglobin: 11.9 g/dL (10-21 @ 04:26)  Hemoglobin: 14.3 g/dL (10-20 @ 10:30)      Platelets: Platelet Count - Automated: 210 K/uL (10-21 @ 04:26)  Platelet Count - Automated: 236 K/uL (10-20 @ 10:30)      LIVER FUNCTIONS - ( 21 Oct 2022 04:26 )  Alb: 3.0 g/dL / Pro: 6.5 g/dL / ALK PHOS: 71 U/L / ALT: 14 U/L / AST: 19 U/L / GGT: x             Urinalysis Basic - ( 21 Oct 2022 07:40 )    Color: Pale Yellow / Appearance: Clear / S.015 / pH: x  Gluc: x / Ketone: Trace  / Bili: Negative / Urobili: 1   Blood: x / Protein: Negative / Nitrite: Negative   Leuk Esterase: Trace / RBC: 0-2 /HPF / WBC 6-10   Sq Epi: x / Non Sq Epi: Few / Bacteria: Occasional        RADIOLOGY & ADDITIONAL STUDIES:      MICROBIOLOGY:  RECENT CULTURES:  10-20 .Blood Blood-Peripheral XXXX XXXX   No growth to date.    10-20 .Blood Blood-Peripheral XXXX XXXX   No growth to date.

## 2022-10-21 NOTE — SWALLOW BEDSIDE ASSESSMENT ADULT - SLP PERTINENT HISTORY OF CURRENT PROBLEM
Per charting, "Patient brought in by EMS from [facility] for shortness of breath cough wheezing for 2 days.  Patient denies fevers chills headache chest pain abdominal pain vomiting Admit for antibacterial managmnet ,intravenous steroids,nebulised bronchodilators and pulmonology evaluation,O2 supply,serial labs and chest xrays,obtain ECHO to evaluate LVEF and rule out chf component."

## 2022-10-21 NOTE — DIETITIAN INITIAL EVALUATION ADULT - PERTINENT LABORATORY DATA
10-21    145  |  108  |  9   ----------------------------<  105<H>  4.1   |  34<H>  |  0.41<L>    Ca    9.2      21 Oct 2022 04:26    TPro  6.5  /  Alb  3.0<L>  /  TBili  0.3  /  DBili  x   /  AST  19  /  ALT  14  /  AlkPhos  71  10-21  POCT Blood Glucose.: 106 mg/dL (10-21-22 @ 11:49)

## 2022-10-21 NOTE — PROVIDER CONTACT NOTE (OTHER) - ACTION/TREATMENT ORDERED:
Dr. Slaughter aware. wants to monitor at this time. will think about adding imodium. will continue to assess and monitor Dr. Slaughter aware. wants to monitor at this time. covid swab to be ordered will think about adding imodium. will continue to assess and monitor

## 2022-10-21 NOTE — PROGRESS NOTE ADULT - SUBJECTIVE AND OBJECTIVE BOX
Patient is a 82y Female with a known history of :  Mood disorder [F39]    COPD exacerbation [J44.1]    Acute respiratory failure with hypoxia [J96.01]    Insomnia [G47.00]    Dementia [F03.90]    Constipation [K59.00]    GERD (gastroesophageal reflux disease) [K21.9]    CHF, chronic [I50.9]    HTN (hypertension) [I10]    Hypothyroidism [E03.9]    Prophylactic measure [Z29.9]      HPI:  Patient brought in by EMS from [facility] for shortness of breath cough wheezing for 2 days.  Patient denies fevers chills headache chest pain abdominal pain vomiting Admit for antibacterial managmnet ,intravenous steroids,nebulised bronchodilators and pulmonology evaluation,O2 supply,serial labs and chest xrays,obtain ECHO to evaluate LVEF and rule out chf component Admitted  to telemetry unit for monitoring , send 3 sets of cardiac enzymes to rule out acute coronary event, obtain ECHO to evaluate LVEF, cardiology consult  ,continue current management, O2 supply, anticoagulation plan as per cardiology consult Palliative care consult requested ,to discuss advance directives and complete MOLST  (20 Oct 2022 13:54)      REVIEW OF SYSTEMS:    CONSTITUTIONAL: No fever, weight loss, or fatigue  EYES: No eye pain, visual disturbances, or discharge  ENMT:  No difficulty hearing, tinnitus, vertigo; No sinus or throat pain  NECK: No pain or stiffness  BREASTS: No pain, masses, or nipple discharge  RESPIRATORY: No cough, wheezing, chills or hemoptysis; No shortness of breath  CARDIOVASCULAR: No chest pain, palpitations, dizziness, or leg swelling  GASTROINTESTINAL: No abdominal or epigastric pain. No nausea, vomiting, or hematemesis; No diarrhea or constipation. No melena or hematochezia.  GENITOURINARY: No dysuria, frequency, hematuria, or incontinence  NEUROLOGICAL: No headaches, memory loss, loss of strength, numbness, or tremors  SKIN: No itching, burning, rashes, or lesions   LYMPH NODES: No enlarged glands  ENDOCRINE: No heat or cold intolerance; No hair loss  MUSCULOSKELETAL: No joint pain or swelling; No muscle, back, or extremity pain  PSYCHIATRIC: No depression, anxiety, mood swings, or difficulty sleeping  HEME/LYMPH: No easy bruising, or bleeding gums  ALLERGY AND IMMUNOLOGIC: No hives or eczema    MEDICATIONS  (STANDING):  albuterol/ipratropium for Nebulization 3 milliLiter(s) Nebulizer every 6 hours  baclofen 5 milliGRAM(s) Oral every 12 hours  buDESOnide    Inhalation Suspension 0.5 milliGRAM(s) Inhalation two times a day  donepezil 10 milliGRAM(s) Oral at bedtime  DULoxetine 60 milliGRAM(s) Oral daily  enoxaparin Injectable 40 milliGRAM(s) SubCutaneous every 24 hours  guaiFENesin  milliGRAM(s) Oral every 12 hours  lactated ringers. 1000 milliLiter(s) (80 mL/Hr) IV Continuous <Continuous>  levothyroxine 150 MICROGram(s) Oral daily  memantine 10 milliGRAM(s) Oral two times a day  methylPREDNISolone sodium succinate Injectable 40 milliGRAM(s) IV Push daily  metoprolol tartrate 37.5 milliGRAM(s) Oral two times a day  pantoprazole    Tablet 40 milliGRAM(s) Oral before breakfast  senna 2 Tablet(s) Oral at bedtime  simvastatin 10 milliGRAM(s) Oral at bedtime  traZODone 100 milliGRAM(s) Oral at bedtime    MEDICATIONS  (PRN):  aluminum hydroxide/magnesium hydroxide/simethicone Suspension 30 milliLiter(s) Oral every 4 hours PRN Dyspepsia  bisacodyl Suppository 10 milliGRAM(s) Rectal daily PRN Constipation  ibuprofen  Tablet. 400 milliGRAM(s) Oral four times a day PRN Temp greater or equal to 38C (100.4F), Mild Pain (1 - 3)  magnesium hydroxide Suspension 30 milliLiter(s) Oral daily PRN Constipation  melatonin 3 milliGRAM(s) Oral at bedtime PRN Insomnia  ondansetron Injectable 4 milliGRAM(s) IV Push every 8 hours PRN Nausea and/or Vomiting  traMADol 50 milliGRAM(s) Oral three times a day PRN Moderate Pain (4 - 6)      ALLERGIES: sulfa drugs (Unknown)  Tylenol (Unknown)      FAMILY HISTORY:      PHYSICAL EXAMINATION:  -----------------------------  T(C): 36.3 (10-21-22 @ 04:00), Max: 37 (10-20-22 @ 12:06)  HR: 69 (10-21-22 @ 04:00) (68 - 82)  BP: 108/71 (10-21-22 @ 04:00) (92/56 - 108/71)  RR: 17 (10-21-22 @ 04:00) (16 - 21)  SpO2: 100% (10-21-22 @ 04:00) (96% - 100%)  Wt(kg): --    Height (cm): 154.9 (10-20 @ 10:22)  Weight (kg): 52.2 (10-20 @ 10:22)  BMI (kg/m2): 21.8 (10-20 @ 10:22)  BSA (m2): 1.49 (10-20 @ 10:22)    VITALS  T(C): 36.3 (10-21-22 @ 04:00), Max: 37 (10-20-22 @ 12:06)  HR: 69 (10-21-22 @ 04:00) (68 - 82)  BP: 108/71 (10-21-22 @ 04:00) (92/56 - 108/71)  RR: 17 (10-21-22 @ 04:00) (16 - 21)  SpO2: 100% (10-21-22 @ 04:00) (96% - 100%)    Constitutional: well developed, normal appearance, well groomed, well nourished, no deformities and no acute distress.   Eyes: the conjunctiva exhibited no abnormalities and the eyelids demonstrated no xanthelasmas.   HEENT: normal oral mucosa, no oral pallor and no oral cyanosis.   Neck: normal jugular venous A waves present, normal jugular venous V waves present and no jugular venous oliveira A waves.   Pulmonary: no respiratory distress, normal respiratory rhythm and effort, no accessory muscle use and lungs were clear to auscultation bilaterally.   Cardiovascular: heart rate and rhythm were normal, normal S1 and S2 and no murmur, gallop, rub, heave or thrill are present.   Abdomen: soft, non-tender, no hepato-splenomegaly and no abdominal mass palpated.   Musculoskeletal: the gait could not be assessed..   Extremities: no clubbing of the fingernails, no localized cyanosis, no petechial hemorrhages and no ischemic changes.   Skin: normal skin color and pigmentation, no rash, no venous stasis, no skin lesions, no skin ulcer and no xanthoma was observed.   Psychiatric: oriented to person, place, and time, the affect was normal, the mood was normal and not feeling anxious.     LABS:   --------  10-21    145  |  108  |  9   ----------------------------<  105<H>  4.1   |  34<H>  |  0.41<L>    Ca    9.2      21 Oct 2022 04:26    TPro  6.5  /  Alb  3.0<L>  /  TBili  0.3  /  DBili  x   /  AST  19  /  ALT  14  /  AlkPhos  71  10-21                         11.9   3.00  )-----------( 210      ( 21 Oct 2022 04:26 )             37.9     PT/INR - ( 20 Oct 2022 10:30 )   PT: 13.4 sec;   INR: 1.14 ratio         PTT - ( 20 Oct 2022 10:30 )  PTT:35.8 sec  10-20 @ 20:30 BNP: 2505 pg/mL            RADIOLOGY:  -----------------    ECG:     ECHO:

## 2022-10-21 NOTE — PROGRESS NOTE ADULT - SUBJECTIVE AND OBJECTIVE BOX
PROGRESS NOTE  Patient is a 82y old  Female who presents with a chief complaint of Shortness of breath    Patient brought in by EMS from [facility] for shortness of breath cough wheezing for 2 days.  (21 Oct 2022 12:06)    Chart and available morning labs /imaging are reviewed electronically , urgent issues addressed . More information  is being added upon completion of rounds , when more information is collected and management discussed with consultants , medical staff and social service/case management on the floor   OVERNIGHT  No new issues reported by medical staff . All above noted Patient is resting in a bed comfortably .Confused ,poor mentation .No distress noted SLP ESOPHAGOGRAM noted and d/w GI TEAM -EGD planned for monday ,started on clear diet until test    HPI:  Patient brought in by EMS from [facility] for shortness of breath cough wheezing for 2 days.  Patient denies fevers chills headache chest pain abdominal pain vomiting Admit for antibacterial managmnet ,intravenous steroids,nebulised bronchodilators and pulmonology evaluation,O2 supply,serial labs and chest xrays,obtain ECHO to evaluate LVEF and rule out chf component Admitted  to telemetry unit for monitoring , send 3 sets of cardiac enzymes to rule out acute coronary event, obtain ECHO to evaluate LVEF, cardiology consult  ,continue current management, O2 supply, anticoagulation plan as per cardiology consult Palliative care consult requested ,to discuss advance directives and complete MOLST  (20 Oct 2022 13:54)    PAST MEDICAL & SURGICAL HISTORY:  History of COPD      Insomnia      Mood disorder      Dementia      Spasm of muscle      Constipation      Hypothyroidism      GERD (gastroesophageal reflux disease)      HTN (hypertension)      CHF, chronic          MEDICATIONS  (STANDING):  albuterol/ipratropium for Nebulization 3 milliLiter(s) Nebulizer every 6 hours  baclofen 5 milliGRAM(s) Oral every 12 hours  buDESOnide    Inhalation Suspension 0.5 milliGRAM(s) Inhalation two times a day  donepezil 10 milliGRAM(s) Oral at bedtime  DULoxetine 60 milliGRAM(s) Oral daily  enoxaparin Injectable 40 milliGRAM(s) SubCutaneous every 24 hours  guaiFENesin  milliGRAM(s) Oral every 12 hours  lactated ringers. 1000 milliLiter(s) (80 mL/Hr) IV Continuous <Continuous>  levothyroxine 150 MICROGram(s) Oral daily  memantine 10 milliGRAM(s) Oral two times a day  methylPREDNISolone sodium succinate Injectable 40 milliGRAM(s) IV Push daily  metoprolol tartrate 37.5 milliGRAM(s) Oral two times a day  pantoprazole    Tablet 40 milliGRAM(s) Oral before breakfast  senna 2 Tablet(s) Oral at bedtime  simvastatin 10 milliGRAM(s) Oral at bedtime  traZODone 100 milliGRAM(s) Oral at bedtime    MEDICATIONS  (PRN):  aluminum hydroxide/magnesium hydroxide/simethicone Suspension 30 milliLiter(s) Oral every 4 hours PRN Dyspepsia  bisacodyl Suppository 10 milliGRAM(s) Rectal daily PRN Constipation  ibuprofen  Tablet. 400 milliGRAM(s) Oral four times a day PRN Temp greater or equal to 38C (100.4F), Mild Pain (1 - 3)  magnesium hydroxide Suspension 30 milliLiter(s) Oral daily PRN Constipation  melatonin 3 milliGRAM(s) Oral at bedtime PRN Insomnia  ondansetron Injectable 4 milliGRAM(s) IV Push every 8 hours PRN Nausea and/or Vomiting  traMADol 50 milliGRAM(s) Oral three times a day PRN Moderate Pain (4 - 6)      OBJECTIVE    T(C): 36.6 (10-21-22 @ 13:20), Max: 36.9 (10-20-22 @ 22:30)  HR: 59 (10-21-22 @ 13:20) (59 - 78)  BP: 98/60 (10-21-22 @ 13:20) (94/63 - 108/71)  RR: 17 (10-21-22 @ 13:20) (16 - 18)  SpO2: 96% (10-21-22 @ 13:20) (96% - 100%)  Wt(kg): --  I&O's Summary        REVIEW OF SYSTEMS:  CONSTITUTIONAL: No fever, weight loss, or fatigue  EYES: No eye pain, visual disturbances, or discharge  ENMT:   No sinus or throat pain  NECK: No pain or stiffness  RESPIRATORY: No cough, wheezing, chills or hemoptysis; No shortness of breath  CARDIOVASCULAR: No chest pain, palpitations, dizziness, or leg swelling  GASTROINTESTINAL: No abdominal pain. No nausea, vomiting; No diarrhea or constipation. No melena or hematochezia.  GENITOURINARY: No dysuria, frequency, hematuria, or incontinence  NEUROLOGICAL: No headaches, memory loss, loss of strength, numbness, or tremors  SKIN: No itching, burning, rashes, or lesions   MUSCULOSKELETAL: No joint pain or swelling; No muscle, back, or extremity pain    PHYSICAL EXAM:  Appearance: NAD. VS past 24 hrs -as above   HEENT:   Moist oral mucosa. Conjunctiva clear b/l.   Neck : supple  Respiratory: Lungs CTAB.  Gastrointestinal:  Soft, nontender. No rebound. No rigidity. BS present	  Cardiovascular: RRR ,S1S2 present  Neurologic: Non-focal. Moving all extremities.  Extremities: No edema. No erythema. No calf tenderness.  Skin: No rashes, No ecchymoses, No cyanosis.	  wounds ,skin lesions-See skin assesment flow sheet   LABS:                        11.9   3.00  )-----------( 210      ( 21 Oct 2022 04:26 )             37.9     10-21    145  |  108  |  9   ----------------------------<  105<H>  4.1   |  34<H>  |  0.41<L>    Ca    9.2      21 Oct 2022 04:26    TPro  6.5  /  Alb  3.0<L>  /  TBili  0.3  /  DBili  x   /  AST  19  /  ALT  14  /  AlkPhos  71  10-    CAPILLARY BLOOD GLUCOSE      POCT Blood Glucose.: 106 mg/dL (21 Oct 2022 11:49)  POCT Blood Glucose.: 97 mg/dL (21 Oct 2022 07:36)    PT/INR - ( 20 Oct 2022 10:30 )   PT: 13.4 sec;   INR: 1.14 ratio         PTT - ( 20 Oct 2022 10:30 )  PTT:35.8 sec  Urinalysis Basic - ( 21 Oct 2022 07:40 )    Color: Pale Yellow / Appearance: Clear / S.015 / pH: x  Gluc: x / Ketone: Trace  / Bili: Negative / Urobili: 1   Blood: x / Protein: Negative / Nitrite: Negative   Leuk Esterase: Trace / RBC: 0-2 /HPF / WBC 6-10   Sq Epi: x / Non Sq Epi: Few / Bacteria: Occasional        RADIOLOGY & ADDITIONAL TESTS:< from: Xray Esophagram Single Contrast (10.21.22 @ 11:23) >  ACC: [id number] EXAM:  XR ESOPH SNGL CON STUDY                          PROCEDURE DATE:  10/21/2022          INTERPRETATION:  XR ESOPHAGRAM    Clinical information: Difficulty swallowing.    Preliminary  film of the chest was performed. Exam demonstrates the   patient is mildly rotated towards the right. An atherosclerotic aorta is   seen. Overlying leads are noted as well as an IVC filter. There are   coarse interstitial markings. Small right pleural effusion. No focal   consolidation. Probable cardiomegaly. There is a right paraspinal opacity   thought related to the previously seen dilated esophagus on CT.    Under fluoroscopic control a single contrast esophagram was performed.   Exam was performed in the reverse Trendelenburg position.    FINDINGS:  There is moderate dilatation of the proximal esophagus. Within the mid   third of the esophagus there is a large diverticulum arising from the   right lateral aspect. Mouth of the diverticulum measures approximately 4   cm craniocaudad. Diverticulum measures approximately 10.4 x craniocaudad   by 8.8 cm anterior to posterior by 6.2 cm in width. There is preferential   filling of the large diverticulum with debris present within the   diverticulum and proximal esophagus. There is mild narrowing of the   esophagus just distal to the diverticulum with a diameter measuring 1.3   cm in length of 1 cm. Patency of the more distal esophagus and GE   junction.    IMPRESSION:  Large debris-filled wide mouth diverticulum arising from the right   lateral aspect of the mid esophagus. Associated moderate dilatation of   the more proximal esophagus which also contains debris. Mild narrowing of   the esophagus just distal to the diverticulum with diameter 1.3 cm in   length of 1 cm. Patency of the more distal esophagus and GE junction.    < end of copied text >  < from: US Duplex Venous Lower Ext Complete, Bilateral (10.20.22 @ 17:20) >  ACC: [id number] EXAM:  US DPLX LWR EXT VEINS COMPL BI                          PROCEDURE DATE:  10/20/2022          INTERPRETATION:  CLINICAL INFORMATION: Shortness of breath, evaluate for   DVT    COMPARISON: None available.    TECHNIQUE: Duplex sonography of the BILATERAL LOWER extremity veins with   color and spectral Doppler, with and without compression.    FINDINGS:    RIGHT:  Normal compressibility of the RIGHT common femoral, femoral and popliteal   veins.  Doppler examination shows normal spontaneous and phasic flow.  No RIGHT calf vein thrombosis is detected.    LEFT:  Normal compressibility of the LEFT common femoral, femoral and popliteal   veins.  Doppler examination shows normal spontaneous and phasic flow.  No LEFT calf vein thrombosis is detected.    IMPRESSION:  No evidence of deep venous thrombosis in either lower extremity.    < end of copied text >  < from: CT Chest No Cont (10.20.22 @ 14:55) >  ACC: [id number] EXAM:  CT ABDOMEN AND PELVIS                        ACC: [id number] EXAM:  CT CHEST                          PROCEDURE DATE:  10/20/2022          INTERPRETATION:  CLINICAL INFORMATION: Shortness of breath and cough.   History dementia.    COMPARISON: None.    CONTRAST/COMPLICATIONS:  IV Contrast: NONE  Oral Contrast: NONE  Complications: None reported at time of study completion    PROCEDURE:  CT of the Chest, Abdomen and Pelvis was performed.  Sagittal and coronal reformats were performed.    FINDINGS:    CHEST:    LUNGS AND LARGE AIRWAYS: PLEURA:  Emphysematous disease. Biapical scarring.    Bibasilar atelectatic changes.  No lobar lung consolidation, pleural effusion or pneumothorax.    The central airways remain patent.    VESSELS: Atherosclerotic changes thoracic aorta and coronary artery   calcifications.    HEART: The heart is enlarged. No pericardial effusion.    MEDIASTINUM AND YAMEL:  Clusters of small subcentimeter pretracheal/precarinal and subcarinal   mediastinal lymph nodes.    Markedly distended esophagus with retained particulate/ingested matter.   There is abrupt tapering at the distal esophagus, just above the level of   the diaphragmatic hiatus.    CHEST WALL AND LOWER NECK: Within normal limits.    ABDOMEN AND PELVIS:    The evaluation of the solid organ parenchyma is limited without   intravenous contrast.    LIVER:  Small indeterminate hypodense lesion posterior right hepatic lobe.  BILE DUCTS: Normal caliber.  GALLBLADDER: Within normal limits.  SPLEEN: Within normal limits.  PANCREAS: Atrophic.  ADRENALS: Within normal limits.  KIDNEYS/URETERS: Within normal limits.    Streak artifact related to patient's bilateral orthopedic hardware   degrades image quality limiting evaluation pelvis.    BLADDER: Nondistended.  REPRODUCTIVE ORGANS: No pelvic mass.    BOWEL:    Evaluation of the stomach is limited without distention.  Sigmoid diverticulosis, without CT evidence of diverticulitis.  No bowel obstruction.  PERITONEUM: No ascites.    VESSELS: Atherosclerotic changes.  IVC filter.  RETROPERITONEUM/LYMPH NODES: No lymphadenopathy.    ABDOMINAL WALL: Within normal limits.    BONES:  Diffuse osteopenia/demineralization.  Multilevel vertebral compression deformities.    Partially imaged bilateral ORIF proximal femurs.  Partially imaged ORIF right proximal humerus.    Old sternal fracture deformity.    IMPRESSION:    Emphysematous disease.    Dilated esophagus with retained food/particulate matter and abrupt   tapering distally.  Correlate clinically for achalasia or esophageal stricture.    No acute intra-abdominal pathology.    Other findings as discussed above.    < end of copied text >     reviewed elctronically  ASSESSMENT/PLAN: 	    45 minutes aggregate time was spent on this visit, 50% visit time spent in care co-ordination with other attendings and counselling patient .I have discussed care plan with patient / HCP/family member ,who expressed understanding of problems treatment and their effect and side effects, alternatives in details. I have asked if they have any questions and concerns and appropriately addressed them to best of my ability.

## 2022-10-21 NOTE — SWALLOW BEDSIDE ASSESSMENT ADULT - COMMENTS
CDE consult received and chart reviewed. RN requested CDE to be performed given patient now back on unit s/p esophagram. Aware patient receiving a clear, thin liquid diet per GI. Spoke with MD who requested that CDE be deferred given patient undergoing GI work-up and is scheduled for EGD. MD will re-consult this department when CDE is indicated.
Attempted clinical swallow assessment this AM. Per discussion with RN and radiology, pt is likely undergoing esophagram today. Radiology reported pt to remain NPO prior to esophagram. Will f/u pending results and GI recommendations, as schedule permits. Left message with Dr. Roa.

## 2022-10-22 LAB
ALBUMIN SERPL ELPH-MCNC: 2.9 G/DL — LOW (ref 3.3–5)
ALP SERPL-CCNC: 62 U/L — SIGNIFICANT CHANGE UP (ref 40–120)
ALT FLD-CCNC: 13 U/L — SIGNIFICANT CHANGE UP (ref 12–78)
ANION GAP SERPL CALC-SCNC: 7 MMOL/L — SIGNIFICANT CHANGE UP (ref 5–17)
AST SERPL-CCNC: 18 U/L — SIGNIFICANT CHANGE UP (ref 15–37)
BASE EXCESS BLDA CALC-SCNC: 5.6 MMOL/L — HIGH (ref -2–3)
BILIRUB SERPL-MCNC: 0.3 MG/DL — SIGNIFICANT CHANGE UP (ref 0.2–1.2)
BLOOD GAS COMMENTS ARTERIAL: SIGNIFICANT CHANGE UP
BUN SERPL-MCNC: 11 MG/DL — SIGNIFICANT CHANGE UP (ref 7–23)
CALCIUM SERPL-MCNC: 9.1 MG/DL — SIGNIFICANT CHANGE UP (ref 8.5–10.1)
CHLORIDE SERPL-SCNC: 107 MMOL/L — SIGNIFICANT CHANGE UP (ref 96–108)
CO2 SERPL-SCNC: 32 MMOL/L — HIGH (ref 22–31)
CREAT SERPL-MCNC: 0.47 MG/DL — LOW (ref 0.5–1.3)
CRP SERPL-MCNC: 8 MG/L — HIGH
CULTURE RESULTS: SIGNIFICANT CHANGE UP
EGFR: 94 ML/MIN/1.73M2 — SIGNIFICANT CHANGE UP
ERYTHROCYTE [SEDIMENTATION RATE] IN BLOOD: 14 MM/HR — SIGNIFICANT CHANGE UP (ref 0–20)
FERRITIN SERPL-MCNC: 80 NG/ML — SIGNIFICANT CHANGE UP (ref 15–150)
GLUCOSE SERPL-MCNC: 85 MG/DL — SIGNIFICANT CHANGE UP (ref 70–99)
HCO3 BLDA-SCNC: 30 MMOL/L — HIGH (ref 21–28)
HCT VFR BLD CALC: 36.1 % — SIGNIFICANT CHANGE UP (ref 34.5–45)
HGB BLD-MCNC: 11 G/DL — LOW (ref 11.5–15.5)
MCHC RBC-ENTMCNC: 28.4 PG — SIGNIFICANT CHANGE UP (ref 27–34)
MCHC RBC-ENTMCNC: 30.5 GM/DL — LOW (ref 32–36)
MCV RBC AUTO: 93.3 FL — SIGNIFICANT CHANGE UP (ref 80–100)
NRBC # BLD: 0 /100 WBCS — SIGNIFICANT CHANGE UP (ref 0–0)
PCO2 BLDA: 45 MMHG — HIGH (ref 32–35)
PH BLDA: 7.43 — SIGNIFICANT CHANGE UP (ref 7.35–7.45)
PLATELET # BLD AUTO: 205 K/UL — SIGNIFICANT CHANGE UP (ref 150–400)
PO2 BLDA: 135 MMHG — HIGH (ref 83–108)
POTASSIUM SERPL-MCNC: 2.9 MMOL/L — CRITICAL LOW (ref 3.5–5.3)
POTASSIUM SERPL-MCNC: 3.8 MMOL/L — SIGNIFICANT CHANGE UP (ref 3.5–5.3)
POTASSIUM SERPL-SCNC: 2.9 MMOL/L — CRITICAL LOW (ref 3.5–5.3)
POTASSIUM SERPL-SCNC: 3.8 MMOL/L — SIGNIFICANT CHANGE UP (ref 3.5–5.3)
PROT SERPL-MCNC: 5.9 G/DL — LOW (ref 6–8.3)
RBC # BLD: 3.87 M/UL — SIGNIFICANT CHANGE UP (ref 3.8–5.2)
RBC # FLD: 15 % — HIGH (ref 10.3–14.5)
SAO2 % BLDA: 98.3 % — HIGH (ref 94–98)
SODIUM SERPL-SCNC: 146 MMOL/L — HIGH (ref 135–145)
SPECIMEN SOURCE: SIGNIFICANT CHANGE UP
WBC # BLD: 6.89 K/UL — SIGNIFICANT CHANGE UP (ref 3.8–10.5)
WBC # FLD AUTO: 6.89 K/UL — SIGNIFICANT CHANGE UP (ref 3.8–10.5)

## 2022-10-22 PROCEDURE — 99233 SBSQ HOSP IP/OBS HIGH 50: CPT

## 2022-10-22 PROCEDURE — 71045 X-RAY EXAM CHEST 1 VIEW: CPT | Mod: 26

## 2022-10-22 PROCEDURE — 93010 ELECTROCARDIOGRAM REPORT: CPT

## 2022-10-22 PROCEDURE — 71045 X-RAY EXAM CHEST 1 VIEW: CPT | Mod: 26,77

## 2022-10-22 RX ORDER — POTASSIUM CHLORIDE 20 MEQ
40 PACKET (EA) ORAL EVERY 4 HOURS
Refills: 0 | Status: COMPLETED | OUTPATIENT
Start: 2022-10-22 | End: 2022-10-22

## 2022-10-22 RX ORDER — PANTOPRAZOLE SODIUM 20 MG/1
40 TABLET, DELAYED RELEASE ORAL EVERY 12 HOURS
Refills: 0 | Status: DISCONTINUED | OUTPATIENT
Start: 2022-10-22 | End: 2022-11-01

## 2022-10-22 RX ORDER — SODIUM CHLORIDE 9 MG/ML
1000 INJECTION, SOLUTION INTRAVENOUS
Refills: 0 | Status: DISCONTINUED | OUTPATIENT
Start: 2022-10-22 | End: 2022-10-26

## 2022-10-22 RX ORDER — POTASSIUM CHLORIDE 20 MEQ
10 PACKET (EA) ORAL
Refills: 0 | Status: COMPLETED | OUTPATIENT
Start: 2022-10-22 | End: 2022-10-22

## 2022-10-22 RX ORDER — IPRATROPIUM/ALBUTEROL SULFATE 18-103MCG
3 AEROSOL WITH ADAPTER (GRAM) INHALATION ONCE
Refills: 0 | Status: COMPLETED | OUTPATIENT
Start: 2022-10-22 | End: 2022-10-22

## 2022-10-22 RX ORDER — METOPROLOL TARTRATE 50 MG
2.5 TABLET ORAL EVERY 12 HOURS
Refills: 0 | Status: DISCONTINUED | OUTPATIENT
Start: 2022-10-22 | End: 2022-10-25

## 2022-10-22 RX ORDER — LEVOTHYROXINE SODIUM 125 MCG
75 TABLET ORAL AT BEDTIME
Refills: 0 | Status: DISCONTINUED | OUTPATIENT
Start: 2022-10-22 | End: 2022-11-01

## 2022-10-22 RX ORDER — FUROSEMIDE 40 MG
20 TABLET ORAL ONCE
Refills: 0 | Status: COMPLETED | OUTPATIENT
Start: 2022-10-22 | End: 2022-10-22

## 2022-10-22 RX ORDER — FUROSEMIDE 40 MG
40 TABLET ORAL DAILY
Refills: 0 | Status: DISCONTINUED | OUTPATIENT
Start: 2022-10-22 | End: 2022-10-22

## 2022-10-22 RX ADMIN — PANTOPRAZOLE SODIUM 40 MILLIGRAM(S): 20 TABLET, DELAYED RELEASE ORAL at 06:34

## 2022-10-22 RX ADMIN — PANTOPRAZOLE SODIUM 40 MILLIGRAM(S): 20 TABLET, DELAYED RELEASE ORAL at 18:39

## 2022-10-22 RX ADMIN — Medication 3 MILLILITER(S): at 08:16

## 2022-10-22 RX ADMIN — Medication 3 MILLILITER(S): at 11:51

## 2022-10-22 RX ADMIN — Medication 2.5 MILLIGRAM(S): at 18:33

## 2022-10-22 RX ADMIN — Medication 600 MILLIGRAM(S): at 06:00

## 2022-10-22 RX ADMIN — Medication 40 MILLIEQUIVALENT(S): at 10:51

## 2022-10-22 RX ADMIN — Medication 5 MILLIGRAM(S): at 06:00

## 2022-10-22 RX ADMIN — Medication 150 MICROGRAM(S): at 06:00

## 2022-10-22 RX ADMIN — Medication 0.5 MILLIGRAM(S): at 08:17

## 2022-10-22 RX ADMIN — Medication 100 MILLIEQUIVALENT(S): at 17:27

## 2022-10-22 RX ADMIN — Medication 0.5 MILLIGRAM(S): at 19:38

## 2022-10-22 RX ADMIN — Medication 75 MICROGRAM(S): at 21:16

## 2022-10-22 RX ADMIN — Medication 3 MILLILITER(S): at 14:04

## 2022-10-22 RX ADMIN — MEMANTINE HYDROCHLORIDE 10 MILLIGRAM(S): 10 TABLET ORAL at 06:00

## 2022-10-22 RX ADMIN — Medication 100 MILLIEQUIVALENT(S): at 14:35

## 2022-10-22 RX ADMIN — Medication 20 MILLIGRAM(S): at 11:44

## 2022-10-22 RX ADMIN — Medication 40 MILLIGRAM(S): at 06:03

## 2022-10-22 RX ADMIN — ENOXAPARIN SODIUM 40 MILLIGRAM(S): 100 INJECTION SUBCUTANEOUS at 21:16

## 2022-10-22 RX ADMIN — Medication 3 MILLILITER(S): at 19:38

## 2022-10-22 RX ADMIN — Medication 100 MILLIEQUIVALENT(S): at 19:51

## 2022-10-22 NOTE — CHART NOTE - NSCHARTNOTEFT_GEN_A_CORE
Rapid response was called at ~11:30am for increased stridor, changed from baseline.    Events leading up to Rapid Response:  As per RN, she gave pt PO meds and pt began developing choking-like episode and increased stridor. RN was concerned so called Rapid response.    Patient was seen and examined at the bedside by the rapid response team ICU PA at bedside. Pt endorses difficulty breathing with increased work of breathing. Denies CP, palpitations, nausea and vomiting,    Rapid Response Vital Signs:    BP: 141/59  HR: 79  RR: 20  SpO2: 98% on 2L NC  Temp: 97.7F  FS: 150      Physical Exam:  Gen: in acute distress, increased work of breathing, AOx3  HEENT: +inspiratory stridor, NCAT, PEERLA b/l, EOMI b/l  Cardio: regular rate and rhythm, +s1s2, no murmurs, rubs, or gallops  Pulm: CTA b/l, no wheezes, rales or rhonchi  Abdomen: soft, nontender, nondistended, +BS x4 quadrants, no guarding  Extremities: no cyanosis or edema, +2 pedal pulses  Neuro: AAOx3, CNII-XII intact, 5/5 strength all extremities, sensation intact  Skin: warm and dry      Assessment/Plan:   82F PMH COPD, Dementia, Hypothyroidism, GERD, HTN, and CHF admitted for Acute respiratory failure with hypoxia likely 2/2 to COPD exacerbation and possible Zenker's Diverticulum. Rapid response called for acute worsening of inspiratory stridor and increased work of breathing.    - worsening inspiratory stridor likely due to choking 2/2 collection in Zenker's Diverticulum after ingestion of meds  - aspiration less likely as pt is maintaining saturation in high 90s with no wheeze on exam  - oxygen requirement not escalated, sating 98% on 2L NC  - STAT duonebs x1 ordered  - STAT CXR ordered, f/u results  - STAT EKG: showed Afib unchanged from prior EKG  - pt with improved stridor during duonebs treatment  - Chest PT ordered  - Discussed with Dr. Roa, agrees with above plan  - RN to call if any changes      Addendum:     RAPID RESPONSE FOLLOW UP NOTE:    ASSESSMENT/ PLAN:        - RN to call with any changes.     DISPOSITION:  - Maintain current level of care. Rapid response was called at ~11:30am for increased stridor, changed from baseline.    Events leading up to Rapid Response:  As per RN, she gave pt PO meds and pt began developing choking-like episode and increased stridor. RN was concerned so called Rapid response.    Patient was seen and examined at the bedside by the rapid response team ICU PA at bedside. Pt endorses difficulty breathing with increased work of breathing. Denies CP, palpitations, nausea and vomiting,    Rapid Response Vital Signs:    BP: 141/59  HR: 79  RR: 20  SpO2: 98% on 2L NC  Temp: 97.7F  FS: 150      Physical Exam:  Gen: in acute distress, increased work of breathing, AOx3  HEENT: +inspiratory stridor, NCAT, PEERLA b/l, EOMI b/l  Cardio: regular rate and rhythm, +s1s2, no murmurs, rubs, or gallops  Pulm: CTA b/l, no wheezes, rales or rhonchi  Abdomen: soft, nontender, nondistended, +BS x4 quadrants, no guarding  Extremities: no cyanosis or edema, +2 pedal pulses  Neuro: AAOx3, CNII-XII intact, 5/5 strength all extremities, sensation intact  Skin: warm and dry      Assessment/Plan:   82F PMH COPD, Dementia, Hypothyroidism, GERD, HTN, and CHF admitted for Acute respiratory failure with hypoxia likely 2/2 to COPD exacerbation and possible Zenker's Diverticulum. Rapid response called for acute worsening of inspiratory stridor and increased work of breathing.    - worsening inspiratory stridor likely due to choking 2/2 collection in Zenker's Diverticulum after ingestion of meds  - aspiration less likely as pt is maintaining saturation in high 90s with no wheeze on exam  - oxygen requirement not escalated, sating 98% on 2L NC  - STAT duonebs x1 ordered  - STAT CXR ordered, f/u results  - STAT EKG: showed Afib unchanged from prior EKG  - pt with improved stridor during duonebs treatment  - Chest PT ordered  - Pt made NPO  - Discussed with Dr. Roa, agrees with above plan  - RN to call if any changes      Addendum:     RAPID RESPONSE FOLLOW UP NOTE:    ASSESSMENT/ PLAN:        - RN to call with any changes.     DISPOSITION:  - Maintain current level of care. Rapid response was called at ~11:30am for increased stridor, changed from baseline.    Events leading up to Rapid Response:  As per RN, she gave pt PO meds and pt began developing choking-like episode and increased stridor. RN was concerned so called Rapid response.    Patient was seen and examined at the bedside by the rapid response team ICU PA at bedside. Pt endorses difficulty breathing with increased work of breathing. Denies CP, palpitations, nausea and vomiting,    Rapid Response Vital Signs:    BP: 141/59  HR: 79  RR: 20  SpO2: 98% on 2L NC  Temp: 97.7F  FS: 150      Physical Exam:  Gen: in acute distress, increased work of breathing, AOx3  HEENT: +inspiratory stridor, NCAT, PEERLA b/l, EOMI b/l  Cardio: regular rate and rhythm, +s1s2, no murmurs, rubs, or gallops  Pulm: CTA b/l, no wheezes, rales or rhonchi  Abdomen: soft, nontender, nondistended, +BS x4 quadrants, no guarding  Extremities: no cyanosis or edema, +2 pedal pulses  Neuro: AAOx3, CNII-XII intact, 5/5 strength all extremities, sensation intact  Skin: warm and dry      Assessment/Plan:   82F PMH COPD, Dementia, Hypothyroidism, GERD, HTN, and CHF admitted for Acute respiratory failure with hypoxia likely 2/2 to COPD exacerbation and possible Zenker's Diverticulum. Rapid response called for acute worsening of inspiratory stridor and increased work of breathing.    - worsening inspiratory stridor likely due to choking 2/2 collection in Zenker's Diverticulum after ingestion of meds  - aspiration less likely as pt is maintaining saturation in high 90s with no wheeze on exam  - oxygen requirement not escalated, sating 98% on 2L NC  - STAT duonebs x1 ordered  - STAT CXR ordered, f/u results  - STAT EKG: showed Afib unchanged from prior EKG  - pt with improved stridor during duonebs treatment  - Chest PT ordered  - Pt made NPO  - Discussed with Dr. Roa, agrees with above plan  - RN to call if any changes      Addendum ~1:40pm:     RAPID RESPONSE FOLLOW UP NOTE:   Pt seen and examined at bedside, resting comfortably in bed. No acute complaints at this time. Denies fever, chills, CP, SOB, nausea, vomiting, abd pain, diarrhea.    T(C): 36.3 (10-22-22 @ 11:30), Max: 36.7 (10-21-22 @ 18:44)  HR: 79 (10-22-22 @ 14:27) (55 - 79)  BP: 141/59 (10-22-22 @ 11:30) (89/54 - 141/59)  RR: 20 (10-22-22 @ 11:30) (18 - 20)  SpO2: 98% (10-22-22 @ 14:27) (97% - 99%)    GENERAL: AOx3, patient appears well, no acute distress, appropriate, pleasant  EYES: sclera clear, no exudates  ENMT: oropharynx clear without erythema, no exudates, moist mucous membranes, conjunctiva and sclera clear, PERRLA, EOMI  NECK: supple, soft, no thyromegaly noted  LUNGS: good air entry bilaterally, clear to auscultation, symmetric breath sounds, no wheezing, rhonchi, or rales appreciated  HEART: soft S1/S2, regular rate and rhythm, no murmurs noted, no lower extremity edema  GASTROINTESTINAL: abdomen is soft, nontender, nondistended, normoactive bowel sounds, no palpable masses. No guarding, rebound or rigidity  VASCULAR: DP pulses +2 B/L, Radial pulses 2+ B/L, no varicose veins  INTEGUMENT: warm and dry, color normal, good skin turgor, no lesions noted  MUSCULOSKELETAL: no clubbing or cyanosis, no obvious deformity  NEUROLOGIC: good muscle tone in 4 extremities, no obvious sensory deficits  PSYCHIATRIC: mood is good, affect is congruent, linear and logical thought process  HEME/LYMPH: no obvious ecchymosis or petechiae     ASSESSMENT/ PLAN:  82F PMH COPD, Dementia, Hypothyroidism, GERD, HTN, and CHF admitted for Acute respiratory failure with hypoxia likely 2/2 to COPD exacerbation and possible Zenker's Diverticulum. Rapid response called for acute worsening of inspiratory stridor and increased work of breathing.    - inspiratory stridor resolved s/p duonebsx1  - pt now resting comfortably in bed with no complaints  - oxygen requirement not escalated, sating 98% on 2L NC  - STAT CXR: unchanged from prior on wet read  - continue NPO  - RN to call if any changes    DISPOSITION:  - Maintain current level of care.

## 2022-10-22 NOTE — CONSULT NOTE ADULT - ASSESSMENT
Patient brought in by EMS from [facility] for shortness of breath cough wheezing for 2 days.  Patient denies fevers chills headache chest pain abdominal pain vomiting Admit for antibacterial managmnet ,intravenous steroids,nebulised bronchodilators and pulmonology evaluation,O2 supply,serial labs and chest xrays,obtain ECHO to evaluate LVEF and rule out chf component Admitted  to telemetry unit for monitoring , send 3 sets of cardiac enzymes to rule out acute coronary event, obtain ECHO to evaluate LVEF, cardiology consult  ,continue current management, O2 supply, anticoagulation plan as per cardiology consult Palliative care consult requested ,to discuss advance directives and complete MOLST  (20 Oct 2022 13:54)    hypernatremia   will check ua , urine osmolality , urine sodium , urine uric acid , serum sodium , serum osmolality , serum uric acid , f/u with hypernatremia work up , f/u with bmp , monitor i and o    BP monitoring,continue current antihypertensive meds, low salt diet,followup with PMD in 1-2 weeks  metoprolol tartrate Injectable 2.5 milliGRAM(s) IV Push every 12 hours    hypokalemia dextrose 5% + sodium chloride 0.45%. 1000 milliLiter(s) (30 mL/Hr) IV Continuous

## 2022-10-22 NOTE — PROVIDER CONTACT NOTE (OTHER) - ACTION/TREATMENT ORDERED:
Dr. Roa aware. will switch over PO medications to IV. xray done will review results. will continue to assess and monitor.

## 2022-10-22 NOTE — PROGRESS NOTE ADULT - SUBJECTIVE AND OBJECTIVE BOX
PROGRESS NOTE  Patient is a 82y old  Female who presents with a chief complaint of SOB AND COUGH (22 Oct 2022 10:26)  Chart and available morning labs /imaging are reviewed electronically , urgent issues addressed . More information  is being added upon completion of rounds , when more information is collected and management discussed with consultants , medical staff and social service/case management on the floor   OVERNIGHT  WHEEZING reported by medical staff . All above noted Patient is resting in a bed comfortably .Confused ,poor mentation .No distress noted   K is replaced ,lasix ordered by cardiologist ,chest xray and abg is pending   HPI:  Patient brought in by EMS from [facility] for shortness of breath cough wheezing for 2 days.  Patient denies fevers chills headache chest pain abdominal pain vomiting Admit for antibacterial managmnet ,intravenous steroids,nebulised bronchodilators and pulmonology evaluation,O2 supply,serial labs and chest xrays,obtain ECHO to evaluate LVEF and rule out chf component Admitted  to telemetry unit for monitoring , send 3 sets of cardiac enzymes to rule out acute coronary event, obtain ECHO to evaluate LVEF, cardiology consult  ,continue current management, O2 supply, anticoagulation plan as per cardiology consult Palliative care consult requested ,to discuss advance directives and complete MOLST  (20 Oct 2022 13:54)    PAST MEDICAL & SURGICAL HISTORY:  History of COPD      Insomnia      Mood disorder      Dementia      Spasm of muscle      Constipation      Hypothyroidism      GERD (gastroesophageal reflux disease)      HTN (hypertension)      CHF, chronic          MEDICATIONS  (STANDING):  albuterol/ipratropium for Nebulization 3 milliLiter(s) Nebulizer every 6 hours  baclofen 5 milliGRAM(s) Oral every 12 hours  buDESOnide    Inhalation Suspension 0.5 milliGRAM(s) Inhalation two times a day  donepezil 10 milliGRAM(s) Oral at bedtime  DULoxetine 60 milliGRAM(s) Oral daily  enoxaparin Injectable 40 milliGRAM(s) SubCutaneous every 24 hours  furosemide    Tablet 40 milliGRAM(s) Oral daily  furosemide   Injectable 20 milliGRAM(s) IV Push once  guaiFENesin  milliGRAM(s) Oral every 12 hours  levothyroxine 150 MICROGram(s) Oral daily  memantine 10 milliGRAM(s) Oral two times a day  methylPREDNISolone sodium succinate Injectable 40 milliGRAM(s) IV Push daily  metoprolol tartrate 37.5 milliGRAM(s) Oral two times a day  pantoprazole    Tablet 40 milliGRAM(s) Oral before breakfast  potassium chloride    Tablet ER 40 milliEquivalent(s) Oral every 4 hours  simvastatin 10 milliGRAM(s) Oral at bedtime  traZODone 100 milliGRAM(s) Oral at bedtime    MEDICATIONS  (PRN):  aluminum hydroxide/magnesium hydroxide/simethicone Suspension 30 milliLiter(s) Oral every 4 hours PRN Dyspepsia  ibuprofen  Tablet. 400 milliGRAM(s) Oral four times a day PRN Temp greater or equal to 38C (100.4F), Mild Pain (1 - 3)  loperamide 2 milliGRAM(s) Oral four times a day PRN Diarrhea  melatonin 3 milliGRAM(s) Oral at bedtime PRN Insomnia  ondansetron Injectable 4 milliGRAM(s) IV Push every 8 hours PRN Nausea and/or Vomiting  traMADol 50 milliGRAM(s) Oral three times a day PRN Moderate Pain (4 - 6)      OBJECTIVE    T(C): 36.5 (10-22-22 @ 04:40), Max: 36.7 (10-21-22 @ 18:44)  HR: 72 (10-22-22 @ 08:22) (55 - 72)  BP: 105/59 (10-22-22 @ 04:40) (89/54 - 105/59)  RR: 18 (10-22-22 @ 04:40) (17 - 18)  SpO2: 97% (10-22-22 @ 08:22) (96% - 100%)  Wt(kg): --  I&O's Summary    21 Oct 2022 07:01  -  22 Oct 2022 07:00  --------------------------------------------------------  IN: 720 mL / OUT: 0 mL / NET: 720 mL          REVIEW OF SYSTEMS:  CONSTITUTIONAL: No fever, weight loss, or fatigue  EYES: No eye pain, visual disturbances, or discharge  ENMT:   No sinus or throat pain  NECK: No pain or stiffness  RESPIRATORY:  wheezing  CARDIOVASCULAR: No chest pain, palpitations, dizziness, or leg swelling  GASTROINTESTINAL: No abdominal pain. No nausea, vomiting; No diarrhea or constipation. No melena or hematochezia.  GENITOURINARY: No dysuria, frequency, hematuria, or incontinence  NEUROLOGICAL: No headaches, memory loss, loss of strength, numbness, or tremors  SKIN: No itching, burning, rashes, or lesions   MUSCULOSKELETAL: No joint pain or swelling; No muscle, back, or extremity pain    PHYSICAL EXAM:  Appearance: NAD. VS past 24 hrs -as above   HEENT:   Moist oral mucosa. Conjunctiva clear b/l.   Neck : supple  Respiratory: Lungs wheezing  Gastrointestinal:  Soft, nontender. No rebound. No rigidity. BS present	  Cardiovascular: RRR ,S1S2 present  Neurologic: Non-focal. Moving all extremities.  Extremities: No edema. No erythema. No calf tenderness.  Skin: No rashes, No ecchymoses, No cyanosis.	  wounds ,skin lesions-See skin assesment flow sheet   LABS:                        11.0   6.89  )-----------( 205      ( 22 Oct 2022 06:25 )             36.1     10-22    146<H>  |  107  |  11  ----------------------------<  85  2.9<LL>   |  32<H>  |  0.47<L>    Ca    9.1      22 Oct 2022 06:25    TPro  5.9<L>  /  Alb  2.9<L>  /  TBili  0.3  /  DBili  x   /  AST  18  /  ALT  13  /  AlkPhos  62  10-22    CAPILLARY BLOOD GLUCOSE      POCT Blood Glucose.: 86 mg/dL (21 Oct 2022 16:54)  POCT Blood Glucose.: 106 mg/dL (21 Oct 2022 11:49)      Urinalysis Basic - ( 21 Oct 2022 07:40 )    Color: Pale Yellow / Appearance: Clear / S.015 / pH: x  Gluc: x / Ketone: Trace  / Bili: Negative / Urobili: 1   Blood: x / Protein: Negative / Nitrite: Negative   Leuk Esterase: Trace / RBC: 0-2 /HPF / WBC 6-10   Sq Epi: x / Non Sq Epi: Few / Bacteria: Occasional        Culture - Urine (collected 21 Oct 2022 07:40)  Source: Clean Catch Clean Catch (Midstream)  Final Report (22 Oct 2022 10:35):    <10,000 CFU/mL Normal Urogenital Consuelo    Culture - Blood (collected 20 Oct 2022 10:40)  Source: .Blood Blood-Peripheral  Preliminary Report (21 Oct 2022 17:01):    No growth to date.    Culture - Blood (collected 20 Oct 2022 10:30)  Source: .Blood Blood-Peripheral  Preliminary Report (21 Oct 2022 17:01):    No growth to date.      RADIOLOGY & ADDITIONAL TESTS:   reviewed elctronically  ASSESSMENT/PLAN: 	    25 minutes aggregate time was spent on this visit, 50% visit time spent in care co-ordination with other attendings and counselling patient .Left a message for family

## 2022-10-22 NOTE — PROGRESS NOTE ADULT - SUBJECTIVE AND OBJECTIVE BOX
ROXIE LAZAR    \[facility] [place] [facility]\"" 1EAS 114 W1    Allergies    sulfa drugs (Unknown)  Tylenol (Unknown)    Intolerances        PAST MEDICAL & SURGICAL HISTORY:  History of COPD      Insomnia      Mood disorder      Dementia      Spasm of muscle      Constipation      Hypothyroidism      GERD (gastroesophageal reflux disease)      HTN (hypertension)      CHF, chronic          FAMILY HISTORY:      Home Medications:  acetaminophen 325 mg oral tablet: 2 tab(s) orally every 6 hours, As Needed for pain  (20 Oct 2022 16:34)  baclofen 10 mg oral tablet: 0.5 tab(s) orally 2 times a day (20 Oct 2022 16:34)  Cymbalta 60 mg oral delayed release capsule: 1 cap(s) orally once a day (20 Oct 2022 16:34)  donepezil 10 mg oral tablet: 1 tab(s) orally once a day (at bedtime) (20 Oct 2022 16:34)  Eucerin topical cream: Apply topically to affected area once a day (at bedtime) (20 Oct 2022 16:34)  furosemide 40 mg oral tablet: 0.5 tab(s) orally once a day (20 Oct 2022 16:34)  ipratropium-albuterol 0.5 mg-2.5 mg/3 mL inhalation solution: 3 milliliter(s) inhaled 4 times a day (20 Oct 2022 16:34)  levothyroxine 150 mcg (0.15 mg) oral capsule: 1 cap(s) orally once a day (20 Oct 2022 16:34)  Linzess 290 mcg oral capsule: 1 cap(s) orally once a day (20 Oct 2022 16:34)  melatonin 3 mg oral tablet: 1 tab(s) orally once a day (at bedtime) (20 Oct 2022 16:34)  Metoprolol Tartrate 25 mg oral tablet: 1.5 tab(s) orally 2 times a day (20 Oct 2022 16:34)  Namenda 10 mg oral tablet: 2 tab(s) orally once a day (at bedtime) (20 Oct 2022 16:34)  omeprazole 40 mg oral delayed release capsule: 1 cap(s) orally once a day (20 Oct 2022 16:34)  ondansetron 4 mg oral tablet: 1 tab(s) orally once a day (20 Oct 2022 16:34)  ProAir HFA 90 mcg/inh inhalation aerosol: 2 puff(s) inhaled every 6 hours, As Needed (20 Oct 2022 16:34)  Senna 8.6 mg oral tablet: 2 tab(s) orally once a day (at bedtime) (20 Oct 2022 16:34)  traZODone 100 mg oral tablet: 1 tab(s) orally once a day (at bedtime) (20 Oct 2022 16:34)  zinc oxide topical ointment: Apply topically to affected area once a day and as needed  (20 Oct 2022 16:34)  Zocor 10 mg oral tablet: 1 tab(s) orally once a day (at bedtime) (20 Oct 2022 16:34)  ZyrTEC 10 mg oral tablet: 1 tab(s) orally once a day (20 Oct 2022 16:34)      MEDICATIONS  (STANDING):  albuterol/ipratropium for Nebulization 3 milliLiter(s) Nebulizer every 6 hours  baclofen 5 milliGRAM(s) Oral every 12 hours  buDESOnide    Inhalation Suspension 0.5 milliGRAM(s) Inhalation two times a day  dextrose 5% + sodium chloride 0.45%. 1000 milliLiter(s) (30 mL/Hr) IV Continuous <Continuous>  donepezil 10 milliGRAM(s) Oral at bedtime  DULoxetine 60 milliGRAM(s) Oral daily  enoxaparin Injectable 40 milliGRAM(s) SubCutaneous every 24 hours  guaiFENesin  milliGRAM(s) Oral every 12 hours  levothyroxine Injectable 75 MICROGram(s) IV Push at bedtime  memantine 10 milliGRAM(s) Oral two times a day  methylPREDNISolone sodium succinate Injectable 40 milliGRAM(s) IV Push daily  metoprolol tartrate Injectable 2.5 milliGRAM(s) IV Push every 12 hours  pantoprazole  Injectable 40 milliGRAM(s) IV Push every 12 hours  potassium chloride  10 mEq/100 mL IVPB 10 milliEquivalent(s) IV Intermittent every 1 hour  simvastatin 10 milliGRAM(s) Oral at bedtime  traZODone 100 milliGRAM(s) Oral at bedtime    MEDICATIONS  (PRN):  aluminum hydroxide/magnesium hydroxide/simethicone Suspension 30 milliLiter(s) Oral every 4 hours PRN Dyspepsia  ibuprofen  Tablet. 400 milliGRAM(s) Oral four times a day PRN Temp greater or equal to 38C (100.4F), Mild Pain (1 - 3)  loperamide 2 milliGRAM(s) Oral four times a day PRN Diarrhea  melatonin 3 milliGRAM(s) Oral at bedtime PRN Insomnia  ondansetron Injectable 4 milliGRAM(s) IV Push every 8 hours PRN Nausea and/or Vomiting  traMADol 50 milliGRAM(s) Oral three times a day PRN Moderate Pain (4 - 6)      Diet, NPO (10-22-22 @ 12:09) [Active]          Vital Signs Last 24 Hrs  T(C): 36.3 (22 Oct 2022 11:30), Max: 36.7 (21 Oct 2022 18:44)  T(F): 97.3 (22 Oct 2022 11:30), Max: 98 (21 Oct 2022 18:44)  HR: 79 (22 Oct 2022 14:27) (55 - 79)  BP: 141/59 (22 Oct 2022 11:30) (89/54 - 141/59)  BP(mean): --  RR: 20 (22 Oct 2022 11:30) (18 - 20)  SpO2: 98% (22 Oct 2022 14:27) (97% - 99%)    Parameters below as of 22 Oct 2022 14:27  Patient On (Oxygen Delivery Method): nasal cannula,2 LPM          10-21-22 @ 07:01  -  10-22-22 @ 07:00  --------------------------------------------------------  IN: 720 mL / OUT: 0 mL / NET: 720 mL              LABS:                        11.0   6.89  )-----------( 205      ( 22 Oct 2022 06:25 )             36.1     10-22    x   |  x   |  x   ----------------------------<  x   3.8   |  x   |  x     Ca    9.1      22 Oct 2022 06:25    TPro  5.9<L>  /  Alb  2.9<L>  /  TBili  0.3  /  DBili  x   /  AST  18  /  ALT  13  /  AlkPhos  62  10-22      Urinalysis Basic - ( 21 Oct 2022 07:40 )    Color: Pale Yellow / Appearance: Clear / S.015 / pH: x  Gluc: x / Ketone: Trace  / Bili: Negative / Urobili: 1   Blood: x / Protein: Negative / Nitrite: Negative   Leuk Esterase: Trace / RBC: 0-2 /HPF / WBC 6-10   Sq Epi: x / Non Sq Epi: Few / Bacteria: Occasional        ABG - ( 22 Oct 2022 12:03 )  pH, Arterial: 7.43  pH, Blood: x     /  pCO2: 45    /  pO2: 135   / HCO3: 30    / Base Excess: 5.6   /  SaO2: 98.3                WBC:  WBC Count: 6.89 K/uL (10-22 @ 06:25)  WBC Count: 3.00 K/uL (10-21 @ 04:26)  WBC Count: 4.42 K/uL (10-20 @ 10:30)      MICROBIOLOGY:  RECENT CULTURES:  10-21 Clean Catch Clean Catch (Midstream) XXXX XXXX   <10,000 CFU/mL Normal Urogenital Consuelo    10-20 .Blood Blood-Peripheral XXXX XXXX   No growth to date.    10-20 .Blood Blood-Peripheral XXXX XXXX   No growth to date.                    Sodium:  Sodium, Serum: 146 mmol/L (10-22 @ 06:25)  Sodium, Serum: 145 mmol/L (10-21 @ 04:26)  Sodium, Serum: 143 mmol/L (10-20 @ 10:30)      0.47 mg/dL 10-22 @ 06:25  0.41 mg/dL 10-21 @ 04:26  0.70 mg/dL 10-20 @ 10:30      Hemoglobin:  Hemoglobin: 11.0 g/dL (10-22 @ 06:25)  Hemoglobin: 11.9 g/dL (10-21 @ 04:26)  Hemoglobin: 14.3 g/dL (10-20 @ 10:30)      Platelets: Platelet Count - Automated: 205 K/uL (10-22 @ 06:25)  Platelet Count - Automated: 210 K/uL (10-21 @ 04:26)  Platelet Count - Automated: 236 K/uL (10-20 @ 10:30)      LIVER FUNCTIONS - ( 22 Oct 2022 06:25 )  Alb: 2.9 g/dL / Pro: 5.9 g/dL / ALK PHOS: 62 U/L / ALT: 13 U/L / AST: 18 U/L / GGT: x             Urinalysis Basic - ( 21 Oct 2022 07:40 )    Color: Pale Yellow / Appearance: Clear / S.015 / pH: x  Gluc: x / Ketone: Trace  / Bili: Negative / Urobili: 1   Blood: x / Protein: Negative / Nitrite: Negative   Leuk Esterase: Trace / RBC: 0-2 /HPF / WBC 6-10   Sq Epi: x / Non Sq Epi: Few / Bacteria: Occasional        RADIOLOGY & ADDITIONAL STUDIES:      MICROBIOLOGY:  RECENT CULTURES:  10-21 Clean Catch Clean Catch (Midstream) XXXX XXXX   <10,000 CFU/mL Normal Urogenital Consuelo    10- .Blood Blood-Peripheral XXXX XXXX   No growth to date.    10- .Blood Blood-Peripheral XXXX XXXX   No growth to date.

## 2022-10-22 NOTE — PROGRESS NOTE ADULT - SUBJECTIVE AND OBJECTIVE BOX
Date/Time Patient Seen:  		  Referring MD:   Data Reviewed	       Patient is a 82y old  Female who presents with a chief complaint of SOB AND COUGH (21 Oct 2022 20:10)      Subjective/HPI     PAST MEDICAL & SURGICAL HISTORY:  History of COPD    Insomnia    Mood disorder    Dementia    Spasm of muscle    Constipation    Hypothyroidism    GERD (gastroesophageal reflux disease)    HTN (hypertension)    CHF, chronic          Medication list         MEDICATIONS  (STANDING):  albuterol/ipratropium for Nebulization 3 milliLiter(s) Nebulizer every 6 hours  baclofen 5 milliGRAM(s) Oral every 12 hours  buDESOnide    Inhalation Suspension 0.5 milliGRAM(s) Inhalation two times a day  dextrose 5% + sodium chloride 0.45%. 1000 milliLiter(s) (50 mL/Hr) IV Continuous <Continuous>  donepezil 10 milliGRAM(s) Oral at bedtime  DULoxetine 60 milliGRAM(s) Oral daily  enoxaparin Injectable 40 milliGRAM(s) SubCutaneous every 24 hours  guaiFENesin  milliGRAM(s) Oral every 12 hours  levothyroxine 150 MICROGram(s) Oral daily  memantine 10 milliGRAM(s) Oral two times a day  methylPREDNISolone sodium succinate Injectable 40 milliGRAM(s) IV Push daily  metoprolol tartrate 37.5 milliGRAM(s) Oral two times a day  pantoprazole    Tablet 40 milliGRAM(s) Oral before breakfast  simvastatin 10 milliGRAM(s) Oral at bedtime  traZODone 100 milliGRAM(s) Oral at bedtime    MEDICATIONS  (PRN):  aluminum hydroxide/magnesium hydroxide/simethicone Suspension 30 milliLiter(s) Oral every 4 hours PRN Dyspepsia  ibuprofen  Tablet. 400 milliGRAM(s) Oral four times a day PRN Temp greater or equal to 38C (100.4F), Mild Pain (1 - 3)  loperamide 2 milliGRAM(s) Oral four times a day PRN Diarrhea  melatonin 3 milliGRAM(s) Oral at bedtime PRN Insomnia  ondansetron Injectable 4 milliGRAM(s) IV Push every 8 hours PRN Nausea and/or Vomiting  traMADol 50 milliGRAM(s) Oral three times a day PRN Moderate Pain (4 - 6)         Vitals log        ICU Vital Signs Last 24 Hrs  T(C): 36.5 (22 Oct 2022 04:40), Max: 36.7 (21 Oct 2022 18:44)  T(F): 97.7 (22 Oct 2022 04:40), Max: 98 (21 Oct 2022 18:44)  HR: 63 (22 Oct 2022 04:40) (55 - 66)  BP: 105/59 (22 Oct 2022 04:40) (89/54 - 105/59)  BP(mean): --  ABP: --  ABP(mean): --  RR: 18 (22 Oct 2022 04:40) (17 - 18)  SpO2: 99% (22 Oct 2022 04:40) (96% - 100%)    O2 Parameters below as of 22 Oct 2022 04:40  Patient On (Oxygen Delivery Method): nasal cannula                 Input and Output:  I&O's Detail    20 Oct 2022 07:01  -  21 Oct 2022 07:00  --------------------------------------------------------  IN:    Lactated Ringers: 80 mL  Total IN: 80 mL    OUT:  Total OUT: 0 mL    Total NET: 80 mL      21 Oct 2022 07:01  -  22 Oct 2022 05:53  --------------------------------------------------------  IN:    Lactated Ringers: 720 mL  Total IN: 720 mL    OUT:  Total OUT: 0 mL    Total NET: 720 mL          Lab Data                        11.9   3.00  )-----------( 210      ( 21 Oct 2022 04:26 )             37.9     10-21    145  |  108  |  9   ----------------------------<  105<H>  4.1   |  34<H>  |  0.41<L>    Ca    9.2      21 Oct 2022 04:26    TPro  6.5  /  Alb  3.0<L>  /  TBili  0.3  /  DBili  x   /  AST  19  /  ALT  14  /  AlkPhos  71  10-21    ABG - ( 21 Oct 2022 06:00 )  pH, Arterial: 7.44  pH, Blood: x     /  pCO2: 49    /  pO2: 177   / HCO3: 33    / Base Excess: 9.1   /  SaO2: 99.0                    Review of Systems	      Objective     Physical Examination    heart s1s2  lung dec BS  head nc      Pertinent Lab findings & Imaging      Gela:  NO   Adequate UO     I&O's Detail    20 Oct 2022 07:01  -  21 Oct 2022 07:00  --------------------------------------------------------  IN:    Lactated Ringers: 80 mL  Total IN: 80 mL    OUT:  Total OUT: 0 mL    Total NET: 80 mL      21 Oct 2022 07:01  -  22 Oct 2022 05:53  --------------------------------------------------------  IN:    Lactated Ringers: 720 mL  Total IN: 720 mL    OUT:  Total OUT: 0 mL    Total NET: 720 mL               Discussed with:     Cultures:	        Radiology

## 2022-10-22 NOTE — PROGRESS NOTE ADULT - SUBJECTIVE AND OBJECTIVE BOX
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  Patient denies fevers chills headache chest pain abdominal pain vomiting Admit for antibacterial managmnet ,intravenous steroids,nebulised bronchodilators and pulmonology evaluation,O2 supply,serial labs and chest xrays,obtain ECHO to evaluate LVEF and rule out chf component Admitted  to telemetry unit for monitoring , send 3 sets of cardiac enzymes to rule out acute coronary event, obtain ECHO to evaluate LVEF, cardiology consult  ,continue current management, O2 supply, anticoagulation plan as per cardiology consult Palliative care consult requested ,to discuss advance directives and complete MOLST  (20 Oct 2022 13:54)      REVIEW OF SYSTEMS:    CONSTITUTIONAL: No fever, weight loss, or fatigue  EYES: No eye pain, visual disturbances, or discharge  ENMT:  No difficulty hearing, tinnitus, vertigo; No sinus or throat pain  NECK: No pain or stiffness  BREASTS: No pain, masses, or nipple discharge  RESPIRATORY: No cough, wheezing, chills or hemoptysis; No shortness of breath  CARDIOVASCULAR: No chest pain, palpitations, dizziness, or leg swelling  GASTROINTESTINAL: No abdominal or epigastric pain. No nausea, vomiting, or hematemesis; No diarrhea or constipation. No melena or hematochezia.  GENITOURINARY: No dysuria, frequency, hematuria, or incontinence  NEUROLOGICAL: No headaches, memory loss, loss of strength, numbness, or tremors  SKIN: No itching, burning, rashes, or lesions   LYMPH NODES: No enlarged glands  ENDOCRINE: No heat or cold intolerance; No hair loss  MUSCULOSKELETAL: No joint pain or swelling; No muscle, back, or extremity pain  PSYCHIATRIC: No depression, anxiety, mood swings, or difficulty sleeping  HEME/LYMPH: No easy bruising, or bleeding gums  ALLERGY AND IMMUNOLOGIC: No hives or eczema    MEDICATIONS  (STANDING):  albuterol/ipratropium for Nebulization 3 milliLiter(s) Nebulizer every 6 hours  baclofen 5 milliGRAM(s) Oral every 12 hours  buDESOnide    Inhalation Suspension 0.5 milliGRAM(s) Inhalation two times a day  donepezil 10 milliGRAM(s) Oral at bedtime  DULoxetine 60 milliGRAM(s) Oral daily  enoxaparin Injectable 40 milliGRAM(s) SubCutaneous every 24 hours  furosemide    Tablet 40 milliGRAM(s) Oral daily  guaiFENesin  milliGRAM(s) Oral every 12 hours  levothyroxine 150 MICROGram(s) Oral daily  memantine 10 milliGRAM(s) Oral two times a day  methylPREDNISolone sodium succinate Injectable 40 milliGRAM(s) IV Push daily  metoprolol tartrate 37.5 milliGRAM(s) Oral two times a day  pantoprazole    Tablet 40 milliGRAM(s) Oral before breakfast  simvastatin 10 milliGRAM(s) Oral at bedtime  traZODone 100 milliGRAM(s) Oral at bedtime    MEDICATIONS  (PRN):  aluminum hydroxide/magnesium hydroxide/simethicone Suspension 30 milliLiter(s) Oral every 4 hours PRN Dyspepsia  ibuprofen  Tablet. 400 milliGRAM(s) Oral four times a day PRN Temp greater or equal to 38C (100.4F), Mild Pain (1 - 3)  loperamide 2 milliGRAM(s) Oral four times a day PRN Diarrhea  melatonin 3 milliGRAM(s) Oral at bedtime PRN Insomnia  ondansetron Injectable 4 milliGRAM(s) IV Push every 8 hours PRN Nausea and/or Vomiting  traMADol 50 milliGRAM(s) Oral three times a day PRN Moderate Pain (4 - 6)      ALLERGIES: sulfa drugs (Unknown)  Tylenol (Unknown)      FAMILY HISTORY:      PHYSICAL EXAMINATION:  -----------------------------  T(C): 36.5 (10-22-22 @ 04:40), Max: 36.7 (10-21-22 @ 18:44)  HR: 72 (10-22-22 @ 08:22) (55 - 72)  BP: 105/59 (10-22-22 @ 04:40) (89/54 - 105/59)  RR: 18 (10-22-22 @ 04:40) (17 - 18)  SpO2: 97% (10-22-22 @ 08:22) (96% - 100%)  Wt(kg): --    10-21 @ 07:01  -  10-22 @ 07:00  --------------------------------------------------------  IN:    Lactated Ringers: 720 mL  Total IN: 720 mL    OUT:  Total OUT: 0 mL    Total NET: 720 mL            VITALS  T(C): 36.5 (10-22-22 @ 04:40), Max: 36.7 (10-21-22 @ 18:44)  HR: 72 (10-22-22 @ 08:22) (55 - 72)  BP: 105/59 (10-22-22 @ 04:40) (89/54 - 105/59)  RR: 18 (10-22-22 @ 04:40) (17 - 18)  SpO2: 97% (10-22-22 @ 08:22) (96% - 100%)    Constitutional: well developed, normal appearance, well groomed, well nourished, no deformities and no acute distress.   Eyes: the conjunctiva exhibited no abnormalities and the eyelids demonstrated no xanthelasmas.   HEENT: normal oral mucosa, no oral pallor and no oral cyanosis.   Neck: normal jugular venous A waves present, normal jugular venous V waves present and no jugular venous oliveira A waves.   Pulmonary: no respiratory distress, normal respiratory rhythm and effort, no accessory muscle use and lungs were clear to auscultation bilaterally.   Cardiovascular: heart rate and rhythm were normal, normal S1 and S2 and no murmur, gallop, rub, heave or thrill are present.   Abdomen: soft, non-tender, no hepato-splenomegaly and no abdominal mass palpated.   Musculoskeletal: the gait could not be assessed..   Extremities: no clubbing of the fingernails, no localized cyanosis, no petechial hemorrhages and no ischemic changes.   Skin: normal skin color and pigmentation, no rash, no venous stasis, no skin lesions, no skin ulcer and no xanthoma was observed.   Psychiatric: oriented to person, place, and time, the affect was normal, the mood was normal and not feeling anxious.     LABS:   --------  10-21    145  |  108  |  9   ----------------------------<  105<H>  4.1   |  34<H>  |  0.41<L>    Ca    9.2      21 Oct 2022 04:26    TPro  6.5  /  Alb  3.0<L>  /  TBili  0.3  /  DBili  x   /  AST  19  /  ALT  14  /  AlkPhos  71  10-21                         11.0   6.89  )-----------( 205      ( 22 Oct 2022 06:25 )             36.1     PT/INR - ( 20 Oct 2022 10:30 )   PT: 13.4 sec;   INR: 1.14 ratio         PTT - ( 20 Oct 2022 10:30 )  PTT:35.8 sec        Culture Results:   No growth to date. (10-20 @ 10:40)  Culture Results:   No growth to date. (10-20 @ 10:30)      RADIOLOGY:  -----------------    ECG:     ECHO:

## 2022-10-22 NOTE — CONSULT NOTE ADULT - SUBJECTIVE AND OBJECTIVE BOX
Patient is a 82y Female whom presented to the hospital with hypernatremia     PAST MEDICAL & SURGICAL HISTORY:  History of COPD      Insomnia      Mood disorder      Dementia      Spasm of muscle      Constipation      Hypothyroidism      GERD (gastroesophageal reflux disease)      HTN (hypertension)      CHF, chronic          MEDICATIONS  (STANDING):  albuterol/ipratropium for Nebulization 3 milliLiter(s) Nebulizer every 6 hours  baclofen 5 milliGRAM(s) Oral every 12 hours  buDESOnide    Inhalation Suspension 0.5 milliGRAM(s) Inhalation two times a day  dextrose 5% + sodium chloride 0.45%. 1000 milliLiter(s) (30 mL/Hr) IV Continuous <Continuous>  donepezil 10 milliGRAM(s) Oral at bedtime  DULoxetine 60 milliGRAM(s) Oral daily  enoxaparin Injectable 40 milliGRAM(s) SubCutaneous every 24 hours  guaiFENesin  milliGRAM(s) Oral every 12 hours  levothyroxine Injectable 75 MICROGram(s) IV Push at bedtime  memantine 10 milliGRAM(s) Oral two times a day  methylPREDNISolone sodium succinate Injectable 40 milliGRAM(s) IV Push daily  metoprolol tartrate Injectable 2.5 milliGRAM(s) IV Push every 12 hours  pantoprazole  Injectable 40 milliGRAM(s) IV Push every 12 hours  potassium chloride  10 mEq/100 mL IVPB 10 milliEquivalent(s) IV Intermittent every 1 hour  simvastatin 10 milliGRAM(s) Oral at bedtime  traZODone 100 milliGRAM(s) Oral at bedtime      Allergies    sulfa drugs (Unknown)  Tylenol (Unknown)    Intolerances        SOCIAL HISTORY:  Denies ETOh,Smoking,     FAMILY HISTORY:      REVIEW OF SYSTEMS:    CONSTITUTIONAL: No weakness, fevers or chills  RESPIRATORY: No cough, wheezing, hemoptysis; No shortness of breath  CARDIOVASCULAR: No chest pain or palpitations  GASTROINTESTINAL: No abdominal or epigastric pain. No nausea, vomiting,     No diarrhea or constipation. No melena   SKIN: dry      VITAL:  T(C): , Max: 36.7 (10-21-22 @ 18:44)  T(F): , Max: 98 (10-21-22 @ 18:44)  HR: 79 (10-22-22 @ 14:27)  BP: 141/59 (10-22-22 @ 11:30)  BP(mean): --  RR: 20 (10-22-22 @ 11:30)  SpO2: 98% (10-22-22 @ 14:27)  Wt(kg): --    I and O's:    10-21 @ 07:01  -  10-22 @ 07:00  --------------------------------------------------------  IN: 720 mL / OUT: 0 mL / NET: 720 mL          PHYSICAL EXAM:    Constitutional: NAD  HEENT: conjunctive   clear   Neck:  No JVD  Respiratory: decrease bs b/l   Cardiovascular: S1 and S2  Gastrointestinal: BS+, soft, NT/ND  Extremities: No peripheral edema  : No Ren  Skin: dry   LABS:                        11.0   6.89  )-----------( 205      ( 22 Oct 2022 06:25 )             36.1     10-22    x   |  x   |  x   ----------------------------<  x   3.8   |  x   |  x     Ca    9.1      22 Oct 2022 06:25    TPro  5.9<L>  /  Alb  2.9<L>  /  TBili  0.3  /  DBili  x   /  AST  18  /  ALT  13  /  AlkPhos  62  10-22      Urine Studies:  Urinalysis Basic - ( 21 Oct 2022 07:40 )    Color: Pale Yellow / Appearance: Clear / S.015 / pH: x  Gluc: x / Ketone: Trace  / Bili: Negative / Urobili: 1   Blood: x / Protein: Negative / Nitrite: Negative   Leuk Esterase: Trace / RBC: 0-2 /HPF / WBC 6-10   Sq Epi: x / Non Sq Epi: Few / Bacteria: Occasional            RADIOLOGY & ADDITIONAL STUDIES:        MEDICATIONS  (STANDING):  albuterol/ipratropium for Nebulization 3 milliLiter(s) Nebulizer every 6 hours  baclofen 5 milliGRAM(s) Oral every 12 hours  buDESOnide    Inhalation Suspension 0.5 milliGRAM(s) Inhalation two times a day  dextrose 5% + sodium chloride 0.45%. 1000 milliLiter(s) (30 mL/Hr) IV Continuous <Continuous>  donepezil 10 milliGRAM(s) Oral at bedtime  DULoxetine 60 milliGRAM(s) Oral daily  enoxaparin Injectable 40 milliGRAM(s) SubCutaneous every 24 hours  guaiFENesin  milliGRAM(s) Oral every 12 hours  levothyroxine Injectable 75 MICROGram(s) IV Push at bedtime  memantine 10 milliGRAM(s) Oral two times a day  methylPREDNISolone sodium succinate Injectable 40 milliGRAM(s) IV Push daily  metoprolol tartrate Injectable 2.5 milliGRAM(s) IV Push every 12 hours  pantoprazole  Injectable 40 milliGRAM(s) IV Push every 12 hours  potassium chloride  10 mEq/100 mL IVPB 10 milliEquivalent(s) IV Intermittent every 1 hour  simvastatin 10 milliGRAM(s) Oral at bedtime  traZODone 100 milliGRAM(s) Oral at bedtime

## 2022-10-23 ENCOUNTER — TRANSCRIPTION ENCOUNTER (OUTPATIENT)
Age: 82
End: 2022-10-23

## 2022-10-23 LAB
ANION GAP SERPL CALC-SCNC: 4 MMOL/L — LOW (ref 5–17)
BUN SERPL-MCNC: 9 MG/DL — SIGNIFICANT CHANGE UP (ref 7–23)
CALCIUM SERPL-MCNC: 9.1 MG/DL — SIGNIFICANT CHANGE UP (ref 8.5–10.1)
CHLORIDE SERPL-SCNC: 109 MMOL/L — HIGH (ref 96–108)
CO2 SERPL-SCNC: 33 MMOL/L — HIGH (ref 22–31)
CREAT SERPL-MCNC: 0.43 MG/DL — LOW (ref 0.5–1.3)
EGFR: 96 ML/MIN/1.73M2 — SIGNIFICANT CHANGE UP
GLUCOSE SERPL-MCNC: 124 MG/DL — HIGH (ref 70–99)
HCT VFR BLD CALC: 37 % — SIGNIFICANT CHANGE UP (ref 34.5–45)
HGB BLD-MCNC: 11.5 G/DL — SIGNIFICANT CHANGE UP (ref 11.5–15.5)
MAGNESIUM SERPL-MCNC: 1.7 MG/DL — SIGNIFICANT CHANGE UP (ref 1.6–2.6)
MCHC RBC-ENTMCNC: 28.7 PG — SIGNIFICANT CHANGE UP (ref 27–34)
MCHC RBC-ENTMCNC: 31.1 GM/DL — LOW (ref 32–36)
MCV RBC AUTO: 92.3 FL — SIGNIFICANT CHANGE UP (ref 80–100)
NRBC # BLD: 0 /100 WBCS — SIGNIFICANT CHANGE UP (ref 0–0)
NT-PROBNP SERPL-SCNC: 6271 PG/ML — HIGH (ref 0–450)
PHOSPHATE SERPL-MCNC: 2.2 MG/DL — LOW (ref 2.5–4.5)
PLATELET # BLD AUTO: 200 K/UL — SIGNIFICANT CHANGE UP (ref 150–400)
POTASSIUM SERPL-MCNC: 3.9 MMOL/L — SIGNIFICANT CHANGE UP (ref 3.5–5.3)
POTASSIUM SERPL-SCNC: 3.9 MMOL/L — SIGNIFICANT CHANGE UP (ref 3.5–5.3)
RBC # BLD: 4.01 M/UL — SIGNIFICANT CHANGE UP (ref 3.8–5.2)
RBC # FLD: 14.9 % — HIGH (ref 10.3–14.5)
SODIUM SERPL-SCNC: 146 MMOL/L — HIGH (ref 135–145)
WBC # BLD: 4.98 K/UL — SIGNIFICANT CHANGE UP (ref 3.8–10.5)
WBC # FLD AUTO: 4.98 K/UL — SIGNIFICANT CHANGE UP (ref 3.8–10.5)

## 2022-10-23 PROCEDURE — 99233 SBSQ HOSP IP/OBS HIGH 50: CPT

## 2022-10-23 PROCEDURE — 99223 1ST HOSP IP/OBS HIGH 75: CPT

## 2022-10-23 RX ORDER — POTASSIUM PHOSPHATE, MONOBASIC POTASSIUM PHOSPHATE, DIBASIC 236; 224 MG/ML; MG/ML
15 INJECTION, SOLUTION INTRAVENOUS ONCE
Refills: 0 | Status: COMPLETED | OUTPATIENT
Start: 2022-10-23 | End: 2022-10-23

## 2022-10-23 RX ADMIN — Medication 0.5 MILLIGRAM(S): at 20:44

## 2022-10-23 RX ADMIN — PANTOPRAZOLE SODIUM 40 MILLIGRAM(S): 20 TABLET, DELAYED RELEASE ORAL at 05:23

## 2022-10-23 RX ADMIN — Medication 0.5 MILLIGRAM(S): at 08:15

## 2022-10-23 RX ADMIN — PANTOPRAZOLE SODIUM 40 MILLIGRAM(S): 20 TABLET, DELAYED RELEASE ORAL at 17:57

## 2022-10-23 RX ADMIN — Medication 3 MILLILITER(S): at 20:44

## 2022-10-23 RX ADMIN — POTASSIUM PHOSPHATE, MONOBASIC POTASSIUM PHOSPHATE, DIBASIC 62.5 MILLIMOLE(S): 236; 224 INJECTION, SOLUTION INTRAVENOUS at 19:16

## 2022-10-23 RX ADMIN — Medication 40 MILLIGRAM(S): at 05:22

## 2022-10-23 RX ADMIN — ENOXAPARIN SODIUM 40 MILLIGRAM(S): 100 INJECTION SUBCUTANEOUS at 21:17

## 2022-10-23 RX ADMIN — Medication 2.5 MILLIGRAM(S): at 05:21

## 2022-10-23 RX ADMIN — Medication 75 MICROGRAM(S): at 21:16

## 2022-10-23 RX ADMIN — SODIUM CHLORIDE 30 MILLILITER(S): 9 INJECTION, SOLUTION INTRAVENOUS at 21:16

## 2022-10-23 RX ADMIN — Medication 3 MILLILITER(S): at 14:00

## 2022-10-23 RX ADMIN — Medication 2.5 MILLIGRAM(S): at 17:56

## 2022-10-23 RX ADMIN — Medication 3 MILLILITER(S): at 01:13

## 2022-10-23 RX ADMIN — Medication 3 MILLILITER(S): at 08:15

## 2022-10-23 NOTE — PROGRESS NOTE ADULT - SUBJECTIVE AND OBJECTIVE BOX
[facility]  Alberto Lepe PA-C  [address] [place] [address]  [phone]      INTERVAL HPI/OVERNIGHT EVENTS:  Pt s/e  Overnight events noted  Now NPO  No further GI events    MEDICATIONS  (STANDING):  albuterol/ipratropium for Nebulization 3 milliLiter(s) Nebulizer every 6 hours  baclofen 5 milliGRAM(s) Oral every 12 hours  buDESOnide    Inhalation Suspension 0.5 milliGRAM(s) Inhalation two times a day  dextrose 5% + sodium chloride 0.45%. 1000 milliLiter(s) (30 mL/Hr) IV Continuous <Continuous>  donepezil 10 milliGRAM(s) Oral at bedtime  DULoxetine 60 milliGRAM(s) Oral daily  enoxaparin Injectable 40 milliGRAM(s) SubCutaneous every 24 hours  guaiFENesin  milliGRAM(s) Oral every 12 hours  levothyroxine Injectable 75 MICROGram(s) IV Push at bedtime  memantine 10 milliGRAM(s) Oral two times a day  methylPREDNISolone sodium succinate Injectable 40 milliGRAM(s) IV Push daily  metoprolol tartrate Injectable 2.5 milliGRAM(s) IV Push every 12 hours  pantoprazole  Injectable 40 milliGRAM(s) IV Push every 12 hours  simvastatin 10 milliGRAM(s) Oral at bedtime  traZODone 100 milliGRAM(s) Oral at bedtime    MEDICATIONS  (PRN):  aluminum hydroxide/magnesium hydroxide/simethicone Suspension 30 milliLiter(s) Oral every 4 hours PRN Dyspepsia  ibuprofen  Tablet. 400 milliGRAM(s) Oral four times a day PRN Temp greater or equal to 38C (100.4F), Mild Pain (1 - 3)  loperamide 2 milliGRAM(s) Oral four times a day PRN Diarrhea  melatonin 3 milliGRAM(s) Oral at bedtime PRN Insomnia  ondansetron Injectable 4 milliGRAM(s) IV Push every 8 hours PRN Nausea and/or Vomiting  traMADol 50 milliGRAM(s) Oral three times a day PRN Moderate Pain (4 - 6)      Allergies    sulfa drugs (Unknown)  Tylenol (Unknown)      PHYSICAL EXAM:   Vital Signs:  Vital Signs Last 24 Hrs  T(C): 36.6 (23 Oct 2022 04:40), Max: 36.7 (22 Oct 2022 21:05)  T(F): 97.9 (23 Oct 2022 04:40), Max: 98 (22 Oct 2022 21:05)  HR: 64 (23 Oct 2022 08:26) (64 - 86)  BP: 114/71 (23 Oct 2022 04:40) (110/76 - 141/59)  BP(mean): --  RR: 18 (23 Oct 2022 04:40) (18 - 20)  SpO2: 98% (23 Oct 2022 08:26) (97% - 98%)    Parameters below as of 23 Oct 2022 08:26  Patient On (Oxygen Delivery Method): nasal cannula,2LPM      Daily     Daily Weight in k (23 Oct 2022 04:40)    GENERAL:  Appears stated age  ABDOMEN:  Soft, non-tender, non-distended  NEURO:  Alert      LABS:                        11.5   4.98  )-----------( 200      ( 23 Oct 2022 08:15 )             37.0     10-23    146<H>  |  109<H>  |  9   ----------------------------<  124<H>  3.9   |  33<H>  |  0.43<L>    Ca    9.1      23 Oct 2022 08:15  Phos  2.2     10-23  Mg     1.7     10-23    TPro  5.9<L>  /  Alb  2.9<L>  /  TBili  0.3  /  DBili  x   /  AST  18  /  ALT  13  /  AlkPhos  62  10-22

## 2022-10-23 NOTE — PROGRESS NOTE ADULT - SUBJECTIVE AND OBJECTIVE BOX
ROXIE LAZAR    [facility] [address]    Allergies    sulfa drugs (Unknown)  Tylenol (Unknown)    Intolerances        PAST MEDICAL & SURGICAL HISTORY:  History of COPD      Insomnia      Mood disorder      Dementia      Spasm of muscle      Constipation      Hypothyroidism      GERD (gastroesophageal reflux disease)      HTN (hypertension)      CHF, chronic          FAMILY HISTORY:      Home Medications:  acetaminophen 325 mg oral tablet: 2 tab(s) orally every 6 hours, As Needed for pain  (20 Oct 2022 16:34)  baclofen 10 mg oral tablet: 0.5 tab(s) orally 2 times a day (20 Oct 2022 16:34)  Cymbalta 60 mg oral delayed release capsule: 1 cap(s) orally once a day (20 Oct 2022 16:34)  donepezil 10 mg oral tablet: 1 tab(s) orally once a day (at bedtime) (20 Oct 2022 16:34)  Eucerin topical cream: Apply topically to affected area once a day (at bedtime) (20 Oct 2022 16:34)  furosemide 40 mg oral tablet: 0.5 tab(s) orally once a day (20 Oct 2022 16:34)  ipratropium-albuterol 0.5 mg-2.5 mg/3 mL inhalation solution: 3 milliliter(s) inhaled 4 times a day (20 Oct 2022 16:34)  levothyroxine 150 mcg (0.15 mg) oral capsule: 1 cap(s) orally once a day (20 Oct 2022 16:34)  Linzess 290 mcg oral capsule: 1 cap(s) orally once a day (20 Oct 2022 16:34)  melatonin 3 mg oral tablet: 1 tab(s) orally once a day (at bedtime) (20 Oct 2022 16:34)  Metoprolol Tartrate 25 mg oral tablet: 1.5 tab(s) orally 2 times a day (20 Oct 2022 16:34)  Namenda 10 mg oral tablet: 2 tab(s) orally once a day (at bedtime) (20 Oct 2022 16:34)  omeprazole 40 mg oral delayed release capsule: 1 cap(s) orally once a day (20 Oct 2022 16:34)  ondansetron 4 mg oral tablet: 1 tab(s) orally once a day (20 Oct 2022 16:34)  ProAir HFA 90 mcg/inh inhalation aerosol: 2 puff(s) inhaled every 6 hours, As Needed (20 Oct 2022 16:34)  Senna 8.6 mg oral tablet: 2 tab(s) orally once a day (at bedtime) (20 Oct 2022 16:34)  traZODone 100 mg oral tablet: 1 tab(s) orally once a day (at bedtime) (20 Oct 2022 16:34)  zinc oxide topical ointment: Apply topically to affected area once a day and as needed  (20 Oct 2022 16:34)  Zocor 10 mg oral tablet: 1 tab(s) orally once a day (at bedtime) (20 Oct 2022 16:34)  ZyrTEC 10 mg oral tablet: 1 tab(s) orally once a day (20 Oct 2022 16:34)      MEDICATIONS  (STANDING):  albuterol/ipratropium for Nebulization 3 milliLiter(s) Nebulizer every 6 hours  baclofen 5 milliGRAM(s) Oral every 12 hours  buDESOnide    Inhalation Suspension 0.5 milliGRAM(s) Inhalation two times a day  dextrose 5% + sodium chloride 0.45%. 1000 milliLiter(s) (30 mL/Hr) IV Continuous <Continuous>  donepezil 10 milliGRAM(s) Oral at bedtime  DULoxetine 60 milliGRAM(s) Oral daily  enoxaparin Injectable 40 milliGRAM(s) SubCutaneous every 24 hours  guaiFENesin  milliGRAM(s) Oral every 12 hours  levothyroxine Injectable 75 MICROGram(s) IV Push at bedtime  memantine 10 milliGRAM(s) Oral two times a day  methylPREDNISolone sodium succinate Injectable 40 milliGRAM(s) IV Push daily  metoprolol tartrate Injectable 2.5 milliGRAM(s) IV Push every 12 hours  pantoprazole  Injectable 40 milliGRAM(s) IV Push every 12 hours  simvastatin 10 milliGRAM(s) Oral at bedtime  traZODone 100 milliGRAM(s) Oral at bedtime    MEDICATIONS  (PRN):  aluminum hydroxide/magnesium hydroxide/simethicone Suspension 30 milliLiter(s) Oral every 4 hours PRN Dyspepsia  ibuprofen  Tablet. 400 milliGRAM(s) Oral four times a day PRN Temp greater or equal to 38C (100.4F), Mild Pain (1 - 3)  loperamide 2 milliGRAM(s) Oral four times a day PRN Diarrhea  melatonin 3 milliGRAM(s) Oral at bedtime PRN Insomnia  ondansetron Injectable 4 milliGRAM(s) IV Push every 8 hours PRN Nausea and/or Vomiting  traMADol 50 milliGRAM(s) Oral three times a day PRN Moderate Pain (4 - 6)      Diet, NPO (10-22-22 @ 12:09) [Active]          Vital Signs Last 24 Hrs  T(C): 36.6 (23 Oct 2022 04:40), Max: 36.7 (22 Oct 2022 21:05)  T(F): 97.9 (23 Oct 2022 04:40), Max: 98 (22 Oct 2022 21:05)  HR: 64 (23 Oct 2022 08:26) (64 - 86)  BP: 114/71 (23 Oct 2022 04:40) (110/76 - 141/59)  BP(mean): --  RR: 18 (23 Oct 2022 04:40) (18 - 20)  SpO2: 98% (23 Oct 2022 08:26) (97% - 98%)    Parameters below as of 23 Oct 2022 08:26  Patient On (Oxygen Delivery Method): nasal cannula,2LPM          10-22-22 @ 07:01  -  10-23-22 @ 07:00  --------------------------------------------------------  IN: 210 mL / OUT: 350 mL / NET: -140 mL              LABS:                        11.5   4.98  )-----------( 200      ( 23 Oct 2022 08:15 )             37.0     10-23    146<H>  |  109<H>  |  9   ----------------------------<  124<H>  3.9   |  33<H>  |  0.43<L>    Ca    9.1      23 Oct 2022 08:15  Phos  2.2     10-23  Mg     1.7     10-23    TPro  5.9<L>  /  Alb  2.9<L>  /  TBili  0.3  /  DBili  x   /  AST  18  /  ALT  13  /  AlkPhos  62  10-22          ABG - ( 22 Oct 2022 12:03 )  pH, Arterial: 7.43  pH, Blood: x     /  pCO2: 45    /  pO2: 135   / HCO3: 30    / Base Excess: 5.6   /  SaO2: 98.3                WBC:  WBC Count: 4.98 K/uL (10-23 @ 08:15)  WBC Count: 6.89 K/uL (10-22 @ 06:25)  WBC Count: 3.00 K/uL (10-21 @ 04:26)  WBC Count: 4.42 K/uL (10-20 @ 10:30)      MICROBIOLOGY:  RECENT CULTURES:  10-21 Clean Catch Clean Catch (Midstream) XXXX XXXX   <10,000 CFU/mL Normal Urogenital Consuelo    10-20 .Blood Blood-Peripheral XXXX XXXX   No growth to date.    10-20 .Blood Blood-Peripheral XXXX XXXX   No growth to date.                    Sodium:  Sodium, Serum: 146 mmol/L (10-23 @ 08:15)  Sodium, Serum: 146 mmol/L (10-22 @ 06:25)  Sodium, Serum: 145 mmol/L (10-21 @ 04:26)  Sodium, Serum: 143 mmol/L (10-20 @ 10:30)      0.43 mg/dL 10-23 @ 08:15  0.47 mg/dL 10-22 @ 06:25  0.41 mg/dL 10-21 @ 04:26  0.70 mg/dL 10-20 @ 10:30      Hemoglobin:  Hemoglobin: 11.5 g/dL (10-23 @ 08:15)  Hemoglobin: 11.0 g/dL (10-22 @ 06:25)  Hemoglobin: 11.9 g/dL (10-21 @ 04:26)  Hemoglobin: 14.3 g/dL (10-20 @ 10:30)      Platelets: Platelet Count - Automated: 200 K/uL (10-23 @ 08:15)  Platelet Count - Automated: 205 K/uL (10-22 @ 06:25)  Platelet Count - Automated: 210 K/uL (10-21 @ 04:26)  Platelet Count - Automated: 236 K/uL (10-20 @ 10:30)      LIVER FUNCTIONS - ( 22 Oct 2022 06:25 )  Alb: 2.9 g/dL / Pro: 5.9 g/dL / ALK PHOS: 62 U/L / ALT: 13 U/L / AST: 18 U/L / GGT: x                 RADIOLOGY & ADDITIONAL STUDIES:      MICROBIOLOGY:  RECENT CULTURES:  10-21 Clean Catch Clean Catch (Midstream) XXXX XXXX   <10,000 CFU/mL Normal Urogenital Consuelo    10-20 .Blood Blood-Peripheral XXXX XXXX   No growth to date.    10-20 .Blood Blood-Peripheral XXXX XXXX   No growth to date.

## 2022-10-23 NOTE — PROGRESS NOTE ADULT - SUBJECTIVE AND OBJECTIVE BOX
Date/Time Patient Seen:  		  Referring MD:   Data Reviewed	       Patient is a 82y old  Female who presents with a chief complaint of SOB AND COUGH (22 Oct 2022 17:44)      Subjective/HPI     PAST MEDICAL & SURGICAL HISTORY:  History of COPD    Insomnia    Mood disorder    Dementia    Spasm of muscle    Constipation    Hypothyroidism    GERD (gastroesophageal reflux disease)    HTN (hypertension)    CHF, chronic          Medication list         MEDICATIONS  (STANDING):  albuterol/ipratropium for Nebulization 3 milliLiter(s) Nebulizer every 6 hours  baclofen 5 milliGRAM(s) Oral every 12 hours  buDESOnide    Inhalation Suspension 0.5 milliGRAM(s) Inhalation two times a day  dextrose 5% + sodium chloride 0.45%. 1000 milliLiter(s) (30 mL/Hr) IV Continuous <Continuous>  donepezil 10 milliGRAM(s) Oral at bedtime  DULoxetine 60 milliGRAM(s) Oral daily  enoxaparin Injectable 40 milliGRAM(s) SubCutaneous every 24 hours  guaiFENesin  milliGRAM(s) Oral every 12 hours  levothyroxine Injectable 75 MICROGram(s) IV Push at bedtime  memantine 10 milliGRAM(s) Oral two times a day  methylPREDNISolone sodium succinate Injectable 40 milliGRAM(s) IV Push daily  metoprolol tartrate Injectable 2.5 milliGRAM(s) IV Push every 12 hours  pantoprazole  Injectable 40 milliGRAM(s) IV Push every 12 hours  simvastatin 10 milliGRAM(s) Oral at bedtime  traZODone 100 milliGRAM(s) Oral at bedtime    MEDICATIONS  (PRN):  aluminum hydroxide/magnesium hydroxide/simethicone Suspension 30 milliLiter(s) Oral every 4 hours PRN Dyspepsia  ibuprofen  Tablet. 400 milliGRAM(s) Oral four times a day PRN Temp greater or equal to 38C (100.4F), Mild Pain (1 - 3)  loperamide 2 milliGRAM(s) Oral four times a day PRN Diarrhea  melatonin 3 milliGRAM(s) Oral at bedtime PRN Insomnia  ondansetron Injectable 4 milliGRAM(s) IV Push every 8 hours PRN Nausea and/or Vomiting  traMADol 50 milliGRAM(s) Oral three times a day PRN Moderate Pain (4 - 6)         Vitals log        ICU Vital Signs Last 24 Hrs  T(C): 36.6 (23 Oct 2022 04:40), Max: 36.7 (22 Oct 2022 21:05)  T(F): 97.9 (23 Oct 2022 04:40), Max: 98 (22 Oct 2022 21:05)  HR: 69 (23 Oct 2022 04:40) (69 - 86)  BP: 114/71 (23 Oct 2022 04:40) (110/76 - 141/59)  BP(mean): --  ABP: --  ABP(mean): --  RR: 18 (23 Oct 2022 04:40) (18 - 20)  SpO2: 97% (23 Oct 2022 04:40) (97% - 98%)    O2 Parameters below as of 23 Oct 2022 04:40  Patient On (Oxygen Delivery Method): nasal cannula  O2 Flow (L/min): 2               Input and Output:  I&O's Detail    21 Oct 2022 07:01  -  22 Oct 2022 07:00  --------------------------------------------------------  IN:    Lactated Ringers: 720 mL  Total IN: 720 mL    OUT:  Total OUT: 0 mL    Total NET: 720 mL          Lab Data                        11.0   6.89  )-----------( 205      ( 22 Oct 2022 06:25 )             36.1     10-22    x   |  x   |  x   ----------------------------<  x   3.8   |  x   |  x     Ca    9.1      22 Oct 2022 06:25    TPro  5.9<L>  /  Alb  2.9<L>  /  TBili  0.3  /  DBili  x   /  AST  18  /  ALT  13  /  AlkPhos  62  10-22    ABG - ( 22 Oct 2022 12:03 )  pH, Arterial: 7.43  pH, Blood: x     /  pCO2: 45    /  pO2: 135   / HCO3: 30    / Base Excess: 5.6   /  SaO2: 98.3                    Review of Systems	      Objective     Physical Examination    heart s1s2  lung dec BS  head nc      Pertinent Lab findings & Imaging      Gela:  NO   Adequate UO     I&O's Detail    21 Oct 2022 07:01  -  22 Oct 2022 07:00  --------------------------------------------------------  IN:    Lactated Ringers: 720 mL  Total IN: 720 mL    OUT:  Total OUT: 0 mL    Total NET: 720 mL               Discussed with:     Cultures:	        Radiology

## 2022-10-23 NOTE — PROGRESS NOTE ADULT - SUBJECTIVE AND OBJECTIVE BOX
Interval History:    CENTRAL LINE:   [  ] YES       [  ] NO  FIGUEROA:                 [  ] YES       [  ] NO         REVIEW OF SYSTEMS:  All Systems below were reviewed and are negative [  ]  HEENT:  ID:  Pulmonary:  Cardiac:  GI:  Renal:  Musculoskeletal:  All other systems above were reviewed and are negative   [  ]      MEDICATIONS  (STANDING):  albuterol/ipratropium for Nebulization 3 milliLiter(s) Nebulizer every 6 hours  baclofen 5 milliGRAM(s) Oral every 12 hours  buDESOnide    Inhalation Suspension 0.5 milliGRAM(s) Inhalation two times a day  dextrose 5% + sodium chloride 0.45%. 1000 milliLiter(s) (30 mL/Hr) IV Continuous <Continuous>  donepezil 10 milliGRAM(s) Oral at bedtime  DULoxetine 60 milliGRAM(s) Oral daily  enoxaparin Injectable 40 milliGRAM(s) SubCutaneous every 24 hours  guaiFENesin  milliGRAM(s) Oral every 12 hours  levothyroxine Injectable 75 MICROGram(s) IV Push at bedtime  memantine 10 milliGRAM(s) Oral two times a day  methylPREDNISolone sodium succinate Injectable 40 milliGRAM(s) IV Push daily  metoprolol tartrate Injectable 2.5 milliGRAM(s) IV Push every 12 hours  pantoprazole  Injectable 40 milliGRAM(s) IV Push every 12 hours  potassium phosphate IVPB 15 milliMole(s) IV Intermittent once  simvastatin 10 milliGRAM(s) Oral at bedtime  traZODone 100 milliGRAM(s) Oral at bedtime    MEDICATIONS  (PRN):  aluminum hydroxide/magnesium hydroxide/simethicone Suspension 30 milliLiter(s) Oral every 4 hours PRN Dyspepsia  ibuprofen  Tablet. 400 milliGRAM(s) Oral four times a day PRN Temp greater or equal to 38C (100.4F), Mild Pain (1 - 3)  loperamide 2 milliGRAM(s) Oral four times a day PRN Diarrhea  melatonin 3 milliGRAM(s) Oral at bedtime PRN Insomnia  ondansetron Injectable 4 milliGRAM(s) IV Push every 8 hours PRN Nausea and/or Vomiting  traMADol 50 milliGRAM(s) Oral three times a day PRN Moderate Pain (4 - 6)      Vital Signs Last 24 Hrs  T(C): 36.6 (23 Oct 2022 14:09), Max: 36.7 (22 Oct 2022 21:05)  T(F): 97.8 (23 Oct 2022 14:09), Max: 98 (22 Oct 2022 21:05)  HR: 67 (23 Oct 2022 17:56) (64 - 89)  BP: 135/75 (23 Oct 2022 17:56) (114/71 - 135/75)  BP(mean): --  RR: 18 (23 Oct 2022 14:09) (18 - 19)  SpO2: 99% (23 Oct 2022 14:14) (96% - 99%)    Parameters below as of 23 Oct 2022 14:14  Patient On (Oxygen Delivery Method): nasal cannula,2LPM        I&O's Summary    22 Oct 2022 07:01  -  23 Oct 2022 07:00  --------------------------------------------------------  IN: 210 mL / OUT: 350 mL / NET: -140 mL        PHYSICAL EXAM:  HEENT: NC/AT; PERRLA  Neck: Soft; no tenderness  Lungs: CTA bilaterally; no wheezing.   Heart:  Abdomen:  Genital/ Rectal:  Extremities:  Neurologic:  Vascular:      LABORATORY:    CBC Full  -  ( 23 Oct 2022 08:15 )  WBC Count : 4.98 K/uL  RBC Count : 4.01 M/uL  Hemoglobin : 11.5 g/dL  Hematocrit : 37.0 %  Platelet Count - Automated : 200 K/uL  Mean Cell Volume : 92.3 fl  Mean Cell Hemoglobin : 28.7 pg  Mean Cell Hemoglobin Concentration : 31.1 gm/dL  Auto Neutrophil # : x  Auto Lymphocyte # : x  Auto Monocyte # : x  Auto Eosinophil # : x  Auto Basophil # : x  Auto Neutrophil % : x  Auto Lymphocyte % : x  Auto Monocyte % : x  Auto Eosinophil % : x  Auto Basophil % : x      ESR:                   10-22 @ 06:25  14    C-Reactive Protein:     10-22 @ 06:25  8    Procalcitonin:           10-22 @ 06:25   --  ESR:                   10-21 @ 04:26  --    C-Reactive Protein:     10-21 @ 04:26  --    Procalcitonin:           10-21 @ 04:26   0.03  ESR:                   10-20 @ 20:30  --    C-Reactive Protein:     10-20 @ 20:30  --    Procalcitonin:           10-20 @ 20:30   0.04      10-23    146<H>  |  109<H>  |  9   ----------------------------<  124<H>  3.9   |  33<H>  |  0.43<L>    Ca    9.1      23 Oct 2022 08:15  Phos  2.2     10-23  Mg     1.7     10-23    TPro  5.9<L>  /  Alb  2.9<L>  /  TBili  0.3  /  DBili  x   /  AST  18  /  ALT  13  /  AlkPhos  62  10-22      Rapid Respiratory Viral Panel Result        10-20 @ 10:47  Rapid RVP NotDete  Coronovirus --  Adenovirus --  Bordetella Pertussis --  Chlamydia Pneumonia --  Entero/Rhinovirus--  HKU1 Coronovirus --  HMPV Coronovirus --  Influenza A --  Influenza AH1 --  Influenza AH1 2009 --  Influenza AH3 --  Influenza B --  Mycoplasma Pneumoniae --  NL63 Coronovirus --  OC43 Coronovirus --  Parainfluenza 1 --  Parainfluenza 2 --  Parainfluenza 3 --  Parainfluenza 4 --  Resp Syncytial Virus --      Assessment and Plan:          Je Baez MD   [phone].  30y Female POD#  4  s/p C/S, Uncomplicated                                       1. Neuro/Pain:  OPM  2  CV:  VS per routine  3. Pulm: Encourage ISS & Ambulation  4. GI:  Reg  5. : Voiding  6. DVT ppx: SCDs, SQH 5000 mg BID  7. Dispo: POD  #4   No fevers  Comfortable      MEDICATIONS  (STANDING):  albuterol/ipratropium for Nebulization 3 milliLiter(s) Nebulizer every 6 hours  baclofen 5 milliGRAM(s) Oral every 12 hours  buDESOnide    Inhalation Suspension 0.5 milliGRAM(s) Inhalation two times a day  dextrose 5% + sodium chloride 0.45%. 1000 milliLiter(s) (30 mL/Hr) IV Continuous <Continuous>  donepezil 10 milliGRAM(s) Oral at bedtime  DULoxetine 60 milliGRAM(s) Oral daily  enoxaparin Injectable 40 milliGRAM(s) SubCutaneous every 24 hours  guaiFENesin  milliGRAM(s) Oral every 12 hours  levothyroxine Injectable 75 MICROGram(s) IV Push at bedtime  memantine 10 milliGRAM(s) Oral two times a day  methylPREDNISolone sodium succinate Injectable 40 milliGRAM(s) IV Push daily  metoprolol tartrate Injectable 2.5 milliGRAM(s) IV Push every 12 hours  pantoprazole  Injectable 40 milliGRAM(s) IV Push every 12 hours  potassium phosphate IVPB 15 milliMole(s) IV Intermittent once  simvastatin 10 milliGRAM(s) Oral at bedtime  traZODone 100 milliGRAM(s) Oral at bedtime    MEDICATIONS  (PRN):  aluminum hydroxide/magnesium hydroxide/simethicone Suspension 30 milliLiter(s) Oral every 4 hours PRN Dyspepsia  ibuprofen  Tablet. 400 milliGRAM(s) Oral four times a day PRN Temp greater or equal to 38C (100.4F), Mild Pain (1 - 3)  loperamide 2 milliGRAM(s) Oral four times a day PRN Diarrhea  melatonin 3 milliGRAM(s) Oral at bedtime PRN Insomnia  ondansetron Injectable 4 milliGRAM(s) IV Push every 8 hours PRN Nausea and/or Vomiting  traMADol 50 milliGRAM(s) Oral three times a day PRN Moderate Pain (4 - 6)      Vital Signs Last 24 Hrs  T(C): 36.6 (23 Oct 2022 14:09), Max: 36.7 (22 Oct 2022 21:05)  T(F): 97.8 (23 Oct 2022 14:09), Max: 98 (22 Oct 2022 21:05)  HR: 67 (23 Oct 2022 17:56) (64 - 89)  BP: 135/75 (23 Oct 2022 17:56) (114/71 - 135/75)  BP(mean): --  RR: 18 (23 Oct 2022 14:09) (18 - 19)  SpO2: 99% (23 Oct 2022 14:14) (96% - 99%)    Parameters below as of 23 Oct 2022 14:14  Patient On (Oxygen Delivery Method): nasal cannula,2LPM        I&O's Summary    22 Oct 2022 07:01  -  23 Oct 2022 07:00  --------------------------------------------------------  IN: 210 mL / OUT: 350 mL / NET: -140 mL        PHYSICAL EXAM:  HEENT: NC/AT; PERRLA  Neck: Soft; no tenderness  Lungs: CTA bilaterally; no wheezing.   Heart:  Abdomen:  Genital/ Rectal:  Extremities:  Neurologic:  Vascular:      LABORATORY:    CBC Full  -  ( 23 Oct 2022 08:15 )  WBC Count : 4.98 K/uL  RBC Count : 4.01 M/uL  Hemoglobin : 11.5 g/dL  Hematocrit : 37.0 %  Platelet Count - Automated : 200 K/uL  Mean Cell Volume : 92.3 fl  Mean Cell Hemoglobin : 28.7 pg  Mean Cell Hemoglobin Concentration : 31.1 gm/dL  Auto Neutrophil # : x  Auto Lymphocyte # : x  Auto Monocyte # : x  Auto Eosinophil # : x  Auto Basophil # : x  Auto Neutrophil % : x  Auto Lymphocyte % : x  Auto Monocyte % : x  Auto Eosinophil % : x  Auto Basophil % : x      ESR:                   10-22 @ 06:25  14    C-Reactive Protein:     10-22 @ 06:25  8    Procalcitonin:           10-22 @ 06:25   --  ESR:                   10-21 @ 04:26  --    C-Reactive Protein:     10-21 @ 04:26  --    Procalcitonin:           10-21 @ 04:26   0.03  ESR:                   10-20 @ 20:30  --    C-Reactive Protein:     10-20 @ 20:30  --    Procalcitonin:           10-20 @ 20:30   0.04      10-23    146<H>  |  109<H>  |  9   ----------------------------<  124<H>  3.9   |  33<H>  |  0.43<L>    Ca    9.1      23 Oct 2022 08:15  Phos  2.2     10-23  Mg     1.7     10-23    TPro  5.9<L>  /  Alb  2.9<L>  /  TBili  0.3  /  DBili  x   /  AST  18  /  ALT  13  /  AlkPhos  62  10-22      Assessment and Plan:    1. Dyspnea  2. COPD.  3. Dilated esophagus, r/p achalasia.   4. Left knee swelling.    , Unclear sources of infection.  No pneumonia on  chest CT.   . Unclear sources of dyspnea. Unclear if the patient has COPD exacerbation as she has no wheezing or respiratory distress. D dimer is  elevated,  consider evaluation for PE if ok with pulmonary.   . Repeated  COVID 19 PCR was negative  . Diarrhea now resolved.           Je Baez MD   [phone].    No fevers  Comfortable  Denied diarrhea today.     MEDICATIONS  (STANDING):  albuterol/ipratropium for Nebulization 3 milliLiter(s) Nebulizer every 6 hours  baclofen 5 milliGRAM(s) Oral every 12 hours  buDESOnide    Inhalation Suspension 0.5 milliGRAM(s) Inhalation two times a day  dextrose 5% + sodium chloride 0.45%. 1000 milliLiter(s) (30 mL/Hr) IV Continuous <Continuous>  donepezil 10 milliGRAM(s) Oral at bedtime  DULoxetine 60 milliGRAM(s) Oral daily  enoxaparin Injectable 40 milliGRAM(s) SubCutaneous every 24 hours  guaiFENesin  milliGRAM(s) Oral every 12 hours  levothyroxine Injectable 75 MICROGram(s) IV Push at bedtime  memantine 10 milliGRAM(s) Oral two times a day  methylPREDNISolone sodium succinate Injectable 40 milliGRAM(s) IV Push daily  metoprolol tartrate Injectable 2.5 milliGRAM(s) IV Push every 12 hours  pantoprazole  Injectable 40 milliGRAM(s) IV Push every 12 hours  potassium phosphate IVPB 15 milliMole(s) IV Intermittent once  simvastatin 10 milliGRAM(s) Oral at bedtime  traZODone 100 milliGRAM(s) Oral at bedtime    MEDICATIONS  (PRN):  aluminum hydroxide/magnesium hydroxide/simethicone Suspension 30 milliLiter(s) Oral every 4 hours PRN Dyspepsia  ibuprofen  Tablet. 400 milliGRAM(s) Oral four times a day PRN Temp greater or equal to 38C (100.4F), Mild Pain (1 - 3)  loperamide 2 milliGRAM(s) Oral four times a day PRN Diarrhea  melatonin 3 milliGRAM(s) Oral at bedtime PRN Insomnia  ondansetron Injectable 4 milliGRAM(s) IV Push every 8 hours PRN Nausea and/or Vomiting  traMADol 50 milliGRAM(s) Oral three times a day PRN Moderate Pain (4 - 6)      Vital Signs Last 24 Hrs  T(C): 36.6 (23 Oct 2022 14:09), Max: 36.7 (22 Oct 2022 21:05)  T(F): 97.8 (23 Oct 2022 14:09), Max: 98 (22 Oct 2022 21:05)  HR: 67 (23 Oct 2022 17:56) (64 - 89)  BP: 135/75 (23 Oct 2022 17:56) (114/71 - 135/75)  BP(mean): --  RR: 18 (23 Oct 2022 14:09) (18 - 19)  SpO2: 99% (23 Oct 2022 14:14) (96% - 99%)    Parameters below as of 23 Oct 2022 14:14  Patient On (Oxygen Delivery Method): nasal cannula,2LPM      22 Oct 2022 07:01  -  23 Oct 2022 07:00  --------------------------------------------------------  IN: 210 mL / OUT: 350 mL / NET: -140 mL        PHYSICAL EXAM:  HEENT: NC/AT; PERRLA  Neck: Soft; no tenderness  Lungs: Coarse BS bilaterally; no wheezing.   Heart: RRR, no murmurs.  Abdomen: Soft, no tenderness.   Genital/ Rectal: No bearden catheter.   Extremities: No ulcers.  Neurologic: Confused.       LABORATORY:    CBC Full  -  ( 23 Oct 2022 08:15 )  WBC Count : 4.98 K/uL  RBC Count : 4.01 M/uL  Hemoglobin : 11.5 g/dL  Hematocrit : 37.0 %  Platelet Count - Automated : 200 K/uL  Mean Cell Volume : 92.3 fl  Mean Cell Hemoglobin : 28.7 pg  Mean Cell Hemoglobin Concentration : 31.1 gm/dL  Auto Neutrophil # : x  Auto Lymphocyte # : x  Auto Monocyte # : x  Auto Eosinophil # : x  Auto Basophil # : x  Auto Neutrophil % : x  Auto Lymphocyte % : x  Auto Monocyte % : x  Auto Eosinophil % : x  Auto Basophil % : x      ESR:                   10-22 @ 06:25  14    C-Reactive Protein:     10-22 @ 06:25  8    Procalcitonin:           10-22 @ 06:25   --  ESR:                   10-21 @ 04:26  --    C-Reactive Protein:     10-21 @ 04:26  --    Procalcitonin:           10-21 @ 04:26   0.03  ESR:                   10-20 @ 20:30  --    C-Reactive Protein:     10-20 @ 20:30  --    Procalcitonin:           10-20 @ 20:30   0.04      10-23    146<H>  |  109<H>  |  9   ----------------------------<  124<H>  3.9   |  33<H>  |  0.43<L>    Ca    9.1      23 Oct 2022 08:15  Phos  2.2     10-23  Mg     1.7     10-23    TPro  5.9<L>  /  Alb  2.9<L>  /  TBili  0.3  /  DBili  x   /  AST  18  /  ALT  13  /  AlkPhos  62  10-22      Assessment and Plan:    1. Dyspnea  2. COPD.  3. Dilated esophagus, r/p achalasia.   4. Left knee swelling.    , Unclear sources of infection.  No pneumonia on  chest CT. Monitor off antibiotics.  . Repeated  COVID 19 PCR was negative  . Diarrhea now resolved.   . Waiting for EGD with GI.       Je Baez MD   [phone].

## 2022-10-23 NOTE — CONSULT NOTE ADULT - PROBLEM SELECTOR RECOMMENDATION 9
-Upper endoscopy on Monday to assess esophageal diverticulum  -Not optimal candidate for surgical intervention, will await upper endoscopy results.

## 2022-10-23 NOTE — PROGRESS NOTE ADULT - SUBJECTIVE AND OBJECTIVE BOX
Patient is a 82y Female whom presented to the hospital with hypernatremia     PAST MEDICAL & SURGICAL HISTORY:  History of COPD      Insomnia      Mood disorder      Dementia      Spasm of muscle      Constipation      Hypothyroidism      GERD (gastroesophageal reflux disease)      HTN (hypertension)      CHF, chronic          MEDICATIONS  (STANDING):  albuterol/ipratropium for Nebulization 3 milliLiter(s) Nebulizer every 6 hours  baclofen 5 milliGRAM(s) Oral every 12 hours  buDESOnide    Inhalation Suspension 0.5 milliGRAM(s) Inhalation two times a day  dextrose 5% + sodium chloride 0.45%. 1000 milliLiter(s) (30 mL/Hr) IV Continuous <Continuous>  donepezil 10 milliGRAM(s) Oral at bedtime  DULoxetine 60 milliGRAM(s) Oral daily  enoxaparin Injectable 40 milliGRAM(s) SubCutaneous every 24 hours  guaiFENesin  milliGRAM(s) Oral every 12 hours  levothyroxine Injectable 75 MICROGram(s) IV Push at bedtime  memantine 10 milliGRAM(s) Oral two times a day  methylPREDNISolone sodium succinate Injectable 40 milliGRAM(s) IV Push daily  metoprolol tartrate Injectable 2.5 milliGRAM(s) IV Push every 12 hours  pantoprazole  Injectable 40 milliGRAM(s) IV Push every 12 hours  potassium chloride  10 mEq/100 mL IVPB 10 milliEquivalent(s) IV Intermittent every 1 hour  simvastatin 10 milliGRAM(s) Oral at bedtime  traZODone 100 milliGRAM(s) Oral at bedtime      Allergies    sulfa drugs (Unknown)  Tylenol (Unknown)    Intolerances        SOCIAL HISTORY:  Denies ETOh,Smoking,     FAMILY HISTORY:      REVIEW OF SYSTEMS:    CONSTITUTIONAL: No weakness, fevers or chills  RESPIRATORY: No cough, wheezing, hemoptysis; No shortness of breath  CARDIOVASCULAR: No chest pain or palpitations  GASTROINTESTINAL: No abdominal or epigastric pain. No nausea, vomiting,     No diarrhea or constipation. No melena   SKIN: dry                            11.5   4.98  )-----------( 200      ( 23 Oct 2022 08:15 )             37.0       CBC Full  -  ( 23 Oct 2022 08:15 )  WBC Count : 4.98 K/uL  RBC Count : 4.01 M/uL  Hemoglobin : 11.5 g/dL  Hematocrit : 37.0 %  Platelet Count - Automated : 200 K/uL  Mean Cell Volume : 92.3 fl  Mean Cell Hemoglobin : 28.7 pg  Mean Cell Hemoglobin Concentration : 31.1 gm/dL  Auto Neutrophil # : x  Auto Lymphocyte # : x  Auto Monocyte # : x  Auto Eosinophil # : x  Auto Basophil # : x  Auto Neutrophil % : x  Auto Lymphocyte % : x  Auto Monocyte % : x  Auto Eosinophil % : x  Auto Basophil % : x      10-23    146<H>  |  109<H>  |  9   ----------------------------<  124<H>  3.9   |  33<H>  |  0.43<L>    Ca    9.1      23 Oct 2022 08:15  Phos  2.2     10-23  Mg     1.7     10-23    TPro  5.9<L>  /  Alb  2.9<L>  /  TBili  0.3  /  DBili  x   /  AST  18  /  ALT  13  /  AlkPhos  62  10-22      CAPILLARY BLOOD GLUCOSE          Vital Signs Last 24 Hrs  T(C): 36.6 (23 Oct 2022 14:09), Max: 36.7 (22 Oct 2022 21:05)  T(F): 97.8 (23 Oct 2022 14:09), Max: 98 (22 Oct 2022 21:05)  HR: 87 (23 Oct 2022 14:14) (64 - 89)  BP: 132/85 (23 Oct 2022 14:09) (110/76 - 132/85)  BP(mean): --  RR: 18 (23 Oct 2022 14:09) (18 - 20)  SpO2: 99% (23 Oct 2022 14:14) (96% - 99%)    Parameters below as of 23 Oct 2022 14:14  Patient On (Oxygen Delivery Method): nasal cannula,2LPM                    PHYSICAL EXAM:    Constitutional: NAD  HEENT: conjunctive   clear   Neck:  No JVD  Respiratory: decrease bs b/l   Cardiovascular: S1 and S2  Gastrointestinal: BS+, soft, NT/ND  Extremities: No peripheral edema  : No Ren  Skin: dry

## 2022-10-23 NOTE — CONSULT NOTE ADULT - CONSULT REASON
Esophageal diverticulum
Evaluation for pneumonia
COPD
cardiac evaluation
goc
hypernatremia
abn imaging

## 2022-10-23 NOTE — CONSULT NOTE ADULT - SUBJECTIVE AND OBJECTIVE BOX
Chief Complaint:  Patient is a 82y old  Female who presents with a chief complaint of SOB AND COUGH   Patient brought in by EMS from [facility] for shortness of breath cough wheezing for 2 days.  Pt found to have a large esophageal diverticulum.      Allergies:  sulfa drugs (Unknown)  Tylenol (Unknown)      Medications:  albuterol/ipratropium for Nebulization 3 milliLiter(s) Nebulizer every 6 hours  aluminum hydroxide/magnesium hydroxide/simethicone Suspension 30 milliLiter(s) Oral every 4 hours PRN  baclofen 5 milliGRAM(s) Oral every 12 hours  bisacodyl Suppository 10 milliGRAM(s) Rectal daily PRN  buDESOnide    Inhalation Suspension 0.5 milliGRAM(s) Inhalation two times a day  donepezil 10 milliGRAM(s) Oral at bedtime  DULoxetine 60 milliGRAM(s) Oral daily  enoxaparin Injectable 40 milliGRAM(s) SubCutaneous every 24 hours  guaiFENesin  milliGRAM(s) Oral every 12 hours  ibuprofen  Tablet. 400 milliGRAM(s) Oral four times a day PRN  lactated ringers. 1000 milliLiter(s) IV Continuous <Continuous>  levothyroxine 150 MICROGram(s) Oral daily  magnesium hydroxide Suspension 30 milliLiter(s) Oral daily PRN  melatonin 3 milliGRAM(s) Oral at bedtime PRN  memantine 10 milliGRAM(s) Oral two times a day  methylPREDNISolone sodium succinate Injectable 40 milliGRAM(s) IV Push daily  metoprolol tartrate 37.5 milliGRAM(s) Oral two times a day  ondansetron Injectable 4 milliGRAM(s) IV Push every 8 hours PRN  pantoprazole    Tablet 40 milliGRAM(s) Oral before breakfast  piperacillin/tazobactam IVPB.. 3.375 Gram(s) IV Intermittent every 8 hours  senna 2 Tablet(s) Oral at bedtime  simvastatin 10 milliGRAM(s) Oral at bedtime  traMADol 50 milliGRAM(s) Oral three times a day PRN  traZODone 100 milliGRAM(s) Oral at bedtime      PMHX/PSHX:  History of COPD    Insomnia    Mood disorder    Dementia    Spasm of muscle    Constipation    Hypothyroidism    GERD (gastroesophageal reflux disease)    HTN (hypertension)    CHF, chronic            PHYSICAL EXAM:     HEART:  Regular rhythm, S1, S2, no murmur/rub/S3/S4, no abdominal bruit, no edema  ABDOMEN:  Soft, non-tender, non-distended, normoactive bowel sounds,  no masses ,no hepato-splenomegaly, no signs of chronic liver disease

## 2022-10-23 NOTE — PROGRESS NOTE ADULT - SUBJECTIVE AND OBJECTIVE BOX
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  Patient denies fevers chills headache chest pain abdominal pain vomiting Admit for antibacterial managmnet ,intravenous steroids,nebulised bronchodilators and pulmonology evaluation,O2 supply,serial labs and chest xrays,obtain ECHO to evaluate LVEF and rule out chf component Admitted  to telemetry unit for monitoring , send 3 sets of cardiac enzymes to rule out acute coronary event, obtain ECHO to evaluate LVEF, cardiology consult  ,continue current management, O2 supply, anticoagulation plan as per cardiology consult Palliative care consult requested ,to discuss advance directives and complete MOLST  (20 Oct 2022 13:54)      REVIEW OF SYSTEMS:    CONSTITUTIONAL: No fever, weight loss, or fatigue  EYES: No eye pain, visual disturbances, or discharge  ENMT:  No difficulty hearing, tinnitus, vertigo; No sinus or throat pain  NECK: No pain or stiffness  BREASTS: No pain, masses, or nipple discharge  RESPIRATORY: No cough, wheezing, chills or hemoptysis; No shortness of breath  CARDIOVASCULAR: No chest pain, palpitations, dizziness, or leg swelling  GASTROINTESTINAL: No abdominal or epigastric pain. No nausea, vomiting, or hematemesis; No diarrhea or constipation. No melena or hematochezia.  GENITOURINARY: No dysuria, frequency, hematuria, or incontinence  NEUROLOGICAL: No headaches, memory loss, loss of strength, numbness, or tremors  SKIN: No itching, burning, rashes, or lesions   LYMPH NODES: No enlarged glands  ENDOCRINE: No heat or cold intolerance; No hair loss  MUSCULOSKELETAL: No joint pain or swelling; No muscle, back, or extremity pain  PSYCHIATRIC: No depression, anxiety, mood swings, or difficulty sleeping  HEME/LYMPH: No easy bruising, or bleeding gums  ALLERGY AND IMMUNOLOGIC: No hives or eczema    MEDICATIONS  (STANDING):  albuterol/ipratropium for Nebulization 3 milliLiter(s) Nebulizer every 6 hours  baclofen 5 milliGRAM(s) Oral every 12 hours  buDESOnide    Inhalation Suspension 0.5 milliGRAM(s) Inhalation two times a day  dextrose 5% + sodium chloride 0.45%. 1000 milliLiter(s) (30 mL/Hr) IV Continuous <Continuous>  donepezil 10 milliGRAM(s) Oral at bedtime  DULoxetine 60 milliGRAM(s) Oral daily  enoxaparin Injectable 40 milliGRAM(s) SubCutaneous every 24 hours  guaiFENesin  milliGRAM(s) Oral every 12 hours  levothyroxine Injectable 75 MICROGram(s) IV Push at bedtime  memantine 10 milliGRAM(s) Oral two times a day  methylPREDNISolone sodium succinate Injectable 40 milliGRAM(s) IV Push daily  metoprolol tartrate Injectable 2.5 milliGRAM(s) IV Push every 12 hours  pantoprazole  Injectable 40 milliGRAM(s) IV Push every 12 hours  simvastatin 10 milliGRAM(s) Oral at bedtime  traZODone 100 milliGRAM(s) Oral at bedtime    MEDICATIONS  (PRN):  aluminum hydroxide/magnesium hydroxide/simethicone Suspension 30 milliLiter(s) Oral every 4 hours PRN Dyspepsia  ibuprofen  Tablet. 400 milliGRAM(s) Oral four times a day PRN Temp greater or equal to 38C (100.4F), Mild Pain (1 - 3)  loperamide 2 milliGRAM(s) Oral four times a day PRN Diarrhea  melatonin 3 milliGRAM(s) Oral at bedtime PRN Insomnia  ondansetron Injectable 4 milliGRAM(s) IV Push every 8 hours PRN Nausea and/or Vomiting  traMADol 50 milliGRAM(s) Oral three times a day PRN Moderate Pain (4 - 6)      ALLERGIES: sulfa drugs (Unknown)  Tylenol (Unknown)      FAMILY HISTORY:      PHYSICAL EXAMINATION:  -----------------------------  T(C): 36.6 (10-23-22 @ 04:40), Max: 36.7 (10-22-22 @ 21:05)  HR: 64 (10-23-22 @ 08:26) (64 - 86)  BP: 114/71 (10-23-22 @ 04:40) (110/76 - 141/59)  RR: 18 (10-23-22 @ 04:40) (18 - 20)  SpO2: 98% (10-23-22 @ 08:26) (97% - 98%)  Wt(kg): --    10-22 @ 07:01  -  10-23 @ 07:00  --------------------------------------------------------  IN:    dextrose 5% + sodium chloride 0.45%: 210 mL  Total IN: 210 mL    OUT:    Voided (mL): 350 mL  Total OUT: 350 mL    Total NET: -140 mL            VITALS  T(C): 36.6 (10-23-22 @ 04:40), Max: 36.7 (10-22-22 @ 21:05)  HR: 64 (10-23-22 @ 08:26) (64 - 86)  BP: 114/71 (10-23-22 @ 04:40) (110/76 - 141/59)  RR: 18 (10-23-22 @ 04:40) (18 - 20)  SpO2: 98% (10-23-22 @ 08:26) (97% - 98%)    Constitutional: well developed, normal appearance, well groomed, well nourished, no deformities and no acute distress.   Eyes: the conjunctiva exhibited no abnormalities and the eyelids demonstrated no xanthelasmas.   HEENT: normal oral mucosa, no oral pallor and no oral cyanosis.   Neck: normal jugular venous A waves present, normal jugular venous V waves present and no jugular venous oliveira A waves.   Pulmonary: no respiratory distress, normal respiratory rhythm and effort, no accessory muscle use and lungs were clear to auscultation bilaterally.   Cardiovascular: heart rate and rhythm were normal, normal S1 and S2 and no murmur, gallop, rub, heave or thrill are present.   Abdomen: soft, non-tender, no hepato-splenomegaly and no abdominal mass palpated.   Musculoskeletal: the gait could not be assessed..   Extremities: no clubbing of the fingernails, no localized cyanosis, no petechial hemorrhages and no ischemic changes.   Skin: normal skin color and pigmentation, no rash, no venous stasis, no skin lesions, no skin ulcer and no xanthoma was observed.   Psychiatric: oriented to person, place, and time, the affect was normal, the mood was normal and not feeling anxious.     LABS:   --------  10-23    146<H>  |  109<H>  |  9   ----------------------------<  124<H>  3.9   |  33<H>  |  0.43<L>    Ca    9.1      23 Oct 2022 08:15  Phos  2.2     10-23  Mg     1.7     10-23    TPro  5.9<L>  /  Alb  2.9<L>  /  TBili  0.3  /  DBili  x   /  AST  18  /  ALT  13  /  AlkPhos  62  10-22                         11.5   4.98  )-----------( 200      ( 23 Oct 2022 08:15 )             37.0                 RADIOLOGY:  -----------------    ECG:     ECHO:

## 2022-10-23 NOTE — CONSULT NOTE ADULT - CONSULT REQUESTED DATE/TIME
20-Oct-2022 17:46
23-Oct-2022
20-Oct-2022
20-Oct-2022 14:40
22-Oct-2022 17:46
20-Oct-2022
20-Oct-2022 17:12

## 2022-10-24 ENCOUNTER — RESULT REVIEW (OUTPATIENT)
Age: 82
End: 2022-10-24

## 2022-10-24 LAB
ANION GAP SERPL CALC-SCNC: 6 MMOL/L — SIGNIFICANT CHANGE UP (ref 5–17)
BUN SERPL-MCNC: 8 MG/DL — SIGNIFICANT CHANGE UP (ref 7–23)
CALCIUM SERPL-MCNC: 9.3 MG/DL — SIGNIFICANT CHANGE UP (ref 8.5–10.1)
CHLORIDE SERPL-SCNC: 103 MMOL/L — SIGNIFICANT CHANGE UP (ref 96–108)
CO2 SERPL-SCNC: 31 MMOL/L — SIGNIFICANT CHANGE UP (ref 22–31)
CREAT SERPL-MCNC: 0.39 MG/DL — LOW (ref 0.5–1.3)
EGFR: 98 ML/MIN/1.73M2 — SIGNIFICANT CHANGE UP
GLUCOSE SERPL-MCNC: 118 MG/DL — HIGH (ref 70–99)
HCT VFR BLD CALC: 39 % — SIGNIFICANT CHANGE UP (ref 34.5–45)
HGB BLD-MCNC: 12.2 G/DL — SIGNIFICANT CHANGE UP (ref 11.5–15.5)
MAGNESIUM SERPL-MCNC: 1.7 MG/DL — SIGNIFICANT CHANGE UP (ref 1.6–2.6)
MCHC RBC-ENTMCNC: 28.3 PG — SIGNIFICANT CHANGE UP (ref 27–34)
MCHC RBC-ENTMCNC: 31.3 GM/DL — LOW (ref 32–36)
MCV RBC AUTO: 90.5 FL — SIGNIFICANT CHANGE UP (ref 80–100)
NRBC # BLD: 0 /100 WBCS — SIGNIFICANT CHANGE UP (ref 0–0)
PHOSPHATE SERPL-MCNC: 2.6 MG/DL — SIGNIFICANT CHANGE UP (ref 2.5–4.5)
PLATELET # BLD AUTO: 196 K/UL — SIGNIFICANT CHANGE UP (ref 150–400)
POTASSIUM SERPL-MCNC: 3.7 MMOL/L — SIGNIFICANT CHANGE UP (ref 3.5–5.3)
POTASSIUM SERPL-SCNC: 3.7 MMOL/L — SIGNIFICANT CHANGE UP (ref 3.5–5.3)
RBC # BLD: 4.31 M/UL — SIGNIFICANT CHANGE UP (ref 3.8–5.2)
RBC # FLD: 14.6 % — HIGH (ref 10.3–14.5)
SODIUM SERPL-SCNC: 140 MMOL/L — SIGNIFICANT CHANGE UP (ref 135–145)
WBC # BLD: 7.5 K/UL — SIGNIFICANT CHANGE UP (ref 3.8–10.5)
WBC # FLD AUTO: 7.5 K/UL — SIGNIFICANT CHANGE UP (ref 3.8–10.5)

## 2022-10-24 PROCEDURE — 99233 SBSQ HOSP IP/OBS HIGH 50: CPT

## 2022-10-24 PROCEDURE — 88312 SPECIAL STAINS GROUP 1: CPT | Mod: 26

## 2022-10-24 PROCEDURE — 88305 TISSUE EXAM BY PATHOLOGIST: CPT | Mod: 26

## 2022-10-24 DEVICE — ESOPHAGEAL BALLOON CATH CRE FIXED WIRE 10-11-12MM: Type: IMPLANTABLE DEVICE | Status: FUNCTIONAL

## 2022-10-24 DEVICE — HEMOSPRAY HEMOSTAT ENDO 7F: Type: IMPLANTABLE DEVICE | Status: FUNCTIONAL

## 2022-10-24 DEVICE — ESOPHAGEAL BALLOON CATH CRE FIXED WIRE 15-16.5-18MM: Type: IMPLANTABLE DEVICE | Status: FUNCTIONAL

## 2022-10-24 DEVICE — ESOPHAGEAL BALLOON CATH CRE FIXED WIRE 12-13.5-15MM: Type: IMPLANTABLE DEVICE | Status: FUNCTIONAL

## 2022-10-24 DEVICE — ESOPHAGEAL BALLOON CATH CRE FIXED WIRE 6-7-8MM: Type: IMPLANTABLE DEVICE | Status: FUNCTIONAL

## 2022-10-24 DEVICE — ESOPHAGEAL BALLOON CATH CRE FIXED WIRE 8-9-10MM: Type: IMPLANTABLE DEVICE | Status: FUNCTIONAL

## 2022-10-24 RX ADMIN — PANTOPRAZOLE SODIUM 40 MILLIGRAM(S): 20 TABLET, DELAYED RELEASE ORAL at 18:30

## 2022-10-24 RX ADMIN — PANTOPRAZOLE SODIUM 40 MILLIGRAM(S): 20 TABLET, DELAYED RELEASE ORAL at 05:09

## 2022-10-24 RX ADMIN — Medication 75 MICROGRAM(S): at 22:26

## 2022-10-24 RX ADMIN — Medication 3 MILLILITER(S): at 07:43

## 2022-10-24 RX ADMIN — Medication 2.5 MILLIGRAM(S): at 18:30

## 2022-10-24 RX ADMIN — Medication 3 MILLILITER(S): at 19:52

## 2022-10-24 RX ADMIN — Medication 2.5 MILLIGRAM(S): at 05:11

## 2022-10-24 RX ADMIN — Medication 0.5 MILLIGRAM(S): at 07:39

## 2022-10-24 RX ADMIN — Medication 40 MILLIGRAM(S): at 05:08

## 2022-10-24 RX ADMIN — Medication 0.5 MILLIGRAM(S): at 18:31

## 2022-10-24 RX ADMIN — Medication 0.5 MILLIGRAM(S): at 19:53

## 2022-10-24 RX ADMIN — Medication 3 MILLILITER(S): at 13:04

## 2022-10-24 RX ADMIN — Medication 0.5 MILLIGRAM(S): at 07:43

## 2022-10-24 RX ADMIN — ENOXAPARIN SODIUM 40 MILLIGRAM(S): 100 INJECTION SUBCUTANEOUS at 22:26

## 2022-10-24 NOTE — PHYSICAL THERAPY INITIAL EVALUATION ADULT - ADDITIONAL COMMENTS
Patient reports that she resides in [facility]. Ambulates with RW at baseline, requires assist for ADLs. Uses w/c as needed.

## 2022-10-24 NOTE — PROGRESS NOTE ADULT - SUBJECTIVE AND OBJECTIVE BOX
PROGRESS NOTE  Patient is a 82y old  Female who presents with a chief complaint of SOB AND COUGH (24 Oct 2022 10:11)    Chart and available morning labs /imaging are reviewed electronically , urgent issues addressed . More information  is being added upon completion of rounds , when more information is collected and management discussed with consultants , medical staff and social service/case management on the floor   OVERNIGHT  Anxiety  reported by medical staff  ,ativan ordered . All above noted -NPO  Scheduled for EGD today at 2 pm  HPI:  Patient brought in by EMS from [facility] for shortness of breath cough wheezing for 2 days.  Patient denies fevers chills headache chest pain abdominal pain vomiting Admit for antibacterial managmnet ,intravenous steroids,nebulised bronchodilators and pulmonology evaluation,O2 supply,serial labs and chest xrays,obtain ECHO to evaluate LVEF and rule out chf component Admitted  to telemetry unit for monitoring , send 3 sets of cardiac enzymes to rule out acute coronary event, obtain ECHO to evaluate LVEF, cardiology consult  ,continue current management, O2 supply, anticoagulation plan as per cardiology consult Palliative care consult requested ,to discuss advance directives and complete MOLST  (20 Oct 2022 13:54)    PAST MEDICAL & SURGICAL HISTORY:  History of COPD      Insomnia      Mood disorder      Dementia      Spasm of muscle      Constipation      Hypothyroidism      GERD (gastroesophageal reflux disease)      HTN (hypertension)      CHF, chronic          MEDICATIONS  (STANDING):  albuterol/ipratropium for Nebulization 3 milliLiter(s) Nebulizer every 6 hours  baclofen 5 milliGRAM(s) Oral every 12 hours  buDESOnide    Inhalation Suspension 0.5 milliGRAM(s) Inhalation two times a day  dextrose 5% + sodium chloride 0.45%. 1000 milliLiter(s) (30 mL/Hr) IV Continuous <Continuous>  donepezil 10 milliGRAM(s) Oral at bedtime  DULoxetine 60 milliGRAM(s) Oral daily  enoxaparin Injectable 40 milliGRAM(s) SubCutaneous every 24 hours  guaiFENesin  milliGRAM(s) Oral every 12 hours  levothyroxine Injectable 75 MICROGram(s) IV Push at bedtime  memantine 10 milliGRAM(s) Oral two times a day  methylPREDNISolone sodium succinate Injectable 40 milliGRAM(s) IV Push daily  metoprolol tartrate Injectable 2.5 milliGRAM(s) IV Push every 12 hours  pantoprazole  Injectable 40 milliGRAM(s) IV Push every 12 hours  simvastatin 10 milliGRAM(s) Oral at bedtime  traZODone 100 milliGRAM(s) Oral at bedtime    MEDICATIONS  (PRN):  aluminum hydroxide/magnesium hydroxide/simethicone Suspension 30 milliLiter(s) Oral every 4 hours PRN Dyspepsia  ibuprofen  Tablet. 400 milliGRAM(s) Oral four times a day PRN Temp greater or equal to 38C (100.4F), Mild Pain (1 - 3)  loperamide 2 milliGRAM(s) Oral four times a day PRN Diarrhea  melatonin 3 milliGRAM(s) Oral at bedtime PRN Insomnia  ondansetron Injectable 4 milliGRAM(s) IV Push every 8 hours PRN Nausea and/or Vomiting  traMADol 50 milliGRAM(s) Oral three times a day PRN Moderate Pain (4 - 6)      OBJECTIVE    T(C): 36.7 (10-24-22 @ 04:50), Max: 36.7 (10-24-22 @ 04:50)  HR: 56 (10-24-22 @ 07:45) (56 - 89)  BP: 152/81 (10-24-22 @ 04:50) (132/85 - 152/81)  RR: 18 (10-24-22 @ 04:50) (18 - 18)  SpO2: 99% (10-24-22 @ 07:45) (96% - 100%)  Wt(kg): --  I&O's Summary    23 Oct 2022 07:01  -  24 Oct 2022 07:00  --------------------------------------------------------  IN: 330 mL / OUT: 0 mL / NET: 330 mL          REVIEW OF SYSTEMS:  CONSTITUTIONAL: No fever, weight loss, or fatigue  EYES: No eye pain, visual disturbances, or discharge  ENMT:   No sinus or throat pain  NECK: No pain or stiffness  RESPIRATORY: No cough, wheezing, chills or hemoptysis; No shortness of breath  CARDIOVASCULAR: No chest pain, palpitations, dizziness, or leg swelling  GASTROINTESTINAL: No abdominal pain. No nausea, vomiting; No diarrhea or constipation. No melena or hematochezia.  GENITOURINARY: No dysuria, frequency, hematuria, or incontinence  NEUROLOGICAL: No headaches, memory loss, loss of strength, numbness, or tremors  SKIN: No itching, burning, rashes, or lesions   MUSCULOSKELETAL: No joint pain or swelling; No muscle, back, or extremity pain    PHYSICAL EXAM:  Appearance: NAD. VS past 24 hrs -as above   HEENT:   Moist oral mucosa. Conjunctiva clear b/l.   Neck : supple  Respiratory: Lungs CTAB.  Gastrointestinal:  Soft, nontender. No rebound. No rigidity. BS present	  Cardiovascular: RRR ,S1S2 present  Neurologic: Non-focal. Moving all extremities.  Extremities: No edema. No erythema. No calf tenderness.  Skin: No rashes, No ecchymoses, No cyanosis.	  wounds ,skin lesions-See skin assesment flow sheet   LABS:                        12.2   7.50  )-----------( 196      ( 24 Oct 2022 08:20 )             39.0     10-24    140  |  103  |  8   ----------------------------<  118<H>  3.7   |  31  |  0.39<L>    Ca    9.3      24 Oct 2022 08:20  Phos  2.6     10-24  Mg     1.7     10-24      CAPILLARY BLOOD GLUCOSE              Culture - Urine (collected 21 Oct 2022 07:40)  Source: Clean Catch Clean Catch (Midstream)  Final Report (22 Oct 2022 10:35):    <10,000 CFU/mL Normal Urogenital Consuelo    Culture - Blood (collected 20 Oct 2022 10:40)  Source: .Blood Blood-Peripheral  Preliminary Report (21 Oct 2022 17:01):    No growth to date.    Culture - Blood (collected 20 Oct 2022 10:30)  Source: .Blood Blood-Peripheral  Preliminary Report (21 Oct 2022 17:01):    No growth to date.      RADIOLOGY & ADDITIONAL TESTS:   reviewed elctronically  ASSESSMENT/PLAN: 	     PROGRESS NOTE  Patient is a 82y old  Female who presents with a chief complaint of SOB AND COUGH (24 Oct 2022 10:11)    Chart and available morning labs /imaging are reviewed electronically , urgent issues addressed . More information  is being added upon completion of rounds , when more information is collected and management discussed with consultants , medical staff and social service/case management on the floor   OVERNIGHT  Anxiety  reported by medical staff  ,ativan ordered . All above noted -NPO Patient is resting in a bed comfortably .Confused ,poor mentation .No distress noted   Scheduled for EGD today at 2 pm  HPI:  Patient brought in by EMS from [facility] for shortness of breath cough wheezing for 2 days.  Patient denies fevers chills headache chest pain abdominal pain vomiting Admit for antibacterial managmnet ,intravenous steroids,nebulised bronchodilators and pulmonology evaluation,O2 supply,serial labs and chest xrays,obtain ECHO to evaluate LVEF and rule out chf component Admitted  to telemetry unit for monitoring , send 3 sets of cardiac enzymes to rule out acute coronary event, obtain ECHO to evaluate LVEF, cardiology consult  ,continue current management, O2 supply, anticoagulation plan as per cardiology consult Palliative care consult requested ,to discuss advance directives and complete MOLST  (20 Oct 2022 13:54)    PAST MEDICAL & SURGICAL HISTORY:  History of COPD      Insomnia      Mood disorder      Dementia      Spasm of muscle      Constipation      Hypothyroidism      GERD (gastroesophageal reflux disease)      HTN (hypertension)      CHF, chronic          MEDICATIONS  (STANDING):  albuterol/ipratropium for Nebulization 3 milliLiter(s) Nebulizer every 6 hours  baclofen 5 milliGRAM(s) Oral every 12 hours  buDESOnide    Inhalation Suspension 0.5 milliGRAM(s) Inhalation two times a day  dextrose 5% + sodium chloride 0.45%. 1000 milliLiter(s) (30 mL/Hr) IV Continuous <Continuous>  donepezil 10 milliGRAM(s) Oral at bedtime  DULoxetine 60 milliGRAM(s) Oral daily  enoxaparin Injectable 40 milliGRAM(s) SubCutaneous every 24 hours  guaiFENesin  milliGRAM(s) Oral every 12 hours  levothyroxine Injectable 75 MICROGram(s) IV Push at bedtime  memantine 10 milliGRAM(s) Oral two times a day  methylPREDNISolone sodium succinate Injectable 40 milliGRAM(s) IV Push daily  metoprolol tartrate Injectable 2.5 milliGRAM(s) IV Push every 12 hours  pantoprazole  Injectable 40 milliGRAM(s) IV Push every 12 hours  simvastatin 10 milliGRAM(s) Oral at bedtime  traZODone 100 milliGRAM(s) Oral at bedtime    MEDICATIONS  (PRN):  aluminum hydroxide/magnesium hydroxide/simethicone Suspension 30 milliLiter(s) Oral every 4 hours PRN Dyspepsia  ibuprofen  Tablet. 400 milliGRAM(s) Oral four times a day PRN Temp greater or equal to 38C (100.4F), Mild Pain (1 - 3)  loperamide 2 milliGRAM(s) Oral four times a day PRN Diarrhea  melatonin 3 milliGRAM(s) Oral at bedtime PRN Insomnia  ondansetron Injectable 4 milliGRAM(s) IV Push every 8 hours PRN Nausea and/or Vomiting  traMADol 50 milliGRAM(s) Oral three times a day PRN Moderate Pain (4 - 6)      OBJECTIVE    T(C): 36.7 (10-24-22 @ 04:50), Max: 36.7 (10-24-22 @ 04:50)  HR: 56 (10-24-22 @ 07:45) (56 - 89)  BP: 152/81 (10-24-22 @ 04:50) (132/85 - 152/81)  RR: 18 (10-24-22 @ 04:50) (18 - 18)  SpO2: 99% (10-24-22 @ 07:45) (96% - 100%)  Wt(kg): --  I&O's Summary    23 Oct 2022 07:01  -  24 Oct 2022 07:00  --------------------------------------------------------  IN: 330 mL / OUT: 0 mL / NET: 330 mL          REVIEW OF SYSTEMS:  CONSTITUTIONAL: No fever, weight loss, or fatigue  EYES: No eye pain, visual disturbances, or discharge  ENMT:   No sinus or throat pain  NECK: No pain or stiffness  RESPIRATORY: No cough, wheezing, chills or hemoptysis; No shortness of breath  CARDIOVASCULAR: No chest pain, palpitations, dizziness, or leg swelling  GASTROINTESTINAL: No abdominal pain. No nausea, vomiting; No diarrhea or constipation. No melena or hematochezia.  GENITOURINARY: No dysuria, frequency, hematuria, or incontinence  NEUROLOGICAL: No headaches, memory loss, loss of strength, numbness, or tremors  SKIN: No itching, burning, rashes, or lesions   MUSCULOSKELETAL: No joint pain or swelling; No muscle, back, or extremity pain    PHYSICAL EXAM:  Appearance: NAD. VS past 24 hrs -as above   HEENT:   Moist oral mucosa. Conjunctiva clear b/l.   Neck : supple  Respiratory: Lungs CTAB.  Gastrointestinal:  Soft, nontender. No rebound. No rigidity. BS present	  Cardiovascular: RRR ,S1S2 present  Neurologic: Non-focal. Moving all extremities.  Extremities: No edema. No erythema. No calf tenderness.  Skin: No rashes, No ecchymoses, No cyanosis.	  wounds ,skin lesions-See skin assesment flow sheet   LABS:                        12.2   7.50  )-----------( 196      ( 24 Oct 2022 08:20 )             39.0     10-24    140  |  103  |  8   ----------------------------<  118<H>  3.7   |  31  |  0.39<L>    Ca    9.3      24 Oct 2022 08:20  Phos  2.6     10-24  Mg     1.7     10-24      CAPILLARY BLOOD GLUCOSE              Culture - Urine (collected 21 Oct 2022 07:40)  Source: Clean Catch Clean Catch (Midstream)  Final Report (22 Oct 2022 10:35):    <10,000 CFU/mL Normal Urogenital Consuelo  Culture - Blood (collected 20 Oct 2022 10:40)  Source: .Blood Blood-Peripheral  Preliminary Report (21 Oct 2022 17:01):    No growth to date.  Culture - Blood (collected 20 Oct 2022 10:30)  Source: .Blood Blood-Peripheral  Preliminary Report (21 Oct 2022 17:01):    No growth to date.  RADIOLOGY & ADDITIONAL TESTS:   reviewed elctronically  ASSESSMENT/PLAN: 	  25 minutes aggregate time was spent on this visit, 50% visit time spent in care co-ordination with other attendings and counselling patient .I have discussed care plan with patient / HCP/family member ,who expressed understanding of problems treatment and their effect and side effects, alternatives in details. I have asked if they have any questions and concerns and appropriately addressed them to best of my ability. Advance care planning was discussed , pallitaive care issues ,CMO ,hospice levels of care were discussed in details , forms ,advance directives were reviewed .All questions were answered to the best of my knowledge (25 m)

## 2022-10-24 NOTE — PROGRESS NOTE ADULT - SUBJECTIVE AND OBJECTIVE BOX
Patient is a 82y Female whom presented to the hospital with hypernatremia     PAST MEDICAL & SURGICAL HISTORY:  History of COPD      Insomnia      Mood disorder      Dementia      Spasm of muscle      Constipation      Hypothyroidism      GERD (gastroesophageal reflux disease)      HTN (hypertension)      CHF, chronic          MEDICATIONS  (STANDING):  albuterol/ipratropium for Nebulization 3 milliLiter(s) Nebulizer every 6 hours  baclofen 5 milliGRAM(s) Oral every 12 hours  buDESOnide    Inhalation Suspension 0.5 milliGRAM(s) Inhalation two times a day  dextrose 5% + sodium chloride 0.45%. 1000 milliLiter(s) (30 mL/Hr) IV Continuous <Continuous>  donepezil 10 milliGRAM(s) Oral at bedtime  DULoxetine 60 milliGRAM(s) Oral daily  enoxaparin Injectable 40 milliGRAM(s) SubCutaneous every 24 hours  guaiFENesin  milliGRAM(s) Oral every 12 hours  levothyroxine Injectable 75 MICROGram(s) IV Push at bedtime  memantine 10 milliGRAM(s) Oral two times a day  methylPREDNISolone sodium succinate Injectable 40 milliGRAM(s) IV Push daily  metoprolol tartrate Injectable 2.5 milliGRAM(s) IV Push every 12 hours  pantoprazole  Injectable 40 milliGRAM(s) IV Push every 12 hours  potassium chloride  10 mEq/100 mL IVPB 10 milliEquivalent(s) IV Intermittent every 1 hour  simvastatin 10 milliGRAM(s) Oral at bedtime  traZODone 100 milliGRAM(s) Oral at bedtime      Allergies    sulfa drugs (Unknown)  Tylenol (Unknown)    Intolerances        SOCIAL HISTORY:  Denies ETOh,Smoking,     FAMILY HISTORY:      REVIEW OF SYSTEMS:    CONSTITUTIONAL: No weakness, fevers or chills  RESPIRATORY: No cough, wheezing, hemoptysis; No shortness of breath  CARDIOVASCULAR: No chest pain or palpitations  GASTROINTESTINAL: No abdominal or epigastric pain. No nausea, vomiting,     No diarrhea or constipation. No melena   SKIN: dry                                                  12.2   7.50  )-----------( 196      ( 24 Oct 2022 08:20 )             39.0       CBC Full  -  ( 24 Oct 2022 08:20 )  WBC Count : 7.50 K/uL  RBC Count : 4.31 M/uL  Hemoglobin : 12.2 g/dL  Hematocrit : 39.0 %  Platelet Count - Automated : 196 K/uL  Mean Cell Volume : 90.5 fl  Mean Cell Hemoglobin : 28.3 pg  Mean Cell Hemoglobin Concentration : 31.3 gm/dL  Auto Neutrophil # : x  Auto Lymphocyte # : x  Auto Monocyte # : x  Auto Eosinophil # : x  Auto Basophil # : x  Auto Neutrophil % : x  Auto Lymphocyte % : x  Auto Monocyte % : x  Auto Eosinophil % : x  Auto Basophil % : x      10-24    140  |  103  |  8   ----------------------------<  118<H>  3.7   |  31  |  0.39<L>    Ca    9.3      24 Oct 2022 08:20  Phos  2.6     10-24  Mg     1.7     10-24        CAPILLARY BLOOD GLUCOSE          Vital Signs Last 24 Hrs  T(C): 36.6 (24 Oct 2022 15:41), Max: 36.9 (24 Oct 2022 13:34)  T(F): 97.9 (24 Oct 2022 15:41), Max: 98.4 (24 Oct 2022 13:34)  HR: 71 (24 Oct 2022 18:30) (56 - 79)  BP: 137/78 (24 Oct 2022 18:30) (119/82 - 152/81)  BP(mean): --  RR: 18 (24 Oct 2022 15:41) (18 - 18)  SpO2: 98% (24 Oct 2022 15:41) (97% - 100%)    Parameters below as of 24 Oct 2022 15:41  Patient On (Oxygen Delivery Method): nasal cannula  O2 Flow (L/min): 2                        PHYSICAL EXAM:    Constitutional: NAD  HEENT: conjunctive   clear   Neck:  No JVD  Respiratory: decrease bs b/l   Cardiovascular: S1 and S2  Gastrointestinal: BS+, soft, NT/ND  Extremities: No peripheral edema  : No Ren  Skin: dry

## 2022-10-24 NOTE — PROVIDER CONTACT NOTE (OTHER) - ACTION/TREATMENT ORDERED:
Dr. Edwards aware. no orders to follow. will continue to monitor and will contact if any further episodes. EKG stripes in the chart for viewing

## 2022-10-24 NOTE — PHYSICAL THERAPY INITIAL EVALUATION ADULT - PERTINENT HX OF CURRENT PROBLEM, REHAB EVAL
Patient brought in by EMS from [facility] for shortness of breath cough wheezing for 2 days. Admitted with acute respiratory failure with hypoxia and COPD exacerbation.

## 2022-10-24 NOTE — PROGRESS NOTE ADULT - SUBJECTIVE AND OBJECTIVE BOX
Date/Time Patient Seen:  		  Referring MD:   Data Reviewed	       Patient is a 82y old  Female who presents with a chief complaint of SOB AND COUGH (23 Oct 2022 18:49)      Subjective/HPI     PAST MEDICAL & SURGICAL HISTORY:  History of COPD    Insomnia    Mood disorder    Dementia    Spasm of muscle    Constipation    Hypothyroidism    GERD (gastroesophageal reflux disease)    HTN (hypertension)    CHF, chronic          Medication list         MEDICATIONS  (STANDING):  albuterol/ipratropium for Nebulization 3 milliLiter(s) Nebulizer every 6 hours  baclofen 5 milliGRAM(s) Oral every 12 hours  buDESOnide    Inhalation Suspension 0.5 milliGRAM(s) Inhalation two times a day  dextrose 5% + sodium chloride 0.45%. 1000 milliLiter(s) (30 mL/Hr) IV Continuous <Continuous>  donepezil 10 milliGRAM(s) Oral at bedtime  DULoxetine 60 milliGRAM(s) Oral daily  enoxaparin Injectable 40 milliGRAM(s) SubCutaneous every 24 hours  guaiFENesin  milliGRAM(s) Oral every 12 hours  levothyroxine Injectable 75 MICROGram(s) IV Push at bedtime  memantine 10 milliGRAM(s) Oral two times a day  methylPREDNISolone sodium succinate Injectable 40 milliGRAM(s) IV Push daily  metoprolol tartrate Injectable 2.5 milliGRAM(s) IV Push every 12 hours  pantoprazole  Injectable 40 milliGRAM(s) IV Push every 12 hours  simvastatin 10 milliGRAM(s) Oral at bedtime  traZODone 100 milliGRAM(s) Oral at bedtime    MEDICATIONS  (PRN):  aluminum hydroxide/magnesium hydroxide/simethicone Suspension 30 milliLiter(s) Oral every 4 hours PRN Dyspepsia  ibuprofen  Tablet. 400 milliGRAM(s) Oral four times a day PRN Temp greater or equal to 38C (100.4F), Mild Pain (1 - 3)  loperamide 2 milliGRAM(s) Oral four times a day PRN Diarrhea  melatonin 3 milliGRAM(s) Oral at bedtime PRN Insomnia  ondansetron Injectable 4 milliGRAM(s) IV Push every 8 hours PRN Nausea and/or Vomiting  traMADol 50 milliGRAM(s) Oral three times a day PRN Moderate Pain (4 - 6)         Vitals log        ICU Vital Signs Last 24 Hrs  T(C): 36.7 (24 Oct 2022 04:50), Max: 36.7 (24 Oct 2022 04:50)  T(F): 98.1 (24 Oct 2022 04:50), Max: 98.1 (24 Oct 2022 04:50)  HR: 70 (24 Oct 2022 04:50) (64 - 89)  BP: 152/81 (24 Oct 2022 04:50) (132/85 - 152/81)  BP(mean): --  ABP: --  ABP(mean): --  RR: 18 (24 Oct 2022 04:50) (18 - 18)  SpO2: 98% (24 Oct 2022 04:50) (96% - 100%)    O2 Parameters below as of 24 Oct 2022 04:50  Patient On (Oxygen Delivery Method): nasal cannula  O2 Flow (L/min): 2               Input and Output:  I&O's Detail    22 Oct 2022 07:01  -  23 Oct 2022 07:00  --------------------------------------------------------  IN:    dextrose 5% + sodium chloride 0.45%: 210 mL  Total IN: 210 mL    OUT:    Voided (mL): 350 mL  Total OUT: 350 mL    Total NET: -140 mL          Lab Data                        11.5   4.98  )-----------( 200      ( 23 Oct 2022 08:15 )             37.0     10-23    146<H>  |  109<H>  |  9   ----------------------------<  124<H>  3.9   |  33<H>  |  0.43<L>    Ca    9.1      23 Oct 2022 08:15  Phos  2.2     10-23  Mg     1.7     10-23    TPro  5.9<L>  /  Alb  2.9<L>  /  TBili  0.3  /  DBili  x   /  AST  18  /  ALT  13  /  AlkPhos  62  10-22    ABG - ( 22 Oct 2022 12:03 )  pH, Arterial: 7.43  pH, Blood: x     /  pCO2: 45    /  pO2: 135   / HCO3: 30    / Base Excess: 5.6   /  SaO2: 98.3                    Review of Systems	      Objective     Physical Examination    heart s1s2  lung dc BS  head nc      Pertinent Lab findings & Imaging      Gela:  NO   Adequate UO     I&O's Detail    22 Oct 2022 07:01  -  23 Oct 2022 07:00  --------------------------------------------------------  IN:    dextrose 5% + sodium chloride 0.45%: 210 mL  Total IN: 210 mL    OUT:    Voided (mL): 350 mL  Total OUT: 350 mL    Total NET: -140 mL               Discussed with:     Cultures:	        Radiology

## 2022-10-24 NOTE — CHART NOTE - NSCHARTNOTEFT_GEN_A_CORE
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  Advanced care planning forms were reviewed and discussed.  Risks, benefits and alternatives of gastroenterologic procedures were discussed in detail and all questions were answered.    30 minutes spent.

## 2022-10-24 NOTE — PHYSICAL THERAPY INITIAL EVALUATION ADULT - TRANSFER TRAINING, PT EVAL
GOAL: Patient will transfer between sitting/standing independently with use of rolling walker (2 weeks)

## 2022-10-24 NOTE — PROGRESS NOTE ADULT - PROBLEM SELECTOR PLAN 3
gerd precautions w/PO intake ,case d/w GI TEAM   ppi once a day, may increase to twice a day   maalox as needed  ,clear liquid diet   will monitor clinically , EGD planned 10/24/22 gerd precautions w/PO intake ,case d/w GI TEAM   ppi once a day, may increase to twice a day   maalox as needed  ,clear liquid diet   will monitor clinically , EGD planned 10/24/22 -medically optimized and cleared by carsiologist

## 2022-10-24 NOTE — CHART NOTE - NSCHARTNOTEFT_GEN_A_CORE
Malnutrition follow up: ( chart reviewed, events noted) Brief hx:    Factors impacting intake: [ ] none [ ] nausea  [ ] vomiting [ ] diarrhea [ ] constipation  [ ]chewing problems [ ] swallowing issues  [ ] other:     Diet Prescription: Diet, NPO (10-22-22 @ 12:09)    Intake:     Current Weight: Weight (kg): 52.2 (10-20 @ 10:22)  % Weight Change    Pertinent Medications: MEDICATIONS  (STANDING):  albuterol/ipratropium for Nebulization 3 milliLiter(s) Nebulizer every 6 hours  baclofen 5 milliGRAM(s) Oral every 12 hours  buDESOnide    Inhalation Suspension 0.5 milliGRAM(s) Inhalation two times a day  dextrose 5% + sodium chloride 0.45%. 1000 milliLiter(s) (30 mL/Hr) IV Continuous <Continuous>  donepezil 10 milliGRAM(s) Oral at bedtime  DULoxetine 60 milliGRAM(s) Oral daily  enoxaparin Injectable 40 milliGRAM(s) SubCutaneous every 24 hours  guaiFENesin  milliGRAM(s) Oral every 12 hours  levothyroxine Injectable 75 MICROGram(s) IV Push at bedtime  memantine 10 milliGRAM(s) Oral two times a day  methylPREDNISolone sodium succinate Injectable 40 milliGRAM(s) IV Push daily  metoprolol tartrate Injectable 2.5 milliGRAM(s) IV Push every 12 hours  pantoprazole  Injectable 40 milliGRAM(s) IV Push every 12 hours  simvastatin 10 milliGRAM(s) Oral at bedtime  traZODone 100 milliGRAM(s) Oral at bedtime    MEDICATIONS  (PRN):  aluminum hydroxide/magnesium hydroxide/simethicone Suspension 30 milliLiter(s) Oral every 4 hours PRN Dyspepsia  ibuprofen  Tablet. 400 milliGRAM(s) Oral four times a day PRN Temp greater or equal to 38C (100.4F), Mild Pain (1 - 3)  loperamide 2 milliGRAM(s) Oral four times a day PRN Diarrhea  melatonin 3 milliGRAM(s) Oral at bedtime PRN Insomnia  ondansetron Injectable 4 milliGRAM(s) IV Push every 8 hours PRN Nausea and/or Vomiting  traMADol 50 milliGRAM(s) Oral three times a day PRN Moderate Pain (4 - 6)    Pertinent Labs: 10-24 Na140 mmol/L Glu 118 mg/dL<H> K+ 3.7 mmol/L Cr  0.39 mg/dL<L> BUN 8 mg/dL 10-24 Phos 2.6 mg/dL 10-22 Alb 2.9 g/dL<L>     CAPILLARY BLOOD GLUCOSE        Skin:     Estimated Needs:   [ ] no change since previous assessment  [ ] recalculated:     Previous Nutrition Diagnosis:   [ ] Inadequate Energy Intake [ ]Inadequate Oral Intake [ ] Excessive Energy Intake   [ ] Underweight [ ] Increased Nutrient Needs [ ] Overweight/Obesity   [ ] Altered GI Function [ ] Unintended Weight Loss [ ] Food & Nutrition Related Knowledge Deficit [ ] Malnutrition     Nutrition Diagnosis is [ ] ongoing  [ ] resolved [ ] not applicable     New Nutrition Diagnosis: [ ] not applicable       Interventions:   Recommend  [ ] Change Diet To:  [ ] Nutrition Supplement  [ ] Nutrition Support  [ ] Other:     Monitoring and Evaluation:   [ ] PO intake [ x ] Tolerance to diet prescription [ x ] weights [ x ] labs[ x ] follow up per protocol  [ ] other: Malnutrition follow up: ( chart reviewed, events noted) Brief hx: 81 YO F from [facility] with hx of COPD , HTN, CHF, mood disorder, hypothyroidism, dementia being treated for  Acute respiratory failure with hypoxia /COPD exacerbation with oxygen supply ,iv steroids and nebulized BD.  esophagram revealed large diverticulum of esophagus.; on GERD precautions, PPI and plan is for EGD today 10/24/22     Factors impacting intake: [ ] none [ ] nausea  [ ] vomiting [ ] diarrhea [ ] constipation  [ ]chewing problems [X ] swallowing issues  [ ] other: NPO    Diet Prescription: Diet, NPO (10-22-22 @ 12:09)    Intake: nil 2/2 NPO    Current Weight: Weight (kg): 52.2 (10-20 @ 10:22) 67 kg ( 10/23/22)   % Weight Change: most unlikely a 14.8 kg wt gain in 3 days mathew in view of NPO. any wt gain likely r/t IVF's     Pertinent Medications: MEDICATIONS  (STANDING):  albuterol/ipratropium for Nebulization 3 milliLiter(s) Nebulizer every 6 hours  baclofen 5 milliGRAM(s) Oral every 12 hours  buDESOnide    Inhalation Suspension 0.5 milliGRAM(s) Inhalation two times a day  dextrose 5% + sodium chloride 0.45%. 1000 milliLiter(s) (30 mL/Hr) IV Continuous <Continuous>  donepezil 10 milliGRAM(s) Oral at bedtime  DULoxetine 60 milliGRAM(s) Oral daily  enoxaparin Injectable 40 milliGRAM(s) SubCutaneous every 24 hours  guaiFENesin  milliGRAM(s) Oral every 12 hours  levothyroxine Injectable 75 MICROGram(s) IV Push at bedtime  memantine 10 milliGRAM(s) Oral two times a day  methylPREDNISolone sodium succinate Injectable 40 milliGRAM(s) IV Push daily  metoprolol tartrate Injectable 2.5 milliGRAM(s) IV Push every 12 hours  pantoprazole  Injectable 40 milliGRAM(s) IV Push every 12 hours  simvastatin 10 milliGRAM(s) Oral at bedtime  traZODone 100 milliGRAM(s) Oral at bedtime    MEDICATIONS  (PRN):  aluminum hydroxide/magnesium hydroxide/simethicone Suspension 30 milliLiter(s) Oral every 4 hours PRN Dyspepsia  ibuprofen  Tablet. 400 milliGRAM(s) Oral four times a day PRN Temp greater or equal to 38C (100.4F), Mild Pain (1 - 3)  loperamide 2 milliGRAM(s) Oral four times a day PRN Diarrhea  melatonin 3 milliGRAM(s) Oral at bedtime PRN Insomnia  ondansetron Injectable 4 milliGRAM(s) IV Push every 8 hours PRN Nausea and/or Vomiting  traMADol 50 milliGRAM(s) Oral three times a day PRN Moderate Pain (4 - 6)    Pertinent Labs: 10-24 Na140 mmol/L Glu 118 mg/dL<H> K+ 3.7 mmol/L Cr  0.39 mg/dL<L> BUN 8 mg/dL 10-24 Phos 2.6 mg/dL 10-22 Alb 2.9 g/dL<L>     CAPILLARY BLOOD GLUCOSE    Skin: no PI  edema: no  BM : 10/23/22    Estimated Needs:   [X ] no change since previous assessment: based on admit wt of 52.1 kg ( 25-30 kcals/kg) 5590-4686 kcals and ( 1.2-1.4 gm pro/kg) 65-75 gm pro  [ ] recalculated:     Previous Nutrition Diagnosis:   [ ] Inadequate Energy Intake [ ]Inadequate Oral Intake [ ] Excessive Energy Intake   [ ] Underweight [ ] Increased Nutrient Needs [ ] Overweight/Obesity   [ ] Altered GI Function [ ] Unintended Weight Loss [ ] Food & Nutrition Related Knowledge Deficit [ X] Malnutrition   [x]swallowing difficulty     Nutrition Diagnosis is [X ] ongoing  [ ] resolved [ ] not applicable     New Nutrition Diagnosis: [X ] inadequate energy intake    Interventions:   Recommend  [ ] Change Diet To:  [ ] Nutrition Supplement  [ ] Nutrition Support  [X ] Other: pending EGD results  If needs to remain NPO ( short term or long term) , discuss nutrition support options with family   Monitoring and Evaluation:   [X ] PO intake when applicable  x ] Tolerance to diet prescription [ x ] weights [ x ] labs[ x ] follow up per protocol  [X ] other:skin integrity

## 2022-10-24 NOTE — PHYSICAL THERAPY INITIAL EVALUATION ADULT - PHYSICAL ASSIST/NONPHYSICAL ASSIST, REHAB EVAL
· Placed discharge outreach phone call to patient s/p hospital discharge 6/17/17.  Left voicemail with my contact information and instructions to return my phone call.   verbal cues/1 person assist

## 2022-10-24 NOTE — PROGRESS NOTE ADULT - SUBJECTIVE AND OBJECTIVE BOX
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  Patient denies fevers chills headache chest pain abdominal pain vomiting Admit for antibacterial managmnet ,intravenous steroids,nebulised bronchodilators and pulmonology evaluation,O2 supply,serial labs and chest xrays,obtain ECHO to evaluate LVEF and rule out chf component Admitted  to telemetry unit for monitoring , send 3 sets of cardiac enzymes to rule out acute coronary event, obtain ECHO to evaluate LVEF, cardiology consult  ,continue current management, O2 supply, anticoagulation plan as per cardiology consult Palliative care consult requested ,to discuss advance directives and complete MOLST  (20 Oct 2022 13:54)      REVIEW OF SYSTEMS:    CONSTITUTIONAL: No fever, weight loss, or fatigue  EYES: No eye pain, visual disturbances, or discharge  ENMT:  No difficulty hearing, tinnitus, vertigo; No sinus or throat pain  NECK: No pain or stiffness  BREASTS: No pain, masses, or nipple discharge  RESPIRATORY: No cough, wheezing, chills or hemoptysis; No shortness of breath  CARDIOVASCULAR: No chest pain, palpitations, dizziness, or leg swelling  GASTROINTESTINAL: No abdominal or epigastric pain. No nausea, vomiting, or hematemesis; No diarrhea or constipation. No melena or hematochezia.  GENITOURINARY: No dysuria, frequency, hematuria, or incontinence  NEUROLOGICAL: No headaches, memory loss, loss of strength, numbness, or tremors  SKIN: No itching, burning, rashes, or lesions   LYMPH NODES: No enlarged glands  ENDOCRINE: No heat or cold intolerance; No hair loss  MUSCULOSKELETAL: No joint pain or swelling; No muscle, back, or extremity pain  PSYCHIATRIC: No depression, anxiety, mood swings, or difficulty sleeping  HEME/LYMPH: No easy bruising, or bleeding gums  ALLERGY AND IMMUNOLOGIC: No hives or eczema    MEDICATIONS  (STANDING):  albuterol/ipratropium for Nebulization 3 milliLiter(s) Nebulizer every 6 hours  baclofen 5 milliGRAM(s) Oral every 12 hours  buDESOnide    Inhalation Suspension 0.5 milliGRAM(s) Inhalation two times a day  dextrose 5% + sodium chloride 0.45%. 1000 milliLiter(s) (30 mL/Hr) IV Continuous <Continuous>  donepezil 10 milliGRAM(s) Oral at bedtime  DULoxetine 60 milliGRAM(s) Oral daily  enoxaparin Injectable 40 milliGRAM(s) SubCutaneous every 24 hours  guaiFENesin  milliGRAM(s) Oral every 12 hours  levothyroxine Injectable 75 MICROGram(s) IV Push at bedtime  memantine 10 milliGRAM(s) Oral two times a day  methylPREDNISolone sodium succinate Injectable 40 milliGRAM(s) IV Push daily  metoprolol tartrate Injectable 2.5 milliGRAM(s) IV Push every 12 hours  pantoprazole  Injectable 40 milliGRAM(s) IV Push every 12 hours  simvastatin 10 milliGRAM(s) Oral at bedtime  traZODone 100 milliGRAM(s) Oral at bedtime    MEDICATIONS  (PRN):  aluminum hydroxide/magnesium hydroxide/simethicone Suspension 30 milliLiter(s) Oral every 4 hours PRN Dyspepsia  ibuprofen  Tablet. 400 milliGRAM(s) Oral four times a day PRN Temp greater or equal to 38C (100.4F), Mild Pain (1 - 3)  loperamide 2 milliGRAM(s) Oral four times a day PRN Diarrhea  melatonin 3 milliGRAM(s) Oral at bedtime PRN Insomnia  ondansetron Injectable 4 milliGRAM(s) IV Push every 8 hours PRN Nausea and/or Vomiting  traMADol 50 milliGRAM(s) Oral three times a day PRN Moderate Pain (4 - 6)      ALLERGIES: sulfa drugs (Unknown)  Tylenol (Unknown)      FAMILY HISTORY:      PHYSICAL EXAMINATION:  -----------------------------  T(C): 36.7 (10-24-22 @ 04:50), Max: 36.7 (10-24-22 @ 04:50)  HR: 56 (10-24-22 @ 07:45) (56 - 89)  BP: 152/81 (10-24-22 @ 04:50) (132/85 - 152/81)  RR: 18 (10-24-22 @ 04:50) (18 - 18)  SpO2: 99% (10-24-22 @ 07:45) (96% - 100%)  Wt(kg): --    10-23 @ 07:01  -  10-24 @ 07:00  --------------------------------------------------------  IN:    dextrose 5% + sodium chloride 0.45%: 330 mL  Total IN: 330 mL    OUT:  Total OUT: 0 mL    Total NET: 330 mL            VITALS  T(C): 36.7 (10-24-22 @ 04:50), Max: 36.7 (10-24-22 @ 04:50)  HR: 56 (10-24-22 @ 07:45) (56 - 89)  BP: 152/81 (10-24-22 @ 04:50) (132/85 - 152/81)  RR: 18 (10-24-22 @ 04:50) (18 - 18)  SpO2: 99% (10-24-22 @ 07:45) (96% - 100%)    Constitutional: well developed, normal appearance, well groomed, well nourished, no deformities and no acute distress.   Eyes: the conjunctiva exhibited no abnormalities and the eyelids demonstrated no xanthelasmas.   HEENT: normal oral mucosa, no oral pallor and no oral cyanosis.   Neck: normal jugular venous A waves present, normal jugular venous V waves present and no jugular venous oliveira A waves.   Pulmonary: no respiratory distress, normal respiratory rhythm and effort, no accessory muscle use and lungs were clear to auscultation bilaterally.   Cardiovascular: heart rate and rhythm were normal, normal S1 and S2 and no murmur, gallop, rub, heave or thrill are present.   Abdomen: soft, non-tender, no hepato-splenomegaly and no abdominal mass palpated.   Musculoskeletal: the gait could not be assessed..   Extremities: no clubbing of the fingernails, no localized cyanosis, no petechial hemorrhages and no ischemic changes.   Skin: normal skin color and pigmentation, no rash, no venous stasis, no skin lesions, no skin ulcer and no xanthoma was observed.   Psychiatric: oriented to person, place, and time, the affect was normal, the mood was normal and not feeling anxious.     LABS:   --------  10-23    146<H>  |  109<H>  |  9   ----------------------------<  124<H>  3.9   |  33<H>  |  0.43<L>    Ca    9.1      23 Oct 2022 08:15  Phos  2.2     10-23  Mg     1.7     10-23                           11.5   4.98  )-----------( 200      ( 23 Oct 2022 08:15 )             37.0                 RADIOLOGY:  -----------------    ECG:     ECHO:

## 2022-10-25 LAB
ANION GAP SERPL CALC-SCNC: 9 MMOL/L — SIGNIFICANT CHANGE UP (ref 5–17)
BUN SERPL-MCNC: 7 MG/DL — SIGNIFICANT CHANGE UP (ref 7–23)
CALCIUM SERPL-MCNC: 9.1 MG/DL — SIGNIFICANT CHANGE UP (ref 8.5–10.1)
CHLORIDE SERPL-SCNC: 103 MMOL/L — SIGNIFICANT CHANGE UP (ref 96–108)
CO2 SERPL-SCNC: 30 MMOL/L — SIGNIFICANT CHANGE UP (ref 22–31)
CREAT SERPL-MCNC: 0.41 MG/DL — LOW (ref 0.5–1.3)
CULTURE RESULTS: SIGNIFICANT CHANGE UP
CULTURE RESULTS: SIGNIFICANT CHANGE UP
EGFR: 97 ML/MIN/1.73M2 — SIGNIFICANT CHANGE UP
GLUCOSE SERPL-MCNC: 115 MG/DL — HIGH (ref 70–99)
HCT VFR BLD CALC: 40 % — SIGNIFICANT CHANGE UP (ref 34.5–45)
HGB BLD-MCNC: 12.7 G/DL — SIGNIFICANT CHANGE UP (ref 11.5–15.5)
MAGNESIUM SERPL-MCNC: 1.4 MG/DL — LOW (ref 1.6–2.6)
MCHC RBC-ENTMCNC: 28.4 PG — SIGNIFICANT CHANGE UP (ref 27–34)
MCHC RBC-ENTMCNC: 31.8 GM/DL — LOW (ref 32–36)
MCV RBC AUTO: 89.5 FL — SIGNIFICANT CHANGE UP (ref 80–100)
NRBC # BLD: 0 /100 WBCS — SIGNIFICANT CHANGE UP (ref 0–0)
PHOSPHATE SERPL-MCNC: 1.9 MG/DL — LOW (ref 2.5–4.5)
PLATELET # BLD AUTO: 190 K/UL — SIGNIFICANT CHANGE UP (ref 150–400)
POTASSIUM SERPL-MCNC: 3.2 MMOL/L — LOW (ref 3.5–5.3)
POTASSIUM SERPL-MCNC: 3.4 MMOL/L — LOW (ref 3.5–5.3)
POTASSIUM SERPL-SCNC: 3.2 MMOL/L — LOW (ref 3.5–5.3)
POTASSIUM SERPL-SCNC: 3.4 MMOL/L — LOW (ref 3.5–5.3)
RBC # BLD: 4.47 M/UL — SIGNIFICANT CHANGE UP (ref 3.8–5.2)
RBC # FLD: 14.4 % — SIGNIFICANT CHANGE UP (ref 10.3–14.5)
SARS-COV-2 RNA SPEC QL NAA+PROBE: DETECTED
SODIUM SERPL-SCNC: 142 MMOL/L — SIGNIFICANT CHANGE UP (ref 135–145)
SPECIMEN SOURCE: SIGNIFICANT CHANGE UP
SPECIMEN SOURCE: SIGNIFICANT CHANGE UP
TSH SERPL-MCNC: 0.04 UIU/ML — LOW (ref 0.36–3.74)
WBC # BLD: 6.81 K/UL — SIGNIFICANT CHANGE UP (ref 3.8–10.5)
WBC # FLD AUTO: 6.81 K/UL — SIGNIFICANT CHANGE UP (ref 3.8–10.5)

## 2022-10-25 PROCEDURE — 93010 ELECTROCARDIOGRAM REPORT: CPT

## 2022-10-25 PROCEDURE — 99233 SBSQ HOSP IP/OBS HIGH 50: CPT

## 2022-10-25 RX ORDER — POTASSIUM PHOSPHATE, MONOBASIC POTASSIUM PHOSPHATE, DIBASIC 236; 224 MG/ML; MG/ML
15 INJECTION, SOLUTION INTRAVENOUS ONCE
Refills: 0 | Status: COMPLETED | OUTPATIENT
Start: 2022-10-25 | End: 2022-10-25

## 2022-10-25 RX ORDER — MAGNESIUM SULFATE 500 MG/ML
1 VIAL (ML) INJECTION ONCE
Refills: 0 | Status: COMPLETED | OUTPATIENT
Start: 2022-10-25 | End: 2022-10-25

## 2022-10-25 RX ORDER — METOPROLOL TARTRATE 50 MG
2.5 TABLET ORAL EVERY 8 HOURS
Refills: 0 | Status: DISCONTINUED | OUTPATIENT
Start: 2022-10-25 | End: 2022-11-01

## 2022-10-25 RX ORDER — POTASSIUM CHLORIDE 20 MEQ
10 PACKET (EA) ORAL
Refills: 0 | Status: COMPLETED | OUTPATIENT
Start: 2022-10-25 | End: 2022-10-25

## 2022-10-25 RX ORDER — DIPHENHYDRAMINE HCL 50 MG
25 CAPSULE ORAL ONCE
Refills: 0 | Status: COMPLETED | OUTPATIENT
Start: 2022-10-25 | End: 2022-10-25

## 2022-10-25 RX ADMIN — Medication 2.5 MILLIGRAM(S): at 05:53

## 2022-10-25 RX ADMIN — SIMVASTATIN 10 MILLIGRAM(S): 20 TABLET, FILM COATED ORAL at 21:49

## 2022-10-25 RX ADMIN — Medication 3 MILLILITER(S): at 12:49

## 2022-10-25 RX ADMIN — Medication 5 MILLIGRAM(S): at 17:35

## 2022-10-25 RX ADMIN — Medication 100 MILLIGRAM(S): at 21:51

## 2022-10-25 RX ADMIN — Medication 25 MILLIGRAM(S): at 02:14

## 2022-10-25 RX ADMIN — Medication 400 MILLIGRAM(S): at 21:48

## 2022-10-25 RX ADMIN — Medication 100 MILLIEQUIVALENT(S): at 14:56

## 2022-10-25 RX ADMIN — POTASSIUM PHOSPHATE, MONOBASIC POTASSIUM PHOSPHATE, DIBASIC 62.5 MILLIMOLE(S): 236; 224 INJECTION, SOLUTION INTRAVENOUS at 21:54

## 2022-10-25 RX ADMIN — Medication 100 GRAM(S): at 21:54

## 2022-10-25 RX ADMIN — Medication 400 MILLIGRAM(S): at 22:18

## 2022-10-25 RX ADMIN — Medication 3 MILLILITER(S): at 07:48

## 2022-10-25 RX ADMIN — Medication 100 MILLIEQUIVALENT(S): at 18:05

## 2022-10-25 RX ADMIN — Medication 3 MILLIGRAM(S): at 23:19

## 2022-10-25 RX ADMIN — Medication 2.5 MILLIGRAM(S): at 21:52

## 2022-10-25 RX ADMIN — PANTOPRAZOLE SODIUM 40 MILLIGRAM(S): 20 TABLET, DELAYED RELEASE ORAL at 17:35

## 2022-10-25 RX ADMIN — Medication 600 MILLIGRAM(S): at 17:35

## 2022-10-25 RX ADMIN — MEMANTINE HYDROCHLORIDE 10 MILLIGRAM(S): 10 TABLET ORAL at 17:35

## 2022-10-25 RX ADMIN — PANTOPRAZOLE SODIUM 40 MILLIGRAM(S): 20 TABLET, DELAYED RELEASE ORAL at 05:53

## 2022-10-25 RX ADMIN — DONEPEZIL HYDROCHLORIDE 10 MILLIGRAM(S): 10 TABLET, FILM COATED ORAL at 21:48

## 2022-10-25 RX ADMIN — Medication 100 MILLIEQUIVALENT(S): at 13:25

## 2022-10-25 RX ADMIN — ENOXAPARIN SODIUM 40 MILLIGRAM(S): 100 INJECTION SUBCUTANEOUS at 20:48

## 2022-10-25 RX ADMIN — Medication 400 MILLIGRAM(S): at 22:06

## 2022-10-25 RX ADMIN — DULOXETINE HYDROCHLORIDE 60 MILLIGRAM(S): 30 CAPSULE, DELAYED RELEASE ORAL at 12:09

## 2022-10-25 RX ADMIN — Medication 0.5 MILLIGRAM(S): at 07:48

## 2022-10-25 RX ADMIN — Medication 40 MILLIGRAM(S): at 05:53

## 2022-10-25 RX ADMIN — Medication 2.5 MILLIGRAM(S): at 13:25

## 2022-10-25 RX ADMIN — SODIUM CHLORIDE 30 MILLILITER(S): 9 INJECTION, SOLUTION INTRAVENOUS at 12:13

## 2022-10-25 NOTE — PROGRESS NOTE ADULT - SUBJECTIVE AND OBJECTIVE BOX
Date/Time Patient Seen:  		  Referring MD:   Data Reviewed	       Patient is a 82y old  Female who presents with a chief complaint of SOB AND COUGH (24 Oct 2022 12:53)      Subjective/HPI     PAST MEDICAL & SURGICAL HISTORY:  History of COPD    Insomnia    Mood disorder    Dementia    Spasm of muscle    Constipation    Hypothyroidism    GERD (gastroesophageal reflux disease)    HTN (hypertension)    CHF, chronic          Medication list         MEDICATIONS  (STANDING):  albuterol/ipratropium for Nebulization 3 milliLiter(s) Nebulizer every 6 hours  baclofen 5 milliGRAM(s) Oral every 12 hours  buDESOnide    Inhalation Suspension 0.5 milliGRAM(s) Inhalation two times a day  dextrose 5% + sodium chloride 0.45%. 1000 milliLiter(s) (30 mL/Hr) IV Continuous <Continuous>  donepezil 10 milliGRAM(s) Oral at bedtime  DULoxetine 60 milliGRAM(s) Oral daily  enoxaparin Injectable 40 milliGRAM(s) SubCutaneous every 24 hours  guaiFENesin  milliGRAM(s) Oral every 12 hours  levothyroxine Injectable 75 MICROGram(s) IV Push at bedtime  memantine 10 milliGRAM(s) Oral two times a day  methylPREDNISolone sodium succinate Injectable 40 milliGRAM(s) IV Push daily  metoprolol tartrate Injectable 2.5 milliGRAM(s) IV Push every 12 hours  pantoprazole  Injectable 40 milliGRAM(s) IV Push every 12 hours  simvastatin 10 milliGRAM(s) Oral at bedtime  traZODone 100 milliGRAM(s) Oral at bedtime    MEDICATIONS  (PRN):  aluminum hydroxide/magnesium hydroxide/simethicone Suspension 30 milliLiter(s) Oral every 4 hours PRN Dyspepsia  ibuprofen  Tablet. 400 milliGRAM(s) Oral four times a day PRN Temp greater or equal to 38C (100.4F), Mild Pain (1 - 3)  loperamide 2 milliGRAM(s) Oral four times a day PRN Diarrhea  LORazepam   Injectable 0.5 milliGRAM(s) IV Push every 8 hours PRN Anxiety  melatonin 3 milliGRAM(s) Oral at bedtime PRN Insomnia  ondansetron Injectable 4 milliGRAM(s) IV Push every 8 hours PRN Nausea and/or Vomiting  traMADol 50 milliGRAM(s) Oral three times a day PRN Moderate Pain (4 - 6)         Vitals log        ICU Vital Signs Last 24 Hrs  T(C): 36.6 (24 Oct 2022 20:30), Max: 36.9 (24 Oct 2022 13:34)  T(F): 97.9 (24 Oct 2022 20:30), Max: 98.4 (24 Oct 2022 13:34)  HR: 71 (24 Oct 2022 20:30) (56 - 79)  BP: 111/68 (24 Oct 2022 20:30) (111/68 - 138/75)  BP(mean): --  ABP: --  ABP(mean): --  RR: 16 (24 Oct 2022 20:30) (16 - 18)  SpO2: 95% (24 Oct 2022 20:30) (95% - 99%)    O2 Parameters below as of 24 Oct 2022 20:30  Patient On (Oxygen Delivery Method): nasal cannula  O2 Flow (L/min): 2               Input and Output:  I&O's Detail    23 Oct 2022 07:01  -  24 Oct 2022 07:00  --------------------------------------------------------  IN:    dextrose 5% + sodium chloride 0.45%: 330 mL  Total IN: 330 mL    OUT:  Total OUT: 0 mL    Total NET: 330 mL      24 Oct 2022 07:01  -  25 Oct 2022 05:06  --------------------------------------------------------  IN:    dextrose 5% + sodium chloride 0.45%: 300 mL  Total IN: 300 mL    OUT:  Total OUT: 0 mL    Total NET: 300 mL          Lab Data                        12.2   7.50  )-----------( 196      ( 24 Oct 2022 08:20 )             39.0     10-24    140  |  103  |  8   ----------------------------<  118<H>  3.7   |  31  |  0.39<L>    Ca    9.3      24 Oct 2022 08:20  Phos  2.6     10-24  Mg     1.7     10-24              Review of Systems	      Objective     Physical Examination    heart s1s2  lung dc BS  head nc      Pertinent Lab findings & Imaging      Gela:  NO   Adequate UO     I&O's Detail    23 Oct 2022 07:01  -  24 Oct 2022 07:00  --------------------------------------------------------  IN:    dextrose 5% + sodium chloride 0.45%: 330 mL  Total IN: 330 mL    OUT:  Total OUT: 0 mL    Total NET: 330 mL      24 Oct 2022 07:01  -  25 Oct 2022 05:06  --------------------------------------------------------  IN:    dextrose 5% + sodium chloride 0.45%: 300 mL  Total IN: 300 mL    OUT:  Total OUT: 0 mL    Total NET: 300 mL               Discussed with:     Cultures:	        Radiology

## 2022-10-25 NOTE — PROGRESS NOTE ADULT - SUBJECTIVE AND OBJECTIVE BOX
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  Patient denies fevers chills headache chest pain abdominal pain vomiting Admit for antibacterial managmnet ,intravenous steroids,nebulised bronchodilators and pulmonology evaluation,O2 supply,serial labs and chest xrays,obtain ECHO to evaluate LVEF and rule out chf component Admitted  to telemetry unit for monitoring , send 3 sets of cardiac enzymes to rule out acute coronary event, obtain ECHO to evaluate LVEF, cardiology consult  ,continue current management, O2 supply, anticoagulation plan as per cardiology consult Palliative care consult requested ,to discuss advance directives and complete MOLST  (20 Oct 2022 13:54)      REVIEW OF SYSTEMS:    CONSTITUTIONAL: No fever, weight loss, or fatigue  EYES: No eye pain, visual disturbances, or discharge  ENMT:  No difficulty hearing, tinnitus, vertigo; No sinus or throat pain  NECK: No pain or stiffness  BREASTS: No pain, masses, or nipple discharge  RESPIRATORY: No cough, wheezing, chills or hemoptysis; No shortness of breath  CARDIOVASCULAR: No chest pain, palpitations, dizziness, or leg swelling  GASTROINTESTINAL: No abdominal or epigastric pain. No nausea, vomiting, or hematemesis; No diarrhea or constipation. No melena or hematochezia.  GENITOURINARY: No dysuria, frequency, hematuria, or incontinence  NEUROLOGICAL: No headaches, memory loss, loss of strength, numbness, or tremors  SKIN: No itching, burning, rashes, or lesions   LYMPH NODES: No enlarged glands  ENDOCRINE: No heat or cold intolerance; No hair loss  MUSCULOSKELETAL: No joint pain or swelling; No muscle, back, or extremity pain  PSYCHIATRIC: No depression, anxiety, mood swings, or difficulty sleeping  HEME/LYMPH: No easy bruising, or bleeding gums  ALLERGY AND IMMUNOLOGIC: No hives or eczema    MEDICATIONS  (STANDING):  albuterol/ipratropium for Nebulization 3 milliLiter(s) Nebulizer every 6 hours  baclofen 5 milliGRAM(s) Oral every 12 hours  buDESOnide    Inhalation Suspension 0.5 milliGRAM(s) Inhalation two times a day  dextrose 5% + sodium chloride 0.45%. 1000 milliLiter(s) (30 mL/Hr) IV Continuous <Continuous>  donepezil 10 milliGRAM(s) Oral at bedtime  DULoxetine 60 milliGRAM(s) Oral daily  enoxaparin Injectable 40 milliGRAM(s) SubCutaneous every 24 hours  guaiFENesin  milliGRAM(s) Oral every 12 hours  levothyroxine Injectable 75 MICROGram(s) IV Push at bedtime  memantine 10 milliGRAM(s) Oral two times a day  methylPREDNISolone sodium succinate Injectable 40 milliGRAM(s) IV Push daily  metoprolol tartrate Injectable 2.5 milliGRAM(s) IV Push every 12 hours  pantoprazole  Injectable 40 milliGRAM(s) IV Push every 12 hours  simvastatin 10 milliGRAM(s) Oral at bedtime  traZODone 100 milliGRAM(s) Oral at bedtime    MEDICATIONS  (PRN):  aluminum hydroxide/magnesium hydroxide/simethicone Suspension 30 milliLiter(s) Oral every 4 hours PRN Dyspepsia  ibuprofen  Tablet. 400 milliGRAM(s) Oral four times a day PRN Temp greater or equal to 38C (100.4F), Mild Pain (1 - 3)  loperamide 2 milliGRAM(s) Oral four times a day PRN Diarrhea  LORazepam   Injectable 0.5 milliGRAM(s) IV Push every 8 hours PRN Anxiety  melatonin 3 milliGRAM(s) Oral at bedtime PRN Insomnia  ondansetron Injectable 4 milliGRAM(s) IV Push every 8 hours PRN Nausea and/or Vomiting  traMADol 50 milliGRAM(s) Oral three times a day PRN Moderate Pain (4 - 6)      ALLERGIES: sulfa drugs (Unknown)  Tylenol (Unknown)      FAMILY HISTORY:      PHYSICAL EXAMINATION:  -----------------------------  T(C): 36.7 (10-25-22 @ 05:35), Max: 36.9 (10-24-22 @ 13:34)  HR: 77 (10-25-22 @ 07:50) (62 - 83)  BP: 137/81 (10-25-22 @ 05:35) (111/68 - 138/75)  RR: 17 (10-25-22 @ 05:35) (16 - 18)  SpO2: 99% (10-25-22 @ 07:50) (95% - 99%)  Wt(kg): --    10-24 @ 07:01  -  10-25 @ 07:00  --------------------------------------------------------  IN:    dextrose 5% + sodium chloride 0.45%: 300 mL  Total IN: 300 mL    OUT:  Total OUT: 0 mL    Total NET: 300 mL            VITALS  T(C): 36.7 (10-25-22 @ 05:35), Max: 36.9 (10-24-22 @ 13:34)  HR: 77 (10-25-22 @ 07:50) (62 - 83)  BP: 137/81 (10-25-22 @ 05:35) (111/68 - 138/75)  RR: 17 (10-25-22 @ 05:35) (16 - 18)  SpO2: 99% (10-25-22 @ 07:50) (95% - 99%)    Constitutional: well developed, normal appearance, well groomed, well nourished, no deformities and no acute distress.   Eyes: the conjunctiva exhibited no abnormalities and the eyelids demonstrated no xanthelasmas.   HEENT: normal oral mucosa, no oral pallor and no oral cyanosis.   Neck: normal jugular venous A waves present, normal jugular venous V waves present and no jugular venous oliveira A waves.   Pulmonary: no respiratory distress, normal respiratory rhythm and effort, no accessory muscle use and lungs were clear to auscultation bilaterally.   Cardiovascular: heart rate and rhythm were normal, normal S1 and S2 and no murmur, gallop, rub, heave or thrill are present.   Abdomen: soft, non-tender, no hepato-splenomegaly and no abdominal mass palpated.   Musculoskeletal: the gait could not be assessed..   Extremities: no clubbing of the fingernails, no localized cyanosis, no petechial hemorrhages and no ischemic changes.   Skin: normal skin color and pigmentation, no rash, no venous stasis, no skin lesions, no skin ulcer and no xanthoma was observed.   Psychiatric: oriented to person, place, and time, the affect was normal, the mood was normal and not feeling anxious.     LABS:   --------  10-24    140  |  103  |  8   ----------------------------<  118<H>  3.7   |  31  |  0.39<L>    Ca    9.3      24 Oct 2022 08:20  Phos  2.6     10-24  Mg     1.7     10-24                           12.2   7.50  )-----------( 196      ( 24 Oct 2022 08:20 )             39.0                 RADIOLOGY:  -----------------    ECG:     ECHO:

## 2022-10-25 NOTE — PROGRESS NOTE ADULT - SUBJECTIVE AND OBJECTIVE BOX
ROXIE LAZAR    [facility] 1EAS [address]    Allergies    sulfa drugs (Unknown)  Tylenol (Unknown)    Intolerances        PAST MEDICAL & SURGICAL HISTORY:  History of COPD      Insomnia      Mood disorder      Dementia      Spasm of muscle      Constipation      Hypothyroidism      GERD (gastroesophageal reflux disease)      HTN (hypertension)      CHF, chronic          FAMILY HISTORY:      Home Medications:  acetaminophen 325 mg oral tablet: 2 tab(s) orally every 6 hours, As Needed for pain  (20 Oct 2022 16:34)  baclofen 10 mg oral tablet: 0.5 tab(s) orally 2 times a day (20 Oct 2022 16:34)  Cymbalta 60 mg oral delayed release capsule: 1 cap(s) orally once a day (20 Oct 2022 16:34)  donepezil 10 mg oral tablet: 1 tab(s) orally once a day (at bedtime) (20 Oct 2022 16:34)  Eucerin topical cream: Apply topically to affected area once a day (at bedtime) (20 Oct 2022 16:34)  furosemide 40 mg oral tablet: 0.5 tab(s) orally once a day (20 Oct 2022 16:34)  ipratropium-albuterol 0.5 mg-2.5 mg/3 mL inhalation solution: 3 milliliter(s) inhaled 4 times a day (20 Oct 2022 16:34)  levothyroxine 150 mcg (0.15 mg) oral capsule: 1 cap(s) orally once a day (20 Oct 2022 16:34)  Linzess 290 mcg oral capsule: 1 cap(s) orally once a day (20 Oct 2022 16:34)  melatonin 3 mg oral tablet: 1 tab(s) orally once a day (at bedtime) (20 Oct 2022 16:34)  Metoprolol Tartrate 25 mg oral tablet: 1.5 tab(s) orally 2 times a day (20 Oct 2022 16:34)  Namenda 10 mg oral tablet: 2 tab(s) orally once a day (at bedtime) (20 Oct 2022 16:34)  omeprazole 40 mg oral delayed release capsule: 1 cap(s) orally once a day (20 Oct 2022 16:34)  ondansetron 4 mg oral tablet: 1 tab(s) orally once a day (20 Oct 2022 16:34)  ProAir HFA 90 mcg/inh inhalation aerosol: 2 puff(s) inhaled every 6 hours, As Needed (20 Oct 2022 16:34)  Senna 8.6 mg oral tablet: 2 tab(s) orally once a day (at bedtime) (20 Oct 2022 16:34)  traZODone 100 mg oral tablet: 1 tab(s) orally once a day (at bedtime) (20 Oct 2022 16:34)  zinc oxide topical ointment: Apply topically to affected area once a day and as needed  (20 Oct 2022 16:34)  Zocor 10 mg oral tablet: 1 tab(s) orally once a day (at bedtime) (20 Oct 2022 16:34)  ZyrTEC 10 mg oral tablet: 1 tab(s) orally once a day (20 Oct 2022 16:34)      MEDICATIONS  (STANDING):  albuterol/ipratropium for Nebulization 3 milliLiter(s) Nebulizer every 6 hours  baclofen 5 milliGRAM(s) Oral every 12 hours  buDESOnide    Inhalation Suspension 0.5 milliGRAM(s) Inhalation two times a day  dextrose 5% + sodium chloride 0.45%. 1000 milliLiter(s) (30 mL/Hr) IV Continuous <Continuous>  donepezil 10 milliGRAM(s) Oral at bedtime  DULoxetine 60 milliGRAM(s) Oral daily  enoxaparin Injectable 40 milliGRAM(s) SubCutaneous every 24 hours  guaiFENesin  milliGRAM(s) Oral every 12 hours  levothyroxine Injectable 75 MICROGram(s) IV Push at bedtime  memantine 10 milliGRAM(s) Oral two times a day  methylPREDNISolone sodium succinate Injectable 40 milliGRAM(s) IV Push daily  metoprolol tartrate Injectable 2.5 milliGRAM(s) IV Push every 12 hours  pantoprazole  Injectable 40 milliGRAM(s) IV Push every 12 hours  simvastatin 10 milliGRAM(s) Oral at bedtime  traZODone 100 milliGRAM(s) Oral at bedtime    MEDICATIONS  (PRN):  aluminum hydroxide/magnesium hydroxide/simethicone Suspension 30 milliLiter(s) Oral every 4 hours PRN Dyspepsia  ibuprofen  Tablet. 400 milliGRAM(s) Oral four times a day PRN Temp greater or equal to 38C (100.4F), Mild Pain (1 - 3)  loperamide 2 milliGRAM(s) Oral four times a day PRN Diarrhea  LORazepam   Injectable 0.5 milliGRAM(s) IV Push every 8 hours PRN Anxiety  melatonin 3 milliGRAM(s) Oral at bedtime PRN Insomnia  ondansetron Injectable 4 milliGRAM(s) IV Push every 8 hours PRN Nausea and/or Vomiting  traMADol 50 milliGRAM(s) Oral three times a day PRN Moderate Pain (4 - 6)      Diet, Clear Liquid:   Supplement Feeding Modality:  Oral  Ensure Clear Cans or Servings Per Day:  1       Frequency:  Two Times a day (10-25-22 @ 08:43) [Active]          Vital Signs Last 24 Hrs  T(C): 36.7 (25 Oct 2022 05:35), Max: 36.9 (24 Oct 2022 13:34)  T(F): 98 (25 Oct 2022 05:35), Max: 98.4 (24 Oct 2022 13:34)  HR: 77 (25 Oct 2022 07:50) (62 - 83)  BP: 137/81 (25 Oct 2022 05:35) (111/68 - 138/75)  BP(mean): --  RR: 17 (25 Oct 2022 05:35) (16 - 18)  SpO2: 99% (25 Oct 2022 07:50) (95% - 99%)    Parameters below as of 25 Oct 2022 07:50  Patient On (Oxygen Delivery Method): nasal cannula,2LPM          10-24-22 @ 07:01  -  10-25-22 @ 07:00  --------------------------------------------------------  IN: 300 mL / OUT: 0 mL / NET: 300 mL              LABS:                        12.7   6.81  )-----------( 190      ( 25 Oct 2022 08:10 )             40.0     10-25    142  |  103  |  7   ----------------------------<  115<H>  3.2<L>   |  30  |  0.41<L>    Ca    9.1      25 Oct 2022 08:10  Phos  2.6     10-24  Mg     1.7     10-24                WBC:  WBC Count: 6.81 K/uL (10-25 @ 08:10)  WBC Count: 7.50 K/uL (10-24 @ 08:20)  WBC Count: 4.98 K/uL (10-23 @ 08:15)  WBC Count: 6.89 K/uL (10-22 @ 06:25)      MICROBIOLOGY:  RECENT CULTURES:  10-21 Clean Catch Clean Catch (Midstream) XXXX XXXX   <10,000 CFU/mL Normal Urogenital Consuelo    10-20 .Blood Blood-Peripheral XXXX XXXX   No growth to date.    10-20 .Blood Blood-Peripheral XXXX XXXX   No growth to date.                    Sodium:  Sodium, Serum: 142 mmol/L (10-25 @ 08:10)  Sodium, Serum: 140 mmol/L (10-24 @ 08:20)  Sodium, Serum: 146 mmol/L (10-23 @ 08:15)  Sodium, Serum: 146 mmol/L (10-22 @ 06:25)      0.41 mg/dL 10-25 @ 08:10  0.39 mg/dL 10-24 @ 08:20  0.43 mg/dL 10-23 @ 08:15  0.47 mg/dL 10-22 @ 06:25      Hemoglobin:  Hemoglobin: 12.7 g/dL (10-25 @ 08:10)  Hemoglobin: 12.2 g/dL (10-24 @ 08:20)  Hemoglobin: 11.5 g/dL (10-23 @ 08:15)  Hemoglobin: 11.0 g/dL (10-22 @ 06:25)      Platelets: Platelet Count - Automated: 190 K/uL (10-25 @ 08:10)  Platelet Count - Automated: 196 K/uL (10-24 @ 08:20)  Platelet Count - Automated: 200 K/uL (10-23 @ 08:15)  Platelet Count - Automated: 205 K/uL (10-22 @ 06:25)              RADIOLOGY & ADDITIONAL STUDIES:      MICROBIOLOGY:  RECENT CULTURES:  10-21 Clean Catch Clean Catch (Midstream) XXXX XXXX   <10,000 CFU/mL Normal Urogenital Consuelo    10-20 .Blood Blood-Peripheral XXXX XXXX   No growth to date.    10-20 .Blood Blood-Peripheral XXXX XXXX   No growth to date.

## 2022-10-25 NOTE — PROGRESS NOTE ADULT - SUBJECTIVE AND OBJECTIVE BOX
Patient is a 82y Female whom presented to the hospital with hypernatremia     PAST MEDICAL & SURGICAL HISTORY:  History of COPD      Insomnia      Mood disorder      Dementia      Spasm of muscle      Constipation      Hypothyroidism      GERD (gastroesophageal reflux disease)      HTN (hypertension)      CHF, chronic          MEDICATIONS  (STANDING):  albuterol/ipratropium for Nebulization 3 milliLiter(s) Nebulizer every 6 hours  baclofen 5 milliGRAM(s) Oral every 12 hours  buDESOnide    Inhalation Suspension 0.5 milliGRAM(s) Inhalation two times a day  dextrose 5% + sodium chloride 0.45%. 1000 milliLiter(s) (30 mL/Hr) IV Continuous <Continuous>  donepezil 10 milliGRAM(s) Oral at bedtime  DULoxetine 60 milliGRAM(s) Oral daily  enoxaparin Injectable 40 milliGRAM(s) SubCutaneous every 24 hours  guaiFENesin  milliGRAM(s) Oral every 12 hours  levothyroxine Injectable 75 MICROGram(s) IV Push at bedtime  memantine 10 milliGRAM(s) Oral two times a day  methylPREDNISolone sodium succinate Injectable 40 milliGRAM(s) IV Push daily  metoprolol tartrate Injectable 2.5 milliGRAM(s) IV Push every 12 hours  pantoprazole  Injectable 40 milliGRAM(s) IV Push every 12 hours  potassium chloride  10 mEq/100 mL IVPB 10 milliEquivalent(s) IV Intermittent every 1 hour  simvastatin 10 milliGRAM(s) Oral at bedtime  traZODone 100 milliGRAM(s) Oral at bedtime      Allergies    sulfa drugs (Unknown)  Tylenol (Unknown)    Intolerances        SOCIAL HISTORY:  Denies ETOh,Smoking,     FAMILY HISTORY:      REVIEW OF SYSTEMS:    CONSTITUTIONAL: No weakness, fevers or chills  RESPIRATORY: No cough, wheezing, hemoptysis; No shortness of breath  CARDIOVASCULAR: No chest pain or palpitations  GASTROINTESTINAL: No abdominal or epigastric pain. No nausea, vomiting,     No diarrhea or constipation. No melena   SKIN: dry                                                                    12.7   6.81  )-----------( 190      ( 25 Oct 2022 08:10 )             40.0       CBC Full  -  ( 25 Oct 2022 08:10 )  WBC Count : 6.81 K/uL  RBC Count : 4.47 M/uL  Hemoglobin : 12.7 g/dL  Hematocrit : 40.0 %  Platelet Count - Automated : 190 K/uL  Mean Cell Volume : 89.5 fl  Mean Cell Hemoglobin : 28.4 pg  Mean Cell Hemoglobin Concentration : 31.8 gm/dL  Auto Neutrophil # : x  Auto Lymphocyte # : x  Auto Monocyte # : x  Auto Eosinophil # : x  Auto Basophil # : x  Auto Neutrophil % : x  Auto Lymphocyte % : x  Auto Monocyte % : x  Auto Eosinophil % : x  Auto Basophil % : x      10-25    x   |  x   |  x   ----------------------------<  x   3.4<L>   |  x   |  x     Ca    9.1      25 Oct 2022 08:10  Phos  1.9     10-25  Mg     1.4     10-25        CAPILLARY BLOOD GLUCOSE          Vital Signs Last 24 Hrs  T(C): 37.9 (25 Oct 2022 14:02), Max: 37.9 (25 Oct 2022 14:02)  T(F): 100.2 (25 Oct 2022 14:02), Max: 100.2 (25 Oct 2022 14:02)  HR: 130 (25 Oct 2022 13:15) (62 - 150)  BP: 124/81 (25 Oct 2022 13:15) (111/68 - 137/81)  BP(mean): --  RR: 17 (25 Oct 2022 05:35) (16 - 17)  SpO2: 96% (25 Oct 2022 12:50) (95% - 99%)    Parameters below as of 25 Oct 2022 12:50  Patient On (Oxygen Delivery Method): nasal cannula                          PHYSICAL EXAM:    Constitutional: NAD  HEENT: conjunctive   clear   Neck:  No JVD  Respiratory: decrease bs b/l   Cardiovascular: S1 and S2  Gastrointestinal: BS+, soft, NT/ND  Extremities: No peripheral edema  : No Ren  Skin: dry

## 2022-10-25 NOTE — PROVIDER CONTACT NOTE (OTHER) - REASON
Patient had rapid response for stridor to upper airway
patient had 4 liquid BM this shift
heart rate of 150, then 140, then 117
patient had tachycardia into 160s at 1029
Patient had rapid response called for stridor to upper airway
Patient sounds very wet
patient placed on clear liquid diet per dr. Larsen

## 2022-10-25 NOTE — PROGRESS NOTE ADULT - SUBJECTIVE AND OBJECTIVE BOX
PROGRESS NOTE  Patient is a 82y old  Female who presents with a chief complaint of SOB AND COUGH (25 Oct 2022 11:24)      OVERNIGHT      HPI:  Patient brought in by EMS from [facility] for shortness of breath cough wheezing for 2 days.  Patient denies fevers chills headache chest pain abdominal pain vomiting Admit for antibacterial managmnet ,intravenous steroids,nebulised bronchodilators and pulmonology evaluation,O2 supply,serial labs and chest xrays,obtain ECHO to evaluate LVEF and rule out chf component Admitted  to telemetry unit for monitoring , send 3 sets of cardiac enzymes to rule out acute coronary event, obtain ECHO to evaluate LVEF, cardiology consult  ,continue current management, O2 supply, anticoagulation plan as per cardiology consult Palliative care consult requested ,to discuss advance directives and complete MOLST  (20 Oct 2022 13:54)    PAST MEDICAL & SURGICAL HISTORY:  History of COPD      Insomnia      Mood disorder      Dementia      Spasm of muscle      Constipation      Hypothyroidism      GERD (gastroesophageal reflux disease)      HTN (hypertension)      CHF, chronic          MEDICATIONS  (STANDING):  albuterol/ipratropium for Nebulization 3 milliLiter(s) Nebulizer every 6 hours  baclofen 5 milliGRAM(s) Oral every 12 hours  buDESOnide    Inhalation Suspension 0.5 milliGRAM(s) Inhalation two times a day  dextrose 5% + sodium chloride 0.45%. 1000 milliLiter(s) (30 mL/Hr) IV Continuous <Continuous>  donepezil 10 milliGRAM(s) Oral at bedtime  DULoxetine 60 milliGRAM(s) Oral daily  enoxaparin Injectable 40 milliGRAM(s) SubCutaneous every 24 hours  guaiFENesin  milliGRAM(s) Oral every 12 hours  levothyroxine Injectable 75 MICROGram(s) IV Push at bedtime  memantine 10 milliGRAM(s) Oral two times a day  methylPREDNISolone sodium succinate Injectable 40 milliGRAM(s) IV Push daily  metoprolol tartrate Injectable 2.5 milliGRAM(s) IV Push every 8 hours  pantoprazole  Injectable 40 milliGRAM(s) IV Push every 12 hours  potassium chloride  10 mEq/100 mL IVPB 10 milliEquivalent(s) IV Intermittent every 1 hour  simvastatin 10 milliGRAM(s) Oral at bedtime  traZODone 100 milliGRAM(s) Oral at bedtime    MEDICATIONS  (PRN):  aluminum hydroxide/magnesium hydroxide/simethicone Suspension 30 milliLiter(s) Oral every 4 hours PRN Dyspepsia  ibuprofen  Tablet. 400 milliGRAM(s) Oral four times a day PRN Temp greater or equal to 38C (100.4F), Mild Pain (1 - 3)  loperamide 2 milliGRAM(s) Oral four times a day PRN Diarrhea  LORazepam   Injectable 0.5 milliGRAM(s) IV Push every 8 hours PRN Anxiety  melatonin 3 milliGRAM(s) Oral at bedtime PRN Insomnia  ondansetron Injectable 4 milliGRAM(s) IV Push every 8 hours PRN Nausea and/or Vomiting  traMADol 50 milliGRAM(s) Oral three times a day PRN Moderate Pain (4 - 6)      OBJECTIVE    T(C): 36.7 (10-25-22 @ 05:35), Max: 36.9 (10-24-22 @ 13:34)  HR: 150 (10-25-22 @ 10:04) (62 - 150)  BP: 137/81 (10-25-22 @ 05:35) (111/68 - 138/75)  RR: 17 (10-25-22 @ 05:35) (16 - 18)  SpO2: 99% (10-25-22 @ 07:50) (95% - 99%)  Wt(kg): --  I&O's Summary    24 Oct 2022 07:01  -  25 Oct 2022 07:00  --------------------------------------------------------  IN: 300 mL / OUT: 0 mL / NET: 300 mL          REVIEW OF SYSTEMS:  CONSTITUTIONAL: No fever, weight loss, or fatigue  EYES: No eye pain, visual disturbances, or discharge  ENMT:   No sinus or throat pain  NECK: No pain or stiffness  RESPIRATORY: No cough, wheezing, chills or hemoptysis; No shortness of breath  CARDIOVASCULAR: No chest pain, palpitations, dizziness, or leg swelling  GASTROINTESTINAL: No abdominal pain. No nausea, vomiting; No diarrhea or constipation. No melena or hematochezia.  GENITOURINARY: No dysuria, frequency, hematuria, or incontinence  NEUROLOGICAL: No headaches, memory loss, loss of strength, numbness, or tremors  SKIN: No itching, burning, rashes, or lesions   MUSCULOSKELETAL: No joint pain or swelling; No muscle, back, or extremity pain    PHYSICAL EXAM:  Appearance: NAD. VS past 24 hrs -as above   HEENT:   Moist oral mucosa. Conjunctiva clear b/l.   Neck : supple  Respiratory: Lungs CTAB.  Gastrointestinal:  Soft, nontender. No rebound. No rigidity. BS present	  Cardiovascular: RRR ,S1S2 present  Neurologic: Non-focal. Moving all extremities.  Extremities: No edema. No erythema. No calf tenderness.  Skin: No rashes, No ecchymoses, No cyanosis.	  wounds ,skin lesions-See skin assesment flow sheet   LABS:                        12.7   6.81  )-----------( 190      ( 25 Oct 2022 08:10 )             40.0     10-25    142  |  103  |  7   ----------------------------<  115<H>  3.2<L>   |  30  |  0.41<L>    Ca    9.1      25 Oct 2022 08:10  Phos  2.6     10-24  Mg     1.7     10-24      CAPILLARY BLOOD GLUCOSE              Culture - Urine (collected 21 Oct 2022 07:40)  Source: Clean Catch Clean Catch (Midstream)  Final Report (22 Oct 2022 10:35):    <10,000 CFU/mL Normal Urogenital Consuelo    Culture - Blood (collected 20 Oct 2022 10:40)  Source: .Blood Blood-Peripheral  Preliminary Report (21 Oct 2022 17:01):    No growth to date.    Culture - Blood (collected 20 Oct 2022 10:30)  Source: .Blood Blood-Peripheral  Preliminary Report (21 Oct 2022 17:01):    No growth to date.      RADIOLOGY & ADDITIONAL TESTS:   reviewed elctronically  ASSESSMENT/PLAN: 	     PROGRESS NOTE  Patient is a 82y old  Female who presents with a chief complaint of SOB AND COUGH (25 Oct 2022 11:24)  Chart and available morning labs /imaging are reviewed electronically , urgent issues addressed . More information  is being added upon completion of rounds , when more information is collected and management discussed with consultants , medical staff and social service/case management on the floor     OVERNIGHT  No new issues reported by medical staff . All above noted Patient is resting in a bed comfortably .Confused ,poor mentation .No distress noted   Scheduled for peg in am ,patient is aware and is in agreement , I spoke to the son Favio on a phone and plan of care discussed ,he is in agreement with planned procedure   HPI:  Patient brought in by EMS from [facility] for shortness of breath cough wheezing for 2 days.  Patient denies fevers chills headache chest pain abdominal pain vomiting Admit for antibacterial managmnet ,intravenous steroids,nebulised bronchodilators and pulmonology evaluation,O2 supply,serial labs and chest xrays,obtain ECHO to evaluate LVEF and rule out chf component Admitted  to telemetry unit for monitoring , send 3 sets of cardiac enzymes to rule out acute coronary event, obtain ECHO to evaluate LVEF, cardiology consult  ,continue current management, O2 supply, anticoagulation plan as per cardiology consult Palliative care consult requested ,to discuss advance directives and complete MOLST  (20 Oct 2022 13:54)    PAST MEDICAL & SURGICAL HISTORY:  History of COPD      Insomnia      Mood disorder      Dementia      Spasm of muscle      Constipation      Hypothyroidism      GERD (gastroesophageal reflux disease)      HTN (hypertension)      CHF, chronic          MEDICATIONS  (STANDING):  albuterol/ipratropium for Nebulization 3 milliLiter(s) Nebulizer every 6 hours  baclofen 5 milliGRAM(s) Oral every 12 hours  buDESOnide    Inhalation Suspension 0.5 milliGRAM(s) Inhalation two times a day  dextrose 5% + sodium chloride 0.45%. 1000 milliLiter(s) (30 mL/Hr) IV Continuous <Continuous>  donepezil 10 milliGRAM(s) Oral at bedtime  DULoxetine 60 milliGRAM(s) Oral daily  enoxaparin Injectable 40 milliGRAM(s) SubCutaneous every 24 hours  guaiFENesin  milliGRAM(s) Oral every 12 hours  levothyroxine Injectable 75 MICROGram(s) IV Push at bedtime  memantine 10 milliGRAM(s) Oral two times a day  methylPREDNISolone sodium succinate Injectable 40 milliGRAM(s) IV Push daily  metoprolol tartrate Injectable 2.5 milliGRAM(s) IV Push every 8 hours  pantoprazole  Injectable 40 milliGRAM(s) IV Push every 12 hours  potassium chloride  10 mEq/100 mL IVPB 10 milliEquivalent(s) IV Intermittent every 1 hour  simvastatin 10 milliGRAM(s) Oral at bedtime  traZODone 100 milliGRAM(s) Oral at bedtime    MEDICATIONS  (PRN):  aluminum hydroxide/magnesium hydroxide/simethicone Suspension 30 milliLiter(s) Oral every 4 hours PRN Dyspepsia  ibuprofen  Tablet. 400 milliGRAM(s) Oral four times a day PRN Temp greater or equal to 38C (100.4F), Mild Pain (1 - 3)  loperamide 2 milliGRAM(s) Oral four times a day PRN Diarrhea  LORazepam   Injectable 0.5 milliGRAM(s) IV Push every 8 hours PRN Anxiety  melatonin 3 milliGRAM(s) Oral at bedtime PRN Insomnia  ondansetron Injectable 4 milliGRAM(s) IV Push every 8 hours PRN Nausea and/or Vomiting  traMADol 50 milliGRAM(s) Oral three times a day PRN Moderate Pain (4 - 6)      OBJECTIVE    T(C): 36.7 (10-25-22 @ 05:35), Max: 36.9 (10-24-22 @ 13:34)  HR: 150 (10-25-22 @ 10:04) (62 - 150)  BP: 137/81 (10-25-22 @ 05:35) (111/68 - 138/75)  RR: 17 (10-25-22 @ 05:35) (16 - 18)  SpO2: 99% (10-25-22 @ 07:50) (95% - 99%)  Wt(kg): --  I&O's Summary    24 Oct 2022 07:01  -  25 Oct 2022 07:00  --------------------------------------------------------  IN: 300 mL / OUT: 0 mL / NET: 300 mL          REVIEW OF SYSTEMS:  CONSTITUTIONAL: No fever, weight loss, or fatigue  EYES: No eye pain, visual disturbances, or discharge  ENMT:   No sinus or throat pain  NECK: No pain or stiffness  RESPIRATORY: No cough, wheezing, chills or hemoptysis; No shortness of breath  CARDIOVASCULAR: No chest pain, palpitations, dizziness, or leg swelling  GASTROINTESTINAL: No abdominal pain. No nausea, vomiting; No diarrhea or constipation. No melena or hematochezia.  GENITOURINARY: No dysuria, frequency, hematuria, or incontinence  NEUROLOGICAL: No headaches, memory loss, loss of strength, numbness, or tremors  SKIN: No itching, burning, rashes, or lesions   MUSCULOSKELETAL: No joint pain or swelling; No muscle, back, or extremity pain    PHYSICAL EXAM:  Appearance: NAD. VS past 24 hrs -as above   HEENT:   Moist oral mucosa. Conjunctiva clear b/l.   Neck : supple  Respiratory: Lungs CTAB.  Gastrointestinal:  Soft, nontender. No rebound. No rigidity. BS present	  Cardiovascular: RRR ,S1S2 present  Neurologic: Non-focal. Moving all extremities.  Extremities: No edema. No erythema. No calf tenderness.  Skin: No rashes, No ecchymoses, No cyanosis.	  wounds ,skin lesions-See skin assesment flow sheet   LABS:                        12.7   6.81  )-----------( 190      ( 25 Oct 2022 08:10 )             40.0     10-25    142  |  103  |  7   ----------------------------<  115<H>  3.2<L>   |  30  |  0.41<L>    Ca    9.1      25 Oct 2022 08:10  Phos  2.6     10-24  Mg     1.7     10-24      CAPILLARY BLOOD GLUCOSE              Culture - Urine (collected 21 Oct 2022 07:40)  Source: Clean Catch Clean Catch (Midstream)  Final Report (22 Oct 2022 10:35):    <10,000 CFU/mL Normal Urogenital Consuelo    Culture - Blood (collected 20 Oct 2022 10:40)  Source: .Blood Blood-Peripheral  Preliminary Report (21 Oct 2022 17:01):    No growth to date.    Culture - Blood (collected 20 Oct 2022 10:30)  Source: .Blood Blood-Peripheral  Preliminary Report (21 Oct 2022 17:01):    No growth to date.      RADIOLOGY & ADDITIONAL TESTS:   reviewed elctronically  ASSESSMENT/PLAN: 	    25 minutes aggregate time was spent on this visit, 50% visit time spent in care co-ordination with other attendings and counselling patient .I have discussed care plan with patient / HCP/family member nitza Stahl  ,who expressed understanding of problems treatment and their effect and side effects, alternatives in details. I have asked if they have any questions and concerns and appropriately addressed them to best of my ability. Advance care planning was discussed , pallitaive care issues ,CMO ,hospice levels of care were discussed in details , forms ,advance directives were reviewed .All questions were answered to the best of my knowledge .( 25 min spent).

## 2022-10-25 NOTE — PROGRESS NOTE ADULT - SUBJECTIVE AND OBJECTIVE BOX
[facility]  Alberto Lepe PA-C  [address] [place] [address]  [phone]      INTERVAL HPI/OVERNIGHT EVENTS:  Pt s/e  Reports feeling well s/p upper gastrointestinal endoscopy 10/24  States no new GI complaints    MEDICATIONS  (STANDING):  albuterol/ipratropium for Nebulization 3 milliLiter(s) Nebulizer every 6 hours  baclofen 5 milliGRAM(s) Oral every 12 hours  buDESOnide    Inhalation Suspension 0.5 milliGRAM(s) Inhalation two times a day  dextrose 5% + sodium chloride 0.45%. 1000 milliLiter(s) (30 mL/Hr) IV Continuous <Continuous>  donepezil 10 milliGRAM(s) Oral at bedtime  DULoxetine 60 milliGRAM(s) Oral daily  enoxaparin Injectable 40 milliGRAM(s) SubCutaneous every 24 hours  guaiFENesin  milliGRAM(s) Oral every 12 hours  levothyroxine Injectable 75 MICROGram(s) IV Push at bedtime  memantine 10 milliGRAM(s) Oral two times a day  methylPREDNISolone sodium succinate Injectable 40 milliGRAM(s) IV Push daily  metoprolol tartrate Injectable 2.5 milliGRAM(s) IV Push every 12 hours  pantoprazole  Injectable 40 milliGRAM(s) IV Push every 12 hours  simvastatin 10 milliGRAM(s) Oral at bedtime  traZODone 100 milliGRAM(s) Oral at bedtime    MEDICATIONS  (PRN):  aluminum hydroxide/magnesium hydroxide/simethicone Suspension 30 milliLiter(s) Oral every 4 hours PRN Dyspepsia  ibuprofen  Tablet. 400 milliGRAM(s) Oral four times a day PRN Temp greater or equal to 38C (100.4F), Mild Pain (1 - 3)  loperamide 2 milliGRAM(s) Oral four times a day PRN Diarrhea  LORazepam   Injectable 0.5 milliGRAM(s) IV Push every 8 hours PRN Anxiety  melatonin 3 milliGRAM(s) Oral at bedtime PRN Insomnia  ondansetron Injectable 4 milliGRAM(s) IV Push every 8 hours PRN Nausea and/or Vomiting  traMADol 50 milliGRAM(s) Oral three times a day PRN Moderate Pain (4 - 6)      Allergies    sulfa drugs (Unknown)  Tylenol (Unknown)      PHYSICAL EXAM:   Vital Signs:  Vital Signs Last 24 Hrs  T(C): 36.7 (25 Oct 2022 05:35), Max: 36.9 (24 Oct 2022 13:34)  T(F): 98 (25 Oct 2022 05:35), Max: 98.4 (24 Oct 2022 13:34)  HR: 150 (25 Oct 2022 10:04) (62 - 150)  BP: 137/81 (25 Oct 2022 05:35) (111/68 - 138/75)  BP(mean): --  RR: 17 (25 Oct 2022 05:35) (16 - 18)  SpO2: 99% (25 Oct 2022 07:50) (95% - 99%)    Parameters below as of 25 Oct 2022 07:50  Patient On (Oxygen Delivery Method): nasal cannula,2LPM    GENERAL:  Appears stated age  ABDOMEN:  Soft, non-tender, non-distended  NEURO:  Alert    LABS:                        12.7   6.81  )-----------( 190      ( 25 Oct 2022 08:10 )             40.0     10-25    142  |  103  |  7   ----------------------------<  115<H>  3.2<L>   |  30  |  0.41<L>    Ca    9.1      25 Oct 2022 08:10  Phos  2.6     10-24  Mg     1.7     10-24

## 2022-10-25 NOTE — PROVIDER CONTACT NOTE (OTHER) - SITUATION
patient had tachycardia into 160s at 1029 patient ambulating and getting cleaned up at the time. stripes
Pt is on remote telemetry with continuous pulse ox, call received from tele room
Patient had rapid response called for stridor to upper airway. patient saturation 98% on 2L NC. patient given duoneb and IV lasixs 20mg. patient made NPO. will continuously monitor w/ remote tele 
Patient had rapid response for stridor to upper airway saturation 98% on 2L. suction orally/nasally. patient given duoneb, lasix 20mg and patient made NPO.
patient placed on clear liquid diet per dr. Larsen
patient had 4 liquid BM this shift
patient lung sounds sound very wet. patient does have IVF going since yesterday and patient has history of CHF

## 2022-10-25 NOTE — PROGRESS NOTE ADULT - SUBJECTIVE AND OBJECTIVE BOX
Interval History:    CENTRAL LINE:   [  ] YES       [  ] NO  FIGUEROA:                 [  ] YES       [  ] NO         REVIEW OF SYSTEMS:  All Systems below were reviewed and are negative [  ]  HEENT:  ID:  Pulmonary:  Cardiac:  GI:  Renal:  Musculoskeletal:  All other systems above were reviewed and are negative   [  ]      MEDICATIONS  (STANDING):  albuterol/ipratropium for Nebulization 3 milliLiter(s) Nebulizer every 6 hours  baclofen 5 milliGRAM(s) Oral every 12 hours  buDESOnide    Inhalation Suspension 0.5 milliGRAM(s) Inhalation two times a day  dextrose 5% + sodium chloride 0.45%. 1000 milliLiter(s) (30 mL/Hr) IV Continuous <Continuous>  donepezil 10 milliGRAM(s) Oral at bedtime  DULoxetine 60 milliGRAM(s) Oral daily  enoxaparin Injectable 40 milliGRAM(s) SubCutaneous every 24 hours  guaiFENesin  milliGRAM(s) Oral every 12 hours  levothyroxine Injectable 75 MICROGram(s) IV Push at bedtime  memantine 10 milliGRAM(s) Oral two times a day  methylPREDNISolone sodium succinate Injectable 40 milliGRAM(s) IV Push daily  metoprolol tartrate Injectable 2.5 milliGRAM(s) IV Push every 8 hours  pantoprazole  Injectable 40 milliGRAM(s) IV Push every 12 hours  simvastatin 10 milliGRAM(s) Oral at bedtime  traZODone 100 milliGRAM(s) Oral at bedtime    MEDICATIONS  (PRN):  aluminum hydroxide/magnesium hydroxide/simethicone Suspension 30 milliLiter(s) Oral every 4 hours PRN Dyspepsia  ibuprofen  Tablet. 400 milliGRAM(s) Oral four times a day PRN Temp greater or equal to 38C (100.4F), Mild Pain (1 - 3)  loperamide 2 milliGRAM(s) Oral four times a day PRN Diarrhea  LORazepam   Injectable 0.5 milliGRAM(s) IV Push every 8 hours PRN Anxiety  melatonin 3 milliGRAM(s) Oral at bedtime PRN Insomnia  ondansetron Injectable 4 milliGRAM(s) IV Push every 8 hours PRN Nausea and/or Vomiting  traMADol 50 milliGRAM(s) Oral three times a day PRN Moderate Pain (4 - 6)      Vital Signs Last 24 Hrs  T(C): 37.9 (25 Oct 2022 14:02), Max: 37.9 (25 Oct 2022 14:02)  T(F): 100.2 (25 Oct 2022 14:02), Max: 100.2 (25 Oct 2022 14:02)  HR: 130 (25 Oct 2022 13:15) (62 - 150)  BP: 124/81 (25 Oct 2022 13:15) (111/68 - 137/81)  BP(mean): --  RR: 17 (25 Oct 2022 05:35) (16 - 17)  SpO2: 96% (25 Oct 2022 12:50) (95% - 99%)    Parameters below as of 25 Oct 2022 12:50  Patient On (Oxygen Delivery Method): nasal cannula        I&O's Summary    24 Oct 2022 07:01  -  25 Oct 2022 07:00  --------------------------------------------------------  IN: 300 mL / OUT: 0 mL / NET: 300 mL        PHYSICAL EXAM:  HEENT: NC/AT; PERRLA  Neck: Soft; no tenderness  Lungs: CTA bilaterally; no wheezing.   Heart:  Abdomen:  Genital/ Rectal:  Extremities:  Neurologic:  Vascular:      LABORATORY:    CBC Full  -  ( 25 Oct 2022 08:10 )  WBC Count : 6.81 K/uL  RBC Count : 4.47 M/uL  Hemoglobin : 12.7 g/dL  Hematocrit : 40.0 %  Platelet Count - Automated : 190 K/uL  Mean Cell Volume : 89.5 fl  Mean Cell Hemoglobin : 28.4 pg  Mean Cell Hemoglobin Concentration : 31.8 gm/dL  Auto Neutrophil # : x  Auto Lymphocyte # : x  Auto Monocyte # : x  Auto Eosinophil # : x  Auto Basophil # : x  Auto Neutrophil % : x  Auto Lymphocyte % : x  Auto Monocyte % : x  Auto Eosinophil % : x  Auto Basophil % : x      ESR:                   10-22 @ 06:25  14    C-Reactive Protein:     10-22 @ 06:25  8    Procalcitonin:           10-22 @ 06:25   --  ESR:                   10-21 @ 04:26  --    C-Reactive Protein:     10-21 @ 04:26  --    Procalcitonin:           10-21 @ 04:26   0.03  ESR:                   10-20 @ 20:30  --    C-Reactive Protein:     10-20 @ 20:30  --    Procalcitonin:           10-20 @ 20:30   0.04      10-25    x   |  x   |  x   ----------------------------<  x   3.4<L>   |  x   |  x     Ca    9.1      25 Oct 2022 08:10  Phos  1.9     10-25  Mg     1.4     10-25        Rapid Respiratory Viral Panel Result        10-20 @ 10:47  Rapid RVP [facility]  Coronovirus --  Adenovirus --  Bordetella Pertussis --  Chlamydia Pneumonia --  Entero/Rhinovirus--  HKU1 Coronovirus --  HMPV Coronovirus --  Influenza A --  Influenza AH1 --  Influenza AH1 2009 --  Influenza AH3 --  Influenza B --  Mycoplasma Pneumoniae --  NL63 Coronovirus --  OC43 Coronovirus --  Parainfluenza 1 --  Parainfluenza 2 --  Parainfluenza 3 --  Parainfluenza 4 --  Resp Syncytial Virus --      Assessment and Plan:          Je Baez MD   [phone].    She is afebrile  On O2 NC      MEDICATIONS  (STANDING):  albuterol/ipratropium for Nebulization 3 milliLiter(s) Nebulizer every 6 hours  baclofen 5 milliGRAM(s) Oral every 12 hours  buDESOnide    Inhalation Suspension 0.5 milliGRAM(s) Inhalation two times a day  dextrose 5% + sodium chloride 0.45%. 1000 milliLiter(s) (30 mL/Hr) IV Continuous <Continuous>  donepezil 10 milliGRAM(s) Oral at bedtime  DULoxetine 60 milliGRAM(s) Oral daily  enoxaparin Injectable 40 milliGRAM(s) SubCutaneous every 24 hours  guaiFENesin  milliGRAM(s) Oral every 12 hours  levothyroxine Injectable 75 MICROGram(s) IV Push at bedtime  memantine 10 milliGRAM(s) Oral two times a day  methylPREDNISolone sodium succinate Injectable 40 milliGRAM(s) IV Push daily  metoprolol tartrate Injectable 2.5 milliGRAM(s) IV Push every 8 hours  pantoprazole  Injectable 40 milliGRAM(s) IV Push every 12 hours  simvastatin 10 milliGRAM(s) Oral at bedtime  traZODone 100 milliGRAM(s) Oral at bedtime    MEDICATIONS  (PRN):  aluminum hydroxide/magnesium hydroxide/simethicone Suspension 30 milliLiter(s) Oral every 4 hours PRN Dyspepsia  ibuprofen  Tablet. 400 milliGRAM(s) Oral four times a day PRN Temp greater or equal to 38C (100.4F), Mild Pain (1 - 3)  loperamide 2 milliGRAM(s) Oral four times a day PRN Diarrhea  LORazepam   Injectable 0.5 milliGRAM(s) IV Push every 8 hours PRN Anxiety  melatonin 3 milliGRAM(s) Oral at bedtime PRN Insomnia  ondansetron Injectable 4 milliGRAM(s) IV Push every 8 hours PRN Nausea and/or Vomiting  traMADol 50 milliGRAM(s) Oral three times a day PRN Moderate Pain (4 - 6)      Vital Signs Last 24 Hrs  T(C): 37.9 (25 Oct 2022 14:02), Max: 37.9 (25 Oct 2022 14:02)  T(F): 100.2 (25 Oct 2022 14:02), Max: 100.2 (25 Oct 2022 14:02)  HR: 130 (25 Oct 2022 13:15) (62 - 150)  BP: 124/81 (25 Oct 2022 13:15) (111/68 - 137/81)  BP(mean): --  RR: 17 (25 Oct 2022 05:35) (16 - 17)  SpO2: 96% (25 Oct 2022 12:50) (95% - 99%)    Parameters below as of 25 Oct 2022 12:50  Patient On (Oxygen Delivery Method): nasal cannula        I&O's Summary    24 Oct 2022 07:01  -  25 Oct 2022 07:00  --------------------------------------------------------  IN: 300 mL / OUT: 0 mL / NET: 300 mL        PHYSICAL EXAM:  HEENT: NC/AT; PERRLA  Neck: Soft; no tenderness  Lungs: CTA bilaterally; no wheezing.   Heart:  Abdomen:  Genital/ Rectal:  Extremities:  Neurologic:  Vascular:      LABORATORY:    CBC Full  -  ( 25 Oct 2022 08:10 )  WBC Count : 6.81 K/uL  RBC Count : 4.47 M/uL  Hemoglobin : 12.7 g/dL  Hematocrit : 40.0 %  Platelet Count - Automated : 190 K/uL  Mean Cell Volume : 89.5 fl  Mean Cell Hemoglobin : 28.4 pg  Mean Cell Hemoglobin Concentration : 31.8 gm/dL  Auto Neutrophil # : x  Auto Lymphocyte # : x  Auto Monocyte # : x  Auto Eosinophil # : x  Auto Basophil # : x  Auto Neutrophil % : x  Auto Lymphocyte % : x  Auto Monocyte % : x  Auto Eosinophil % : x  Auto Basophil % : x      ESR:                   10-22 @ 06:25  14    C-Reactive Protein:     10-22 @ 06:25  8    Procalcitonin:           10-22 @ 06:25   --  ESR:                   10-21 @ 04:26  --    C-Reactive Protein:     10-21 @ 04:26  --    Procalcitonin:           10-21 @ 04:26   0.03  ESR:                   10-20 @ 20:30  --    C-Reactive Protein:     10-20 @ 20:30  --    Procalcitonin:           10-20 @ 20:30   0.04      10-25    x   |  x   |  x   ----------------------------<  x   3.4<L>   |  x   |  x     Ca    9.1      25 Oct 2022 08:10  Phos  1.9     10-25  Mg     1.4     10-25        Rapid Respiratory Viral Panel Result        10-20 @ 10:47  Rapid R[facility]  Coronovirus --  Adenovirus --  Bordetella Pertussis --  Chlamydia Pneumonia --  Entero/Rhinovirus--  HKU1 Coronovirus --  HMPV Coronovirus --  Influenza A --  Influenza AH1 --  Influenza AH1 2009 --  Influenza AH3 --  Influenza B --  Mycoplasma Pneumoniae --  NL63 Coronovirus --  OC43 Coronovirus --  Parainfluenza 1 --  Parainfluenza 2 --  Parainfluenza 3 --  Parainfluenza 4 --  Resp Syncytial Virus --      Assessment and Plan:    1. Dyspnea  2. COPD.  3. Dilated esophagus, r/p achalasia.   4. Left knee swelling.    , Repeated COVID 19 PCR today is positive  . She till has SOB and on O2 NC., She denied cough  . Monitor for now          Je Baez MD   [phone].    She is afebrile  On O2 NC      MEDICATIONS  (STANDING):  albuterol/ipratropium for Nebulization 3 milliLiter(s) Nebulizer every 6 hours  baclofen 5 milliGRAM(s) Oral every 12 hours  buDESOnide    Inhalation Suspension 0.5 milliGRAM(s) Inhalation two times a day  dextrose 5% + sodium chloride 0.45%. 1000 milliLiter(s) (30 mL/Hr) IV Continuous <Continuous>  donepezil 10 milliGRAM(s) Oral at bedtime  DULoxetine 60 milliGRAM(s) Oral daily  enoxaparin Injectable 40 milliGRAM(s) SubCutaneous every 24 hours  guaiFENesin  milliGRAM(s) Oral every 12 hours  levothyroxine Injectable 75 MICROGram(s) IV Push at bedtime  memantine 10 milliGRAM(s) Oral two times a day  methylPREDNISolone sodium succinate Injectable 40 milliGRAM(s) IV Push daily  metoprolol tartrate Injectable 2.5 milliGRAM(s) IV Push every 8 hours  pantoprazole  Injectable 40 milliGRAM(s) IV Push every 12 hours  simvastatin 10 milliGRAM(s) Oral at bedtime  traZODone 100 milliGRAM(s) Oral at bedtime    MEDICATIONS  (PRN):  aluminum hydroxide/magnesium hydroxide/simethicone Suspension 30 milliLiter(s) Oral every 4 hours PRN Dyspepsia  ibuprofen  Tablet. 400 milliGRAM(s) Oral four times a day PRN Temp greater or equal to 38C (100.4F), Mild Pain (1 - 3)  loperamide 2 milliGRAM(s) Oral four times a day PRN Diarrhea  LORazepam   Injectable 0.5 milliGRAM(s) IV Push every 8 hours PRN Anxiety  melatonin 3 milliGRAM(s) Oral at bedtime PRN Insomnia  ondansetron Injectable 4 milliGRAM(s) IV Push every 8 hours PRN Nausea and/or Vomiting  traMADol 50 milliGRAM(s) Oral three times a day PRN Moderate Pain (4 - 6)      Vital Signs Last 24 Hrs  T(C): 37.9 (25 Oct 2022 14:02), Max: 37.9 (25 Oct 2022 14:02)  T(F): 100.2 (25 Oct 2022 14:02), Max: 100.2 (25 Oct 2022 14:02)  HR: 130 (25 Oct 2022 13:15) (62 - 150)  BP: 124/81 (25 Oct 2022 13:15) (111/68 - 137/81)  BP(mean): --  RR: 17 (25 Oct 2022 05:35) (16 - 17)  SpO2: 96% (25 Oct 2022 12:50) (95% - 99%)    Parameters below as of 25 Oct 2022 12:50  Patient On (Oxygen Delivery Method): nasal cannula        I&O's Summary    24 Oct 2022 07:01  -  25 Oct 2022 07:00  --------------------------------------------------------  IN: 300 mL / OUT: 0 mL / NET: 300 mL        PHYSICAL EXAM:  HEENT: NC/AT; PERRLA  Neck: Soft; no tenderness  Lungs: Coarse BS bilaterally; no wheezing.   Heart: RRR, no murmurs  Abdomen: Soft, no tenderness.  Genital/ Rectal: No bearden catheter.  Extremities: No ulcers.  Neurologic: Awake      LABORATORY:    CBC Full  -  ( 25 Oct 2022 08:10 )  WBC Count : 6.81 K/uL  RBC Count : 4.47 M/uL  Hemoglobin : 12.7 g/dL  Hematocrit : 40.0 %  Platelet Count - Automated : 190 K/uL  Mean Cell Volume : 89.5 fl  Mean Cell Hemoglobin : 28.4 pg  Mean Cell Hemoglobin Concentration : 31.8 gm/dL  Auto Neutrophil # : x  Auto Lymphocyte # : x  Auto Monocyte # : x  Auto Eosinophil # : x  Auto Basophil # : x  Auto Neutrophil % : x  Auto Lymphocyte % : x  Auto Monocyte % : x  Auto Eosinophil % : x  Auto Basophil % : x      ESR:                   10-22 @ 06:25  14    C-Reactive Protein:     10-22 @ 06:25  8    Procalcitonin:           10-22 @ 06:25   --  ESR:                   10-21 @ 04:26  --    C-Reactive Protein:     10-21 @ 04:26  --    Procalcitonin:           10-21 @ 04:26   0.03  ESR:                   10-20 @ 20:30  --    C-Reactive Protein:     10-20 @ 20:30  --    Procalcitonin:           10-20 @ 20:30   0.04      10-25    x   |  x   |  x   ----------------------------<  x   3.4<L>   |  x   |  x     Ca    9.1      25 Oct 2022 08:10  Phos  1.9     10-25  Mg     1.4     10-25        Rapid Respiratory Viral Panel Result        10-20 @ 10:47  Rapid RVP [facility]  Coronovirus --  Adenovirus --  Bordetella Pertussis --  Chlamydia Pneumonia --  Entero/Rhinovirus--  HKU1 Coronovirus --  HMPV Coronovirus --  Influenza A --  Influenza AH1 --  Influenza AH1 2009 --  Influenza AH3 --  Influenza B --  Mycoplasma Pneumoniae --  NL63 Coronovirus --  OC43 Coronovirus --  Parainfluenza 1 --  Parainfluenza 2 --  Parainfluenza 3 --  Parainfluenza 4 --  Resp Syncytial Virus --      Assessment and Plan:    1. Dyspnea  2. COPD.  3. Dilated esophagus.   4. Left knee swelling.    . Repeated COVID 19 PCR today is positive. She had two negative COVID 19 PCRs last week. Not clear if this is a new infection. She has no new symptoms and is still on O2 NC.   . She denied cough or fevers.   . Monitor for now. If she becomes worse, will add treatments for COVID 19.  . Waiting for PEG with GI.           Je Baez MD   [phone].

## 2022-10-25 NOTE — PROGRESS NOTE ADULT - PROBLEM SELECTOR PLAN 3
gerd precautions w/PO intake ,case d/w GI TEAM   ppi once a day, may increase to twice a day   maalox as needed  ,clear liquid diet   will monitor clinically , EGD planned 10/24/22 -medically optimized and cleared by carsiologist

## 2022-10-26 LAB
ALBUMIN SERPL ELPH-MCNC: 3 G/DL — LOW (ref 3.3–5)
ALP SERPL-CCNC: 63 U/L — SIGNIFICANT CHANGE UP (ref 40–120)
ALT FLD-CCNC: 16 U/L — SIGNIFICANT CHANGE UP (ref 12–78)
ANION GAP SERPL CALC-SCNC: 7 MMOL/L — SIGNIFICANT CHANGE UP (ref 5–17)
AST SERPL-CCNC: 14 U/L — LOW (ref 15–37)
BILIRUB SERPL-MCNC: 0.6 MG/DL — SIGNIFICANT CHANGE UP (ref 0.2–1.2)
BUN SERPL-MCNC: 4 MG/DL — LOW (ref 7–23)
CALCIUM SERPL-MCNC: 8.9 MG/DL — SIGNIFICANT CHANGE UP (ref 8.5–10.1)
CHLORIDE SERPL-SCNC: 110 MMOL/L — HIGH (ref 96–108)
CO2 SERPL-SCNC: 31 MMOL/L — SIGNIFICANT CHANGE UP (ref 22–31)
CREAT SERPL-MCNC: 0.47 MG/DL — LOW (ref 0.5–1.3)
EGFR: 94 ML/MIN/1.73M2 — SIGNIFICANT CHANGE UP
GLUCOSE SERPL-MCNC: 112 MG/DL — HIGH (ref 70–99)
HCT VFR BLD CALC: 40 % — SIGNIFICANT CHANGE UP (ref 34.5–45)
HGB BLD-MCNC: 12.6 G/DL — SIGNIFICANT CHANGE UP (ref 11.5–15.5)
MAGNESIUM SERPL-MCNC: 2.1 MG/DL — SIGNIFICANT CHANGE UP (ref 1.6–2.6)
MCHC RBC-ENTMCNC: 28.3 PG — SIGNIFICANT CHANGE UP (ref 27–34)
MCHC RBC-ENTMCNC: 31.5 GM/DL — LOW (ref 32–36)
MCV RBC AUTO: 89.7 FL — SIGNIFICANT CHANGE UP (ref 80–100)
NRBC # BLD: 0 /100 WBCS — SIGNIFICANT CHANGE UP (ref 0–0)
PHOSPHATE SERPL-MCNC: 3.2 MG/DL — SIGNIFICANT CHANGE UP (ref 2.5–4.5)
PLATELET # BLD AUTO: 201 K/UL — SIGNIFICANT CHANGE UP (ref 150–400)
POTASSIUM SERPL-MCNC: 3.3 MMOL/L — LOW (ref 3.5–5.3)
POTASSIUM SERPL-SCNC: 3.3 MMOL/L — LOW (ref 3.5–5.3)
PROT SERPL-MCNC: 6.2 G/DL — SIGNIFICANT CHANGE UP (ref 6–8.3)
RBC # BLD: 4.46 M/UL — SIGNIFICANT CHANGE UP (ref 3.8–5.2)
RBC # FLD: 14.8 % — HIGH (ref 10.3–14.5)
SODIUM SERPL-SCNC: 148 MMOL/L — HIGH (ref 135–145)
WBC # BLD: 4.76 K/UL — SIGNIFICANT CHANGE UP (ref 3.8–10.5)
WBC # FLD AUTO: 4.76 K/UL — SIGNIFICANT CHANGE UP (ref 3.8–10.5)

## 2022-10-26 PROCEDURE — 99233 SBSQ HOSP IP/OBS HIGH 50: CPT

## 2022-10-26 RX ORDER — SODIUM CHLORIDE 9 MG/ML
1000 INJECTION, SOLUTION INTRAVENOUS
Refills: 0 | Status: DISCONTINUED | OUTPATIENT
Start: 2022-10-26 | End: 2022-10-27

## 2022-10-26 RX ORDER — POTASSIUM CHLORIDE 20 MEQ
10 PACKET (EA) ORAL ONCE
Refills: 0 | Status: COMPLETED | OUTPATIENT
Start: 2022-10-26 | End: 2022-10-26

## 2022-10-26 RX ORDER — POTASSIUM CHLORIDE 20 MEQ
40 PACKET (EA) ORAL ONCE
Refills: 0 | Status: COMPLETED | OUTPATIENT
Start: 2022-10-26 | End: 2022-10-26

## 2022-10-26 RX ORDER — POTASSIUM CHLORIDE 20 MEQ
10 PACKET (EA) ORAL ONCE
Refills: 0 | Status: DISCONTINUED | OUTPATIENT
Start: 2022-10-26 | End: 2022-10-26

## 2022-10-26 RX ADMIN — Medication 5 MILLIGRAM(S): at 18:08

## 2022-10-26 RX ADMIN — Medication 400 MILLIGRAM(S): at 23:35

## 2022-10-26 RX ADMIN — SODIUM CHLORIDE 40 MILLILITER(S): 9 INJECTION, SOLUTION INTRAVENOUS at 12:40

## 2022-10-26 RX ADMIN — Medication 2.5 MILLIGRAM(S): at 21:13

## 2022-10-26 RX ADMIN — Medication 40 MILLIEQUIVALENT(S): at 18:08

## 2022-10-26 RX ADMIN — PANTOPRAZOLE SODIUM 40 MILLIGRAM(S): 20 TABLET, DELAYED RELEASE ORAL at 18:07

## 2022-10-26 RX ADMIN — MEMANTINE HYDROCHLORIDE 10 MILLIGRAM(S): 10 TABLET ORAL at 18:08

## 2022-10-26 RX ADMIN — PANTOPRAZOLE SODIUM 40 MILLIGRAM(S): 20 TABLET, DELAYED RELEASE ORAL at 05:43

## 2022-10-26 RX ADMIN — Medication 100 MILLIGRAM(S): at 21:15

## 2022-10-26 RX ADMIN — Medication 2.5 MILLIGRAM(S): at 05:43

## 2022-10-26 RX ADMIN — Medication 600 MILLIGRAM(S): at 18:08

## 2022-10-26 RX ADMIN — Medication 75 MICROGRAM(S): at 23:55

## 2022-10-26 RX ADMIN — Medication 3 MILLIGRAM(S): at 21:15

## 2022-10-26 RX ADMIN — DONEPEZIL HYDROCHLORIDE 10 MILLIGRAM(S): 10 TABLET, FILM COATED ORAL at 21:14

## 2022-10-26 RX ADMIN — ENOXAPARIN SODIUM 40 MILLIGRAM(S): 100 INJECTION SUBCUTANEOUS at 21:17

## 2022-10-26 RX ADMIN — SIMVASTATIN 10 MILLIGRAM(S): 20 TABLET, FILM COATED ORAL at 21:18

## 2022-10-26 RX ADMIN — Medication 20 MILLIGRAM(S): at 05:42

## 2022-10-26 NOTE — PROGRESS NOTE ADULT - SUBJECTIVE AND OBJECTIVE BOX
PROGRESS NOTE  Patient is a 82y old  Female who presents with a chief complaint of SOB AND COUGH (26 Oct 2022 08:18)    Chart and available morning labs /imaging are reviewed electronically , urgent issues addressed . More information  is being added upon completion of rounds , when more information is collected and management discussed with consultants , medical staff and social service/case management on the floor   OVERNIGHT  COVID POSITIVE TEST  reported by medical staff . All above noted Patient is resting in a bed comfortably .Confused ,poor mentation .No distress noted   Remains NPO  HPI:  Patient brought in by EMS from [facility] for shortness of breath cough wheezing for 2 days.  Patient denies fevers chills headache chest pain abdominal pain vomiting Admit for antibacterial managmnet ,intravenous steroids,nebulised bronchodilators and pulmonology evaluation,O2 supply,serial labs and chest xrays,obtain ECHO to evaluate LVEF and rule out chf component Admitted  to telemetry unit for monitoring , send 3 sets of cardiac enzymes to rule out acute coronary event, obtain ECHO to evaluate LVEF, cardiology consult  ,continue current management, O2 supply, anticoagulation plan as per cardiology consult Palliative care consult requested ,to discuss advance directives and complete MOLST  (20 Oct 2022 13:54)    PAST MEDICAL & SURGICAL HISTORY:  History of COPD      Insomnia      Mood disorder      Dementia      Spasm of muscle      Constipation      Hypothyroidism      GERD (gastroesophageal reflux disease)      HTN (hypertension)      CHF, chronic          MEDICATIONS  (STANDING):  albuterol/ipratropium for Nebulization 3 milliLiter(s) Nebulizer every 6 hours  baclofen 5 milliGRAM(s) Oral every 12 hours  buDESOnide    Inhalation Suspension 0.5 milliGRAM(s) Inhalation two times a day  dextrose 5%. 1000 milliLiter(s) (40 mL/Hr) IV Continuous <Continuous>  donepezil 10 milliGRAM(s) Oral at bedtime  DULoxetine 60 milliGRAM(s) Oral daily  enoxaparin Injectable 40 milliGRAM(s) SubCutaneous every 24 hours  guaiFENesin  milliGRAM(s) Oral every 12 hours  levothyroxine Injectable 75 MICROGram(s) IV Push at bedtime  memantine 10 milliGRAM(s) Oral two times a day  methylPREDNISolone sodium succinate Injectable 20 milliGRAM(s) IV Push daily  metoprolol tartrate Injectable 2.5 milliGRAM(s) IV Push every 8 hours  pantoprazole  Injectable 40 milliGRAM(s) IV Push every 12 hours  simvastatin 10 milliGRAM(s) Oral at bedtime  traZODone 100 milliGRAM(s) Oral at bedtime    MEDICATIONS  (PRN):  aluminum hydroxide/magnesium hydroxide/simethicone Suspension 30 milliLiter(s) Oral every 4 hours PRN Dyspepsia  ibuprofen  Tablet. 400 milliGRAM(s) Oral four times a day PRN Temp greater or equal to 38C (100.4F), Mild Pain (1 - 3)  loperamide 2 milliGRAM(s) Oral four times a day PRN Diarrhea  LORazepam   Injectable 0.5 milliGRAM(s) IV Push every 8 hours PRN Anxiety  melatonin 3 milliGRAM(s) Oral at bedtime PRN Insomnia  ondansetron Injectable 4 milliGRAM(s) IV Push every 8 hours PRN Nausea and/or Vomiting  traMADol 50 milliGRAM(s) Oral three times a day PRN Moderate Pain (4 - 6)      OBJECTIVE    T(C): 36.4 (10-26-22 @ 05:05), Max: 37.9 (10-25-22 @ 14:02)  HR: 80 (10-26-22 @ 05:05) (80 - 130)  BP: 115/79 (10-26-22 @ 05:05) (115/79 - 133/84)  RR: 18 (10-26-22 @ 05:05) (18 - 18)  SpO2: 98% (10-26-22 @ 05:05) (96% - 98%)  Wt(kg): --  I&O's Summary    25 Oct 2022 07:01  -  26 Oct 2022 07:00  --------------------------------------------------------  IN: 0 mL / OUT: 950 mL / NET: -950 mL          REVIEW OF SYSTEMS:  CONSTITUTIONAL: No fever, weight loss, or fatigue  EYES: No eye pain, visual disturbances, or discharge  ENMT:   No sinus or throat pain  NECK: No pain or stiffness  RESPIRATORY: No cough, wheezing, chills or hemoptysis; No shortness of breath  CARDIOVASCULAR: No chest pain, palpitations, dizziness, or leg swelling  GASTROINTESTINAL: No abdominal pain. No nausea, vomiting; No diarrhea or constipation. No melena or hematochezia.  GENITOURINARY: No dysuria, frequency, hematuria, or incontinence  NEUROLOGICAL: No headaches, memory loss, loss of strength, numbness, or tremors  SKIN: No itching, burning, rashes, or lesions   MUSCULOSKELETAL: No joint pain or swelling; No muscle, back, or extremity pain    PHYSICAL EXAM:  Appearance: NAD. VS past 24 hrs -as above   HEENT:   Moist oral mucosa. Conjunctiva clear b/l.   Neck : supple  Respiratory: Lungs CTAB.  Gastrointestinal:  Soft, nontender. No rebound. No rigidity. BS present	  Cardiovascular: RRR ,S1S2 present  Neurologic: Non-focal. Moving all extremities.  Extremities: No edema. No erythema. No calf tenderness.  Skin: No rashes, No ecchymoses, No cyanosis.	  wounds ,skin lesions-See skin assesment flow sheet   LABS:                        12.6   4.76  )-----------( 201      ( 26 Oct 2022 07:38 )             40.0     10-26    148<H>  |  110<H>  |  4<L>  ----------------------------<  112<H>  3.3<L>   |  31  |  0.47<L>    Ca    8.9      26 Oct 2022 07:38  Phos  3.2     10-26  Mg     2.1     10-26    TPro  6.2  /  Alb  3.0<L>  /  TBili  0.6  /  DBili  x   /  AST  14<L>  /  ALT  16  /  AlkPhos  63  10-26    CAPILLARY BLOOD GLUCOSE              Culture - Urine (collected 21 Oct 2022 07:40)  Source: Clean Catch Clean Catch (Midstream)  Final Report (22 Oct 2022 10:35):    <10,000 CFU/mL Normal Urogenital Consuelo    Culture - Blood (collected 20 Oct 2022 10:40)  Source: .Blood Blood-Peripheral  Final Report (25 Oct 2022 17:01):    No Growth Final    Culture - Blood (collected 20 Oct 2022 10:30)  Source: .Blood Blood-Peripheral  Final Report (25 Oct 2022 17:01):    No Growth Final      RADIOLOGY & ADDITIONAL TESTS:   reviewed elctronically  ASSESSMENT/PLAN: 	     PROGRESS NOTE  Patient is a 82y old  Female who presents with a chief complaint of SOB AND COUGH (26 Oct 2022 08:18)    Chart and available morning labs /imaging are reviewed electronically , urgent issues addressed . More information  is being added upon completion of rounds , when more information is collected and management discussed with consultants , medical staff and social service/case management on the floor   OVERNIGHT  COVID POSITIVE TEST  reported by medical staff . All above noted Patient is resting in a bed comfortably .Confused ,poor mentation .No distress noted   Remains NPO  HPI:  Patient brought in by EMS from [facility] for shortness of breath cough wheezing for 2 days.  Patient denies fevers chills headache chest pain abdominal pain vomiting Admit for antibacterial managmnet ,intravenous steroids,nebulised bronchodilators and pulmonology evaluation,O2 supply,serial labs and chest xrays,obtain ECHO to evaluate LVEF and rule out chf component Admitted  to telemetry unit for monitoring , send 3 sets of cardiac enzymes to rule out acute coronary event, obtain ECHO to evaluate LVEF, cardiology consult  ,continue current management, O2 supply, anticoagulation plan as per cardiology consult Palliative care consult requested ,to discuss advance directives and complete MOLST  (20 Oct 2022 13:54)    PAST MEDICAL & SURGICAL HISTORY:  History of COPD      Insomnia      Mood disorder      Dementia      Spasm of muscle      Constipation      Hypothyroidism      GERD (gastroesophageal reflux disease)      HTN (hypertension)      CHF, chronic          MEDICATIONS  (STANDING):  albuterol/ipratropium for Nebulization 3 milliLiter(s) Nebulizer every 6 hours  baclofen 5 milliGRAM(s) Oral every 12 hours  buDESOnide    Inhalation Suspension 0.5 milliGRAM(s) Inhalation two times a day  dextrose 5%. 1000 milliLiter(s) (40 mL/Hr) IV Continuous <Continuous>  donepezil 10 milliGRAM(s) Oral at bedtime  DULoxetine 60 milliGRAM(s) Oral daily  enoxaparin Injectable 40 milliGRAM(s) SubCutaneous every 24 hours  guaiFENesin  milliGRAM(s) Oral every 12 hours  levothyroxine Injectable 75 MICROGram(s) IV Push at bedtime  memantine 10 milliGRAM(s) Oral two times a day  methylPREDNISolone sodium succinate Injectable 20 milliGRAM(s) IV Push daily  metoprolol tartrate Injectable 2.5 milliGRAM(s) IV Push every 8 hours  pantoprazole  Injectable 40 milliGRAM(s) IV Push every 12 hours  simvastatin 10 milliGRAM(s) Oral at bedtime  traZODone 100 milliGRAM(s) Oral at bedtime    MEDICATIONS  (PRN):  aluminum hydroxide/magnesium hydroxide/simethicone Suspension 30 milliLiter(s) Oral every 4 hours PRN Dyspepsia  ibuprofen  Tablet. 400 milliGRAM(s) Oral four times a day PRN Temp greater or equal to 38C (100.4F), Mild Pain (1 - 3)  loperamide 2 milliGRAM(s) Oral four times a day PRN Diarrhea  LORazepam   Injectable 0.5 milliGRAM(s) IV Push every 8 hours PRN Anxiety  melatonin 3 milliGRAM(s) Oral at bedtime PRN Insomnia  ondansetron Injectable 4 milliGRAM(s) IV Push every 8 hours PRN Nausea and/or Vomiting  traMADol 50 milliGRAM(s) Oral three times a day PRN Moderate Pain (4 - 6)      OBJECTIVE    T(C): 36.4 (10-26-22 @ 05:05), Max: 37.9 (10-25-22 @ 14:02)  HR: 80 (10-26-22 @ 05:05) (80 - 130)  BP: 115/79 (10-26-22 @ 05:05) (115/79 - 133/84)  RR: 18 (10-26-22 @ 05:05) (18 - 18)  SpO2: 98% (10-26-22 @ 05:05) (96% - 98%)  Wt(kg): --  I&O's Summary    25 Oct 2022 07:01  -  26 Oct 2022 07:00  --------------------------------------------------------  IN: 0 mL / OUT: 950 mL / NET: -950 mL          REVIEW OF SYSTEMS:  CONSTITUTIONAL: No fever, weight loss, or fatigue  EYES: No eye pain, visual disturbances, or discharge  ENMT:   No sinus or throat pain  NECK: No pain or stiffness  RESPIRATORY: No cough, wheezing, chills or hemoptysis; No shortness of breath  CARDIOVASCULAR: No chest pain, palpitations, dizziness, or leg swelling  GASTROINTESTINAL: No abdominal pain. No nausea, vomiting; No diarrhea or constipation. No melena or hematochezia.  GENITOURINARY: No dysuria, frequency, hematuria, or incontinence  NEUROLOGICAL: No headaches, memory loss, loss of strength, numbness, or tremors  SKIN: No itching, burning, rashes, or lesions   MUSCULOSKELETAL: No joint pain or swelling; No muscle, back, or extremity pain    PHYSICAL EXAM:  Appearance: NAD. VS past 24 hrs -as above   HEENT:   Moist oral mucosa. Conjunctiva clear b/l.   Neck : supple  Respiratory: Lungs CTAB.  Gastrointestinal:  Soft, nontender. No rebound. No rigidity. BS present	  Cardiovascular: RRR ,S1S2 present  Neurologic: Non-focal. Moving all extremities.  Extremities: No edema. No erythema. No calf tenderness.  Skin: No rashes, No ecchymoses, No cyanosis.	  wounds ,skin lesions-See skin assesment flow sheet   LABS:                        12.6   4.76  )-----------( 201      ( 26 Oct 2022 07:38 )             40.0     10-26    148<H>  |  110<H>  |  4<L>  ----------------------------<  112<H>  3.3<L>   |  31  |  0.47<L>    Ca    8.9      26 Oct 2022 07:38  Phos  3.2     10-26  Mg     2.1     10-26    TPro  6.2  /  Alb  3.0<L>  /  TBili  0.6  /  DBili  x   /  AST  14<L>  /  ALT  16  /  AlkPhos  63  10-26    CAPILLARY BLOOD GLUCOSE              Culture - Urine (collected 21 Oct 2022 07:40)  Source: Clean Catch Clean Catch (Midstream)  Final Report (22 Oct 2022 10:35):    <10,000 CFU/mL Normal Urogenital Consuelo    Culture - Blood (collected 20 Oct 2022 10:40)  Source: .Blood Blood-Peripheral  Final Report (25 Oct 2022 17:01):    No Growth Final    Culture - Blood (collected 20 Oct 2022 10:30)  Source: .Blood Blood-Peripheral  Final Report (25 Oct 2022 17:01):    No Growth Final      RADIOLOGY & ADDITIONAL TESTS:   reviewed elctronically  ASSESSMENT/PLAN: 	    25 minutes aggregate time was spent on this visit, 50% visit time spent in care co-ordination with other attendings and counselling patient .I have discussed care plan with patient / HCP/family member ,who expressed understanding of problems treatment and their effect and side effects, alternatives in details. I have asked if they have any questions and concerns and appropriately addressed them to best of my ability. Advance care planning was discussed , pallitaive care issues ,CMO ,hospice levels of care were discussed in details , forms ,advance directives were reviewed .All questions were answered to the best of my knowledge .Additional 25 min spent.

## 2022-10-26 NOTE — PROGRESS NOTE ADULT - SUBJECTIVE AND OBJECTIVE BOX
Interval History:    CENTRAL LINE:   [  ] YES       [  ] NO  FIGUEROA:                 [  ] YES       [  ] NO         REVIEW OF SYSTEMS:  All Systems below were reviewed and are negative [  ]  HEENT:  ID:  Pulmonary:  Cardiac:  GI:  Renal:  Musculoskeletal:  All other systems above were reviewed and are negative   [  ]      MEDICATIONS  (STANDING):  albuterol/ipratropium for Nebulization 3 milliLiter(s) Nebulizer every 6 hours  baclofen 5 milliGRAM(s) Oral every 12 hours  buDESOnide    Inhalation Suspension 0.5 milliGRAM(s) Inhalation two times a day  dextrose 5%. 1000 milliLiter(s) (40 mL/Hr) IV Continuous <Continuous>  donepezil 10 milliGRAM(s) Oral at bedtime  DULoxetine 60 milliGRAM(s) Oral daily  enoxaparin Injectable 40 milliGRAM(s) SubCutaneous every 24 hours  guaiFENesin  milliGRAM(s) Oral every 12 hours  levothyroxine Injectable 75 MICROGram(s) IV Push at bedtime  memantine 10 milliGRAM(s) Oral two times a day  methylPREDNISolone sodium succinate Injectable 20 milliGRAM(s) IV Push daily  metoprolol tartrate Injectable 2.5 milliGRAM(s) IV Push every 8 hours  pantoprazole  Injectable 40 milliGRAM(s) IV Push every 12 hours  simvastatin 10 milliGRAM(s) Oral at bedtime  traZODone 100 milliGRAM(s) Oral at bedtime    MEDICATIONS  (PRN):  aluminum hydroxide/magnesium hydroxide/simethicone Suspension 30 milliLiter(s) Oral every 4 hours PRN Dyspepsia  ibuprofen  Tablet. 400 milliGRAM(s) Oral four times a day PRN Temp greater or equal to 38C (100.4F), Mild Pain (1 - 3)  loperamide 2 milliGRAM(s) Oral four times a day PRN Diarrhea  LORazepam   Injectable 0.5 milliGRAM(s) IV Push every 8 hours PRN Anxiety  melatonin 3 milliGRAM(s) Oral at bedtime PRN Insomnia  ondansetron Injectable 4 milliGRAM(s) IV Push every 8 hours PRN Nausea and/or Vomiting  traMADol 50 milliGRAM(s) Oral three times a day PRN Moderate Pain (4 - 6)      Vital Signs Last 24 Hrs  T(C): 36.6 (26 Oct 2022 20:15), Max: 36.6 (26 Oct 2022 20:15)  T(F): 97.9 (26 Oct 2022 20:15), Max: 97.9 (26 Oct 2022 20:15)  HR: 90 (26 Oct 2022 20:15) (72 - 90)  BP: 112/76 (26 Oct 2022 20:15) (100/62 - 115/79)  BP(mean): --  RR: 17 (26 Oct 2022 20:15) (17 - 18)  SpO2: 95% (26 Oct 2022 20:15) (94% - 98%)    Parameters below as of 26 Oct 2022 20:15  Patient On (Oxygen Delivery Method): nasal cannula  O2 Flow (L/min): 2      I&O's Summary    25 Oct 2022 07:01  -  26 Oct 2022 07:00  --------------------------------------------------------  IN: 30 mL / OUT: 950 mL / NET: -920 mL    26 Oct 2022 07:01  -  26 Oct 2022 20:30  --------------------------------------------------------  IN: 440 mL / OUT: 0 mL / NET: 440 mL        PHYSICAL EXAM:  HEENT: NC/AT; PERRLA  Neck: Soft; no tenderness  Lungs: CTA bilaterally; no wheezing.   Heart:  Abdomen:  Genital/ Rectal:  Extremities:  Neurologic:  Vascular:      LABORATORY:    CBC Full  -  ( 26 Oct 2022 07:38 )  WBC Count : 4.76 K/uL  RBC Count : 4.46 M/uL  Hemoglobin : 12.6 g/dL  Hematocrit : 40.0 %  Platelet Count - Automated : 201 K/uL  Mean Cell Volume : 89.7 fl  Mean Cell Hemoglobin : 28.3 pg  Mean Cell Hemoglobin Concentration : 31.5 gm/dL  Auto Neutrophil # : x  Auto Lymphocyte # : x  Auto Monocyte # : x  Auto Eosinophil # : x  Auto Basophil # : x  Auto Neutrophil % : x  Auto Lymphocyte % : x  Auto Monocyte % : x  Auto Eosinophil % : x  Auto Basophil % : x      ESR:                   10-22 @ 06:25  14    C-Reactive Protein:     10-22 @ 06:25  8    Procalcitonin:           10-22 @ 06:25   --  ESR:                   10-21 @ 04:26  --    C-Reactive Protein:     10-21 @ 04:26  --    Procalcitonin:           10-21 @ 04:26   0.03  ESR:                   10-20 @ 20:30  --    C-Reactive Protein:     10-20 @ 20:30  --    Procalcitonin:           10-20 @ 20:30   0.04      10-26    148<H>  |  110<H>  |  4<L>  ----------------------------<  112<H>  3.3<L>   |  31  |  0.47<L>    Ca    8.9      26 Oct 2022 07:38  Phos  3.2     10-26  Mg     2.1     10-26    TPro  6.2  /  Alb  3.0<L>  /  TBili  0.6  /  DBili  x   /  AST  14<L>  /  ALT  16  /  AlkPhos  63  10-26      Rapid Respiratory Viral Panel Result        10-20 @ 10:47  Rapid RVP NotDetec  Coronovirus --  Adenovirus --  Bordetella Pertussis --  Chlamydia Pneumonia --  Entero/Rhinovirus--  HKU1 Coronovirus --  HMPV Coronovirus --  Influenza A --  Influenza AH1 --  Influenza AH1 2009 --  Influenza AH3 --  Influenza B --  Mycoplasma Pneumoniae --  NL63 Coronovirus --  OC43 Coronovirus --  Parainfluenza 1 --  Parainfluenza 2 --  Parainfluenza 3 --  Parainfluenza 4 --  Resp Syncytial Virus --      Assessment and Plan:          Je Baez MD   [phone].    No fevers  On O2 NC    MEDICATIONS  (STANDING):  albuterol/ipratropium for Nebulization 3 milliLiter(s) Nebulizer every 6 hours  baclofen 5 milliGRAM(s) Oral every 12 hours  buDESOnide    Inhalation Suspension 0.5 milliGRAM(s) Inhalation two times a day  dextrose 5%. 1000 milliLiter(s) (40 mL/Hr) IV Continuous <Continuous>  donepezil 10 milliGRAM(s) Oral at bedtime  DULoxetine 60 milliGRAM(s) Oral daily  enoxaparin Injectable 40 milliGRAM(s) SubCutaneous every 24 hours  guaiFENesin  milliGRAM(s) Oral every 12 hours  levothyroxine Injectable 75 MICROGram(s) IV Push at bedtime  memantine 10 milliGRAM(s) Oral two times a day  methylPREDNISolone sodium succinate Injectable 20 milliGRAM(s) IV Push daily  metoprolol tartrate Injectable 2.5 milliGRAM(s) IV Push every 8 hours  pantoprazole  Injectable 40 milliGRAM(s) IV Push every 12 hours  simvastatin 10 milliGRAM(s) Oral at bedtime  traZODone 100 milliGRAM(s) Oral at bedtime    MEDICATIONS  (PRN):  aluminum hydroxide/magnesium hydroxide/simethicone Suspension 30 milliLiter(s) Oral every 4 hours PRN Dyspepsia  ibuprofen  Tablet. 400 milliGRAM(s) Oral four times a day PRN Temp greater or equal to 38C (100.4F), Mild Pain (1 - 3)  loperamide 2 milliGRAM(s) Oral four times a day PRN Diarrhea  LORazepam   Injectable 0.5 milliGRAM(s) IV Push every 8 hours PRN Anxiety  melatonin 3 milliGRAM(s) Oral at bedtime PRN Insomnia  ondansetron Injectable 4 milliGRAM(s) IV Push every 8 hours PRN Nausea and/or Vomiting  traMADol 50 milliGRAM(s) Oral three times a day PRN Moderate Pain (4 - 6)      Vital Signs Last 24 Hrs  T(C): 36.6 (26 Oct 2022 20:15), Max: 36.6 (26 Oct 2022 20:15)  T(F): 97.9 (26 Oct 2022 20:15), Max: 97.9 (26 Oct 2022 20:15)  HR: 90 (26 Oct 2022 20:15) (72 - 90)  BP: 112/76 (26 Oct 2022 20:15) (100/62 - 115/79)  BP(mean): --  RR: 17 (26 Oct 2022 20:15) (17 - 18)  SpO2: 95% (26 Oct 2022 20:15) (94% - 98%)    Parameters below as of 26 Oct 2022 20:15  Patient On (Oxygen Delivery Method): nasal cannula  O2 Flow (L/min): 2      I&O's Summary    25 Oct 2022 07:01  -  26 Oct 2022 07:00  --------------------------------------------------------  IN: 30 mL / OUT: 950 mL / NET: -920 mL    26 Oct 2022 07:01  -  26 Oct 2022 20:30  --------------------------------------------------------  IN: 440 mL / OUT: 0 mL / NET: 440 mL        PHYSICAL EXAM:  HEENT: NC/AT; PERRLA  Neck: Soft; no tenderness  Lungs: CTA bilaterally; no wheezing.   Heart:  Abdomen:  Genital/ Rectal:  Extremities:  Neurologic:  Vascular:      LABORATORY:    CBC Full  -  ( 26 Oct 2022 07:38 )  WBC Count : 4.76 K/uL  RBC Count : 4.46 M/uL  Hemoglobin : 12.6 g/dL  Hematocrit : 40.0 %  Platelet Count - Automated : 201 K/uL  Mean Cell Volume : 89.7 fl  Mean Cell Hemoglobin : 28.3 pg  Mean Cell Hemoglobin Concentration : 31.5 gm/dL  Auto Neutrophil # : x  Auto Lymphocyte # : x  Auto Monocyte # : x  Auto Eosinophil # : x  Auto Basophil # : x  Auto Neutrophil % : x  Auto Lymphocyte % : x  Auto Monocyte % : x  Auto Eosinophil % : x  Auto Basophil % : x      ESR:                   10-22 @ 06:25  14    C-Reactive Protein:     10-22 @ 06:25  8    Procalcitonin:           10-22 @ 06:25   --  ESR:                   10-21 @ 04:26  --    C-Reactive Protein:     10-21 @ 04:26  --    Procalcitonin:           10-21 @ 04:26   0.03  ESR:                   10-20 @ 20:30  --    C-Reactive Protein:     10-20 @ 20:30  --    Procalcitonin:           10-20 @ 20:30   0.04      10-26    148<H>  |  110<H>  |  4<L>  ----------------------------<  112<H>  3.3<L>   |  31  |  0.47<L>    Ca    8.9      26 Oct 2022 07:38  Phos  3.2     10-26  Mg     2.1     10-26    TPro  6.2  /  Alb  3.0<L>  /  TBili  0.6  /  DBili  x   /  AST  14<L>  /  ALT  16  /  AlkPhos  63  10-26      Rapid Respiratory Viral Panel Result        10-20 @ 10:47  Rapid RVP NotDetec  Coronovirus --  Adenovirus --  Bordetella Pertussis --  Chlamydia Pneumonia --  Entero/Rhinovirus--  HKU1 Coronovirus --  HMPV Coronovirus --  Influenza A --  Influenza AH1 --  Influenza AH1 2009 --  Influenza AH3 --  Influenza B --  Mycoplasma Pneumoniae --  NL63 Coronovirus --  OC43 Coronovirus --  Parainfluenza 1 --  Parainfluenza 2 --  Parainfluenza 3 --  Parainfluenza 4 --  Resp Syncytial Virus --      Assessment and Plan:    1. Dyspnea  2. COPD.  3. Dilated esophagus.   4. Left knee swelling.    . Repeated COVID 19 PCR today is positive. She had two negative COVID 19 PCRs last week. Not clear if this is a new infection. She has no new symptoms and is still on O2 NC.   . She denied cough or fevers.   . Monitor for now. If she becomes worse, will add treatments for COVID 19.  . Waiting for PEG with RUSH Baez MD   [phone].    No fevers  On O2 NC    MEDICATIONS  (STANDING):  albuterol/ipratropium for Nebulization 3 milliLiter(s) Nebulizer every 6 hours  baclofen 5 milliGRAM(s) Oral every 12 hours  buDESOnide    Inhalation Suspension 0.5 milliGRAM(s) Inhalation two times a day  dextrose 5%. 1000 milliLiter(s) (40 mL/Hr) IV Continuous <Continuous>  donepezil 10 milliGRAM(s) Oral at bedtime  DULoxetine 60 milliGRAM(s) Oral daily  enoxaparin Injectable 40 milliGRAM(s) SubCutaneous every 24 hours  guaiFENesin  milliGRAM(s) Oral every 12 hours  levothyroxine Injectable 75 MICROGram(s) IV Push at bedtime  memantine 10 milliGRAM(s) Oral two times a day  methylPREDNISolone sodium succinate Injectable 20 milliGRAM(s) IV Push daily  metoprolol tartrate Injectable 2.5 milliGRAM(s) IV Push every 8 hours  pantoprazole  Injectable 40 milliGRAM(s) IV Push every 12 hours  simvastatin 10 milliGRAM(s) Oral at bedtime  traZODone 100 milliGRAM(s) Oral at bedtime    MEDICATIONS  (PRN):  aluminum hydroxide/magnesium hydroxide/simethicone Suspension 30 milliLiter(s) Oral every 4 hours PRN Dyspepsia  ibuprofen  Tablet. 400 milliGRAM(s) Oral four times a day PRN Temp greater or equal to 38C (100.4F), Mild Pain (1 - 3)  loperamide 2 milliGRAM(s) Oral four times a day PRN Diarrhea  LORazepam   Injectable 0.5 milliGRAM(s) IV Push every 8 hours PRN Anxiety  melatonin 3 milliGRAM(s) Oral at bedtime PRN Insomnia  ondansetron Injectable 4 milliGRAM(s) IV Push every 8 hours PRN Nausea and/or Vomiting  traMADol 50 milliGRAM(s) Oral three times a day PRN Moderate Pain (4 - 6)      Vital Signs Last 24 Hrs  T(C): 36.6 (26 Oct 2022 20:15), Max: 36.6 (26 Oct 2022 20:15)  T(F): 97.9 (26 Oct 2022 20:15), Max: 97.9 (26 Oct 2022 20:15)  HR: 90 (26 Oct 2022 20:15) (72 - 90)  BP: 112/76 (26 Oct 2022 20:15) (100/62 - 115/79)  BP(mean): --  RR: 17 (26 Oct 2022 20:15) (17 - 18)  SpO2: 95% (26 Oct 2022 20:15) (94% - 98%)    Parameters below as of 26 Oct 2022 20:15  Patient On (Oxygen Delivery Method): nasal cannula  O2 Flow (L/min): 2      I&O's Summary    25 Oct 2022 07:01  -  26 Oct 2022 07:00  --------------------------------------------------------  IN: 30 mL / OUT: 950 mL / NET: -920 mL    26 Oct 2022 07:01  -  26 Oct 2022 20:30  --------------------------------------------------------  IN: 440 mL / OUT: 0 mL / NET: 440 mL        PHYSICAL EXAM:  HEENT: NC/AT; PERRLA  Neck: Soft; no tenderness  Lungs: Coarse BS bilaterally; no wheezing.   Heart: RRR, no murmurs.   Abdomen: Soft, no tenderness.   Genital/ Rectal: No bearden catheter.   Extremities: No ulcers.  Neurologic: Confused.       LABORATORY:    CBC Full  -  ( 26 Oct 2022 07:38 )  WBC Count : 4.76 K/uL  RBC Count : 4.46 M/uL  Hemoglobin : 12.6 g/dL  Hematocrit : 40.0 %  Platelet Count - Automated : 201 K/uL  Mean Cell Volume : 89.7 fl  Mean Cell Hemoglobin : 28.3 pg  Mean Cell Hemoglobin Concentration : 31.5 gm/dL  Auto Neutrophil # : x  Auto Lymphocyte # : x  Auto Monocyte # : x  Auto Eosinophil # : x  Auto Basophil # : x  Auto Neutrophil % : x  Auto Lymphocyte % : x  Auto Monocyte % : x  Auto Eosinophil % : x  Auto Basophil % : x      ESR:                   10-22 @ 06:25  14    C-Reactive Protein:     10-22 @ 06:25  8    Procalcitonin:           10-22 @ 06:25   --  ESR:                   10-21 @ 04:26  --    C-Reactive Protein:     10-21 @ 04:26  --    Procalcitonin:           10-21 @ 04:26   0.03  ESR:                   10-20 @ 20:30  --    C-Reactive Protein:     10-20 @ 20:30  --    Procalcitonin:           10-20 @ 20:30   0.04      10-26    148<H>  |  110<H>  |  4<L>  ----------------------------<  112<H>  3.3<L>   |  31  |  0.47<L>    Ca    8.9      26 Oct 2022 07:38  Phos  3.2     10-26  Mg     2.1     10-26    TPro  6.2  /  Alb  3.0<L>  /  TBili  0.6  /  DBili  x   /  AST  14<L>  /  ALT  16  /  AlkPhos  63  10-26      Rapid Respiratory Viral Panel Result        10-20 @ 10:47  Rapid RVP NotDetec  Coronovirus --  Adenovirus --  Bordetella Pertussis --  Chlamydia Pneumonia --  Entero/Rhinovirus--  HKU1 Coronovirus --  HMPV Coronovirus --  Influenza A --  Influenza AH1 --  Influenza AH1 2009 --  Influenza AH3 --  Influenza B --  Mycoplasma Pneumoniae --  NL63 Coronovirus --  OC43 Coronovirus --  Parainfluenza 1 --  Parainfluenza 2 --  Parainfluenza 3 --  Parainfluenza 4 --  Resp Syncytial Virus --      Assessment and Plan:    1. Dyspnea  2. COPD.  3. Dilated esophagus.   4. Left knee swelling.    . Repeated COVID 19 PCR today is positive. She had two negative COVID 19 PCRs last week. Not clear if this is a new infection. She has no new symptoms and is still on O2 NC.   . She denied cough or fevers.   . Monitor for now. If she becomes worse, will add treatments for COVID 19.  . Waiting for PEG with GI  . Supportive care.       Je Baez MD   [phone].

## 2022-10-26 NOTE — PROGRESS NOTE ADULT - SUBJECTIVE AND OBJECTIVE BOX
Patient is a 82y Female whom presented to the hospital with hypernatremia     PAST MEDICAL & SURGICAL HISTORY:  History of COPD      Insomnia      Mood disorder      Dementia      Spasm of muscle      Constipation      Hypothyroidism      GERD (gastroesophageal reflux disease)      HTN (hypertension)      CHF, chronic          MEDICATIONS  (STANDING):  albuterol/ipratropium for Nebulization 3 milliLiter(s) Nebulizer every 6 hours  baclofen 5 milliGRAM(s) Oral every 12 hours  buDESOnide    Inhalation Suspension 0.5 milliGRAM(s) Inhalation two times a day  dextrose 5% + sodium chloride 0.45%. 1000 milliLiter(s) (30 mL/Hr) IV Continuous <Continuous>  donepezil 10 milliGRAM(s) Oral at bedtime  DULoxetine 60 milliGRAM(s) Oral daily  enoxaparin Injectable 40 milliGRAM(s) SubCutaneous every 24 hours  guaiFENesin  milliGRAM(s) Oral every 12 hours  levothyroxine Injectable 75 MICROGram(s) IV Push at bedtime  memantine 10 milliGRAM(s) Oral two times a day  methylPREDNISolone sodium succinate Injectable 40 milliGRAM(s) IV Push daily  metoprolol tartrate Injectable 2.5 milliGRAM(s) IV Push every 12 hours  pantoprazole  Injectable 40 milliGRAM(s) IV Push every 12 hours  potassium chloride  10 mEq/100 mL IVPB 10 milliEquivalent(s) IV Intermittent every 1 hour  simvastatin 10 milliGRAM(s) Oral at bedtime  traZODone 100 milliGRAM(s) Oral at bedtime      Allergies    sulfa drugs (Unknown)  Tylenol (Unknown)    Intolerances        SOCIAL HISTORY:  Denies ETOh,Smoking,     FAMILY HISTORY:      REVIEW OF SYSTEMS:    CONSTITUTIONAL: No weakness, fevers or chills  RESPIRATORY: No cough, wheezing, hemoptysis; No shortness of breath  CARDIOVASCULAR: No chest pain or palpitations  GASTROINTESTINAL: No abdominal or epigastric pain. No nausea, vomiting,     No diarrhea or constipation. No melena   SKIN: dry                                                                                      12.6   4.76  )-----------( 201      ( 26 Oct 2022 07:38 )             40.0       CBC Full  -  ( 26 Oct 2022 07:38 )  WBC Count : 4.76 K/uL  RBC Count : 4.46 M/uL  Hemoglobin : 12.6 g/dL  Hematocrit : 40.0 %  Platelet Count - Automated : 201 K/uL  Mean Cell Volume : 89.7 fl  Mean Cell Hemoglobin : 28.3 pg  Mean Cell Hemoglobin Concentration : 31.5 gm/dL  Auto Neutrophil # : x  Auto Lymphocyte # : x  Auto Monocyte # : x  Auto Eosinophil # : x  Auto Basophil # : x  Auto Neutrophil % : x  Auto Lymphocyte % : x  Auto Monocyte % : x  Auto Eosinophil % : x  Auto Basophil % : x      10-26    148<H>  |  110<H>  |  4<L>  ----------------------------<  112<H>  3.3<L>   |  31  |  0.47<L>    Ca    8.9      26 Oct 2022 07:38  Phos  3.2     10-26  Mg     2.1     10-26    TPro  6.2  /  Alb  3.0<L>  /  TBili  0.6  /  DBili  x   /  AST  14<L>  /  ALT  16  /  AlkPhos  63  10-26      CAPILLARY BLOOD GLUCOSE          Vital Signs Last 24 Hrs  T(C): 36.6 (26 Oct 2022 20:15), Max: 36.6 (26 Oct 2022 20:15)  T(F): 97.9 (26 Oct 2022 20:15), Max: 97.9 (26 Oct 2022 20:15)  HR: 90 (26 Oct 2022 20:15) (72 - 90)  BP: 112/76 (26 Oct 2022 20:15) (100/62 - 115/79)  BP(mean): --  RR: 17 (26 Oct 2022 20:15) (17 - 18)  SpO2: 95% (26 Oct 2022 20:15) (94% - 98%)    Parameters below as of 26 Oct 2022 20:15  Patient On (Oxygen Delivery Method): nasal cannula  O2 Flow (L/min): 2                PHYSICAL EXAM:    Constitutional: NAD  HEENT: conjunctive   clear   Neck:  No JVD  Respiratory: decrease bs b/l   Cardiovascular: S1 and S2  Gastrointestinal: BS+, soft, NT/ND  Extremities: No peripheral edema  : No Ren  Skin: dry

## 2022-10-26 NOTE — PROGRESS NOTE ADULT - SUBJECTIVE AND OBJECTIVE BOX
ROXIE LAZAR    [facility] [address]    Allergies    sulfa drugs (Unknown)  Tylenol (Unknown)    Intolerances        PAST MEDICAL & SURGICAL HISTORY:  History of COPD      Insomnia      Mood disorder      Dementia      Spasm of muscle      Constipation      Hypothyroidism      GERD (gastroesophageal reflux disease)      HTN (hypertension)      CHF, chronic          FAMILY HISTORY:      Home Medications:  acetaminophen 325 mg oral tablet: 2 tab(s) orally every 6 hours, As Needed for pain  (20 Oct 2022 16:34)  baclofen 10 mg oral tablet: 0.5 tab(s) orally 2 times a day (20 Oct 2022 16:34)  Cymbalta 60 mg oral delayed release capsule: 1 cap(s) orally once a day (20 Oct 2022 16:34)  donepezil 10 mg oral tablet: 1 tab(s) orally once a day (at bedtime) (20 Oct 2022 16:34)  Eucerin topical cream: Apply topically to affected area once a day (at bedtime) (20 Oct 2022 16:34)  furosemide 40 mg oral tablet: 0.5 tab(s) orally once a day (20 Oct 2022 16:34)  ipratropium-albuterol 0.5 mg-2.5 mg/3 mL inhalation solution: 3 milliliter(s) inhaled 4 times a day (20 Oct 2022 16:34)  levothyroxine 150 mcg (0.15 mg) oral capsule: 1 cap(s) orally once a day (20 Oct 2022 16:34)  Linzess 290 mcg oral capsule: 1 cap(s) orally once a day (20 Oct 2022 16:34)  melatonin 3 mg oral tablet: 1 tab(s) orally once a day (at bedtime) (20 Oct 2022 16:34)  Metoprolol Tartrate 25 mg oral tablet: 1.5 tab(s) orally 2 times a day (20 Oct 2022 16:34)  Namenda 10 mg oral tablet: 2 tab(s) orally once a day (at bedtime) (20 Oct 2022 16:34)  omeprazole 40 mg oral delayed release capsule: 1 cap(s) orally once a day (20 Oct 2022 16:34)  ondansetron 4 mg oral tablet: 1 tab(s) orally once a day (20 Oct 2022 16:34)  ProAir HFA 90 mcg/inh inhalation aerosol: 2 puff(s) inhaled every 6 hours, As Needed (20 Oct 2022 16:34)  Senna 8.6 mg oral tablet: 2 tab(s) orally once a day (at bedtime) (20 Oct 2022 16:34)  traZODone 100 mg oral tablet: 1 tab(s) orally once a day (at bedtime) (20 Oct 2022 16:34)  zinc oxide topical ointment: Apply topically to affected area once a day and as needed  (20 Oct 2022 16:34)  Zocor 10 mg oral tablet: 1 tab(s) orally once a day (at bedtime) (20 Oct 2022 16:34)  ZyrTEC 10 mg oral tablet: 1 tab(s) orally once a day (20 Oct 2022 16:34)      MEDICATIONS  (STANDING):  albuterol/ipratropium for Nebulization 3 milliLiter(s) Nebulizer every 6 hours  baclofen 5 milliGRAM(s) Oral every 12 hours  buDESOnide    Inhalation Suspension 0.5 milliGRAM(s) Inhalation two times a day  dextrose 5% + sodium chloride 0.45%. 1000 milliLiter(s) (30 mL/Hr) IV Continuous <Continuous>  donepezil 10 milliGRAM(s) Oral at bedtime  DULoxetine 60 milliGRAM(s) Oral daily  enoxaparin Injectable 40 milliGRAM(s) SubCutaneous every 24 hours  guaiFENesin  milliGRAM(s) Oral every 12 hours  levothyroxine Injectable 75 MICROGram(s) IV Push at bedtime  memantine 10 milliGRAM(s) Oral two times a day  methylPREDNISolone sodium succinate Injectable 20 milliGRAM(s) IV Push daily  metoprolol tartrate Injectable 2.5 milliGRAM(s) IV Push every 8 hours  pantoprazole  Injectable 40 milliGRAM(s) IV Push every 12 hours  simvastatin 10 milliGRAM(s) Oral at bedtime  traZODone 100 milliGRAM(s) Oral at bedtime    MEDICATIONS  (PRN):  aluminum hydroxide/magnesium hydroxide/simethicone Suspension 30 milliLiter(s) Oral every 4 hours PRN Dyspepsia  ibuprofen  Tablet. 400 milliGRAM(s) Oral four times a day PRN Temp greater or equal to 38C (100.4F), Mild Pain (1 - 3)  loperamide 2 milliGRAM(s) Oral four times a day PRN Diarrhea  LORazepam   Injectable 0.5 milliGRAM(s) IV Push every 8 hours PRN Anxiety  melatonin 3 milliGRAM(s) Oral at bedtime PRN Insomnia  ondansetron Injectable 4 milliGRAM(s) IV Push every 8 hours PRN Nausea and/or Vomiting  traMADol 50 milliGRAM(s) Oral three times a day PRN Moderate Pain (4 - 6)      Diet, NPO after Midnight:      NPO Start Date: 25-Oct-2022,   NPO Start Time: 23:59 (10-25-22 @ 12:11) [Active]  Diet, NPO after Midnight:      NPO Start Date: 25-Oct-2022,   NPO Start Time: 23:59 (10-25-22 @ 11:28) [Active]  Diet, Clear Liquid:   Supplement Feeding Modality:  Oral  Ensure Clear Cans or Servings Per Day:  1       Frequency:  Two Times a day (10-25-22 @ 08:43) [Active]          Vital Signs Last 24 Hrs  T(C): 36.4 (26 Oct 2022 05:05), Max: 37.9 (25 Oct 2022 14:02)  T(F): 97.6 (26 Oct 2022 05:05), Max: 100.2 (25 Oct 2022 14:02)  HR: 80 (26 Oct 2022 05:05) (77 - 150)  BP: 115/79 (26 Oct 2022 05:05) (115/79 - 133/84)  BP(mean): --  RR: 18 (26 Oct 2022 05:05) (18 - 18)  SpO2: 98% (26 Oct 2022 05:05) (96% - 99%)    Parameters below as of 26 Oct 2022 05:05  Patient On (Oxygen Delivery Method): nasal cannula  O2 Flow (L/min): 2        10-25-22 @ 07:01  -  10-26-22 @ 07:00  --------------------------------------------------------  IN: 0 mL / OUT: 950 mL / NET: -950 mL              LABS:                        12.7   6.81  )-----------( 190      ( 25 Oct 2022 08:10 )             40.0     10-25    x   |  x   |  x   ----------------------------<  x   3.4<L>   |  x   |  x     Ca    9.1      25 Oct 2022 08:10  Phos  1.9     10-25  Mg     1.4     10-25                WBC:  WBC Count: 6.81 K/uL (10-25 @ 08:10)  WBC Count: 7.50 K/uL (10-24 @ 08:20)  WBC Count: 4.98 K/uL (10-23 @ 08:15)      MICROBIOLOGY:  RECENT CULTURES:  10-21 Clean Catch Clean Catch (Midstream) XXXX XXXX   <10,000 CFU/mL Normal Urogenital Consuelo    10-20 .Blood Blood-Peripheral XXXX XXXX   No Growth Final    10-20 .Blood Blood-Peripheral XXXX XXXX   No Growth Final                    Sodium:  Sodium, Serum: 142 mmol/L (10-25 @ 08:10)  Sodium, Serum: 140 mmol/L (10-24 @ 08:20)  Sodium, Serum: 146 mmol/L (10-23 @ 08:15)      0.41 mg/dL 10-25 @ 08:10  0.39 mg/dL 10-24 @ 08:20  0.43 mg/dL 10-23 @ 08:15      Hemoglobin:  Hemoglobin: 12.7 g/dL (10-25 @ 08:10)  Hemoglobin: 12.2 g/dL (10-24 @ 08:20)  Hemoglobin: 11.5 g/dL (10-23 @ 08:15)      Platelets: Platelet Count - Automated: 190 K/uL (10-25 @ 08:10)  Platelet Count - Automated: 196 K/uL (10-24 @ 08:20)  Platelet Count - Automated: 200 K/uL (10-23 @ 08:15)              RADIOLOGY & ADDITIONAL STUDIES:      MICROBIOLOGY:  RECENT CULTURES:  10-21 Clean Catch Clean Catch (Midstream) XXXX XXXX   <10,000 CFU/mL Normal Urogenital Consuelo    10-20 .Blood Blood-Peripheral XXXX XXXX   No Growth Final    10-20 .Blood Blood-Peripheral XXXX XXXX   No Growth Final

## 2022-10-26 NOTE — PROGRESS NOTE ADULT - SUBJECTIVE AND OBJECTIVE BOX
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  Patient denies fevers chills headache chest pain abdominal pain vomiting Admit for antibacterial managmnet ,intravenous steroids,nebulised bronchodilators and pulmonology evaluation,O2 supply,serial labs and chest xrays,obtain ECHO to evaluate LVEF and rule out chf component Admitted  to telemetry unit for monitoring , send 3 sets of cardiac enzymes to rule out acute coronary event, obtain ECHO to evaluate LVEF, cardiology consult  ,continue current management, O2 supply, anticoagulation plan as per cardiology consult Palliative care consult requested ,to discuss advance directives and complete MOLST  (20 Oct 2022 13:54)      REVIEW OF SYSTEMS:    CONSTITUTIONAL: No fever, weight loss, or fatigue  EYES: No eye pain, visual disturbances, or discharge  ENMT:  No difficulty hearing, tinnitus, vertigo; No sinus or throat pain  NECK: No pain or stiffness  BREASTS: No pain, masses, or nipple discharge  RESPIRATORY: No cough, wheezing, chills or hemoptysis; No shortness of breath  CARDIOVASCULAR: No chest pain, palpitations, dizziness, or leg swelling  GASTROINTESTINAL: No abdominal or epigastric pain. No nausea, vomiting, or hematemesis; No diarrhea or constipation. No melena or hematochezia.  GENITOURINARY: No dysuria, frequency, hematuria, or incontinence  NEUROLOGICAL: No headaches, memory loss, loss of strength, numbness, or tremors  SKIN: No itching, burning, rashes, or lesions   LYMPH NODES: No enlarged glands  ENDOCRINE: No heat or cold intolerance; No hair loss  MUSCULOSKELETAL: No joint pain or swelling; No muscle, back, or extremity pain  PSYCHIATRIC: No depression, anxiety, mood swings, or difficulty sleeping  HEME/LYMPH: No easy bruising, or bleeding gums  ALLERGY AND IMMUNOLOGIC: No hives or eczema    MEDICATIONS  (STANDING):  albuterol/ipratropium for Nebulization 3 milliLiter(s) Nebulizer every 6 hours  baclofen 5 milliGRAM(s) Oral every 12 hours  buDESOnide    Inhalation Suspension 0.5 milliGRAM(s) Inhalation two times a day  dextrose 5% + sodium chloride 0.45%. 1000 milliLiter(s) (30 mL/Hr) IV Continuous <Continuous>  donepezil 10 milliGRAM(s) Oral at bedtime  DULoxetine 60 milliGRAM(s) Oral daily  enoxaparin Injectable 40 milliGRAM(s) SubCutaneous every 24 hours  guaiFENesin  milliGRAM(s) Oral every 12 hours  levothyroxine Injectable 75 MICROGram(s) IV Push at bedtime  memantine 10 milliGRAM(s) Oral two times a day  methylPREDNISolone sodium succinate Injectable 20 milliGRAM(s) IV Push daily  metoprolol tartrate Injectable 2.5 milliGRAM(s) IV Push every 8 hours  pantoprazole  Injectable 40 milliGRAM(s) IV Push every 12 hours  potassium chloride  10 mEq/50 mL IVPB 10 milliEquivalent(s) IV Intermittent once  simvastatin 10 milliGRAM(s) Oral at bedtime  traZODone 100 milliGRAM(s) Oral at bedtime    MEDICATIONS  (PRN):  aluminum hydroxide/magnesium hydroxide/simethicone Suspension 30 milliLiter(s) Oral every 4 hours PRN Dyspepsia  ibuprofen  Tablet. 400 milliGRAM(s) Oral four times a day PRN Temp greater or equal to 38C (100.4F), Mild Pain (1 - 3)  loperamide 2 milliGRAM(s) Oral four times a day PRN Diarrhea  LORazepam   Injectable 0.5 milliGRAM(s) IV Push every 8 hours PRN Anxiety  melatonin 3 milliGRAM(s) Oral at bedtime PRN Insomnia  ondansetron Injectable 4 milliGRAM(s) IV Push every 8 hours PRN Nausea and/or Vomiting  traMADol 50 milliGRAM(s) Oral three times a day PRN Moderate Pain (4 - 6)      ALLERGIES: sulfa drugs (Unknown)  Tylenol (Unknown)      FAMILY HISTORY:      PHYSICAL EXAMINATION:  -----------------------------  T(C): 36.4 (10-26-22 @ 05:05), Max: 37.9 (10-25-22 @ 14:02)  HR: 80 (10-26-22 @ 05:05) (80 - 150)  BP: 115/79 (10-26-22 @ 05:05) (115/79 - 133/84)  RR: 18 (10-26-22 @ 05:05) (18 - 18)  SpO2: 98% (10-26-22 @ 05:05) (96% - 98%)  Wt(kg): --    10-25 @ 07:01  -  10-26 @ 07:00  --------------------------------------------------------  IN:  Total IN: 0 mL    OUT:    Voided (mL): 950 mL  Total OUT: 950 mL    Total NET: -950 mL            VITALS  T(C): 36.4 (10-26-22 @ 05:05), Max: 37.9 (10-25-22 @ 14:02)  HR: 80 (10-26-22 @ 05:05) (80 - 150)  BP: 115/79 (10-26-22 @ 05:05) (115/79 - 133/84)  RR: 18 (10-26-22 @ 05:05) (18 - 18)  SpO2: 98% (10-26-22 @ 05:05) (96% - 98%)    Constitutional: well developed, normal appearance, well groomed, well nourished, no deformities and no acute distress.   Eyes: the conjunctiva exhibited no abnormalities and the eyelids demonstrated no xanthelasmas.   HEENT: normal oral mucosa, no oral pallor and no oral cyanosis.   Neck: normal jugular venous A waves present, normal jugular venous V waves present and no jugular venous oliveira A waves.   Pulmonary: no respiratory distress, normal respiratory rhythm and effort, no accessory muscle use and lungs were clear to auscultation bilaterally.   Cardiovascular: heart rate and rhythm were normal, normal S1 and S2 and no murmur, gallop, rub, heave or thrill are present.   Abdomen: soft, non-tender, no hepato-splenomegaly and no abdominal mass palpated.   Musculoskeletal: the gait could not be assessed..   Extremities: no clubbing of the fingernails, no localized cyanosis, no petechial hemorrhages and no ischemic changes.   Skin: normal skin color and pigmentation, no rash, no venous stasis, no skin lesions, no skin ulcer and no xanthoma was observed.   Psychiatric: oriented to person, place, and time, the affect was normal, the mood was normal and not feeling anxious.     LABS:   --------  10-25    x   |  x   |  x   ----------------------------<  x   3.4<L>   |  x   |  x     Ca    9.1      25 Oct 2022 08:10  Phos  1.9     10-25  Mg     1.4     10-25                           12.6   4.76  )-----------( 201      ( 26 Oct 2022 07:38 )             40.0                 RADIOLOGY:  -----------------    ECG:     ECHO:

## 2022-10-26 NOTE — PROGRESS NOTE ADULT - SUBJECTIVE AND OBJECTIVE BOX
[facility]  Alberto Lepe PA-C  [address] [place] [address]  [phone]      INTERVAL HPI/OVERNIGHT EVENTS:  Pt s/e  Had to cancel peg due to new covid positive    MEDICATIONS  (STANDING):  albuterol/ipratropium for Nebulization 3 milliLiter(s) Nebulizer every 6 hours  baclofen 5 milliGRAM(s) Oral every 12 hours  buDESOnide    Inhalation Suspension 0.5 milliGRAM(s) Inhalation two times a day  dextrose 5%. 1000 milliLiter(s) (40 mL/Hr) IV Continuous <Continuous>  donepezil 10 milliGRAM(s) Oral at bedtime  DULoxetine 60 milliGRAM(s) Oral daily  enoxaparin Injectable 40 milliGRAM(s) SubCutaneous every 24 hours  guaiFENesin  milliGRAM(s) Oral every 12 hours  levothyroxine Injectable 75 MICROGram(s) IV Push at bedtime  memantine 10 milliGRAM(s) Oral two times a day  methylPREDNISolone sodium succinate Injectable 20 milliGRAM(s) IV Push daily  metoprolol tartrate Injectable 2.5 milliGRAM(s) IV Push every 8 hours  pantoprazole  Injectable 40 milliGRAM(s) IV Push every 12 hours  potassium chloride   Powder 40 milliEquivalent(s) Oral once  simvastatin 10 milliGRAM(s) Oral at bedtime  traZODone 100 milliGRAM(s) Oral at bedtime    MEDICATIONS  (PRN):  aluminum hydroxide/magnesium hydroxide/simethicone Suspension 30 milliLiter(s) Oral every 4 hours PRN Dyspepsia  ibuprofen  Tablet. 400 milliGRAM(s) Oral four times a day PRN Temp greater or equal to 38C (100.4F), Mild Pain (1 - 3)  loperamide 2 milliGRAM(s) Oral four times a day PRN Diarrhea  LORazepam   Injectable 0.5 milliGRAM(s) IV Push every 8 hours PRN Anxiety  melatonin 3 milliGRAM(s) Oral at bedtime PRN Insomnia  ondansetron Injectable 4 milliGRAM(s) IV Push every 8 hours PRN Nausea and/or Vomiting  traMADol 50 milliGRAM(s) Oral three times a day PRN Moderate Pain (4 - 6)      Allergies    sulfa drugs (Unknown)  Tylenol (Unknown)      PHYSICAL EXAM:   Vital Signs:  Vital Signs Last 24 Hrs  T(C): 36.4 (26 Oct 2022 12:22), Max: 37.9 (25 Oct 2022 14:02)  T(F): 97.5 (26 Oct 2022 12:22), Max: 100.2 (25 Oct 2022 14:02)  HR: 72 (26 Oct 2022 12:22) (72 - 130)  BP: 109/63 (26 Oct 2022 12:22) (109/63 - 133/84)  BP(mean): --  RR: 18 (26 Oct 2022 12:22) (18 - 18)  SpO2: 94% (26 Oct 2022 12:22) (94% - 98%)    Parameters below as of 26 Oct 2022 12:22  Patient On (Oxygen Delivery Method): nasal cannula  O2 Flow (L/min): 2    GENERAL:  Appears stated age   ABDOMEN:  Soft, non-tender, non-distended  NEURO:  Alert    LABS:                        12.6   4.76  )-----------( 201      ( 26 Oct 2022 07:38 )             40.0     10-26    148<H>  |  110<H>  |  4<L>  ----------------------------<  112<H>  3.3<L>   |  31  |  0.47<L>    Ca    8.9      26 Oct 2022 07:38  Phos  3.2     10-26  Mg     2.1     10-26    TPro  6.2  /  Alb  3.0<L>  /  TBili  0.6  /  DBili  x   /  AST  14<L>  /  ALT  16  /  AlkPhos  63  10-26

## 2022-10-26 NOTE — PROGRESS NOTE ADULT - SUBJECTIVE AND OBJECTIVE BOX
Date/Time Patient Seen:  		  Referring MD:   Data Reviewed	       Patient is a 82y old  Female who presents with a chief complaint of SOB AND COUGH (25 Oct 2022 20:14)      Subjective/HPI     PAST MEDICAL & SURGICAL HISTORY:  History of COPD    Insomnia    Mood disorder    Dementia    Spasm of muscle    Constipation    Hypothyroidism    GERD (gastroesophageal reflux disease)    HTN (hypertension)    CHF, chronic          Medication list         MEDICATIONS  (STANDING):  albuterol/ipratropium for Nebulization 3 milliLiter(s) Nebulizer every 6 hours  baclofen 5 milliGRAM(s) Oral every 12 hours  buDESOnide    Inhalation Suspension 0.5 milliGRAM(s) Inhalation two times a day  dextrose 5% + sodium chloride 0.45%. 1000 milliLiter(s) (30 mL/Hr) IV Continuous <Continuous>  donepezil 10 milliGRAM(s) Oral at bedtime  DULoxetine 60 milliGRAM(s) Oral daily  enoxaparin Injectable 40 milliGRAM(s) SubCutaneous every 24 hours  guaiFENesin  milliGRAM(s) Oral every 12 hours  levothyroxine Injectable 75 MICROGram(s) IV Push at bedtime  memantine 10 milliGRAM(s) Oral two times a day  methylPREDNISolone sodium succinate Injectable 20 milliGRAM(s) IV Push daily  metoprolol tartrate Injectable 2.5 milliGRAM(s) IV Push every 8 hours  pantoprazole  Injectable 40 milliGRAM(s) IV Push every 12 hours  simvastatin 10 milliGRAM(s) Oral at bedtime  traZODone 100 milliGRAM(s) Oral at bedtime    MEDICATIONS  (PRN):  aluminum hydroxide/magnesium hydroxide/simethicone Suspension 30 milliLiter(s) Oral every 4 hours PRN Dyspepsia  ibuprofen  Tablet. 400 milliGRAM(s) Oral four times a day PRN Temp greater or equal to 38C (100.4F), Mild Pain (1 - 3)  loperamide 2 milliGRAM(s) Oral four times a day PRN Diarrhea  LORazepam   Injectable 0.5 milliGRAM(s) IV Push every 8 hours PRN Anxiety  melatonin 3 milliGRAM(s) Oral at bedtime PRN Insomnia  ondansetron Injectable 4 milliGRAM(s) IV Push every 8 hours PRN Nausea and/or Vomiting  traMADol 50 milliGRAM(s) Oral three times a day PRN Moderate Pain (4 - 6)         Vitals log        ICU Vital Signs Last 24 Hrs  T(C): 36.4 (26 Oct 2022 05:05), Max: 37.9 (25 Oct 2022 14:02)  T(F): 97.6 (26 Oct 2022 05:05), Max: 100.2 (25 Oct 2022 14:02)  HR: 80 (26 Oct 2022 05:05) (77 - 150)  BP: 115/79 (26 Oct 2022 05:05) (115/79 - 133/84)  BP(mean): --  ABP: --  ABP(mean): --  RR: 18 (26 Oct 2022 05:05) (18 - 18)  SpO2: 98% (26 Oct 2022 05:05) (96% - 99%)    O2 Parameters below as of 26 Oct 2022 05:05  Patient On (Oxygen Delivery Method): nasal cannula  O2 Flow (L/min): 2               Input and Output:  I&O's Detail    24 Oct 2022 07:01  -  25 Oct 2022 07:00  --------------------------------------------------------  IN:    dextrose 5% + sodium chloride 0.45%: 300 mL  Total IN: 300 mL    OUT:  Total OUT: 0 mL    Total NET: 300 mL      25 Oct 2022 07:01  -  26 Oct 2022 06:06  --------------------------------------------------------  IN:  Total IN: 0 mL    OUT:    Voided (mL): 950 mL  Total OUT: 950 mL    Total NET: -950 mL          Lab Data                        12.7   6.81  )-----------( 190      ( 25 Oct 2022 08:10 )             40.0     10-25    x   |  x   |  x   ----------------------------<  x   3.4<L>   |  x   |  x     Ca    9.1      25 Oct 2022 08:10  Phos  1.9     10-25  Mg     1.4     10-25              Review of Systems	      Objective     Physical Examination    heart s1s2  lung dc BS  head nc      Pertinent Lab findings & Imaging      Gela:  NO   Adequate UO     I&O's Detail    24 Oct 2022 07:01  -  25 Oct 2022 07:00  --------------------------------------------------------  IN:    dextrose 5% + sodium chloride 0.45%: 300 mL  Total IN: 300 mL    OUT:  Total OUT: 0 mL    Total NET: 300 mL      25 Oct 2022 07:01  -  26 Oct 2022 06:06  --------------------------------------------------------  IN:  Total IN: 0 mL    OUT:    Voided (mL): 950 mL  Total OUT: 950 mL    Total NET: -950 mL               Discussed with:     Cultures:	        Radiology

## 2022-10-27 LAB
ANION GAP SERPL CALC-SCNC: 6 MMOL/L — SIGNIFICANT CHANGE UP (ref 5–17)
BUN SERPL-MCNC: 5 MG/DL — LOW (ref 7–23)
CALCIUM SERPL-MCNC: 9.1 MG/DL — SIGNIFICANT CHANGE UP (ref 8.5–10.1)
CHLORIDE SERPL-SCNC: 106 MMOL/L — SIGNIFICANT CHANGE UP (ref 96–108)
CO2 SERPL-SCNC: 32 MMOL/L — HIGH (ref 22–31)
CREAT SERPL-MCNC: 0.36 MG/DL — LOW (ref 0.5–1.3)
EGFR: 100 ML/MIN/1.73M2 — SIGNIFICANT CHANGE UP
GLUCOSE SERPL-MCNC: 136 MG/DL — HIGH (ref 70–99)
HCT VFR BLD CALC: 41.7 % — SIGNIFICANT CHANGE UP (ref 34.5–45)
HGB BLD-MCNC: 13.2 G/DL — SIGNIFICANT CHANGE UP (ref 11.5–15.5)
MCHC RBC-ENTMCNC: 28.5 PG — SIGNIFICANT CHANGE UP (ref 27–34)
MCHC RBC-ENTMCNC: 31.7 GM/DL — LOW (ref 32–36)
MCV RBC AUTO: 90.1 FL — SIGNIFICANT CHANGE UP (ref 80–100)
NRBC # BLD: 0 /100 WBCS — SIGNIFICANT CHANGE UP (ref 0–0)
PLATELET # BLD AUTO: 206 K/UL — SIGNIFICANT CHANGE UP (ref 150–400)
POTASSIUM SERPL-MCNC: 3.3 MMOL/L — LOW (ref 3.5–5.3)
POTASSIUM SERPL-SCNC: 3.3 MMOL/L — LOW (ref 3.5–5.3)
RBC # BLD: 4.63 M/UL — SIGNIFICANT CHANGE UP (ref 3.8–5.2)
RBC # FLD: 14.9 % — HIGH (ref 10.3–14.5)
SODIUM SERPL-SCNC: 144 MMOL/L — SIGNIFICANT CHANGE UP (ref 135–145)
WBC # BLD: 4.73 K/UL — SIGNIFICANT CHANGE UP (ref 3.8–10.5)
WBC # FLD AUTO: 4.73 K/UL — SIGNIFICANT CHANGE UP (ref 3.8–10.5)

## 2022-10-27 PROCEDURE — 99233 SBSQ HOSP IP/OBS HIGH 50: CPT

## 2022-10-27 RX ORDER — ALBUTEROL 90 UG/1
2 AEROSOL, METERED ORAL
Qty: 0 | Refills: 0 | DISCHARGE

## 2022-10-27 RX ORDER — ZINC OXIDE 200 MG/G
0 OINTMENT TOPICAL
Qty: 0 | Refills: 0 | DISCHARGE

## 2022-10-27 RX ORDER — METOPROLOL TARTRATE 50 MG
1.5 TABLET ORAL
Qty: 0 | Refills: 0 | DISCHARGE

## 2022-10-27 RX ORDER — ONDANSETRON 8 MG/1
1 TABLET, FILM COATED ORAL
Qty: 0 | Refills: 0 | DISCHARGE

## 2022-10-27 RX ORDER — SENNA PLUS 8.6 MG/1
1 TABLET ORAL
Qty: 0 | Refills: 0 | DISCHARGE

## 2022-10-27 RX ORDER — BACLOFEN 100 %
1 POWDER (GRAM) MISCELLANEOUS
Qty: 0 | Refills: 0 | DISCHARGE

## 2022-10-27 RX ORDER — ACETAMINOPHEN 500 MG
2 TABLET ORAL
Qty: 0 | Refills: 0 | DISCHARGE

## 2022-10-27 RX ORDER — MEMANTINE HYDROCHLORIDE 10 MG/1
1 TABLET ORAL
Qty: 0 | Refills: 0 | DISCHARGE

## 2022-10-27 RX ORDER — LANOLIN/MINERAL OIL
1 LOTION (ML) TOPICAL
Qty: 0 | Refills: 0 | DISCHARGE

## 2022-10-27 RX ORDER — CETIRIZINE HYDROCHLORIDE 10 MG/1
1 TABLET ORAL
Qty: 0 | Refills: 0 | DISCHARGE

## 2022-10-27 RX ORDER — SENNA PLUS 8.6 MG/1
2 TABLET ORAL
Qty: 0 | Refills: 0 | DISCHARGE

## 2022-10-27 RX ORDER — METOPROLOL TARTRATE 50 MG
1 TABLET ORAL
Qty: 0 | Refills: 0 | DISCHARGE

## 2022-10-27 RX ORDER — POTASSIUM CHLORIDE 20 MEQ
40 PACKET (EA) ORAL ONCE
Refills: 0 | Status: COMPLETED | OUTPATIENT
Start: 2022-10-27 | End: 2022-10-27

## 2022-10-27 RX ORDER — POTASSIUM CHLORIDE 20 MEQ
40 PACKET (EA) ORAL ONCE
Refills: 0 | Status: DISCONTINUED | OUTPATIENT
Start: 2022-10-27 | End: 2022-10-27

## 2022-10-27 RX ORDER — DULOXETINE HYDROCHLORIDE 30 MG/1
1 CAPSULE, DELAYED RELEASE ORAL
Qty: 0 | Refills: 0 | DISCHARGE

## 2022-10-27 RX ORDER — TRAZODONE HCL 50 MG
0 TABLET ORAL
Qty: 0 | Refills: 0 | DISCHARGE

## 2022-10-27 RX ORDER — LANOLIN ALCOHOL/MO/W.PET/CERES
1 CREAM (GRAM) TOPICAL
Qty: 0 | Refills: 0 | DISCHARGE

## 2022-10-27 RX ORDER — IBUPROFEN 200 MG
400 TABLET ORAL EVERY 6 HOURS
Refills: 0 | Status: DISCONTINUED | OUTPATIENT
Start: 2022-10-27 | End: 2022-11-01

## 2022-10-27 RX ORDER — IPRATROPIUM/ALBUTEROL SULFATE 18-103MCG
3 AEROSOL WITH ADAPTER (GRAM) INHALATION
Qty: 0 | Refills: 0 | DISCHARGE

## 2022-10-27 RX ORDER — LEVOTHYROXINE SODIUM 125 MCG
1 TABLET ORAL
Qty: 0 | Refills: 0 | DISCHARGE

## 2022-10-27 RX ORDER — FUROSEMIDE 40 MG
1 TABLET ORAL
Qty: 0 | Refills: 0 | DISCHARGE

## 2022-10-27 RX ORDER — ZINC OXIDE 200 MG/G
1 OINTMENT TOPICAL
Qty: 0 | Refills: 0 | DISCHARGE

## 2022-10-27 RX ADMIN — Medication 5 MILLIGRAM(S): at 17:03

## 2022-10-27 RX ADMIN — Medication 0.5 MILLIGRAM(S): at 21:48

## 2022-10-27 RX ADMIN — MEMANTINE HYDROCHLORIDE 10 MILLIGRAM(S): 10 TABLET ORAL at 05:25

## 2022-10-27 RX ADMIN — SIMVASTATIN 10 MILLIGRAM(S): 20 TABLET, FILM COATED ORAL at 21:47

## 2022-10-27 RX ADMIN — Medication 2.5 MILLIGRAM(S): at 21:50

## 2022-10-27 RX ADMIN — Medication 2.5 MILLIGRAM(S): at 11:18

## 2022-10-27 RX ADMIN — Medication 3 MILLIGRAM(S): at 22:50

## 2022-10-27 RX ADMIN — Medication 20 MILLIGRAM(S): at 05:26

## 2022-10-27 RX ADMIN — PANTOPRAZOLE SODIUM 40 MILLIGRAM(S): 20 TABLET, DELAYED RELEASE ORAL at 17:04

## 2022-10-27 RX ADMIN — Medication 100 MILLIGRAM(S): at 21:47

## 2022-10-27 RX ADMIN — Medication 3 MILLILITER(S): at 08:05

## 2022-10-27 RX ADMIN — Medication 0.5 MILLIGRAM(S): at 08:06

## 2022-10-27 RX ADMIN — Medication 600 MILLIGRAM(S): at 05:25

## 2022-10-27 RX ADMIN — Medication 600 MILLIGRAM(S): at 17:04

## 2022-10-27 RX ADMIN — Medication 3 MILLILITER(S): at 21:48

## 2022-10-27 RX ADMIN — PANTOPRAZOLE SODIUM 40 MILLIGRAM(S): 20 TABLET, DELAYED RELEASE ORAL at 05:26

## 2022-10-27 RX ADMIN — MEMANTINE HYDROCHLORIDE 10 MILLIGRAM(S): 10 TABLET ORAL at 17:04

## 2022-10-27 RX ADMIN — Medication 400 MILLIGRAM(S): at 00:00

## 2022-10-27 RX ADMIN — Medication 3 MILLILITER(S): at 13:46

## 2022-10-27 RX ADMIN — Medication 5 MILLIGRAM(S): at 05:25

## 2022-10-27 RX ADMIN — DONEPEZIL HYDROCHLORIDE 10 MILLIGRAM(S): 10 TABLET, FILM COATED ORAL at 21:47

## 2022-10-27 RX ADMIN — ENOXAPARIN SODIUM 40 MILLIGRAM(S): 100 INJECTION SUBCUTANEOUS at 20:57

## 2022-10-27 RX ADMIN — Medication 40 MILLIEQUIVALENT(S): at 12:15

## 2022-10-27 RX ADMIN — DULOXETINE HYDROCHLORIDE 60 MILLIGRAM(S): 30 CAPSULE, DELAYED RELEASE ORAL at 11:18

## 2022-10-27 RX ADMIN — Medication 75 MICROGRAM(S): at 22:56

## 2022-10-27 NOTE — PROGRESS NOTE ADULT - SUBJECTIVE AND OBJECTIVE BOX
PROGRESS NOTE  Patient is a 82y old  Female who presents with a chief complaint of SOB AND COUGH (27 Oct 2022 11:52)    Chart and available morning labs /imaging are reviewed electronically , urgent issues addressed . More information  is being added upon completion of rounds , when more information is collected and management discussed with consultants , medical staff and social service/case management on the floor   OVERNIGHT      HPI:  Patient brought in by EMS from [facility] for shortness of breath cough wheezing for 2 days.  Patient denies fevers chills headache chest pain abdominal pain vomiting Admit for antibacterial managmnet ,intravenous steroids,nebulised bronchodilators and pulmonology evaluation,O2 supply,serial labs and chest xrays,obtain ECHO to evaluate LVEF and rule out chf component Admitted  to telemetry unit for monitoring , send 3 sets of cardiac enzymes to rule out acute coronary event, obtain ECHO to evaluate LVEF, cardiology consult  ,continue current management, O2 supply, anticoagulation plan as per cardiology consult Palliative care consult requested ,to discuss advance directives and complete MOLST  (20 Oct 2022 13:54)    PAST MEDICAL & SURGICAL HISTORY:  History of COPD      Insomnia      Mood disorder      Dementia      Spasm of muscle      Constipation      Hypothyroidism      GERD (gastroesophageal reflux disease)      HTN (hypertension)      CHF, chronic          MEDICATIONS  (STANDING):  albuterol/ipratropium for Nebulization 3 milliLiter(s) Nebulizer every 6 hours  baclofen 5 milliGRAM(s) Oral every 12 hours  buDESOnide    Inhalation Suspension 0.5 milliGRAM(s) Inhalation two times a day  donepezil 10 milliGRAM(s) Oral at bedtime  DULoxetine 60 milliGRAM(s) Oral daily  enoxaparin Injectable 40 milliGRAM(s) SubCutaneous every 24 hours  guaiFENesin  milliGRAM(s) Oral every 12 hours  levothyroxine Injectable 75 MICROGram(s) IV Push at bedtime  memantine 10 milliGRAM(s) Oral two times a day  methylPREDNISolone sodium succinate Injectable 20 milliGRAM(s) IV Push daily  metoprolol tartrate Injectable 2.5 milliGRAM(s) IV Push every 8 hours  pantoprazole  Injectable 40 milliGRAM(s) IV Push every 12 hours  simvastatin 10 milliGRAM(s) Oral at bedtime  traZODone 100 milliGRAM(s) Oral at bedtime    MEDICATIONS  (PRN):  aluminum hydroxide/magnesium hydroxide/simethicone Suspension 30 milliLiter(s) Oral every 4 hours PRN Dyspepsia  ibuprofen  Tablet. 400 milliGRAM(s) Oral four times a day PRN Temp greater or equal to 38C (100.4F), Mild Pain (1 - 3)  loperamide 2 milliGRAM(s) Oral four times a day PRN Diarrhea  LORazepam   Injectable 0.5 milliGRAM(s) IV Push every 8 hours PRN Anxiety  melatonin 3 milliGRAM(s) Oral at bedtime PRN Insomnia  ondansetron Injectable 4 milliGRAM(s) IV Push every 8 hours PRN Nausea and/or Vomiting  traMADol 50 milliGRAM(s) Oral three times a day PRN Moderate Pain (4 - 6)      OBJECTIVE    T(C): 36.4 (10-27-22 @ 11:44), Max: 36.6 (10-26-22 @ 20:15)  HR: 84 (10-27-22 @ 14:03) (74 - 90)  BP: 101/62 (10-27-22 @ 11:44) (100/62 - 112/76)  RR: 17 (10-27-22 @ 11:44) (16 - 18)  SpO2: 97% (10-27-22 @ 14:03) (95% - 99%)  Wt(kg): --  I&O's Summary    26 Oct 2022 07:01  -  27 Oct 2022 07:00  --------------------------------------------------------  IN: 440 mL / OUT: 0 mL / NET: 440 mL          REVIEW OF SYSTEMS:  CONSTITUTIONAL: No fever, weight loss, or fatigue  EYES: No eye pain, visual disturbances, or discharge  ENMT:   No sinus or throat pain  NECK: No pain or stiffness  RESPIRATORY: No cough, wheezing, chills or hemoptysis; No shortness of breath  CARDIOVASCULAR: No chest pain, palpitations, dizziness, or leg swelling  GASTROINTESTINAL: No abdominal pain. No nausea, vomiting; No diarrhea or constipation. No melena or hematochezia.  GENITOURINARY: No dysuria, frequency, hematuria, or incontinence  NEUROLOGICAL: No headaches, memory loss, loss of strength, numbness, or tremors  SKIN: No itching, burning, rashes, or lesions   MUSCULOSKELETAL: No joint pain or swelling; No muscle, back, or extremity pain    PHYSICAL EXAM:  Appearance: NAD. VS past 24 hrs -as above   HEENT:   Moist oral mucosa. Conjunctiva clear b/l.   Neck : supple  Respiratory: Lungs CTAB.  Gastrointestinal:  Soft, nontender. No rebound. No rigidity. BS present	  Cardiovascular: RRR ,S1S2 present  Neurologic: Non-focal. Moving all extremities.  Extremities: No edema. No erythema. No calf tenderness.  Skin: No rashes, No ecchymoses, No cyanosis.	  wounds ,skin lesions-See skin assesment flow sheet   LABS:                        13.2   4.73  )-----------( 206      ( 27 Oct 2022 08:37 )             41.7     10-27    144  |  106  |  5<L>  ----------------------------<  136<H>  3.3<L>   |  32<H>  |  0.36<L>    Ca    9.1      27 Oct 2022 08:37  Phos  3.2     10-26  Mg     2.1     10-26    TPro  6.2  /  Alb  3.0<L>  /  TBili  0.6  /  DBili  x   /  AST  14<L>  /  ALT  16  /  AlkPhos  63  10-26    CAPILLARY BLOOD GLUCOSE              Culture - Urine (collected 21 Oct 2022 07:40)  Source: Clean Catch Clean Catch (Midstream)  Final Report (22 Oct 2022 10:35):    <10,000 CFU/mL Normal Urogenital Consuelo    Culture - Blood (collected 20 Oct 2022 10:40)  Source: .Blood Blood-Peripheral  Final Report (25 Oct 2022 17:01):    No Growth Final    Culture - Blood (collected 20 Oct 2022 10:30)  Source: .Blood Blood-Peripheral  Final Report (25 Oct 2022 17:01):    No Growth Final      RADIOLOGY & ADDITIONAL TESTS:   reviewed elctronically  ASSESSMENT/PLAN: 	     PROGRESS NOTE  Patient is a 82y old  Female who presents with a chief complaint of SOB AND COUGH (27 Oct 2022 11:52)    Chart and available morning labs /imaging are reviewed electronically , urgent issues addressed . More information  is being added upon completion of rounds , when more information is collected and management discussed with consultants , medical staff and social service/case management on the floor   OVERNIGHT  No new issues reported by medical staff . All above noted Patient is resting in a bed comfortably .No distress noted     HPI:  Patient brought in by EMS from [facility] for shortness of breath cough wheezing for 2 days.  Patient denies fevers chills headache chest pain abdominal pain vomiting Admit for antibacterial managmnet ,intravenous steroids,nebulised bronchodilators and pulmonology evaluation,O2 supply,serial labs and chest xrays,obtain ECHO to evaluate LVEF and rule out chf component Admitted  to telemetry unit for monitoring , send 3 sets of cardiac enzymes to rule out acute coronary event, obtain ECHO to evaluate LVEF, cardiology consult  ,continue current management, O2 supply, anticoagulation plan as per cardiology consult Palliative care consult requested ,to discuss advance directives and complete MOLST  (20 Oct 2022 13:54)    PAST MEDICAL & SURGICAL HISTORY:  History of COPD      Insomnia      Mood disorder      Dementia      Spasm of muscle      Constipation      Hypothyroidism      GERD (gastroesophageal reflux disease)      HTN (hypertension)      CHF, chronic          MEDICATIONS  (STANDING):  albuterol/ipratropium for Nebulization 3 milliLiter(s) Nebulizer every 6 hours  baclofen 5 milliGRAM(s) Oral every 12 hours  buDESOnide    Inhalation Suspension 0.5 milliGRAM(s) Inhalation two times a day  donepezil 10 milliGRAM(s) Oral at bedtime  DULoxetine 60 milliGRAM(s) Oral daily  enoxaparin Injectable 40 milliGRAM(s) SubCutaneous every 24 hours  guaiFENesin  milliGRAM(s) Oral every 12 hours  levothyroxine Injectable 75 MICROGram(s) IV Push at bedtime  memantine 10 milliGRAM(s) Oral two times a day  methylPREDNISolone sodium succinate Injectable 20 milliGRAM(s) IV Push daily  metoprolol tartrate Injectable 2.5 milliGRAM(s) IV Push every 8 hours  pantoprazole  Injectable 40 milliGRAM(s) IV Push every 12 hours  simvastatin 10 milliGRAM(s) Oral at bedtime  traZODone 100 milliGRAM(s) Oral at bedtime    MEDICATIONS  (PRN):  aluminum hydroxide/magnesium hydroxide/simethicone Suspension 30 milliLiter(s) Oral every 4 hours PRN Dyspepsia  ibuprofen  Tablet. 400 milliGRAM(s) Oral four times a day PRN Temp greater or equal to 38C (100.4F), Mild Pain (1 - 3)  loperamide 2 milliGRAM(s) Oral four times a day PRN Diarrhea  LORazepam   Injectable 0.5 milliGRAM(s) IV Push every 8 hours PRN Anxiety  melatonin 3 milliGRAM(s) Oral at bedtime PRN Insomnia  ondansetron Injectable 4 milliGRAM(s) IV Push every 8 hours PRN Nausea and/or Vomiting  traMADol 50 milliGRAM(s) Oral three times a day PRN Moderate Pain (4 - 6)      OBJECTIVE    T(C): 36.4 (10-27-22 @ 11:44), Max: 36.6 (10-26-22 @ 20:15)  HR: 84 (10-27-22 @ 14:03) (74 - 90)  BP: 101/62 (10-27-22 @ 11:44) (100/62 - 112/76)  RR: 17 (10-27-22 @ 11:44) (16 - 18)  SpO2: 97% (10-27-22 @ 14:03) (95% - 99%)  Wt(kg): --  I&O's Summary    26 Oct 2022 07:01  -  27 Oct 2022 07:00  --------------------------------------------------------  IN: 440 mL / OUT: 0 mL / NET: 440 mL          REVIEW OF SYSTEMS:  CONSTITUTIONAL: No fever, weight loss, or fatigue  EYES: No eye pain, visual disturbances, or discharge  ENMT:   No sinus or throat pain  NECK: No pain or stiffness  RESPIRATORY: No cough, wheezing, chills or hemoptysis; No shortness of breath  CARDIOVASCULAR: No chest pain, palpitations, dizziness, or leg swelling  GASTROINTESTINAL: No abdominal pain. No nausea, vomiting; No diarrhea or constipation. No melena or hematochezia.  GENITOURINARY: No dysuria, frequency, hematuria, or incontinence  NEUROLOGICAL: No headaches, memory loss, loss of strength, numbness, or tremors  SKIN: No itching, burning, rashes, or lesions   MUSCULOSKELETAL: No joint pain or swelling; No muscle, back, or extremity pain    PHYSICAL EXAM:  Appearance: NAD. VS past 24 hrs -as above   HEENT:   Moist oral mucosa. Conjunctiva clear b/l.   Neck : supple  Respiratory: Lungs CTAB.  Gastrointestinal:  Soft, nontender. No rebound. No rigidity. BS present	  Cardiovascular: RRR ,S1S2 present  Neurologic: Non-focal. Moving all extremities.  Extremities: No edema. No erythema. No calf tenderness.  Skin: No rashes, No ecchymoses, No cyanosis.	  wounds ,skin lesions-See skin assesment flow sheet   LABS:                        13.2   4.73  )-----------( 206      ( 27 Oct 2022 08:37 )             41.7     10-27    144  |  106  |  5<L>  ----------------------------<  136<H>  3.3<L>   |  32<H>  |  0.36<L>    Ca    9.1      27 Oct 2022 08:37  Phos  3.2     10-26  Mg     2.1     10-26    TPro  6.2  /  Alb  3.0<L>  /  TBili  0.6  /  DBili  x   /  AST  14<L>  /  ALT  16  /  AlkPhos  63  10-26    CAPILLARY BLOOD GLUCOSE              Culture - Urine (collected 21 Oct 2022 07:40)  Source: Clean Catch Clean Catch (Midstream)  Final Report (22 Oct 2022 10:35):    <10,000 CFU/mL Normal Urogenital Consuelo    Culture - Blood (collected 20 Oct 2022 10:40)  Source: .Blood Blood-Peripheral  Final Report (25 Oct 2022 17:01):    No Growth Final    Culture - Blood (collected 20 Oct 2022 10:30)  Source: .Blood Blood-Peripheral  Final Report (25 Oct 2022 17:01):    No Growth Final      RADIOLOGY & ADDITIONAL TESTS:   reviewed elctronically  ASSESSMENT/PLAN: 	    25 minutes aggregate time was spent on this visit, 50% visit time spent in care co-ordination with other attendings and counselling patient .I have discussed care plan with patient / HCP/family member ,who expressed understanding of problems treatment and their effect and side effects, alternatives in details. I have asked if they have any questions and concerns and appropriately addressed them to best of my ability.

## 2022-10-27 NOTE — PROGRESS NOTE ADULT - SUBJECTIVE AND OBJECTIVE BOX
Interval History:    CENTRAL LINE:   [  ] YES       [  ] NO  FIGUEROA:                 [  ] YES       [  ] NO         REVIEW OF SYSTEMS:  All Systems below were reviewed and are negative [  ]  HEENT:  ID:  Pulmonary:  Cardiac:  GI:  Renal:  Musculoskeletal:  All other systems above were reviewed and are negative   [  ]      MEDICATIONS  (STANDING):  albuterol/ipratropium for Nebulization 3 milliLiter(s) Nebulizer every 6 hours  baclofen 5 milliGRAM(s) Oral every 12 hours  buDESOnide    Inhalation Suspension 0.5 milliGRAM(s) Inhalation two times a day  donepezil 10 milliGRAM(s) Oral at bedtime  DULoxetine 60 milliGRAM(s) Oral daily  enoxaparin Injectable 40 milliGRAM(s) SubCutaneous every 24 hours  guaiFENesin  milliGRAM(s) Oral every 12 hours  levothyroxine Injectable 75 MICROGram(s) IV Push at bedtime  memantine 10 milliGRAM(s) Oral two times a day  methylPREDNISolone sodium succinate Injectable 20 milliGRAM(s) IV Push daily  metoprolol tartrate Injectable 2.5 milliGRAM(s) IV Push every 8 hours  pantoprazole  Injectable 40 milliGRAM(s) IV Push every 12 hours  simvastatin 10 milliGRAM(s) Oral at bedtime  traZODone 100 milliGRAM(s) Oral at bedtime    MEDICATIONS  (PRN):  aluminum hydroxide/magnesium hydroxide/simethicone Suspension 30 milliLiter(s) Oral every 4 hours PRN Dyspepsia  ibuprofen  Tablet. 400 milliGRAM(s) Oral four times a day PRN Temp greater or equal to 38C (100.4F), Mild Pain (1 - 3)  loperamide 2 milliGRAM(s) Oral four times a day PRN Diarrhea  LORazepam   Injectable 0.5 milliGRAM(s) IV Push every 8 hours PRN Anxiety  melatonin 3 milliGRAM(s) Oral at bedtime PRN Insomnia  ondansetron Injectable 4 milliGRAM(s) IV Push every 8 hours PRN Nausea and/or Vomiting  traMADol 50 milliGRAM(s) Oral three times a day PRN Moderate Pain (4 - 6)      Vital Signs Last 24 Hrs  T(C): 36.6 (27 Oct 2022 17:05), Max: 36.6 (26 Oct 2022 20:15)  T(F): 97.9 (27 Oct 2022 17:05), Max: 97.9 (26 Oct 2022 20:15)  HR: 100 (27 Oct 2022 17:05) (77 - 100)  BP: 107/70 (27 Oct 2022 17:05) (101/62 - 112/76)  BP(mean): --  RR: 17 (27 Oct 2022 17:05) (16 - 18)  SpO2: 94% (27 Oct 2022 17:05) (94% - 99%)    Parameters below as of 27 Oct 2022 17:05  Patient On (Oxygen Delivery Method): nasal cannula  O2 Flow (L/min): 2      I&O's Summary    26 Oct 2022 07:01  -  27 Oct 2022 07:00  --------------------------------------------------------  IN: 440 mL / OUT: 0 mL / NET: 440 mL        PHYSICAL EXAM:  HEENT: NC/AT; PERRLA  Neck: Soft; no tenderness  Lungs: CTA bilaterally; no wheezing.   Heart:  Abdomen:  Genital/ Rectal:  Extremities:  Neurologic:  Vascular:      LABORATORY:    CBC Full  -  ( 27 Oct 2022 08:37 )  WBC Count : 4.73 K/uL  RBC Count : 4.63 M/uL  Hemoglobin : 13.2 g/dL  Hematocrit : 41.7 %  Platelet Count - Automated : 206 K/uL  Mean Cell Volume : 90.1 fl  Mean Cell Hemoglobin : 28.5 pg  Mean Cell Hemoglobin Concentration : 31.7 gm/dL  Auto Neutrophil # : x  Auto Lymphocyte # : x  Auto Monocyte # : x  Auto Eosinophil # : x  Auto Basophil # : x  Auto Neutrophil % : x  Auto Lymphocyte % : x  Auto Monocyte % : x  Auto Eosinophil % : x  Auto Basophil % : x      ESR:                   10-22 @ 06:25  14    C-Reactive Protein:     10-22 @ 06:25  8    Procalcitonin:           10-22 @ 06:25   --  ESR:                   10-21 @ 04:26  --    C-Reactive Protein:     10-21 @ 04:26  --    Procalcitonin:           10-21 @ 04:26   0.03  ESR:                   10-20 @ 20:30  --    C-Reactive Protein:     10-20 @ 20:30  --    Procalcitonin:           10-20 @ 20:30   0.04      10-27    144  |  106  |  5<L>  ----------------------------<  136<H>  3.3<L>   |  32<H>  |  0.36<L>    Ca    9.1      27 Oct 2022 08:37  Phos  3.2     10-26  Mg     2.1     10-26    TPro  6.2  /  Alb  3.0<L>  /  TBili  0.6  /  DBili  x   /  AST  14<L>  /  ALT  16  /  AlkPhos  63  10-26      Rapid Respiratory Viral Panel Result        10-20 @ 10:47  Rapid RVP NotDetec  Coronovirus --  Adenovirus --  Bordetella Pertussis --  Chlamydia Pneumonia --  Entero/Rhinovirus--  HKU1 Coronovirus --  HMPV Coronovirus --  Influenza A --  Influenza AH1 --  Influenza AH1 2009 --  Influenza AH3 --  Influenza B --  Mycoplasma Pneumoniae --  NL63 Coronovirus --  OC43 Coronovirus --  Parainfluenza 1 --  Parainfluenza 2 --  Parainfluenza 3 --  Parainfluenza 4 --  Resp Syncytial Virus --      Assessment and Plan:          Je Baez MD   [phone].    She is afebrile  Complains of headache  On O2 NC  Denied cough or SOB.      MEDICATIONS  (STANDING):  albuterol/ipratropium for Nebulization 3 milliLiter(s) Nebulizer every 6 hours  baclofen 5 milliGRAM(s) Oral every 12 hours  buDESOnide    Inhalation Suspension 0.5 milliGRAM(s) Inhalation two times a day  donepezil 10 milliGRAM(s) Oral at bedtime  DULoxetine 60 milliGRAM(s) Oral daily  enoxaparin Injectable 40 milliGRAM(s) SubCutaneous every 24 hours  guaiFENesin  milliGRAM(s) Oral every 12 hours  levothyroxine Injectable 75 MICROGram(s) IV Push at bedtime  memantine 10 milliGRAM(s) Oral two times a day  methylPREDNISolone sodium succinate Injectable 20 milliGRAM(s) IV Push daily  metoprolol tartrate Injectable 2.5 milliGRAM(s) IV Push every 8 hours  pantoprazole  Injectable 40 milliGRAM(s) IV Push every 12 hours  simvastatin 10 milliGRAM(s) Oral at bedtime  traZODone 100 milliGRAM(s) Oral at bedtime    MEDICATIONS  (PRN):  aluminum hydroxide/magnesium hydroxide/simethicone Suspension 30 milliLiter(s) Oral every 4 hours PRN Dyspepsia  ibuprofen  Tablet. 400 milliGRAM(s) Oral four times a day PRN Temp greater or equal to 38C (100.4F), Mild Pain (1 - 3)  loperamide 2 milliGRAM(s) Oral four times a day PRN Diarrhea  LORazepam   Injectable 0.5 milliGRAM(s) IV Push every 8 hours PRN Anxiety  melatonin 3 milliGRAM(s) Oral at bedtime PRN Insomnia  ondansetron Injectable 4 milliGRAM(s) IV Push every 8 hours PRN Nausea and/or Vomiting  traMADol 50 milliGRAM(s) Oral three times a day PRN Moderate Pain (4 - 6)      Vital Signs Last 24 Hrs  T(C): 36.6 (27 Oct 2022 17:05), Max: 36.6 (26 Oct 2022 20:15)  T(F): 97.9 (27 Oct 2022 17:05), Max: 97.9 (26 Oct 2022 20:15)  HR: 100 (27 Oct 2022 17:05) (77 - 100)  BP: 107/70 (27 Oct 2022 17:05) (101/62 - 112/76)  BP(mean): --  RR: 17 (27 Oct 2022 17:05) (16 - 18)  SpO2: 94% (27 Oct 2022 17:05) (94% - 99%)    Parameters below as of 27 Oct 2022 17:05  Patient On (Oxygen Delivery Method): nasal cannula  O2 Flow (L/min): 2      I&O's Summary    26 Oct 2022 07:01  -  27 Oct 2022 07:00  --------------------------------------------------------  IN: 440 mL / OUT: 0 mL / NET: 440 mL        PHYSICAL EXAM:  HEENT: NC/AT; PERRLA  Neck: Soft; no tenderness  Lungs: CTA bilaterally; no wheezing.   Heart: RRR, no murmurs.   Abdomen: Soft, no tenderness.   Genital/ Rectal: No bearden catheter.   Extremities: No ulcers.   Neurologic: Awake.       LABORATORY:    CBC Full  -  ( 27 Oct 2022 08:37 )  WBC Count : 4.73 K/uL  RBC Count : 4.63 M/uL  Hemoglobin : 13.2 g/dL  Hematocrit : 41.7 %  Platelet Count - Automated : 206 K/uL  Mean Cell Volume : 90.1 fl  Mean Cell Hemoglobin : 28.5 pg  Mean Cell Hemoglobin Concentration : 31.7 gm/dL  Auto Neutrophil # : x  Auto Lymphocyte # : x  Auto Monocyte # : x  Auto Eosinophil # : x  Auto Basophil # : x  Auto Neutrophil % : x  Auto Lymphocyte % : x  Auto Monocyte % : x  Auto Eosinophil % : x  Auto Basophil % : x      ESR:                   10-22 @ 06:25  14    C-Reactive Protein:     10-22 @ 06:25  8    Procalcitonin:           10-22 @ 06:25   --  ESR:                   10-21 @ 04:26  --    C-Reactive Protein:     10-21 @ 04:26  --    Procalcitonin:           10-21 @ 04:26   0.03  ESR:                   10-20 @ 20:30  --    C-Reactive Protein:     10-20 @ 20:30  --    Procalcitonin:           10-20 @ 20:30   0.04      10-27    144  |  106  |  5<L>  ----------------------------<  136<H>  3.3<L>   |  32<H>  |  0.36<L>    Ca    9.1      27 Oct 2022 08:37  Phos  3.2     10-26  Mg     2.1     10-26    TPro  6.2  /  Alb  3.0<L>  /  TBili  0.6  /  DBili  x   /  AST  14<L>  /  ALT  16  /  AlkPhos  63  10-26    Assessment and Plan:    1. Dyspnea  2. COPD.  3. Dilated esophagus.   4. Left knee swelling.    . Repeated COVID 19 PCR this week was positive. She had two negative COVID 19 PCRs last week. Not clear if this is a new infection. She has no new symptoms and is still on O2 NC.   . She denied cough or fevers or SOB.   . Monitor for now. If she becomes worse, will consider treatments for COVID 19.  . Waiting for PEG with GI  . Supportive care.         Je Baez MD   [phone].

## 2022-10-27 NOTE — PROGRESS NOTE ADULT - SUBJECTIVE AND OBJECTIVE BOX
Patient is a 82y Female whom presented to the hospital with hypernatremia     PAST MEDICAL & SURGICAL HISTORY:  History of COPD      Insomnia      Mood disorder      Dementia      Spasm of muscle      Constipation      Hypothyroidism      GERD (gastroesophageal reflux disease)      HTN (hypertension)      CHF, chronic          MEDICATIONS  (STANDING):  albuterol/ipratropium for Nebulization 3 milliLiter(s) Nebulizer every 6 hours  baclofen 5 milliGRAM(s) Oral every 12 hours  buDESOnide    Inhalation Suspension 0.5 milliGRAM(s) Inhalation two times a day  dextrose 5% + sodium chloride 0.45%. 1000 milliLiter(s) (30 mL/Hr) IV Continuous <Continuous>  donepezil 10 milliGRAM(s) Oral at bedtime  DULoxetine 60 milliGRAM(s) Oral daily  enoxaparin Injectable 40 milliGRAM(s) SubCutaneous every 24 hours  guaiFENesin  milliGRAM(s) Oral every 12 hours  levothyroxine Injectable 75 MICROGram(s) IV Push at bedtime  memantine 10 milliGRAM(s) Oral two times a day  methylPREDNISolone sodium succinate Injectable 40 milliGRAM(s) IV Push daily  metoprolol tartrate Injectable 2.5 milliGRAM(s) IV Push every 12 hours  pantoprazole  Injectable 40 milliGRAM(s) IV Push every 12 hours  potassium chloride  10 mEq/100 mL IVPB 10 milliEquivalent(s) IV Intermittent every 1 hour  simvastatin 10 milliGRAM(s) Oral at bedtime  traZODone 100 milliGRAM(s) Oral at bedtime      Allergies    sulfa drugs (Unknown)  Tylenol (Unknown)    Intolerances        SOCIAL HISTORY:  Denies ETOh,Smoking,     FAMILY HISTORY:      REVIEW OF SYSTEMS:    CONSTITUTIONAL: No weakness, fevers or chills  RESPIRATORY: No cough, wheezing, hemoptysis; No shortness of breath  CARDIOVASCULAR: No chest pain or palpitations  GASTROINTESTINAL: No abdominal or epigastric pain. No nausea, vomiting,     No diarrhea or constipation. No melena   SKIN: dry                                                                                                           13.2   4.73  )-----------( 206      ( 27 Oct 2022 08:37 )             41.7       CBC Full  -  ( 27 Oct 2022 08:37 )  WBC Count : 4.73 K/uL  RBC Count : 4.63 M/uL  Hemoglobin : 13.2 g/dL  Hematocrit : 41.7 %  Platelet Count - Automated : 206 K/uL  Mean Cell Volume : 90.1 fl  Mean Cell Hemoglobin : 28.5 pg  Mean Cell Hemoglobin Concentration : 31.7 gm/dL  Auto Neutrophil # : x  Auto Lymphocyte # : x  Auto Monocyte # : x  Auto Eosinophil # : x  Auto Basophil # : x  Auto Neutrophil % : x  Auto Lymphocyte % : x  Auto Monocyte % : x  Auto Eosinophil % : x  Auto Basophil % : x      10-27    144  |  106  |  5<L>  ----------------------------<  136<H>  3.3<L>   |  32<H>  |  0.36<L>    Ca    9.1      27 Oct 2022 08:37  Phos  3.2     10-26  Mg     2.1     10-26    TPro  6.2  /  Alb  3.0<L>  /  TBili  0.6  /  DBili  x   /  AST  14<L>  /  ALT  16  /  AlkPhos  63  10-26      CAPILLARY BLOOD GLUCOSE          Vital Signs Last 24 Hrs  T(C): 36.6 (27 Oct 2022 17:05), Max: 36.6 (26 Oct 2022 20:15)  T(F): 97.9 (27 Oct 2022 17:05), Max: 97.9 (26 Oct 2022 20:15)  HR: 100 (27 Oct 2022 17:05) (77 - 100)  BP: 107/70 (27 Oct 2022 17:05) (101/62 - 112/76)  BP(mean): --  RR: 17 (27 Oct 2022 17:05) (16 - 18)  SpO2: 94% (27 Oct 2022 17:05) (94% - 99%)    Parameters below as of 27 Oct 2022 20:02  Patient On (Oxygen Delivery Method): nasal cannula,2L                  PHYSICAL EXAM:    Constitutional: NAD  HEENT: conjunctive   clear   Neck:  No JVD  Respiratory: decrease bs b/l   Cardiovascular: S1 and S2  Gastrointestinal: BS+, soft, NT/ND  Extremities: No peripheral edema  : No Ren  Skin: dry

## 2022-10-27 NOTE — PROGRESS NOTE ADULT - SUBJECTIVE AND OBJECTIVE BOX
Date/Time Patient Seen:  		  Referring MD:   Data Reviewed	       Patient is a 82y old  Female who presents with a chief complaint of SOB AND COUGH (26 Oct 2022 20:30)      Subjective/HPI     PAST MEDICAL & SURGICAL HISTORY:  History of COPD    Insomnia    Mood disorder    Dementia    Spasm of muscle    Constipation    Hypothyroidism    GERD (gastroesophageal reflux disease)    HTN (hypertension)    CHF, chronic          Medication list         MEDICATIONS  (STANDING):  albuterol/ipratropium for Nebulization 3 milliLiter(s) Nebulizer every 6 hours  baclofen 5 milliGRAM(s) Oral every 12 hours  buDESOnide    Inhalation Suspension 0.5 milliGRAM(s) Inhalation two times a day  dextrose 5%. 1000 milliLiter(s) (40 mL/Hr) IV Continuous <Continuous>  donepezil 10 milliGRAM(s) Oral at bedtime  DULoxetine 60 milliGRAM(s) Oral daily  enoxaparin Injectable 40 milliGRAM(s) SubCutaneous every 24 hours  guaiFENesin  milliGRAM(s) Oral every 12 hours  levothyroxine Injectable 75 MICROGram(s) IV Push at bedtime  memantine 10 milliGRAM(s) Oral two times a day  methylPREDNISolone sodium succinate Injectable 20 milliGRAM(s) IV Push daily  metoprolol tartrate Injectable 2.5 milliGRAM(s) IV Push every 8 hours  pantoprazole  Injectable 40 milliGRAM(s) IV Push every 12 hours  simvastatin 10 milliGRAM(s) Oral at bedtime  traZODone 100 milliGRAM(s) Oral at bedtime    MEDICATIONS  (PRN):  aluminum hydroxide/magnesium hydroxide/simethicone Suspension 30 milliLiter(s) Oral every 4 hours PRN Dyspepsia  ibuprofen  Tablet. 400 milliGRAM(s) Oral four times a day PRN Temp greater or equal to 38C (100.4F), Mild Pain (1 - 3)  loperamide 2 milliGRAM(s) Oral four times a day PRN Diarrhea  LORazepam   Injectable 0.5 milliGRAM(s) IV Push every 8 hours PRN Anxiety  melatonin 3 milliGRAM(s) Oral at bedtime PRN Insomnia  ondansetron Injectable 4 milliGRAM(s) IV Push every 8 hours PRN Nausea and/or Vomiting  traMADol 50 milliGRAM(s) Oral three times a day PRN Moderate Pain (4 - 6)         Vitals log        ICU Vital Signs Last 24 Hrs  T(C): 36.5 (27 Oct 2022 05:08), Max: 36.6 (26 Oct 2022 20:15)  T(F): 97.7 (27 Oct 2022 05:08), Max: 97.9 (26 Oct 2022 20:15)  HR: 88 (27 Oct 2022 05:08) (72 - 90)  BP: 107/82 (27 Oct 2022 05:08) (100/62 - 112/76)  BP(mean): --  ABP: --  ABP(mean): --  RR: 18 (27 Oct 2022 05:08) (17 - 18)  SpO2: 99% (27 Oct 2022 05:08) (94% - 99%)    O2 Parameters below as of 27 Oct 2022 05:08  Patient On (Oxygen Delivery Method): nasal cannula  O2 Flow (L/min): 2               Input and Output:  I&O's Detail    25 Oct 2022 07:01  -  26 Oct 2022 07:00  --------------------------------------------------------  IN:    dextrose 5% + sodium chloride 0.45%: 30 mL  Total IN: 30 mL    OUT:    Voided (mL): 950 mL  Total OUT: 950 mL    Total NET: -920 mL      26 Oct 2022 07:01  -  27 Oct 2022 06:12  --------------------------------------------------------  IN:    dextrose 5%: 320 mL    dextrose 5% + sodium chloride 0.45%: 120 mL  Total IN: 440 mL    OUT:  Total OUT: 0 mL    Total NET: 440 mL          Lab Data                        12.6   4.76  )-----------( 201      ( 26 Oct 2022 07:38 )             40.0     10-26    148<H>  |  110<H>  |  4<L>  ----------------------------<  112<H>  3.3<L>   |  31  |  0.47<L>    Ca    8.9      26 Oct 2022 07:38  Phos  3.2     10-26  Mg     2.1     10-26    TPro  6.2  /  Alb  3.0<L>  /  TBili  0.6  /  DBili  x   /  AST  14<L>  /  ALT  16  /  AlkPhos  63  10-26            Review of Systems	      Objective     Physical Examination    heart s1s2  lung dec BS  head nc      Pertinent Lab findings & Imaging      Gela:  NO   Adequate UO     I&O's Detail    25 Oct 2022 07:01  -  26 Oct 2022 07:00  --------------------------------------------------------  IN:    dextrose 5% + sodium chloride 0.45%: 30 mL  Total IN: 30 mL    OUT:    Voided (mL): 950 mL  Total OUT: 950 mL    Total NET: -920 mL      26 Oct 2022 07:01  -  27 Oct 2022 06:12  --------------------------------------------------------  IN:    dextrose 5%: 320 mL    dextrose 5% + sodium chloride 0.45%: 120 mL  Total IN: 440 mL    OUT:  Total OUT: 0 mL    Total NET: 440 mL               Discussed with:     Cultures:	        Radiology

## 2022-10-27 NOTE — PROGRESS NOTE ADULT - SUBJECTIVE AND OBJECTIVE BOX
ROXIE LAZAR    [facility] [address]    Allergies    sulfa drugs (Unknown)  Tylenol (Unknown)    Intolerances        PAST MEDICAL & SURGICAL HISTORY:  History of COPD      Insomnia      Mood disorder      Dementia      Spasm of muscle      Constipation      Hypothyroidism      GERD (gastroesophageal reflux disease)      HTN (hypertension)      CHF, chronic          FAMILY HISTORY:      Home Medications:  acetaminophen 325 mg oral tablet: 2 tab(s) orally every 6 hours, As Needed for pain  (20 Oct 2022 16:34)  baclofen 10 mg oral tablet: 0.5 tab(s) orally 2 times a day (20 Oct 2022 16:34)  Cymbalta 60 mg oral delayed release capsule: 1 cap(s) orally once a day (20 Oct 2022 16:34)  donepezil 10 mg oral tablet: 1 tab(s) orally once a day (at bedtime) (20 Oct 2022 16:34)  Eucerin topical cream: Apply topically to affected area once a day (at bedtime) (20 Oct 2022 16:34)  furosemide 40 mg oral tablet: 0.5 tab(s) orally once a day (20 Oct 2022 16:34)  ipratropium-albuterol 0.5 mg-2.5 mg/3 mL inhalation solution: 3 milliliter(s) inhaled 4 times a day (20 Oct 2022 16:34)  levothyroxine 150 mcg (0.15 mg) oral capsule: 1 cap(s) orally once a day (20 Oct 2022 16:34)  Linzess 290 mcg oral capsule: 1 cap(s) orally once a day (20 Oct 2022 16:34)  melatonin 3 mg oral tablet: 1 tab(s) orally once a day (at bedtime) (20 Oct 2022 16:34)  Metoprolol Tartrate 25 mg oral tablet: 1.5 tab(s) orally 2 times a day (20 Oct 2022 16:34)  Namenda 10 mg oral tablet: 2 tab(s) orally once a day (at bedtime) (20 Oct 2022 16:34)  omeprazole 40 mg oral delayed release capsule: 1 cap(s) orally once a day (20 Oct 2022 16:34)  ondansetron 4 mg oral tablet: 1 tab(s) orally once a day (20 Oct 2022 16:34)  ProAir HFA 90 mcg/inh inhalation aerosol: 2 puff(s) inhaled every 6 hours, As Needed (20 Oct 2022 16:34)  Senna 8.6 mg oral tablet: 2 tab(s) orally once a day (at bedtime) (20 Oct 2022 16:34)  traZODone 100 mg oral tablet: 1 tab(s) orally once a day (at bedtime) (20 Oct 2022 16:34)  zinc oxide topical ointment: Apply topically to affected area once a day and as needed  (20 Oct 2022 16:34)  Zocor 10 mg oral tablet: 1 tab(s) orally once a day (at bedtime) (20 Oct 2022 16:34)  ZyrTEC 10 mg oral tablet: 1 tab(s) orally once a day (20 Oct 2022 16:34)      MEDICATIONS  (STANDING):  albuterol/ipratropium for Nebulization 3 milliLiter(s) Nebulizer every 6 hours  baclofen 5 milliGRAM(s) Oral every 12 hours  buDESOnide    Inhalation Suspension 0.5 milliGRAM(s) Inhalation two times a day  dextrose 5%. 1000 milliLiter(s) (40 mL/Hr) IV Continuous <Continuous>  donepezil 10 milliGRAM(s) Oral at bedtime  DULoxetine 60 milliGRAM(s) Oral daily  enoxaparin Injectable 40 milliGRAM(s) SubCutaneous every 24 hours  guaiFENesin  milliGRAM(s) Oral every 12 hours  levothyroxine Injectable 75 MICROGram(s) IV Push at bedtime  memantine 10 milliGRAM(s) Oral two times a day  methylPREDNISolone sodium succinate Injectable 20 milliGRAM(s) IV Push daily  metoprolol tartrate Injectable 2.5 milliGRAM(s) IV Push every 8 hours  pantoprazole  Injectable 40 milliGRAM(s) IV Push every 12 hours  simvastatin 10 milliGRAM(s) Oral at bedtime  traZODone 100 milliGRAM(s) Oral at bedtime    MEDICATIONS  (PRN):  aluminum hydroxide/magnesium hydroxide/simethicone Suspension 30 milliLiter(s) Oral every 4 hours PRN Dyspepsia  ibuprofen  Tablet. 400 milliGRAM(s) Oral four times a day PRN Temp greater or equal to 38C (100.4F), Mild Pain (1 - 3)  loperamide 2 milliGRAM(s) Oral four times a day PRN Diarrhea  LORazepam   Injectable 0.5 milliGRAM(s) IV Push every 8 hours PRN Anxiety  melatonin 3 milliGRAM(s) Oral at bedtime PRN Insomnia  ondansetron Injectable 4 milliGRAM(s) IV Push every 8 hours PRN Nausea and/or Vomiting  traMADol 50 milliGRAM(s) Oral three times a day PRN Moderate Pain (4 - 6)              Vital Signs Last 24 Hrs  T(C): 36.5 (27 Oct 2022 05:08), Max: 36.6 (26 Oct 2022 20:15)  T(F): 97.7 (27 Oct 2022 05:08), Max: 97.9 (26 Oct 2022 20:15)  HR: 88 (27 Oct 2022 05:08) (72 - 90)  BP: 107/82 (27 Oct 2022 05:08) (100/62 - 112/76)  BP(mean): --  RR: 18 (27 Oct 2022 05:08) (17 - 18)  SpO2: 99% (27 Oct 2022 05:08) (94% - 99%)    Parameters below as of 27 Oct 2022 05:08  Patient On (Oxygen Delivery Method): nasal cannula  O2 Flow (L/min): 2        10-26-22 @ 07:01  -  10-27-22 @ 07:00  --------------------------------------------------------  IN: 440 mL / OUT: 0 mL / NET: 440 mL              LABS:                        12.6   4.76  )-----------( 201      ( 26 Oct 2022 07:38 )             40.0     10-26    148<H>  |  110<H>  |  4<L>  ----------------------------<  112<H>  3.3<L>   |  31  |  0.47<L>    Ca    8.9      26 Oct 2022 07:38  Phos  3.2     10-26  Mg     2.1     10-26    TPro  6.2  /  Alb  3.0<L>  /  TBili  0.6  /  DBili  x   /  AST  14<L>  /  ALT  16  /  AlkPhos  63  10-26              WBC:  WBC Count: 4.76 K/uL (10-26 @ 07:38)  WBC Count: 6.81 K/uL (10-25 @ 08:10)  WBC Count: 7.50 K/uL (10-24 @ 08:20)  WBC Count: 4.98 K/uL (10-23 @ 08:15)      MICROBIOLOGY:  RECENT CULTURES:  10-21 Clean Catch Clean Catch (Midstream) XXXX XXXX   <10,000 CFU/mL Normal Urogenital Consuelo    10-20 .Blood Blood-Peripheral XXXX XXXX   No Growth Final    10-20 .Blood Blood-Peripheral XXXX XXXX   No Growth Final                    Sodium:  Sodium, Serum: 148 mmol/L (10-26 @ 07:38)  Sodium, Serum: 142 mmol/L (10-25 @ 08:10)  Sodium, Serum: 140 mmol/L (10-24 @ 08:20)  Sodium, Serum: 146 mmol/L (10-23 @ 08:15)      0.47 mg/dL 10-26 @ 07:38  0.41 mg/dL 10-25 @ 08:10  0.39 mg/dL 10-24 @ 08:20  0.43 mg/dL 10-23 @ 08:15      Hemoglobin:  Hemoglobin: 12.6 g/dL (10-26 @ 07:38)  Hemoglobin: 12.7 g/dL (10-25 @ 08:10)  Hemoglobin: 12.2 g/dL (10-24 @ 08:20)  Hemoglobin: 11.5 g/dL (10-23 @ 08:15)      Platelets: Platelet Count - Automated: 201 K/uL (10-26 @ 07:38)  Platelet Count - Automated: 190 K/uL (10-25 @ 08:10)  Platelet Count - Automated: 196 K/uL (10-24 @ 08:20)  Platelet Count - Automated: 200 K/uL (10-23 @ 08:15)      LIVER FUNCTIONS - ( 26 Oct 2022 07:38 )  Alb: 3.0 g/dL / Pro: 6.2 g/dL / ALK PHOS: 63 U/L / ALT: 16 U/L / AST: 14 U/L / GGT: x                 RADIOLOGY & ADDITIONAL STUDIES:      MICROBIOLOGY:  RECENT CULTURES:  10-21 Clean Catch Clean Catch (Midstream) XXXX XXXX   <10,000 CFU/mL Normal Urogenital Consuelo    10-20 .Blood Blood-Peripheral XXXX XXXX   No Growth Final    10-20 .Blood Blood-Peripheral XXXX XXXX   No Growth Final

## 2022-10-27 NOTE — PROGRESS NOTE ADULT - SUBJECTIVE AND OBJECTIVE BOX
[facility]  Alberto Lepe PA-C  [address] [place] [address]  [phone]      INTERVAL HPI/OVERNIGHT EVENTS:  Pt s/e  Reports no new GI complaints  Tolerating diet    MEDICATIONS  (STANDING):  albuterol/ipratropium for Nebulization 3 milliLiter(s) Nebulizer every 6 hours  baclofen 5 milliGRAM(s) Oral every 12 hours  buDESOnide    Inhalation Suspension 0.5 milliGRAM(s) Inhalation two times a day  dextrose 5%. 1000 milliLiter(s) (40 mL/Hr) IV Continuous <Continuous>  donepezil 10 milliGRAM(s) Oral at bedtime  DULoxetine 60 milliGRAM(s) Oral daily  enoxaparin Injectable 40 milliGRAM(s) SubCutaneous every 24 hours  guaiFENesin  milliGRAM(s) Oral every 12 hours  levothyroxine Injectable 75 MICROGram(s) IV Push at bedtime  memantine 10 milliGRAM(s) Oral two times a day  methylPREDNISolone sodium succinate Injectable 20 milliGRAM(s) IV Push daily  metoprolol tartrate Injectable 2.5 milliGRAM(s) IV Push every 8 hours  pantoprazole  Injectable 40 milliGRAM(s) IV Push every 12 hours  potassium chloride   Powder 40 milliEquivalent(s) Oral once  simvastatin 10 milliGRAM(s) Oral at bedtime  traZODone 100 milliGRAM(s) Oral at bedtime    MEDICATIONS  (PRN):  aluminum hydroxide/magnesium hydroxide/simethicone Suspension 30 milliLiter(s) Oral every 4 hours PRN Dyspepsia  ibuprofen  Tablet. 400 milliGRAM(s) Oral four times a day PRN Temp greater or equal to 38C (100.4F), Mild Pain (1 - 3)  loperamide 2 milliGRAM(s) Oral four times a day PRN Diarrhea  LORazepam   Injectable 0.5 milliGRAM(s) IV Push every 8 hours PRN Anxiety  melatonin 3 milliGRAM(s) Oral at bedtime PRN Insomnia  ondansetron Injectable 4 milliGRAM(s) IV Push every 8 hours PRN Nausea and/or Vomiting  traMADol 50 milliGRAM(s) Oral three times a day PRN Moderate Pain (4 - 6)      Allergies    sulfa drugs (Unknown)  Tylenol (Unknown)    PHYSICAL EXAM:   Vital Signs:  Vital Signs Last 24 Hrs  T(C): 36.4 (27 Oct 2022 11:44), Max: 36.6 (26 Oct 2022 20:15)  T(F): 97.5 (27 Oct 2022 11:44), Max: 97.9 (26 Oct 2022 20:15)  HR: 77 (27 Oct 2022 11:44) (72 - 90)  BP: 101/62 (27 Oct 2022 11:44) (100/62 - 112/76)  BP(mean): --  RR: 17 (27 Oct 2022 11:44) (16 - 18)  SpO2: 99% (27 Oct 2022 11:44) (94% - 99%)    Parameters below as of 27 Oct 2022 11:44  Patient On (Oxygen Delivery Method): nasal cannula  O2 Flow (L/min): 2    Daily     Daily Weight in k.3 (27 Oct 2022 05:08)    GENERAL:  Appears stated age  ABDOMEN:  Soft, non-tender, non-distended  NEURO:  Alert      LABS:                        13.2   4.73  )-----------( 206      ( 27 Oct 2022 08:37 )             41.7     10    144  |  106  |  5<L>  ----------------------------<  136<H>  3.3<L>   |  32<H>  |  0.36<L>    Ca    9.1      27 Oct 2022 08:37  Phos  3.2     10-26  Mg     2.1     10-26    TPro  6.2  /  Alb  3.0<L>  /  TBili  0.6  /  DBili  x   /  AST  14<L>  /  ALT  16  /  AlkPhos  63  10-26

## 2022-10-28 LAB
ANION GAP SERPL CALC-SCNC: 5 MMOL/L — SIGNIFICANT CHANGE UP (ref 5–17)
BUN SERPL-MCNC: 7 MG/DL — SIGNIFICANT CHANGE UP (ref 7–23)
CALCIUM SERPL-MCNC: 8.8 MG/DL — SIGNIFICANT CHANGE UP (ref 8.5–10.1)
CHLORIDE SERPL-SCNC: 104 MMOL/L — SIGNIFICANT CHANGE UP (ref 96–108)
CO2 SERPL-SCNC: 34 MMOL/L — HIGH (ref 22–31)
CREAT SERPL-MCNC: 0.51 MG/DL — SIGNIFICANT CHANGE UP (ref 0.5–1.3)
EGFR: 92 ML/MIN/1.73M2 — SIGNIFICANT CHANGE UP
GLUCOSE SERPL-MCNC: 115 MG/DL — HIGH (ref 70–99)
HCT VFR BLD CALC: 38.9 % — SIGNIFICANT CHANGE UP (ref 34.5–45)
HGB BLD-MCNC: 12.1 G/DL — SIGNIFICANT CHANGE UP (ref 11.5–15.5)
INR BLD: 1.12 RATIO — SIGNIFICANT CHANGE UP (ref 0.88–1.16)
MCHC RBC-ENTMCNC: 28.3 PG — SIGNIFICANT CHANGE UP (ref 27–34)
MCHC RBC-ENTMCNC: 31.1 GM/DL — LOW (ref 32–36)
MCV RBC AUTO: 90.9 FL — SIGNIFICANT CHANGE UP (ref 80–100)
NRBC # BLD: 0 /100 WBCS — SIGNIFICANT CHANGE UP (ref 0–0)
PLATELET # BLD AUTO: 201 K/UL — SIGNIFICANT CHANGE UP (ref 150–400)
POTASSIUM SERPL-MCNC: 3.5 MMOL/L — SIGNIFICANT CHANGE UP (ref 3.5–5.3)
POTASSIUM SERPL-SCNC: 3.5 MMOL/L — SIGNIFICANT CHANGE UP (ref 3.5–5.3)
PROTHROM AB SERPL-ACNC: 13.1 SEC — SIGNIFICANT CHANGE UP (ref 10.5–13.4)
RBC # BLD: 4.28 M/UL — SIGNIFICANT CHANGE UP (ref 3.8–5.2)
RBC # FLD: 14.7 % — HIGH (ref 10.3–14.5)
SODIUM SERPL-SCNC: 143 MMOL/L — SIGNIFICANT CHANGE UP (ref 135–145)
WBC # BLD: 5.83 K/UL — SIGNIFICANT CHANGE UP (ref 3.8–10.5)
WBC # FLD AUTO: 5.83 K/UL — SIGNIFICANT CHANGE UP (ref 3.8–10.5)

## 2022-10-28 PROCEDURE — 99233 SBSQ HOSP IP/OBS HIGH 50: CPT

## 2022-10-28 RX ADMIN — Medication 100 MILLIGRAM(S): at 21:18

## 2022-10-28 RX ADMIN — DULOXETINE HYDROCHLORIDE 60 MILLIGRAM(S): 30 CAPSULE, DELAYED RELEASE ORAL at 12:26

## 2022-10-28 RX ADMIN — Medication 600 MILLIGRAM(S): at 05:26

## 2022-10-28 RX ADMIN — MEMANTINE HYDROCHLORIDE 10 MILLIGRAM(S): 10 TABLET ORAL at 05:25

## 2022-10-28 RX ADMIN — Medication 3 MILLIGRAM(S): at 23:13

## 2022-10-28 RX ADMIN — MEMANTINE HYDROCHLORIDE 10 MILLIGRAM(S): 10 TABLET ORAL at 17:16

## 2022-10-28 RX ADMIN — Medication 600 MILLIGRAM(S): at 17:16

## 2022-10-28 RX ADMIN — SIMVASTATIN 10 MILLIGRAM(S): 20 TABLET, FILM COATED ORAL at 21:16

## 2022-10-28 RX ADMIN — DONEPEZIL HYDROCHLORIDE 10 MILLIGRAM(S): 10 TABLET, FILM COATED ORAL at 21:16

## 2022-10-28 RX ADMIN — Medication 2.5 MILLIGRAM(S): at 21:16

## 2022-10-28 RX ADMIN — Medication 3 MILLIGRAM(S): at 21:16

## 2022-10-28 RX ADMIN — PANTOPRAZOLE SODIUM 40 MILLIGRAM(S): 20 TABLET, DELAYED RELEASE ORAL at 17:16

## 2022-10-28 RX ADMIN — Medication 5 MILLIGRAM(S): at 05:26

## 2022-10-28 RX ADMIN — ENOXAPARIN SODIUM 40 MILLIGRAM(S): 100 INJECTION SUBCUTANEOUS at 19:45

## 2022-10-28 RX ADMIN — Medication 2.5 MILLIGRAM(S): at 05:28

## 2022-10-28 RX ADMIN — Medication 5 MILLIGRAM(S): at 17:17

## 2022-10-28 RX ADMIN — Medication 20 MILLIGRAM(S): at 05:26

## 2022-10-28 RX ADMIN — PANTOPRAZOLE SODIUM 40 MILLIGRAM(S): 20 TABLET, DELAYED RELEASE ORAL at 05:27

## 2022-10-28 NOTE — PROGRESS NOTE ADULT - SUBJECTIVE AND OBJECTIVE BOX
[facility]  Alberto Lepe PA-C  [address] [place] [address]  [phone]      INTERVAL HPI/OVERNIGHT EVENTS:  Pt s/e  Reports no new GI complaints  Tolerating liquids    MEDICATIONS  (STANDING):  albuterol/ipratropium for Nebulization 3 milliLiter(s) Nebulizer every 6 hours  baclofen 5 milliGRAM(s) Oral every 12 hours  buDESOnide    Inhalation Suspension 0.5 milliGRAM(s) Inhalation two times a day  donepezil 10 milliGRAM(s) Oral at bedtime  DULoxetine 60 milliGRAM(s) Oral daily  enoxaparin Injectable 40 milliGRAM(s) SubCutaneous every 24 hours  guaiFENesin  milliGRAM(s) Oral every 12 hours  levothyroxine Injectable 75 MICROGram(s) IV Push at bedtime  memantine 10 milliGRAM(s) Oral two times a day  methylPREDNISolone sodium succinate Injectable 20 milliGRAM(s) IV Push daily  metoprolol tartrate Injectable 2.5 milliGRAM(s) IV Push every 8 hours  pantoprazole  Injectable 40 milliGRAM(s) IV Push every 12 hours  simvastatin 10 milliGRAM(s) Oral at bedtime  traZODone 100 milliGRAM(s) Oral at bedtime    MEDICATIONS  (PRN):  aluminum hydroxide/magnesium hydroxide/simethicone Suspension 30 milliLiter(s) Oral every 4 hours PRN Dyspepsia  ibuprofen  Tablet. 400 milliGRAM(s) Oral every 6 hours PRN Temp greater or equal to 38C (100.4F), Mild Pain (1 - 3)  loperamide 2 milliGRAM(s) Oral four times a day PRN Diarrhea  LORazepam   Injectable 0.5 milliGRAM(s) IV Push every 8 hours PRN Anxiety  melatonin 3 milliGRAM(s) Oral at bedtime PRN Insomnia  ondansetron Injectable 4 milliGRAM(s) IV Push every 8 hours PRN Nausea and/or Vomiting      Allergies    sulfa drugs (Unknown)  Sulfur Colliod (Rash)  Tylenol (Swelling)  Tylenol (Unknown)    PHYSICAL EXAM:   Vital Signs:  Vital Signs Last 24 Hrs  T(C): 36.6 (28 Oct 2022 05:07), Max: 37.1 (27 Oct 2022 20:08)  T(F): 97.8 (28 Oct 2022 05:07), Max: 98.8 (27 Oct 2022 20:08)  HR: 62 (28 Oct 2022 05:07) (62 - 100)  BP: 117/71 (28 Oct 2022 05:07) (107/70 - 117/71)  BP(mean): --  RR: 17 (28 Oct 2022 05:07) (17 - 17)  SpO2: 98% (28 Oct 2022 05:07) (94% - 98%)    Parameters below as of 28 Oct 2022 05:07  Patient On (Oxygen Delivery Method): nasal cannula      Daily     Daily Weight in k (28 Oct 2022 05:07)    GENERAL:  Appears stated age  ABDOMEN:  Soft, non-tender, non-distended  NEURO:  Alert    LABS:                        12.1   5.83  )-----------( 201      ( 28 Oct 2022 08:25 )             38.9     10    143  |  104  |  7   ----------------------------<  115<H>  3.5   |  34<H>  |  0.51    Ca    8.8      28 Oct 2022 08:25      PT/INR - ( 28 Oct 2022 08:25 )   PT: 13.1 sec;   INR: 1.12 ratio

## 2022-10-28 NOTE — PROGRESS NOTE ADULT - SUBJECTIVE AND OBJECTIVE BOX
Patient is a 82y Female whom presented to the hospital with hypernatremia     PAST MEDICAL & SURGICAL HISTORY:  History of COPD      Insomnia      Mood disorder      Dementia      Spasm of muscle      Constipation      Hypothyroidism      GERD (gastroesophageal reflux disease)      HTN (hypertension)      CHF, chronic          MEDICATIONS  (STANDING):  albuterol/ipratropium for Nebulization 3 milliLiter(s) Nebulizer every 6 hours  baclofen 5 milliGRAM(s) Oral every 12 hours  buDESOnide    Inhalation Suspension 0.5 milliGRAM(s) Inhalation two times a day  dextrose 5% + sodium chloride 0.45%. 1000 milliLiter(s) (30 mL/Hr) IV Continuous <Continuous>  donepezil 10 milliGRAM(s) Oral at bedtime  DULoxetine 60 milliGRAM(s) Oral daily  enoxaparin Injectable 40 milliGRAM(s) SubCutaneous every 24 hours  guaiFENesin  milliGRAM(s) Oral every 12 hours  levothyroxine Injectable 75 MICROGram(s) IV Push at bedtime  memantine 10 milliGRAM(s) Oral two times a day  methylPREDNISolone sodium succinate Injectable 40 milliGRAM(s) IV Push daily  metoprolol tartrate Injectable 2.5 milliGRAM(s) IV Push every 12 hours  pantoprazole  Injectable 40 milliGRAM(s) IV Push every 12 hours  potassium chloride  10 mEq/100 mL IVPB 10 milliEquivalent(s) IV Intermittent every 1 hour  simvastatin 10 milliGRAM(s) Oral at bedtime  traZODone 100 milliGRAM(s) Oral at bedtime      Allergies    sulfa drugs (Unknown)  Tylenol (Unknown)    Intolerances        SOCIAL HISTORY:  Denies ETOh,Smoking,     FAMILY HISTORY:      REVIEW OF SYSTEMS:    CONSTITUTIONAL: No weakness, fevers or chills  RESPIRATORY: No cough, wheezing, hemoptysis; No shortness of breath  CARDIOVASCULAR: No chest pain or palpitations  GASTROINTESTINAL: No abdominal or epigastric pain. No nausea, vomiting,     No diarrhea or constipation. No melena   SKIN: dry                                                                                                                                 12.1   5.83  )-----------( 201      ( 28 Oct 2022 08:25 )             38.9       CBC Full  -  ( 28 Oct 2022 08:25 )  WBC Count : 5.83 K/uL  RBC Count : 4.28 M/uL  Hemoglobin : 12.1 g/dL  Hematocrit : 38.9 %  Platelet Count - Automated : 201 K/uL  Mean Cell Volume : 90.9 fl  Mean Cell Hemoglobin : 28.3 pg  Mean Cell Hemoglobin Concentration : 31.1 gm/dL  Auto Neutrophil # : x  Auto Lymphocyte # : x  Auto Monocyte # : x  Auto Eosinophil # : x  Auto Basophil # : x  Auto Neutrophil % : x  Auto Lymphocyte % : x  Auto Monocyte % : x  Auto Eosinophil % : x  Auto Basophil % : x      10-28    143  |  104  |  7   ----------------------------<  115<H>  3.5   |  34<H>  |  0.51    Ca    8.8      28 Oct 2022 08:25        CAPILLARY BLOOD GLUCOSE          Vital Signs Last 24 Hrs  T(C): 37.1 (28 Oct 2022 12:08), Max: 37.1 (27 Oct 2022 20:08)  T(F): 98.7 (28 Oct 2022 12:08), Max: 98.8 (27 Oct 2022 20:08)  HR: 81 (28 Oct 2022 12:08) (62 - 88)  BP: 98/65 (28 Oct 2022 12:08) (98/65 - 117/71)  BP(mean): --  RR: 17 (28 Oct 2022 12:08) (17 - 17)  SpO2: 98% (28 Oct 2022 12:08) (96% - 98%)    Parameters below as of 28 Oct 2022 12:08  Patient On (Oxygen Delivery Method): nasal cannula  O2 Flow (L/min): 2          PT/INR - ( 28 Oct 2022 08:25 )   PT: 13.1 sec;   INR: 1.12 ratio                   PHYSICAL EXAM:    Constitutional: NAD  HEENT: conjunctive   clear   Neck:  No JVD  Respiratory: decrease bs b/l   Cardiovascular: S1 and S2  Gastrointestinal: BS+, soft, NT/ND  Extremities: No peripheral edema  : No Ren  Skin: dry

## 2022-10-28 NOTE — PROGRESS NOTE ADULT - SUBJECTIVE AND OBJECTIVE BOX
Interval History:    CENTRAL LINE:   [  ] YES       [  ] NO  FIGUEROA:                 [  ] YES       [  ] NO         REVIEW OF SYSTEMS:  All Systems below were reviewed and are negative [  ]  HEENT:  ID:  Pulmonary:  Cardiac:  GI:  Renal:  Musculoskeletal:  All other systems above were reviewed and are negative   [  ]      MEDICATIONS  (STANDING):  albuterol/ipratropium for Nebulization 3 milliLiter(s) Nebulizer every 6 hours  baclofen 5 milliGRAM(s) Oral every 12 hours  buDESOnide    Inhalation Suspension 0.5 milliGRAM(s) Inhalation two times a day  donepezil 10 milliGRAM(s) Oral at bedtime  DULoxetine 60 milliGRAM(s) Oral daily  enoxaparin Injectable 40 milliGRAM(s) SubCutaneous every 24 hours  guaiFENesin  milliGRAM(s) Oral every 12 hours  levothyroxine Injectable 75 MICROGram(s) IV Push at bedtime  memantine 10 milliGRAM(s) Oral two times a day  methylPREDNISolone sodium succinate Injectable 20 milliGRAM(s) IV Push daily  metoprolol tartrate Injectable 2.5 milliGRAM(s) IV Push every 8 hours  pantoprazole  Injectable 40 milliGRAM(s) IV Push every 12 hours  simvastatin 10 milliGRAM(s) Oral at bedtime  traZODone 100 milliGRAM(s) Oral at bedtime    MEDICATIONS  (PRN):  aluminum hydroxide/magnesium hydroxide/simethicone Suspension 30 milliLiter(s) Oral every 4 hours PRN Dyspepsia  ibuprofen  Tablet. 400 milliGRAM(s) Oral every 6 hours PRN Temp greater or equal to 38C (100.4F), Mild Pain (1 - 3)  loperamide 2 milliGRAM(s) Oral four times a day PRN Diarrhea  LORazepam   Injectable 0.5 milliGRAM(s) IV Push every 8 hours PRN Anxiety  melatonin 3 milliGRAM(s) Oral at bedtime PRN Insomnia  ondansetron Injectable 4 milliGRAM(s) IV Push every 8 hours PRN Nausea and/or Vomiting      Vital Signs Last 24 Hrs  T(C): 37.1 (28 Oct 2022 12:08), Max: 37.1 (27 Oct 2022 20:08)  T(F): 98.7 (28 Oct 2022 12:08), Max: 98.8 (27 Oct 2022 20:08)  HR: 81 (28 Oct 2022 12:08) (62 - 88)  BP: 98/65 (28 Oct 2022 12:08) (98/65 - 117/71)  BP(mean): --  RR: 17 (28 Oct 2022 12:08) (17 - 17)  SpO2: 98% (28 Oct 2022 12:08) (96% - 98%)    Parameters below as of 28 Oct 2022 12:08  Patient On (Oxygen Delivery Method): nasal cannula  O2 Flow (L/min): 2      I&O's Summary    27 Oct 2022 07:01  -  28 Oct 2022 07:00  --------------------------------------------------------  IN: 0 mL / OUT: 350 mL / NET: -350 mL        PHYSICAL EXAM:  HEENT: NC/AT; PERRLA  Neck: Soft; no tenderness  Lungs: CTA bilaterally; no wheezing.   Heart:  Abdomen:  Genital/ Rectal:  Extremities:  Neurologic:  Vascular:      LABORATORY:    CBC Full  -  ( 28 Oct 2022 08:25 )  WBC Count : 5.83 K/uL  RBC Count : 4.28 M/uL  Hemoglobin : 12.1 g/dL  Hematocrit : 38.9 %  Platelet Count - Automated : 201 K/uL  Mean Cell Volume : 90.9 fl  Mean Cell Hemoglobin : 28.3 pg  Mean Cell Hemoglobin Concentration : 31.1 gm/dL  Auto Neutrophil # : x  Auto Lymphocyte # : x  Auto Monocyte # : x  Auto Eosinophil # : x  Auto Basophil # : x  Auto Neutrophil % : x  Auto Lymphocyte % : x  Auto Monocyte % : x  Auto Eosinophil % : x  Auto Basophil % : x      ESR:                   10-22 @ 06:25  14    C-Reactive Protein:     10-22 @ 06:25  8    Procalcitonin:           10-22 @ 06:25   --  ESR:                   10-21 @ 04:26  --    C-Reactive Protein:     10-21 @ 04:26  --    Procalcitonin:           10-21 @ 04:26   0.03  ESR:                   10-20 @ 20:30  --    C-Reactive Protein:     10-20 @ 20:30  --    Procalcitonin:           10-20 @ 20:30   0.04      10-28    143  |  104  |  7   ----------------------------<  115<H>  3.5   |  34<H>  |  0.51    Ca    8.8      28 Oct 2022 08:25        Rapid Respiratory Viral Panel Result        10-20 @ 10:47  Rapid RVP [facility]  Coronovirus --  Adenovirus --  Bordetella Pertussis --  Chlamydia Pneumonia --  Entero/Rhinovirus--  HKU1 Coronovirus --  HMPV Coronovirus --  Influenza A --  Influenza AH1 --  Influenza AH1 2009 --  Influenza AH3 --  Influenza B --  Mycoplasma Pneumoniae --  NL63 Coronovirus --  OC43 Coronovirus --  Parainfluenza 1 --  Parainfluenza 2 --  Parainfluenza 3 --  Parainfluenza 4 --  Resp Syncytial Virus --      Assessment and Plan:          Je Baez MD   [phone].

## 2022-10-28 NOTE — PROGRESS NOTE ADULT - NSPROGADDITIONALINFOA_GEN_ALL_CORE
I spoke with son  I explained if she is able to tolerate fulls and puree then she may not need surgery as it is high risk and she is a poor surgical candidate and therefore would avoid peg
Medically optimized for OR -PEG placement and cardiology clearance requested ( discussed with ) Spoke to the son Favio on a phone ,plan of care discussed in length. Case d/w GI team and thoracic surgeon 
MEDICALLY OPTIMIZED FOR UPPER ENDOSCOPY AND CLEARED BY CARDIOLOGIST

## 2022-10-28 NOTE — PROGRESS NOTE ADULT - SUBJECTIVE AND OBJECTIVE BOX
Date/Time Patient Seen:  		  Referring MD:   Data Reviewed	       Patient is a 82y old  Female who presents with a chief complaint of SOB AND COUGH (27 Oct 2022 19:29)      Subjective/HPI     PAST MEDICAL & SURGICAL HISTORY:  Afib    History of COPD    HTN (hypertension)    Hypothyroid    GERD (gastroesophageal reflux disease)    Esophageal diverticulum    History of COPD    Insomnia    Mood disorder    Dementia    Spasm of muscle    Constipation    Hypothyroidism    GERD (gastroesophageal reflux disease)    HTN (hypertension)    CHF, chronic    Presence of IVC filter          Medication list         MEDICATIONS  (STANDING):  albuterol/ipratropium for Nebulization 3 milliLiter(s) Nebulizer every 6 hours  baclofen 5 milliGRAM(s) Oral every 12 hours  buDESOnide    Inhalation Suspension 0.5 milliGRAM(s) Inhalation two times a day  donepezil 10 milliGRAM(s) Oral at bedtime  DULoxetine 60 milliGRAM(s) Oral daily  enoxaparin Injectable 40 milliGRAM(s) SubCutaneous every 24 hours  guaiFENesin  milliGRAM(s) Oral every 12 hours  levothyroxine Injectable 75 MICROGram(s) IV Push at bedtime  memantine 10 milliGRAM(s) Oral two times a day  methylPREDNISolone sodium succinate Injectable 20 milliGRAM(s) IV Push daily  metoprolol tartrate Injectable 2.5 milliGRAM(s) IV Push every 8 hours  pantoprazole  Injectable 40 milliGRAM(s) IV Push every 12 hours  simvastatin 10 milliGRAM(s) Oral at bedtime  traZODone 100 milliGRAM(s) Oral at bedtime    MEDICATIONS  (PRN):  aluminum hydroxide/magnesium hydroxide/simethicone Suspension 30 milliLiter(s) Oral every 4 hours PRN Dyspepsia  ibuprofen  Tablet. 400 milliGRAM(s) Oral every 6 hours PRN Temp greater or equal to 38C (100.4F), Mild Pain (1 - 3)  loperamide 2 milliGRAM(s) Oral four times a day PRN Diarrhea  LORazepam   Injectable 0.5 milliGRAM(s) IV Push every 8 hours PRN Anxiety  melatonin 3 milliGRAM(s) Oral at bedtime PRN Insomnia  ondansetron Injectable 4 milliGRAM(s) IV Push every 8 hours PRN Nausea and/or Vomiting         Vitals log        ICU Vital Signs Last 24 Hrs  T(C): 36.6 (28 Oct 2022 05:07), Max: 37.1 (27 Oct 2022 20:08)  T(F): 97.8 (28 Oct 2022 05:07), Max: 98.8 (27 Oct 2022 20:08)  HR: 62 (28 Oct 2022 05:07) (62 - 100)  BP: 117/71 (28 Oct 2022 05:07) (101/62 - 117/71)  BP(mean): --  ABP: --  ABP(mean): --  RR: 17 (28 Oct 2022 05:07) (16 - 17)  SpO2: 98% (28 Oct 2022 05:07) (94% - 99%)    O2 Parameters below as of 28 Oct 2022 05:07  Patient On (Oxygen Delivery Method): nasal cannula                 Input and Output:  I&O's Detail    26 Oct 2022 07:01  -  27 Oct 2022 07:00  --------------------------------------------------------  IN:    dextrose 5%: 320 mL    dextrose 5% + sodium chloride 0.45%: 120 mL  Total IN: 440 mL    OUT:  Total OUT: 0 mL    Total NET: 440 mL          Lab Data                        13.2   4.73  )-----------( 206      ( 27 Oct 2022 08:37 )             41.7     10-27    144  |  106  |  5<L>  ----------------------------<  136<H>  3.3<L>   |  32<H>  |  0.36<L>    Ca    9.1      27 Oct 2022 08:37  Phos  3.2     10-26  Mg     2.1     10-26    TPro  6.2  /  Alb  3.0<L>  /  TBili  0.6  /  DBili  x   /  AST  14<L>  /  ALT  16  /  AlkPhos  63  10-26            Review of Systems	      Objective     Physical Examination    heart s1s2  lung dec BS  head nc      Pertinent Lab findings & Imaging      Gela:  NO   Adequate UO     I&O's Detail    26 Oct 2022 07:01  -  27 Oct 2022 07:00  --------------------------------------------------------  IN:    dextrose 5%: 320 mL    dextrose 5% + sodium chloride 0.45%: 120 mL  Total IN: 440 mL    OUT:  Total OUT: 0 mL    Total NET: 440 mL               Discussed with:     Cultures:	        Radiology

## 2022-10-28 NOTE — PROGRESS NOTE ADULT - SUBJECTIVE AND OBJECTIVE BOX
PROGRESS NOTE  Patient is a 82y old  Female who presents with a chief complaint of SOB AND COUGH (28 Oct 2022 18:39)    Chart and available morning labs /imaging are reviewed electronically , urgent issues addressed . More information  is being added upon completion of rounds , when more information is collected and management discussed with consultants , medical staff and social service/case management on the floor   OVERNIGHT  No new issues reported by medical staff . All above noted Patient is resting in a bed comfortably ..No distress noted     HPI:  Patient brought in by EMS from [facility] for shortness of breath cough wheezing for 2 days.  Patient denies fevers chills headache chest pain abdominal pain vomiting Admit for antibacterial managmnet ,intravenous steroids,nebulised bronchodilators and pulmonology evaluation,O2 supply,serial labs and chest xrays,obtain ECHO to evaluate LVEF and rule out chf component Admitted  to telemetry unit for monitoring , send 3 sets of cardiac enzymes to rule out acute coronary event, obtain ECHO to evaluate LVEF, cardiology consult  ,continue current management, O2 supply, anticoagulation plan as per cardiology consult Palliative care consult requested ,to discuss advance directives and complete MOLST  (20 Oct 2022 13:54)    PAST MEDICAL & SURGICAL HISTORY:  Afib      History of COPD      HTN (hypertension)      Hypothyroid      GERD (gastroesophageal reflux disease)      Esophageal diverticulum      History of COPD      Insomnia      Mood disorder      Dementia      Spasm of muscle      Constipation      Hypothyroidism      GERD (gastroesophageal reflux disease)      HTN (hypertension)      CHF, chronic      Presence of IVC filter          MEDICATIONS  (STANDING):  albuterol/ipratropium for Nebulization 3 milliLiter(s) Nebulizer every 6 hours  baclofen 5 milliGRAM(s) Oral every 12 hours  buDESOnide    Inhalation Suspension 0.5 milliGRAM(s) Inhalation two times a day  donepezil 10 milliGRAM(s) Oral at bedtime  DULoxetine 60 milliGRAM(s) Oral daily  enoxaparin Injectable 40 milliGRAM(s) SubCutaneous every 24 hours  guaiFENesin  milliGRAM(s) Oral every 12 hours  levothyroxine Injectable 75 MICROGram(s) IV Push at bedtime  memantine 10 milliGRAM(s) Oral two times a day  methylPREDNISolone sodium succinate Injectable 20 milliGRAM(s) IV Push daily  metoprolol tartrate Injectable 2.5 milliGRAM(s) IV Push every 8 hours  pantoprazole  Injectable 40 milliGRAM(s) IV Push every 12 hours  simvastatin 10 milliGRAM(s) Oral at bedtime  traZODone 100 milliGRAM(s) Oral at bedtime    MEDICATIONS  (PRN):  aluminum hydroxide/magnesium hydroxide/simethicone Suspension 30 milliLiter(s) Oral every 4 hours PRN Dyspepsia  ibuprofen  Tablet. 400 milliGRAM(s) Oral every 6 hours PRN Temp greater or equal to 38C (100.4F), Mild Pain (1 - 3)  loperamide 2 milliGRAM(s) Oral four times a day PRN Diarrhea  LORazepam   Injectable 0.5 milliGRAM(s) IV Push every 8 hours PRN Anxiety  melatonin 3 milliGRAM(s) Oral at bedtime PRN Insomnia  ondansetron Injectable 4 milliGRAM(s) IV Push every 8 hours PRN Nausea and/or Vomiting      OBJECTIVE    T(C): 36.9 (10-28-22 @ 19:31), Max: 37.1 (10-28-22 @ 12:08)  HR: 87 (10-28-22 @ 19:31) (62 - 87)  BP: 110/49 (10-28-22 @ 19:31) (98/65 - 117/71)  RR: 17 (10-28-22 @ 19:31) (17 - 17)  SpO2: 95% (10-28-22 @ 19:31) (95% - 98%)  Wt(kg): --  I&O's Summary    27 Oct 2022 07:01  -  28 Oct 2022 07:00  --------------------------------------------------------  IN: 0 mL / OUT: 350 mL / NET: -350 mL          REVIEW OF SYSTEMS:  CONSTITUTIONAL: No fever, weight loss, or fatigue  EYES: No eye pain, visual disturbances, or discharge  ENMT:   No sinus or throat pain  NECK: No pain or stiffness  RESPIRATORY: No cough, wheezing, chills or hemoptysis; No shortness of breath  CARDIOVASCULAR: No chest pain, palpitations, dizziness, or leg swelling  GASTROINTESTINAL: No abdominal pain. No nausea, vomiting; No diarrhea or constipation. No melena or hematochezia.  GENITOURINARY: No dysuria, frequency, hematuria, or incontinence  NEUROLOGICAL: No headaches, memory loss, loss of strength, numbness, or tremors  SKIN: No itching, burning, rashes, or lesions   MUSCULOSKELETAL: No joint pain or swelling; No muscle, back, or extremity pain    PHYSICAL EXAM:  Appearance: NAD. VS past 24 hrs -as above   HEENT:   Moist oral mucosa. Conjunctiva clear b/l.   Neck : supple  Respiratory: Lungs CTAB.  Gastrointestinal:  Soft, nontender. No rebound. No rigidity. BS present	  Cardiovascular: RRR ,S1S2 present  Neurologic: Non-focal. Moving all extremities.  Extremities: No edema. No erythema. No calf tenderness.  Skin: No rashes, No ecchymoses, No cyanosis.	  wounds ,skin lesions-See skin assesment flow sheet   LABS:                        12.1   5.83  )-----------( 201      ( 28 Oct 2022 08:25 )             38.9     10-28    143  |  104  |  7   ----------------------------<  115<H>  3.5   |  34<H>  |  0.51    Ca    8.8      28 Oct 2022 08:25      CAPILLARY BLOOD GLUCOSE        PT/INR - ( 28 Oct 2022 08:25 )   PT: 13.1 sec;   INR: 1.12 ratio               Culture - Urine (collected 21 Oct 2022 07:40)  Source: Clean Catch Clean Catch (Midstream)  Final Report (22 Oct 2022 10:35):    <10,000 CFU/mL Normal Urogenital Consuelo    Culture - Blood (collected 20 Oct 2022 10:40)  Source: .Blood Blood-Peripheral  Final Report (25 Oct 2022 17:01):    No Growth Final    Culture - Blood (collected 20 Oct 2022 10:30)  Source: .Blood Blood-Peripheral  Final Report (25 Oct 2022 17:01):    No Growth Final      RADIOLOGY & ADDITIONAL TESTS:   reviewed elctronically  ASSESSMENT/PLAN: 	    25 minutes aggregate time was spent on this visit, 50% visit time spent in care co-ordination with other attendings and counselling patient .I have discussed care plan with patient / HCP/family member ,who expressed understanding of problems treatment and their effect and side effects, alternatives in details. I have asked if they have any questions and concerns and appropriately addressed them to best of my ability.

## 2022-10-28 NOTE — PROGRESS NOTE ADULT - SUBJECTIVE AND OBJECTIVE BOX
ROXIE LAZAR    [facility] [address]    Allergies    sulfa drugs (Unknown)  Sulfur Colliod (Rash)  Tylenol (Swelling)  Tylenol (Unknown)    Intolerances        PAST MEDICAL & SURGICAL HISTORY:  Afib      History of COPD      HTN (hypertension)      Hypothyroid      GERD (gastroesophageal reflux disease)      Esophageal diverticulum      History of COPD      Insomnia      Mood disorder      Dementia      Spasm of muscle      Constipation      Hypothyroidism      GERD (gastroesophageal reflux disease)      HTN (hypertension)      CHF, chronic      Presence of IVC filter          FAMILY HISTORY:      Home Medications:  acetaminophen 325 mg oral tablet: 2 tab(s) orally every 6 hours, As Needed for pain  (27 Oct 2022 14:12)  Albuterol (Eqv-ProAir HFA) 90 mcg/inh inhalation aerosol: 2 puff(s) inhaled every 6 hours, As Needed for wheezing (05 Oct 2021 11:37)  baclofen 10 mg oral tablet: 0.5 tab(s) orally 2 times a day (27 Oct 2022 14:12)  baclofen 5 mg oral tablet: 1 tab(s) orally 2 times a day (10 Sep 2021 15:54)  bisacodyl 10 mg rectal suppository: 1 suppository(ies) rectal once a day, As needed, Constipation (05 Oct 2021 11:07)  Cymbalta 60 mg oral delayed release capsule: 1 cap(s) orally once a day (10 Sep 2021 15:54)  Cymbalta 60 mg oral delayed release capsule: 1 cap(s) orally once a day (27 Oct 2022 14:12)  donepezil 10 mg oral tablet: 1 tab(s) orally once a day (at bedtime) (27 Oct 2022 14:12)  donepezil 10 mg oral tablet: 1 tab(s) orally once a day (at bedtime) (05 Oct 2021 11:07)  Eucerin topical cream: Apply topically to affected area once a day (at bedtime) (27 Oct 2022 14:12)  furosemide 40 mg oral tablet: 0.5 tab(s) orally once a day (27 Oct 2022 14:12)  ipratropium-albuterol 0.5 mg-2.5 mg/3 mL inhalation solution: 3 milliliter(s) inhaled 4 times a day (27 Oct 2022 14:12)  levothyroxine 150 mcg (0.15 mg) oral capsule: 1 cap(s) orally once a day (27 Oct 2022 14:12)  levothyroxine 150 mcg (0.15 mg) oral tablet: 1 tab(s) orally once a day (05 Oct 2021 11:07)  Linzess 290 mcg oral capsule: 1 cap(s) orally once a day (10 Sep 2021 15:54)  Linzess 290 mcg oral capsule: 1 cap(s) orally once a day (27 Oct 2022 14:12)  melatonin 3 mg oral tablet: 1 tab(s) orally once a day (at bedtime) (27 Oct 2022 14:12)  memantine 10 mg oral tablet: 1 tab(s) orally every 12 hours (05 Oct 2021 11:07)  Metoprolol Tartrate 25 mg oral tablet: 1.5 tab(s) orally 2 times a day (27 Oct 2022 14:12)  metoprolol tartrate 37.5 mg oral tablet: 1 tab(s) orally 2 times a day (05 Oct 2021 11:07)  Namenda 10 mg oral tablet: 2 tab(s) orally once a day (at bedtime) (27 Oct 2022 14:12)  omeprazole 40 mg oral delayed release capsule: 1 cap(s) orally once a day (27 Oct 2022 14:12)  ondansetron 4 mg oral tablet: 1 tab(s) orally once a day (27 Oct 2022 14:12)  pantoprazole 40 mg oral delayed release tablet: 1 tab(s) orally once a day (05 Oct 2021 11:37)  polyethylene glycol 3350 oral powder for reconstitution: 17 gram(s) orally 2 times a day (05 Oct 2021 11:07)  ProAir HFA 90 mcg/inh inhalation aerosol: 2 puff(s) inhaled every 6 hours, As Needed (27 Oct 2022 14:12)  Senna 8.6 mg oral tablet: 2 tab(s) orally once a day (at bedtime) (27 Oct 2022 14:12)  senna oral tablet: 2 tab(s) orally once a day (at bedtime) (05 Oct 2021 11:07)  Spiriva 18 mcg inhalation capsule: 1 cap(s) inhaled once a day (10 Sep 2021 15:54)  Symbicort 80 mcg-4.5 mcg/inh inhalation aerosol: 2 puff(s) inhaled 2 times a day (10 Sep 2021 15:54)  traZODone 100 mg oral tablet: 1 tab(s) orally every 12 hours (05 Oct 2021 11:07)  traZODone 100 mg oral tablet: 1 tab(s) orally once a day (at bedtime) (27 Oct 2022 14:12)  zinc oxide topical ointment: Apply topically to affected area once a day and as needed  (27 Oct 2022 14:12)  Zocor 10 mg oral tablet: 1 tab(s) orally once a day (at bedtime) (27 Oct 2022 14:12)  Zocor 10 mg oral tablet: 1 tab(s) orally once a day (at bedtime) (10 Sep 2021 15:54)  ZyrTEC 10 mg oral tablet: 1 tab(s) orally once a day (27 Oct 2022 14:12)      MEDICATIONS  (STANDING):  albuterol/ipratropium for Nebulization 3 milliLiter(s) Nebulizer every 6 hours  baclofen 5 milliGRAM(s) Oral every 12 hours  buDESOnide    Inhalation Suspension 0.5 milliGRAM(s) Inhalation two times a day  donepezil 10 milliGRAM(s) Oral at bedtime  DULoxetine 60 milliGRAM(s) Oral daily  enoxaparin Injectable 40 milliGRAM(s) SubCutaneous every 24 hours  guaiFENesin  milliGRAM(s) Oral every 12 hours  levothyroxine Injectable 75 MICROGram(s) IV Push at bedtime  memantine 10 milliGRAM(s) Oral two times a day  methylPREDNISolone sodium succinate Injectable 20 milliGRAM(s) IV Push daily  metoprolol tartrate Injectable 2.5 milliGRAM(s) IV Push every 8 hours  pantoprazole  Injectable 40 milliGRAM(s) IV Push every 12 hours  simvastatin 10 milliGRAM(s) Oral at bedtime  traZODone 100 milliGRAM(s) Oral at bedtime    MEDICATIONS  (PRN):  aluminum hydroxide/magnesium hydroxide/simethicone Suspension 30 milliLiter(s) Oral every 4 hours PRN Dyspepsia  ibuprofen  Tablet. 400 milliGRAM(s) Oral every 6 hours PRN Temp greater or equal to 38C (100.4F), Mild Pain (1 - 3)  loperamide 2 milliGRAM(s) Oral four times a day PRN Diarrhea  LORazepam   Injectable 0.5 milliGRAM(s) IV Push every 8 hours PRN Anxiety  melatonin 3 milliGRAM(s) Oral at bedtime PRN Insomnia  ondansetron Injectable 4 milliGRAM(s) IV Push every 8 hours PRN Nausea and/or Vomiting              Vital Signs Last 24 Hrs  T(C): 36.6 (28 Oct 2022 05:07), Max: 37.1 (27 Oct 2022 20:08)  T(F): 97.8 (28 Oct 2022 05:07), Max: 98.8 (27 Oct 2022 20:08)  HR: 62 (28 Oct 2022 05:07) (62 - 100)  BP: 117/71 (28 Oct 2022 05:07) (101/62 - 117/71)  BP(mean): --  RR: 17 (28 Oct 2022 05:07) (16 - 17)  SpO2: 98% (28 Oct 2022 05:07) (94% - 99%)    Parameters below as of 28 Oct 2022 05:07  Patient On (Oxygen Delivery Method): nasal cannula          10-27-22 @ 07:01  -  10-28-22 @ 07:00  --------------------------------------------------------  IN: 0 mL / OUT: 350 mL / NET: -350 mL              LABS:                        13.2   4.73  )-----------( 206      ( 27 Oct 2022 08:37 )             41.7     10-27    144  |  106  |  5<L>  ----------------------------<  136<H>  3.3<L>   |  32<H>  |  0.36<L>    Ca    9.1      27 Oct 2022 08:37  Phos  3.2     10-26  Mg     2.1     10-26    TPro  6.2  /  Alb  3.0<L>  /  TBili  0.6  /  DBili  x   /  AST  14<L>  /  ALT  16  /  AlkPhos  63  10-26              WBC:  WBC Count: 4.73 K/uL (10-27 @ 08:37)  WBC Count: 4.76 K/uL (10-26 @ 07:38)  WBC Count: 6.81 K/uL (10-25 @ 08:10)  WBC Count: 7.50 K/uL (10-24 @ 08:20)      MICROBIOLOGY:  RECENT CULTURES:  10-21 Clean Catch Clean Catch (Midstream) XXXX XXXX   <10,000 CFU/mL Normal Urogenital Consuelo                    Sodium:  Sodium, Serum: 144 mmol/L (10-27 @ 08:37)  Sodium, Serum: 148 mmol/L (10-26 @ 07:38)  Sodium, Serum: 142 mmol/L (10-25 @ 08:10)  Sodium, Serum: 140 mmol/L (10-24 @ 08:20)      0.36 mg/dL 10-27 @ 08:37  0.47 mg/dL 10-26 @ 07:38  0.41 mg/dL 10-25 @ 08:10  0.39 mg/dL 10-24 @ 08:20      Hemoglobin:  Hemoglobin: 13.2 g/dL (10-27 @ 08:37)  Hemoglobin: 12.6 g/dL (10-26 @ 07:38)  Hemoglobin: 12.7 g/dL (10-25 @ 08:10)  Hemoglobin: 12.2 g/dL (10-24 @ 08:20)      Platelets: Platelet Count - Automated: 206 K/uL (10-27 @ 08:37)  Platelet Count - Automated: 201 K/uL (10-26 @ 07:38)  Platelet Count - Automated: 190 K/uL (10-25 @ 08:10)  Platelet Count - Automated: 196 K/uL (10-24 @ 08:20)      LIVER FUNCTIONS - ( 26 Oct 2022 07:38 )  Alb: 3.0 g/dL / Pro: 6.2 g/dL / ALK PHOS: 63 U/L / ALT: 16 U/L / AST: 14 U/L / GGT: x                 RADIOLOGY & ADDITIONAL STUDIES:      MICROBIOLOGY:  RECENT CULTURES:  10-21 Clean Catch Clean Catch (Midstream) XXXX XXXX   <10,000 CFU/mL Normal Urogenital Consuelo

## 2022-10-29 LAB
ANION GAP SERPL CALC-SCNC: 5 MMOL/L — SIGNIFICANT CHANGE UP (ref 5–17)
BUN SERPL-MCNC: 7 MG/DL — SIGNIFICANT CHANGE UP (ref 7–23)
CALCIUM SERPL-MCNC: 9.1 MG/DL — SIGNIFICANT CHANGE UP (ref 8.5–10.1)
CHLORIDE SERPL-SCNC: 104 MMOL/L — SIGNIFICANT CHANGE UP (ref 96–108)
CO2 SERPL-SCNC: 33 MMOL/L — HIGH (ref 22–31)
CREAT SERPL-MCNC: 0.36 MG/DL — LOW (ref 0.5–1.3)
EGFR: 100 ML/MIN/1.73M2 — SIGNIFICANT CHANGE UP
GLUCOSE SERPL-MCNC: 104 MG/DL — HIGH (ref 70–99)
HCT VFR BLD CALC: 40.3 % — SIGNIFICANT CHANGE UP (ref 34.5–45)
HGB BLD-MCNC: 12.6 G/DL — SIGNIFICANT CHANGE UP (ref 11.5–15.5)
MCHC RBC-ENTMCNC: 28.6 PG — SIGNIFICANT CHANGE UP (ref 27–34)
MCHC RBC-ENTMCNC: 31.3 GM/DL — LOW (ref 32–36)
MCV RBC AUTO: 91.4 FL — SIGNIFICANT CHANGE UP (ref 80–100)
NRBC # BLD: 0 /100 WBCS — SIGNIFICANT CHANGE UP (ref 0–0)
PLATELET # BLD AUTO: 200 K/UL — SIGNIFICANT CHANGE UP (ref 150–400)
POTASSIUM SERPL-MCNC: 3.8 MMOL/L — SIGNIFICANT CHANGE UP (ref 3.5–5.3)
POTASSIUM SERPL-SCNC: 3.8 MMOL/L — SIGNIFICANT CHANGE UP (ref 3.5–5.3)
RBC # BLD: 4.41 M/UL — SIGNIFICANT CHANGE UP (ref 3.8–5.2)
RBC # FLD: 14.7 % — HIGH (ref 10.3–14.5)
SODIUM SERPL-SCNC: 142 MMOL/L — SIGNIFICANT CHANGE UP (ref 135–145)
WBC # BLD: 5.9 K/UL — SIGNIFICANT CHANGE UP (ref 3.8–10.5)
WBC # FLD AUTO: 5.9 K/UL — SIGNIFICANT CHANGE UP (ref 3.8–10.5)

## 2022-10-29 PROCEDURE — 99233 SBSQ HOSP IP/OBS HIGH 50: CPT

## 2022-10-29 RX ADMIN — Medication 5 MILLIGRAM(S): at 05:20

## 2022-10-29 RX ADMIN — Medication 2.5 MILLIGRAM(S): at 05:21

## 2022-10-29 RX ADMIN — Medication 75 MICROGRAM(S): at 05:25

## 2022-10-29 RX ADMIN — Medication 0.5 MILLIGRAM(S): at 21:47

## 2022-10-29 RX ADMIN — Medication 600 MILLIGRAM(S): at 17:42

## 2022-10-29 RX ADMIN — Medication 100 MILLIGRAM(S): at 21:35

## 2022-10-29 RX ADMIN — Medication 2 MILLIGRAM(S): at 14:47

## 2022-10-29 RX ADMIN — MEMANTINE HYDROCHLORIDE 10 MILLIGRAM(S): 10 TABLET ORAL at 05:20

## 2022-10-29 RX ADMIN — PANTOPRAZOLE SODIUM 40 MILLIGRAM(S): 20 TABLET, DELAYED RELEASE ORAL at 17:42

## 2022-10-29 RX ADMIN — Medication 5 MILLIGRAM(S): at 17:42

## 2022-10-29 RX ADMIN — Medication 3 MILLILITER(S): at 21:48

## 2022-10-29 RX ADMIN — Medication 600 MILLIGRAM(S): at 05:20

## 2022-10-29 RX ADMIN — MEMANTINE HYDROCHLORIDE 10 MILLIGRAM(S): 10 TABLET ORAL at 17:42

## 2022-10-29 RX ADMIN — DONEPEZIL HYDROCHLORIDE 10 MILLIGRAM(S): 10 TABLET, FILM COATED ORAL at 21:35

## 2022-10-29 RX ADMIN — Medication 20 MILLIGRAM(S): at 05:20

## 2022-10-29 RX ADMIN — PANTOPRAZOLE SODIUM 40 MILLIGRAM(S): 20 TABLET, DELAYED RELEASE ORAL at 05:27

## 2022-10-29 RX ADMIN — Medication 75 MICROGRAM(S): at 21:47

## 2022-10-29 RX ADMIN — ENOXAPARIN SODIUM 40 MILLIGRAM(S): 100 INJECTION SUBCUTANEOUS at 21:35

## 2022-10-29 RX ADMIN — SIMVASTATIN 10 MILLIGRAM(S): 20 TABLET, FILM COATED ORAL at 21:34

## 2022-10-29 RX ADMIN — DULOXETINE HYDROCHLORIDE 60 MILLIGRAM(S): 30 CAPSULE, DELAYED RELEASE ORAL at 11:03

## 2022-10-29 RX ADMIN — Medication 400 MILLIGRAM(S): at 14:46

## 2022-10-29 RX ADMIN — Medication 2.5 MILLIGRAM(S): at 22:38

## 2022-10-29 NOTE — PROGRESS NOTE ADULT - SUBJECTIVE AND OBJECTIVE BOX
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 for shortness of breath cough wheezing for 2 days.  Patient denies fevers chills headache chest pain abdominal pain vomiting Admit for antibacterial managmnet ,intravenous steroids,nebulised bronchodilators and pulmonology evaluation,O2 supply,serial labs and chest xrays,obtain ECHO to evaluate LVEF and rule out chf component Admitted  to telemetry unit for monitoring , send 3 sets of cardiac enzymes to rule out acute coronary event, obtain ECHO to evaluate LVEF, cardiology consult  ,continue current management, O2 supply, anticoagulation plan as per cardiology consult Palliative care consult requested ,to discuss advance directives and complete MOLST  (20 Oct 2022 13:54)      REVIEW OF SYSTEMS:    CONSTITUTIONAL: No fever, weight loss, or fatigue  EYES: No eye pain, visual disturbances, or discharge  ENMT:  No difficulty hearing, tinnitus, vertigo; No sinus or throat pain  NECK: No pain or stiffness  BREASTS: No pain, masses, or nipple discharge  RESPIRATORY: No cough, wheezing, chills or hemoptysis; No shortness of breath  CARDIOVASCULAR: No chest pain, palpitations, dizziness, or leg swelling  GASTROINTESTINAL: No abdominal or epigastric pain. No nausea, vomiting, or hematemesis; No diarrhea or constipation. No melena or hematochezia.  GENITOURINARY: No dysuria, frequency, hematuria, or incontinence  NEUROLOGICAL: No headaches, memory loss, loss of strength, numbness, or tremors  SKIN: No itching, burning, rashes, or lesions   LYMPH NODES: No enlarged glands  ENDOCRINE: No heat or cold intolerance; No hair loss  MUSCULOSKELETAL: No joint pain or swelling; No muscle, back, or extremity pain  PSYCHIATRIC: No depression, anxiety, mood swings, or difficulty sleeping  HEME/LYMPH: No easy bruising, or bleeding gums  ALLERGY AND IMMUNOLOGIC: No hives or eczema    MEDICATIONS  (STANDING):  albuterol/ipratropium for Nebulization 3 milliLiter(s) Nebulizer every 6 hours  baclofen 5 milliGRAM(s) Oral every 12 hours  buDESOnide    Inhalation Suspension 0.5 milliGRAM(s) Inhalation two times a day  donepezil 10 milliGRAM(s) Oral at bedtime  DULoxetine 60 milliGRAM(s) Oral daily  enoxaparin Injectable 40 milliGRAM(s) SubCutaneous every 24 hours  guaiFENesin  milliGRAM(s) Oral every 12 hours  levothyroxine Injectable 75 MICROGram(s) IV Push at bedtime  memantine 10 milliGRAM(s) Oral two times a day  methylPREDNISolone sodium succinate Injectable 20 milliGRAM(s) IV Push daily  metoprolol tartrate Injectable 2.5 milliGRAM(s) IV Push every 8 hours  pantoprazole  Injectable 40 milliGRAM(s) IV Push every 12 hours  simvastatin 10 milliGRAM(s) Oral at bedtime  traZODone 100 milliGRAM(s) Oral at bedtime    MEDICATIONS  (PRN):  aluminum hydroxide/magnesium hydroxide/simethicone Suspension 30 milliLiter(s) Oral every 4 hours PRN Dyspepsia  ibuprofen  Tablet. 400 milliGRAM(s) Oral every 6 hours PRN Temp greater or equal to 38C (100.4F), Mild Pain (1 - 3)  loperamide 2 milliGRAM(s) Oral four times a day PRN Diarrhea  LORazepam   Injectable 0.5 milliGRAM(s) IV Push every 8 hours PRN Anxiety  melatonin 3 milliGRAM(s) Oral at bedtime PRN Insomnia  ondansetron Injectable 4 milliGRAM(s) IV Push every 8 hours PRN Nausea and/or Vomiting      ALLERGIES: sulfa drugs (Unknown)  Sulfur Colliod (Rash)  Tylenol (Swelling)  Tylenol (Unknown)      FAMILY HISTORY:      PHYSICAL EXAMINATION:  -----------------------------  T(C): 37 (10-29-22 @ 05:24), Max: 37.1 (10-28-22 @ 12:08)  HR: 62 (10-29-22 @ 05:24) (62 - 87)  BP: 106/70 (10-29-22 @ 05:24) (98/65 - 116/74)  RR: 18 (10-29-22 @ 05:24) (17 - 18)  SpO2: 99% (10-29-22 @ 05:24) (95% - 99%)  Wt(kg): --        VITALS  T(C): 37 (10-29-22 @ 05:24), Max: 37.1 (10-28-22 @ 12:08)  HR: 62 (10-29-22 @ 05:24) (62 - 87)  BP: 106/70 (10-29-22 @ 05:24) (98/65 - 116/74)  RR: 18 (10-29-22 @ 05:24) (17 - 18)  SpO2: 99% (10-29-22 @ 05:24) (95% - 99%)    Constitutional: well developed, normal appearance, well groomed, well nourished, no deformities and no acute distress.   Eyes: the conjunctiva exhibited no abnormalities and the eyelids demonstrated no xanthelasmas.   HEENT: normal oral mucosa, no oral pallor and no oral cyanosis.   Neck: normal jugular venous A waves present, normal jugular venous V waves present and no jugular venous oliveira A waves.   Pulmonary: no respiratory distress, normal respiratory rhythm and effort, no accessory muscle use and lungs were clear to auscultation bilaterally.   Cardiovascular: heart rate and rhythm were normal, normal S1 and S2 and no murmur, gallop, rub, heave or thrill are present.   Abdomen: soft, non-tender, no hepato-splenomegaly and no abdominal mass palpated.   Musculoskeletal: the gait could not be assessed..   Extremities: no clubbing of the fingernails, no localized cyanosis, no petechial hemorrhages and no ischemic changes.   Skin: normal skin color and pigmentation, no rash, no venous stasis, no skin lesions, no skin ulcer and no xanthoma was observed.   Psychiatric: oriented to person, place, and time, the affect was normal, the mood was normal and not feeling anxious.     LABS:   --------  10-28    143  |  104  |  7   ----------------------------<  115<H>  3.5   |  34<H>  |  0.51    Ca    8.8      28 Oct 2022 08:25                           12.1   5.83  )-----------( 201      ( 28 Oct 2022 08:25 )             38.9     PT/INR - ( 28 Oct 2022 08:25 )   PT: 13.1 sec;   INR: 1.12 ratio                     RADIOLOGY:  -----------------    ECG:     ECHO:

## 2022-10-29 NOTE — PROGRESS NOTE ADULT - SUBJECTIVE AND OBJECTIVE BOX
PROGRESS NOTE  Patient is a 82y old  Female who presents with a chief complaint of SOB AND COUGH (29 Oct 2022 07:48)    Chart and available morning labs /imaging are reviewed electronically , urgent issues addressed . More information  is being added upon completion of rounds , when more information is collected and management discussed with consultants , medical staff and social service/case management on the floor   OVERNIGHT      HPI:  Patient brought in by EMS from [facility] for shortness of breath cough wheezing for 2 days.  Patient denies fevers chills headache chest pain abdominal pain vomiting Admit for antibacterial managmnet ,intravenous steroids,nebulised bronchodilators and pulmonology evaluation,O2 supply,serial labs and chest xrays,obtain ECHO to evaluate LVEF and rule out chf component Admitted  to telemetry unit for monitoring , send 3 sets of cardiac enzymes to rule out acute coronary event, obtain ECHO to evaluate LVEF, cardiology consult  ,continue current management, O2 supply, anticoagulation plan as per cardiology consult Palliative care consult requested ,to discuss advance directives and complete MOLST  (20 Oct 2022 13:54)    PAST MEDICAL & SURGICAL HISTORY:  Afib      History of COPD      HTN (hypertension)      Hypothyroid      GERD (gastroesophageal reflux disease)      Esophageal diverticulum      History of COPD      Insomnia      Mood disorder      Dementia      Spasm of muscle      Constipation      Hypothyroidism      GERD (gastroesophageal reflux disease)      HTN (hypertension)      CHF, chronic      Presence of IVC filter          MEDICATIONS  (STANDING):  albuterol/ipratropium for Nebulization 3 milliLiter(s) Nebulizer every 6 hours  baclofen 5 milliGRAM(s) Oral every 12 hours  buDESOnide    Inhalation Suspension 0.5 milliGRAM(s) Inhalation two times a day  donepezil 10 milliGRAM(s) Oral at bedtime  DULoxetine 60 milliGRAM(s) Oral daily  enoxaparin Injectable 40 milliGRAM(s) SubCutaneous every 24 hours  guaiFENesin  milliGRAM(s) Oral every 12 hours  levothyroxine Injectable 75 MICROGram(s) IV Push at bedtime  memantine 10 milliGRAM(s) Oral two times a day  methylPREDNISolone sodium succinate Injectable 20 milliGRAM(s) IV Push daily  metoprolol tartrate Injectable 2.5 milliGRAM(s) IV Push every 8 hours  pantoprazole  Injectable 40 milliGRAM(s) IV Push every 12 hours  simvastatin 10 milliGRAM(s) Oral at bedtime  traZODone 100 milliGRAM(s) Oral at bedtime    MEDICATIONS  (PRN):  aluminum hydroxide/magnesium hydroxide/simethicone Suspension 30 milliLiter(s) Oral every 4 hours PRN Dyspepsia  ibuprofen  Tablet. 400 milliGRAM(s) Oral every 6 hours PRN Temp greater or equal to 38C (100.4F), Mild Pain (1 - 3)  loperamide 2 milliGRAM(s) Oral four times a day PRN Diarrhea  LORazepam   Injectable 0.5 milliGRAM(s) IV Push every 8 hours PRN Anxiety  melatonin 3 milliGRAM(s) Oral at bedtime PRN Insomnia  ondansetron Injectable 4 milliGRAM(s) IV Push every 8 hours PRN Nausea and/or Vomiting      OBJECTIVE    T(C): 37 (10-29-22 @ 05:24), Max: 37.1 (10-28-22 @ 12:08)  HR: 62 (10-29-22 @ 05:24) (62 - 87)  BP: 106/70 (10-29-22 @ 05:24) (98/65 - 116/74)  RR: 18 (10-29-22 @ 05:24) (17 - 18)  SpO2: 99% (10-29-22 @ 05:24) (95% - 99%)  Wt(kg): --  I&O's Summary  REVIEW OF SYSTEMS:  Chart and available morning labs /imaging are reviewed electronically , urgent issues addressed . More information  is being added upon completion of rounds , when more information is collected and management discussed with consultants , medical staff and social service/case management on the floor   PHYSICAL EXAM:  Due to COVID pandemic please refer to following -pulmonology/internal medicine progress note ( d/w )                         12.1   5.83  )-----------( 201      ( 28 Oct 2022 08:25 )             38.9     10-28    143  |  104  |  7   ----------------------------<  115<H>  3.5   |  34<H>  |  0.51    Ca    8.8      28 Oct 2022 08:25      CAPILLARY BLOOD GLUCOSE        PT/INR - ( 28 Oct 2022 08:25 )   PT: 13.1 sec;   INR: 1.12 ratio               Culture - Urine (collected 21 Oct 2022 07:40)  Source: Clean Catch Clean Catch (Midstream)  Final Report (22 Oct 2022 10:35):    <10,000 CFU/mL Normal Urogenital Consuelo    Culture - Blood (collected 20 Oct 2022 10:40)  Source: .Blood Blood-Peripheral  Final Report (25 Oct 2022 17:01):    No Growth Final    Culture - Blood (collected 20 Oct 2022 10:30)  Source: .Blood Blood-Peripheral  Final Report (25 Oct 2022 17:01):    No Growth Final      RADIOLOGY & ADDITIONAL TESTS:   reviewed elctronically  ASSESSMENT/PLAN: 	     PROGRESS NOTE  Patient is a 82y old  Female who presents with a chief complaint of SOB AND COUGH (29 Oct 2022 07:48)    Chart and available morning labs /imaging are reviewed electronically , urgent issues addressed . More information  is being added upon completion of rounds , when more information is collected and management discussed with consultants , medical staff and social service/case management on the floor   OVERNIGHT    No new issues reported by medical staff . All above noted   HPI:  Patient brought in by EMS from [facility] for shortness of breath cough wheezing for 2 days.  Patient denies fevers chills headache chest pain abdominal pain vomiting Admit for antibacterial managmnet ,intravenous steroids,nebulised bronchodilators and pulmonology evaluation,O2 supply,serial labs and chest xrays,obtain ECHO to evaluate LVEF and rule out chf component Admitted  to telemetry unit for monitoring , send 3 sets of cardiac enzymes to rule out acute coronary event, obtain ECHO to evaluate LVEF, cardiology consult  ,continue current management, O2 supply, anticoagulation plan as per cardiology consult Palliative care consult requested ,to discuss advance directives and complete MOLST  (20 Oct 2022 13:54)    PAST MEDICAL & SURGICAL HISTORY:  Afib      History of COPD      HTN (hypertension)      Hypothyroid      GERD (gastroesophageal reflux disease)      Esophageal diverticulum      History of COPD      Insomnia      Mood disorder      Dementia      Spasm of muscle      Constipation      Hypothyroidism      GERD (gastroesophageal reflux disease)      HTN (hypertension)      CHF, chronic      Presence of IVC filter          MEDICATIONS  (STANDING):  albuterol/ipratropium for Nebulization 3 milliLiter(s) Nebulizer every 6 hours  baclofen 5 milliGRAM(s) Oral every 12 hours  buDESOnide    Inhalation Suspension 0.5 milliGRAM(s) Inhalation two times a day  donepezil 10 milliGRAM(s) Oral at bedtime  DULoxetine 60 milliGRAM(s) Oral daily  enoxaparin Injectable 40 milliGRAM(s) SubCutaneous every 24 hours  guaiFENesin  milliGRAM(s) Oral every 12 hours  levothyroxine Injectable 75 MICROGram(s) IV Push at bedtime  memantine 10 milliGRAM(s) Oral two times a day  methylPREDNISolone sodium succinate Injectable 20 milliGRAM(s) IV Push daily  metoprolol tartrate Injectable 2.5 milliGRAM(s) IV Push every 8 hours  pantoprazole  Injectable 40 milliGRAM(s) IV Push every 12 hours  simvastatin 10 milliGRAM(s) Oral at bedtime  traZODone 100 milliGRAM(s) Oral at bedtime    MEDICATIONS  (PRN):  aluminum hydroxide/magnesium hydroxide/simethicone Suspension 30 milliLiter(s) Oral every 4 hours PRN Dyspepsia  ibuprofen  Tablet. 400 milliGRAM(s) Oral every 6 hours PRN Temp greater or equal to 38C (100.4F), Mild Pain (1 - 3)  loperamide 2 milliGRAM(s) Oral four times a day PRN Diarrhea  LORazepam   Injectable 0.5 milliGRAM(s) IV Push every 8 hours PRN Anxiety  melatonin 3 milliGRAM(s) Oral at bedtime PRN Insomnia  ondansetron Injectable 4 milliGRAM(s) IV Push every 8 hours PRN Nausea and/or Vomiting      OBJECTIVE    T(C): 37 (10-29-22 @ 05:24), Max: 37.1 (10-28-22 @ 12:08)  HR: 62 (10-29-22 @ 05:24) (62 - 87)  BP: 106/70 (10-29-22 @ 05:24) (98/65 - 116/74)  RR: 18 (10-29-22 @ 05:24) (17 - 18)  SpO2: 99% (10-29-22 @ 05:24) (95% - 99%)  Wt(kg): --  I&O's Summary  REVIEW OF SYSTEMS:  Chart and available morning labs /imaging are reviewed electronically , urgent issues addressed . More information  is being added upon completion of rounds , when more information is collected and management discussed with consultants , medical staff and social service/case management on the floor   PHYSICAL EXAM:  Due to COVID pandemic please refer to following -pulmonology/internal medicine progress note ( d/w )                         12.1   5.83  )-----------( 201      ( 28 Oct 2022 08:25 )             38.9     10-28    143  |  104  |  7   ----------------------------<  115<H>  3.5   |  34<H>  |  0.51    Ca    8.8      28 Oct 2022 08:25      CAPILLARY BLOOD GLUCOSE        PT/INR - ( 28 Oct 2022 08:25 )   PT: 13.1 sec;   INR: 1.12 ratio               Culture - Urine (collected 21 Oct 2022 07:40)  Source: Clean Catch Clean Catch (Midstream)  Final Report (22 Oct 2022 10:35):    <10,000 CFU/mL Normal Urogenital Consuelo    Culture - Blood (collected 20 Oct 2022 10:40)  Source: .Blood Blood-Peripheral  Final Report (25 Oct 2022 17:01):    No Growth Final    Culture - Blood (collected 20 Oct 2022 10:30)  Source: .Blood Blood-Peripheral  Final Report (25 Oct 2022 17:01):    No Growth Final      RADIOLOGY & ADDITIONAL TESTS:   reviewed elctronically  ASSESSMENT/PLAN: 	    25 minutes aggregate time was spent on this visit, 50% visit time spent in care co-ordination with other attendings and counselling patient .I have discussed care plan with patient / HCP/family member ,who expressed understanding of problems treatment and their effect and side effects, alternatives in details. I have asked if they have any questions and concerns and appropriately addressed them to best of my ability.

## 2022-10-29 NOTE — PROGRESS NOTE ADULT - SUBJECTIVE AND OBJECTIVE BOX
ROXIE LAZAR    [facility] [address]    Allergies    sulfa drugs (Unknown)  Sulfur Colliod (Rash)  Tylenol (Swelling)  Tylenol (Unknown)    Intolerances        PAST MEDICAL & SURGICAL HISTORY:  Afib      History of COPD      HTN (hypertension)      Hypothyroid      GERD (gastroesophageal reflux disease)      Esophageal diverticulum      History of COPD      Insomnia      Mood disorder      Dementia      Spasm of muscle      Constipation      Hypothyroidism      GERD (gastroesophageal reflux disease)      HTN (hypertension)      CHF, chronic      Presence of IVC filter          FAMILY HISTORY:      Home Medications:  acetaminophen 325 mg oral tablet: 2 tab(s) orally every 6 hours, As Needed for pain  (27 Oct 2022 14:12)  Albuterol (Eqv-ProAir HFA) 90 mcg/inh inhalation aerosol: 2 puff(s) inhaled every 6 hours, As Needed for wheezing (05 Oct 2021 11:37)  baclofen 10 mg oral tablet: 0.5 tab(s) orally 2 times a day (27 Oct 2022 14:12)  baclofen 5 mg oral tablet: 1 tab(s) orally 2 times a day (10 Sep 2021 15:54)  bisacodyl 10 mg rectal suppository: 1 suppository(ies) rectal once a day, As needed, Constipation (05 Oct 2021 11:07)  Cymbalta 60 mg oral delayed release capsule: 1 cap(s) orally once a day (10 Sep 2021 15:54)  Cymbalta 60 mg oral delayed release capsule: 1 cap(s) orally once a day (27 Oct 2022 14:12)  donepezil 10 mg oral tablet: 1 tab(s) orally once a day (at bedtime) (27 Oct 2022 14:12)  donepezil 10 mg oral tablet: 1 tab(s) orally once a day (at bedtime) (05 Oct 2021 11:07)  Eucerin topical cream: Apply topically to affected area once a day (at bedtime) (27 Oct 2022 14:12)  furosemide 40 mg oral tablet: 0.5 tab(s) orally once a day (27 Oct 2022 14:12)  ipratropium-albuterol 0.5 mg-2.5 mg/3 mL inhalation solution: 3 milliliter(s) inhaled 4 times a day (27 Oct 2022 14:12)  levothyroxine 150 mcg (0.15 mg) oral capsule: 1 cap(s) orally once a day (27 Oct 2022 14:12)  levothyroxine 150 mcg (0.15 mg) oral tablet: 1 tab(s) orally once a day (05 Oct 2021 11:07)  Linzess 290 mcg oral capsule: 1 cap(s) orally once a day (10 Sep 2021 15:54)  Linzess 290 mcg oral capsule: 1 cap(s) orally once a day (27 Oct 2022 14:12)  melatonin 3 mg oral tablet: 1 tab(s) orally once a day (at bedtime) (27 Oct 2022 14:12)  memantine 10 mg oral tablet: 1 tab(s) orally every 12 hours (05 Oct 2021 11:07)  Metoprolol Tartrate 25 mg oral tablet: 1.5 tab(s) orally 2 times a day (27 Oct 2022 14:12)  metoprolol tartrate 37.5 mg oral tablet: 1 tab(s) orally 2 times a day (05 Oct 2021 11:07)  Namenda 10 mg oral tablet: 2 tab(s) orally once a day (at bedtime) (27 Oct 2022 14:12)  omeprazole 40 mg oral delayed release capsule: 1 cap(s) orally once a day (27 Oct 2022 14:12)  ondansetron 4 mg oral tablet: 1 tab(s) orally once a day (27 Oct 2022 14:12)  pantoprazole 40 mg oral delayed release tablet: 1 tab(s) orally once a day (05 Oct 2021 11:37)  polyethylene glycol 3350 oral powder for reconstitution: 17 gram(s) orally 2 times a day (05 Oct 2021 11:07)  ProAir HFA 90 mcg/inh inhalation aerosol: 2 puff(s) inhaled every 6 hours, As Needed (27 Oct 2022 14:12)  Senna 8.6 mg oral tablet: 2 tab(s) orally once a day (at bedtime) (27 Oct 2022 14:12)  senna oral tablet: 2 tab(s) orally once a day (at bedtime) (05 Oct 2021 11:07)  Spiriva 18 mcg inhalation capsule: 1 cap(s) inhaled once a day (10 Sep 2021 15:54)  Symbicort 80 mcg-4.5 mcg/inh inhalation aerosol: 2 puff(s) inhaled 2 times a day (10 Sep 2021 15:54)  traZODone 100 mg oral tablet: 1 tab(s) orally every 12 hours (05 Oct 2021 11:07)  traZODone 100 mg oral tablet: 1 tab(s) orally once a day (at bedtime) (27 Oct 2022 14:12)  zinc oxide topical ointment: Apply topically to affected area once a day and as needed  (27 Oct 2022 14:12)  Zocor 10 mg oral tablet: 1 tab(s) orally once a day (at bedtime) (27 Oct 2022 14:12)  Zocor 10 mg oral tablet: 1 tab(s) orally once a day (at bedtime) (10 Sep 2021 15:54)  ZyrTEC 10 mg oral tablet: 1 tab(s) orally once a day (27 Oct 2022 14:12)      MEDICATIONS  (STANDING):  albuterol/ipratropium for Nebulization 3 milliLiter(s) Nebulizer every 6 hours  baclofen 5 milliGRAM(s) Oral every 12 hours  buDESOnide    Inhalation Suspension 0.5 milliGRAM(s) Inhalation two times a day  donepezil 10 milliGRAM(s) Oral at bedtime  DULoxetine 60 milliGRAM(s) Oral daily  enoxaparin Injectable 40 milliGRAM(s) SubCutaneous every 24 hours  guaiFENesin  milliGRAM(s) Oral every 12 hours  levothyroxine Injectable 75 MICROGram(s) IV Push at bedtime  memantine 10 milliGRAM(s) Oral two times a day  methylPREDNISolone sodium succinate Injectable 20 milliGRAM(s) IV Push daily  metoprolol tartrate Injectable 2.5 milliGRAM(s) IV Push every 8 hours  pantoprazole  Injectable 40 milliGRAM(s) IV Push every 12 hours  simvastatin 10 milliGRAM(s) Oral at bedtime  traZODone 100 milliGRAM(s) Oral at bedtime    MEDICATIONS  (PRN):  aluminum hydroxide/magnesium hydroxide/simethicone Suspension 30 milliLiter(s) Oral every 4 hours PRN Dyspepsia  ibuprofen  Tablet. 400 milliGRAM(s) Oral every 6 hours PRN Temp greater or equal to 38C (100.4F), Mild Pain (1 - 3)  loperamide 2 milliGRAM(s) Oral four times a day PRN Diarrhea  LORazepam   Injectable 0.5 milliGRAM(s) IV Push every 8 hours PRN Anxiety  melatonin 3 milliGRAM(s) Oral at bedtime PRN Insomnia  ondansetron Injectable 4 milliGRAM(s) IV Push every 8 hours PRN Nausea and/or Vomiting              Vital Signs Last 24 Hrs  T(C): 37 (29 Oct 2022 05:24), Max: 37.1 (28 Oct 2022 12:08)  T(F): 98.6 (29 Oct 2022 05:24), Max: 98.7 (28 Oct 2022 12:08)  HR: 62 (29 Oct 2022 05:24) (62 - 87)  BP: 106/70 (29 Oct 2022 05:24) (98/65 - 116/74)  BP(mean): --  RR: 18 (29 Oct 2022 05:24) (17 - 18)  SpO2: 99% (29 Oct 2022 05:24) (95% - 99%)    Parameters below as of 29 Oct 2022 05:24  Patient On (Oxygen Delivery Method): nasal cannula                  LABS:                        12.6   5.90  )-----------( 200      ( 29 Oct 2022 07:53 )             40.3     10-29    142  |  104  |  7   ----------------------------<  104<H>  3.8   |  33<H>  |  0.36<L>    Ca    9.1      29 Oct 2022 07:53      PT/INR - ( 28 Oct 2022 08:25 )   PT: 13.1 sec;   INR: 1.12 ratio                   WBC:  WBC Count: 5.90 K/uL (10-29 @ 07:53)  WBC Count: 5.83 K/uL (10-28 @ 08:25)  WBC Count: 4.73 K/uL (10-27 @ 08:37)  WBC Count: 4.76 K/uL (10-26 @ 07:38)      MICROBIOLOGY:  RECENT CULTURES:              PT/INR - ( 28 Oct 2022 08:25 )   PT: 13.1 sec;   INR: 1.12 ratio             Sodium:  Sodium, Serum: 142 mmol/L (10-29 @ 07:53)  Sodium, Serum: 143 mmol/L (10-28 @ 08:25)  Sodium, Serum: 144 mmol/L (10-27 @ 08:37)  Sodium, Serum: 148 mmol/L (10-26 @ 07:38)      0.36 mg/dL 10-29 @ 07:53  0.51 mg/dL 10-28 @ 08:25  0.36 mg/dL 10-27 @ 08:37  0.47 mg/dL 10-26 @ 07:38      Hemoglobin:  Hemoglobin: 12.6 g/dL (10-29 @ 07:53)  Hemoglobin: 12.1 g/dL (10-28 @ 08:25)  Hemoglobin: 13.2 g/dL (10-27 @ 08:37)  Hemoglobin: 12.6 g/dL (10-26 @ 07:38)      Platelets: Platelet Count - Automated: 200 K/uL (10-29 @ 07:53)  Platelet Count - Automated: 201 K/uL (10-28 @ 08:25)  Platelet Count - Automated: 206 K/uL (10-27 @ 08:37)  Platelet Count - Automated: 201 K/uL (10-26 @ 07:38)              RADIOLOGY & ADDITIONAL STUDIES:      MICROBIOLOGY:  RECENT CULTURES:

## 2022-10-29 NOTE — CHART NOTE - NSCHARTNOTEFT_GEN_A_CORE
Assessment:   Pt seen for nutrition follow up.  Chart reviewed, hospital course noted.     Brief History: 81 YO F from [facility] with hx of COPD , HTN, CHF, mood disorder, hypothyroidism, dementia being treated for  Acute respiratory failure with hypoxia /COPD exacerbation with oxygen supply ,iv steroids and nebulized BD.  esophagram revealed large diverticulum of esophagus.; on GERD precautions, PP.    EGD done 10/24- GI bx negative.  CT surgical consult recommended PEG to limit aspiration risk however notes pt is a poor surgical candidate.  PEG was scheduled then cancelled due to now +COVID.    Pt states is eating fairly well, likes the berry flavor Ensure Clear.  Asking for coffee.    Factors impacting intake: [ ] none [ ] nausea  [ ] vomiting [ ] diarrhea [ ] constipation  [ ]chewing problems [x] swallowing issues  [x] other: esophageal diverticulum    Diet Prescription: Diet, Full Liquid:   Supplement Feeding Modality:  Oral  Ensure Clear Cans or Servings Per Day:  1       Frequency:  Two Times a day (10-26-22 @ 11:49)    Intake: fairly good ate about 70% full liquid lunch    Current Weight: 10/23 147.7#, 10/27 137.3#, 10/29 134.2#      Pertinent Medications: MEDICATIONS  (STANDING):  albuterol/ipratropium for Nebulization 3 milliLiter(s) Nebulizer every 6 hours  baclofen 5 milliGRAM(s) Oral every 12 hours  buDESOnide    Inhalation Suspension 0.5 milliGRAM(s) Inhalation two times a day  donepezil 10 milliGRAM(s) Oral at bedtime  DULoxetine 60 milliGRAM(s) Oral daily  enoxaparin Injectable 40 milliGRAM(s) SubCutaneous every 24 hours  guaiFENesin  milliGRAM(s) Oral every 12 hours  levothyroxine Injectable 75 MICROGram(s) IV Push at bedtime  memantine 10 milliGRAM(s) Oral two times a day  methylPREDNISolone sodium succinate Injectable 20 milliGRAM(s) IV Push daily  metoprolol tartrate Injectable 2.5 milliGRAM(s) IV Push every 8 hours  pantoprazole  Injectable 40 milliGRAM(s) IV Push every 12 hours  simvastatin 10 milliGRAM(s) Oral at bedtime  traZODone 100 milliGRAM(s) Oral at bedtime    MEDICATIONS  (PRN):  aluminum hydroxide/magnesium hydroxide/simethicone Suspension 30 milliLiter(s) Oral every 4 hours PRN Dyspepsia  ibuprofen  Tablet. 400 milliGRAM(s) Oral every 6 hours PRN Temp greater or equal to 38C (100.4F), Mild Pain (1 - 3)  loperamide 2 milliGRAM(s) Oral four times a day PRN Diarrhea  LORazepam   Injectable 0.5 milliGRAM(s) IV Push every 8 hours PRN Anxiety  melatonin 3 milliGRAM(s) Oral at bedtime PRN Insomnia  ondansetron Injectable 4 milliGRAM(s) IV Push every 8 hours PRN Nausea and/or Vomiting    Pertinent Labs: 10-29 Na142 mmol/L Glu 104 mg/dL<H> K+ 3.8 mmol/L Cr  0.36 mg/dL<L> BUN 7 mg/dL 10-26 Phos 3.2 mg/dL 10-26 Alb 3.0 g/dL<L>     CAPILLARY BLOOD GLUCOSE    Skin: no pressure ulcers  Edema: none noted    Estimated Needs:   [x] no change since previous assessment on: based on admit wt of 52.1 kg ( 25-30 kcals/kg) 6684-9896 kcals and ( 1.2-1.4 gm pro/kg) 65-75 gm pro      Previous Nutrition Diagnosis:   [x] Swallowing Difficulty [x]  Malnutrition     Nutrition Diagnosis is [x] ongoing  [ ] resolved [ ] not applicable     New Nutrition Diagnosis: [x] not applicable       Interventions:   Recommend  [x] Continue current diet  [ ] Change Diet To:  [x] Nutrition Supplement- consider add Ensure Enlive QD for more calories/protein  [ ] Nutrition Support  [ ] Other:     Monitoring and Evaluation:   [x] PO intake [ x ] Tolerance to diet prescription [ x ] weights [ x ] labs [ x ] follow up per protocol  [x] other: s/s GI distress, bowel function, skin integrity/ edema

## 2022-10-29 NOTE — PROGRESS NOTE ADULT - SUBJECTIVE AND OBJECTIVE BOX
Date/Time Patient Seen:  		  Referring MD:   Data Reviewed	       Patient is a 82y old  Female who presents with a chief complaint of SOB AND COUGH (28 Oct 2022 18:39)      Subjective/HPI     PAST MEDICAL & SURGICAL HISTORY:  Afib    History of COPD    HTN (hypertension)    Hypothyroid    GERD (gastroesophageal reflux disease)    Esophageal diverticulum    History of COPD    Insomnia    Mood disorder    Dementia    Spasm of muscle    Constipation    Hypothyroidism    GERD (gastroesophageal reflux disease)    HTN (hypertension)    CHF, chronic    Presence of IVC filter          Medication list         MEDICATIONS  (STANDING):  albuterol/ipratropium for Nebulization 3 milliLiter(s) Nebulizer every 6 hours  baclofen 5 milliGRAM(s) Oral every 12 hours  buDESOnide    Inhalation Suspension 0.5 milliGRAM(s) Inhalation two times a day  donepezil 10 milliGRAM(s) Oral at bedtime  DULoxetine 60 milliGRAM(s) Oral daily  enoxaparin Injectable 40 milliGRAM(s) SubCutaneous every 24 hours  guaiFENesin  milliGRAM(s) Oral every 12 hours  levothyroxine Injectable 75 MICROGram(s) IV Push at bedtime  memantine 10 milliGRAM(s) Oral two times a day  methylPREDNISolone sodium succinate Injectable 20 milliGRAM(s) IV Push daily  metoprolol tartrate Injectable 2.5 milliGRAM(s) IV Push every 8 hours  pantoprazole  Injectable 40 milliGRAM(s) IV Push every 12 hours  simvastatin 10 milliGRAM(s) Oral at bedtime  traZODone 100 milliGRAM(s) Oral at bedtime    MEDICATIONS  (PRN):  aluminum hydroxide/magnesium hydroxide/simethicone Suspension 30 milliLiter(s) Oral every 4 hours PRN Dyspepsia  ibuprofen  Tablet. 400 milliGRAM(s) Oral every 6 hours PRN Temp greater or equal to 38C (100.4F), Mild Pain (1 - 3)  loperamide 2 milliGRAM(s) Oral four times a day PRN Diarrhea  LORazepam   Injectable 0.5 milliGRAM(s) IV Push every 8 hours PRN Anxiety  melatonin 3 milliGRAM(s) Oral at bedtime PRN Insomnia  ondansetron Injectable 4 milliGRAM(s) IV Push every 8 hours PRN Nausea and/or Vomiting         Vitals log        ICU Vital Signs Last 24 Hrs  T(C): 37 (29 Oct 2022 05:24), Max: 37.1 (28 Oct 2022 12:08)  T(F): 98.6 (29 Oct 2022 05:24), Max: 98.7 (28 Oct 2022 12:08)  HR: 62 (29 Oct 2022 05:24) (62 - 87)  BP: 106/70 (29 Oct 2022 05:24) (98/65 - 116/74)  BP(mean): --  ABP: --  ABP(mean): --  RR: 18 (29 Oct 2022 05:24) (17 - 18)  SpO2: 99% (29 Oct 2022 05:24) (95% - 99%)    O2 Parameters below as of 29 Oct 2022 05:24  Patient On (Oxygen Delivery Method): nasal cannula                 Input and Output:  I&O's Detail    27 Oct 2022 07:01  -  28 Oct 2022 07:00  --------------------------------------------------------  IN:  Total IN: 0 mL    OUT:    Voided (mL): 350 mL  Total OUT: 350 mL    Total NET: -350 mL          Lab Data                        12.1   5.83  )-----------( 201      ( 28 Oct 2022 08:25 )             38.9     10-28    143  |  104  |  7   ----------------------------<  115<H>  3.5   |  34<H>  |  0.51    Ca    8.8      28 Oct 2022 08:25              Review of Systems	      Objective     Physical Examination    heart s1s2  lung dec bS  head nc      Pertinent Lab findings & Imaging      Gela:  NO   Adequate UO     I&O's Detail    27 Oct 2022 07:01  -  28 Oct 2022 07:00  --------------------------------------------------------  IN:  Total IN: 0 mL    OUT:    Voided (mL): 350 mL  Total OUT: 350 mL    Total NET: -350 mL               Discussed with:     Cultures:	        Radiology

## 2022-10-29 NOTE — PROGRESS NOTE ADULT - SUBJECTIVE AND OBJECTIVE BOX
Patient is a 82y Female whom presented to the hospital with hypernatremia     PAST MEDICAL & SURGICAL HISTORY:  History of COPD      Insomnia      Mood disorder      Dementia      Spasm of muscle      Constipation      Hypothyroidism      GERD (gastroesophageal reflux disease)      HTN (hypertension)      CHF, chronic          MEDICATIONS  (STANDING):  albuterol/ipratropium for Nebulization 3 milliLiter(s) Nebulizer every 6 hours  baclofen 5 milliGRAM(s) Oral every 12 hours  buDESOnide    Inhalation Suspension 0.5 milliGRAM(s) Inhalation two times a day  dextrose 5% + sodium chloride 0.45%. 1000 milliLiter(s) (30 mL/Hr) IV Continuous <Continuous>  donepezil 10 milliGRAM(s) Oral at bedtime  DULoxetine 60 milliGRAM(s) Oral daily  enoxaparin Injectable 40 milliGRAM(s) SubCutaneous every 24 hours  guaiFENesin  milliGRAM(s) Oral every 12 hours  levothyroxine Injectable 75 MICROGram(s) IV Push at bedtime  memantine 10 milliGRAM(s) Oral two times a day  methylPREDNISolone sodium succinate Injectable 40 milliGRAM(s) IV Push daily  metoprolol tartrate Injectable 2.5 milliGRAM(s) IV Push every 12 hours  pantoprazole  Injectable 40 milliGRAM(s) IV Push every 12 hours  potassium chloride  10 mEq/100 mL IVPB 10 milliEquivalent(s) IV Intermittent every 1 hour  simvastatin 10 milliGRAM(s) Oral at bedtime  traZODone 100 milliGRAM(s) Oral at bedtime      Allergies    sulfa drugs (Unknown)  Tylenol (Unknown)    Intolerances        SOCIAL HISTORY:  Denies ETOh,Smoking,     FAMILY HISTORY:      REVIEW OF SYSTEMS:    CONSTITUTIONAL: No weakness, fevers or chills  RESPIRATORY: No cough, wheezing, hemoptysis; No shortness of breath  CARDIOVASCULAR: No chest pain or palpitations  GASTROINTESTINAL: No abdominal or epigastric pain. No nausea, vomiting,     No diarrhea or constipation. No melena   SKIN: dry                                                           12.6   5.90  )-----------( 200      ( 29 Oct 2022 07:53 )             40.3       CBC Full  -  ( 29 Oct 2022 07:53 )  WBC Count : 5.90 K/uL  RBC Count : 4.41 M/uL  Hemoglobin : 12.6 g/dL  Hematocrit : 40.3 %  Platelet Count - Automated : 200 K/uL  Mean Cell Volume : 91.4 fl  Mean Cell Hemoglobin : 28.6 pg  Mean Cell Hemoglobin Concentration : 31.3 gm/dL  Auto Neutrophil # : x  Auto Lymphocyte # : x  Auto Monocyte # : x  Auto Eosinophil # : x  Auto Basophil # : x  Auto Neutrophil % : x  Auto Lymphocyte % : x  Auto Monocyte % : x  Auto Eosinophil % : x  Auto Basophil % : x      10-29    142  |  104  |  7   ----------------------------<  104<H>  3.8   |  33<H>  |  0.36<L>    Ca    9.1      29 Oct 2022 07:53        CAPILLARY BLOOD GLUCOSE          Vital Signs Last 24 Hrs  T(C): 37 (29 Oct 2022 05:24), Max: 37.1 (28 Oct 2022 12:08)  T(F): 98.6 (29 Oct 2022 05:24), Max: 98.7 (28 Oct 2022 12:08)  HR: 62 (29 Oct 2022 05:24) (62 - 87)  BP: 106/70 (29 Oct 2022 05:24) (98/65 - 116/74)  BP(mean): --  RR: 18 (29 Oct 2022 05:24) (17 - 18)  SpO2: 99% (29 Oct 2022 05:24) (95% - 99%)    Parameters below as of 29 Oct 2022 05:24  Patient On (Oxygen Delivery Method): nasal cannula            PT/INR - ( 28 Oct 2022 08:25 )   PT: 13.1 sec;   INR: 1.12 ratio                                                                                                           PHYSICAL EXAM:    Constitutional: NAD  HEENT: conjunctive   clear   Neck:  No JVD  Respiratory: decrease bs b/l   Cardiovascular: S1 and S2  Gastrointestinal: BS+, soft, NT/ND  Extremities: No peripheral edema  : No Ren  Skin: dry

## 2022-10-30 PROCEDURE — 99233 SBSQ HOSP IP/OBS HIGH 50: CPT

## 2022-10-30 RX ADMIN — DONEPEZIL HYDROCHLORIDE 10 MILLIGRAM(S): 10 TABLET, FILM COATED ORAL at 21:15

## 2022-10-30 RX ADMIN — ENOXAPARIN SODIUM 40 MILLIGRAM(S): 100 INJECTION SUBCUTANEOUS at 21:14

## 2022-10-30 RX ADMIN — Medication 100 MILLIGRAM(S): at 21:15

## 2022-10-30 RX ADMIN — Medication 600 MILLIGRAM(S): at 18:03

## 2022-10-30 RX ADMIN — Medication 5 MILLIGRAM(S): at 05:56

## 2022-10-30 RX ADMIN — Medication 3 MILLIGRAM(S): at 03:36

## 2022-10-30 RX ADMIN — Medication 5 MILLIGRAM(S): at 18:02

## 2022-10-30 RX ADMIN — Medication 2.5 MILLIGRAM(S): at 21:15

## 2022-10-30 RX ADMIN — Medication 400 MILLIGRAM(S): at 11:20

## 2022-10-30 RX ADMIN — MEMANTINE HYDROCHLORIDE 10 MILLIGRAM(S): 10 TABLET ORAL at 05:56

## 2022-10-30 RX ADMIN — PANTOPRAZOLE SODIUM 40 MILLIGRAM(S): 20 TABLET, DELAYED RELEASE ORAL at 05:57

## 2022-10-30 RX ADMIN — Medication 400 MILLIGRAM(S): at 12:20

## 2022-10-30 RX ADMIN — Medication 600 MILLIGRAM(S): at 05:56

## 2022-10-30 RX ADMIN — Medication 2.5 MILLIGRAM(S): at 05:57

## 2022-10-30 RX ADMIN — MEMANTINE HYDROCHLORIDE 10 MILLIGRAM(S): 10 TABLET ORAL at 18:03

## 2022-10-30 RX ADMIN — SIMVASTATIN 10 MILLIGRAM(S): 20 TABLET, FILM COATED ORAL at 21:14

## 2022-10-30 RX ADMIN — PANTOPRAZOLE SODIUM 40 MILLIGRAM(S): 20 TABLET, DELAYED RELEASE ORAL at 18:02

## 2022-10-30 RX ADMIN — DULOXETINE HYDROCHLORIDE 60 MILLIGRAM(S): 30 CAPSULE, DELAYED RELEASE ORAL at 11:19

## 2022-10-30 NOTE — PROGRESS NOTE ADULT - SUBJECTIVE AND OBJECTIVE BOX
Date/Time Patient Seen:  		  Referring MD:   Data Reviewed	       Patient is a 82y old  Female who presents with a chief complaint of SOB AND COUGH (29 Oct 2022 11:07)      Subjective/HPI     PAST MEDICAL & SURGICAL HISTORY:  Afib    History of COPD    HTN (hypertension)    Hypothyroid    GERD (gastroesophageal reflux disease)    Esophageal diverticulum    History of COPD    Insomnia    Mood disorder    Dementia    Spasm of muscle    Constipation    Hypothyroidism    GERD (gastroesophageal reflux disease)    HTN (hypertension)    CHF, chronic    Presence of IVC filter          Medication list         MEDICATIONS  (STANDING):  albuterol/ipratropium for Nebulization 3 milliLiter(s) Nebulizer every 6 hours  baclofen 5 milliGRAM(s) Oral every 12 hours  buDESOnide    Inhalation Suspension 0.5 milliGRAM(s) Inhalation two times a day  donepezil 10 milliGRAM(s) Oral at bedtime  DULoxetine 60 milliGRAM(s) Oral daily  enoxaparin Injectable 40 milliGRAM(s) SubCutaneous every 24 hours  guaiFENesin  milliGRAM(s) Oral every 12 hours  levothyroxine Injectable 75 MICROGram(s) IV Push at bedtime  memantine 10 milliGRAM(s) Oral two times a day  metoprolol tartrate Injectable 2.5 milliGRAM(s) IV Push every 8 hours  pantoprazole  Injectable 40 milliGRAM(s) IV Push every 12 hours  simvastatin 10 milliGRAM(s) Oral at bedtime  traZODone 100 milliGRAM(s) Oral at bedtime    MEDICATIONS  (PRN):  aluminum hydroxide/magnesium hydroxide/simethicone Suspension 30 milliLiter(s) Oral every 4 hours PRN Dyspepsia  ibuprofen  Tablet. 400 milliGRAM(s) Oral every 6 hours PRN Temp greater or equal to 38C (100.4F), Mild Pain (1 - 3)  loperamide 2 milliGRAM(s) Oral four times a day PRN Diarrhea  LORazepam   Injectable 0.5 milliGRAM(s) IV Push every 8 hours PRN Anxiety  melatonin 3 milliGRAM(s) Oral at bedtime PRN Insomnia  ondansetron Injectable 4 milliGRAM(s) IV Push every 8 hours PRN Nausea and/or Vomiting         Vitals log        ICU Vital Signs Last 24 Hrs  T(C): 36.4 (30 Oct 2022 04:58), Max: 37 (29 Oct 2022 11:47)  T(F): 97.6 (30 Oct 2022 04:58), Max: 98.6 (29 Oct 2022 11:47)  HR: 70 (30 Oct 2022 04:58) (62 - 81)  BP: 107/69 (30 Oct 2022 04:58) (101/57 - 107/69)  BP(mean): --  ABP: --  ABP(mean): --  RR: 18 (30 Oct 2022 04:58) (18 - 18)  SpO2: 98% (30 Oct 2022 04:58) (98% - 99%)    O2 Parameters below as of 30 Oct 2022 04:58  Patient On (Oxygen Delivery Method): nasal cannula                 Input and Output:  I&O's Detail      Lab Data                        12.6   5.90  )-----------( 200      ( 29 Oct 2022 07:53 )             40.3     10-29    142  |  104  |  7   ----------------------------<  104<H>  3.8   |  33<H>  |  0.36<L>    Ca    9.1      29 Oct 2022 07:53              Review of Systems	      Objective     Physical Examination    heart s1s2  lung dec bS  head nc  head at      Pertinent Lab findings & Imaging      Gela:  NO   Adequate UO     I&O's Detail           Discussed with:     Cultures:	        Radiology

## 2022-10-30 NOTE — PROGRESS NOTE ADULT - SUBJECTIVE AND OBJECTIVE BOX
Interval History:    CENTRAL LINE:   [  ] YES       [  ] NO  FIGUEROA:                 [  ] YES       [  ] NO         REVIEW OF SYSTEMS:  All Systems below were reviewed and are negative [  ]  HEENT:  ID:  Pulmonary:  Cardiac:  GI:  Renal:  Musculoskeletal:  All other systems above were reviewed and are negative   [  ]      MEDICATIONS  (STANDING):  albuterol/ipratropium for Nebulization 3 milliLiter(s) Nebulizer every 6 hours  baclofen 5 milliGRAM(s) Oral every 12 hours  buDESOnide    Inhalation Suspension 0.5 milliGRAM(s) Inhalation two times a day  donepezil 10 milliGRAM(s) Oral at bedtime  DULoxetine 60 milliGRAM(s) Oral daily  enoxaparin Injectable 40 milliGRAM(s) SubCutaneous every 24 hours  guaiFENesin  milliGRAM(s) Oral every 12 hours  levothyroxine Injectable 75 MICROGram(s) IV Push at bedtime  memantine 10 milliGRAM(s) Oral two times a day  metoprolol tartrate Injectable 2.5 milliGRAM(s) IV Push every 8 hours  pantoprazole  Injectable 40 milliGRAM(s) IV Push every 12 hours  simvastatin 10 milliGRAM(s) Oral at bedtime  traZODone 100 milliGRAM(s) Oral at bedtime    MEDICATIONS  (PRN):  aluminum hydroxide/magnesium hydroxide/simethicone Suspension 30 milliLiter(s) Oral every 4 hours PRN Dyspepsia  ibuprofen  Tablet. 400 milliGRAM(s) Oral every 6 hours PRN Temp greater or equal to 38C (100.4F), Mild Pain (1 - 3)  loperamide 2 milliGRAM(s) Oral four times a day PRN Diarrhea  LORazepam   Injectable 0.5 milliGRAM(s) IV Push every 8 hours PRN Anxiety  melatonin 3 milliGRAM(s) Oral at bedtime PRN Insomnia  ondansetron Injectable 4 milliGRAM(s) IV Push every 8 hours PRN Nausea and/or Vomiting      Vital Signs Last 24 Hrs  T(C): 36.7 (30 Oct 2022 19:33), Max: 36.7 (30 Oct 2022 12:31)  T(F): 98 (30 Oct 2022 19:33), Max: 98 (30 Oct 2022 12:31)  HR: 75 (30 Oct 2022 19:33) (54 - 81)  BP: 102/65 (30 Oct 2022 19:33) (94/55 - 107/69)  BP(mean): --  RR: 18 (30 Oct 2022 19:33) (18 - 18)  SpO2: 98% (30 Oct 2022 19:33) (98% - 99%)    Parameters below as of 30 Oct 2022 19:33  Patient On (Oxygen Delivery Method): nasal cannula  O2 Flow (L/min): 2      I&O's Summary    29 Oct 2022 07:01  -  30 Oct 2022 07:00  --------------------------------------------------------  IN: 0 mL / OUT: 600 mL / NET: -600 mL        PHYSICAL EXAM:  HEENT: NC/AT; PERRLA  Neck: Soft; no tenderness  Lungs: CTA bilaterally; no wheezing.   Heart:  Abdomen:  Genital/ Rectal:  Extremities:  Neurologic:  Vascular:      LABORATORY:    CBC Full  -  ( 29 Oct 2022 07:53 )  WBC Count : 5.90 K/uL  RBC Count : 4.41 M/uL  Hemoglobin : 12.6 g/dL  Hematocrit : 40.3 %  Platelet Count - Automated : 200 K/uL  Mean Cell Volume : 91.4 fl  Mean Cell Hemoglobin : 28.6 pg  Mean Cell Hemoglobin Concentration : 31.3 gm/dL  Auto Neutrophil # : x  Auto Lymphocyte # : x  Auto Monocyte # : x  Auto Eosinophil # : x  Auto Basophil # : x  Auto Neutrophil % : x  Auto Lymphocyte % : x  Auto Monocyte % : x  Auto Eosinophil % : x  Auto Basophil % : x      ESR:                   10-22 @ 06:25  14    C-Reactive Protein:     10-22 @ 06:25  8    Procalcitonin:           10-22 @ 06:25   --  ESR:                   10-21 @ 04:26  --    C-Reactive Protein:     10-21 @ 04:26  --    Procalcitonin:           10-21 @ 04:26   0.03  ESR:                   10-20 @ 20:30  --    C-Reactive Protein:     10-20 @ 20:30  --    Procalcitonin:           10-20 @ 20:30   0.04      10-29    142  |  104  |  7   ----------------------------<  104<H>  3.8   |  33<H>  |  0.36<L>    Ca    9.1      29 Oct 2022 07:53        Rapid Respiratory Viral Panel Result        10-20 @ 10:47  Rapid RVP [facility]  Coronovirus --  Adenovirus --  Bordetella Pertussis --  Chlamydia Pneumonia --  Entero/Rhinovirus--  HKU1 Coronovirus --  HMPV Coronovirus --  Influenza A --  Influenza AH1 --  Influenza AH1 2009 --  Influenza AH3 --  Influenza B --  Mycoplasma Pneumoniae --  NL63 Coronovirus --  OC43 Coronovirus --  Parainfluenza 1 --  Parainfluenza 2 --  Parainfluenza 3 --  Parainfluenza 4 --  Resp Syncytial Virus --      Assessment and Plan:          Je Baez MD   [phone].    No fevers     MEDICATIONS  (STANDING):  albuterol/ipratropium for Nebulization 3 milliLiter(s) Nebulizer every 6 hours  baclofen 5 milliGRAM(s) Oral every 12 hours  buDESOnide    Inhalation Suspension 0.5 milliGRAM(s) Inhalation two times a day  donepezil 10 milliGRAM(s) Oral at bedtime  DULoxetine 60 milliGRAM(s) Oral daily  enoxaparin Injectable 40 milliGRAM(s) SubCutaneous every 24 hours  guaiFENesin  milliGRAM(s) Oral every 12 hours  levothyroxine Injectable 75 MICROGram(s) IV Push at bedtime  memantine 10 milliGRAM(s) Oral two times a day  metoprolol tartrate Injectable 2.5 milliGRAM(s) IV Push every 8 hours  pantoprazole  Injectable 40 milliGRAM(s) IV Push every 12 hours  simvastatin 10 milliGRAM(s) Oral at bedtime  traZODone 100 milliGRAM(s) Oral at bedtime    MEDICATIONS  (PRN):  aluminum hydroxide/magnesium hydroxide/simethicone Suspension 30 milliLiter(s) Oral every 4 hours PRN Dyspepsia  ibuprofen  Tablet. 400 milliGRAM(s) Oral every 6 hours PRN Temp greater or equal to 38C (100.4F), Mild Pain (1 - 3)  loperamide 2 milliGRAM(s) Oral four times a day PRN Diarrhea  LORazepam   Injectable 0.5 milliGRAM(s) IV Push every 8 hours PRN Anxiety  melatonin 3 milliGRAM(s) Oral at bedtime PRN Insomnia  ondansetron Injectable 4 milliGRAM(s) IV Push every 8 hours PRN Nausea and/or Vomiting      Vital Signs Last 24 Hrs  T(C): 36.7 (30 Oct 2022 19:33), Max: 36.7 (30 Oct 2022 12:31)  T(F): 98 (30 Oct 2022 19:33), Max: 98 (30 Oct 2022 12:31)  HR: 75 (30 Oct 2022 19:33) (54 - 81)  BP: 102/65 (30 Oct 2022 19:33) (94/55 - 107/69)  BP(mean): --  RR: 18 (30 Oct 2022 19:33) (18 - 18)  SpO2: 98% (30 Oct 2022 19:33) (98% - 99%)    Parameters below as of 30 Oct 2022 19:33  Patient On (Oxygen Delivery Method): nasal cannula  O2 Flow (L/min): 2      I&O's Summary    29 Oct 2022 07:01  -  30 Oct 2022 07:00  --------------------------------------------------------  IN: 0 mL / OUT: 600 mL / NET: -600 mL        PHYSICAL EXAM:  HEENT: NC/AT; PERRLA  Neck: Soft; no tenderness  Lungs: CTA bilaterally; no wheezing.   Heart:  Abdomen:  Genital/ Rectal:  Extremities:  Neurologic:  Vascular:      LABORATORY:    CBC Full  -  ( 29 Oct 2022 07:53 )  WBC Count : 5.90 K/uL  RBC Count : 4.41 M/uL  Hemoglobin : 12.6 g/dL  Hematocrit : 40.3 %  Platelet Count - Automated : 200 K/uL  Mean Cell Volume : 91.4 fl  Mean Cell Hemoglobin : 28.6 pg  Mean Cell Hemoglobin Concentration : 31.3 gm/dL  Auto Neutrophil # : x  Auto Lymphocyte # : x  Auto Monocyte # : x  Auto Eosinophil # : x  Auto Basophil # : x  Auto Neutrophil % : x  Auto Lymphocyte % : x  Auto Monocyte % : x  Auto Eosinophil % : x  Auto Basophil % : x      ESR:                   10-22 @ 06:25  14    C-Reactive Protein:     10-22 @ 06:25  8    Procalcitonin:           10-22 @ 06:25   --  ESR:                   10-21 @ 04:26  --    C-Reactive Protein:     10-21 @ 04:26  --    Procalcitonin:           10-21 @ 04:26   0.03  ESR:                   10-20 @ 20:30  --    C-Reactive Protein:     10-20 @ 20:30  --    Procalcitonin:           10-20 @ 20:30   0.04      10-29    142  |  104  |  7   ----------------------------<  104<H>  3.8   |  33<H>  |  0.36<L>    Ca    9.1      29 Oct 2022 07:53        Rapid Respiratory Viral Panel Result        10-20 @ 10:47  Rapid RVP [facility]  Coronovirus --  Adenovirus --  Bordetella Pertussis --  Chlamydia Pneumonia --  Entero/Rhinovirus--  HKU1 Coronovirus --  HMPV Coronovirus --  Influenza A --  Influenza AH1 --  Influenza AH1 2009 --  Influenza AH3 --  Influenza B --  Mycoplasma Pneumoniae --  NL63 Coronovirus --  OC43 Coronovirus --  Parainfluenza 1 --  Parainfluenza 2 --  Parainfluenza 3 --  Parainfluenza 4 --  Resp Syncytial Virus --      Assessment and Plan:    1. Dyspnea  2. COPD.  3. Dilated esophagus.   4. Left knee swelling.    . Repeated COVID 19 PCR this week was positive. She had two negative COVID 19 PCRs last week. Not clear if this is a new infection. She has no new symptoms and is still on O2 NC.   . She denied cough or fevers or SOB.   . Monitor for now. If she becomes worse, will consider treatments for COVID 19.  . Waiting for PEG with GI  . Supportive care.         Je Baez MD   [phone].    No fevers   On O2 NC      MEDICATIONS  (STANDING):  albuterol/ipratropium for Nebulization 3 milliLiter(s) Nebulizer every 6 hours  baclofen 5 milliGRAM(s) Oral every 12 hours  buDESOnide    Inhalation Suspension 0.5 milliGRAM(s) Inhalation two times a day  donepezil 10 milliGRAM(s) Oral at bedtime  DULoxetine 60 milliGRAM(s) Oral daily  enoxaparin Injectable 40 milliGRAM(s) SubCutaneous every 24 hours  guaiFENesin  milliGRAM(s) Oral every 12 hours  levothyroxine Injectable 75 MICROGram(s) IV Push at bedtime  memantine 10 milliGRAM(s) Oral two times a day  metoprolol tartrate Injectable 2.5 milliGRAM(s) IV Push every 8 hours  pantoprazole  Injectable 40 milliGRAM(s) IV Push every 12 hours  simvastatin 10 milliGRAM(s) Oral at bedtime  traZODone 100 milliGRAM(s) Oral at bedtime    MEDICATIONS  (PRN):  aluminum hydroxide/magnesium hydroxide/simethicone Suspension 30 milliLiter(s) Oral every 4 hours PRN Dyspepsia  ibuprofen  Tablet. 400 milliGRAM(s) Oral every 6 hours PRN Temp greater or equal to 38C (100.4F), Mild Pain (1 - 3)  loperamide 2 milliGRAM(s) Oral four times a day PRN Diarrhea  LORazepam   Injectable 0.5 milliGRAM(s) IV Push every 8 hours PRN Anxiety  melatonin 3 milliGRAM(s) Oral at bedtime PRN Insomnia  ondansetron Injectable 4 milliGRAM(s) IV Push every 8 hours PRN Nausea and/or Vomiting      Vital Signs Last 24 Hrs  T(C): 36.7 (30 Oct 2022 19:33), Max: 36.7 (30 Oct 2022 12:31)  T(F): 98 (30 Oct 2022 19:33), Max: 98 (30 Oct 2022 12:31)  HR: 75 (30 Oct 2022 19:33) (54 - 81)  BP: 102/65 (30 Oct 2022 19:33) (94/55 - 107/69)  BP(mean): --  RR: 18 (30 Oct 2022 19:33) (18 - 18)  SpO2: 98% (30 Oct 2022 19:33) (98% - 99%)    Parameters below as of 30 Oct 2022 19:33  Patient On (Oxygen Delivery Method): nasal cannula  O2 Flow (L/min): 2      I&O's Summary    29 Oct 2022 07:01  -  30 Oct 2022 07:00  --------------------------------------------------------  IN: 0 mL / OUT: 600 mL / NET: -600 mL        PHYSICAL EXAM:  HEENT: NC/AT; PERRLA  Neck: Soft; no tenderness  Lungs: CTA bilaterally; no wheezing.   Heart: RRR, no murmurs.   Abdomen: Soft, no tenderness.   Genital/ Rectal: No bearden  Extremities: No ulcers  Neurologic: Confused.       LABORATORY:    CBC Full  -  ( 29 Oct 2022 07:53 )  WBC Count : 5.90 K/uL  RBC Count : 4.41 M/uL  Hemoglobin : 12.6 g/dL  Hematocrit : 40.3 %  Platelet Count - Automated : 200 K/uL  Mean Cell Volume : 91.4 fl  Mean Cell Hemoglobin : 28.6 pg  Mean Cell Hemoglobin Concentration : 31.3 gm/dL  Auto Neutrophil # : x  Auto Lymphocyte # : x  Auto Monocyte # : x  Auto Eosinophil # : x  Auto Basophil # : x  Auto Neutrophil % : x  Auto Lymphocyte % : x  Auto Monocyte % : x  Auto Eosinophil % : x  Auto Basophil % : x      ESR:                   10-22 @ 06:25  14    C-Reactive Protein:     10-22 @ 06:25  8    Procalcitonin:           10-22 @ 06:25   --  ESR:                   10-21 @ 04:26  --    C-Reactive Protein:     10-21 @ 04:26  --    Procalcitonin:           10-21 @ 04:26   0.03  ESR:                   10-20 @ 20:30  --    C-Reactive Protein:     10-20 @ 20:30  --    Procalcitonin:           10-20 @ 20:30   0.04      10-29    142  |  104  |  7   ----------------------------<  104<H>  3.8   |  33<H>  |  0.36<L>    Ca    9.1      29 Oct 2022 07:53        Rapid Respiratory Viral Panel Result        10-20 @ 10:47  Rapid RVP [facility]  Coronovirus --  Adenovirus --  Bordetella Pertussis --  Chlamydia Pneumonia --  Entero/Rhinovirus--  HKU1 Coronovirus --  HMPV Coronovirus --  Influenza A --  Influenza AH1 --  Influenza AH1 2009 --  Influenza AH3 --  Influenza B --  Mycoplasma Pneumoniae --  NL63 Coronovirus --  OC43 Coronovirus --  Parainfluenza 1 --  Parainfluenza 2 --  Parainfluenza 3 --  Parainfluenza 4 --  Resp Syncytial Virus --      Assessment and Plan:    1. Dyspnea  2. COPD.  3. Dilated esophagus.   4. Left knee swelling.    . Repeated COVID 19 PCR this week was positive. She had two negative COVID 19 PCRs last week. She has no new symptoms and is still on O2 NC.   . She denied cough or fevers or SOB. No need for treatment.   . Waiting for PEG with GI  . Supportive care.         Je Baez MD   [phone].

## 2022-10-30 NOTE — PROGRESS NOTE ADULT - SUBJECTIVE AND OBJECTIVE BOX
ROXIE LAZAR    [facility] [address]    Allergies    sulfa drugs (Unknown)  Sulfur Colliod (Rash)  Tylenol (Swelling)  Tylenol (Unknown)    Intolerances        PAST MEDICAL & SURGICAL HISTORY:  Afib      History of COPD      HTN (hypertension)      Hypothyroid      GERD (gastroesophageal reflux disease)      Esophageal diverticulum      History of COPD      Insomnia      Mood disorder      Dementia      Spasm of muscle      Constipation      Hypothyroidism      GERD (gastroesophageal reflux disease)      HTN (hypertension)      CHF, chronic      Presence of IVC filter          FAMILY HISTORY:      Home Medications:  acetaminophen 325 mg oral tablet: 2 tab(s) orally every 6 hours, As Needed for pain  (27 Oct 2022 14:12)  Albuterol (Eqv-ProAir HFA) 90 mcg/inh inhalation aerosol: 2 puff(s) inhaled every 6 hours, As Needed for wheezing (05 Oct 2021 11:37)  baclofen 10 mg oral tablet: 0.5 tab(s) orally 2 times a day (27 Oct 2022 14:12)  baclofen 5 mg oral tablet: 1 tab(s) orally 2 times a day (10 Sep 2021 15:54)  bisacodyl 10 mg rectal suppository: 1 suppository(ies) rectal once a day, As needed, Constipation (05 Oct 2021 11:07)  Cymbalta 60 mg oral delayed release capsule: 1 cap(s) orally once a day (10 Sep 2021 15:54)  Cymbalta 60 mg oral delayed release capsule: 1 cap(s) orally once a day (27 Oct 2022 14:12)  donepezil 10 mg oral tablet: 1 tab(s) orally once a day (at bedtime) (27 Oct 2022 14:12)  donepezil 10 mg oral tablet: 1 tab(s) orally once a day (at bedtime) (05 Oct 2021 11:07)  Eucerin topical cream: Apply topically to affected area once a day (at bedtime) (27 Oct 2022 14:12)  furosemide 40 mg oral tablet: 0.5 tab(s) orally once a day (27 Oct 2022 14:12)  ipratropium-albuterol 0.5 mg-2.5 mg/3 mL inhalation solution: 3 milliliter(s) inhaled 4 times a day (27 Oct 2022 14:12)  levothyroxine 150 mcg (0.15 mg) oral capsule: 1 cap(s) orally once a day (27 Oct 2022 14:12)  levothyroxine 150 mcg (0.15 mg) oral tablet: 1 tab(s) orally once a day (05 Oct 2021 11:07)  Linzess 290 mcg oral capsule: 1 cap(s) orally once a day (10 Sep 2021 15:54)  Linzess 290 mcg oral capsule: 1 cap(s) orally once a day (27 Oct 2022 14:12)  melatonin 3 mg oral tablet: 1 tab(s) orally once a day (at bedtime) (27 Oct 2022 14:12)  memantine 10 mg oral tablet: 1 tab(s) orally every 12 hours (05 Oct 2021 11:07)  Metoprolol Tartrate 25 mg oral tablet: 1.5 tab(s) orally 2 times a day (27 Oct 2022 14:12)  metoprolol tartrate 37.5 mg oral tablet: 1 tab(s) orally 2 times a day (05 Oct 2021 11:07)  Namenda 10 mg oral tablet: 2 tab(s) orally once a day (at bedtime) (27 Oct 2022 14:12)  omeprazole 40 mg oral delayed release capsule: 1 cap(s) orally once a day (27 Oct 2022 14:12)  ondansetron 4 mg oral tablet: 1 tab(s) orally once a day (27 Oct 2022 14:12)  pantoprazole 40 mg oral delayed release tablet: 1 tab(s) orally once a day (05 Oct 2021 11:37)  polyethylene glycol 3350 oral powder for reconstitution: 17 gram(s) orally 2 times a day (05 Oct 2021 11:07)  ProAir HFA 90 mcg/inh inhalation aerosol: 2 puff(s) inhaled every 6 hours, As Needed (27 Oct 2022 14:12)  Senna 8.6 mg oral tablet: 2 tab(s) orally once a day (at bedtime) (27 Oct 2022 14:12)  senna oral tablet: 2 tab(s) orally once a day (at bedtime) (05 Oct 2021 11:07)  Spiriva 18 mcg inhalation capsule: 1 cap(s) inhaled once a day (10 Sep 2021 15:54)  Symbicort 80 mcg-4.5 mcg/inh inhalation aerosol: 2 puff(s) inhaled 2 times a day (10 Sep 2021 15:54)  traZODone 100 mg oral tablet: 1 tab(s) orally every 12 hours (05 Oct 2021 11:07)  traZODone 100 mg oral tablet: 1 tab(s) orally once a day (at bedtime) (27 Oct 2022 14:12)  zinc oxide topical ointment: Apply topically to affected area once a day and as needed  (27 Oct 2022 14:12)  Zocor 10 mg oral tablet: 1 tab(s) orally once a day (at bedtime) (27 Oct 2022 14:12)  Zocor 10 mg oral tablet: 1 tab(s) orally once a day (at bedtime) (10 Sep 2021 15:54)  ZyrTEC 10 mg oral tablet: 1 tab(s) orally once a day (27 Oct 2022 14:12)      MEDICATIONS  (STANDING):  albuterol/ipratropium for Nebulization 3 milliLiter(s) Nebulizer every 6 hours  baclofen 5 milliGRAM(s) Oral every 12 hours  buDESOnide    Inhalation Suspension 0.5 milliGRAM(s) Inhalation two times a day  donepezil 10 milliGRAM(s) Oral at bedtime  DULoxetine 60 milliGRAM(s) Oral daily  enoxaparin Injectable 40 milliGRAM(s) SubCutaneous every 24 hours  guaiFENesin  milliGRAM(s) Oral every 12 hours  levothyroxine Injectable 75 MICROGram(s) IV Push at bedtime  memantine 10 milliGRAM(s) Oral two times a day  metoprolol tartrate Injectable 2.5 milliGRAM(s) IV Push every 8 hours  pantoprazole  Injectable 40 milliGRAM(s) IV Push every 12 hours  simvastatin 10 milliGRAM(s) Oral at bedtime  traZODone 100 milliGRAM(s) Oral at bedtime    MEDICATIONS  (PRN):  aluminum hydroxide/magnesium hydroxide/simethicone Suspension 30 milliLiter(s) Oral every 4 hours PRN Dyspepsia  ibuprofen  Tablet. 400 milliGRAM(s) Oral every 6 hours PRN Temp greater or equal to 38C (100.4F), Mild Pain (1 - 3)  loperamide 2 milliGRAM(s) Oral four times a day PRN Diarrhea  LORazepam   Injectable 0.5 milliGRAM(s) IV Push every 8 hours PRN Anxiety  melatonin 3 milliGRAM(s) Oral at bedtime PRN Insomnia  ondansetron Injectable 4 milliGRAM(s) IV Push every 8 hours PRN Nausea and/or Vomiting              Vital Signs Last 24 Hrs  T(C): 36.4 (30 Oct 2022 04:58), Max: 37 (29 Oct 2022 11:47)  T(F): 97.6 (30 Oct 2022 04:58), Max: 98.6 (29 Oct 2022 11:47)  HR: 54 (30 Oct 2022 08:12) (54 - 81)  BP: 107/69 (30 Oct 2022 04:58) (101/57 - 107/69)  BP(mean): --  RR: 18 (30 Oct 2022 04:58) (18 - 18)  SpO2: 99% (30 Oct 2022 08:12) (98% - 99%)    Parameters below as of 30 Oct 2022 08:12  Patient On (Oxygen Delivery Method): nasal cannula  O2 Flow (L/min): 2        10-29-22 @ 07:01  -  10-30-22 @ 07:00  --------------------------------------------------------  IN: 0 mL / OUT: 600 mL / NET: -600 mL              LABS:                        12.6   5.90  )-----------( 200      ( 29 Oct 2022 07:53 )             40.3     10-29    142  |  104  |  7   ----------------------------<  104<H>  3.8   |  33<H>  |  0.36<L>    Ca    9.1      29 Oct 2022 07:53                WBC:  WBC Count: 5.90 K/uL (10-29 @ 07:53)  WBC Count: 5.83 K/uL (10-28 @ 08:25)  WBC Count: 4.73 K/uL (10-27 @ 08:37)      MICROBIOLOGY:  RECENT CULTURES:                  Sodium:  Sodium, Serum: 142 mmol/L (10-29 @ 07:53)  Sodium, Serum: 143 mmol/L (10-28 @ 08:25)  Sodium, Serum: 144 mmol/L (10-27 @ 08:37)      0.36 mg/dL 10-29 @ 07:53  0.51 mg/dL 10-28 @ 08:25  0.36 mg/dL 10-27 @ 08:37      Hemoglobin:  Hemoglobin: 12.6 g/dL (10-29 @ 07:53)  Hemoglobin: 12.1 g/dL (10-28 @ 08:25)  Hemoglobin: 13.2 g/dL (10-27 @ 08:37)      Platelets: Platelet Count - Automated: 200 K/uL (10-29 @ 07:53)  Platelet Count - Automated: 201 K/uL (10-28 @ 08:25)  Platelet Count - Automated: 206 K/uL (10-27 @ 08:37)              RADIOLOGY & ADDITIONAL STUDIES:      MICROBIOLOGY:  RECENT CULTURES:

## 2022-10-30 NOTE — PROGRESS NOTE ADULT - SUBJECTIVE AND OBJECTIVE BOX
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  Patient denies fevers chills headache chest pain abdominal pain vomiting Admit for antibacterial managmnet ,intravenous steroids,nebulised bronchodilators and pulmonology evaluation,O2 supply,serial labs and chest xrays,obtain ECHO to evaluate LVEF and rule out chf component Admitted  to telemetry unit for monitoring , send 3 sets of cardiac enzymes to rule out acute coronary event, obtain ECHO to evaluate LVEF, cardiology consult  ,continue current management, O2 supply, anticoagulation plan as per cardiology consult Palliative care consult requested ,to discuss advance directives and complete MOLST  (20 Oct 2022 13:54)      REVIEW OF SYSTEMS:    CONSTITUTIONAL: No fever, weight loss, or fatigue  EYES: No eye pain, visual disturbances, or discharge  ENMT:  No difficulty hearing, tinnitus, vertigo; No sinus or throat pain  NECK: No pain or stiffness  BREASTS: No pain, masses, or nipple discharge  RESPIRATORY: No cough, wheezing, chills or hemoptysis; No shortness of breath  CARDIOVASCULAR: No chest pain, palpitations, dizziness, or leg swelling  GASTROINTESTINAL: No abdominal or epigastric pain. No nausea, vomiting, or hematemesis; No diarrhea or constipation. No melena or hematochezia.  GENITOURINARY: No dysuria, frequency, hematuria, or incontinence  NEUROLOGICAL: No headaches, memory loss, loss of strength, numbness, or tremors  SKIN: No itching, burning, rashes, or lesions   LYMPH NODES: No enlarged glands  ENDOCRINE: No heat or cold intolerance; No hair loss  MUSCULOSKELETAL: No joint pain or swelling; No muscle, back, or extremity pain  PSYCHIATRIC: No depression, anxiety, mood swings, or difficulty sleeping  HEME/LYMPH: No easy bruising, or bleeding gums  ALLERGY AND IMMUNOLOGIC: No hives or eczema    MEDICATIONS  (STANDING):  albuterol/ipratropium for Nebulization 3 milliLiter(s) Nebulizer every 6 hours  baclofen 5 milliGRAM(s) Oral every 12 hours  buDESOnide    Inhalation Suspension 0.5 milliGRAM(s) Inhalation two times a day  donepezil 10 milliGRAM(s) Oral at bedtime  DULoxetine 60 milliGRAM(s) Oral daily  enoxaparin Injectable 40 milliGRAM(s) SubCutaneous every 24 hours  guaiFENesin  milliGRAM(s) Oral every 12 hours  levothyroxine Injectable 75 MICROGram(s) IV Push at bedtime  memantine 10 milliGRAM(s) Oral two times a day  metoprolol tartrate Injectable 2.5 milliGRAM(s) IV Push every 8 hours  pantoprazole  Injectable 40 milliGRAM(s) IV Push every 12 hours  simvastatin 10 milliGRAM(s) Oral at bedtime  traZODone 100 milliGRAM(s) Oral at bedtime    MEDICATIONS  (PRN):  aluminum hydroxide/magnesium hydroxide/simethicone Suspension 30 milliLiter(s) Oral every 4 hours PRN Dyspepsia  ibuprofen  Tablet. 400 milliGRAM(s) Oral every 6 hours PRN Temp greater or equal to 38C (100.4F), Mild Pain (1 - 3)  loperamide 2 milliGRAM(s) Oral four times a day PRN Diarrhea  LORazepam   Injectable 0.5 milliGRAM(s) IV Push every 8 hours PRN Anxiety  melatonin 3 milliGRAM(s) Oral at bedtime PRN Insomnia  ondansetron Injectable 4 milliGRAM(s) IV Push every 8 hours PRN Nausea and/or Vomiting      ALLERGIES: sulfa drugs (Unknown)  Sulfur Colliod (Rash)  Tylenol (Swelling)  Tylenol (Unknown)      FAMILY HISTORY:      PHYSICAL EXAMINATION:  -----------------------------  T(C): 36.4 (10-30-22 @ 04:58), Max: 37 (10-29-22 @ 11:47)  HR: 54 (10-30-22 @ 08:12) (54 - 81)  BP: 107/69 (10-30-22 @ 04:58) (101/57 - 107/69)  RR: 18 (10-30-22 @ 04:58) (18 - 18)  SpO2: 99% (10-30-22 @ 08:12) (98% - 99%)  Wt(kg): --    10-29 @ 07:01  -  10-30 @ 07:00  --------------------------------------------------------  IN:  Total IN: 0 mL    OUT:    Voided (mL): 600 mL  Total OUT: 600 mL    Total NET: -600 mL            VITALS  T(C): 36.4 (10-30-22 @ 04:58), Max: 37 (10-29-22 @ 11:47)  HR: 54 (10-30-22 @ 08:12) (54 - 81)  BP: 107/69 (10-30-22 @ 04:58) (101/57 - 107/69)  RR: 18 (10-30-22 @ 04:58) (18 - 18)  SpO2: 99% (10-30-22 @ 08:12) (98% - 99%)    Constitutional: well developed, normal appearance, well groomed, well nourished, no deformities and no acute distress.   Eyes: the conjunctiva exhibited no abnormalities and the eyelids demonstrated no xanthelasmas.   HEENT: normal oral mucosa, no oral pallor and no oral cyanosis.   Neck: normal jugular venous A waves present, normal jugular venous V waves present and no jugular venous oliveira A waves.   Pulmonary: no respiratory distress, normal respiratory rhythm and effort, no accessory muscle use and lungs were clear to auscultation bilaterally.   Cardiovascular: heart rate and rhythm were normal, normal S1 and S2 and no murmur, gallop, rub, heave or thrill are present.   Abdomen: soft, non-tender, no hepato-splenomegaly and no abdominal mass palpated.   Musculoskeletal: the gait could not be assessed..   Extremities: no clubbing of the fingernails, no localized cyanosis, no petechial hemorrhages and no ischemic changes.   Skin: normal skin color and pigmentation, no rash, no venous stasis, no skin lesions, no skin ulcer and no xanthoma was observed.   Psychiatric: oriented to person, place, and time, the affect was normal, the mood was normal and not feeling anxious.     LABS:   --------  10-29    142  |  104  |  7   ----------------------------<  104<H>  3.8   |  33<H>  |  0.36<L>    Ca    9.1      29 Oct 2022 07:53                           12.6   5.90  )-----------( 200      ( 29 Oct 2022 07:53 )             40.3                 RADIOLOGY:  -----------------    ECG:     ECHO:

## 2022-10-30 NOTE — PROGRESS NOTE ADULT - SUBJECTIVE AND OBJECTIVE BOX
PROGRESS NOTE  Patient is a 82y old  Female who presents with a chief complaint of SOB AND COUGH (30 Oct 2022 11:01)    Chart and available morning labs /imaging are reviewed electronically , urgent issues addressed . More information  is being added upon completion of rounds , when more information is collected and management discussed with consultants , medical staff and social service/case management on the floor   OVERNIGHT  No new issues reported by medical staff . All above noted Patient is resting in a bed comfortably .Confused ,poor mentation .No distress noted     HPI:  Patient brought in by EMS from [facility] for shortness of breath cough wheezing for 2 days.  Patient denies fevers chills headache chest pain abdominal pain vomiting Admit for antibacterial managmnet ,intravenous steroids,nebulised bronchodilators and pulmonology evaluation,O2 supply,serial labs and chest xrays,obtain ECHO to evaluate LVEF and rule out chf component Admitted  to telemetry unit for monitoring , send 3 sets of cardiac enzymes to rule out acute coronary event, obtain ECHO to evaluate LVEF, cardiology consult  ,continue current management, O2 supply, anticoagulation plan as per cardiology consult Palliative care consult requested ,to discuss advance directives and complete MOLST  (20 Oct 2022 13:54)    PAST MEDICAL & SURGICAL HISTORY:  Afib      History of COPD      HTN (hypertension)      Hypothyroid      GERD (gastroesophageal reflux disease)      Esophageal diverticulum      History of COPD      Insomnia      Mood disorder      Dementia      Spasm of muscle      Constipation      Hypothyroidism      GERD (gastroesophageal reflux disease)      HTN (hypertension)      CHF, chronic      Presence of IVC filter          MEDICATIONS  (STANDING):  albuterol/ipratropium for Nebulization 3 milliLiter(s) Nebulizer every 6 hours  baclofen 5 milliGRAM(s) Oral every 12 hours  buDESOnide    Inhalation Suspension 0.5 milliGRAM(s) Inhalation two times a day  donepezil 10 milliGRAM(s) Oral at bedtime  DULoxetine 60 milliGRAM(s) Oral daily  enoxaparin Injectable 40 milliGRAM(s) SubCutaneous every 24 hours  guaiFENesin  milliGRAM(s) Oral every 12 hours  levothyroxine Injectable 75 MICROGram(s) IV Push at bedtime  memantine 10 milliGRAM(s) Oral two times a day  metoprolol tartrate Injectable 2.5 milliGRAM(s) IV Push every 8 hours  pantoprazole  Injectable 40 milliGRAM(s) IV Push every 12 hours  simvastatin 10 milliGRAM(s) Oral at bedtime  traZODone 100 milliGRAM(s) Oral at bedtime    MEDICATIONS  (PRN):  aluminum hydroxide/magnesium hydroxide/simethicone Suspension 30 milliLiter(s) Oral every 4 hours PRN Dyspepsia  ibuprofen  Tablet. 400 milliGRAM(s) Oral every 6 hours PRN Temp greater or equal to 38C (100.4F), Mild Pain (1 - 3)  loperamide 2 milliGRAM(s) Oral four times a day PRN Diarrhea  LORazepam   Injectable 0.5 milliGRAM(s) IV Push every 8 hours PRN Anxiety  melatonin 3 milliGRAM(s) Oral at bedtime PRN Insomnia  ondansetron Injectable 4 milliGRAM(s) IV Push every 8 hours PRN Nausea and/or Vomiting      OBJECTIVE    T(C): 36.7 (10-30-22 @ 12:31), Max: 36.7 (10-30-22 @ 12:31)  HR: 77 (10-30-22 @ 12:31) (54 - 81)  BP: 94/55 (10-30-22 @ 12:31) (94/55 - 107/69)  RR: 18 (10-30-22 @ 12:31) (18 - 18)  SpO2: 98% (10-30-22 @ 12:31) (98% - 99%)  Wt(kg): --  I&O's Summary    29 Oct 2022 07:01  -  30 Oct 2022 07:00  --------------------------------------------------------  IN: 0 mL / OUT: 600 mL / NET: -600 mL          REVIEW OF SYSTEMS:  A 10-point review of systems was obtained.   Review of systems otherwise unchanged compared to prior visit except as previously noted     PHYSICAL EXAM:  Appearance: NAD. VS past 24 hrs -as above   HEENT:   Moist oral mucosa. Conjunctiva clear b/l.   Neck : supple  Respiratory: Lungs CTAB.  Gastrointestinal:  Soft, nontender. No rebound. No rigidity. BS present	  Cardiovascular: RRR ,S1S2 present  Neurologic: Non-focal. Moving all extremities.  Extremities: No edema. No erythema. No calf tenderness.  Skin: No rashes, No ecchymoses, No cyanosis.	  wounds ,skin lesions-See skin assesment flow sheet   LABS:                        12.6   5.90  )-----------( 200      ( 29 Oct 2022 07:53 )             40.3     10-29    142  |  104  |  7   ----------------------------<  104<H>  3.8   |  33<H>  |  0.36<L>    Ca    9.1      29 Oct 2022 07:53      CAPILLARY BLOOD GLUCOSE              Culture - Urine (collected 21 Oct 2022 07:40)  Source: Clean Catch Clean Catch (Midstream)  Final Report (22 Oct 2022 10:35):    <10,000 CFU/mL Normal Urogenital Consuelo      RADIOLOGY & ADDITIONAL TESTS:   reviewed elctronically  ASSESSMENT/PLAN: 	    25 minutes aggregate time was spent on this visit, 50% visit time spent in care co-ordination with other attendings and counselling patient .I have discussed care plan with patient / HCP/family member ,who expressed understanding of problems treatment and their effect and side effects, alternatives in details. I have asked if they have any questions and concerns and appropriately addressed them to best of my ability.

## 2022-10-30 NOTE — PROGRESS NOTE ADULT - PROBLEM SELECTOR PLAN 3
gerd precautions w/PO intake ,case d/w GI TEAM   ppi once a day, may increase to twice a day   maalox as needed  ,clear liquid diet   ·  Plan: Seen by surgery cons -Large mid esophageal diverticulum.  Ideally, would resect diverticulum and perform myotomy.  -Poor surgical candidate  -Recommend PEG placement to limit chance of aspiration

## 2022-10-30 NOTE — PROGRESS NOTE ADULT - SUBJECTIVE AND OBJECTIVE BOX
Patient is a 82y Female whom presented to the hospital with hypernatremia     PAST MEDICAL & SURGICAL HISTORY:  History of COPD      Insomnia      Mood disorder      Dementia      Spasm of muscle      Constipation      Hypothyroidism      GERD (gastroesophageal reflux disease)      HTN (hypertension)      CHF, chronic          MEDICATIONS  (STANDING):  albuterol/ipratropium for Nebulization 3 milliLiter(s) Nebulizer every 6 hours  baclofen 5 milliGRAM(s) Oral every 12 hours  buDESOnide    Inhalation Suspension 0.5 milliGRAM(s) Inhalation two times a day  dextrose 5% + sodium chloride 0.45%. 1000 milliLiter(s) (30 mL/Hr) IV Continuous <Continuous>  donepezil 10 milliGRAM(s) Oral at bedtime  DULoxetine 60 milliGRAM(s) Oral daily  enoxaparin Injectable 40 milliGRAM(s) SubCutaneous every 24 hours  guaiFENesin  milliGRAM(s) Oral every 12 hours  levothyroxine Injectable 75 MICROGram(s) IV Push at bedtime  memantine 10 milliGRAM(s) Oral two times a day  methylPREDNISolone sodium succinate Injectable 40 milliGRAM(s) IV Push daily  metoprolol tartrate Injectable 2.5 milliGRAM(s) IV Push every 12 hours  pantoprazole  Injectable 40 milliGRAM(s) IV Push every 12 hours  potassium chloride  10 mEq/100 mL IVPB 10 milliEquivalent(s) IV Intermittent every 1 hour  simvastatin 10 milliGRAM(s) Oral at bedtime  traZODone 100 milliGRAM(s) Oral at bedtime      Allergies    sulfa drugs (Unknown)  Tylenol (Unknown)    Intolerances        SOCIAL HISTORY:  Denies ETOh,Smoking,     FAMILY HISTORY:      REVIEW OF SYSTEMS:    CONSTITUTIONAL: No weakness, fevers or chills  RESPIRATORY: No cough, wheezing, hemoptysis; No shortness of breath  CARDIOVASCULAR: No chest pain or palpitations  GASTROINTESTINAL: No abdominal or epigastric pain. No nausea, vomiting,     No diarrhea or constipation. No melena   SKIN: dry                                                                           12.6   5.90  )-----------( 200      ( 29 Oct 2022 07:53 )             40.3       CBC Full  -  ( 29 Oct 2022 07:53 )  WBC Count : 5.90 K/uL  RBC Count : 4.41 M/uL  Hemoglobin : 12.6 g/dL  Hematocrit : 40.3 %  Platelet Count - Automated : 200 K/uL  Mean Cell Volume : 91.4 fl  Mean Cell Hemoglobin : 28.6 pg  Mean Cell Hemoglobin Concentration : 31.3 gm/dL  Auto Neutrophil # : x  Auto Lymphocyte # : x  Auto Monocyte # : x  Auto Eosinophil # : x  Auto Basophil # : x  Auto Neutrophil % : x  Auto Lymphocyte % : x  Auto Monocyte % : x  Auto Eosinophil % : x  Auto Basophil % : x      10-29    142  |  104  |  7   ----------------------------<  104<H>  3.8   |  33<H>  |  0.36<L>    Ca    9.1      29 Oct 2022 07:53        CAPILLARY BLOOD GLUCOSE          Vital Signs Last 24 Hrs  T(C): 36.7 (30 Oct 2022 12:31), Max: 36.7 (30 Oct 2022 12:31)  T(F): 98 (30 Oct 2022 12:31), Max: 98 (30 Oct 2022 12:31)  HR: 77 (30 Oct 2022 12:31) (54 - 81)  BP: 94/55 (30 Oct 2022 12:31) (94/55 - 107/69)  BP(mean): --  RR: 18 (30 Oct 2022 12:31) (18 - 18)  SpO2: 98% (30 Oct 2022 12:31) (98% - 99%)    Parameters below as of 30 Oct 2022 12:31  Patient On (Oxygen Delivery Method): nasal cannula  O2 Flow (L/min): 2                                                                                                            PHYSICAL EXAM:    Constitutional: NAD  HEENT: conjunctive   clear   Neck:  No JVD  Respiratory: decrease bs b/l   Cardiovascular: S1 and S2  Gastrointestinal: BS+, soft, NT/ND  Extremities: No peripheral edema  : No Ren  Skin: dry

## 2022-10-31 LAB
ALBUMIN SERPL ELPH-MCNC: 2.6 G/DL — LOW (ref 3.3–5)
ALP SERPL-CCNC: 60 U/L — SIGNIFICANT CHANGE UP (ref 40–120)
ALT FLD-CCNC: 13 U/L — SIGNIFICANT CHANGE UP (ref 12–78)
ANION GAP SERPL CALC-SCNC: 5 MMOL/L — SIGNIFICANT CHANGE UP (ref 5–17)
AST SERPL-CCNC: 14 U/L — LOW (ref 15–37)
BILIRUB SERPL-MCNC: 0.3 MG/DL — SIGNIFICANT CHANGE UP (ref 0.2–1.2)
BUN SERPL-MCNC: 9 MG/DL — SIGNIFICANT CHANGE UP (ref 7–23)
CALCIUM SERPL-MCNC: 8.7 MG/DL — SIGNIFICANT CHANGE UP (ref 8.5–10.1)
CHLORIDE SERPL-SCNC: 103 MMOL/L — SIGNIFICANT CHANGE UP (ref 96–108)
CO2 SERPL-SCNC: 36 MMOL/L — HIGH (ref 22–31)
CREAT SERPL-MCNC: 0.41 MG/DL — LOW (ref 0.5–1.3)
EGFR: 97 ML/MIN/1.73M2 — SIGNIFICANT CHANGE UP
GLUCOSE SERPL-MCNC: 90 MG/DL — SIGNIFICANT CHANGE UP (ref 70–99)
HCT VFR BLD CALC: 38.5 % — SIGNIFICANT CHANGE UP (ref 34.5–45)
HGB BLD-MCNC: 11.9 G/DL — SIGNIFICANT CHANGE UP (ref 11.5–15.5)
MCHC RBC-ENTMCNC: 28.5 PG — SIGNIFICANT CHANGE UP (ref 27–34)
MCHC RBC-ENTMCNC: 30.9 GM/DL — LOW (ref 32–36)
MCV RBC AUTO: 92.1 FL — SIGNIFICANT CHANGE UP (ref 80–100)
NRBC # BLD: 0 /100 WBCS — SIGNIFICANT CHANGE UP (ref 0–0)
PLATELET # BLD AUTO: 222 K/UL — SIGNIFICANT CHANGE UP (ref 150–400)
POTASSIUM SERPL-MCNC: 3.5 MMOL/L — SIGNIFICANT CHANGE UP (ref 3.5–5.3)
POTASSIUM SERPL-SCNC: 3.5 MMOL/L — SIGNIFICANT CHANGE UP (ref 3.5–5.3)
PROT SERPL-MCNC: 5.5 G/DL — LOW (ref 6–8.3)
RBC # BLD: 4.18 M/UL — SIGNIFICANT CHANGE UP (ref 3.8–5.2)
RBC # FLD: 14.6 % — HIGH (ref 10.3–14.5)
SARS-COV-2 RNA SPEC QL NAA+PROBE: DETECTED
SODIUM SERPL-SCNC: 144 MMOL/L — SIGNIFICANT CHANGE UP (ref 135–145)
WBC # BLD: 4.53 K/UL — SIGNIFICANT CHANGE UP (ref 3.8–10.5)
WBC # FLD AUTO: 4.53 K/UL — SIGNIFICANT CHANGE UP (ref 3.8–10.5)

## 2022-10-31 PROCEDURE — 99233 SBSQ HOSP IP/OBS HIGH 50: CPT

## 2022-10-31 RX ADMIN — Medication 5 MILLIGRAM(S): at 17:31

## 2022-10-31 RX ADMIN — Medication 3 MILLILITER(S): at 20:12

## 2022-10-31 RX ADMIN — Medication 2.5 MILLIGRAM(S): at 21:42

## 2022-10-31 RX ADMIN — MEMANTINE HYDROCHLORIDE 10 MILLIGRAM(S): 10 TABLET ORAL at 17:30

## 2022-10-31 RX ADMIN — Medication 600 MILLIGRAM(S): at 05:10

## 2022-10-31 RX ADMIN — Medication 75 MICROGRAM(S): at 06:14

## 2022-10-31 RX ADMIN — DULOXETINE HYDROCHLORIDE 60 MILLIGRAM(S): 30 CAPSULE, DELAYED RELEASE ORAL at 13:36

## 2022-10-31 RX ADMIN — Medication 600 MILLIGRAM(S): at 17:30

## 2022-10-31 RX ADMIN — SIMVASTATIN 10 MILLIGRAM(S): 20 TABLET, FILM COATED ORAL at 21:41

## 2022-10-31 RX ADMIN — Medication 400 MILLIGRAM(S): at 22:36

## 2022-10-31 RX ADMIN — PANTOPRAZOLE SODIUM 40 MILLIGRAM(S): 20 TABLET, DELAYED RELEASE ORAL at 17:31

## 2022-10-31 RX ADMIN — Medication 3 MILLIGRAM(S): at 21:41

## 2022-10-31 RX ADMIN — PANTOPRAZOLE SODIUM 40 MILLIGRAM(S): 20 TABLET, DELAYED RELEASE ORAL at 05:11

## 2022-10-31 RX ADMIN — Medication 100 MILLIGRAM(S): at 21:41

## 2022-10-31 RX ADMIN — MEMANTINE HYDROCHLORIDE 10 MILLIGRAM(S): 10 TABLET ORAL at 05:11

## 2022-10-31 RX ADMIN — Medication 0.5 MILLIGRAM(S): at 20:12

## 2022-10-31 RX ADMIN — DONEPEZIL HYDROCHLORIDE 10 MILLIGRAM(S): 10 TABLET, FILM COATED ORAL at 21:43

## 2022-10-31 RX ADMIN — ENOXAPARIN SODIUM 40 MILLIGRAM(S): 100 INJECTION SUBCUTANEOUS at 21:41

## 2022-10-31 RX ADMIN — Medication 2.5 MILLIGRAM(S): at 13:38

## 2022-10-31 RX ADMIN — Medication 5 MILLIGRAM(S): at 05:11

## 2022-10-31 NOTE — PROGRESS NOTE ADULT - SUBJECTIVE AND OBJECTIVE BOX
PROGRESS NOTE  Patient is a 82y old  Female who presents with a chief complaint of SOB AND COUGH (31 Oct 2022 09:51)    Chart and available morning labs /imaging are reviewed electronically , urgent issues addressed . More information  is being added upon completion of rounds , when more information is collected and management discussed with consultants , medical staff and social service/case management on the floor   OVERNIGHT  No new issues reported by medical staff . All above noted Patient is resting in a bed comfortably ..No distress noted     HPI:  Patient brought in by EMS from [facility] for shortness of breath cough wheezing for 2 days.  Patient denies fevers chills headache chest pain abdominal pain vomiting Admit for antibacterial managmnet ,intravenous steroids,nebulised bronchodilators and pulmonology evaluation,O2 supply,serial labs and chest xrays,obtain ECHO to evaluate LVEF and rule out chf component Admitted  to telemetry unit for monitoring , send 3 sets of cardiac enzymes to rule out acute coronary event, obtain ECHO to evaluate LVEF, cardiology consult  ,continue current management, O2 supply, anticoagulation plan as per cardiology consult Palliative care consult requested ,to discuss advance directives and complete MOLST  (20 Oct 2022 13:54)    PAST MEDICAL & SURGICAL HISTORY:  Afib      History of COPD      HTN (hypertension)      Hypothyroid      GERD (gastroesophageal reflux disease)      Esophageal diverticulum      History of COPD      Insomnia      Mood disorder      Dementia      Spasm of muscle      Constipation      Hypothyroidism      GERD (gastroesophageal reflux disease)      HTN (hypertension)      CHF, chronic      Presence of IVC filter          MEDICATIONS  (STANDING):  albuterol/ipratropium for Nebulization 3 milliLiter(s) Nebulizer every 6 hours  baclofen 5 milliGRAM(s) Oral every 12 hours  buDESOnide    Inhalation Suspension 0.5 milliGRAM(s) Inhalation two times a day  donepezil 10 milliGRAM(s) Oral at bedtime  DULoxetine 60 milliGRAM(s) Oral daily  enoxaparin Injectable 40 milliGRAM(s) SubCutaneous every 24 hours  guaiFENesin  milliGRAM(s) Oral every 12 hours  levothyroxine Injectable 75 MICROGram(s) IV Push at bedtime  memantine 10 milliGRAM(s) Oral two times a day  metoprolol tartrate Injectable 2.5 milliGRAM(s) IV Push every 8 hours  pantoprazole  Injectable 40 milliGRAM(s) IV Push every 12 hours  simvastatin 10 milliGRAM(s) Oral at bedtime  traZODone 100 milliGRAM(s) Oral at bedtime    MEDICATIONS  (PRN):  aluminum hydroxide/magnesium hydroxide/simethicone Suspension 30 milliLiter(s) Oral every 4 hours PRN Dyspepsia  ibuprofen  Tablet. 400 milliGRAM(s) Oral every 6 hours PRN Temp greater or equal to 38C (100.4F), Mild Pain (1 - 3)  loperamide 2 milliGRAM(s) Oral four times a day PRN Diarrhea  LORazepam   Injectable 0.5 milliGRAM(s) IV Push every 8 hours PRN Anxiety  melatonin 3 milliGRAM(s) Oral at bedtime PRN Insomnia  ondansetron Injectable 4 milliGRAM(s) IV Push every 8 hours PRN Nausea and/or Vomiting      OBJECTIVE    T(C): 36.3 (10-31-22 @ 04:42), Max: 36.7 (10-30-22 @ 12:31)  HR: 64 (10-31-22 @ 04:42) (64 - 77)  BP: 104/61 (10-31-22 @ 04:42) (94/55 - 104/61)  RR: 17 (10-31-22 @ 04:42) (17 - 18)  SpO2: 97% (10-31-22 @ 04:42) (97% - 98%)  Wt(kg): --  I&O's Summary    30 Oct 2022 07:01  -  31 Oct 2022 07:00  --------------------------------------------------------  IN: 0 mL / OUT: 500 mL / NET: -500 mL          REVIEW OF SYSTEMS:  CONSTITUTIONAL: No fever, weight loss, or fatigue  EYES: No eye pain, visual disturbances, or discharge  ENMT:   No sinus or throat pain  NECK: No pain or stiffness  RESPIRATORY: No cough, wheezing, chills or hemoptysis; No shortness of breath  CARDIOVASCULAR: No chest pain, palpitations, dizziness, or leg swelling  GASTROINTESTINAL: No abdominal pain. No nausea, vomiting; No diarrhea or constipation. No melena or hematochezia.  GENITOURINARY: No dysuria, frequency, hematuria, or incontinence  NEUROLOGICAL: No headaches, memory loss, loss of strength, numbness, or tremors  SKIN: No itching, burning, rashes, or lesions   MUSCULOSKELETAL: No joint pain or swelling; No muscle, back, or extremity pain    PHYSICAL EXAM:  Appearance: NAD. VS past 24 hrs -as above   HEENT:   Moist oral mucosa. Conjunctiva clear b/l.   Neck : supple  Respiratory: Lungs CTAB.  Gastrointestinal:  Soft, nontender. No rebound. No rigidity. BS present	  Cardiovascular: RRR ,S1S2 present  Neurologic: Non-focal. Moving all extremities.  Extremities: No edema. No erythema. No calf tenderness.  Skin: No rashes, No ecchymoses, No cyanosis.	  wounds ,skin lesions-See skin assesment flow sheet   LABS:                        11.9   4.53  )-----------( 222      ( 31 Oct 2022 08:20 )             38.5     10-31    144  |  103  |  9   ----------------------------<  90  3.5   |  36<H>  |  0.41<L>    Ca    8.7      31 Oct 2022 08:20    TPro  5.5<L>  /  Alb  2.6<L>  /  TBili  0.3  /  DBili  x   /  AST  14<L>  /  ALT  13  /  AlkPhos  60  10-31    CAPILLARY BLOOD GLUCOSE              RADIOLOGY & ADDITIONAL TESTS:   reviewed elctronically  ASSESSMENT/PLAN: 	     PROGRESS NOTE  Patient is a 82y old  Female who presents with a chief complaint of SOB AND COUGH (31 Oct 2022 09:51)    Chart and available morning labs /imaging are reviewed electronically , urgent issues addressed . More information  is being added upon completion of rounds , when more information is collected and management discussed with consultants , medical staff and social service/case management on the floor   OVERNIGHT  No new issues reported by medical staff . All above noted Patient is resting in a bed comfortably ..No distress noted   Patient states " I am not having surgery"  HPI:  Patient brought in by EMS from [facility] for shortness of breath cough wheezing for 2 days.  Patient denies fevers chills headache chest pain abdominal pain vomiting Admit for antibacterial managmnet ,intravenous steroids,nebulised bronchodilators and pulmonology evaluation,O2 supply,serial labs and chest xrays,obtain ECHO to evaluate LVEF and rule out chf component Admitted  to telemetry unit for monitoring , send 3 sets of cardiac enzymes to rule out acute coronary event, obtain ECHO to evaluate LVEF, cardiology consult  ,continue current management, O2 supply, anticoagulation plan as per cardiology consult Palliative care consult requested ,to discuss advance directives and complete MOLST  (20 Oct 2022 13:54)    PAST MEDICAL & SURGICAL HISTORY:  Afib      History of COPD      HTN (hypertension)      Hypothyroid      GERD (gastroesophageal reflux disease)      Esophageal diverticulum      History of COPD      Insomnia      Mood disorder      Dementia      Spasm of muscle      Constipation      Hypothyroidism      GERD (gastroesophageal reflux disease)      HTN (hypertension)      CHF, chronic      Presence of IVC filter          MEDICATIONS  (STANDING):  albuterol/ipratropium for Nebulization 3 milliLiter(s) Nebulizer every 6 hours  baclofen 5 milliGRAM(s) Oral every 12 hours  buDESOnide    Inhalation Suspension 0.5 milliGRAM(s) Inhalation two times a day  donepezil 10 milliGRAM(s) Oral at bedtime  DULoxetine 60 milliGRAM(s) Oral daily  enoxaparin Injectable 40 milliGRAM(s) SubCutaneous every 24 hours  guaiFENesin  milliGRAM(s) Oral every 12 hours  levothyroxine Injectable 75 MICROGram(s) IV Push at bedtime  memantine 10 milliGRAM(s) Oral two times a day  metoprolol tartrate Injectable 2.5 milliGRAM(s) IV Push every 8 hours  pantoprazole  Injectable 40 milliGRAM(s) IV Push every 12 hours  simvastatin 10 milliGRAM(s) Oral at bedtime  traZODone 100 milliGRAM(s) Oral at bedtime    MEDICATIONS  (PRN):  aluminum hydroxide/magnesium hydroxide/simethicone Suspension 30 milliLiter(s) Oral every 4 hours PRN Dyspepsia  ibuprofen  Tablet. 400 milliGRAM(s) Oral every 6 hours PRN Temp greater or equal to 38C (100.4F), Mild Pain (1 - 3)  loperamide 2 milliGRAM(s) Oral four times a day PRN Diarrhea  LORazepam   Injectable 0.5 milliGRAM(s) IV Push every 8 hours PRN Anxiety  melatonin 3 milliGRAM(s) Oral at bedtime PRN Insomnia  ondansetron Injectable 4 milliGRAM(s) IV Push every 8 hours PRN Nausea and/or Vomiting      OBJECTIVE    T(C): 36.3 (10-31-22 @ 04:42), Max: 36.7 (10-30-22 @ 12:31)  HR: 64 (10-31-22 @ 04:42) (64 - 77)  BP: 104/61 (10-31-22 @ 04:42) (94/55 - 104/61)  RR: 17 (10-31-22 @ 04:42) (17 - 18)  SpO2: 97% (10-31-22 @ 04:42) (97% - 98%)  Wt(kg): --  I&O's Summary    30 Oct 2022 07:01  -  31 Oct 2022 07:00  --------------------------------------------------------  IN: 0 mL / OUT: 500 mL / NET: -500 mL          REVIEW OF SYSTEMS:  CONSTITUTIONAL: No fever, weight loss, or fatigue  EYES: No eye pain, visual disturbances, or discharge  ENMT:   No sinus or throat pain  NECK: No pain or stiffness  RESPIRATORY: No cough, wheezing, chills or hemoptysis; No shortness of breath  CARDIOVASCULAR: No chest pain, palpitations, dizziness, or leg swelling  GASTROINTESTINAL: No abdominal pain. No nausea, vomiting; No diarrhea or constipation. No melena or hematochezia.  GENITOURINARY: No dysuria, frequency, hematuria, or incontinence  NEUROLOGICAL: No headaches, memory loss, loss of strength, numbness, or tremors  SKIN: No itching, burning, rashes, or lesions   MUSCULOSKELETAL: No joint pain or swelling; No muscle, back, or extremity pain    PHYSICAL EXAM:  Appearance: NAD. VS past 24 hrs -as above   HEENT:   Moist oral mucosa. Conjunctiva clear b/l.   Neck : supple  Respiratory: Lungs CTAB.  Gastrointestinal:  Soft, nontender. No rebound. No rigidity. BS present	  Cardiovascular: RRR ,S1S2 present  Neurologic: Non-focal. Moving all extremities.  Extremities: No edema. No erythema. No calf tenderness.  Skin: No rashes, No ecchymoses, No cyanosis.	  wounds ,skin lesions-See skin assesment flow sheet   LABS:                        11.9   4.53  )-----------( 222      ( 31 Oct 2022 08:20 )             38.5     10-31    144  |  103  |  9   ----------------------------<  90  3.5   |  36<H>  |  0.41<L>    Ca    8.7      31 Oct 2022 08:20    TPro  5.5<L>  /  Alb  2.6<L>  /  TBili  0.3  /  DBili  x   /  AST  14<L>  /  ALT  13  /  AlkPhos  60  10-31    CAPILLARY BLOOD GLUCOSE              RADIOLOGY & ADDITIONAL TESTS:   reviewed elctronically  ASSESSMENT/PLAN: 	  25 minutes aggregate time was spent on this visit, 50% visit time spent in care co-ordination with other attendings and counselling patient .I have discussed care plan with patient / HCP/family member ,who expressed understanding of problems treatment and their effect and side effects, alternatives in details. I have asked if they have any questions and concerns and appropriately addressed them to best of my ability.

## 2022-10-31 NOTE — PROGRESS NOTE ADULT - SUBJECTIVE AND OBJECTIVE BOX
Date/Time Patient Seen:  		  Referring MD:   Data Reviewed	       Patient is a 82y old  Female who presents with a chief complaint of SOB AND COUGH (30 Oct 2022 19:47)      Subjective/HPI     PAST MEDICAL & SURGICAL HISTORY:  Afib    History of COPD    HTN (hypertension)    Hypothyroid    GERD (gastroesophageal reflux disease)    Esophageal diverticulum    History of COPD    Insomnia    Mood disorder    Dementia    Spasm of muscle    Constipation    Hypothyroidism    GERD (gastroesophageal reflux disease)    HTN (hypertension)    CHF, chronic    Presence of IVC filter          Medication list         MEDICATIONS  (STANDING):  albuterol/ipratropium for Nebulization 3 milliLiter(s) Nebulizer every 6 hours  baclofen 5 milliGRAM(s) Oral every 12 hours  buDESOnide    Inhalation Suspension 0.5 milliGRAM(s) Inhalation two times a day  donepezil 10 milliGRAM(s) Oral at bedtime  DULoxetine 60 milliGRAM(s) Oral daily  enoxaparin Injectable 40 milliGRAM(s) SubCutaneous every 24 hours  guaiFENesin  milliGRAM(s) Oral every 12 hours  levothyroxine Injectable 75 MICROGram(s) IV Push at bedtime  memantine 10 milliGRAM(s) Oral two times a day  metoprolol tartrate Injectable 2.5 milliGRAM(s) IV Push every 8 hours  pantoprazole  Injectable 40 milliGRAM(s) IV Push every 12 hours  simvastatin 10 milliGRAM(s) Oral at bedtime  traZODone 100 milliGRAM(s) Oral at bedtime    MEDICATIONS  (PRN):  aluminum hydroxide/magnesium hydroxide/simethicone Suspension 30 milliLiter(s) Oral every 4 hours PRN Dyspepsia  ibuprofen  Tablet. 400 milliGRAM(s) Oral every 6 hours PRN Temp greater or equal to 38C (100.4F), Mild Pain (1 - 3)  loperamide 2 milliGRAM(s) Oral four times a day PRN Diarrhea  LORazepam   Injectable 0.5 milliGRAM(s) IV Push every 8 hours PRN Anxiety  melatonin 3 milliGRAM(s) Oral at bedtime PRN Insomnia  ondansetron Injectable 4 milliGRAM(s) IV Push every 8 hours PRN Nausea and/or Vomiting         Vitals log        ICU Vital Signs Last 24 Hrs  T(C): 36.3 (31 Oct 2022 04:42), Max: 36.7 (30 Oct 2022 12:31)  T(F): 97.4 (31 Oct 2022 04:42), Max: 98 (30 Oct 2022 12:31)  HR: 64 (31 Oct 2022 04:42) (54 - 77)  BP: 104/61 (31 Oct 2022 04:42) (94/55 - 104/61)  BP(mean): --  ABP: --  ABP(mean): --  RR: 17 (31 Oct 2022 04:42) (17 - 18)  SpO2: 97% (31 Oct 2022 04:42) (97% - 99%)    O2 Parameters below as of 31 Oct 2022 04:42  Patient On (Oxygen Delivery Method): nasal cannula  O2 Flow (L/min): 2               Input and Output:  I&O's Detail    29 Oct 2022 07:01  -  30 Oct 2022 07:00  --------------------------------------------------------  IN:  Total IN: 0 mL    OUT:    Voided (mL): 600 mL  Total OUT: 600 mL    Total NET: -600 mL          Lab Data                        12.6   5.90  )-----------( 200      ( 29 Oct 2022 07:53 )             40.3     10-29    142  |  104  |  7   ----------------------------<  104<H>  3.8   |  33<H>  |  0.36<L>    Ca    9.1      29 Oct 2022 07:53              Review of Systems	      Objective     Physical Examination    heart s1s2  lung dec BS  head nc      Pertinent Lab findings & Imaging      Gela:  NO   Adequate UO     I&O's Detail    29 Oct 2022 07:01  -  30 Oct 2022 07:00  --------------------------------------------------------  IN:  Total IN: 0 mL    OUT:    Voided (mL): 600 mL  Total OUT: 600 mL    Total NET: -600 mL               Discussed with:     Cultures:	        Radiology

## 2022-10-31 NOTE — PROGRESS NOTE ADULT - SUBJECTIVE AND OBJECTIVE BOX
ROXIE LAZAR    [facility] [address]    Allergies    sulfa drugs (Unknown)  Sulfur Colliod (Rash)  Tylenol (Swelling)  Tylenol (Unknown)    Intolerances        PAST MEDICAL & SURGICAL HISTORY:  Afib      History of COPD      HTN (hypertension)      Hypothyroid      GERD (gastroesophageal reflux disease)      Esophageal diverticulum      History of COPD      Insomnia      Mood disorder      Dementia      Spasm of muscle      Constipation      Hypothyroidism      GERD (gastroesophageal reflux disease)      HTN (hypertension)      CHF, chronic      Presence of IVC filter          FAMILY HISTORY:      Home Medications:  acetaminophen 325 mg oral tablet: 2 tab(s) orally every 6 hours, As Needed for pain  (27 Oct 2022 14:12)  Albuterol (Eqv-ProAir HFA) 90 mcg/inh inhalation aerosol: 2 puff(s) inhaled every 6 hours, As Needed for wheezing (05 Oct 2021 11:37)  baclofen 10 mg oral tablet: 0.5 tab(s) orally 2 times a day (27 Oct 2022 14:12)  baclofen 5 mg oral tablet: 1 tab(s) orally 2 times a day (10 Sep 2021 15:54)  bisacodyl 10 mg rectal suppository: 1 suppository(ies) rectal once a day, As needed, Constipation (05 Oct 2021 11:07)  Cymbalta 60 mg oral delayed release capsule: 1 cap(s) orally once a day (10 Sep 2021 15:54)  Cymbalta 60 mg oral delayed release capsule: 1 cap(s) orally once a day (27 Oct 2022 14:12)  donepezil 10 mg oral tablet: 1 tab(s) orally once a day (at bedtime) (27 Oct 2022 14:12)  donepezil 10 mg oral tablet: 1 tab(s) orally once a day (at bedtime) (05 Oct 2021 11:07)  Eucerin topical cream: Apply topically to affected area once a day (at bedtime) (27 Oct 2022 14:12)  furosemide 40 mg oral tablet: 0.5 tab(s) orally once a day (27 Oct 2022 14:12)  ipratropium-albuterol 0.5 mg-2.5 mg/3 mL inhalation solution: 3 milliliter(s) inhaled 4 times a day (27 Oct 2022 14:12)  levothyroxine 150 mcg (0.15 mg) oral capsule: 1 cap(s) orally once a day (27 Oct 2022 14:12)  levothyroxine 150 mcg (0.15 mg) oral tablet: 1 tab(s) orally once a day (05 Oct 2021 11:07)  Linzess 290 mcg oral capsule: 1 cap(s) orally once a day (10 Sep 2021 15:54)  Linzess 290 mcg oral capsule: 1 cap(s) orally once a day (27 Oct 2022 14:12)  melatonin 3 mg oral tablet: 1 tab(s) orally once a day (at bedtime) (27 Oct 2022 14:12)  memantine 10 mg oral tablet: 1 tab(s) orally every 12 hours (05 Oct 2021 11:07)  Metoprolol Tartrate 25 mg oral tablet: 1.5 tab(s) orally 2 times a day (27 Oct 2022 14:12)  metoprolol tartrate 37.5 mg oral tablet: 1 tab(s) orally 2 times a day (05 Oct 2021 11:07)  Namenda 10 mg oral tablet: 2 tab(s) orally once a day (at bedtime) (27 Oct 2022 14:12)  omeprazole 40 mg oral delayed release capsule: 1 cap(s) orally once a day (27 Oct 2022 14:12)  ondansetron 4 mg oral tablet: 1 tab(s) orally once a day (27 Oct 2022 14:12)  pantoprazole 40 mg oral delayed release tablet: 1 tab(s) orally once a day (05 Oct 2021 11:37)  polyethylene glycol 3350 oral powder for reconstitution: 17 gram(s) orally 2 times a day (05 Oct 2021 11:07)  ProAir HFA 90 mcg/inh inhalation aerosol: 2 puff(s) inhaled every 6 hours, As Needed (27 Oct 2022 14:12)  Senna 8.6 mg oral tablet: 2 tab(s) orally once a day (at bedtime) (27 Oct 2022 14:12)  senna oral tablet: 2 tab(s) orally once a day (at bedtime) (05 Oct 2021 11:07)  Spiriva 18 mcg inhalation capsule: 1 cap(s) inhaled once a day (10 Sep 2021 15:54)  Symbicort 80 mcg-4.5 mcg/inh inhalation aerosol: 2 puff(s) inhaled 2 times a day (10 Sep 2021 15:54)  traZODone 100 mg oral tablet: 1 tab(s) orally every 12 hours (05 Oct 2021 11:07)  traZODone 100 mg oral tablet: 1 tab(s) orally once a day (at bedtime) (27 Oct 2022 14:12)  zinc oxide topical ointment: Apply topically to affected area once a day and as needed  (27 Oct 2022 14:12)  Zocor 10 mg oral tablet: 1 tab(s) orally once a day (at bedtime) (27 Oct 2022 14:12)  Zocor 10 mg oral tablet: 1 tab(s) orally once a day (at bedtime) (10 Sep 2021 15:54)  ZyrTEC 10 mg oral tablet: 1 tab(s) orally once a day (27 Oct 2022 14:12)      MEDICATIONS  (STANDING):  albuterol/ipratropium for Nebulization 3 milliLiter(s) Nebulizer every 6 hours  baclofen 5 milliGRAM(s) Oral every 12 hours  buDESOnide    Inhalation Suspension 0.5 milliGRAM(s) Inhalation two times a day  donepezil 10 milliGRAM(s) Oral at bedtime  DULoxetine 60 milliGRAM(s) Oral daily  enoxaparin Injectable 40 milliGRAM(s) SubCutaneous every 24 hours  guaiFENesin  milliGRAM(s) Oral every 12 hours  levothyroxine Injectable 75 MICROGram(s) IV Push at bedtime  memantine 10 milliGRAM(s) Oral two times a day  metoprolol tartrate Injectable 2.5 milliGRAM(s) IV Push every 8 hours  pantoprazole  Injectable 40 milliGRAM(s) IV Push every 12 hours  simvastatin 10 milliGRAM(s) Oral at bedtime  traZODone 100 milliGRAM(s) Oral at bedtime    MEDICATIONS  (PRN):  aluminum hydroxide/magnesium hydroxide/simethicone Suspension 30 milliLiter(s) Oral every 4 hours PRN Dyspepsia  ibuprofen  Tablet. 400 milliGRAM(s) Oral every 6 hours PRN Temp greater or equal to 38C (100.4F), Mild Pain (1 - 3)  loperamide 2 milliGRAM(s) Oral four times a day PRN Diarrhea  LORazepam   Injectable 0.5 milliGRAM(s) IV Push every 8 hours PRN Anxiety  melatonin 3 milliGRAM(s) Oral at bedtime PRN Insomnia  ondansetron Injectable 4 milliGRAM(s) IV Push every 8 hours PRN Nausea and/or Vomiting              Vital Signs Last 24 Hrs  T(C): 36.3 (31 Oct 2022 04:42), Max: 36.7 (30 Oct 2022 12:31)  T(F): 97.4 (31 Oct 2022 04:42), Max: 98 (30 Oct 2022 12:31)  HR: 64 (31 Oct 2022 04:42) (64 - 77)  BP: 104/61 (31 Oct 2022 04:42) (94/55 - 104/61)  BP(mean): --  RR: 17 (31 Oct 2022 04:42) (17 - 18)  SpO2: 97% (31 Oct 2022 04:42) (97% - 98%)    Parameters below as of 31 Oct 2022 04:42  Patient On (Oxygen Delivery Method): nasal cannula  O2 Flow (L/min): 2        10-30-22 @ 07:01  -  10-31-22 @ 07:00  --------------------------------------------------------  IN: 0 mL / OUT: 500 mL / NET: -500 mL              LABS:                        11.9   4.53  )-----------( 222      ( 31 Oct 2022 08:20 )             38.5     10-31    144  |  103  |  9   ----------------------------<  90  3.5   |  36<H>  |  0.41<L>    Ca    8.7      31 Oct 2022 08:20    TPro  5.5<L>  /  Alb  2.6<L>  /  TBili  0.3  /  DBili  x   /  AST  14<L>  /  ALT  13  /  AlkPhos  60  10-31              WBC:  WBC Count: 4.53 K/uL (10-31 @ 08:20)  WBC Count: 5.90 K/uL (10-29 @ 07:53)  WBC Count: 5.83 K/uL (10-28 @ 08:25)      MICROBIOLOGY:  RECENT CULTURES:                  Sodium:  Sodium, Serum: 144 mmol/L (10-31 @ 08:20)  Sodium, Serum: 142 mmol/L (10-29 @ 07:53)  Sodium, Serum: 143 mmol/L (10-28 @ 08:25)      0.41 mg/dL 10-31 @ 08:20  0.36 mg/dL 10-29 @ 07:53  0.51 mg/dL 10-28 @ 08:25      Hemoglobin:  Hemoglobin: 11.9 g/dL (10-31 @ 08:20)  Hemoglobin: 12.6 g/dL (10-29 @ 07:53)  Hemoglobin: 12.1 g/dL (10-28 @ 08:25)      Platelets: Platelet Count - Automated: 222 K/uL (10-31 @ 08:20)  Platelet Count - Automated: 200 K/uL (10-29 @ 07:53)  Platelet Count - Automated: 201 K/uL (10-28 @ 08:25)      LIVER FUNCTIONS - ( 31 Oct 2022 08:20 )  Alb: 2.6 g/dL / Pro: 5.5 g/dL / ALK PHOS: 60 U/L / ALT: 13 U/L / AST: 14 U/L / GGT: x                 RADIOLOGY & ADDITIONAL STUDIES:      MICROBIOLOGY:  RECENT CULTURES:

## 2022-10-31 NOTE — PROGRESS NOTE ADULT - SUBJECTIVE AND OBJECTIVE BOX
Patient is a 82y Female whom presented to the hospital with hypernatremia     PAST MEDICAL & SURGICAL HISTORY:  History of COPD      Insomnia      Mood disorder      Dementia      Spasm of muscle      Constipation      Hypothyroidism      GERD (gastroesophageal reflux disease)      HTN (hypertension)      CHF, chronic          MEDICATIONS  (STANDING):  albuterol/ipratropium for Nebulization 3 milliLiter(s) Nebulizer every 6 hours  baclofen 5 milliGRAM(s) Oral every 12 hours  buDESOnide    Inhalation Suspension 0.5 milliGRAM(s) Inhalation two times a day  dextrose 5% + sodium chloride 0.45%. 1000 milliLiter(s) (30 mL/Hr) IV Continuous <Continuous>  donepezil 10 milliGRAM(s) Oral at bedtime  DULoxetine 60 milliGRAM(s) Oral daily  enoxaparin Injectable 40 milliGRAM(s) SubCutaneous every 24 hours  guaiFENesin  milliGRAM(s) Oral every 12 hours  levothyroxine Injectable 75 MICROGram(s) IV Push at bedtime  memantine 10 milliGRAM(s) Oral two times a day  methylPREDNISolone sodium succinate Injectable 40 milliGRAM(s) IV Push daily  metoprolol tartrate Injectable 2.5 milliGRAM(s) IV Push every 12 hours  pantoprazole  Injectable 40 milliGRAM(s) IV Push every 12 hours  potassium chloride  10 mEq/100 mL IVPB 10 milliEquivalent(s) IV Intermittent every 1 hour  simvastatin 10 milliGRAM(s) Oral at bedtime  traZODone 100 milliGRAM(s) Oral at bedtime      Allergies    sulfa drugs (Unknown)  Tylenol (Unknown)    Intolerances        SOCIAL HISTORY:  Denies ETOh,Smoking,     FAMILY HISTORY:      REVIEW OF SYSTEMS:    CONSTITUTIONAL: No weakness, fevers or chills  RESPIRATORY: No cough, wheezing, hemoptysis; No shortness of breath  CARDIOVASCULAR: No chest pain or palpitations  GASTROINTESTINAL: No abdominal or epigastric pain. No nausea, vomiting,     No diarrhea or constipation. No melena   SKIN: dry                                                                           MEDICATIONS  (STANDING):  albuterol/ipratropium for Nebulization 3 milliLiter(s) Nebulizer every 6 hours  baclofen 5 milliGRAM(s) Oral every 12 hours  buDESOnide    Inhalation Suspension 0.5 milliGRAM(s) Inhalation two times a day  donepezil 10 milliGRAM(s) Oral at bedtime  DULoxetine 60 milliGRAM(s) Oral daily  enoxaparin Injectable 40 milliGRAM(s) SubCutaneous every 24 hours  guaiFENesin  milliGRAM(s) Oral every 12 hours  levothyroxine Injectable 75 MICROGram(s) IV Push at bedtime  memantine 10 milliGRAM(s) Oral two times a day  metoprolol tartrate Injectable 2.5 milliGRAM(s) IV Push every 8 hours  pantoprazole  Injectable 40 milliGRAM(s) IV Push every 12 hours  simvastatin 10 milliGRAM(s) Oral at bedtime  traZODone 100 milliGRAM(s) Oral at bedtime                                                                                                        PHYSICAL EXAM:    Constitutional: NAD  HEENT: conjunctive   clear   Neck:  No JVD  Respiratory: decrease bs b/l   Cardiovascular: S1 and S2  Gastrointestinal: BS+, soft, NT/ND  Extremities: No peripheral edema  : No Ren  Skin: dry

## 2022-10-31 NOTE — PROGRESS NOTE ADULT - SUBJECTIVE AND OBJECTIVE BOX
[facility]  Alberto Lepe PA-C  [address] [place] [address]  [phone]      INTERVAL HPI/OVERNIGHT EVENTS:  pt s/e  Tolerating diet  no GI complaints    MEDICATIONS  (STANDING):  albuterol/ipratropium for Nebulization 3 milliLiter(s) Nebulizer every 6 hours  baclofen 5 milliGRAM(s) Oral every 12 hours  buDESOnide    Inhalation Suspension 0.5 milliGRAM(s) Inhalation two times a day  donepezil 10 milliGRAM(s) Oral at bedtime  DULoxetine 60 milliGRAM(s) Oral daily  enoxaparin Injectable 40 milliGRAM(s) SubCutaneous every 24 hours  guaiFENesin  milliGRAM(s) Oral every 12 hours  levothyroxine Injectable 75 MICROGram(s) IV Push at bedtime  memantine 10 milliGRAM(s) Oral two times a day  metoprolol tartrate Injectable 2.5 milliGRAM(s) IV Push every 8 hours  pantoprazole  Injectable 40 milliGRAM(s) IV Push every 12 hours  simvastatin 10 milliGRAM(s) Oral at bedtime  traZODone 100 milliGRAM(s) Oral at bedtime    MEDICATIONS  (PRN):  aluminum hydroxide/magnesium hydroxide/simethicone Suspension 30 milliLiter(s) Oral every 4 hours PRN Dyspepsia  ibuprofen  Tablet. 400 milliGRAM(s) Oral every 6 hours PRN Temp greater or equal to 38C (100.4F), Mild Pain (1 - 3)  loperamide 2 milliGRAM(s) Oral four times a day PRN Diarrhea  LORazepam   Injectable 0.5 milliGRAM(s) IV Push every 8 hours PRN Anxiety  melatonin 3 milliGRAM(s) Oral at bedtime PRN Insomnia  ondansetron Injectable 4 milliGRAM(s) IV Push every 8 hours PRN Nausea and/or Vomiting      Allergies    sulfa drugs (Unknown)  Sulfur Colliod (Rash)  Tylenol (Swelling)  Tylenol (Unknown)      PHYSICAL EXAM:   Vital Signs:  Vital Signs Last 24 Hrs  T(C): 36.3 (31 Oct 2022 04:42), Max: 36.7 (30 Oct 2022 12:31)  T(F): 97.4 (31 Oct 2022 04:42), Max: 98 (30 Oct 2022 12:31)  HR: 64 (31 Oct 2022 04:42) (64 - 77)  BP: 104/61 (31 Oct 2022 04:42) (94/55 - 104/61)  BP(mean): --  RR: 17 (31 Oct 2022 04:42) (17 - 18)  SpO2: 97% (31 Oct 2022 04:42) (97% - 98%)    Parameters below as of 31 Oct 2022 09:06  Patient On (Oxygen Delivery Method): nasal cannula,2L      Daily     Daily Weight in k.2 (31 Oct 2022 04:42)    GENERAL:  Appears stated age  ABDOMEN:  Soft, non-tender, non-distended  NEURO:  Alert      LABS:                        11.9   4.53  )-----------( 222      ( 31 Oct 2022 08:20 )             38.5     10-31    144  |  103  |  9   ----------------------------<  90  3.5   |  36<H>  |  0.41<L>    Ca    8.7      31 Oct 2022 08:20    TPro  5.5<L>  /  Alb  2.6<L>  /  TBili  0.3  /  DBili  x   /  AST  14<L>  /  ALT  13  /  AlkPhos  60  10-31

## 2022-10-31 NOTE — PROGRESS NOTE ADULT - SUBJECTIVE AND OBJECTIVE BOX
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  Patient denies fevers chills headache chest pain abdominal pain vomiting Admit for antibacterial managmnet ,intravenous steroids,nebulised bronchodilators and pulmonology evaluation,O2 supply,serial labs and chest xrays,obtain ECHO to evaluate LVEF and rule out chf component Admitted  to telemetry unit for monitoring , send 3 sets of cardiac enzymes to rule out acute coronary event, obtain ECHO to evaluate LVEF, cardiology consult  ,continue current management, O2 supply, anticoagulation plan as per cardiology consult Palliative care consult requested ,to discuss advance directives and complete MOLST  (20 Oct 2022 13:54)      REVIEW OF SYSTEMS:    CONSTITUTIONAL: No fever, weight loss, or fatigue  EYES: No eye pain, visual disturbances, or discharge  ENMT:  No difficulty hearing, tinnitus, vertigo; No sinus or throat pain  NECK: No pain or stiffness  BREASTS: No pain, masses, or nipple discharge  RESPIRATORY: No cough, wheezing, chills or hemoptysis; No shortness of breath  CARDIOVASCULAR: No chest pain, palpitations, dizziness, or leg swelling  GASTROINTESTINAL: No abdominal or epigastric pain. No nausea, vomiting, or hematemesis; No diarrhea or constipation. No melena or hematochezia.  GENITOURINARY: No dysuria, frequency, hematuria, or incontinence  NEUROLOGICAL: No headaches, memory loss, loss of strength, numbness, or tremors  SKIN: No itching, burning, rashes, or lesions   LYMPH NODES: No enlarged glands  ENDOCRINE: No heat or cold intolerance; No hair loss  MUSCULOSKELETAL: No joint pain or swelling; No muscle, back, or extremity pain  PSYCHIATRIC: No depression, anxiety, mood swings, or difficulty sleeping  HEME/LYMPH: No easy bruising, or bleeding gums  ALLERGY AND IMMUNOLOGIC: No hives or eczema    MEDICATIONS  (STANDING):  albuterol/ipratropium for Nebulization 3 milliLiter(s) Nebulizer every 6 hours  baclofen 5 milliGRAM(s) Oral every 12 hours  buDESOnide    Inhalation Suspension 0.5 milliGRAM(s) Inhalation two times a day  donepezil 10 milliGRAM(s) Oral at bedtime  DULoxetine 60 milliGRAM(s) Oral daily  enoxaparin Injectable 40 milliGRAM(s) SubCutaneous every 24 hours  guaiFENesin  milliGRAM(s) Oral every 12 hours  levothyroxine Injectable 75 MICROGram(s) IV Push at bedtime  memantine 10 milliGRAM(s) Oral two times a day  metoprolol tartrate Injectable 2.5 milliGRAM(s) IV Push every 8 hours  pantoprazole  Injectable 40 milliGRAM(s) IV Push every 12 hours  simvastatin 10 milliGRAM(s) Oral at bedtime  traZODone 100 milliGRAM(s) Oral at bedtime    MEDICATIONS  (PRN):  aluminum hydroxide/magnesium hydroxide/simethicone Suspension 30 milliLiter(s) Oral every 4 hours PRN Dyspepsia  ibuprofen  Tablet. 400 milliGRAM(s) Oral every 6 hours PRN Temp greater or equal to 38C (100.4F), Mild Pain (1 - 3)  loperamide 2 milliGRAM(s) Oral four times a day PRN Diarrhea  LORazepam   Injectable 0.5 milliGRAM(s) IV Push every 8 hours PRN Anxiety  melatonin 3 milliGRAM(s) Oral at bedtime PRN Insomnia  ondansetron Injectable 4 milliGRAM(s) IV Push every 8 hours PRN Nausea and/or Vomiting      ALLERGIES: sulfa drugs (Unknown)  Sulfur Colliod (Rash)  Tylenol (Swelling)  Tylenol (Unknown)      FAMILY HISTORY:      PHYSICAL EXAMINATION:  -----------------------------  T(C): 36.3 (10-31-22 @ 04:42), Max: 36.7 (10-30-22 @ 12:31)  HR: 64 (10-31-22 @ 04:42) (64 - 77)  BP: 104/61 (10-31-22 @ 04:42) (94/55 - 104/61)  RR: 17 (10-31-22 @ 04:42) (17 - 18)  SpO2: 97% (10-31-22 @ 04:42) (97% - 98%)  Wt(kg): --    10-30 @ 07:01  -  10-31 @ 07:00  --------------------------------------------------------  IN:  Total IN: 0 mL    OUT:    Voided (mL): 500 mL  Total OUT: 500 mL    Total NET: -500 mL            VITALS  T(C): 36.3 (10-31-22 @ 04:42), Max: 36.7 (10-30-22 @ 12:31)  HR: 64 (10-31-22 @ 04:42) (64 - 77)  BP: 104/61 (10-31-22 @ 04:42) (94/55 - 104/61)  RR: 17 (10-31-22 @ 04:42) (17 - 18)  SpO2: 97% (10-31-22 @ 04:42) (97% - 98%)    Constitutional: well developed, normal appearance, well groomed, well nourished, no deformities and no acute distress.   Eyes: the conjunctiva exhibited no abnormalities and the eyelids demonstrated no xanthelasmas.   HEENT: normal oral mucosa, no oral pallor and no oral cyanosis.   Neck: normal jugular venous A waves present, normal jugular venous V waves present and no jugular venous oliveira A waves.   Pulmonary: no respiratory distress, normal respiratory rhythm and effort, no accessory muscle use and lungs were clear to auscultation bilaterally.   Cardiovascular: heart rate and rhythm were normal, normal S1 and S2 and no murmur, gallop, rub, heave or thrill are present.   Abdomen: soft, non-tender, no hepato-splenomegaly and no abdominal mass palpated.   Musculoskeletal: the gait could not be assessed..   Extremities: no clubbing of the fingernails, no localized cyanosis, no petechial hemorrhages and no ischemic changes.   Skin: normal skin color and pigmentation, no rash, no venous stasis, no skin lesions, no skin ulcer and no xanthoma was observed.   Psychiatric: oriented to person, place, and time, the affect was normal, the mood was normal and not feeling anxious.     LABS:   --------                             11.9   4.53  )-----------( 222      ( 31 Oct 2022 08:20 )             38.5                 RADIOLOGY:  -----------------    ECG:     ECHO:

## 2022-10-31 NOTE — PROGRESS NOTE ADULT - PROBLEM SELECTOR PLAN 3
gerd precautions w/PO intake ,case d/w GI TEAM   ppi once a day, may increase to twice a day   maalox as needed  ,clear liquid diet   ·  Plan: Seen by surgery cons -Large mid esophageal diverticulum.  Ideally, would resect diverticulum and perform myotomy.  -Poor surgical candidate  -Recommend PEG placement to limit chance of aspiration gerd precautions w/PO intake ,case d/w GI TEAM   ppi once a day, may increase to twice a day   maalox as needed  ,clear liquid diet   ·  Plan: Seen by surgery cons -Large mid esophageal diverticulum.  Ideally, would resect diverticulum and perform myotomy.  -Poor surgical candidate  -Seen by GI team abn imaging d/w son and patient   ? achalasia   reflux  stricture  gerd precautions w/PO intake  ppi once a day, may increase to twice a day   maalox as needed   esophagram noted  s/p egd showing large esophageal diverticulum  ct surgery eval appreciated  d/w patient   d/w son; I explained if she is able to tolerate fulls and puree then she may not need surgery as it is high risk and she is a poor surgical candidate and therefore would avoid peg

## 2022-11-01 ENCOUNTER — TRANSCRIPTION ENCOUNTER (OUTPATIENT)
Age: 82
End: 2022-11-01

## 2022-11-01 VITALS
SYSTOLIC BLOOD PRESSURE: 153 MMHG | OXYGEN SATURATION: 98 % | DIASTOLIC BLOOD PRESSURE: 89 MMHG | TEMPERATURE: 98 F | HEART RATE: 97 BPM | RESPIRATION RATE: 18 BRPM

## 2022-11-01 PROCEDURE — 83605 ASSAY OF LACTIC ACID: CPT

## 2022-11-01 PROCEDURE — 94667 MNPJ CHEST WALL 1ST: CPT

## 2022-11-01 PROCEDURE — 84145 PROCALCITONIN (PCT): CPT

## 2022-11-01 PROCEDURE — 74176 CT ABD & PELVIS W/O CONTRAST: CPT | Mod: MA

## 2022-11-01 PROCEDURE — 97161 PT EVAL LOW COMPLEX 20 MIN: CPT

## 2022-11-01 PROCEDURE — 82803 BLOOD GASES ANY COMBINATION: CPT

## 2022-11-01 PROCEDURE — 82962 GLUCOSE BLOOD TEST: CPT

## 2022-11-01 PROCEDURE — 83880 ASSAY OF NATRIURETIC PEPTIDE: CPT

## 2022-11-01 PROCEDURE — 85730 THROMBOPLASTIN TIME PARTIAL: CPT

## 2022-11-01 PROCEDURE — 96375 TX/PRO/DX INJ NEW DRUG ADDON: CPT

## 2022-11-01 PROCEDURE — 93005 ELECTROCARDIOGRAM TRACING: CPT

## 2022-11-01 PROCEDURE — 80053 COMPREHEN METABOLIC PANEL: CPT

## 2022-11-01 PROCEDURE — 85025 COMPLETE CBC W/AUTO DIFF WBC: CPT

## 2022-11-01 PROCEDURE — 73560 X-RAY EXAM OF KNEE 1 OR 2: CPT

## 2022-11-01 PROCEDURE — 85610 PROTHROMBIN TIME: CPT

## 2022-11-01 PROCEDURE — 0225U NFCT DS DNA&RNA 21 SARSCOV2: CPT

## 2022-11-01 PROCEDURE — 93970 EXTREMITY STUDY: CPT

## 2022-11-01 PROCEDURE — 86140 C-REACTIVE PROTEIN: CPT

## 2022-11-01 PROCEDURE — 97116 GAIT TRAINING THERAPY: CPT

## 2022-11-01 PROCEDURE — 84132 ASSAY OF SERUM POTASSIUM: CPT

## 2022-11-01 PROCEDURE — 93306 TTE W/DOPPLER COMPLETE: CPT

## 2022-11-01 PROCEDURE — 83735 ASSAY OF MAGNESIUM: CPT

## 2022-11-01 PROCEDURE — 82728 ASSAY OF FERRITIN: CPT

## 2022-11-01 PROCEDURE — 74220 X-RAY XM ESOPHAGUS 1CNTRST: CPT

## 2022-11-01 PROCEDURE — 71250 CT THORAX DX C-: CPT | Mod: MA

## 2022-11-01 PROCEDURE — 87040 BLOOD CULTURE FOR BACTERIA: CPT

## 2022-11-01 PROCEDURE — 36415 COLL VENOUS BLD VENIPUNCTURE: CPT

## 2022-11-01 PROCEDURE — 88312 SPECIAL STAINS GROUP 1: CPT

## 2022-11-01 PROCEDURE — 88305 TISSUE EXAM BY PATHOLOGIST: CPT

## 2022-11-01 PROCEDURE — 84100 ASSAY OF PHOSPHORUS: CPT

## 2022-11-01 PROCEDURE — 99285 EMERGENCY DEPT VISIT HI MDM: CPT

## 2022-11-01 PROCEDURE — 99233 SBSQ HOSP IP/OBS HIGH 50: CPT

## 2022-11-01 PROCEDURE — 94760 N-INVAS EAR/PLS OXIMETRY 1: CPT

## 2022-11-01 PROCEDURE — 85379 FIBRIN DEGRADATION QUANT: CPT

## 2022-11-01 PROCEDURE — 71045 X-RAY EXAM CHEST 1 VIEW: CPT

## 2022-11-01 PROCEDURE — 81001 URINALYSIS AUTO W/SCOPE: CPT

## 2022-11-01 PROCEDURE — 85652 RBC SED RATE AUTOMATED: CPT

## 2022-11-01 PROCEDURE — 80048 BASIC METABOLIC PNL TOTAL CA: CPT

## 2022-11-01 PROCEDURE — 94640 AIRWAY INHALATION TREATMENT: CPT

## 2022-11-01 PROCEDURE — 85027 COMPLETE CBC AUTOMATED: CPT

## 2022-11-01 PROCEDURE — 84443 ASSAY THYROID STIM HORMONE: CPT

## 2022-11-01 PROCEDURE — 87635 SARS-COV-2 COVID-19 AMP PRB: CPT

## 2022-11-01 PROCEDURE — 36600 WITHDRAWAL OF ARTERIAL BLOOD: CPT

## 2022-11-01 PROCEDURE — 94668 MNPJ CHEST WALL SBSQ: CPT

## 2022-11-01 PROCEDURE — 96374 THER/PROPH/DIAG INJ IV PUSH: CPT

## 2022-11-01 PROCEDURE — U0003: CPT

## 2022-11-01 PROCEDURE — 87086 URINE CULTURE/COLONY COUNT: CPT

## 2022-11-01 PROCEDURE — U0005: CPT

## 2022-11-01 RX ORDER — LANOLIN/MINERAL OIL
1 LOTION (ML) TOPICAL
Qty: 0 | Refills: 0 | DISCHARGE

## 2022-11-01 RX ORDER — DULOXETINE HYDROCHLORIDE 30 MG/1
1 CAPSULE, DELAYED RELEASE ORAL
Qty: 0 | Refills: 0 | DISCHARGE

## 2022-11-01 RX ORDER — TIOTROPIUM BROMIDE 18 UG/1
1 CAPSULE ORAL; RESPIRATORY (INHALATION) DAILY
Refills: 0 | Status: DISCONTINUED | OUTPATIENT
Start: 2022-11-01 | End: 2022-11-01

## 2022-11-01 RX ORDER — ACETAMINOPHEN 500 MG
2 TABLET ORAL
Qty: 0 | Refills: 0 | DISCHARGE

## 2022-11-01 RX ORDER — BUDESONIDE AND FORMOTEROL FUMARATE DIHYDRATE 160; 4.5 UG/1; UG/1
2 AEROSOL RESPIRATORY (INHALATION)
Refills: 0 | Status: DISCONTINUED | OUTPATIENT
Start: 2022-11-01 | End: 2022-11-01

## 2022-11-01 RX ORDER — LINACLOTIDE 145 UG/1
1 CAPSULE, GELATIN COATED ORAL
Qty: 0 | Refills: 0 | DISCHARGE

## 2022-11-01 RX ORDER — BACLOFEN 100 %
0.5 POWDER (GRAM) MISCELLANEOUS
Qty: 0 | Refills: 0 | DISCHARGE

## 2022-11-01 RX ORDER — OMEPRAZOLE 10 MG/1
1 CAPSULE, DELAYED RELEASE ORAL
Qty: 0 | Refills: 0 | DISCHARGE

## 2022-11-01 RX ORDER — FUROSEMIDE 40 MG
0.5 TABLET ORAL
Qty: 0 | Refills: 0 | DISCHARGE

## 2022-11-01 RX ORDER — SIMVASTATIN 20 MG/1
1 TABLET, FILM COATED ORAL
Qty: 0 | Refills: 0 | DISCHARGE

## 2022-11-01 RX ORDER — IBUPROFEN 200 MG
1 TABLET ORAL
Qty: 0 | Refills: 0 | DISCHARGE
Start: 2022-11-01

## 2022-11-01 RX ORDER — ENOXAPARIN SODIUM 100 MG/ML
40 INJECTION SUBCUTANEOUS
Qty: 0 | Refills: 0 | DISCHARGE
Start: 2022-11-01

## 2022-11-01 RX ORDER — MEMANTINE HYDROCHLORIDE 10 MG/1
2 TABLET ORAL
Qty: 0 | Refills: 0 | DISCHARGE

## 2022-11-01 RX ORDER — TRAZODONE HCL 50 MG
1 TABLET ORAL
Qty: 0 | Refills: 0 | DISCHARGE

## 2022-11-01 RX ORDER — DONEPEZIL HYDROCHLORIDE 10 MG/1
1 TABLET, FILM COATED ORAL
Qty: 0 | Refills: 0 | DISCHARGE

## 2022-11-01 RX ORDER — TRAZODONE HCL 50 MG
1 TABLET ORAL
Qty: 0 | Refills: 0 | DISCHARGE
Start: 2022-11-01

## 2022-11-01 RX ADMIN — Medication 75 MICROGRAM(S): at 05:52

## 2022-11-01 RX ADMIN — PANTOPRAZOLE SODIUM 40 MILLIGRAM(S): 20 TABLET, DELAYED RELEASE ORAL at 05:19

## 2022-11-01 RX ADMIN — DULOXETINE HYDROCHLORIDE 60 MILLIGRAM(S): 30 CAPSULE, DELAYED RELEASE ORAL at 11:37

## 2022-11-01 RX ADMIN — Medication 600 MILLIGRAM(S): at 05:18

## 2022-11-01 RX ADMIN — Medication 2.5 MILLIGRAM(S): at 13:01

## 2022-11-01 RX ADMIN — MEMANTINE HYDROCHLORIDE 10 MILLIGRAM(S): 10 TABLET ORAL at 05:18

## 2022-11-01 NOTE — DISCHARGE NOTE PROVIDER - CARE PROVIDER_API CALL
Daniel Rodriguez  Infectious Diseases  [address] [place] [address]  Phone: [phone]  Fax: [phone]  Follow Up Time: 1 week    Ky Sanches)  Critical Care Medicine; HospicePalliative Medicine; Internal Medicine; Pulmonary Disease  [address] [place] [address]  Phone: [phone]  Fax: [phone]  Follow Up Time: 1 week    Hussein Covarrubias ()  Gastroenterology; Internal Medicine  [address] [place] [address]  Phone: [phone]  Fax: [phone]  Follow Up Time: 2 weeks    Kobe Vivar)  Thoracic Surgery  [address] [place] [address]  Phone: [phone]  Fax: [phone]  Follow Up Time: 1 month

## 2022-11-01 NOTE — PROGRESS NOTE ADULT - SUBJECTIVE AND OBJECTIVE BOX
Patient is a 82y Female whom presented to the hospital with hypernatremia     PAST MEDICAL & SURGICAL HISTORY:  History of COPD      Insomnia      Mood disorder      Dementia      Spasm of muscle      Constipation      Hypothyroidism      GERD (gastroesophageal reflux disease)      HTN (hypertension)      CHF, chronic          MEDICATIONS  (STANDING):  albuterol/ipratropium for Nebulization 3 milliLiter(s) Nebulizer every 6 hours  baclofen 5 milliGRAM(s) Oral every 12 hours  buDESOnide    Inhalation Suspension 0.5 milliGRAM(s) Inhalation two times a day  dextrose 5% + sodium chloride 0.45%. 1000 milliLiter(s) (30 mL/Hr) IV Continuous <Continuous>  donepezil 10 milliGRAM(s) Oral at bedtime  DULoxetine 60 milliGRAM(s) Oral daily  enoxaparin Injectable 40 milliGRAM(s) SubCutaneous every 24 hours  guaiFENesin  milliGRAM(s) Oral every 12 hours  levothyroxine Injectable 75 MICROGram(s) IV Push at bedtime  memantine 10 milliGRAM(s) Oral two times a day  methylPREDNISolone sodium succinate Injectable 40 milliGRAM(s) IV Push daily  metoprolol tartrate Injectable 2.5 milliGRAM(s) IV Push every 12 hours  pantoprazole  Injectable 40 milliGRAM(s) IV Push every 12 hours  potassium chloride  10 mEq/100 mL IVPB 10 milliEquivalent(s) IV Intermittent every 1 hour  simvastatin 10 milliGRAM(s) Oral at bedtime  traZODone 100 milliGRAM(s) Oral at bedtime      Allergies    sulfa drugs (Unknown)  Tylenol (Unknown)    Intolerances        SOCIAL HISTORY:  Denies ETOh,Smoking,     FAMILY HISTORY:      REVIEW OF SYSTEMS:    CONSTITUTIONAL: No weakness, fevers or chills  RESPIRATORY: No cough, wheezing, hemoptysis; No shortness of breath  CARDIOVASCULAR: No chest pain or palpitations  GASTROINTESTINAL: No abdominal or epigastric pain. No nausea, vomiting,     No diarrhea or constipation. No melena   SKIN: dry                                     11.9   4.53  )-----------( 222      ( 31 Oct 2022 08:20 )             38.5       CBC Full  -  ( 31 Oct 2022 08:20 )  WBC Count : 4.53 K/uL  RBC Count : 4.18 M/uL  Hemoglobin : 11.9 g/dL  Hematocrit : 38.5 %  Platelet Count - Automated : 222 K/uL  Mean Cell Volume : 92.1 fl  Mean Cell Hemoglobin : 28.5 pg  Mean Cell Hemoglobin Concentration : 30.9 gm/dL  Auto Neutrophil # : x  Auto Lymphocyte # : x  Auto Monocyte # : x  Auto Eosinophil # : x  Auto Basophil # : x  Auto Neutrophil % : x  Auto Lymphocyte % : x  Auto Monocyte % : x  Auto Eosinophil % : x  Auto Basophil % : x      10-31    144  |  103  |  9   ----------------------------<  90  3.5   |  36<H>  |  0.41<L>    Ca    8.7      31 Oct 2022 08:20    TPro  5.5<L>  /  Alb  2.6<L>  /  TBili  0.3  /  DBili  x   /  AST  14<L>  /  ALT  13  /  AlkPhos  60  10-31      CAPILLARY BLOOD GLUCOSE          Vital Signs Last 24 Hrs  T(C): 36.9 (01 Nov 2022 11:20), Max: 36.9 (01 Nov 2022 04:58)  T(F): 98.4 (01 Nov 2022 11:20), Max: 98.4 (01 Nov 2022 04:58)  HR: 97 (01 Nov 2022 11:20) (97 - 97)  BP: 153/89 (01 Nov 2022 11:20) (99/68 - 153/89)  BP(mean): --  RR: 18 (01 Nov 2022 11:20) (17 - 18)  SpO2: 98% (01 Nov 2022 11:20) (98% - 98%)    Parameters below as of 01 Nov 2022 11:20  Patient On (Oxygen Delivery Method): room air                                                                                                              PHYSICAL EXAM:    Constitutional: NAD  HEENT: conjunctive   clear   Neck:  No JVD  Respiratory: decrease bs b/l   Cardiovascular: S1 and S2  Gastrointestinal: BS+, soft, NT/ND  Extremities: No peripheral edema  : No Ren  Skin: dry

## 2022-11-01 NOTE — PROGRESS NOTE ADULT - PROBLEM SELECTOR PLAN 1
oxygen supply , pulm eval ,iv steroids and nebulized BD
-Large mid esophageal diverticulum.  Ideally, would resect diverticulum and perform myotomy.  -Poor surgical candidate  -Recommend PEG placement to limit chance of aspiration  -will follow as needed
oxygen supply , pulm eval ,iv steroids and nebulized BD

## 2022-11-01 NOTE — DISCHARGE NOTE PROVIDER - NSDCMRMEDTOKEN_GEN_ALL_CORE_FT
acetaminophen 325 mg oral tablet: 2 tab(s) orally every 6 hours, As Needed for pain   Albuterol (Eqv-ProAir HFA) 90 mcg/inh inhalation aerosol: 2 puff(s) inhaled every 6 hours, As Needed for wheezing  baclofen 10 mg oral tablet: 0.5 tab(s) orally 2 times a day  baclofen 5 mg oral tablet: 1 tab(s) orally 2 times a day  bisacodyl 10 mg rectal suppository: 1 suppository(ies) rectal once a day, As needed, Constipation  Cymbalta 60 mg oral delayed release capsule: 1 cap(s) orally once a day  Cymbalta 60 mg oral delayed release capsule: 1 cap(s) orally once a day  donepezil 10 mg oral tablet: 1 tab(s) orally once a day (at bedtime)  donepezil 10 mg oral tablet: 1 tab(s) orally once a day (at bedtime)  Eucerin topical cream: Apply topically to affected area once a day (at bedtime)  furosemide 40 mg oral tablet: 0.5 tab(s) orally once a day  ipratropium-albuterol 0.5 mg-2.5 mg/3 mL inhalation solution: 3 milliliter(s) inhaled 4 times a day  levothyroxine 150 mcg (0.15 mg) oral capsule: 1 cap(s) orally once a day  levothyroxine 150 mcg (0.15 mg) oral tablet: 1 tab(s) orally once a day  Linzess 290 mcg oral capsule: 1 cap(s) orally once a day  Linzess 290 mcg oral capsule: 1 cap(s) orally once a day  melatonin 3 mg oral tablet: 1 tab(s) orally once a day (at bedtime)  memantine 10 mg oral tablet: 1 tab(s) orally every 12 hours  Metoprolol Tartrate 25 mg oral tablet: 1.5 tab(s) orally 2 times a day  metoprolol tartrate 37.5 mg oral tablet: 1 tab(s) orally 2 times a day  Namenda 10 mg oral tablet: 2 tab(s) orally once a day (at bedtime)  omeprazole 40 mg oral delayed release capsule: 1 cap(s) orally once a day  ondansetron 4 mg oral tablet: 1 tab(s) orally once a day  pantoprazole 40 mg oral delayed release tablet: 1 tab(s) orally once a day  polyethylene glycol 3350 oral powder for reconstitution: 17 gram(s) orally 2 times a day  ProAir HFA 90 mcg/inh inhalation aerosol: 2 puff(s) inhaled every 6 hours, As Needed  Senna 8.6 mg oral tablet: 2 tab(s) orally once a day (at bedtime)  senna oral tablet: 2 tab(s) orally once a day (at bedtime)  Spiriva 18 mcg inhalation capsule: 1 cap(s) inhaled once a day  Symbicort 80 mcg-4.5 mcg/inh inhalation aerosol: 2 puff(s) inhaled 2 times a day  traZODone 100 mg oral tablet: 1 tab(s) orally every 12 hours  traZODone 100 mg oral tablet: 1 tab(s) orally once a day (at bedtime)  zinc oxide topical ointment: Apply topically to affected area once a day and as needed   Zocor 10 mg oral tablet: 1 tab(s) orally once a day (at bedtime)  Zocor 10 mg oral tablet: 1 tab(s) orally once a day (at bedtime)  ZyrTEC 10 mg oral tablet: 1 tab(s) orally once a day   Albuterol (Eqv-ProAir HFA) 90 mcg/inh inhalation aerosol: 2 puff(s) inhaled every 6 hours, As Needed for wheezing  baclofen 5 mg oral tablet: 1 tab(s) orally 2 times a day  Cymbalta 60 mg oral delayed release capsule: 1 cap(s) orally once a day  donepezil 10 mg oral tablet: 1 tab(s) orally once a day (at bedtime)  enoxaparin: 40 milligram(s) subcutaneous once a day  ibuprofen 400 mg oral tablet: 1 tab(s) orally every 6 hours, As needed, Temp greater or equal to 38C (100.4F), Mild Pain (1 - 3)  ipratropium-albuterol 0.5 mg-2.5 mg/3 mL inhalation solution: 3 milliliter(s) inhaled 4 times a day  levothyroxine 150 mcg (0.15 mg) oral capsule: 1 cap(s) orally once a day  melatonin 3 mg oral tablet: 1 tab(s) orally once a day (at bedtime)  memantine 10 mg oral tablet: 1 tab(s) orally every 12 hours  Metoprolol Tartrate 25 mg oral tablet: 1.5 tab(s) orally 2 times a day  ondansetron 4 mg oral tablet: 1 tab(s) orally once a day  pantoprazole 40 mg oral delayed release tablet: 1 tab(s) orally once a day  ProAir HFA 90 mcg/inh inhalation aerosol: 2 puff(s) inhaled every 6 hours, As Needed  Senna 8.6 mg oral tablet: 2 tab(s) orally once a day (at bedtime)  Spiriva 18 mcg inhalation capsule: 1 cap(s) inhaled once a day  Symbicort 80 mcg-4.5 mcg/inh inhalation aerosol: 2 puff(s) inhaled 2 times a day  traZODone 100 mg oral tablet: 1 tab(s) orally once a day (at bedtime)  zinc oxide topical ointment: Apply topically to affected area once a day and as needed   Zocor 10 mg oral tablet: 1 tab(s) orally once a day (at bedtime)  ZyrTEC 10 mg oral tablet: 1 tab(s) orally once a day

## 2022-11-01 NOTE — PROGRESS NOTE ADULT - REASON FOR ADMISSION
SOB AND COUGH

## 2022-11-01 NOTE — PROGRESS NOTE ADULT - PROBLEM SELECTOR PLAN 11
bowel regimen

## 2022-11-01 NOTE — PROGRESS NOTE ADULT - PROBLEM SELECTOR PLAN 3
gerd precautions w/PO intake ,case d/w GI TEAM   ppi once a day, may increase to twice a day   maalox as needed  ,clear liquid diet   ·  Plan: Seen by surgery cons -Large mid esophageal diverticulum.  Ideally, would resect diverticulum and perform myotomy.  -Poor surgical candidate  -Seen by GI team abn imaging d/w son and patient   ? achalasia   reflux  stricture  gerd precautions w/PO intake  ppi once a day, may increase to twice a day   maalox as needed   esophagram noted  s/p egd showing large esophageal diverticulum  ct surgery eval appreciated  d/w patient   d/w son; I explained if she is able to tolerate fulls and puree then she may not need surgery as it is high risk and she is a poor surgical candidate and therefore would avoid peg gerd precautions w/PO intake ,case d/w GI TEAM   ppi once a day, may increase to twice a day   maalox as needed  ,clear liquid diet   ·  Plan: Seen by surgery cons -Large mid esophageal diverticulum.  Ideally, would resect diverticulum and perform myotomy.  -Poor surgical candidate and patient states " I am not having any surgeries"  -Seen by GI team abn imaging d/w son and patient   ? achalasia   reflux  stricture  gerd precautions w/PO intake  ppi once a day, may increase to twice a day   maalox as needed   esophagram noted  s/p egd showing large esophageal diverticulum  ct surgery eval appreciated  d/w patient   d/w son; I explained if she is able to tolerate fulls and puree then she may not need surgery as it is high risk and she is a poor surgical candidate and therefore would avoid peg .Son and patient request discharge back to [facility] ,they would like to think about sx and refusing this admission  ,patient will be readmitted if they change mind regarding sx interventions

## 2022-11-01 NOTE — PROGRESS NOTE ADULT - PROBLEM SELECTOR PROBLEM 3
Diverticulum of esophagus

## 2022-11-01 NOTE — PROGRESS NOTE ADULT - PROBLEM SELECTOR PROBLEM 1
Acute respiratory failure with hypoxia
GERD (gastroesophageal reflux disease)
Acute respiratory failure with hypoxia

## 2022-11-01 NOTE — PROGRESS NOTE ADULT - SUBJECTIVE AND OBJECTIVE BOX
PROGRESS NOTE  Patient is a 82y old  Female who presents with a chief complaint of SOB AND COUGH (01 Nov 2022 05:46)  Chart and available morning labs /imaging are reviewed electronically , urgent issues addressed . More information  is being added upon completion of rounds , when more information is collected and management discussed with consultants , medical staff and social service/case management on the floor     OVERNIGHT      HPI:  Patient brought in by EMS from [facility] for shortness of breath cough wheezing for 2 days.  Patient denies fevers chills headache chest pain abdominal pain vomiting Admit for antibacterial managmnet ,intravenous steroids,nebulised bronchodilators and pulmonology evaluation,O2 supply,serial labs and chest xrays,obtain ECHO to evaluate LVEF and rule out chf component Admitted  to telemetry unit for monitoring , send 3 sets of cardiac enzymes to rule out acute coronary event, obtain ECHO to evaluate LVEF, cardiology consult  ,continue current management, O2 supply, anticoagulation plan as per cardiology consult Palliative care consult requested ,to discuss advance directives and complete MOLST  (20 Oct 2022 13:54)    PAST MEDICAL & SURGICAL HISTORY:  Afib      History of COPD      HTN (hypertension)      Hypothyroid      GERD (gastroesophageal reflux disease)      Esophageal diverticulum      History of COPD      Insomnia      Mood disorder      Dementia      Spasm of muscle      Constipation      Hypothyroidism      GERD (gastroesophageal reflux disease)      HTN (hypertension)      CHF, chronic      Presence of IVC filter          MEDICATIONS  (STANDING):  albuterol/ipratropium for Nebulization 3 milliLiter(s) Nebulizer every 6 hours  baclofen 5 milliGRAM(s) Oral every 12 hours  buDESOnide    Inhalation Suspension 0.5 milliGRAM(s) Inhalation two times a day  donepezil 10 milliGRAM(s) Oral at bedtime  DULoxetine 60 milliGRAM(s) Oral daily  enoxaparin Injectable 40 milliGRAM(s) SubCutaneous every 24 hours  guaiFENesin  milliGRAM(s) Oral every 12 hours  levothyroxine Injectable 75 MICROGram(s) IV Push at bedtime  memantine 10 milliGRAM(s) Oral two times a day  metoprolol tartrate Injectable 2.5 milliGRAM(s) IV Push every 8 hours  pantoprazole  Injectable 40 milliGRAM(s) IV Push every 12 hours  simvastatin 10 milliGRAM(s) Oral at bedtime  traZODone 100 milliGRAM(s) Oral at bedtime    MEDICATIONS  (PRN):  aluminum hydroxide/magnesium hydroxide/simethicone Suspension 30 milliLiter(s) Oral every 4 hours PRN Dyspepsia  ibuprofen  Tablet. 400 milliGRAM(s) Oral every 6 hours PRN Temp greater or equal to 38C (100.4F), Mild Pain (1 - 3)  loperamide 2 milliGRAM(s) Oral four times a day PRN Diarrhea  melatonin 3 milliGRAM(s) Oral at bedtime PRN Insomnia  ondansetron Injectable 4 milliGRAM(s) IV Push every 8 hours PRN Nausea and/or Vomiting      OBJECTIVE    T(C): 36.9 (11-01-22 @ 04:58), Max: 37.1 (10-31-22 @ 20:25)  HR: 97 (11-01-22 @ 04:58) (72 - 97)  BP: 99/68 (11-01-22 @ 04:58) (99/68 - 106/62)  RR: 17 (11-01-22 @ 04:58) (17 - 18)  SpO2: 98% (11-01-22 @ 04:58) (98% - 99%)  Wt(kg): --  I&O's Summary        REVIEW OF SYSTEMS:  CONSTITUTIONAL: No fever, weight loss, or fatigue  EYES: No eye pain, visual disturbances, or discharge  ENMT:   No sinus or throat pain  NECK: No pain or stiffness  RESPIRATORY: No cough, wheezing, chills or hemoptysis; No shortness of breath  CARDIOVASCULAR: No chest pain, palpitations, dizziness, or leg swelling  GASTROINTESTINAL: No abdominal pain. No nausea, vomiting; No diarrhea or constipation. No melena or hematochezia.  GENITOURINARY: No dysuria, frequency, hematuria, or incontinence  NEUROLOGICAL: No headaches, memory loss, loss of strength, numbness, or tremors  SKIN: No itching, burning, rashes, or lesions   MUSCULOSKELETAL: No joint pain or swelling; No muscle, back, or extremity pain    PHYSICAL EXAM:  Appearance: NAD. VS past 24 hrs -as above   HEENT:   Moist oral mucosa. Conjunctiva clear b/l.   Neck : supple  Respiratory: Lungs CTAB.  Gastrointestinal:  Soft, nontender. No rebound. No rigidity. BS present	  Cardiovascular: RRR ,S1S2 present  Neurologic: Non-focal. Moving all extremities.  Extremities: No edema. No erythema. No calf tenderness.  Skin: No rashes, No ecchymoses, No cyanosis.	  wounds ,skin lesions-See skin assesment flow sheet   LABS:                        11.9   4.53  )-----------( 222      ( 31 Oct 2022 08:20 )             38.5     10-31    144  |  103  |  9   ----------------------------<  90  3.5   |  36<H>  |  0.41<L>    Ca    8.7      31 Oct 2022 08:20    TPro  5.5<L>  /  Alb  2.6<L>  /  TBili  0.3  /  DBili  x   /  AST  14<L>  /  ALT  13  /  AlkPhos  60  10-31    CAPILLARY BLOOD GLUCOSE              RADIOLOGY & ADDITIONAL TESTS:   reviewed elctronically  ASSESSMENT/PLAN: 	     PROGRESS NOTE  Patient is a 82y old  Female who presents with a chief complaint of SOB AND COUGH (01 Nov 2022 05:46)  Chart and available morning labs /imaging are reviewed electronically , urgent issues addressed . More information  is being added upon completion of rounds , when more information is collected and management discussed with consultants , medical staff and social service/case management on the floor   OVERNIGHT  No new issues reported by medical staff . All above noted Patient is resting in a bed comfortably . .No distress noted   D/c planned for today since patient and son are refusing surgical interventions and procedures ,patient tolerates clear liquid diet well and insists to go back to [facility]   HPI:  Patient brought in by EMS from [facility] for shortness of breath cough wheezing for 2 days.  Patient denies fevers chills headache chest pain abdominal pain vomiting Admit for antibacterial managmnet ,intravenous steroids,nebulised bronchodilators and pulmonology evaluation,O2 supply,serial labs and chest xrays,obtain ECHO to evaluate LVEF and rule out chf component Admitted  to telemetry unit for monitoring , send 3 sets of cardiac enzymes to rule out acute coronary event, obtain ECHO to evaluate LVEF, cardiology consult  ,continue current management, O2 supply, anticoagulation plan as per cardiology consult Palliative care consult requested ,to discuss advance directives and complete MOLST  (20 Oct 2022 13:54)    PAST MEDICAL & SURGICAL HISTORY:  Afib      History of COPD      HTN (hypertension)      Hypothyroid      GERD (gastroesophageal reflux disease)      Esophageal diverticulum      History of COPD      Insomnia      Mood disorder      Dementia      Spasm of muscle      Constipation      Hypothyroidism      GERD (gastroesophageal reflux disease)      HTN (hypertension)      CHF, chronic      Presence of IVC filter          MEDICATIONS  (STANDING):  albuterol/ipratropium for Nebulization 3 milliLiter(s) Nebulizer every 6 hours  baclofen 5 milliGRAM(s) Oral every 12 hours  buDESOnide    Inhalation Suspension 0.5 milliGRAM(s) Inhalation two times a day  donepezil 10 milliGRAM(s) Oral at bedtime  DULoxetine 60 milliGRAM(s) Oral daily  enoxaparin Injectable 40 milliGRAM(s) SubCutaneous every 24 hours  guaiFENesin  milliGRAM(s) Oral every 12 hours  levothyroxine Injectable 75 MICROGram(s) IV Push at bedtime  memantine 10 milliGRAM(s) Oral two times a day  metoprolol tartrate Injectable 2.5 milliGRAM(s) IV Push every 8 hours  pantoprazole  Injectable 40 milliGRAM(s) IV Push every 12 hours  simvastatin 10 milliGRAM(s) Oral at bedtime  traZODone 100 milliGRAM(s) Oral at bedtime    MEDICATIONS  (PRN):  aluminum hydroxide/magnesium hydroxide/simethicone Suspension 30 milliLiter(s) Oral every 4 hours PRN Dyspepsia  ibuprofen  Tablet. 400 milliGRAM(s) Oral every 6 hours PRN Temp greater or equal to 38C (100.4F), Mild Pain (1 - 3)  loperamide 2 milliGRAM(s) Oral four times a day PRN Diarrhea  melatonin 3 milliGRAM(s) Oral at bedtime PRN Insomnia  ondansetron Injectable 4 milliGRAM(s) IV Push every 8 hours PRN Nausea and/or Vomiting      OBJECTIVE    T(C): 36.9 (11-01-22 @ 04:58), Max: 37.1 (10-31-22 @ 20:25)  HR: 97 (11-01-22 @ 04:58) (72 - 97)  BP: 99/68 (11-01-22 @ 04:58) (99/68 - 106/62)  RR: 17 (11-01-22 @ 04:58) (17 - 18)  SpO2: 98% (11-01-22 @ 04:58) (98% - 99%)  Wt(kg): --  I&O's Summary        REVIEW OF SYSTEMS:  CONSTITUTIONAL: No fever, weight loss, or fatigue  EYES: No eye pain, visual disturbances, or discharge  ENMT:   No sinus or throat pain  NECK: No pain or stiffness  RESPIRATORY: No cough, wheezing, chills or hemoptysis; No shortness of breath  CARDIOVASCULAR: No chest pain, palpitations, dizziness, or leg swelling  GASTROINTESTINAL: No abdominal pain. No nausea, vomiting; No diarrhea or constipation. No melena or hematochezia.  GENITOURINARY: No dysuria, frequency, hematuria, or incontinence  NEUROLOGICAL: No headaches, memory loss, loss of strength, numbness, or tremors  SKIN: No itching, burning, rashes, or lesions   MUSCULOSKELETAL: No joint pain or swelling; No muscle, back, or extremity pain    PHYSICAL EXAM:  Appearance: NAD. VS past 24 hrs -as above   HEENT:   Moist oral mucosa. Conjunctiva clear b/l.   Neck : supple  Respiratory: Lungs CTAB.  Gastrointestinal:  Soft, nontender. No rebound. No rigidity. BS present	  Cardiovascular: RRR ,S1S2 present  Neurologic: Non-focal. Moving all extremities.  Extremities: No edema. No erythema. No calf tenderness.  Skin: No rashes, No ecchymoses, No cyanosis.	  wounds ,skin lesions-See skin assesment flow sheet   LABS:                        11.9   4.53  )-----------( 222      ( 31 Oct 2022 08:20 )             38.5     10-31    144  |  103  |  9   ----------------------------<  90  3.5   |  36<H>  |  0.41<L>    Ca    8.7      31 Oct 2022 08:20    TPro  5.5<L>  /  Alb  2.6<L>  /  TBili  0.3  /  DBili  x   /  AST  14<L>  /  ALT  13  /  AlkPhos  60  10-31    CAPILLARY BLOOD GLUCOSE              RADIOLOGY & ADDITIONAL TESTS:   reviewed elctronically  ASSESSMENT/PLAN: 	    Patient was seen and examined on a day of discharge . Plan of care , discharge medications and recommendations discussed with consultants and clearance for discharge obtained .Social service , case management  and medical staff are aware of plan. Family is notified. Discharge summary  is  prepared electronically-see separate document prepared by me .75minutes spent on this visit, 50% visit time spent in care co-ordination with other attendings and counselling patient  I have discussed care plan with patient and HCP,expressed understanding of problems treatment and their effect and side effects, alternatives in detail,I have asked if they have any questions and concerns and appropriately addressed them to best of my ability

## 2022-11-01 NOTE — DISCHARGE NOTE PROVIDER - DETAILS OF MALNUTRITION DIAGNOSIS/DIAGNOSES
This patient has been assessed with a concern for Malnutrition and was treated during this hospitalization for the following Nutrition diagnosis/diagnoses:     -  10/21/2022: Severe protein-calorie malnutrition

## 2022-11-01 NOTE — PROGRESS NOTE ADULT - SUBJECTIVE AND OBJECTIVE BOX
Patient is a 82y Female with a known history of :  Mood disorder [F39]    COPD exacerbation [J44.1]    Acute respiratory failure with hypoxia [J96.01]    Insomnia [G47.00]    Dementia [F03.90]    Constipation [K59.00]    GERD (gastroesophageal reflux disease) [K21.9]    CHF, chronic [I50.9]    HTN (hypertension) [I10]    Hypothyroidism [E03.9]    Prophylactic measure [Z29.9]    Diverticulum of esophagus [Q39.6]      HPI:  Patient brought in by EMS from [facility] for shortness of breath cough wheezing for 2 days.  Patient denies fevers chills headache chest pain abdominal pain vomiting Admit for antibacterial managmnet ,intravenous steroids,nebulised bronchodilators and pulmonology evaluation,O2 supply,serial labs and chest xrays,obtain ECHO to evaluate LVEF and rule out chf component Admitted  to telemetry unit for monitoring , send 3 sets of cardiac enzymes to rule out acute coronary event, obtain ECHO to evaluate LVEF, cardiology consult  ,continue current management, O2 supply, anticoagulation plan as per cardiology consult Palliative care consult requested ,to discuss advance directives and complete MOLST  (20 Oct 2022 13:54)      REVIEW OF SYSTEMS:    CONSTITUTIONAL: No fever, weight loss, or fatigue  EYES: No eye pain, visual disturbances, or discharge  ENMT:  No difficulty hearing, tinnitus, vertigo; No sinus or throat pain  NECK: No pain or stiffness  BREASTS: No pain, masses, or nipple discharge  RESPIRATORY: No cough, wheezing, chills or hemoptysis; No shortness of breath  CARDIOVASCULAR: No chest pain, palpitations, dizziness, or leg swelling  GASTROINTESTINAL: No abdominal or epigastric pain. No nausea, vomiting, or hematemesis; No diarrhea or constipation. No melena or hematochezia.  GENITOURINARY: No dysuria, frequency, hematuria, or incontinence  NEUROLOGICAL: No headaches, memory loss, loss of strength, numbness, or tremors  SKIN: No itching, burning, rashes, or lesions   LYMPH NODES: No enlarged glands  ENDOCRINE: No heat or cold intolerance; No hair loss  MUSCULOSKELETAL: No joint pain or swelling; No muscle, back, or extremity pain  PSYCHIATRIC: No depression, anxiety, mood swings, or difficulty sleeping  HEME/LYMPH: No easy bruising, or bleeding gums  ALLERGY AND IMMUNOLOGIC: No hives or eczema    MEDICATIONS  (STANDING):  albuterol/ipratropium for Nebulization 3 milliLiter(s) Nebulizer every 6 hours  baclofen 5 milliGRAM(s) Oral every 12 hours  buDESOnide    Inhalation Suspension 0.5 milliGRAM(s) Inhalation two times a day  donepezil 10 milliGRAM(s) Oral at bedtime  DULoxetine 60 milliGRAM(s) Oral daily  enoxaparin Injectable 40 milliGRAM(s) SubCutaneous every 24 hours  guaiFENesin  milliGRAM(s) Oral every 12 hours  levothyroxine Injectable 75 MICROGram(s) IV Push at bedtime  memantine 10 milliGRAM(s) Oral two times a day  metoprolol tartrate Injectable 2.5 milliGRAM(s) IV Push every 8 hours  pantoprazole  Injectable 40 milliGRAM(s) IV Push every 12 hours  simvastatin 10 milliGRAM(s) Oral at bedtime  traZODone 100 milliGRAM(s) Oral at bedtime    MEDICATIONS  (PRN):  aluminum hydroxide/magnesium hydroxide/simethicone Suspension 30 milliLiter(s) Oral every 4 hours PRN Dyspepsia  ibuprofen  Tablet. 400 milliGRAM(s) Oral every 6 hours PRN Temp greater or equal to 38C (100.4F), Mild Pain (1 - 3)  loperamide 2 milliGRAM(s) Oral four times a day PRN Diarrhea  melatonin 3 milliGRAM(s) Oral at bedtime PRN Insomnia  ondansetron Injectable 4 milliGRAM(s) IV Push every 8 hours PRN Nausea and/or Vomiting      ALLERGIES: sulfa drugs (Unknown)  Sulfur Colliod (Rash)  Tylenol (Swelling)  Tylenol (Unknown)      FAMILY HISTORY:      PHYSICAL EXAMINATION:  -----------------------------  T(C): 36.9 (11-01-22 @ 04:58), Max: 37.1 (10-31-22 @ 20:25)  HR: 97 (11-01-22 @ 04:58) (72 - 97)  BP: 99/68 (11-01-22 @ 04:58) (99/68 - 106/62)  RR: 17 (11-01-22 @ 04:58) (17 - 18)  SpO2: 98% (11-01-22 @ 04:58) (98% - 99%)  Wt(kg): --        VITALS  T(C): 36.9 (11-01-22 @ 04:58), Max: 37.1 (10-31-22 @ 20:25)  HR: 97 (11-01-22 @ 04:58) (72 - 97)  BP: 99/68 (11-01-22 @ 04:58) (99/68 - 106/62)  RR: 17 (11-01-22 @ 04:58) (17 - 18)  SpO2: 98% (11-01-22 @ 04:58) (98% - 99%)    Constitutional: well developed, normal appearance, well groomed, well nourished, no deformities and no acute distress.   Eyes: the conjunctiva exhibited no abnormalities and the eyelids demonstrated no xanthelasmas.   HEENT: normal oral mucosa, no oral pallor and no oral cyanosis.   Neck: normal jugular venous A waves present, normal jugular venous V waves present and no jugular venous oliveira A waves.   Pulmonary: no respiratory distress, normal respiratory rhythm and effort, no accessory muscle use and lungs were clear to auscultation bilaterally.   Cardiovascular: heart rate and rhythm were normal, normal S1 and S2 and no murmur, gallop, rub, heave or thrill are present.   Abdomen: soft, non-tender, no hepato-splenomegaly and no abdominal mass palpated.   Musculoskeletal: the gait could not be assessed..   Extremities: no clubbing of the fingernails, no localized cyanosis, no petechial hemorrhages and no ischemic changes.   Skin: normal skin color and pigmentation, no rash, no venous stasis, no skin lesions, no skin ulcer and no xanthoma was observed.   Psychiatric: oriented to person, place, and time, the affect was normal, the mood was normal and not feeling anxious.     LABS:   --------  10-31    144  |  103  |  9   ----------------------------<  90  3.5   |  36<H>  |  0.41<L>    Ca    8.7      31 Oct 2022 08:20    TPro  5.5<L>  /  Alb  2.6<L>  /  TBili  0.3  /  DBili  x   /  AST  14<L>  /  ALT  13  /  AlkPhos  60  10-31                         11.9   4.53  )-----------( 222      ( 31 Oct 2022 08:20 )             38.5                 RADIOLOGY:  -----------------    ECG:     ECHO:

## 2022-11-01 NOTE — DISCHARGE NOTE PROVIDER - NSDCCPCAREPLAN_GEN_ALL_CORE_FT
PRINCIPAL DISCHARGE DIAGNOSIS  Diagnosis: COPD exacerbation  Assessment and Plan of Treatment:       SECONDARY DISCHARGE DIAGNOSES  Diagnosis: Acute respiratory failure with hypoxia  Assessment and Plan of Treatment:     Diagnosis: Diverticulum of esophagus  Assessment and Plan of Treatment:     Diagnosis: CHF, chronic  Assessment and Plan of Treatment:     Diagnosis: Insomnia  Assessment and Plan of Treatment:     Diagnosis: GERD (gastroesophageal reflux disease)  Assessment and Plan of Treatment:     Diagnosis: HTN (hypertension)  Assessment and Plan of Treatment:     Diagnosis: Dementia  Assessment and Plan of Treatment:     Diagnosis: Hypothyroidism  Assessment and Plan of Treatment:     Diagnosis: Constipation  Assessment and Plan of Treatment:     Diagnosis: Mood disorder  Assessment and Plan of Treatment:

## 2022-11-01 NOTE — PROGRESS NOTE ADULT - PROBLEM SELECTOR PROBLEM 8
Mood disorder

## 2022-11-01 NOTE — PROGRESS NOTE ADULT - NUTRITIONAL ASSESSMENT
MEDICATIONS  (STANDING):  albuterol/ipratropium for Nebulization 3 milliLiter(s) Nebulizer every 6 hours  baclofen 5 milliGRAM(s) Oral every 12 hours  buDESOnide    Inhalation Suspension 0.5 milliGRAM(s) Inhalation two times a day  dextrose 5% + sodium chloride 0.45%. 1000 milliLiter(s) (30 mL/Hr) IV Continuous <Continuous>  donepezil 10 milliGRAM(s) Oral at bedtime  DULoxetine 60 milliGRAM(s) Oral daily  enoxaparin Injectable 40 milliGRAM(s) SubCutaneous every 24 hours  guaiFENesin  milliGRAM(s) Oral every 12 hours  levothyroxine Injectable 75 MICROGram(s) IV Push at bedtime  memantine 10 milliGRAM(s) Oral two times a day  methylPREDNISolone sodium succinate Injectable 40 milliGRAM(s) IV Push daily  metoprolol tartrate Injectable 2.5 milliGRAM(s) IV Push every 12 hours  pantoprazole  Injectable 40 milliGRAM(s) IV Push every 12 hours  simvastatin 10 milliGRAM(s) Oral at bedtime  traZODone 100 milliGRAM(s) Oral at bedtime
This patient has been assessed with a concern for Malnutrition and has been determined to have a diagnosis/diagnoses of Severe protein-calorie malnutrition.    This patient is being managed with:   Diet NPO after Midnight-     NPO Start Date: 25-Oct-2022   NPO Start Time: 23:59  Entered: Oct 25 2022 12:11PM    Diet NPO after Midnight-     NPO Start Date: 25-Oct-2022   NPO Start Time: 23:59  Entered: Oct 25 2022 11:28AM    Diet Clear Liquid-  Supplement Feeding Modality:  Oral  Ensure Clear Cans or Servings Per Day:  1       Frequency:  Two Times a day  Entered: Oct 25 2022  8:42AM    
This patient has been assessed with a concern for Malnutrition and has been determined to have a diagnosis/diagnoses of Severe protein-calorie malnutrition.    This patient is being managed with:   Diet Full Liquid-  Supplement Feeding Modality:  Oral  Ensure Clear Cans or Servings Per Day:  1       Frequency:  Two Times a day  Entered: Oct 26 2022 11:49AM    
This patient has been assessed with a concern for Malnutrition and has been determined to have a diagnosis/diagnoses of Severe protein-calorie malnutrition.    This patient is being managed with:   Diet Clear Liquid-  Entered: Oct 21 2022  1:16PM    
This patient has been assessed with a concern for Malnutrition and has been determined to have a diagnosis/diagnoses of Severe protein-calorie malnutrition.    This patient is being managed with:   Diet Full Liquid-  Supplement Feeding Modality:  Oral  Ensure Clear Cans or Servings Per Day:  1       Frequency:  Two Times a day  Entered: Oct 26 2022 11:49AM    
This patient has been assessed with a concern for Malnutrition and has been determined to have a diagnosis/diagnoses of Severe protein-calorie malnutrition.    This patient is being managed with:   Diet Clear Liquid-  Entered: Oct 21 2022  1:16PM    
This patient has been assessed with a concern for Malnutrition and has been determined to have a diagnosis/diagnoses of Severe protein-calorie malnutrition.    This patient is being managed with:   Diet Full Liquid-  Supplement Feeding Modality:  Oral  Ensure Clear Cans or Servings Per Day:  1       Frequency:  Two Times a day  Entered: Oct 26 2022 11:49AM    
This patient has been assessed with a concern for Malnutrition and has been determined to have a diagnosis/diagnoses of Severe protein-calorie malnutrition.    This patient is being managed with:   Diet Full Liquid-  Supplement Feeding Modality:  Oral  Ensure Clear Cans or Servings Per Day:  1       Frequency:  Two Times a day  Entered: Oct 26 2022 11:49AM    
This patient has been assessed with a concern for Malnutrition and has been determined to have a diagnosis/diagnoses of Severe protein-calorie malnutrition.    This patient is being managed with:   Diet NPO-  Entered: Oct 22 2022 12:09PM    
This patient has been assessed with a concern for Malnutrition and has been determined to have a diagnosis/diagnoses of Severe protein-calorie malnutrition.    This patient is being managed with:   Diet Full Liquid-  Supplement Feeding Modality:  Oral  Ensure Clear Cans or Servings Per Day:  1       Frequency:  Two Times a day  Entered: Oct 26 2022 11:49AM    

## 2022-11-01 NOTE — PROGRESS NOTE ADULT - PROBLEM SELECTOR PLAN 9
Supportive care ,frequent redirection ,continue home medications ,management of agitation as needed

## 2022-11-01 NOTE — PROGRESS NOTE ADULT - PROBLEM SELECTOR PLAN 6
ppi ,GI is following
ppi ,GI is following ·  Plan: Seen by surgery cons -s/p EGD Large mid esophageal diverticulum.  Ideally, would resect diverticulum and perform myotomy.  -Poor surgical candidate  -Recommend PEG placement to limit chance of aspiration
ppi
ppi ,GI is following
ppi ,GI is following
ppi ,GI is following ·  Plan: Seen by surgery cons -s/p EGD Large mid esophageal diverticulum.  Ideally, would resect diverticulum and perform myotomy.  -Poor surgical candidate  -Recommend PEG placement to limit chance of aspiration
ppi ,GI is following
ppi ,GI is following ·  Plan: Seen by surgery cons -s/p EGD Large mid esophageal diverticulum.  Ideally, would resect diverticulum and perform myotomy.  -Poor surgical candidate  -Recommend PEG placement to limit chance of aspiration
ppi ,GI is following

## 2022-11-01 NOTE — PROGRESS NOTE ADULT - PROBLEM SELECTOR PROBLEM 5
CHF, chronic

## 2022-11-01 NOTE — PROGRESS NOTE ADULT - PROBLEM SELECTOR PLAN 10
continue home medications

## 2022-11-01 NOTE — DISCHARGE NOTE PROVIDER - CARE PROVIDERS DIRECT ADDRESSES
,DirectAddress_Unknown,DirectAddress_Unknown,DirectAddress_Unknown,[email or web address]

## 2022-11-01 NOTE — PROGRESS NOTE ADULT - SUBJECTIVE AND OBJECTIVE BOX
Date/Time Patient Seen:  		  Referring MD:   Data Reviewed	       Patient is a 82y old  Female who presents with a chief complaint of SOB AND COUGH (31 Oct 2022 11:51)      Subjective/HPI     PAST MEDICAL & SURGICAL HISTORY:  Afib    History of COPD    HTN (hypertension)    Hypothyroid    GERD (gastroesophageal reflux disease)    Esophageal diverticulum    History of COPD    Insomnia    Mood disorder    Dementia    Spasm of muscle    Constipation    Hypothyroidism    GERD (gastroesophageal reflux disease)    HTN (hypertension)    CHF, chronic    Presence of IVC filter          Medication list         MEDICATIONS  (STANDING):  albuterol/ipratropium for Nebulization 3 milliLiter(s) Nebulizer every 6 hours  baclofen 5 milliGRAM(s) Oral every 12 hours  buDESOnide    Inhalation Suspension 0.5 milliGRAM(s) Inhalation two times a day  donepezil 10 milliGRAM(s) Oral at bedtime  DULoxetine 60 milliGRAM(s) Oral daily  enoxaparin Injectable 40 milliGRAM(s) SubCutaneous every 24 hours  guaiFENesin  milliGRAM(s) Oral every 12 hours  levothyroxine Injectable 75 MICROGram(s) IV Push at bedtime  memantine 10 milliGRAM(s) Oral two times a day  metoprolol tartrate Injectable 2.5 milliGRAM(s) IV Push every 8 hours  pantoprazole  Injectable 40 milliGRAM(s) IV Push every 12 hours  simvastatin 10 milliGRAM(s) Oral at bedtime  traZODone 100 milliGRAM(s) Oral at bedtime    MEDICATIONS  (PRN):  aluminum hydroxide/magnesium hydroxide/simethicone Suspension 30 milliLiter(s) Oral every 4 hours PRN Dyspepsia  ibuprofen  Tablet. 400 milliGRAM(s) Oral every 6 hours PRN Temp greater or equal to 38C (100.4F), Mild Pain (1 - 3)  loperamide 2 milliGRAM(s) Oral four times a day PRN Diarrhea  melatonin 3 milliGRAM(s) Oral at bedtime PRN Insomnia  ondansetron Injectable 4 milliGRAM(s) IV Push every 8 hours PRN Nausea and/or Vomiting         Vitals log        ICU Vital Signs Last 24 Hrs  T(C): 36.9 (01 Nov 2022 04:58), Max: 37.1 (31 Oct 2022 20:25)  T(F): 98.4 (01 Nov 2022 04:58), Max: 98.7 (31 Oct 2022 20:25)  HR: 97 (01 Nov 2022 04:58) (72 - 97)  BP: 99/68 (01 Nov 2022 04:58) (99/68 - 106/62)  BP(mean): --  ABP: --  ABP(mean): --  RR: 17 (01 Nov 2022 04:58) (17 - 18)  SpO2: 98% (01 Nov 2022 04:58) (98% - 99%)    O2 Parameters below as of 01 Nov 2022 04:58  Patient On (Oxygen Delivery Method): nasal cannula                 Input and Output:  I&O's Detail    30 Oct 2022 07:01  -  31 Oct 2022 07:00  --------------------------------------------------------  IN:  Total IN: 0 mL    OUT:    Voided (mL): 500 mL  Total OUT: 500 mL    Total NET: -500 mL          Lab Data                        11.9   4.53  )-----------( 222      ( 31 Oct 2022 08:20 )             38.5     10-31    144  |  103  |  9   ----------------------------<  90  3.5   |  36<H>  |  0.41<L>    Ca    8.7      31 Oct 2022 08:20    TPro  5.5<L>  /  Alb  2.6<L>  /  TBili  0.3  /  DBili  x   /  AST  14<L>  /  ALT  13  /  AlkPhos  60  10-31            Review of Systems	      Objective     Physical Examination    heart s1s2  lung dec BS  head nc      Pertinent Lab findings & Imaging      Gela:  NO   Adequate UO     I&O's Detail    30 Oct 2022 07:01  -  31 Oct 2022 07:00  --------------------------------------------------------  IN:  Total IN: 0 mL    OUT:    Voided (mL): 500 mL  Total OUT: 500 mL    Total NET: -500 mL               Discussed with:     Cultures:	        Radiology

## 2022-11-01 NOTE — PROGRESS NOTE ADULT - PROBLEM/PLAN-6
At my request, WOODY Flores contacted patient to notify that his surgery on 6/17 needs to be moved from [facility] to [facility].  She said he is agreeable to this.  Notified [facility] scheduling of cancellation per their shared email and scheduled case at [facility].  
Called [facility] [phone] and spoke to Jania MUÑOZ.  Explained that original authorization # [id number] was only valid until 5/2, that surgery has now been rescheduled for 6/4, and asked if authorization can be extended.  She will note new OR date 6/4 and will extend end date to 11/17/20.    Called Janny at [employer] [phone].  Left message explaining surgery was rescheduled to 6/4 and asked that authorization # [id number] be updated accordingly.  Requested she call me if any problems or questions.  
Patient surgery is robot-assisted and therefore can only be done at [facility].  Patient was called again by [facility] to inform.  Case scheduled for 6/4 at [facility].  
DISPLAY PLAN FREE TEXT

## 2022-11-01 NOTE — DISCHARGE NOTE PROVIDER - HOSPITAL COURSE
· Assessment    Patient brought in by EMS from [facility] for shortness of breath cough wheezing for 2 days.  Patient denies fevers chills headache chest pain abdominal pain vomiting Admit for antibacterial managmnet ,intravenous steroids,nebulised bronchodilators and pulmonology evaluation,O2 supply,serial labs and chest xrays,obtain ECHO to evaluate LVEF and rule out chf component Admitted  to telemetry unit for monitoring , send 3 sets of cardiac enzymes to rule out acute coronary event, obtain ECHO to evaluate LVEF, cardiology consult  ,continue current management, O2 supply, anticoagulation plan as per cardiology consult Palliative care consult requested ,to discuss advance directives and complete [facility]     Nutritional Assessment:  · Nutritional Assessment  This patient has been assessed with a concern for Malnutrition and has been determined to have a diagnosis/diagnoses of Severe protein-calorie malnutrition.    This patient is being managed with:   Diet Full Liquid-  Supplement Feeding Modality:  Oral  Ensure Clear Cans or Servings Per Day:  1       Frequency:  Two Times a day  Entered: Oct 26 2022 11:49AM    Problem/Plan - 1:  ·  Problem: Acute respiratory failure with hypoxia.   ·  Plan: oxygen supply , pulm eval ,iv steroids and nebulized BD.    Problem/Plan - 2:  ·  Problem: COPD exacerbation.   ·  Plan: Admit for antibacterial management ,intravenous steroids ,nebulized bronchodilators and pulmonology evaluation,O2 supply, serial labs and chest xrays ,obtain ECHO to evaluate LVEF and rule out chf component.    Problem/Plan - 3:  ·  Problem: Diverticulum of esophagus.   ·  Plan: gerd precautions w/PO intake ,case d/w GI TEAM   ppi once a day, may increase to twice a day   maalox as needed  ,clear liquid diet   ·  Plan: Seen by surgery cons -Large mid esophageal diverticulum.  Ideally, would resect diverticulum and perform myotomy.  -Poor surgical candidate  -Seen by GI team abn imaging d/w son and patient   ? achalasia   reflux  stricture  gerd precautions w/PO intake  ppi once a day, may increase to twice a day   maalox as needed   esophagram noted  s/p egd showing large esophageal diverticulum  ct surgery eval appreciated  d/w patient   d/w son; I explained if she is able to tolerate fulls and puree then she may not need surgery as it is high risk and she is a poor surgical candidate and therefore would avoid peg.    Problem/Plan - 4:  ·  Problem: HTN (hypertension).   ·  Plan: continue home medications.    Problem/Plan - 5:  ·  Problem: CHF, chronic.   ·  Plan: Admit to monitored unit for cardiac monitoring, obtain echo to evaluate LVEF, intravenous diuresis as per card consult , monitor ins/outs, monitor renal profile and electrolytes closely ,send 3 sets of enzymes, O2 supply, serial chest xrays, monitor weights and oral intake of fluids, nutritionist consult.    Problem/Plan - 6:  ·  Problem: GERD (gastroesophageal reflux disease).   ·  Plan: ppi ,GI is following ·  Plan: Seen by surgery cons -s/p EGD Large mid esophageal diverticulum.  Ideally, would resect diverticulum and perform myotomy.  -Poor surgical candidate  -Recommend PEG placement to limit chance of aspiration.    Problem/Plan - 7:  ·  Problem: Hypothyroidism.   ·  Plan: continue home medications.    Problem/Plan - 8:  ·  Problem: Mood disorder.   ·  Plan: continue home medications.    Problem/Plan - 9:  ·  Problem: Dementia.   ·  Plan: Supportive care ,frequent redirection ,continue home medications ,management of agitation as needed.    Problem/Plan - 10:  ·  Problem: Insomnia.   ·  Plan; continue home medications.    Problem/Plan - 11:  ·  Problem: Constipation.   ·  Plan: bowel regimen.    Problem/Plan - 12:  ·  Problem: Prophylactic measure.   ·  Plan: Gastrointestinal stress ulcer prophylaxis and DVT prophylaxis administered.

## 2022-11-01 NOTE — DISCHARGE NOTE NURSING/CASE MANAGEMENT/SOCIAL WORK - PATIENT PORTAL LINK FT
You can access the FollowMyHealth Patient Portal offered by [facility] by registering at the following website: [email or web address]. By joining [email or web address]’s FollowMyHealth portal, you will also be able to view your health information using other applications (apps) compatible with our system.

## 2022-11-01 NOTE — PROGRESS NOTE ADULT - SUBJECTIVE AND OBJECTIVE BOX
ROXIE LAZAR    [facility] [address]    Allergies    sulfa drugs (Unknown)  Sulfur Colliod (Rash)  Tylenol (Swelling)  Tylenol (Unknown)    Intolerances        PAST MEDICAL & SURGICAL HISTORY:  Afib      History of COPD      HTN (hypertension)      Hypothyroid      GERD (gastroesophageal reflux disease)      Esophageal diverticulum      History of COPD      Insomnia      Mood disorder      Dementia      Spasm of muscle      Constipation      Hypothyroidism      GERD (gastroesophageal reflux disease)      HTN (hypertension)      CHF, chronic      Presence of IVC filter          FAMILY HISTORY:      Home Medications:  acetaminophen 325 mg oral tablet: 2 tab(s) orally every 6 hours, As Needed for pain  (27 Oct 2022 14:12)  Albuterol (Eqv-ProAir HFA) 90 mcg/inh inhalation aerosol: 2 puff(s) inhaled every 6 hours, As Needed for wheezing (05 Oct 2021 11:37)  baclofen 10 mg oral tablet: 0.5 tab(s) orally 2 times a day (27 Oct 2022 14:12)  baclofen 5 mg oral tablet: 1 tab(s) orally 2 times a day (10 Sep 2021 15:54)  bisacodyl 10 mg rectal suppository: 1 suppository(ies) rectal once a day, As needed, Constipation (05 Oct 2021 11:07)  Cymbalta 60 mg oral delayed release capsule: 1 cap(s) orally once a day (10 Sep 2021 15:54)  Cymbalta 60 mg oral delayed release capsule: 1 cap(s) orally once a day (27 Oct 2022 14:12)  donepezil 10 mg oral tablet: 1 tab(s) orally once a day (at bedtime) (27 Oct 2022 14:12)  donepezil 10 mg oral tablet: 1 tab(s) orally once a day (at bedtime) (05 Oct 2021 11:07)  Eucerin topical cream: Apply topically to affected area once a day (at bedtime) (27 Oct 2022 14:12)  furosemide 40 mg oral tablet: 0.5 tab(s) orally once a day (27 Oct 2022 14:12)  ipratropium-albuterol 0.5 mg-2.5 mg/3 mL inhalation solution: 3 milliliter(s) inhaled 4 times a day (27 Oct 2022 14:12)  levothyroxine 150 mcg (0.15 mg) oral capsule: 1 cap(s) orally once a day (27 Oct 2022 14:12)  levothyroxine 150 mcg (0.15 mg) oral tablet: 1 tab(s) orally once a day (05 Oct 2021 11:07)  Linzess 290 mcg oral capsule: 1 cap(s) orally once a day (10 Sep 2021 15:54)  Linzess 290 mcg oral capsule: 1 cap(s) orally once a day (27 Oct 2022 14:12)  melatonin 3 mg oral tablet: 1 tab(s) orally once a day (at bedtime) (27 Oct 2022 14:12)  memantine 10 mg oral tablet: 1 tab(s) orally every 12 hours (05 Oct 2021 11:07)  Metoprolol Tartrate 25 mg oral tablet: 1.5 tab(s) orally 2 times a day (27 Oct 2022 14:12)  metoprolol tartrate 37.5 mg oral tablet: 1 tab(s) orally 2 times a day (05 Oct 2021 11:07)  Namenda 10 mg oral tablet: 2 tab(s) orally once a day (at bedtime) (27 Oct 2022 14:12)  omeprazole 40 mg oral delayed release capsule: 1 cap(s) orally once a day (27 Oct 2022 14:12)  ondansetron 4 mg oral tablet: 1 tab(s) orally once a day (27 Oct 2022 14:12)  pantoprazole 40 mg oral delayed release tablet: 1 tab(s) orally once a day (05 Oct 2021 11:37)  polyethylene glycol 3350 oral powder for reconstitution: 17 gram(s) orally 2 times a day (05 Oct 2021 11:07)  ProAir HFA 90 mcg/inh inhalation aerosol: 2 puff(s) inhaled every 6 hours, As Needed (27 Oct 2022 14:12)  Senna 8.6 mg oral tablet: 2 tab(s) orally once a day (at bedtime) (27 Oct 2022 14:12)  senna oral tablet: 2 tab(s) orally once a day (at bedtime) (05 Oct 2021 11:07)  Spiriva 18 mcg inhalation capsule: 1 cap(s) inhaled once a day (10 Sep 2021 15:54)  Symbicort 80 mcg-4.5 mcg/inh inhalation aerosol: 2 puff(s) inhaled 2 times a day (10 Sep 2021 15:54)  traZODone 100 mg oral tablet: 1 tab(s) orally every 12 hours (05 Oct 2021 11:07)  traZODone 100 mg oral tablet: 1 tab(s) orally once a day (at bedtime) (27 Oct 2022 14:12)  zinc oxide topical ointment: Apply topically to affected area once a day and as needed  (27 Oct 2022 14:12)  Zocor 10 mg oral tablet: 1 tab(s) orally once a day (at bedtime) (27 Oct 2022 14:12)  Zocor 10 mg oral tablet: 1 tab(s) orally once a day (at bedtime) (10 Sep 2021 15:54)  ZyrTEC 10 mg oral tablet: 1 tab(s) orally once a day (27 Oct 2022 14:12)      MEDICATIONS  (STANDING):  albuterol/ipratropium for Nebulization 3 milliLiter(s) Nebulizer every 6 hours  baclofen 5 milliGRAM(s) Oral every 12 hours  buDESOnide    Inhalation Suspension 0.5 milliGRAM(s) Inhalation two times a day  donepezil 10 milliGRAM(s) Oral at bedtime  DULoxetine 60 milliGRAM(s) Oral daily  enoxaparin Injectable 40 milliGRAM(s) SubCutaneous every 24 hours  guaiFENesin  milliGRAM(s) Oral every 12 hours  levothyroxine Injectable 75 MICROGram(s) IV Push at bedtime  memantine 10 milliGRAM(s) Oral two times a day  metoprolol tartrate Injectable 2.5 milliGRAM(s) IV Push every 8 hours  pantoprazole  Injectable 40 milliGRAM(s) IV Push every 12 hours  simvastatin 10 milliGRAM(s) Oral at bedtime  traZODone 100 milliGRAM(s) Oral at bedtime    MEDICATIONS  (PRN):  aluminum hydroxide/magnesium hydroxide/simethicone Suspension 30 milliLiter(s) Oral every 4 hours PRN Dyspepsia  ibuprofen  Tablet. 400 milliGRAM(s) Oral every 6 hours PRN Temp greater or equal to 38C (100.4F), Mild Pain (1 - 3)  loperamide 2 milliGRAM(s) Oral four times a day PRN Diarrhea  melatonin 3 milliGRAM(s) Oral at bedtime PRN Insomnia  ondansetron Injectable 4 milliGRAM(s) IV Push every 8 hours PRN Nausea and/or Vomiting              Vital Signs Last 24 Hrs  T(C): 36.9 (01 Nov 2022 04:58), Max: 37.1 (31 Oct 2022 20:25)  T(F): 98.4 (01 Nov 2022 04:58), Max: 98.7 (31 Oct 2022 20:25)  HR: 97 (01 Nov 2022 04:58) (72 - 97)  BP: 99/68 (01 Nov 2022 04:58) (99/68 - 106/62)  BP(mean): --  RR: 17 (01 Nov 2022 04:58) (17 - 18)  SpO2: 98% (01 Nov 2022 04:58) (98% - 99%)    Parameters below as of 01 Nov 2022 08:31  Patient On (Oxygen Delivery Method): room air                  LABS:                        11.9   4.53  )-----------( 222      ( 31 Oct 2022 08:20 )             38.5     10-31    144  |  103  |  9   ----------------------------<  90  3.5   |  36<H>  |  0.41<L>    Ca    8.7      31 Oct 2022 08:20    TPro  5.5<L>  /  Alb  2.6<L>  /  TBili  0.3  /  DBili  x   /  AST  14<L>  /  ALT  13  /  AlkPhos  60  10-31              WBC:  WBC Count: 4.53 K/uL (10-31 @ 08:20)  WBC Count: 5.90 K/uL (10-29 @ 07:53)      MICROBIOLOGY:  RECENT CULTURES:                  Sodium:  Sodium, Serum: 144 mmol/L (10-31 @ 08:20)  Sodium, Serum: 142 mmol/L (10-29 @ 07:53)      0.41 mg/dL 10-31 @ 08:20  0.36 mg/dL 10-29 @ 07:53      Hemoglobin:  Hemoglobin: 11.9 g/dL (10-31 @ 08:20)  Hemoglobin: 12.6 g/dL (10-29 @ 07:53)      Platelets: Platelet Count - Automated: 222 K/uL (10-31 @ 08:20)  Platelet Count - Automated: 200 K/uL (10-29 @ 07:53)      LIVER FUNCTIONS - ( 31 Oct 2022 08:20 )  Alb: 2.6 g/dL / Pro: 5.5 g/dL / ALK PHOS: 60 U/L / ALT: 13 U/L / AST: 14 U/L / GGT: x                 RADIOLOGY & ADDITIONAL STUDIES:      MICROBIOLOGY:  RECENT CULTURES:

## 2022-11-01 NOTE — PROGRESS NOTE ADULT - SUBJECTIVE AND OBJECTIVE BOX
[facility]  Alberto Lepe PA-C  [address] [place] [address]  [phone]      INTERVAL HPI/OVERNIGHT EVENTS:  Pt s/e  Tolerating diet  No further GI complaints    MEDICATIONS  (STANDING):  baclofen 5 milliGRAM(s) Oral every 12 hours  budesonide 160 MICROgram(s)/formoterol 4.5 MICROgram(s) Inhaler 2 Puff(s) Inhalation two times a day  donepezil 10 milliGRAM(s) Oral at bedtime  DULoxetine 60 milliGRAM(s) Oral daily  enoxaparin Injectable 40 milliGRAM(s) SubCutaneous every 24 hours  guaiFENesin  milliGRAM(s) Oral every 12 hours  levothyroxine Injectable 75 MICROGram(s) IV Push at bedtime  memantine 10 milliGRAM(s) Oral two times a day  metoprolol tartrate Injectable 2.5 milliGRAM(s) IV Push every 8 hours  pantoprazole  Injectable 40 milliGRAM(s) IV Push every 12 hours  simvastatin 10 milliGRAM(s) Oral at bedtime  tiotropium 18 MICROgram(s) Capsule 1 Capsule(s) Inhalation daily  traZODone 100 milliGRAM(s) Oral at bedtime    MEDICATIONS  (PRN):  aluminum hydroxide/magnesium hydroxide/simethicone Suspension 30 milliLiter(s) Oral every 4 hours PRN Dyspepsia  ibuprofen  Tablet. 400 milliGRAM(s) Oral every 6 hours PRN Temp greater or equal to 38C (100.4F), Mild Pain (1 - 3)  loperamide 2 milliGRAM(s) Oral four times a day PRN Diarrhea  melatonin 3 milliGRAM(s) Oral at bedtime PRN Insomnia  ondansetron Injectable 4 milliGRAM(s) IV Push every 8 hours PRN Nausea and/or Vomiting      Allergies    sulfa drugs (Unknown)  Sulfur Colliod (Rash)  Tylenol (Swelling)  Tylenol (Unknown)    PHYSICAL EXAM:   Vital Signs:  Vital Signs Last 24 Hrs  T(C): 36.9 (2022 11:20), Max: 37.1 (31 Oct 2022 20:25)  T(F): 98.4 (2022 11:20), Max: 98.7 (31 Oct 2022 20:25)  HR: 97 (2022 11:20) (72 - 97)  BP: 153/89 (2022 11:20) (99/68 - 153/89)  BP(mean): --  RR: 18 (2022 11:20) (17 - 18)  SpO2: 98% (2022 11:20) (98% - 99%)    Parameters below as of 2022 11:20  Patient On (Oxygen Delivery Method): nasal cannula      Daily     Daily Weight in k.2 (2022 04:58)    GENERAL:  Appears stated age  ABDOMEN:  Soft, non-tender, non-distended  NEURO:  Alert      LABS:                        11.9   4.53  )-----------( 222      ( 31 Oct 2022 08:20 )             38.5     10-31    144  |  103  |  9   ----------------------------<  90  3.5   |  36<H>  |  0.41<L>    Ca    8.7      31 Oct 2022 08:20    TPro  5.5<L>  /  Alb  2.6<L>  /  TBili  0.3  /  DBili  x   /  AST  14<L>  /  ALT  13  /  AlkPhos  60  10-31

## 2022-11-01 NOTE — DISCHARGE NOTE PROVIDER - NSDCCAREPROVSEEN_GEN_ALL_CORE_FT
Grace, Hussein Hudson, Alberto Leonardo, Lisa Baez, Je Willson, Victoria Sanches, Ky Snow, Victor Hugo Covarrubias, Hussein Larsen, Kostas Weil, Susan Lepe, Katie

## 2022-11-01 NOTE — PROGRESS NOTE ADULT - PROBLEM SELECTOR PLAN 5
Admit to monitored unit for cardiac monitoring, obtain echo to evaluate LVEF, intravenous diuresis as per card consult , monitor ins/outs, monitor renal profile and electrolytes closely ,send 3 sets of enzymes, O2 supply, serial chest xrays, monitor weights and oral intake of fluids, nutritionist consult

## 2022-11-01 NOTE — PROGRESS NOTE ADULT - TIME BILLING
as above

## 2022-11-01 NOTE — PROGRESS NOTE ADULT - ASSESSMENT
82y Female complaining of shortness of breath.   Patient brought in by EMS from [facility] for shortness of breath cough wheezing for 2 days.     EGD noted  esoph divertic  vs noted  labs reviewed    covid pos  isolation precs  Motrin Added    cm follow up  pt is eventually planned for [facility] discharge  on Solumedrol  Pulm Follow up reviewed
82y Female complaining of shortness of breath.   Patient brought in by EMS from [facility] for shortness of breath cough wheezing for 2 days.     s/p RRT - events reviewed -   GI eval noted  on IVF  NPO  on steroids - nebs - Pulm following  labs and imaging reviewed  GOC - full code -   monitor VS and HD  assist with needs - ADL  oral and skin hygiene  
Patient brought in by EMS from [facility] for shortness of breath cough wheezing for 2 days.  Patient denies fevers chills headache chest pain abdominal pain vomiting Admit for antibacterial managmnet ,intravenous steroids,nebulised bronchodilators and pulmonology evaluation,O2 supply,serial labs and chest xrays,obtain ECHO to evaluate LVEF and rule out chf component Admitted  to telemetry unit for monitoring , send 3 sets of cardiac enzymes to rule out acute coronary event, obtain ECHO to evaluate LVEF, cardiology consult  ,continue current management, O2 supply, anticoagulation plan as per cardiology consult Palliative care consult requested ,to discuss advance directives and complete MOLST 
Patient brought in by EMS from [facility] for shortness of breath cough wheezing for 2 days.  Patient denies fevers chills headache chest pain abdominal pain vomiting Admit for antibacterial managmnet ,intravenous steroids,nebulised bronchodilators and pulmonology evaluation,O2 supply,serial labs and chest xrays,obtain ECHO to evaluate LVEF and rule out chf component Admitted  to telemetry unit for monitoring , send 3 sets of cardiac enzymes to rule out acute coronary event, obtain ECHO to evaluate LVEF, cardiology consult  ,continue current management, O2 supply, anticoagulation plan as per cardiology consult Palliative care consult requested ,to discuss advance directives and complete MOLST  (20 Oct 2022 13:54)    hypernatremia   will check ua , urine osmolality , urine sodium , urine uric acid , serum sodium , serum osmolality , serum uric acid , f/u with hypernatremia work up , f/u with bmp , monitor i and o    BP monitoring,continue current antihypertensive meds, low salt diet,followup with PMD in 1-2 weeks  metoprolol tartrate Injectable 2.5 milliGRAM(s) IV Push every 12 hours    hypokalemia dextrose 5% + sodium chloride 0.45%. 1000 milliLiter(s) (30 mL/Hr) IV Continuous    
Patient brought in by EMS from [facility] for shortness of breath cough wheezing for 2 days.  Patient denies fevers chills headache chest pain abdominal pain vomiting Admit for antibacterial managmnet ,intravenous steroids,nebulised bronchodilators and pulmonology evaluation,O2 supply,serial labs and chest xrays,obtain ECHO to evaluate LVEF and rule out chf component Admitted  to telemetry unit for monitoring , send 3 sets of cardiac enzymes to rule out acute coronary event, obtain ECHO to evaluate LVEF, cardiology consult  ,continue current management, O2 supply, anticoagulation plan as per cardiology consult Palliative care consult requested ,to discuss advance directives and complete MOLST  (20 Oct 2022 13:54)    hypernatremia   will check ua , urine osmolality , urine sodium , urine uric acid , serum sodium , serum osmolality , serum uric acid , f/u with hypernatremia work up , f/u with bmp , monitor i and o    BP monitoring,continue current antihypertensive meds, low salt diet,followup with PMD in 1-2 weeks  metoprolol tartrate Injectable 2.5 milliGRAM(s) IV Push every 12 hours    hypokalemia dextrose 5% + sodium chloride 0.45%. 1000 milliLiter(s) (30 mL/Hr) IV Continuous    
abn imaging  ? achalasia   reflux  stricture    gerd precautions w/PO intake  ppi once a day, may increase to twice a day   maalox as needed   esophagram noted  s/p egd showing large esophageal diverticulum  ct surgery eval appreciated  will need eventual peg when optimized  d/w patient and primary  will d/w son    I reviewed the overnight course of events on the unit, re-confirming the patient history. I discussed the care with the patient and their family  Differential diagnosis and plan of care discussed with patient after the evaluation  35 minutes spent on total encounter of which more than fifty percent of the encounter was spent counseling and/or coordinating care by the attending physician.  Advanced care planning was discussed with patient and family.  Advanced care planning forms were reviewed and discussed.  Risks, benefits and alternatives of gastroenterologic procedures were discussed in detail and all questions were answered.
abn imaging  ? achalasia   reflux  stricture    gerd precautions w/PO intake  ppi once a day, may increase to twice a day   maalox as needed   esophagram noted  will need egd Monday  will need pulm, cardiac, medical clearance  d/w patient and primary    Advanced care planning was discussed with patient and family.  Advanced care planning forms were reviewed and discussed.  Risks, benefits and alternatives of gastroenterologic procedures were discussed in detail and all questions were answered.    30 minutes spent.
abn imaging  ? achalasia   reflux  stricture    gerd precautions w/PO intake  ppi once a day, may increase to twice a day   maalox as needed   esophagram noted  will need egd Monday  will need pulm, cardiac, medical clearance  keep npo  ct surgery eval appreciated  d/w patient and primary    I reviewed the overnight course of events on the unit, re-confirming the patient history. I discussed the care with the patient and their family  Differential diagnosis and plan of care discussed with patient after the evaluation  35 minutes spent on total encounter of which more than fifty percent of the encounter was spent counseling and/or coordinating care by the attending physician.  Advanced care planning was discussed with patient and family.  Advanced care planning forms were reviewed and discussed.  Risks, benefits and alternatives of gastroenterologic procedures were discussed in detail and all questions were answered.  
abn imaging  ? achalasia   reflux  stricture    gerd precautions w/PO intake  ppi once a day, may increase to twice a day   maalox as needed   will monitor clinically and if no improvement may need EGD  diet as tolerated  consider barium esophagogram    Advanced care planning was discussed with patient and family.  Advanced care planning forms were reviewed and discussed.  Risks, benefits and alternatives of gastroenterologic procedures were discussed in detail and all questions were answered.    30 minutes spent.
copd exacerabation- on bronchodilator and steroid  hypertension  hypothyroidism  chf  gerd  dementia
copd exacerabation- on bronchodilator and steroid  hypertension  hypothyroidism  chf  gerd  dementia
copd exacerabation- on bronchodilator and steroid  hypertension  hypothyroidism  chf  gerd  dementia  hypokalemia is correctef k 3.9 10/23   xray chest - no chf clinically though bnp  elevated.  cardiac status stable low risk for for gi endoscopy- lab pending from today 10/24  had gi endoscopy - large oesephageal diverticulum 10/25  hypokalemia 3.4- k replacement 10/26  now covid -19 positive 10/26  cardiac status stable low risk for peg
82y Female complaining of shortness of breath.   Patient brought in by EMS from [facility] for shortness of breath cough wheezing for 2 days.     EGD noted  esoph divertic  vs noted  labs reviewed    covid pos  isolation precs    cm follow up  pt is eventually planned for [facility] discharge  on Solumedrol  Pulm Follow up reviewed
82y Female complaining of shortness of breath.   Patient brought in by EMS from [facility] for shortness of breath cough wheezing for 2 days.       GI eval noted  on IVF  on PO clears  on steroids - nebs - Pulm following  labs and imaging reviewed  GOC - full code -   monitor VS and HD  assist with needs - ADL  oral and skin hygiene
82y Female complaining of shortness of breath.   Patient brought in by EMS from [facility] for shortness of breath cough wheezing for 2 days.     EGD noted  esoph divertic  vs noted  labs reviewed    covid pos  isolation precs  Motrin Added    cm follow up  pt is eventually planned for [facility] discharge  on NEBS and Steroids  Pulm Follow up reviewed
82y Female complaining of shortness of breath.   Patient brought in by EMS from [facility] for shortness of breath cough wheezing for 2 days.     EGD noted  esoph divertic  vs noted  labs reviewed  NPO  CM following  GI follow up -   
82y Female complaining of shortness of breath.   Patient brought in by EMS from [facility] for shortness of breath cough wheezing for 2 days.     GI eval noted  on IVF  NPO  on steroids - nebs - Pulm following  labs and imaging reviewed  GOC - full code -   monitor VS and HD  assist with needs - ADL  oral and skin hygiene
Patient brought in by EMS from [facility] for shortness of breath cough wheezing for 2 days.  Patient denies fevers chills headache chest pain abdominal pain vomiting Admit for antibacterial managmnet ,intravenous steroids,nebulised bronchodilators and pulmonology evaluation,O2 supply,serial labs and chest xrays,obtain ECHO to evaluate LVEF and rule out chf component Admitted  to telemetry unit for monitoring , send 3 sets of cardiac enzymes to rule out acute coronary event, obtain ECHO to evaluate LVEF, cardiology consult  ,continue current management, O2 supply, anticoagulation plan as per cardiology consult Palliative care consult requested ,to discuss advance directives and complete MOLST  (20 Oct 2022 13:54)    hypernatremia   will check ua , urine osmolality , urine sodium , urine uric acid , serum sodium , serum osmolality , serum uric acid , f/u with hypernatremia work up , f/u with bmp , monitor i and o    BP monitoring,continue current antihypertensive meds, low salt diet,followup with PMD in 1-2 weeks  metoprolol tartrate Injectable 2.5 milliGRAM(s) IV Push every 12 hours    hypokalemia dextrose 5% + sodium chloride 0.45%. 1000 milliLiter(s) (30 mL/Hr) IV Continuous    
Patient brought in by EMS from [facility] for shortness of breath cough wheezing for 2 days.  Patient denies fevers chills headache chest pain abdominal pain vomiting Admit for antibacterial managmnet ,intravenous steroids,nebulised bronchodilators and pulmonology evaluation,O2 supply,serial labs and chest xrays,obtain ECHO to evaluate LVEF and rule out chf component Admitted  to telemetry unit for monitoring , send 3 sets of cardiac enzymes to rule out acute coronary event, obtain ECHO to evaluate LVEF, cardiology consult  ,continue current management, O2 supply, anticoagulation plan as per cardiology consult Palliative care consult requested ,to discuss advance directives and complete MOLST  (20 Oct 2022 13:54)    hypernatremia   will check ua , urine osmolality , urine sodium , urine uric acid , serum sodium , serum osmolality , serum uric acid , f/u with hypernatremia work up , f/u with bmp , monitor i and o    BP monitoring,continue current antihypertensive meds, low salt diet,followup with PMD in 1-2 weeks  metoprolol tartrate Injectable 2.5 milliGRAM(s) IV Push every 12 hours    hypokalemia dextrose 5% + sodium chloride 0.45%. 1000 milliLiter(s) (30 mL/Hr) IV Continuous    
abn imaging  ? achalasia   reflux  stricture    gerd precautions w/PO intake  ppi once a day, may increase to twice a day   maalox as needed   esophagram noted  s/p egd showing large esophageal diverticulum  ct surgery eval appreciated  d/w patient and primary  d/w son; I explained if she is able to tolerate fulls and puree then she may not need surgery as it is high risk and she is a poor surgical candidate and therefore would avoid peg  no objection to d/c planning    I reviewed the overnight course of events on the unit, re-confirming the patient history. I discussed the care with the patient and their family  Differential diagnosis and plan of care discussed with patient after the evaluation  35 minutes spent on total encounter of which more than fifty percent of the encounter was spent counseling and/or coordinating care by the attending physician.  Advanced care planning was discussed with patient and family.  Advanced care planning forms were reviewed and discussed.  Risks, benefits and alternatives of gastroenterologic procedures were discussed in detail and all questions were answered.
abn imaging  ? achalasia   reflux  stricture    gerd precautions w/PO intake  ppi once a day, may increase to twice a day   maalox as needed   esophagram noted  s/p egd showing large esophageal diverticulum  ct surgery eval appreciated  will need eventual peg when optimized  d/w patient and primary    I reviewed the overnight course of events on the unit, re-confirming the patient history. I discussed the care with the patient and their family  Differential diagnosis and plan of care discussed with patient after the evaluation  35 minutes spent on total encounter of which more than fifty percent of the encounter was spent counseling and/or coordinating care by the attending physician.  Advanced care planning was discussed with patient and family.  Advanced care planning forms were reviewed and discussed.  Risks, benefits and alternatives of gastroenterologic procedures were discussed in detail and all questions were answered.
abn imaging  ? achalasia   reflux  stricture    gerd precautions w/PO intake  ppi once a day, may increase to twice a day   maalox as needed   esophagram noted  s/p egd showing large esophageal diverticulum  ct surgery eval appreciated  will need eventual peg when optimized  d/w patient and primary  will d/w son    I reviewed the overnight course of events on the unit, re-confirming the patient history. I discussed the care with the patient and their family  Differential diagnosis and plan of care discussed with patient after the evaluation  35 minutes spent on total encounter of which more than fifty percent of the encounter was spent counseling and/or coordinating care by the attending physician.  Advanced care planning was discussed with patient and family.  Advanced care planning forms were reviewed and discussed.  Risks, benefits and alternatives of gastroenterologic procedures were discussed in detail and all questions were answered.  
copd exacerabation- on bronchodilator and steroid  hypertension  hypothyroidism  chf  gerd  dementia  hypokalemia is correctef k 3.9 10/23   xray chest - no chf clinically though bnp  elevated.  cardiac status stable low risk for for gi endoscopy- lab pending from today 10/24  had gi endoscopy - large oesephageal diverticulum 10/25
 82y Female complaining of shortness of breath.   Patient brought in by EMS from [facility] for shortness of breath cough wheezing for 2 days.       GI eval noted  curr NPO  on steroids - nebs - Pulm following  labs and imaging reviewed  GOC - in progress - message left -   monitor VS and HD  assist with needs - ADL  oral and skin hygiene  
82y Female complaining of shortness of breath.   Patient brought in by EMS from [facility] for shortness of breath cough wheezing for 2 days.     EGD noted  esoph divertic  vs noted  labs reviewed    covid pos  isolation precs    cm follow up  pt is eventually planned for [facility]
82y Female complaining of shortness of breath.   Patient brought in by EMS from [facility] for shortness of breath cough wheezing for 2 days.     EGD noted  esoph divertic  vs noted  labs reviewed    covid pos  isolation precs  Motrin Added    cm follow up  pt is eventually planned for [facility] discharge  on NEBS and Steroids  Pulm Follow up reviewed
82y Female complaining of shortness of breath.   Patient brought in by EMS from [facility] for shortness of breath cough wheezing for 2 days.     EGD noted  esoph divertic  vs noted  labs reviewed    covid pos  isolation precs  Motrin Added    cm follow up  pt is eventually planned for [facility] discharge  on Solumedrol  Pulm Follow up reviewed
82y Female complaining of shortness of breath.   Patient brought in by EMS from [facility] for shortness of breath cough wheezing for 2 days.     EGD noted  esoph divertic  vs noted  labs reviewed    covid pos  isolation precs  Motrin Added    cm follow up  pt is eventually planned for [facility] discharge  on NEBS and Steroids  Pulm Follow up reviewed
Patient brought in by EMS from [facility] for shortness of breath cough wheezing for 2 days.  Patient denies fevers chills headache chest pain abdominal pain vomiting Admit for antibacterial managmnet ,intravenous steroids,nebulised bronchodilators and pulmonology evaluation,O2 supply,serial labs and chest xrays,obtain ECHO to evaluate LVEF and rule out chf component Admitted  to telemetry unit for monitoring , send 3 sets of cardiac enzymes to rule out acute coronary event, obtain ECHO to evaluate LVEF, cardiology consult  ,continue current management, O2 supply, anticoagulation plan as per cardiology consult Palliative care consult requested ,to discuss advance directives and complete MOLST  (20 Oct 2022 13:54)    hypernatremia   will check ua , urine osmolality , urine sodium , urine uric acid , serum sodium , serum osmolality , serum uric acid , f/u with hypernatremia work up , f/u with bmp , monitor i and o    BP monitoring,continue current antihypertensive meds, low salt diet,followup with PMD in 1-2 weeks  metoprolol tartrate Injectable 2.5 milliGRAM(s) IV Push every 12 hours    hypokalemia dextrose 5% + sodium chloride 0.45%. 1000 milliLiter(s) (30 mL/Hr) IV Continuous    
Patient brought in by EMS from [facility] for shortness of breath cough wheezing for 2 days.  Patient denies fevers chills headache chest pain abdominal pain vomiting Admit for antibacterial managmnet ,intravenous steroids,nebulised bronchodilators and pulmonology evaluation,O2 supply,serial labs and chest xrays,obtain ECHO to evaluate LVEF and rule out chf component Admitted  to telemetry unit for monitoring , send 3 sets of cardiac enzymes to rule out acute coronary event, obtain ECHO to evaluate LVEF, cardiology consult  ,continue current management, O2 supply, anticoagulation plan as per cardiology consult Palliative care consult requested ,to discuss advance directives and complete MOLST  (20 Oct 2022 13:54)    hypernatremia   will check ua , urine osmolality , urine sodium , urine uric acid , serum sodium , serum osmolality , serum uric acid , f/u with hypernatremia work up , f/u with bmp , monitor i and o    BP monitoring,continue current antihypertensive meds, low salt diet,followup with PMD in 1-2 weeks  metoprolol tartrate Injectable 2.5 milliGRAM(s) IV Push every 12 hours    hypokalemia dextrose 5% + sodium chloride 0.45%. 1000 milliLiter(s) (30 mL/Hr) IV Continuous    
abn imaging  ? achalasia   reflux  stricture    gerd precautions w/PO intake  ppi once a day, may increase to twice a day   maalox as needed   esophagram noted  s/p egd showing large esophageal diverticulum  ct surgery eval appreciated  tentatively scheduled for peg tomorrow  d/w patient and primary    I reviewed the overnight course of events on the unit, re-confirming the patient history. I discussed the care with the patient and their family  Differential diagnosis and plan of care discussed with patient after the evaluation  35 minutes spent on total encounter of which more than fifty percent of the encounter was spent counseling and/or coordinating care by the attending physician.  Advanced care planning was discussed with patient and family.  Advanced care planning forms were reviewed and discussed.  Risks, benefits and alternatives of gastroenterologic procedures were discussed in detail and all questions were answered.  
copd exacerabation- on bronchodilator and steroid  hypertension  hypothyroidism  chf  gerd  dementia  hypokalemia is correctef k 3.9 10/23   xray chest - no chf clinically though bnp  elevated.  cardiac status stable low risk for for gi endoscopy- lab pending from today 10/24  had gi endoscopy - large oesephageal diverticulum 10/25  hypokalemia 3.4- k replacement 10/26  now covid -19 positive 10/26  cardiac status stable low risk for peg
  REVIEW OF SYMPTOMS      Able to give (reliable) ROS  NO     PHYSICAL EXAM    HEENT Unremarkable  atraumatic   RESP Fair air entry EXP prolonged    Harsh breath sound Resp distres mild   CARDIAC S1 S2 No S3     NO JVD    ABDOMEN SOFT BS PRESENT NOT DISTENDED No hepatosplenomegaly   PEDAL EDEMA present No calf tenderness  NO rash       AGE/SEX.   82 f  DOA.  10/20/2022  CC .  10/20/2022 · from [facility] for cough and SOB x4 days. Uses home O2 2l atc for COPD    PRESENTING PROBLEMS .   RESP DISTRESS 10/20/2022   COPD ex 10/20/2022  COUGH 10/20/2022   RESP TRACT INFECTN 10/20/2022   SEPSIS SOURCE NOT CLEAR 10/20/2022   ALZHEIMERS   DEPRESSN  ER MEDS GIVEN SO FAR 10/20/2022 4:23 PM.  SOLUMED 125 11a   ZOSYN 1P   NS 1000 1P   DUONEB 11A   DUONEB 12P         COURSE.   10/22/2022 rrt resp distress  PROCEDURES.    PMH .  pmh COPD  pmh  dementia   pmh  hytn  pmh  hypothyroid   pmh       NOTABLE HOME MEDS.   Proair  cymbalta 60   donepezil 10   metoprolol 25   namenda 10   lasix 40     GENERAL DATA .   GOC.  10/20/2022 full code       ALLGY.  tylenol sulfa                           WT.  10/20/2022 52  BMI.    10/20/2022 21                           ICU STAY. none  COVID.  scv2 10/20/2022 (-)     BEST PRACTICE ISSUES.    HOB ELEVATN. Yes  DVT PPLX. 10/20 lvnx 40      HALL PPLX. 10/20/2022 protonix 40      INFN PPLX.    SP SW MELISSA.        DIET.  10/22/2022 npo     VS/ PO/IO/ VENT/ DRIPS.   10/22/2022 afeb 79 110/70   10/22/2022 2l 98%     ASSESSMENT/RECOMMENDATIONS .   RESP.  -- RESP DISTRESS.  10/20/2022 check echi  -- GAS EXCHANGE.  target po 90-95%  10/21/2022 3l 744/49/177  10/22/2022 12p 2l 743/35/135   -- COUGH.  10/20/2022 Guaifenesin   -- episode of resp distress 10/22/2022   Possibly related to esophageal diverticulum   10/22/2022 made npo   -- COPD ex.  10/20/2022 duoneb.4  10/20/2022 pulmicort .5x2   10/20/2022 solumed 40  contrx   steroids x 5d   -- RO VTE.  10/21/2022 d dimr 370   10/21/2022 v duplx (-)   INFECTION.  -- RESP TRACT INFECTN.  -- SEPSIS 10/20/2022   W 10/20-10/21-10/22/2022 w 4.4 - 3 - 6.8   pr 10/21/2022  (-)   ua 10/21/2022 w 6-10   CT cap 10/20/2022 dialetd esoph enphysemaiapical scarring  NMo consolidatn   RVP 10/20/2022 (-)   bc 10/20 (-)   10/20/2022 zosyn   10/21/2022 zosyn dced  CARDIAC.  -- Lacticemia 10/20/2022  La 10/20-10/20-10/21/2022 la 3.5-3.6 - 1.3   -- CAD.   10/20/2022 simvastat 10   -- HYTN.  10/22/2022 metoprolol 2.5 x4 iv  10/20/2022 metoprolol 37.5x2 dced   -- CHF.  bnp 10/21/2022 bnp 2505   GI.  LFTS 10/20/2022   AP 94  ast 17  alt 15   no active issues   -- RO DYSPHAGIA.  10/20/2022 speech eval   10/21/2022 esophagogram large debris filld wide mouth diverticulum from r lat mid esophagus and narrow esophagus distal tothis   -- RO ACHALASIA.  ct 10/20/2022 ct ap dilated esophagus with retained food abruptly tapering distally   gi on case   HEMAT.  Hb 10/20/2022 Hb 14   INR 10/20/2022 INR 1.1   no active issues   RENAL.  Na 10/20/2022 Na 143   CO2 10/20/2022 CO2 33   Cr 10/20/2022 Cr .7   no active issues  IV fl.  -- 10/21/2022 d5 1/2 50   -- 10/20/2022 LR 80  DCed   ENDOCRINE.   -- HYPOTHYROID.  10/20/2022 levoxyl 150   NEURO.  10/20/2022 baclofen 5.2   -- ALZHEIMERS.  10/20/2022 donepezil 10  -- DEPRESSN.  10/20/2022 duloxetoine 60     TIME SPENT   Over 25 minutes aggregate care time spent on encounter; activities included   direct patient care, counseling and/or coordinating care reviewing notes, lab data/ imaging , discussion with multidisciplinary team/ patient  /family and explaining in detail risks, benefits, alternatives  of the recommendations     NAGI FAUSTIN 82 F 10/20/2022 1938 DR LUIS CASTRO    
  REVIEW OF SYMPTOMS      Able to give (reliable) ROS  NO     PHYSICAL EXAM    HEENT Unremarkable  atraumatic   RESP Fair air entry EXP prolonged    Harsh breath sound Resp distres mild   CARDIAC S1 S2 No S3     NO JVD    ABDOMEN SOFT BS PRESENT NOT DISTENDED No hepatosplenomegaly   PEDAL EDEMA present No calf tenderness  NO rash       GENERAL DATA .   GOC.  10/20/2022 full code       ALLGY.  tylenol sulfa                           WT.  10/20/2022 52  BMI.    10/20/2022 21                           ICU STAY. none  COVID.  10/25/2022 scv2 (+)  scv2 10/20/2022 (-)     BEST PRACTICE ISSUES.    HOB ELEVATN. Yes  DVT PPLX. 10/20 lvnx 40      HALL PPLX. 10/20/2022 protonix 40      INFN PPLX.    SP SW MELISSA.        DIET.  10/22/2022 npo       VS/ PO/IO/ VENT/ DRIPS.   10/25/2022 100f 130 120/80     ASSESSMENT/RECOMMENDATIONS .   RESP.  -- RESP DISTRESS.  10/20/2022 check echi  -- GAS EXCHANGE.  target po 90-95%  10/21/2022 3l 744/49/177  10/22/2022 12p 2l 743/35/135   -- COUGH.  10/20/2022 Guaifenesin   -- episode of resp distress 10/22/2022   Possibly related to esophageal diverticulum   10/22/2022 made npo   -- COPD ex.  10/20/2022 duoneb.4  10/20/2022 pulmicort .5x2   10/20/2022 solumed 40 -> solumed 20   contrx   steroids x 5d   -- RO VTE.  10/21/2022 d dimr 370   10/21/2022 v duplx (-)   INFECTION.  -- RESP TRACT INFECTN.  -- SEPSIS 10/20/2022   W 10/20-10/21-10/22/2022 w 4.4 - 3 - 6.8   pr 10/21/2022  (-)   ua 10/21/2022 w 6-10   CT cap 10/20/2022 dialetd esoph enphysemaiapical scarring  NMo consolidatn   RVP 10/20/2022 (-)   bc 10/20 (-)   abio deferrd   -- COVID.  10/25/2022 scv2 (+)  CARDIAC.  -- Lacticemia 10/20/2022  La 10/20-10/20-10/21/2022 la 3.5-3.6 - 1.3   -- CAD.   10/20/2022 simvastat 10   -- HYTN.  10/22/2022 metoprolol 2.5 x4 iv  -- CHF.  bnp 10/21/2022 bnp 2505   echo 10/20 ef 60 biatr enlargement   GI.  LFTS 10/20/2022   AP 94  ast 17  alt 15   no active issues   -- RO DYSPHAGIA UPPER ESOPHAGEAL DIVERTICULUM.  10/20/2022 speech eval   10/21/2022 esophagogram large debris filld wide mouth diverticulum from r lat mid esophagus and narrow esophagus distal tothis   10/23/2022 seen by Dr Vivar For egd am   10/25/2022 peg plannd  -- RO ACHALASIA.  ct 10/20/2022 ct ap dilated esophagus with retained food abruptly tapering distally   gi on case   HEMAT.  Hb 10/20/2022 Hb 14   INR 10/20/2022 INR 1.1   no active issues   RENAL.  Na 10/20-10/23/2022 Na 143 - 146   PO4 10/23/2022 po4 2.2   CO2 10/20/2022 CO2 33   Cr 10/20/2022 Cr .7   no active issues  -- Hypomagnesemia   Mg 10/25/2022 Mg 1.4 .  -- Hypophosphatemia.  PO4 10/25/2022 PO4 1.9  IV fl.  -- 10/21/2022 d5 1/2 50   -- 10/20/2022 LR 80  DCed   ENDOCRINE.   -- HYPOTHYROID.  10/25/2022 tsh .04 (d)   10/20/2022 levoxyl 150   NEURO.  10/20/2022 baclofen 5.2   -- ALZHEIMERS.  10/20/2022 donepezil 10  -- DEPRESSN.  10/20/2022 duloxetoine 60     ASSESSMENT/DISPOSITION.   82 f pmh obs copd admitted 10/20/2022 from [facility] cough sob     COPD ex 10/20 BD steroids   episode of resp distress 10/22/2022   Possibly related to esophageal diverticulum r lat mid esoph on 10/21 esophagogram  made npo  RO ACHALASIA.  ct 10/20/2022 ct ap dilated esophagus with retained food abruptly tapering distally   UPPER ESOPHAGEAL DIVERTICULUM  10/25/2022 peg plannd  COVID.  10/25/2022 (+)       TIME SPENT   Over 25 minutes aggregate care time spent on encounter; activities included   direct patient care, counseling and/or coordinating care reviewing notes, lab data/ imaging , discussion with multidisciplinary team/ patient  /family and explaining in detail risks, benefits, alternatives  of the recommendations     NAGI FAUSTIN 82 F 10/20/2022 1938 DR LUIS CASTRO  
  REVIEW OF SYMPTOMS      Able to give (reliable) ROS  NO     PHYSICAL EXAM    HEENT Unremarkable  atraumatic   RESP Fair air entry EXP prolonged    Harsh breath sound Resp distres mild   CARDIAC S1 S2 No S3     NO JVD    ABDOMEN SOFT BS PRESENT NOT DISTENDED No hepatosplenomegaly   PEDAL EDEMA present No calf tenderness  NO rash       GENERAL DATA .   GOC.  10/20/2022 full code       ALLGY.  tylenol sulfa                           WT.  10/20/2022 52  BMI.    10/20/2022 21                           ICU STAY. none  COVID.  10/25/2022 scv2 (+)  scv2 10/20/2022 (-)     BEST PRACTICE ISSUES.    HOB ELEVATN. Yes  DVT PPLX. 10/20 lvnx 40      HALL PPLX. 10/20/2022 protonix 40      INFN PPLX.    SP SW MELISSA.        DIET.  10/26/2022 full liq     ASSESSMENT/RECOMMENDATIONS .   RESP.  -- RESP DISTRESS.  echo 10/20 ef 60 biatr enlargement   breathing comfortably now   -- GAS EXCHANGE.  target po 90-95%  10/21/2022 3l 744/49/177  10/22/2022 12p 2l 743/35/135   -- COUGH.  10/20/2022 Guaifenesin   -- episode of resp distress 10/22/2022   Possibly related to esophageal diverticulum   10/22/2022 made npo   10/26/2022 full liq started by gi  -- COPD ex.  10/20/2022 duoneb.4  10/20/2022 pulmicort .5x2   10/20/2022 solumed 40 -> 10/25-10/29/2022 solumed 20   contrx   cont inhalrs   -- RO VTE.  10/21/2022 d dimr 370   10/21/2022 v duplx (-)   INFECTION.  -- RESP TRACT INFECTN.  -- SEPSIS 10/20/2022   W 10/20-10/21-10/22/2022 w 4.4 - 3 - 6.8   pr 10/21/2022  (-)   ua 10/21/2022 w 6-10   CT cap 10/20/2022 dialetd esoph enphysemaiapical scarring  NMo consolidatn   RVP 10/20/2022 (-)   bc 10/20 (-)   abio deferrd   -- COVID.  10/25/2022 scv2 (+)  rdsv deferrd   id is on case   CARDIAC.  -- Lacticemia 10/20/2022  La 10/20-10/20-10/21/2022 la 3.5-3.6 - 1.3   -- CAD.   10/20/2022 simvastat 10   -- HYTN.  10/22/2022 metoprolol 2.5 x4 iv  -- CHF.  bnp 10/21/2022 bnp 2505   echo 10/20 ef 60 biatr enlargement   GI.  LFTS 10/20/2022   AP 94  ast 17  alt 15   no active issues   -- RO DYSPHAGIA UPPER ESOPHAGEAL DIVERTICULUM.  10/20/2022 speech eval   10/21/2022 esophagogram large debris filld wide mouth diverticulum from r lat mid esophagus and narrow esophagus distal tothis   10/23/2022 seen by Dr Vivar For egd am   10/25/2022 peg plannd  -- RO ACHALASIA.  ct 10/20/2022 ct ap dilated esophagus with retained food abruptly tapering distally   gi on case   HEMAT.  Hb 10/20/2022 Hb 14   INR 10/20/2022 INR 1.1   no active issues   RENAL.  -- HyperNa   Na 10/20-10/23-10/26-10/29/2022 Na 143 - 146 - 148 - 142   PO4 10/23/2022 po4 2.2   CO2 10/20/2022 CO2 33   Cr 10/20/2022 Cr .7   no active issues  -- Hypomagnesemia   Mg 10/25/2022 Mg 1.4 .  -- Hypophosphatemia.  PO4 10/25/2022 PO4 1.9  IV fl.  -- D5 40   ENDOCRINE.   -- HYPOTHYROID.  10/25/2022 tsh .04 (d)   10/20/2022 levoxyl 150   NEURO.  10/20/2022 baclofen 5.2   -- ALZHEIMERS.  10/20/2022 donepezil 10  -- DEPRESSN.  10/20/2022 duloxetoine 60       ASSESSMENT/DISPOSITION.   82 f pmh obs copd admitted 10/20/2022 from [facility] cough sob     COPD ex 10/20 BD steroids   episode of resp distress 10/22/2022   Possibly related to esophageal diverticulum r lat mid esoph on 10/21 esophagogram  made npo  RO ACHALASIA.  ct 10/20/2022 ct ap dilated esophagus with retained food abruptly tapering distally   UPPER ESOPHAGEAL DIVERTICULUM  10/25/2022 peg plannd  COVID.  10/25/2022 (+)       TIME SPENT   Over 25 minutes aggregate care time spent on encounter; activities included   direct patient care, counseling and/or coordinating care reviewing notes, lab data/ imaging , discussion with multidisciplinary team/ patient  /family and explaining in detail risks, benefits, alternatives  of the recommendations     NAGI FAUSTIN 82 F 10/20/2022 1938 DR LUIS CASTRO  
  REVIEW OF SYMPTOMS      Able to give (reliable) ROS  NO     PHYSICAL EXAM    HEENT Unremarkable  atraumatic   RESP Fair air entry EXP prolonged    Harsh breath sound Resp distres mild   CARDIAC S1 S2 No S3     NO JVD    ABDOMEN SOFT BS PRESENT NOT DISTENDED No hepatosplenomegaly   PEDAL EDEMA present No calf tenderness  NO rash       GENERAL DATA .   GOC.  10/20/2022 full code       ALLGY.  tylenol sulfa                           WT.  10/20/2022 52  BMI.    10/20/2022 21                           ICU STAY. none  COVID.  10/25/2022 scv2 (+)  scv2 10/20/2022 (-)     BEST PRACTICE ISSUES.    HOB ELEVATN. Yes  DVT PPLX. 10/20 lvnx 40      HALL PPLX. 10/20/2022 protonix 40      INFN PPLX.    SP SW MELISSA.        DIET.  10/26/2022 full liq     ASSESSMENT/RECOMMENDATIONS .   RESP.  -- RESP DISTRESS.  echo 10/20 ef 60 biatr enlargement   breathing comfortably now   -- GAS EXCHANGE.  target po 90-95%  10/21/2022 3l 744/49/177  10/22/2022 12p 2l 743/35/135   -- COUGH.  10/20/2022 Guaifenesin   -- episode of resp distress 10/22/2022   Possibly related to esophageal diverticulum   10/22/2022 made npo   10/26/2022 full liq started by gi  -- COPD ex.  10/20/2022 duoneb.4  10/20/2022 pulmicort .5x2   10/20/2022 solumed 40 -> 10/25-10/29/2022 solumed 20   contrx   cont inhalrs   -- RO VTE.  10/21/2022 d dimr 370   10/21/2022 v duplx (-)   INFECTION.  -- RESP TRACT INFECTN.  -- SEPSIS 10/20/2022   W 10/20-10/21-10/22/2022 w 4.4 - 3 - 6.8   pr 10/21/2022  (-)   ua 10/21/2022 w 6-10   CT cap 10/20/2022 dialetd esoph enphysemaiapical scarring  NMo consolidatn   RVP 10/20/2022 (-)   bc 10/20 (-)   abio deferrd   -- COVID.  10/25/2022 scv2 (+)  rdsv deferrd   id is on case   CARDIAC.  -- Lacticemia 10/20/2022  La 10/20-10/20-10/21/2022 la 3.5-3.6 - 1.3   -- CAD.   10/20/2022 simvastat 10   -- HYTN.  10/22/2022 metoprolol 2.5 x4 iv  -- CHF.  bnp 10/21/2022 bnp 2505   echo 10/20 ef 60 biatr enlargement   GI.  LFTS 10/20/2022   AP 94  ast 17  alt 15   no active issues   -- RO DYSPHAGIA UPPER ESOPHAGEAL DIVERTICULUM.  10/20/2022 speech eval   10/21/2022 esophagogram large debris filld wide mouth diverticulum from r lat mid esophagus and narrow esophagus distal tothis   10/23/2022 seen by Dr Vivar For egd am   10/25/2022 peg plannd  -- RO ACHALASIA.  ct 10/20/2022 ct ap dilated esophagus with retained food abruptly tapering distally   gi on case   HEMAT.  Hb 10/20/2022 Hb 14   INR 10/20/2022 INR 1.1   no active issues   RENAL.  -- HyperNa   Na 10/20-10/23-10/26-10/29/2022 Na 143 - 146 - 148 - 142   PO4 10/23/2022 po4 2.2   CO2 10/20/2022 CO2 33   Cr 10/20/2022 Cr .7   no active issues  -- Hypomagnesemia   Mg 10/25/2022 Mg 1.4 .  -- Hypophosphatemia.  PO4 10/25/2022 PO4 1.9  IV fl.  -- D5 40   ENDOCRINE.   -- HYPOTHYROID.  10/25/2022 tsh .04 (d)   10/20/2022 levoxyl 150   NEURO.  10/20/2022 baclofen 5.2   -- ALZHEIMERS.  10/20/2022 donepezil 10  -- DEPRESSN.  10/20/2022 duloxetoine 60       ASSESSMENT/DISPOSITION.   82 f pmh obs copd admitted 10/20/2022 from [facility] cough sob     COPD ex 10/20 BD steroids   episode of resp distress 10/22/2022   Possibly related to esophageal diverticulum r lat mid esoph on 10/21 esophagogram  made npo  RO ACHALASIA.  ct 10/20/2022 ct ap dilated esophagus with retained food abruptly tapering distally   UPPER ESOPHAGEAL DIVERTICULUM  10/25/2022 peg plannd  COVID.  10/25/2022 (+)       TIME SPENT   Over 25 minutes aggregate care time spent on encounter; activities included   direct patient care, counseling and/or coordinating care reviewing notes, lab data/ imaging , discussion with multidisciplinary team/ patient  /family and explaining in detail risks, benefits, alternatives  of the recommendations     NAGI FAUSTIN 82 F 10/20/2022 1938 DR LUIS CASTRO  
  REVIEW OF SYMPTOMS      Able to give (reliable) ROS  NO     PHYSICAL EXAM    HEENT Unremarkable  atraumatic   RESP Fair air entry EXP prolonged    Harsh breath sound Resp distres mild   CARDIAC S1 S2 No S3     NO JVD    ABDOMEN SOFT BS PRESENT NOT DISTENDED No hepatosplenomegaly   PEDAL EDEMA present No calf tenderness  NO rash       GENERAL DATA .   GOC.  10/20/2022 full code       ALLGY.  tylenol sulfa                           WT.  10/20/2022 52  BMI.    10/20/2022 21                           ICU STAY. none  COVID.  10/25/2022 scv2 (+)  scv2 10/20/2022 (-)     BEST PRACTICE ISSUES.    HOB ELEVATN. Yes  DVT PPLX. 10/20 lvnx 40      HALL PPLX. 10/20/2022 protonix 40      INFN PPLX.    SP SW MELISSA.        DIET.  10/26/2022 full liq     VS/ PO/IO/ VENT/ DRIPS.   10/26/2022 afeb 80 100/60   10/26/2022 2l 94%     ASSESSMENT/RECOMMENDATIONS .   RESP.  -- RESP DISTRESS.  10/20/2022 check echi  -- GAS EXCHANGE.  target po 90-95%  10/21/2022 3l 744/49/177  10/22/2022 12p 2l 743/35/135   -- COUGH.  10/20/2022 Guaifenesin   -- episode of resp distress 10/22/2022   Possibly related to esophageal diverticulum   10/22/2022 made npo   10/26/2022 full liq started by gi  -- COPD ex.  10/20/2022 duoneb.4  10/20/2022 pulmicort .5x2   10/20/2022 solumed 40 -> solumed 20   contrx   steroids x 5d   -- RO VTE.  10/21/2022 d dimr 370   10/21/2022 v duplx (-)   INFECTION.  -- RESP TRACT INFECTN.  -- SEPSIS 10/20/2022   W 10/20-10/21-10/22/2022 w 4.4 - 3 - 6.8   pr 10/21/2022  (-)   ua 10/21/2022 w 6-10   CT cap 10/20/2022 dialetd esoph enphysemaiapical scarring  NMo consolidatn   RVP 10/20/2022 (-)   bc 10/20 (-)   abio deferrd   -- COVID.  10/25/2022 scv2 (+)  id is on case   CARDIAC.  -- Lacticemia 10/20/2022  La 10/20-10/20-10/21/2022 la 3.5-3.6 - 1.3   -- CAD.   10/20/2022 simvastat 10   -- HYTN.  10/22/2022 metoprolol 2.5 x4 iv  -- CHF.  bnp 10/21/2022 bnp 2505   echo 10/20 ef 60 biatr enlargement   GI.  LFTS 10/20/2022   AP 94  ast 17  alt 15   no active issues   -- RO DYSPHAGIA UPPER ESOPHAGEAL DIVERTICULUM.  10/20/2022 speech eval   10/21/2022 esophagogram large debris filld wide mouth diverticulum from r lat mid esophagus and narrow esophagus distal tothis   10/23/2022 seen by Dr Vivar For egd am   10/25/2022 peg plannd  -- RO ACHALASIA.  ct 10/20/2022 ct ap dilated esophagus with retained food abruptly tapering distally   gi on case   HEMAT.  Hb 10/20/2022 Hb 14   INR 10/20/2022 INR 1.1   no active issues   RENAL.  -- HyperNa   Na 10/20-10/23-10/26/2022 Na 143 - 146 - 148   PO4 10/23/2022 po4 2.2   CO2 10/20/2022 CO2 33   Cr 10/20/2022 Cr .7   no active issues  -- Hypomagnesemia   Mg 10/25/2022 Mg 1.4 .  -- Hypophosphatemia.  PO4 10/25/2022 PO4 1.9  IV fl.  -- D5 40   -- 10/21/2022 d5 1/2 50 DCed  -- 10/20/2022 LR 80  DCed   ENDOCRINE.   -- HYPOTHYROID.  10/25/2022 tsh .04 (d)   10/20/2022 levoxyl 150   NEURO.  10/20/2022 baclofen 5.2   -- ALZHEIMERS.  10/20/2022 donepezil 10  -- DEPRESSN.  10/20/2022 duloxetoine 60     ASSESSMENT/DISPOSITION.   82 f pmh obs copd admitted 10/20/2022 from [facility] cough sob     COPD ex 10/20 BD steroids   episode of resp distress 10/22/2022   Possibly related to esophageal diverticulum r lat mid esoph on 10/21 esophagogram  made npo  RO ACHALASIA.  ct 10/20/2022 ct ap dilated esophagus with retained food abruptly tapering distally   UPPER ESOPHAGEAL DIVERTICULUM  10/25/2022 peg plannd  COVID.  10/25/2022 (+)       TIME SPENT   Over 25 minutes aggregate care time spent on encounter; activities included   direct patient care, counseling and/or coordinating care reviewing notes, lab data/ imaging , discussion with multidisciplinary team/ patient  /family and explaining in detail risks, benefits, alternatives  of the recommendations     NAGI FAUSTIN 82 F 10/20/2022 1938 DR LUIS CASTRO
  REVIEW OF SYMPTOMS      Able to give (reliable) ROS  NO     PHYSICAL EXAM    HEENT Unremarkable  atraumatic   RESP Fair air entry EXP prolonged    Harsh breath sound Resp distres mild   CARDIAC S1 S2 No S3     NO JVD    ABDOMEN SOFT BS PRESENT NOT DISTENDED No hepatosplenomegaly   PEDAL EDEMA present No calf tenderness  NO rash       GENERAL DATA .   GOC.  10/20/2022 full code       ALLGY.  tylenol sulfa                           WT.  10/20/2022 52  BMI.    10/20/2022 21                           ICU STAY. none  COVID.  10/25/2022 scv2 (+)  scv2 10/20/2022 (-)     BEST PRACTICE ISSUES.    HOB ELEVATN. Yes  DVT PPLX. 10/20 lvnx 40      HALL PPLX. 10/20/2022 protonix 40      INFN PPLX.    SP SW MELISSA.        DIET.  10/26/2022 full liq     VS/ PO/IO/ VENT/ DRIPS.   10/27/2022 afeb 77 100/60  10/27/2022 2l 94%     ASSESSMENT/RECOMMENDATIONS .   RESP.  -- RESP DISTRESS.  10/20/2022 check echi  -- GAS EXCHANGE.  target po 90-95%  10/21/2022 3l 744/49/177  10/22/2022 12p 2l 743/35/135   -- COUGH.  10/20/2022 Guaifenesin   -- episode of resp distress 10/22/2022   Possibly related to esophageal diverticulum   10/22/2022 made npo   10/26/2022 full liq started by gi  -- COPD ex.  10/20/2022 duoneb.4  10/20/2022 pulmicort .5x2   10/20/2022 solumed 40 -> 10/25/2022 solumed 20   contrx   steroids x 5d   -- RO VTE.  10/21/2022 d dimr 370   10/21/2022 v duplx (-)   INFECTION.  -- RESP TRACT INFECTN.  -- SEPSIS 10/20/2022   W 10/20-10/21-10/22/2022 w 4.4 - 3 - 6.8   pr 10/21/2022  (-)   ua 10/21/2022 w 6-10   CT cap 10/20/2022 dialetd esoph enphysemaiapical scarring  NMo consolidatn   RVP 10/20/2022 (-)   bc 10/20 (-)   abio deferrd   -- COVID.  10/25/2022 scv2 (+)  id is on case   CARDIAC.  -- Lacticemia 10/20/2022  La 10/20-10/20-10/21/2022 la 3.5-3.6 - 1.3   -- CAD.   10/20/2022 simvastat 10   -- HYTN.  10/22/2022 metoprolol 2.5 x4 iv  -- CHF.  bnp 10/21/2022 bnp 2505   echo 10/20 ef 60 biatr enlargement   GI.  LFTS 10/20/2022   AP 94  ast 17  alt 15   no active issues   -- RO DYSPHAGIA UPPER ESOPHAGEAL DIVERTICULUM.  10/20/2022 speech eval   10/21/2022 esophagogram large debris filld wide mouth diverticulum from r lat mid esophagus and narrow esophagus distal tothis   10/23/2022 seen by Dr Vivar For egd am   10/25/2022 peg plannd  -- RO ACHALASIA.  ct 10/20/2022 ct ap dilated esophagus with retained food abruptly tapering distally   gi on case   HEMAT.  Hb 10/20/2022 Hb 14   INR 10/20/2022 INR 1.1   no active issues   RENAL.  -- HyperNa   Na 10/20-10/23-10/26/2022 Na 143 - 146 - 148   PO4 10/23/2022 po4 2.2   CO2 10/20/2022 CO2 33   Cr 10/20/2022 Cr .7   no active issues  -- Hypomagnesemia   Mg 10/25/2022 Mg 1.4 .  -- Hypophosphatemia.  PO4 10/25/2022 PO4 1.9  IV fl.  -- D5 40   -- 10/21/2022 d5 1/2 50 DCed  -- 10/20/2022 LR 80  DCed   ENDOCRINE.   -- HYPOTHYROID.  10/25/2022 tsh .04 (d)   10/20/2022 levoxyl 150   NEURO.  10/20/2022 baclofen 5.2   -- ALZHEIMERS.  10/20/2022 donepezil 10  -- DEPRESSN.  10/20/2022 duloxetoine 60     ASSESSMENT/DISPOSITION.   82 f pmh obs copd admitted 10/20/2022 from [facility] cough sob     COPD ex 10/20 BD steroids   episode of resp distress 10/22/2022   Possibly related to esophageal diverticulum r lat mid esoph on 10/21 esophagogram  made npo  RO ACHALASIA.  ct 10/20/2022 ct ap dilated esophagus with retained food abruptly tapering distally   UPPER ESOPHAGEAL DIVERTICULUM  10/25/2022 peg plannd  COVID.  10/25/2022 (+)     TIME SPENT   Over 25 minutes aggregate care time spent on encounter; activities included   direct patient care, counseling and/or coordinating care reviewing notes, lab data/ imaging , discussion with multidisciplinary team/ patient  /family and explaining in detail risks, benefits, alternatives  of the recommendations     NAGI FAUSTIN 82 F 10/20/2022 1938 DR LUIS CASTRO    
  REVIEW OF SYMPTOMS      Able to give (reliable) ROS  NO     PHYSICAL EXAM    HEENT Unremarkable  atraumatic   RESP Fair air entry EXP prolonged    Harsh breath sound Resp distres mild   CARDIAC S1 S2 No S3     NO JVD    ABDOMEN SOFT BS PRESENT NOT DISTENDED No hepatosplenomegaly   PEDAL EDEMA present No calf tenderness  NO rash       GENERAL DATA .   GOC.  10/20/2022 full code       ALLGY.  tylenol sulfa                           WT.  10/20/2022 52  BMI.    10/20/2022 21                           ICU STAY. none  COVID.  10/25/2022 scv2 (+)  scv2 10/20/2022 (-)     BEST PRACTICE ISSUES.    HOB ELEVATN. Yes  DVT PPLX. 10/20 lvnx 40      HALL PPLX. 10/20/2022 protonix 40      INFN PPLX.    SP SW MELISSA.        DIET.  10/26/2022 full liq     VS/ PO/IO/ VENT/ DRIPS.   10/31/2022 afeb 60 100/60   10/31/2022 2l 97%     ASSESSMENT/RECOMMENDATIONS .   RESP.  -- RESP DISTRESS.  echo 10/20 ef 60 biatr enlargement   breathing comfortably now   -- GAS EXCHANGE.  target po 90-95%  10/21/2022 3l 744/49/177  10/22/2022 12p 2l 743/35/135   -- COUGH.  10/20/2022 Guaifenesin   -- episode of resp distress 10/22/2022   Possibly related to esophageal diverticulum   10/22/2022 made npo   10/26/2022 full liq started by gi  -- COPD ex.  10/20/2022 duoneb.4  10/20/2022 pulmicort .5x2   10/20/2022 solumed 40 -> 10/25-10/29/2022 solumed 20   contrx   cont inhalrs   -- RO VTE.  10/21/2022 d dimr 370   10/21/2022 v duplx (-)   INFECTION.  -- RESP TRACT INFECTN.  -- SEPSIS 10/20/2022   W 10/20-10/21-10/22/2022 w 4.4 - 3 - 6.8   pr 10/21/2022  (-)   ua 10/21/2022 w 6-10   CT cap 10/20/2022 dialetd esoph enphysemaiapical scarring  NMo consolidatn   RVP 10/20/2022 (-)   bc 10/20 (-)   abio deferrd   -- COVID.  10/25/2022 scv2 (+)  rdsv deferrd   id is on case   CARDIAC.  -- Lacticemia 10/20/2022  La 10/20-10/20-10/21/2022 la 3.5-3.6 - 1.3   -- CAD.   10/20/2022 simvastat 10   -- HYTN.  10/22/2022 metoprolol 2.5 x4 iv  -- CHF.  bnp 10/21/2022 bnp 2505   echo 10/20 ef 60 biatr enlargement   GI.  LFTS 10/20/2022   AP 94  ast 17  alt 15   no active issues   -- RO DYSPHAGIA UPPER ESOPHAGEAL DIVERTICULUM.  10/20/2022 speech eval   10/21/2022 esophagogram large debris filld wide mouth diverticulum from r lat mid esophagus and narrow esophagus distal tothis   10/23/2022 seen by Dr Vivar For egd am   10/25/2022 peg plannd  -- RO ACHALASIA.  ct 10/20/2022 ct ap dilated esophagus with retained food abruptly tapering distally   gi on case   HEMAT.  Hb 10/20/2022 Hb 14   INR 10/20/2022 INR 1.1   no active issues   RENAL.  -- HyperNa   Na 10/20-10/23-10/26-10/29/2022 Na 143 - 146 - 148 - 142   PO4 10/23/2022 po4 2.2   CO2 10/20/2022 CO2 33   Cr 10/20/2022 Cr .7   no active issues  -- Hypomagnesemia   Mg 10/25/2022 Mg 1.4 .  -- Hypophosphatemia.  PO4 10/25/2022 PO4 1.9  IV fl.  -- D5 40   ENDOCRINE.   -- HYPOTHYROID.  10/25/2022 tsh .04 (d)   10/20/2022 levoxyl 150   NEURO.  10/20/2022 baclofen 5.2   -- ALZHEIMERS.  10/20/2022 donepezil 10  -- DEPRESSN.  10/20/2022 duloxetoine 60       ASSESSMENT/DISPOSITION.   82 f pmh obs copd admitted 10/20/2022 from [facility] cough sob     COPD ex 10/20 BD steroids   episode of resp distress 10/22/2022   Possibly related to esophageal diverticulum r lat mid esoph on 10/21 esophagogram  made npo  RO ACHALASIA.  ct 10/20/2022 ct ap dilated esophagus with retained food abruptly tapering distally   UPPER ESOPHAGEAL DIVERTICULUM  10/25/2022 peg plannd  COVID.  10/25/2022 (+)     TIME SPENT   Over 25 minutes aggregate care time spent on encounter; activities included   direct patient care, counseling and/or coordinating care reviewing notes, lab data/ imaging , discussion with multidisciplinary team/ patient  /family and explaining in detail risks, benefits, alternatives  of the recommendations     NAGI FAUSTIN 82 F 10/20/2022 1938 DR LUIS CASTRO    
  REVIEW OF SYMPTOMS      Able to give (reliable) ROS  NO     PHYSICAL EXAM    HEENT Unremarkable  atraumatic   RESP Fair air entry EXP prolonged    Harsh breath sound Resp distres mild   CARDIAC S1 S2 No S3     NO JVD    ABDOMEN SOFT BS PRESENT NOT DISTENDED No hepatosplenomegaly   PEDAL EDEMA present No calf tenderness  NO rash       GENERAL DATA .   GOC.  10/20/2022 full code       ALLGY.  tylenol sulfa                           WT.  10/20/2022 52  BMI.    10/20/2022 21                           ICU STAY. none  COVID.  scv2 10/20/2022 (-)     BEST PRACTICE ISSUES.    HOB ELEVATN. Yes  DVT PPLX. 10/20 lvnx 40      HALL PPLX. 10/20/2022 protonix 40      INFN PPLX.    SP SW MELISSA.        DIET.  10/22/2022 npo       VS/ PO/IO/ VENT/ DRIPS.   10/24/2022 afeb 77 130/70   10/24/2022 2l 95%     ASSESSMENT/RECOMMENDATIONS .   RESP.  -- RESP DISTRESS.  10/20/2022 check echi  -- GAS EXCHANGE.  target po 90-95%  10/21/2022 3l 744/49/177  10/22/2022 12p 2l 743/35/135   -- COUGH.  10/20/2022 Guaifenesin   -- episode of resp distress 10/22/2022   Possibly related to esophageal diverticulum   10/22/2022 made npo   -- COPD ex.  10/20/2022 duoneb.4  10/20/2022 pulmicort .5x2   10/20/2022 solumed 40  contrx   steroids x 5d   -- RO VTE.  10/21/2022 d dimr 370   10/21/2022 v duplx (-)   INFECTION.  -- RESP TRACT INFECTN.  -- SEPSIS 10/20/2022   W 10/20-10/21-10/22/2022 w 4.4 - 3 - 6.8   pr 10/21/2022  (-)   ua 10/21/2022 w 6-10   CT cap 10/20/2022 dialetd esoph enphysemaiapical scarring  NMo consolidatn   RVP 10/20/2022 (-)   bc 10/20 (-)   abio deferrd   CARDIAC.  -- Lacticemia 10/20/2022  La 10/20-10/20-10/21/2022 la 3.5-3.6 - 1.3   -- CAD.   10/20/2022 simvastat 10   -- HYTN.  10/22/2022 metoprolol 2.5 x4 iv  -- CHF.  bnp 10/21/2022 bnp 2505   echo 10/20 ef 60 biatr enlargement   GI.  LFTS 10/20/2022   AP 94  ast 17  alt 15   no active issues   -- RO DYSPHAGIA UPPER ESOPHAGEAL DIVERTICULUM.  10/20/2022 speech eval   10/21/2022 esophagogram large debris filld wide mouth diverticulum from r lat mid esophagus and narrow esophagus distal tothis   10/23/2022 seen by Dr Vivar For egd am   -- RO ACHALASIA.  ct 10/20/2022 ct ap dilated esophagus with retained food abruptly tapering distally   gi on case   HEMAT.  Hb 10/20/2022 Hb 14   INR 10/20/2022 INR 1.1   no active issues   RENAL.  Na 10/20-10/23/2022 Na 143 - 146   PO4 10/23/2022 po4 2.2   CO2 10/20/2022 CO2 33   Cr 10/20/2022 Cr .7   no active issues  IV fl.  -- 10/21/2022 d5 1/2 50   -- 10/20/2022 LR 80  DCed   ENDOCRINE.   -- HYPOTHYROID.  10/20/2022 levoxyl 150   NEURO.  10/20/2022 baclofen 5.2   -- ALZHEIMERS.  10/20/2022 donepezil 10  -- DEPRESSN.  10/20/2022 duloxetoine 60     ASSESSMENT/DISPOSITION.   82 f pmh obs copd admitted 10/20/2022 from [facility] cough sob     COPD ex 10/20 BD steroids   episode of resp distress 10/22/2022   Possibly related to esophageal diverticulum r lat mid esoph on 10/21 esophagogram  made npo  RO ACHALASIA.  ct 10/20/2022 ct ap dilated esophagus with retained food abruptly tapering distally   UPPER ESOPHAGEAL DIVERTICULUM      TIME SPENT   Over 25 minutes aggregate care time spent on encounter; activities included   direct patient care, counseling and/or coordinating care reviewing notes, lab data/ imaging , discussion with multidisciplinary team/ patient  /family and explaining in detail risks, benefits, alternatives  of the recommendations     NAGI FAUSTIN 82 F 10/20/2022 1938 DR LUIS CASTRO  
  REVIEW OF SYMPTOMS      Able to give (reliable) ROS  NO     PHYSICAL EXAM    HEENT Unremarkable  atraumatic   RESP Fair air entry EXP prolonged    Harsh breath sound Resp distres mild   CARDIAC S1 S2 No S3     NO JVD    ABDOMEN SOFT BS PRESENT NOT DISTENDED No hepatosplenomegaly   PEDAL EDEMA present No calf tenderness  NO rash       GENERAL DATA .   GOC.  10/20/2022 full code       ALLGY.  tylenol sulfa                           WT.  10/20/2022 52  BMI.    10/20/2022 21                           ICU STAY. none  COVID.  scv2 10/20/2022 (-)     BEST PRACTICE ISSUES.    HOB ELEVATN. Yes  DVT PPLX. 10/20 lvnx 40      HALL PPLX. 10/20/2022 protonix 40      INFN PPLX.    SP SW MELISSA.        DIET.  10/22/2022 npo     VS/ PO/IO/ VENT/ DRIPS.   10/23/2022 77 130/80   10/23/2022 2l 97%     ASSESSMENT/RECOMMENDATIONS .   RESP.  -- RESP DISTRESS.  10/20/2022 check echi  -- GAS EXCHANGE.  target po 90-95%  10/21/2022 3l 744/49/177  10/22/2022 12p 2l 743/35/135   -- Preop pulm  cxr 10/22 no focal consolidatn   echo 10/20 ef 60 biatr enlargement   10/23/2022 Pt is optimized for low risk procedure  She is abobe average procedure risk without any absolute contraindications   -- COUGH.  10/20/2022 Guaifenesin   -- episode of resp distress 10/22/2022   Possibly related to esophageal diverticulum   10/22/2022 made npo   -- COPD ex.  10/20/2022 duoneb.4  10/20/2022 pulmicort .5x2   10/20/2022 solumed 40  contrx   steroids x 5d   -- RO VTE.  10/21/2022 d dimr 370   10/21/2022 v duplx (-)   INFECTION.  -- RESP TRACT INFECTN.  -- SEPSIS 10/20/2022   W 10/20-10/21-10/22/2022 w 4.4 - 3 - 6.8   pr 10/21/2022  (-)   ua 10/21/2022 w 6-10   CT cap 10/20/2022 dialetd esoph enphysemaiapical scarring  NMo consolidatn   RVP 10/20/2022 (-)   bc 10/20 (-)   10/20/2022 zosyn   10/21/2022 zosyn dced  CARDIAC.  -- Lacticemia 10/20/2022  La 10/20-10/20-10/21/2022 la 3.5-3.6 - 1.3   -- CAD.   10/20/2022 simvastat 10   -- HYTN.  10/22/2022 metoprolol 2.5 x4 iv  -- CHF.  bnp 10/21/2022 bnp 2505   echo 10/20 ef 60 biatr enlargement   GI.  LFTS 10/20/2022   AP 94  ast 17  alt 15   no active issues   -- RO DYSPHAGIA UPPER ESOPHAGEAL DIVERTICULUM.  10/20/2022 speech eval   10/21/2022 esophagogram large debris filld wide mouth diverticulum from r lat mid esophagus and narrow esophagus distal tothis   10/23/2022 seen by Dr Vivar For egd am   -- RO ACHALASIA.  ct 10/20/2022 ct ap dilated esophagus with retained food abruptly tapering distally   gi on case   HEMAT.  Hb 10/20/2022 Hb 14   INR 10/20/2022 INR 1.1   no active issues   RENAL.  Na 10/20-10/23/2022 Na 143 - 146   PO4 10/23/2022 po4 2.2   CO2 10/20/2022 CO2 33   Cr 10/20/2022 Cr .7   no active issues  IV fl.  -- 10/21/2022 d5 1/2 50   -- 10/20/2022 LR 80  DCed   ENDOCRINE.   -- HYPOTHYROID.  10/20/2022 levoxyl 150   NEURO.  10/20/2022 baclofen 5.2   -- ALZHEIMERS.  10/20/2022 donepezil 10  -- DEPRESSN.  10/20/2022 duloxetoine 60     ASSESSMENT/DISPOSITION.[age] f pmh obs copd admitted 10/20/2022 from [facility] cough sob     COPD ex 10/20 BD steroids   episode of resp distress 10/22/2022   Possibly related to esophageal diverticulum r lat mid esoph on 10/21 esophagogram  made npo  RO ACHALASIA.  ct 10/20/2022 ct ap dilated esophagus with retained food abruptly tapering distally         TIME SPENT   Over 25 minutes aggregate care time spent on encounter; activities included   direct patient care, counseling and/or coordinating care reviewing notes, lab data/ imaging , discussion with multidisciplinary team/ patient  /family and explaining in detail risks, benefits, alternatives  of the recommendations     NAGI FAUSTIN 82 F 10/20/2022 1938 DR LUIS CASTRO  
  REVIEW OF SYMPTOMS      Able to give ROS  Yes     RELIABLE +/-   CONSTITUTIONAL Weakness Yes  Chills No   ENDOCRINE  No heat or cold intolerance    ALLERGY No hives  Sore throat No stridor  RESP Coughing blood no  Shortness of breath YES   NEURO No Headache  Confusion Pain neck No   CARDIAC No Chest pain No Palpitations   GI  Pain abdomen NO   Vomiting NO     NOTABLE FINDINGS    PHYSICAL EXAM    HEENT Unremarkable  atraumatic   RESP Fair air entry EXP prolonged    Harsh breath sound Resp distres mild   CARDIAC S1 S2 No S3     NO JVD    ABDOMEN SOFT BS PRESENT NOT DISTENDED No hepatosplenomegaly PEDAL EDEMA present No calf tenderness  NO rash       GENERAL DATA .   GOC.  10/20/2022 full code       ALLGY.  tylenol sulfa                           WT.  10/20/2022 52  BMI.    10/20/2022 21                           ICU STAY. none  COVID.  10/25/2022 scv2 (+)  scv2 10/20/2022 (-)     BEST PRACTICE ISSUES.    HOB ELEVATN. Yes  DVT PPLX. 10/20 lvnx 40      HALL PPLX. 10/20/2022 protonix 40      INFN PPLX.    SP SW MELISSA.        DIET.  10/26/2022 full liq     ASSESSMENT/RECOMMENDATIONS .   RESP.  -- RESP DISTRESS.  echo 10/20 ef 60 biatr enlargement   breathing comfortably now   -- GAS EXCHANGE.  target po 90-95%  10/21/2022 3l 744/49/177  10/22/2022 12p 2l 743/35/135   -- COUGH.  10/20/2022 Guaifenesin   -- episode of resp distress 10/22/2022   Possibly related to esophageal diverticulum   10/22/2022 made npo   10/26/2022 full liq started by gi  -- COPD ex.  10/20/2022 duoneb.4  10/20/2022 pulmicort .5x2   10/20/2022 solumed 40 -> 10/25-10/29/2022 solumed 20   contrx   cont inhalrs   -- RO VTE.  10/21/2022 d dimr 370   10/21/2022 v duplx (-)   INFECTION.  -- RESP TRACT INFECTN.  -- SEPSIS 10/20/2022   W 10/20-10/21-10/22/2022 w 4.4 - 3 - 6.8   pr 10/21/2022  (-)   ua 10/21/2022 w 6-10   CT cap 10/20/2022 dialetd esoph enphysemaiapical scarring  NMo consolidatn   RVP 10/20/2022 (-)   bc 10/20 (-)   abio deferrd   -- COVID.  10/25/2022 scv2 (+)  rdsv deferrd   id is on case   CARDIAC.  -- Lacticemia 10/20/2022  La 10/20-10/20-10/21/2022 la 3.5-3.6 - 1.3   -- CAD.   10/20/2022 simvastat 10   -- HYTN.  10/22/2022 metoprolol 2.5 x4 iv  -- CHF.  bnp 10/21/2022 bnp 2505   echo 10/20 ef 60 biatr enlargement   GI.  LFTS 10/20/2022   AP 94  ast 17  alt 15   no active issues   -- RO DYSPHAGIA UPPER ESOPHAGEAL DIVERTICULUM.  10/20/2022 speech eval   10/21/2022 esophagogram large debris filld wide mouth diverticulum from r lat mid esophagus and narrow esophagus distal tothis   10/23/2022 seen by Dr Vivar For egd am   10/25/2022 peg plannd  -- RO ACHALASIA.  ct 10/20/2022 ct ap dilated esophagus with retained food abruptly tapering distally   gi on case   HEMAT.  Hb 10/20/2022 Hb 14   INR 10/20/2022 INR 1.1   no active issues   RENAL.  -- HyperNa   Na 10/20-10/23-10/26-10/29/2022 Na 143 - 146 - 148 - 142   PO4 10/23/2022 po4 2.2   CO2 10/20/2022 CO2 33   Cr 10/20/2022 Cr .7   no active issues  -- Hypomagnesemia   Mg 10/25/2022 Mg 1.4 .  -- Hypophosphatemia.  PO4 10/25/2022 PO4 1.9  IV fl.  -- D5 40   ENDOCRINE.   -- HYPOTHYROID.  10/25/2022 tsh .04 (d)   10/20/2022 levoxyl 150   NEURO.  10/20/2022 baclofen 5.2   -- ALZHEIMERS.  10/20/2022 donepezil 10  -- DEPRESSN.  10/20/2022 duloxetoine 60     ASSESSMENT/DISPOSITION.   82 f pmh obs copd admitted 10/20/2022 from [facility] cough sob     COPD ex 10/20 BD steroids   episode of resp distress 10/22/2022   Possibly related to esophageal diverticulum r lat mid esoph on 10/21 esophagogram  made npo  RO ACHALASIA.  ct 10/20/2022 ct ap dilated esophagus with retained food abruptly tapering distally   UPPER ESOPHAGEAL DIVERTICULUM  10/25/2022 peg plannd  COVID.  10/25/2022 (+)       TIME SPENT   Over 25 minutes aggregate care time spent on encounter; activities included   direct patient care, counseling and/or coordinating care reviewing notes, lab data/ imaging , discussion with multidisciplinary team/ patient  /family and explaining in detail risks, benefits, alternatives  of the recommendations     NAGI FAUSTIN 82 F 10/20/2022 1938 DR LUIS CASTRO    
  REVIEW OF SYMPTOMS      Able to give ROS  Yes     RELIABLE +/-   CONSTITUTIONAL Weakness Yes  Chills No   ENDOCRINE  No heat or cold intolerance    ALLERGY No hives  Sore throat No stridor  RESP Coughing blood no  Shortness of breath YES   NEURO No Headache  Confusion Pain neck No   CARDIAC No Chest pain No Palpitations   GI  Pain abdomen NO   Vomiting NO     NOTABLE FINDINGS    PHYSICAL EXAM    HEENT Unremarkable  atraumatic   RESP Fair air entry EXP prolonged    Harsh breath sound Resp distres mild   CARDIAC S1 S2 No S3     NO JVD    ABDOMEN SOFT BS PRESENT NOT DISTENDED No hepatosplenomegaly PEDAL EDEMA present No calf tenderness  NO rash       GENERAL DATA .   GOC.  10/20/2022 full code       ALLGY.  tylenol sulfa                           WT.  10/20/2022 52  BMI.    10/20/2022 21                           ICU STAY. none  COVID.  10/25/2022 scv2 (+)  scv2 10/20/2022 (-)     BEST PRACTICE ISSUES.    HOB ELEVATN. Yes  DVT PPLX. 10/20 lvnx 40      HALL PPLX. 10/20/2022 protonix 40      INFN PPLX.    SP SW MELISSA.        DIET.  10/26/2022 full liq     VS/ PO/IO/ VENT/ DRIPS.   10/28/2022 afeb 88 110/70   10/28/2022 2l 98%    ASSESSMENT/RECOMMENDATIONS .   RESP.  -- RESP DISTRESS.  10/20/2022 check echi  -- GAS EXCHANGE.  target po 90-95%  10/21/2022 3l 744/49/177  10/22/2022 12p 2l 743/35/135   -- COUGH.  10/20/2022 Guaifenesin   -- episode of resp distress 10/22/2022   Possibly related to esophageal diverticulum   10/22/2022 made npo   10/26/2022 full liq started by gi  -- COPD ex.  10/20/2022 duoneb.4  10/20/2022 pulmicort .5x2   10/20/2022 solumed 40 -> 10/25/2022 solumed 20   contrx   steroids x 5d   -- RO VTE.  10/21/2022 d dimr 370   10/21/2022 v duplx (-)   INFECTION.  -- RESP TRACT INFECTN.  -- SEPSIS 10/20/2022   W 10/20-10/21-10/22/2022 w 4.4 - 3 - 6.8   pr 10/21/2022  (-)   ua 10/21/2022 w 6-10   CT cap 10/20/2022 dialetd esoph enphysemaiapical scarring  NMo consolidatn   RVP 10/20/2022 (-)   bc 10/20 (-)   abio deferrd   -- COVID.  10/25/2022 scv2 (+)  id is on case   CARDIAC.  -- Lacticemia 10/20/2022  La 10/20-10/20-10/21/2022 la 3.5-3.6 - 1.3   -- CAD.   10/20/2022 simvastat 10   -- HYTN.  10/22/2022 metoprolol 2.5 x4 iv  -- CHF.  bnp 10/21/2022 bnp 2505   echo 10/20 ef 60 biatr enlargement   GI.  LFTS 10/20/2022   AP 94  ast 17  alt 15   no active issues   -- RO DYSPHAGIA UPPER ESOPHAGEAL DIVERTICULUM.  10/20/2022 speech eval   10/21/2022 esophagogram large debris filld wide mouth diverticulum from r lat mid esophagus and narrow esophagus distal tothis   10/23/2022 seen by Dr Vivar For egd am   10/25/2022 peg plannd  -- RO ACHALASIA.  ct 10/20/2022 ct ap dilated esophagus with retained food abruptly tapering distally   gi on case   HEMAT.  Hb 10/20/2022 Hb 14   INR 10/20/2022 INR 1.1   no active issues   RENAL.  -- HyperNa   Na 10/20-10/23-10/26/2022 Na 143 - 146 - 148   PO4 10/23/2022 po4 2.2   CO2 10/20/2022 CO2 33   Cr 10/20/2022 Cr .7   no active issues  -- Hypomagnesemia   Mg 10/25/2022 Mg 1.4 .  -- Hypophosphatemia.  PO4 10/25/2022 PO4 1.9  IV fl.  -- D5 40   -- 10/21/2022 d5 1/2 50 DCed  -- 10/20/2022 LR 80  DCed   ENDOCRINE.   -- HYPOTHYROID.  10/25/2022 tsh .04 (d)   10/20/2022 levoxyl 150   NEURO.  10/20/2022 baclofen 5.2   -- ALZHEIMERS.  10/20/2022 donepezil 10  -- DEPRESSN.  10/20/2022 duloxetoine 60     ASSESSMENT/DISPOSITION.   82 f pmh obs copd admitted 10/20/2022 from [facility] cough sob     COPD ex 10/20 BD steroids   episode of resp distress 10/22/2022   Possibly related to esophageal diverticulum r lat mid esoph on 10/21 esophagogram  made npo  RO ACHALASIA.  ct 10/20/2022 ct ap dilated esophagus with retained food abruptly tapering distally   UPPER ESOPHAGEAL DIVERTICULUM  10/25/2022 peg plannd  COVID.  10/25/2022 (+)       TIME SPENT   Over 25 minutes aggregate care time spent on encounter; activities included   direct patient care, counseling and/or coordinating care reviewing notes, lab data/ imaging , discussion with multidisciplinary team/ patient  /family and explaining in detail risks, benefits, alternatives  of the recommendations     NAGI FAUSTIN 82 F 10/20/2022 1938 DR LUIS CASTRO
AGE/SEX.   82 f  DOA.  10/20/2022  CC .  10/20/2022 · from [facility] for cough and SOB x4 days. Uses home O2 2l atc for COPD    PRESENTING PROBLEMS .   RESP DISTRESS 10/20/2022   COPD ex 10/20/2022  COUGH 10/20/2022   RESP TRACT INFECTN 10/20/2022   SEPSIS SOURCE NOT CLEAR 10/20/2022   ALZHEIMERS   DEPRESSN  ER MEDS GIVEN SO FAR 10/20/2022 4:23 PM.  SOLUMED 125 11a   ZOSYN 1P   NS 1000 1P   DUONEB 11A   DUONEB 12P         COURSE.     PROCEDURES.    PMH .  pmh COPD  pmh  dementia   pmh  hytn  pmh  hypothyroid   pmh       NOTABLE HOME MEDS.   Proair  cymbalta 60   donepezil 10   metoprolol 25   namenda 10   lasix 40     GENERAL DATA .   GOC.  10/20/2022 full code       ALLGY.  tylenol sulfa                           WT.  10/20/2022 52  BMI.    10/20/2022 21                           ICU STAY. none  COVID.  scv2 10/20/2022 (-)     BEST PRACTICE ISSUES.    HOB ELEVATN. Yes  DVT PPLX. 10/20 lvnx 40      HALL PPLX. 10/20/2022 protonix 40      INFN PPLX.    SP SW MELISSA.        DIET. 10/21/2022 clear liq    VS/ PO/IO/ VENT/ DRIPS.   10/21/2022 afeb 60 100/70   10/21/2022 3l 100%     ASSESSMENT/RECOMMENDATIONS .   RESP.  -- RESP DISTRESS.  10/20/2022 check venous duplx d-dimr   10/20/2022 check echi  -- GAS EXCHANGE.  target po 90-95%  10/21/2022 3l 744/49/177  -- COUGH.  10/20/2022 Guaifenesin   -- COPD ex.  10/20/2022 duoneb.4  10/20/2022 pulmicort .5x2   10/20/2022 solumed 40  contrx   steroids x 5d   -- RO VTE.  10/21/2022 d dimr 370   10/21/2022 v duplx (-)   INFECTION.  -- RESP TRACT INFECTN.  -- SEPSIS 10/20/2022   W 10/20-10/21/2022 w 4.4 - 3   pr 10/21/2022  (-)   ua 10/21/2022 w 6-10   CT cap 10/20/2022 dialetd esoph enphysemaiapical scarring  NMo consolidatn   RVP 10/20/2022 (-)   bc 10/20 (-)   10/20/2022 zosyn   10/21/2022 zosyn dced  CARDIAC.  -- Lacticemia 10/20/2022  La 10/20-10/20-10/21/2022 la 3.5-3.6 - 1.3   -- CAD.   10/20/2022 simvastat 10   -- HYTN.  10/20/2022 metoprolol 37.5x2   -- CHF.  bnp 10/21/2022 bnp 2505   GI.  LFTS 10/20/2022   AP 94  ast 17  alt 15   no active issues   -- RO DYSPHAGIA.  10/20/2022 speech eval   10/21/2022 esophagogram large debris filld wide mouth diverticulum from r lat mid esophagus and narrow esophagus distal tothis   -- RO ACHALASIA.  ct 10/20/2022 ct ap dilated esophagus with retained food abruptly tapering distally   gi on case   HEMAT.  Hb 10/20/2022 Hb 14   INR 10/20/2022 INR 1.1   no active issues   RENAL.  Na 10/20/2022 Na 143   CO2 10/20/2022 CO2 33   Cr 10/20/2022 Cr .7   no active issues  IV fl.  -- 10/21/2022 d5 1/2 50   -- 10/20/2022 LR 80  DCed   ENDOCRINE.   -- HYPOTHYROID.  10/20/2022 levoxyl 150   NEURO.  10/20/2022 baclofen 5.2   -- ALZHEIMERS.  10/20/2022 donepezil 10  -- DEPRESSN.  10/20/2022 duloxetoine 60     TIME SPENT   Over 25 minutes aggregate care time spent on encounter; activities included   direct patient care, counseling and/or coordinating care reviewing notes, lab data/ imaging , discussion with multidisciplinary team/ patient  /family and explaining in detail risks, benefits, alternatives  of the recommendations     NAGI FAUSTIN 82 F 10/20/2022 1938 DR LUIS CASTRO    
Patient brought in by EMS from [facility] for shortness of breath cough wheezing for 2 days.  Patient denies fevers chills headache chest pain abdominal pain vomiting Admit for antibacterial managmnet ,intravenous steroids,nebulised bronchodilators and pulmonology evaluation,O2 supply,serial labs and chest xrays,obtain ECHO to evaluate LVEF and rule out chf component Admitted  to telemetry unit for monitoring , send 3 sets of cardiac enzymes to rule out acute coronary event, obtain ECHO to evaluate LVEF, cardiology consult  ,continue current management, O2 supply, anticoagulation plan as per cardiology consult Palliative care consult requested ,to discuss advance directives and complete MOLST  (20 Oct 2022 13:54)    hypernatremia   will check ua , urine osmolality , urine sodium , urine uric acid , serum sodium , serum osmolality , serum uric acid , f/u with hypernatremia work up , f/u with bmp , monitor i and o    BP monitoring,continue current antihypertensive meds, low salt diet,followup with PMD in 1-2 weeks  metoprolol tartrate Injectable 2.5 milliGRAM(s) IV Push every 12 hours    hypokalemia dextrose 5% + sodium chloride 0.45%. 1000 milliLiter(s) (30 mL/Hr) IV Continuous    
Patient brought in by EMS from [facility] for shortness of breath cough wheezing for 2 days.  Patient denies fevers chills headache chest pain abdominal pain vomiting Admit for antibacterial managmnet ,intravenous steroids,nebulised bronchodilators and pulmonology evaluation,O2 supply,serial labs and chest xrays,obtain ECHO to evaluate LVEF and rule out chf component Admitted  to telemetry unit for monitoring , send 3 sets of cardiac enzymes to rule out acute coronary event, obtain ECHO to evaluate LVEF, cardiology consult  ,continue current management, O2 supply, anticoagulation plan as per cardiology consult Palliative care consult requested ,to discuss advance directives and complete MOLST  (20 Oct 2022 13:54)    hypernatremia   will check ua , urine osmolality , urine sodium , urine uric acid , serum sodium , serum osmolality , serum uric acid , f/u with hypernatremia work up , f/u with bmp , monitor i and o    BP monitoring,continue current antihypertensive meds, low salt diet,followup with PMD in 1-2 weeks  metoprolol tartrate Injectable 2.5 milliGRAM(s) IV Push every 12 hours    hypokalemia dextrose 5% + sodium chloride 0.45%. 1000 milliLiter(s) (30 mL/Hr) IV Continuous    
Patient brought in by EMS from [facility] for shortness of breath cough wheezing for 2 days.  Patient denies fevers chills headache chest pain abdominal pain vomiting Admit for antibacterial managmnet ,intravenous steroids,nebulised bronchodilators and pulmonology evaluation,O2 supply,serial labs and chest xrays,obtain ECHO to evaluate LVEF and rule out chf component Admitted  to telemetry unit for monitoring , send 3 sets of cardiac enzymes to rule out acute coronary event, obtain ECHO to evaluate LVEF, cardiology consult  ,continue current management, O2 supply, anticoagulation plan as per cardiology consult Palliative care consult requested ,to discuss advance directives and complete MOLST  (20 Oct 2022 13:54)    hypernatremia   will check ua , urine osmolality , urine sodium , urine uric acid , serum sodium , serum osmolality , serum uric acid , f/u with hypernatremia work up , f/u with bmp , monitor i and o    BP monitoring,continue current antihypertensive meds, low salt diet,followup with PMD in 1-2 weeks  metoprolol tartrate Injectable 2.5 milliGRAM(s) IV Push every 12 hours    hypokalemia dextrose 5% + sodium chloride 0.45%. 1000 milliLiter(s) (30 mL/Hr) IV Continuous    
Patient brought in by EMS from [facility] for shortness of breath cough wheezing for 2 days.  Patient denies fevers chills headache chest pain abdominal pain vomiting Admit for antibacterial managmnet ,intravenous steroids,nebulised bronchodilators and pulmonology evaluation,O2 supply,serial labs and chest xrays,obtain ECHO to evaluate LVEF and rule out chf component Admitted  to telemetry unit for monitoring , send 3 sets of cardiac enzymes to rule out acute coronary event, obtain ECHO to evaluate LVEF, cardiology consult  ,continue current management, O2 supply, anticoagulation plan as per cardiology consult Palliative care consult requested ,to discuss advance directives and complete MOLST  (20 Oct 2022 13:54)    hypernatremia   will check ua , urine osmolality , urine sodium , urine uric acid , serum sodium , serum osmolality , serum uric acid , f/u with hypernatremia work up , f/u with bmp , monitor i and o    BP monitoring,continue current antihypertensive meds, low salt diet,followup with PMD in 1-2 weeks  metoprolol tartrate Injectable 2.5 milliGRAM(s) IV Push every 12 hours    hypokalemia dextrose 5% + sodium chloride 0.45%. 1000 milliLiter(s) (30 mL/Hr) IV Continuous    
abn imaging  ? achalasia   reflux  stricture    gerd precautions w/PO intake  ppi once a day, may increase to twice a day   maalox as needed   esophagram noted  s/p egd showing large esophageal diverticulum  ct surgery eval appreciated  d/w patient and primary  d/w son; I explained if she is able to tolerate fulls and puree then she may not need surgery as it is high risk and she is a poor surgical candidate and therefore would avoid peg    I reviewed the overnight course of events on the unit, re-confirming the patient history. I discussed the care with the patient and their family  Differential diagnosis and plan of care discussed with patient after the evaluation  35 minutes spent on total encounter of which more than fifty percent of the encounter was spent counseling and/or coordinating care by the attending physician.  Advanced care planning was discussed with patient and family.  Advanced care planning forms were reviewed and discussed.  Risks, benefits and alternatives of gastroenterologic procedures were discussed in detail and all questions were answered.
copd exacerabation- on bronchodilator and steroid  hypertension  hypothyroidism  chf  gerd  dementia  hypokalemia is correctef k 3.9 10/23   xray chest - copd 10/22
83 y/o F with esophageal diverticulum
Patient brought in by EMS from [facility] for shortness of breath cough wheezing for 2 days.  Patient denies fevers chills headache chest pain abdominal pain vomiting Admit for antibacterial managmnet ,intravenous steroids,nebulised bronchodilators and pulmonology evaluation,O2 supply,serial labs and chest xrays,obtain ECHO to evaluate LVEF and rule out chf component Admitted  to telemetry unit for monitoring , send 3 sets of cardiac enzymes to rule out acute coronary event, obtain ECHO to evaluate LVEF, cardiology consult  ,continue current management, O2 supply, anticoagulation plan as per cardiology consult Palliative care consult requested ,to discuss advance directives and complete MOLST 
copd exacerabation- on bronchodilator and steroid  hypertension  hypothyroidism  chf  gerd  dementia  hypokalemia is correctef k 3.9 10/23   xray chest - no chf clinically though bnp  elevated.  cardiac status stable low risk for for gi endoscopy- lab pending from today 10/24  had gi endoscopy - large oesephageal diverticulum 10/25  hypokalemia 3.4- k replacement 10/26  now covid -19 positive 10/26  cardiac status stable low risk for peg
copd exacerabation- on bronchodilator and steroid  hypertension  hypothyroidism  chf  gerd  dementia  hypokalemia is correctef k 3.9 10/23   xray chest - no chf clinically though bnp  elevated.  cardiac status stable low risk for for gi endoscopy- lab pending from today 10/24  had gi endoscopy - large oesephageal diverticulum 10/25  hypokalemia 3.4- k replacement 10/26  now covid -19 positive 10/26  cardiac status stable low risk for peg
copd exacerabation- on bronchodilator and steroid  hypertension  hypothyroidism  chf  gerd  dementia  hypokalemia is correctef k 3.9 10/23   xray chest - no chf clinically though bnp  elevated.  cardiac status stable low risk for for gi endoscopy- lab pending from today 10/24
copd exacerabation- on bronchodilator and steroid  hypertension  hypothyroidism  chf  gerd  dementia  hypokalemia is correctef k 3.9 10/23   xray chest - no chf clinically though bnp  elevated.  cardiac status stable low risk for for gi endoscopy- lab pending from today 10/24  had gi endoscopy - large oesephageal diverticulum 10/25  hypokalemia 3.4- k replacement 10/26  now covid -19 positive 10/26  cardiac status stable low risk for peg
Patient brought in by EMS from [facility] for shortness of breath cough wheezing for 2 days.  Patient denies fevers chills headache chest pain abdominal pain vomiting Admit for antibacterial managmnet ,intravenous steroids,nebulised bronchodilators and pulmonology evaluation,O2 supply,serial labs and chest xrays,obtain ECHO to evaluate LVEF and rule out chf component Admitted  to telemetry unit for monitoring , send 3 sets of cardiac enzymes to rule out acute coronary event, obtain ECHO to evaluate LVEF, cardiology consult  ,continue current management, O2 supply, anticoagulation plan as per cardiology consult Palliative care consult requested ,to discuss advance directives and complete MOLST 
Patient brought in by EMS from [facility] for shortness of breath cough wheezing for 2 days.  Patient denies fevers chills headache chest pain abdominal pain vomiting Admit for antibacterial managmnet ,intravenous steroids,nebulised bronchodilators and pulmonology evaluation,O2 supply,serial labs and chest xrays,obtain ECHO to evaluate LVEF and rule out chf component Admitted  to telemetry unit for monitoring , send 3 sets of cardiac enzymes to rule out acute coronary event, obtain ECHO to evaluate LVEF, cardiology consult  ,continue current management, O2 supply, anticoagulation plan as per cardiology consult Palliative care consult requested ,to discuss advance directives and complete MOLST 
Patient brought in by EMS from [facility] for shortness of breath cough wheezing for 2 days.  Patient denies fevers chills headache chest pain abdominal pain vomiting Admit for antibacterial managmnet ,intravenous steroids,nebulised bronchodilators and pulmonology evaluation,O2 supply,serial labs and chest xrays,obtain ECHO to evaluate LVEF and rule out chf component Admitted  to telemetry unit for monitoring , send 3 sets of cardiac enzymes to rule out acute coronary event, obtain ECHO to evaluate LVEF, cardiology consult  ,continue current management, O2 supply, anticoagulation plan as per cardiology consult Palliative care consult requested ,to discuss advance directives and complete MOLST 
Patient brought in by EMS from [facility] for shortness of breath cough wheezing for 2 days.  Patient denies fevers chills headache chest pain abdominal pain vomiting Admit for antibacterial managmnet ,intravenous steroids,nebulised bronchodilators and pulmonology evaluation,O2 supply,serial labs and chest xrays,obtain ECHO to evaluate LVEF and rule out chf component Admitted  to telemetry unit for monitoring , send 3 sets of cardiac enzymes to rule out acute coronary event, obtain ECHO to evaluate LVEF, cardiology consult  ,continue current management, O2 supply, anticoagulation plan as per cardiology consult Palliative care consult requested ,to discuss advance directives and complete MOLST 
Patient brought in by EMS from [facility] for shortness of breath cough wheezing for 2 days.  Patient denies fevers chills headache chest pain abdominal pain vomiting Admit for antibacterial managmnet ,intravenous steroids,nebulised bronchodilators and pulmonology evaluation,O2 supply,serial labs and chest xrays,obtain ECHO to evaluate LVEF and rule out chf component Admitted  to telemetry unit for monitoring , send 3 sets of cardiac enzymes to rule out acute coronary event, obtain ECHO to evaluate LVEF, cardiology consult  ,continue current management, O2 supply, anticoagulation plan as per cardiology consult Palliative care consult requested ,to discuss advance directives and complete MOLST 
Patient brought in by EMS from [facility] for shortness of breath cough wheezing for 2 days.  Patient denies fevers chills headache chest pain abdominal pain vomiting Admit for antibacterial managmnet ,intravenous steroids,nebulised bronchodilators and pulmonology evaluation,O2 supply,serial labs and chest xrays,obtain ECHO to evaluate LVEF and rule out chf component Admitted  to telemetry unit for monitoring , send 3 sets of cardiac enzymes to rule out acute coronary event, obtain ECHO to evaluate LVEF, cardiology consult  ,continue current management, O2 supply, anticoagulation plan as per cardiology consult Palliative care consult requested ,to discuss advance directives and complete MOLST 
Patient brought in by EMS from [facility] for shortness of breath cough wheezing for 2 days.  Patient denies fevers chills headache chest pain abdominal pain vomiting Admit for antibacterial managmnet ,intravenous steroids,nebulised bronchodilators and pulmonology evaluation,O2 supply,serial labs and chest xrays,obtain ECHO to evaluate LVEF and rule out chf component Admitted  to telemetry unit for monitoring , send 3 sets of cardiac enzymes to rule out acute coronary event, obtain ECHO to evaluate LVEF, cardiology consult  ,continue current management, O2 supply, anticoagulation plan as per cardiology consult Palliative care consult requested ,to discuss advance directives and complete MOLST 
Patient brought in by EMS from [facility] for shortness of breath cough wheezing for 2 days.  Patient denies fevers chills headache chest pain abdominal pain vomiting Admit for antibacterial managmnet ,intravenous steroids,nebulised bronchodilators and pulmonology evaluation,O2 supply,serial labs and chest xrays,obtain ECHO to evaluate LVEF and rule out chf component Admitted  to telemetry unit for monitoring , send 3 sets of cardiac enzymes to rule out acute coronary event, obtain ECHO to evaluate LVEF, cardiology consult  ,continue current management, O2 supply, anticoagulation plan as per cardiology consult Palliative care consult requested ,to discuss advance directives and complete MOLST 
Patient brought in by EMS from [facility] for shortness of breath cough wheezing for 2 days.  Patient denies fevers chills headache chest pain abdominal pain vomiting Admit for antibacterial managmnet ,intravenous steroids,nebulised bronchodilators and pulmonology evaluation,O2 supply,serial labs and chest xrays,obtain ECHO to evaluate LVEF and rule out chf component Admitted  to telemetry unit for monitoring , send 3 sets of cardiac enzymes to rule out acute coronary event, obtain ECHO to evaluate LVEF, cardiology consult  ,continue current management, O2 supply, anticoagulation plan as per cardiology consult Palliative care consult requested ,to discuss advance directives and complete MOLST

## 2022-11-01 NOTE — DISCHARGE NOTE PROVIDER - PROVIDER TOKENS
PROVIDER:[TOKEN:[[id number]],FOLLOWUP:[1 week]],PROVIDER:[TOKEN:[[id number]],FOLLOWUP:[1 week]],PROVIDER:[TOKEN:[[id number]],FOLLOWUP:[2 weeks]],PROVIDER:[TOKEN:[[id number]],FOLLOWUP:[1 month]]

## 2022-11-30 ENCOUNTER — APPOINTMENT (OUTPATIENT)
Dept: SURGERY | Facility: CLINIC | Age: 82
End: 2022-11-30

## 2022-12-02 NOTE — DIETITIAN INITIAL EVALUATION ADULT - NUTRITIONGOAL OUTCOME1
[FreeTextEntry1] : 12/2/22: Advanced right hip OA. Pain is daily and is affecting her ADL's. She has tried medications and PT with no relief. Will plan for IA hip injection, start Mobic and plan for KIANA when she returns from [place] in late April/early May. Risks and benefits discussed and all questions present. Pt will maintain wt.

## 2023-08-15 NOTE — PROGRESS NOTE ADULT - SUBJECTIVE AND OBJECTIVE BOX
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  Patient denies fevers chills headache chest pain abdominal pain vomiting Admit for antibacterial managmnet ,intravenous steroids,nebulised bronchodilators and pulmonology evaluation,O2 supply,serial labs and chest xrays,obtain ECHO to evaluate LVEF and rule out chf component Admitted  to telemetry unit for monitoring , send 3 sets of cardiac enzymes to rule out acute coronary event, obtain ECHO to evaluate LVEF, cardiology consult  ,continue current management, O2 supply, anticoagulation plan as per cardiology consult Palliative care consult requested ,to discuss advance directives and complete MOLST  (20 Oct 2022 13:54)      REVIEW OF SYSTEMS:    CONSTITUTIONAL: No fever, weight loss, or fatigue  EYES: No eye pain, visual disturbances, or discharge  ENMT:  No difficulty hearing, tinnitus, vertigo; No sinus or throat pain  NECK: No pain or stiffness  BREASTS: No pain, masses, or nipple discharge  RESPIRATORY: No cough, wheezing, chills or hemoptysis; No shortness of breath  CARDIOVASCULAR: No chest pain, palpitations, dizziness, or leg swelling  GASTROINTESTINAL: No abdominal or epigastric pain. No nausea, vomiting, or hematemesis; No diarrhea or constipation. No melena or hematochezia.  GENITOURINARY: No dysuria, frequency, hematuria, or incontinence  NEUROLOGICAL: No headaches, memory loss, loss of strength, numbness, or tremors  SKIN: No itching, burning, rashes, or lesions   LYMPH NODES: No enlarged glands  ENDOCRINE: No heat or cold intolerance; No hair loss  MUSCULOSKELETAL: No joint pain or swelling; No muscle, back, or extremity pain  PSYCHIATRIC: No depression, anxiety, mood swings, or difficulty sleeping  HEME/LYMPH: No easy bruising, or bleeding gums  ALLERGY AND IMMUNOLOGIC: No hives or eczema    MEDICATIONS  (STANDING):  albuterol/ipratropium for Nebulization 3 milliLiter(s) Nebulizer every 6 hours  baclofen 5 milliGRAM(s) Oral every 12 hours  buDESOnide    Inhalation Suspension 0.5 milliGRAM(s) Inhalation two times a day  donepezil 10 milliGRAM(s) Oral at bedtime  DULoxetine 60 milliGRAM(s) Oral daily  enoxaparin Injectable 40 milliGRAM(s) SubCutaneous every 24 hours  guaiFENesin  milliGRAM(s) Oral every 12 hours  levothyroxine Injectable 75 MICROGram(s) IV Push at bedtime  memantine 10 milliGRAM(s) Oral two times a day  methylPREDNISolone sodium succinate Injectable 20 milliGRAM(s) IV Push daily  metoprolol tartrate Injectable 2.5 milliGRAM(s) IV Push every 8 hours  pantoprazole  Injectable 40 milliGRAM(s) IV Push every 12 hours  simvastatin 10 milliGRAM(s) Oral at bedtime  traZODone 100 milliGRAM(s) Oral at bedtime    MEDICATIONS  (PRN):  aluminum hydroxide/magnesium hydroxide/simethicone Suspension 30 milliLiter(s) Oral every 4 hours PRN Dyspepsia  ibuprofen  Tablet. 400 milliGRAM(s) Oral every 6 hours PRN Temp greater or equal to 38C (100.4F), Mild Pain (1 - 3)  loperamide 2 milliGRAM(s) Oral four times a day PRN Diarrhea  LORazepam   Injectable 0.5 milliGRAM(s) IV Push every 8 hours PRN Anxiety  melatonin 3 milliGRAM(s) Oral at bedtime PRN Insomnia  ondansetron Injectable 4 milliGRAM(s) IV Push every 8 hours PRN Nausea and/or Vomiting      ALLERGIES: sulfa drugs (Unknown)  Sulfur Colliod (Rash)  Tylenol (Swelling)  Tylenol (Unknown)      FAMILY HISTORY:      PHYSICAL EXAMINATION:  -----------------------------  T(C): 36.6 (10-28-22 @ 05:07), Max: 37.1 (10-27-22 @ 20:08)  HR: 62 (10-28-22 @ 05:07) (62 - 100)  BP: 117/71 (10-28-22 @ 05:07) (101/62 - 117/71)  RR: 17 (10-28-22 @ 05:07) (16 - 17)  SpO2: 98% (10-28-22 @ 05:07) (94% - 99%)  Wt(kg): --    10-27 @ 07:01  -  10-28 @ 07:00  --------------------------------------------------------  IN:  Total IN: 0 mL    OUT:    Voided (mL): 350 mL  Total OUT: 350 mL    Total NET: -350 mL            VITALS  T(C): 36.6 (10-28-22 @ 05:07), Max: 37.1 (10-27-22 @ 20:08)  HR: 62 (10-28-22 @ 05:07) (62 - 100)  BP: 117/71 (10-28-22 @ 05:07) (101/62 - 117/71)  RR: 17 (10-28-22 @ 05:07) (16 - 17)  SpO2: 98% (10-28-22 @ 05:07) (94% - 99%)    Constitutional: well developed, normal appearance, well groomed, well nourished, no deformities and no acute distress.   Eyes: the conjunctiva exhibited no abnormalities and the eyelids demonstrated no xanthelasmas.   HEENT: normal oral mucosa, no oral pallor and no oral cyanosis.   Neck: normal jugular venous A waves present, normal jugular venous V waves present and no jugular venous oliveira A waves.   Pulmonary: no respiratory distress, normal respiratory rhythm and effort, no accessory muscle use and lungs were clear to auscultation bilaterally.   Cardiovascular: heart rate and rhythm were normal, normal S1 and S2 and no murmur, gallop, rub, heave or thrill are present.   Abdomen: soft, non-tender, no hepato-splenomegaly and no abdominal mass palpated.   Musculoskeletal: the gait could not be assessed..   Extremities: no clubbing of the fingernails, no localized cyanosis, no petechial hemorrhages and no ischemic changes.   Skin: normal skin color and pigmentation, no rash, no venous stasis, no skin lesions, no skin ulcer and no xanthoma was observed.   Psychiatric: oriented to person, place, and time, the affect was normal, the mood was normal and not feeling anxious.     LABS:   --------  10-27    144  |  106  |  5<L>  ----------------------------<  136<H>  3.3<L>   |  32<H>  |  0.36<L>    Ca    9.1      27 Oct 2022 08:37                           13.2   4.73  )-----------( 206      ( 27 Oct 2022 08:37 )             41.7                 RADIOLOGY:  -----------------    ECG:     ECHO: Clofazimine Counseling:  I discussed with the patient the risks of clofazimine including but not limited to skin and eye pigmentation, liver damage, nausea/vomiting, gastrointestinal bleeding and allergy.

## 2023-11-01 NOTE — ED PROVIDER NOTE - PRO INTERPRETER NEED 2
[] [address]  [address] [facility].  [phone]  F: [phone] [] [address] [facility]  [address]  P: [phone]  F: [phone] [] [address]  [address] [facility]  P: [phone]  F: [phone] [] Miki Kesha: [phone]  F: [phone] [] [address]  [address] [facility] [address]   P: [phone]  F: [phone]  [] [address] [facility].   P: [phone]  F: [phone] [] [address] [facility]  [address]  P: [phone]  F: [phone] [] [address]  [address] [facility]  [place]: [phone]  F: [phone] [] [facility]  [address]  [place]: [phone]  F: [phone]  [] [facility] &  Therapy  [address]  [place]: [phone]  F: [phone]     Physical Therapy Upper Extremity Evaluation    Date:  2023  Patient: Lyla Huerta  : 1944  MRN: [id number]  Physician: ***   Insurance: ***  Medical Diagnosis: ***  Rehab Codes: ***  Onset Date: ***   Next 's appt: ***    Subjective:   CC:  HPI: (onset date) PMR, OA, RA 2019 Dx    Lumps in bilateral UE    increasing  Unable to lift or rotate, opening containers, ADL's Difficult 8-9/10 sharp shooting pain  at rest aching in wrist elbow and shoulder  Taking supplements and using hot or cold packs to give some decrease symptoms  Compression wraps irritate skin  All activities are with R hand  L is too
English

## 2024-01-03 NOTE — PROGRESS NOTE ADULT - SUBJECTIVE AND OBJECTIVE BOX
[FreeTextEntry1] : # CKD stage 4-5 Stepmother reported baseline creatinine ~ 2.5-2.9, 6 months ago, on hospital chart review, serum creatinine has been ~ 3-4. Liley new baseline stable, scr 3.7 most recent lab Nov- -cause likely obstructive nephropathy, lithium induced - UA bland no hematuria or proteinuria - RBUS 9/5: Severe left hydronephrosis. Increased renal echogenicity compatible with medical renal disease. - Avoid nephrotoxic med - Declined renal tx eval, would like to proceed with HD when indicated. He would like to take more time to discuss vascular eval for access. Referral given. - cystatin c pending result  - Patient has lab done by pshych every first Monday. will schedule his lab to be drawn at the same time.   # Nephrogenic DI/Hypernatremia - Last serum sodium wnl 140's. Stable - Recommend to drink water at least 3 litter a day - Low sodium and low protein diet  # Hx of Urinary retention with left Hydronephrosis Reported no retention at the visit -renal US 9/5 Left HN, - Seen urologist dr. Lopez last month. Stepmother stated he was told that everything was okay. Had US done at the office and have 3M FU.   # Active smoker ~ 10 cigarettes a day. Tolerated nicotin patch on last admission. - nicotine patch 14mg/d. Reported not working, cont to smoke, he said " I like to smoke."  # Metabolic acidosis Not improving, bicarb 18  -increase sodium bicarb to 1300 mg tid  # CKD-MBD -cont sevelamer 800 mg daily -Pending repeat phos and vit D  # Elevated BP  No dx of HTN  low salt  diet  RTC 8 weeks   CHIEF COMPLAINT: Follow up in [facility]ICU for intubated for hypoxic respiratory failure with upper GI bleed    PROCEDURE:   - EGD done at outside hospital on 9/9/21 -Large diverticula noted at 28cm filled with blood and blood clots, periphery of diverticula was injected with epinephrine, 5mm distal esophageal tear without bleeding, hiatal hernia.        ISSUES:   Acute hypoxic respiratory failure s/p intubation (9/9)  Esophageal diverticular bleed   Acute blood loss anemia  Hypotension requiring IV pressors  Esophageal mass  COPD  Chronic Afib on Apixaban  Hypothyroidism  HTN      INTERVAL EVENTS:   Received patient on mechanical ventilation. Maintained on PEEP 8 and 40% FIO2.  Received patient on levophed pressors via central line. Patient had increased levophed requirements throughout the day to maintain MAP 65.  CBC repeated with slight decrease. Trending. Discussing timing of repeat EGD and possibility of repeat CT.    HISTORY:   Unable to obtain, intubated, sedated    PHYSICAL EXAM:   Gen: Comfortable, No acute distress, frail elderly, kyphotic  Eyes: Sclera white, Conjunctiva normal, Eyelids normal, Pupils symmetrical   ENT: Mucous membranes moist  Neck: Trachea midline, 7.0 ETT  CV: Rate regular, Rhythm irregular  Resp: Breath sounds clear, No accessory muscles use,   Abd: Soft, Non-distended, Non-tender, Bowel sounds normal,  ,  ,    Skin: Warm, 1+ peripheral edema of lower extremities,  ,    : bearden  Neuro: Moves ext to pain, minimally opens eyes to pain, sedated  Psych: RASS -3      ASSESSMENT AND PLAN:     NEURO:  Vent sedation - Continue propofol and precedex and fentanyl for ventilator synchrony.       RESPIRATORY:    Acute respiratory failure secondary to aspiration PNA in setting of hematemesis - Mechanical ventilation. Monitor ABG. Wean FIO2 for goal O2sat above 92. Vent bundle. Zosyn.           CARDIOVASCULAR:  Hypotension requiring IV pressors - wean pressors for MAP goal of 65  Telemetry (medical test) - Reviewed by me today independently. Rate controlled atrial fibrillation.    Chronic Afib - stable. Hold anticoagulation        HTN - currently hypotensive. hold home antihypertensives.            RENAL:  Stable - Monitor IOs and electrolytes. Keep K above 4.0 and Mg above 2.0.         GASTROINTESTINAL:  NPO  No NGT    Esophageal mass vs blood filled diverticulum - Unchanged. Pantoprazole IV Q12h. GI consult for timing of EGD.           HEMATOLOGIC:  Trend PT/PTT, CBC. Maintain active type and screen. Off anticoagulation.  s/p Kcentra at outside hospital  DVT prophylaxis with SCDs.         INFECTIOUS DISEASE:  Aspiration PNA - Worsening. Zosyn IV q8h.   Follow up cultures        ENDOCRINE:  Stable – Monitor glucose fingersticks for goal 120-180.     Hypothyroidism - stable. Continue synthroid IV.         Pertinent clinical, laboratory, radiographic, telemetry, hemodynamic, respiratory  data and chart were reviewed by myself and analyzed frequently throughout the course of the day and night by myself.    Plan discussed at length with the CTICU staff and Attending CT Surgeon Dr. Cedrick Zuñiga    Patient required critical care management.  45 minutes were spent evaluating, managing, providing, coordinating, and documenting care for this patient, exclusive of procedures from  7AM to 1159PM.      _________________________  VITAL SIGNS:  Vital Signs Last 24 Hrs  T(C): 37.1 (12 Sep 2021 16:00), Max: 38.4 (11 Sep 2021 22:00)  T(F): 98.7 (12 Sep 2021 16:00), Max: 101.1 (11 Sep 2021 22:00)  HR: 93 (12 Sep 2021 19:00) (67 - 100)  BP: --  BP(mean): --  RR: 18 (12 Sep 2021 19:00) (17 - 21)  SpO2: 100% (12 Sep 2021 19:00) (97% - 100%)  I/Os:   I&O's Detail    11 Sep 2021 07:01  -  12 Sep 2021 07:00  --------------------------------------------------------  IN:    Dexmedetomidine: 337.6 mL    FentaNYL: 62.1 mL    IV PiggyBack: 300 mL    Norepinephrine: 44.3 mL    Norepinephrine: 18 mL    Pantoprazole: 240 mL    Phenylephrine: 44 mL    Propofol: 321.2 mL  Total IN: 1367.2 mL    OUT:    Indwelling Catheter - Urethral (mL): 1452 mL  Total OUT: 1452 mL    Total NET: -84.8 mL      12 Sep 2021 07:01  -  12 Sep 2021 19:14  --------------------------------------------------------  IN:    Dexmedetomidine: 141.2 mL    dextrose 5% + lactated ringers w/ Additives: 200 mL    FentaNYL: 52.9 mL    IV PiggyBack: 1000 mL    Norepinephrine: 49.4 mL    Pantoprazole: 120 mL    Propofol: 129.4 mL  Total IN: 1692.9 mL    OUT:    Indwelling Catheter - Urethral (mL): 630 mL  Total OUT: 630 mL    Total NET: 1062.9 mL          Mode: AC/ CMV (Assist Control/ Continuous Mandatory Ventilation)  RR (machine): 18  TV (machine): 350  FiO2: 40  PEEP: 8  ITime: 0.76  MAP: 13  PIP: 27      MEDICATIONS:  MEDICATIONS  (STANDING):  ALBUTerol    90 MICROgram(s) HFA Inhaler 2 Puff(s) Inhalation every 6 hours  budesonide  80 MICROgram(s)/formoterol 4.5 MICROgram(s) Inhaler 2 Puff(s) Inhalation two times a day  chlorhexidine 0.12% Liquid 15 milliLiter(s) Oral Mucosa every 12 hours  chlorhexidine 4% Liquid 1 Application(s) Topical <User Schedule>  dexMEDEtomidine Infusion 0.2 MICROgram(s)/kG/Hr (2.7 mL/Hr) IV Continuous <Continuous>  dextrose 5% + lactated ringers with potassium chloride 40 mEq/L 1000 milliLiter(s) (20 mL/Hr) IV Continuous <Continuous>  fentaNYL   Infusion. 0.5 MICROgram(s)/kG/Hr (2.7 mL/Hr) IV Continuous <Continuous>  levothyroxine Injectable 112 MICROGram(s) IV Push at bedtime  norepinephrine Infusion 0.05 MICROgram(s)/kG/Min (2.53 mL/Hr) IV Continuous <Continuous>  pantoprazole Infusion 8 mG/Hr (10 mL/Hr) IV Continuous <Continuous>  piperacillin/tazobactam IVPB.. 3.375 Gram(s) IV Intermittent every 8 hours  potassium chloride  10 mEq/100 mL IVPB 10 milliEquivalent(s) IV Intermittent every 1 hour  propofol Infusion 40 MICROgram(s)/kG/Min (13 mL/Hr) IV Continuous <Continuous>    MEDICATIONS  (PRN):  sodium chloride 0.9% lock flush 10 milliLiter(s) IV Push every 1 hour PRN Pre/post blood products, medications, blood draw, and to maintain line patency      LABS:  Laboratory data was independently reviewed by me today.                           9.7    12.46 )-----------( 210      ( 12 Sep 2021 16:02 )             30.0     09-12    140  |  103  |  10  ----------------------------<  144<H>  3.3<L>   |  25  |  0.44<L>    Ca    8.9      12 Sep 2021 16:02  Phos  2.1     09-12  Mg     2.00     09-12        PT/INR - ( 12 Sep 2021 05:30 )   PT: 15.5 sec;   INR: 1.38 ratio         PTT - ( 12 Sep 2021 05:30 )  PTT:27.7 sec  ABG - ( 12 Sep 2021 16:02 )  pH, Arterial: 7.41  pH, Blood: x     /  pCO2: 43    /  pO2: 167   / HCO3: 27    / Base Excess: 2.3   /  SaO2: 97.1                  RADIOLOGY:   Radiology images were independently reviewed by me today. Reports were reviewed by me today.    Xray Chest 1 View- PORTABLE-Routine:   EXAM:  XR CHEST PORTABLE ROUTINE 1V        PROCEDURE DATE:  Sep 12 2021         INTERPRETATION:  Chest one view    HISTORY: Hematemesis    COMPARISON STUDY: 9/11/2021    Rotated expiratory view of the chest shows the heart to be similar in size. Endotracheal tube, left jugular line, IVC filter and right humerus hardware are again noted.    The lungs show progression of left infiltrate and left effusion and there is no evidence of pneumothorax nor right pleural effusion.    IMPRESSION:  Progression of left infiltrate/effusion.      Thank you for the courtesy of this referral.    --- End of Report ---              JESSICA GAMBOA MD; Attending Interventional Radiologist  This document has been electronically signed. Sep 12 2021 11:04AM (09-12-21 @ 04:45)  Xray Chest 1 View- PORTABLE-Urgent:   EXAM:  XR CHEST PORTABLE URGENT 1V        PROCEDURE DATE:  Sep 11 2021         INTERPRETATION:  DATE OF STUDY: 9/11/21 at 2:44PM    PRIOR:Earlier 9/11/21 exam    CLINICAL INDICATION: Assess CVP line.    TECHNIQUE: portable chest.    IMPRESSION:  ETtube tip appropriately positioned above the mi.  Left IJ central venous catheter tip overlies SVC.  The heart is similar in size.  Small left pleural effusion persists with associated left basilar atelectasis. The right lung is clear.  No pneumothorax.  Right humeral shaft orthopedic hardware in situ.    --- End of Report ---              EDYTA HERNANDEZ MD; Attending Radiologist  This document has been electronically signed. Sep 11 2021  3:06PM (09-11-21 @ 14:47)  Xray Chest 1 View- PORTABLE-Routine:   EXAM:  XR CHEST PORTABLE ROUTINE 1V        PROCEDURE DATE:  Sep 11 2021         INTERPRETATION:  Chest one view    HISTORY: Intubation    COMPARISON STUDY: 9/10/2021    Frontal expiratory view of the chest shows the heart to be similar in size. Endotracheal tube reaches the distal trachea. Right humerus hardware is present.    The lungs show similar left effusion and there is no evidence of pneumothorax nor right pleural effusion. IVC filter is again noted.    IMPRESSION:  Endotracheal tube to distal trachea.      Thank you for the courtesy of this referral.    --- End of Report ---              JESSICA GAMBOA MD; Attending Interventional Radiologist  This document has been electronically signed. Sep 11 2021 12:38PM (09-11-21 @ 05:27)     CHIEF COMPLAINT: Follow up in CTICU for intubated for hypoxic respiratory failure with upper GI bleed    PROCEDURE:   - EGD done at outside hospital on 9/9/21 -Large diverticula noted at 28cm filled with blood and blood clots, periphery of diverticula was injected with epinephrine, 5mm distal esophageal tear without bleeding, hiatal hernia.        ISSUES:   Acute hypoxic respiratory failure s/p intubation (9/9)  Esophageal diverticular bleed   Acute blood loss anemia  Hypotension requiring IV pressors  Esophageal mass  COPD  Chronic Afib on Apixaban  Hypothyroidism  HTN      INTERVAL EVENTS:   Received patient on mechanical ventilation. Maintained on PEEP 8 and 40% FIO2.  Received patient on levophed pressors via central line. Patient had increased levophed requirements throughout the day to maintain MAP 65.  CBC repeated with slight decrease. Trending. Discussing timing of repeat EGD and possibility of repeat CT.    HISTORY:   Unable to obtain, intubated, sedated    PHYSICAL EXAM:   Gen: Comfortable, No acute distress, frail elderly, kyphotic  Eyes: Sclera white, Conjunctiva normal, Eyelids normal, Pupils symmetrical   ENT: Mucous membranes moist  Neck: Trachea midline, 7.0 ETT  CV: Rate regular, Rhythm irregular  Resp: Breath sounds clear, No accessory muscles use,   Abd: Soft, Non-distended, Non-tender, Bowel sounds normal,  ,  ,    Skin: Warm, 1+ peripheral edema of lower extremities,  ,    : bearden  Neuro: Moves ext to pain, minimally opens eyes to pain, sedated  Psych: RASS -3      ASSESSMENT AND PLAN:     NEURO:  Vent sedation - Continue propofol and precedex and fentanyl for ventilator synchrony.       RESPIRATORY:    Acute respiratory failure secondary to aspiration PNA in setting of hematemesis - Mechanical ventilation. Monitor ABG. Wean FIO2 for goal O2sat above 92. Vent bundle. Zosyn.           CARDIOVASCULAR:  Hypotension requiring IV pressors - wean pressors for MAP goal of 65  Telemetry (medical test) - Reviewed by me today independently. Rate controlled atrial fibrillation.    Chronic Afib - stable. Hold anticoagulation        HTN - currently hypotensive. hold home antihypertensives.            RENAL:  Stable - Monitor IOs and electrolytes. Keep K above 4.0 and Mg above 2.0.         GASTROINTESTINAL:  NPO  No NGT    Esophageal mass vs blood filled diverticulum - Unchanged. Pantoprazole IV Q12h. GI consult for timing of EGD.           HEMATOLOGIC:  Trend PT/PTT, CBC. Maintain active type and screen. Off anticoagulation.  s/p Kcentra at outside hospital  DVT prophylaxis with SCDs.         INFECTIOUS DISEASE:  Aspiration PNA - Worsening. Zosyn IV q8h.   Follow up cultures        ENDOCRINE:  Stable – Monitor glucose fingersticks for goal 120-180.     Hypothyroidism - stable. Continue synthroid IV.         Pertinent clinical, laboratory, radiographic, telemetry, hemodynamic, respiratory  data and chart were reviewed by myself and analyzed frequently throughout the course of the day and night by myself.    Plan discussed at length with the CTICU staff and Attending CT Surgeon - Dr. Brenna Sethi    Patient required critical care management.  45 minutes were spent evaluating, managing, providing, coordinating, and documenting care for this patient, exclusive of procedures from  7AM to 1159PM.      _________________________  VITAL SIGNS:  Vital Signs Last 24 Hrs  T(C): 37.1 (12 Sep 2021 16:00), Max: 38.4 (11 Sep 2021 22:00)  T(F): 98.7 (12 Sep 2021 16:00), Max: 101.1 (11 Sep 2021 22:00)  HR: 93 (12 Sep 2021 19:00) (67 - 100)  BP: --  BP(mean): --  RR: 18 (12 Sep 2021 19:00) (17 - 21)  SpO2: 100% (12 Sep 2021 19:00) (97% - 100%)  I/Os:   I&O's Detail    11 Sep 2021 07:01  -  12 Sep 2021 07:00  --------------------------------------------------------  IN:    Dexmedetomidine: 337.6 mL    FentaNYL: 62.1 mL    IV PiggyBack: 300 mL    Norepinephrine: 44.3 mL    Norepinephrine: 18 mL    Pantoprazole: 240 mL    Phenylephrine: 44 mL    Propofol: 321.2 mL  Total IN: 1367.2 mL    OUT:    Indwelling Catheter - Urethral (mL): 1452 mL  Total OUT: 1452 mL    Total NET: -84.8 mL      12 Sep 2021 07:01  -  12 Sep 2021 19:14  --------------------------------------------------------  IN:    Dexmedetomidine: 141.2 mL    dextrose 5% + lactated ringers w/ Additives: 200 mL    FentaNYL: 52.9 mL    IV PiggyBack: 1000 mL    Norepinephrine: 49.4 mL    Pantoprazole: 120 mL    Propofol: 129.4 mL  Total IN: 1692.9 mL    OUT:    Indwelling Catheter - Urethral (mL): 630 mL  Total OUT: 630 mL    Total NET: 1062.9 mL          Mode: AC/ CMV (Assist Control/ Continuous Mandatory Ventilation)  RR (machine): 18  TV (machine): 350  FiO2: 40  PEEP: 8  ITime: 0.76  MAP: 13  PIP: 27      MEDICATIONS:  MEDICATIONS  (STANDING):  ALBUTerol    90 MICROgram(s) HFA Inhaler 2 Puff(s) Inhalation every 6 hours  budesonide  80 MICROgram(s)/formoterol 4.5 MICROgram(s) Inhaler 2 Puff(s) Inhalation two times a day  chlorhexidine 0.12% Liquid 15 milliLiter(s) Oral Mucosa every 12 hours  chlorhexidine 4% Liquid 1 Application(s) Topical <User Schedule>  dexMEDEtomidine Infusion 0.2 MICROgram(s)/kG/Hr (2.7 mL/Hr) IV Continuous <Continuous>  dextrose 5% + lactated ringers with potassium chloride 40 mEq/L 1000 milliLiter(s) (20 mL/Hr) IV Continuous <Continuous>  fentaNYL   Infusion. 0.5 MICROgram(s)/kG/Hr (2.7 mL/Hr) IV Continuous <Continuous>  levothyroxine Injectable 112 MICROGram(s) IV Push at bedtime  norepinephrine Infusion 0.05 MICROgram(s)/kG/Min (2.53 mL/Hr) IV Continuous <Continuous>  pantoprazole Infusion 8 mG/Hr (10 mL/Hr) IV Continuous <Continuous>  piperacillin/tazobactam IVPB.. 3.375 Gram(s) IV Intermittent every 8 hours  potassium chloride  10 mEq/100 mL IVPB 10 milliEquivalent(s) IV Intermittent every 1 hour  propofol Infusion 40 MICROgram(s)/kG/Min (13 mL/Hr) IV Continuous <Continuous>    MEDICATIONS  (PRN):  sodium chloride 0.9% lock flush 10 milliLiter(s) IV Push every 1 hour PRN Pre/post blood products, medications, blood draw, and to maintain line patency      LABS:  Laboratory data was independently reviewed by me today.                           9.7    12.46 )-----------( 210      ( 12 Sep 2021 16:02 )             30.0     09-12    140  |  103  |  10  ----------------------------<  144<H>  3.3<L>   |  25  |  0.44<L>    Ca    8.9      12 Sep 2021 16:02  Phos  2.1     09-12  Mg     2.00     09-12        PT/INR - ( 12 Sep 2021 05:30 )   PT: 15.5 sec;   INR: 1.38 ratio         PTT - ( 12 Sep 2021 05:30 )  PTT:27.7 sec  ABG - ( 12 Sep 2021 16:02 )  pH, Arterial: 7.41  pH, Blood: x     /  pCO2: 43    /  pO2: 167   / HCO3: 27    / Base Excess: 2.3   /  SaO2: 97.1                  RADIOLOGY:   Radiology images were independently reviewed by me today. Reports were reviewed by me today.    Xray Chest 1 View- PORTABLE-Routine:   EXAM:  XR CHEST PORTABLE ROUTINE 1V        PROCEDURE DATE:  Sep 12 2021         INTERPRETATION:  Chest one view    HISTORY: Hematemesis    COMPARISON STUDY: 9/11/2021    Rotated expiratory view of the chest shows the heart to be similar in size. Endotracheal tube, left jugular line, IVC filter and right humerus hardware are again noted.    The lungs show progression of left infiltrate and left effusion and there is no evidence of pneumothorax nor right pleural effusion.    IMPRESSION:  Progression of left infiltrate/effusion.      Thank you for the courtesy of this referral.    --- End of Report ---              JESSICA GAMBOA MD; Attending Interventional Radiologist  This document has been electronically signed. Sep 12 2021 11:04AM (09-12-21 @ 04:45)  Xray Chest 1 View- PORTABLE-Urgent:   EXAM:  XR CHEST PORTABLE URGENT 1V        PROCEDURE DATE:  Sep 11 2021         INTERPRETATION:  DATE OF STUDY: 9/11/21 at 2:44PM    PRIOR:Earlier 9/11/21 exam    CLINICAL INDICATION: Assess CVP line.    TECHNIQUE: portable chest.    IMPRESSION:  ETtube tip appropriately positioned above the mi.  Left IJ central venous catheter tip overlies SVC.  The heart is similar in size.  Small left pleural effusion persists with associated left basilar atelectasis. The right lung is clear.  No pneumothorax.  Right humeral shaft orthopedic hardware in situ.    --- End of Report ---              EDYTA HERNANDEZ MD; Attending Radiologist  This document has been electronically signed. Sep 11 2021  3:06PM (09-11-21 @ 14:47)  Xray Chest 1 View- PORTABLE-Routine:   EXAM:  XR CHEST PORTABLE ROUTINE 1V        PROCEDURE DATE:  Sep 11 2021         INTERPRETATION:  Chest one view    HISTORY: Intubation    COMPARISON STUDY: 9/10/2021    Frontal expiratory view of the chest shows the heart to be similar in size. Endotracheal tube reaches the distal trachea. Right humerus hardware is present.    The lungs show similar left effusion and there is no evidence of pneumothorax nor right pleural effusion. IVC filter is again noted.    IMPRESSION:  Endotracheal tube to distal trachea.      Thank you for the courtesy of this referral.    --- End of Report ---              JESSICA GAMBOA MD; Attending Interventional Radiologist  This document has been electronically signed. Sep 11 2021 12:38PM (09-11-21 @ 05:27)

## 2024-12-08 PROBLEM — M25.561 PAIN IN BOTH KNEES, UNSPECIFIED CHRONICITY: Status: ACTIVE | Noted: 2024-12-08

## 2024-12-09 ENCOUNTER — APPOINTMENT (OUTPATIENT)
Dept: ORTHOPEDIC SURGERY | Facility: CLINIC | Age: 84
End: 2024-12-09
Payer: MEDICARE

## 2024-12-09 VITALS
BODY MASS INDEX: 24.55 KG/M2 | DIASTOLIC BLOOD PRESSURE: 71 MMHG | WEIGHT: 130 LBS | HEIGHT: 61 IN | SYSTOLIC BLOOD PRESSURE: 122 MMHG | HEART RATE: 65 BPM

## 2024-12-09 PROCEDURE — 20610 DRAIN/INJ JOINT/BURSA W/O US: CPT | Mod: 50

## 2024-12-09 PROCEDURE — 73564 X-RAY EXAM KNEE 4 OR MORE: CPT | Mod: 50

## 2024-12-09 PROCEDURE — 99204 OFFICE O/P NEW MOD 45 MIN: CPT | Mod: 25

## 2024-12-09 RX ORDER — HYALURONATE SODIUM 20 MG/2 ML
20 SYRINGE (ML) INTRAARTICULAR
Qty: 2 | Refills: 0 | Status: ACTIVE | OUTPATIENT
Start: 2024-12-09

## 2024-12-16 ENCOUNTER — APPOINTMENT (OUTPATIENT)
Dept: ORTHOPEDIC SURGERY | Facility: CLINIC | Age: 84
End: 2024-12-16
Payer: MEDICARE

## 2024-12-16 PROBLEM — M17.0 DEGENERATIVE ARTHRITIS OF KNEE, BILATERAL: Status: ACTIVE | Noted: 2024-12-09

## 2024-12-16 PROCEDURE — 20610 DRAIN/INJ JOINT/BURSA W/O US: CPT | Mod: 50

## 2024-12-23 ENCOUNTER — APPOINTMENT (OUTPATIENT)
Dept: ORTHOPEDIC SURGERY | Facility: CLINIC | Age: 84
End: 2024-12-23
Payer: MEDICARE

## 2024-12-23 DIAGNOSIS — M17.0 BILATERAL PRIMARY OSTEOARTHRITIS OF KNEE: ICD-10-CM

## 2024-12-23 PROCEDURE — 20610 DRAIN/INJ JOINT/BURSA W/O US: CPT | Mod: 50

## 2025-05-01 NOTE — PROGRESS NOTE ADULT - SUBJECTIVE AND OBJECTIVE BOX
Interval History:    CENTRAL LINE:   [  ] YES       [  ] NO  FIGUEROA:                 [  ] YES       [  ] NO         REVIEW OF SYSTEMS:  All Systems below were reviewed and are negative [  ]  HEENT:  ID:  Pulmonary:  Cardiac:  GI:  Renal:  Musculoskeletal:  All other systems above were reviewed and are negative   [  ]      MEDICATIONS  (STANDING):  albuterol/ipratropium for Nebulization 3 milliLiter(s) Nebulizer every 6 hours  baclofen 5 milliGRAM(s) Oral every 12 hours  buDESOnide    Inhalation Suspension 0.5 milliGRAM(s) Inhalation two times a day  dextrose 5% + sodium chloride 0.45%. 1000 milliLiter(s) (50 mL/Hr) IV Continuous <Continuous>  donepezil 10 milliGRAM(s) Oral at bedtime  DULoxetine 60 milliGRAM(s) Oral daily  enoxaparin Injectable 40 milliGRAM(s) SubCutaneous every 24 hours  guaiFENesin  milliGRAM(s) Oral every 12 hours  levothyroxine 150 MICROGram(s) Oral daily  memantine 10 milliGRAM(s) Oral two times a day  methylPREDNISolone sodium succinate Injectable 40 milliGRAM(s) IV Push daily  metoprolol tartrate 37.5 milliGRAM(s) Oral two times a day  pantoprazole    Tablet 40 milliGRAM(s) Oral before breakfast  simvastatin 10 milliGRAM(s) Oral at bedtime  traZODone 100 milliGRAM(s) Oral at bedtime    MEDICATIONS  (PRN):  aluminum hydroxide/magnesium hydroxide/simethicone Suspension 30 milliLiter(s) Oral every 4 hours PRN Dyspepsia  ibuprofen  Tablet. 400 milliGRAM(s) Oral four times a day PRN Temp greater or equal to 38C (100.4F), Mild Pain (1 - 3)  loperamide 2 milliGRAM(s) Oral four times a day PRN Diarrhea  melatonin 3 milliGRAM(s) Oral at bedtime PRN Insomnia  ondansetron Injectable 4 milliGRAM(s) IV Push every 8 hours PRN Nausea and/or Vomiting  traMADol 50 milliGRAM(s) Oral three times a day PRN Moderate Pain (4 - 6)      Vital Signs Last 24 Hrs  T(C): 36.7 (21 Oct 2022 18:44), Max: 36.9 (20 Oct 2022 22:30)  T(F): 98 (21 Oct 2022 18:44), Max: 98.5 (20 Oct 2022 22:30)  HR: 59 (21 Oct 2022 13:20) (59 - 74)  BP: 98/60 (21 Oct 2022 13:20) (94/63 - 108/71)  BP(mean): --  RR: 17 (21 Oct 2022 13:20) (16 - 18)  SpO2: 96% (21 Oct 2022 13:20) (96% - 100%)    Parameters below as of 21 Oct 2022 12:55  Patient On (Oxygen Delivery Method): nasal cannula        I&O's Summary    20 Oct 2022 07:01  -  21 Oct 2022 07:00  --------------------------------------------------------  IN: 80 mL / OUT: 0 mL / NET: 80 mL    21 Oct 2022 07:01  -  21 Oct 2022 18:56  --------------------------------------------------------  IN: 720 mL / OUT: 0 mL / NET: 720 mL        PHYSICAL EXAM:  HEENT: NC/AT; PERRLA  Neck: Soft; no tenderness  Lungs: CTA bilaterally; no wheezing.   Heart:  Abdomen:  Genital/ Rectal:  Extremities:  Neurologic:  Vascular:      LABORATORY:    CBC Full  -  ( 21 Oct 2022 04:26 )  WBC Count : 3.00 K/uL  RBC Count : 4.15 M/uL  Hemoglobin : 11.9 g/dL  Hematocrit : 37.9 %  Platelet Count - Automated : 210 K/uL  Mean Cell Volume : 91.3 fl  Mean Cell Hemoglobin : 28.7 pg  Mean Cell Hemoglobin Concentration : 31.4 gm/dL  Auto Neutrophil # : x  Auto Lymphocyte # : x  Auto Monocyte # : x  Auto Eosinophil # : x  Auto Basophil # : x  Auto Neutrophil % : x  Auto Lymphocyte % : x  Auto Monocyte % : x  Auto Eosinophil % : x  Auto Basophil % : x      ESR:                   10-21 @ 04:26  --    C-Reactive Protein:     10-21 @ 04:26  --    Procalcitonin:           10-21 @ 04:26   0.03  ESR:                   10-20 @ 20:30  --    C-Reactive Protein:     10-20 @ 20:30  --    Procalcitonin:           10-20 @ 20:30   0.04      10-21    145  |  108  |  9   ----------------------------<  105<H>  4.1   |  34<H>  |  0.41<L>    Ca    9.2      21 Oct 2022 04:26    TPro  6.5  /  Alb  3.0<L>  /  TBili  0.3  /  DBili  x   /  AST  19  /  ALT  14  /  AlkPhos  71  10-21      Rapid Respiratory Viral Panel Result        10-20 @ 10:47  [facility]  Coronovirus --  Adenovirus --  Bordetella Pertussis --  Chlamydia Pneumonia --  Entero/Rhinovirus--  HKU1 Coronovirus --  HMPV Coronovirus --  Influenza A --  Influenza AH1 --  Influenza AH1 2009 --  Influenza AH3 --  Influenza B --  Mycoplasma Pneumoniae --  NL63 Coronovirus --  OC43 Coronovirus --  Parainfluenza 1 --  Parainfluenza 2 --  Parainfluenza 3 --  Parainfluenza 4 --  Resp Syncytial Virus --      Assessment and Plan:          Je Baez MD   [phone].    She had several bowel movements with loose stools today  Still have SOB and a nonproductive cough.  No fevers   Denied abdominal pain or vomiting.       MEDICATIONS  (STANDING):  albuterol/ipratropium for Nebulization 3 milliLiter(s) Nebulizer every 6 hours  baclofen 5 milliGRAM(s) Oral every 12 hours  buDESOnide    Inhalation Suspension 0.5 milliGRAM(s) Inhalation two times a day  dextrose 5% + sodium chloride 0.45%. 1000 milliLiter(s) (50 mL/Hr) IV Continuous <Continuous>  donepezil 10 milliGRAM(s) Oral at bedtime  DULoxetine 60 milliGRAM(s) Oral daily  enoxaparin Injectable 40 milliGRAM(s) SubCutaneous every 24 hours  guaiFENesin  milliGRAM(s) Oral every 12 hours  levothyroxine 150 MICROGram(s) Oral daily  memantine 10 milliGRAM(s) Oral two times a day  methylPREDNISolone sodium succinate Injectable 40 milliGRAM(s) IV Push daily  metoprolol tartrate 37.5 milliGRAM(s) Oral two times a day  pantoprazole    Tablet 40 milliGRAM(s) Oral before breakfast  simvastatin 10 milliGRAM(s) Oral at bedtime  traZODone 100 milliGRAM(s) Oral at bedtime    MEDICATIONS  (PRN):  aluminum hydroxide/magnesium hydroxide/simethicone Suspension 30 milliLiter(s) Oral every 4 hours PRN Dyspepsia  ibuprofen  Tablet. 400 milliGRAM(s) Oral four times a day PRN Temp greater or equal to 38C (100.4F), Mild Pain (1 - 3)  loperamide 2 milliGRAM(s) Oral four times a day PRN Diarrhea  melatonin 3 milliGRAM(s) Oral at bedtime PRN Insomnia  ondansetron Injectable 4 milliGRAM(s) IV Push every 8 hours PRN Nausea and/or Vomiting  traMADol 50 milliGRAM(s) Oral three times a day PRN Moderate Pain (4 - 6)      Vital Signs Last 24 Hrs  T(C): 36.7 (21 Oct 2022 18:44), Max: 36.9 (20 Oct 2022 22:30)  T(F): 98 (21 Oct 2022 18:44), Max: 98.5 (20 Oct 2022 22:30)  HR: 59 (21 Oct 2022 13:20) (59 - 74)  BP: 98/60 (21 Oct 2022 13:20) (94/63 - 108/71)  BP(mean): --  RR: 17 (21 Oct 2022 13:20) (16 - 18)  SpO2: 96% (21 Oct 2022 13:20) (96% - 100%)    Parameters below as of 21 Oct 2022 12:55  Patient On (Oxygen Delivery Method): nasal cannula        I&O's Summary    20 Oct 2022 07:01  -  21 Oct 2022 07:00  --------------------------------------------------------  IN: 80 mL / OUT: 0 mL / NET: 80 mL    21 Oct 2022 07:01  -  21 Oct 2022 18:56  --------------------------------------------------------  IN: 720 mL / OUT: 0 mL / NET: 720 mL        PHYSICAL EXAM:  HEENT: NC/AT; PERRLA  Neck: Soft; no tenderness  Lungs: Coarse BS bilaterally; no wheezing.   Heart: RRR, no murmurs.   Abdomen: Soft, no tenderness.   Genital/ Rectal: No foiley catheter.  Extremities: No ulcers   Neurologic: Confused.       LABORATORY:    CBC Full  -  ( 21 Oct 2022 04:26 )  WBC Count : 3.00 K/uL  RBC Count : 4.15 M/uL  Hemoglobin : 11.9 g/dL  Hematocrit : 37.9 %  Platelet Count - Automated : 210 K/uL  Mean Cell Volume : 91.3 fl  Mean Cell Hemoglobin : 28.7 pg  Mean Cell Hemoglobin Concentration : 31.4 gm/dL  Auto Neutrophil # : x  Auto Lymphocyte # : x  Auto Monocyte # : x  Auto Eosinophil # : x  Auto Basophil # : x  Auto Neutrophil % : x  Auto Lymphocyte % : x  Auto Monocyte % : x  Auto Eosinophil % : x  Auto Basophil % : x      ESR:                   10-21 @ 04:26  --    C-Reactive Protein:     10-21 @ 04:26  --    Procalcitonin:           10-21 @ 04:26   0.03  ESR:                   10-20 @ 20:30  --    C-Reactive Protein:     10-20 @ 20:30  --    Procalcitonin:           10-20 @ 20:30   0.04      10-21    145  |  108  |  9   ----------------------------<  105<H>  4.1   |  34<H>  |  0.41<L>    Ca    9.2      21 Oct 2022 04:26    TPro  6.5  /  Alb  3.0<L>  /  TBili  0.3  /  DBili  x   /  AST  19  /  ALT  14  /  AlkPhos  71  10-21      Rapid Respiratory Viral Panel Result        10-20 @ 10:47  Rapid RVP [facility]  Coronovirus --  Adenovirus --  Bordetella Pertussis --  Chlamydia Pneumonia --  Entero/Rhinovirus--  HKU1 Coronovirus --  HMPV Coronovirus --  Influenza A --  Influenza AH1 --  Influenza AH1 2009 --  Influenza AH3 --  Influenza B --  Mycoplasma Pneumoniae --  NL63 Coronovirus --  OC43 Coronovirus --  Parainfluenza 1 --  Parainfluenza 2 --  Parainfluenza 3 --  Parainfluenza 4 --  Resp Syncytial Virus --      Assessment and Plan:    1. Dyspnea  2. COPD.  3. Dilated esophagus, r/p achalasia.   4. Left knee swelling.    , Unclear sources of infection.  No pneumonia on  chest CT.   . Unclear sources of dyspnea. Unclear if the patient has COPD exacerbation as she has no wheezing or respiratory distress. D dimer is  elevated,  consider evaluation for PE if ok with pulmonary.   . Repeat COVID 19 PCR.  . The patient had diarrhea today. Possibly from the Zosyn yesterday. Discontinue all laxatives Add imodium prn. Low suspicion for Cdiff. Monitor diarrhea progression.       Je Baez MD   [phone].  not applicable

## 2025-06-18 NOTE — PROGRESS NOTE ADULT - SUBJECTIVE AND OBJECTIVE BOX
2025       Stacey John MD  [address] [facility] [address]  Via Fax: [phone]      Patient: Nam Schneider   YOB: 2016   Date of Visit: 2025       Dear Dr. John:    Thank you for referring Nam Schneider to me for evaluation. Below are my notes for this visit with him.    If you have questions, please do not hesitate to call me. I look forward to following your patient along with you.      Sincerely,        Jayjay Aburto MD        CC: No Recipients    Jayjay Aburto MD  2025  9:37 AM  Addendum  Dear: Stacey John MD    We had the pleasure of seeing your patient in the Pediatric Nephrology Clinic in [facility] on 25. Please find our consultation summary below.    Nam Schneider is a 8 year old male who presents for a consultation regarding:    CHIEF COMPLAINT  Nephrotic syndrome     Nam is accompanied and history obtained by: Mother.    History of present illness:  Original HPI  Nam schneider was born on 2016 and is seen in Pediatric Nephrology clinic at [facility] on 2025.  Nam was born at term by induced vaginal delivery due to possible plaental insufficiency and decreased growth towards of the the pregnancy,  38 weeks gestation - no problem  40 weeks gestastion - not enough growth - induced delivery   Birth weight   2.784 kg, birht length 48.3 cm, birth head circumference 35 cm, Apgar scores nine at one minute and nine at five minutes.   Prenatal ultrasounds otherwise normal, by report.   Nam was discharged to  home on his second full day  of life. (2016)  Nam was not circumcised in the  nursery.   There is no history of known or suspected urinary tract infections.  There  was a history of fevers of  unknown etiology. Nam had fever at six months of age.  Fever lasted for five days.  There was no rash at the end of the fever.   Urine was not checked.  Fever resolved spontaneously.  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  Patient denies fevers chills headache chest pain abdominal pain vomiting Admit for antibacterial managmnet ,intravenous steroids,nebulised bronchodilators and pulmonology evaluation,O2 supply,serial labs and chest xrays,obtain ECHO to evaluate LVEF and rule out chf component Admitted  to telemetry unit for monitoring , send 3 sets of cardiac enzymes to rule out acute coronary event, obtain ECHO to evaluate LVEF, cardiology consult  ,continue current management, O2 supply, anticoagulation plan as per cardiology consult Palliative care consult requested ,to discuss advance directives and complete MOLST  (20 Oct 2022 13:54)      REVIEW OF SYSTEMS:    CONSTITUTIONAL: No fever, weight loss, or fatigue  EYES: No eye pain, visual disturbances, or discharge  ENMT:  No difficulty hearing, tinnitus, vertigo; No sinus or throat pain  NECK: No pain or stiffness  BREASTS: No pain, masses, or nipple discharge  RESPIRATORY: No cough, wheezing, chills or hemoptysis; No shortness of breath  CARDIOVASCULAR: No chest pain, palpitations, dizziness, or leg swelling  GASTROINTESTINAL: No abdominal or epigastric pain. No nausea, vomiting, or hematemesis; No diarrhea or constipation. No melena or hematochezia.  GENITOURINARY: No dysuria, frequency, hematuria, or incontinence  NEUROLOGICAL: No headaches, memory loss, loss of strength, numbness, or tremors  SKIN: No itching, burning, rashes, or lesions   LYMPH NODES: No enlarged glands  ENDOCRINE: No heat or cold intolerance; No hair loss  MUSCULOSKELETAL: No joint pain or swelling; No muscle, back, or extremity pain  PSYCHIATRIC: No depression, anxiety, mood swings, or difficulty sleeping  HEME/LYMPH: No easy bruising, or bleeding gums  ALLERGY AND IMMUNOLOGIC: No hives or eczema    MEDICATIONS  (STANDING):  albuterol/ipratropium for Nebulization 3 milliLiter(s) Nebulizer every 6 hours  baclofen 5 milliGRAM(s) Oral every 12 hours  buDESOnide    Inhalation Suspension 0.5 milliGRAM(s) Inhalation two times a day  dextrose 5%. 1000 milliLiter(s) (40 mL/Hr) IV Continuous <Continuous>  donepezil 10 milliGRAM(s) Oral at bedtime  DULoxetine 60 milliGRAM(s) Oral daily  enoxaparin Injectable 40 milliGRAM(s) SubCutaneous every 24 hours  guaiFENesin  milliGRAM(s) Oral every 12 hours  levothyroxine Injectable 75 MICROGram(s) IV Push at bedtime  memantine 10 milliGRAM(s) Oral two times a day  methylPREDNISolone sodium succinate Injectable 20 milliGRAM(s) IV Push daily  metoprolol tartrate Injectable 2.5 milliGRAM(s) IV Push every 8 hours  pantoprazole  Injectable 40 milliGRAM(s) IV Push every 12 hours  simvastatin 10 milliGRAM(s) Oral at bedtime  traZODone 100 milliGRAM(s) Oral at bedtime    MEDICATIONS  (PRN):  aluminum hydroxide/magnesium hydroxide/simethicone Suspension 30 milliLiter(s) Oral every 4 hours PRN Dyspepsia  ibuprofen  Tablet. 400 milliGRAM(s) Oral four times a day PRN Temp greater or equal to 38C (100.4F), Mild Pain (1 - 3)  loperamide 2 milliGRAM(s) Oral four times a day PRN Diarrhea  LORazepam   Injectable 0.5 milliGRAM(s) IV Push every 8 hours PRN Anxiety  melatonin 3 milliGRAM(s) Oral at bedtime PRN Insomnia  ondansetron Injectable 4 milliGRAM(s) IV Push every 8 hours PRN Nausea and/or Vomiting  traMADol 50 milliGRAM(s) Oral three times a day PRN Moderate Pain (4 - 6)      ALLERGIES: sulfa drugs (Unknown)  Tylenol (Unknown)      FAMILY HISTORY:      PHYSICAL EXAMINATION:  -----------------------------  T(C): 36.5 (10-27-22 @ 05:08), Max: 36.6 (10-26-22 @ 20:15)  HR: 86 (10-27-22 @ 08:25) (72 - 90)  BP: 107/82 (10-27-22 @ 05:08) (100/62 - 112/76)  RR: 18 (10-27-22 @ 05:08) (17 - 18)  SpO2: 96% (10-27-22 @ 08:25) (94% - 99%)  Wt(kg): --    10-26 @ 07:01  -  10-27 @ 07:00  --------------------------------------------------------  IN:    dextrose 5%: 320 mL    dextrose 5% + sodium chloride 0.45%: 120 mL  Total IN: 440 mL    OUT:  Total OUT: 0 mL    Total NET: 440 mL            VITALS  T(C): 36.5 (10-27-22 @ 05:08), Max: 36.6 (10-26-22 @ 20:15)  HR: 86 (10-27-22 @ 08:25) (72 - 90)  BP: 107/82 (10-27-22 @ 05:08) (100/62 - 112/76)  RR: 18 (10-27-22 @ 05:08) (17 - 18)  SpO2: 96% (10-27-22 @ 08:25) (94% - 99%)    Constitutional: well developed, normal appearance, well groomed, well nourished, no deformities and no acute distress.   Eyes: the conjunctiva exhibited no abnormalities and the eyelids demonstrated no xanthelasmas.   HEENT: normal oral mucosa, no oral pallor and no oral cyanosis.   Neck: normal jugular venous A waves present, normal jugular venous V waves present and no jugular venous oliveira A waves.   Pulmonary: no respiratory distress, normal respiratory rhythm and effort, no accessory muscle use and lungs were clear to auscultation bilaterally.   Cardiovascular: heart rate and rhythm were normal, normal S1 and S2 and no murmur, gallop, rub, heave or thrill are present.   Abdomen: soft, non-tender, no hepato-splenomegaly and no abdominal mass palpated.   Musculoskeletal: the gait could not be assessed..   Extremities: no clubbing of the fingernails, no localized cyanosis, no petechial hemorrhages and no ischemic changes.   Skin: normal skin color and pigmentation, no rash, no venous stasis, no skin lesions, no skin ulcer and no xanthoma was observed.   Psychiatric: oriented to person, place, and time, the affect was normal, the mood was normal and not feeling anxious.     LABS:   --------  10-26    148<H>  |  110<H>  |  4<L>  ----------------------------<  112<H>  3.3<L>   |  31  |  0.47<L>    Ca    8.9      26 Oct 2022 07:38  Phos  3.2     10-26  Mg     2.1     10-26    TPro  6.2  /  Alb  3.0<L>  /  TBili  0.6  /  DBili  x   /  AST  14<L>  /  ALT  16  /  AlkPhos  63  10-26                         12.6   4.76  )-----------( 201      ( 26 Oct 2022 07:38 )             40.0                 RADIOLOGY:  -----------------    ECG:     ECHO: toilet trained at normal age without difficulty and there is no secondary dayhtime or night time fecal or urinary incontinence.   Lalo was well unti 11.15.2015 when Lalo presented with edema, diagnosed with nephrotic syndrome.    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   Lalo has had five relapses since his diagnosis.  The urine typicallys clears within one week.   For the last 1.5 years,  lalo had not had any relapses.     Last relapse 09.27.2023.   Lalo has had cough, congestion in the mornings for seven days.  Sunday,  Jonna 15, 2025, Lalo had swollen eyelids.    Monday June 16, 2025, Lalo had 300 mg/dl protein, continued through 06.18.2025.  Labs in ED on 06.18.2025 consistent with active nephrotic syndrome.       Today's Update:     There has been no history of fever,  There has been no  history of gross hematuria  There has been no history of foul smelling urine.  There has been no nausea, no vomiting, no diarrhea.  There has been no constipation.  There has been no abdominal pain, flank pain, dysuria.  There has been no urinary frequency, urinary urgency, urinary hesitancy.  There has been no daytime urinary incontinence.  There has been no night time urinary incontinence.  There has been no fecal incontinence during the daytime or the night time.  There has been edema  There has been no bruising or bleeding.  There has been no rash.  There has been no thrush.  There has been no joint pain or joint swelling.  There has been no pallor, cyanosis, erythema.   There has been no wheezing or shortness of breath.  There has been cough, congestion in AM for seven days.   There has been no mouth sore, no tremor.  There has been no palpitations.  There has been no headache, nosebleed, blurry vision, dizziness.  There has been no hearing problem.  There has been no visual problem.    Review of Systems   Constitutional:  Negative for activity change, appetite  change, chills, diaphoresis, fatigue, fever, irritability and unexpected weight change.        Periorbital edema    HENT:  Positive for congestion. Negative for dental problem, drooling, ear discharge, ear pain, facial swelling, hearing loss, mouth sores, nosebleeds, postnasal drip, rhinorrhea, sinus pressure, sinus pain, sneezing, sore throat, tinnitus, trouble swallowing and voice change.    Eyes:  Negative for photophobia, pain, discharge, redness, itching and visual disturbance.   Respiratory:  Positive for cough. Negative for apnea, choking, chest tightness, shortness of breath, wheezing and stridor.    Cardiovascular:  Negative for chest pain, palpitations and leg swelling.   Gastrointestinal:  Negative for abdominal distention, abdominal pain, anal bleeding, blood in stool, constipation, diarrhea, nausea, rectal pain and vomiting.   Endocrine: Negative for cold intolerance, heat intolerance, polydipsia, polyphagia and polyuria.   Genitourinary:  Negative for decreased urine volume, difficulty urinating, dysuria, enuresis, flank pain, frequency, genital sores, hematuria and urgency.   Musculoskeletal:  Negative for arthralgias, back pain, gait problem, joint swelling, myalgias, neck pain and neck stiffness.   Skin:  Negative for color change, pallor, rash and wound.   Allergic/Immunologic: Negative for environmental allergies, food allergies and immunocompromised state.   Neurological:  Negative for dizziness, tremors, seizures, syncope, facial asymmetry, speech difficulty, weakness, light-headedness, numbness and headaches.   Hematological:  Negative for adenopathy. Does not bruise/bleed easily.   Psychiatric/Behavioral:  Negative for agitation, behavioral problems, confusion, decreased concentration, dysphoric mood, hallucinations, self-injury, sleep disturbance and suicidal ideas. The patient is not nervous/anxious and is not hyperactive.        Current Outpatient Medications   Medication Sig Dispense  Refill   • predniSONE (DELTASONE) 10 MG tablet Take 3 tablets by mouth 2 times daily. 540 tablet 3   • famotidine (PEPCID) 10 MG tablet Take 2 tablets by mouth daily. 180 tablet 3     No current facility-administered medications for this visit.       ALLERGIES:  No Known Allergies  Nam has no known or suspected drug allergies.     Immunizations up to date.     Past Surgical History:   Procedure Laterality Date   • Adenoidectomy     Adenoidectomy on 05.23.2017 at [facility].     FAMILY HISTORY  Mother healthy  Father healthy  Younger brother, Medhat, date of birth 11.06.2016, healthy    Maternal grand father myocardial infarction and arrest and leukemia, venous thrombosis, diabestes mellitus  Maternal grand mother osteoporosis, recurring urinary tract infection  There are no maternal aunts or uncles at this  time  Paternal grand father quadruple coronry artery bypass, chronic obstructive pulmonary disease  Paternal grand mother hypothyroidism  Paternal uncle prediabetes  Paternal aunt hypothyroidism    SOCIAL HISTORY  Nam is living in [facility] with mother, father, younger brother,  There are no pets in the house,   No one is smoking in the h ouse       Vitals:    06/18/25 1423   BP: 109/72   Pulse: 96     BSA: 1.22 meters squared  Growth percentiles: 47 %ile (Z= -0.07) based on CDC (Boys, 2-20 Years) Stature-for-age data based on Stature recorded on 6/18/2025. 67 %ile (Z= 0.45) based on CDC (Boys, 2-20 Years) weight-for-age data using data from 6/18/2025.   Physical Exam  Vitals and nursing note reviewed.   Constitutional:       General: He is active. He is not in acute distress.     Appearance: Normal appearance. He is well-developed and normal weight. He is not toxic-appearing or diaphoretic.   HENT:      Head: Normocephalic and atraumatic. No signs of injury.      Right Ear: Tympanic membrane, ear canal and external ear normal. There is no impacted cerumen. Tympanic membrane is  not erythematous or bulging.      Left Ear: Tympanic membrane, ear canal and external ear normal. There is no impacted cerumen. Tympanic membrane is not erythematous or bulging.      Nose: Nose normal. No congestion or rhinorrhea.      Mouth/Throat:      Mouth: Mucous membranes are moist.      Dentition: No dental caries.      Pharynx: Oropharynx is clear. No oropharyngeal exudate or posterior oropharyngeal erythema.      Tonsils: No tonsillar exudate.      Neck: Normal range of motion and neck supple. No rigidity or tenderness. No muscular tenderness.   Eyes:      General:         Right eye: No discharge.         Left eye: No discharge.      Extraocular Movements: Extraocular movements intact.      Conjunctiva/sclera: Conjunctivae normal.      Pupils: Pupils are equal, round, and reactive to light.   Cardiovascular:      Rate and Rhythm: Normal rate and regular rhythm.      Pulses: Normal pulses.      Heart sounds: Normal heart sounds. No murmur heard.     No friction rub. No gallop.   Pulmonary:      Effort: Pulmonary effort is normal. No respiratory distress, nasal flaring or retractions.      Breath sounds: Normal breath sounds and air entry. No stridor or decreased air movement. No wheezing, rhonchi or rales.   Abdominal:      General: Bowel sounds are normal. There is distension (there is ascites).      Palpations: Abdomen is soft. There is no mass.      Tenderness: There is no abdominal tenderness. There is no guarding or rebound.      Hernia: No hernia is present.   Musculoskeletal:         General: Swelling (mild pedal edema) present. No tenderness, deformity or signs of injury. Normal range of motion.   Lymphadenopathy:      Cervical: No cervical adenopathy.   Skin:     General: Skin is warm and dry.      Capillary Refill: Capillary refill takes less than 2 seconds.      Coloration: Skin is not cyanotic, jaundiced or pale.      Findings: No erythema, petechiae or rash.   Neurological:      General: No  focal deficit present.      Mental Status: He is alert and oriented for age.      Cranial Nerves: No cranial nerve deficit.      Motor: No weakness.      Gait: Gait normal.   Psychiatric:         Mood and Affect: Mood normal.         Behavior: Behavior normal.         Thought Content: Thought content normal.         Judgment: Judgment normal.         Lab and Imaging Results  Recent Results (from the past 25 weeks)   Urinalysis with microscopy without culture    Collection Time: 06/18/25  9:19 AM    Specimen: Urine clean catch   Result Value Ref Range    COLOR, URINALYSIS Yellow     APPEARANCE, URINALYSIS Clear     GLUCOSE, URINALYSIS Negative Negative mg/dL    BILIRUBIN, URINALYSIS Negative Negative    KETONES, URINALYSIS Negative Negative mg/dL    SPECIFIC GRAVITY, URINALYSIS 1.027 1.005 - 1.030    OCCULT BLOOD, URINALYSIS Trace (A) Negative    PH, URINALYSIS 6.5 5.0 - 7.0    PROTEIN, URINALYSIS 300 (A) Negative mg/dL    UROBILINOGEN, URINALYSIS 0.2 0.2, 1.0 mg/dL    NITRITE, URINALYSIS Negative Negative    LEUKOCYTE ESTERASE, URINALYSIS Negative Negative    SQUAMOUS EPITHELIAL, URINALYSIS None Seen None Seen, 1 to 5 /hpf    ERYTHROCYTES, URINALYSIS 1 to 2 None Seen, 1 to 2 /hpf    LEUKOCYTES, URINALYSIS 1 to 5 None Seen, 1 to 5 /hpf    BACTERIA, URINALYSIS Few (A) None Seen /hpf    HYALINE CASTS, URINALYSIS None Seen None Seen, 1 to 5 /lpf   Protein/Creatinine Ratio, Urine    Collection Time: 06/18/25  9:19 AM    Specimen: Urine clean catch   Result Value Ref Range    Protein, Urine 762 (H) <12 mg/dL    Creatinine, Urine 165.0 mg/dL    Protein/ Creatinine Ratio 4,618 (H) <=199 mgPR/gCR   Comprehensive Metabolic Panel    Collection Time: 06/18/25 11:04 AM    Specimen: Blood, Venous   Result Value Ref Range    Fasting Status      Sodium 142 135 - 145 mmol/L    Potassium 4.0 3.4 - 5.1 mmol/L    Chloride 110 97 - 110 mmol/L    Carbon Dioxide 23 21 - 32 mmol/L    Anion Gap 13 7 - 19 mmol/L    Glucose 124 (H) 70 - 99  mg/dL    BUN 13 5 - 18 mg/dL    Creatinine 0.40 0.38 - 1.15 mg/dL    Glomerular Filtration Rate      BUN/Cr 33 (H) 7 - 25    Calcium 7.4 (L) 8.0 - 11.0 mg/dL    Bilirubin, Total 0.3 0.2 - 1.4 mg/dL    GOT/AST 26 10 - 45 Units/L    GPT/ALT 19 10 - 35 Units/L    Alkaline Phosphatase 226 115 - 445 Units/L    Albumin 1.3 (L) 3.4 - 5.0 g/dL    Protein, Total 4.7 (L) 6.0 - 8.0 g/dL    Globulin 3.4 2.0 - 4.0 g/dL    A/G Ratio 0.4 (L) 1.0 - 2.4   CBC with Automated Differential (performable only)    Collection Time: 06/18/25 11:04 AM    Specimen: Blood, Venous   Result Value Ref Range    WBC 9.9 4.2 - 13.5 K/mcL    RBC 5.13 3.90 - 5.30 mil/mcL    HGB 14.9 11.5 - 15.5 g/dL    HCT 43.7 35.0 - 45.0 %    MCV 85.2 77.0 - 95.0 fl    MCH 29.0 25.0 - 33.0 pg    MCHC 34.1 31.0 - 37.0 g/dL    RDW-CV 12.5 11.0 - 15.0 %    RDW-SD 38.8 35.0 - 47.0 fL     140 - 450 K/mcL    NRBC 0 <=0 /100 WBC    Neutrophil, Percent 84 %    Lymphocytes, Percent 14 %    Mono, Percent 2 %    Eosinophils, Percent 0 %    Basophils, Percent 0 %    Immature Granulocytes 0 %    Absolute Neutrophils 8.2 (H) 1.8 - 8.0 K/mcL    Absolute Lymphocytes 1.3 (L) 1.5 - 6.5 K/mcL    Absolute Monocytes 0.2 0.0 - 0.8 K/mcL    Absolute Eosinophils  0.0 0.0 - 0.5 K/mcL    Absolute Basophils 0.0 0.0 - 0.2 K/mcL    Absolute Immature Granulocytes 0.0 0.0 - 0.2 K/mcL   Complement C3 Complement C4    Collection Time: 06/18/25 11:04 AM    Specimen: Blood, Venous   Result Value Ref Range    Complement C3 163 80 - 180 mg/dL    Complement C4 43.7 12.0 - 50.0 mg/dL   Phosphorus    Collection Time: 06/18/25 11:04 AM    Specimen: Blood, Venous   Result Value Ref Range    Phosphorus 3.5 (L) 3.7 - 5.6 mg/dL   Magnesium    Collection Time: 06/18/25 11:04 AM    Specimen: Blood, Venous   Result Value Ref Range    Magnesium 1.8 1.7 - 2.4 mg/dL   POCT Urine Dip Non-Auto    Collection Time: 06/18/25  2:32 PM   Result Value Ref Range    POCT Color Yellow Yenny, Brown, Orange, Pink, Red,  Straw, Yellow, Green, Blue, Colorless, Other    POCT Appearance Clear Clear, Cloudy, Hazy, Turbid    POCT Glucose Urine Negative Negative mg/dL    POCT Bilirubin Negative Negative    POCT Ketones Negative Negative mg/dL    POCT Specific Gravity 1.015 1.000, 1.005, 1.010, 1.015, 1.020, 1.025, 1.030, <= 1.005    POCT Occult Blood Negative Negative    POCT pH 7.0 5.0, 5.5, 6.0, 6.5, 7.0, 7.5, 8.0, 8.5    POCT Protein 300 mg/dL (A) Negative mg/dL    POCT Urobilinogen 0.2 0.2, 1.0 mg/dL    Urine Nitrite Negative Negative    WBC (Leukocyte) Esterase POC Negative Negative    TEST LOT EXPIRATION DATE 11/30/2025     TEST LOT NUMBER [id number]        Assessment and Plan:  Nam was seen today for office visit and consultation.    Diagnoses and all orders for this visit:    Nephrotic syndrome    Kidney problem  -     POCT Urine Dip Non-Auto    Other orders  -     predniSONE (DELTASONE) 10 MG tablet; Take 3 tablets by mouth 2 times daily.  -     famotidine (PEPCID) 10 MG tablet; Take 2 tablets by mouth daily.        ASSESSMENT:      06.18.2025  Blood pressures were  100 - 110 / 68 - 72 mm  Hg on 06.18.2025 06.18.2025  Lab Results   Component Value Date    5COL Yellow 06/18/2025    5UAPP Clear 06/18/2025    5UGLU Negative 06/18/2025    5UBILI Negative 06/18/2025    5UKET Negative 06/18/2025    5USPG 1.015 06/18/2025    5URBC Negative 06/18/2025    5UPH 7.0 06/18/2025    5UPROT 300 mg/dL (A) 06/18/2025    5UURP 0.2 06/18/2025    POCTUNITR Negative 06/18/2025    5UWBC Negative 06/18/2025 06.18.2025  Urine analysis  06.18.2025  Urine sp gravity  1.015  Urine pH  7  Urine 300 mg/dl protein, otherwise, negative on dipstick examination  Urine negative on microscopic examination of the urine sediment      06.19.2025  Nam has periorbital edema.  Prescribed furosemide 20 mg q day - discussed use of furosemide as needed and furosemide definitely not to be given if the urine dipstick is negative or tace for protein.    Discussed with  mother 06.19.2025   9:30 AM               RECOMMENDATIONS:  1.  Prednisone  30 mg BID   2.  Famotidine  20 mg q day   3.  Dip urine  for protein q am  4.  Call when urine negative for trace for  protein for three consecutive days   5.  Labs sent  by ED   6.  Follow up in two months     I reviewed the above recommendations with patient/family who expressed understanding and agreed with this plan.    Thank you very much for allowing me to participate in the care of this patient. If you have any questions, please do not hesitate to contact me.     Jayjay Aburto MD  Pediatric Nephrology  [facility]/ [facility]

## (undated) DEVICE — TUBING SUCTION CONN 6FT STERILE

## (undated) DEVICE — GLV 8 PROTEXIS (WHITE)

## (undated) DEVICE — BRUSH COLONOSCOPY CYTOLOGY

## (undated) DEVICE — CATH ELCTR GLIDE PRB 7FR

## (undated) DEVICE — BRUSH CYTO ENDO

## (undated) DEVICE — TUBE O2 SUPL CRUSH RESIS CONN SOUTHSIDE ONLY

## (undated) DEVICE — BITE BLOCK MAXI RUBBER STAMP

## (undated) DEVICE — NDL INJ SCLERO INTERJECT 23G

## (undated) DEVICE — DRSG CURITY GAUZE SPONGE 4 X 4" 12-PLY

## (undated) DEVICE — SYR IV POSIFLUSH NS 3ML 30/TY

## (undated) DEVICE — TUBING CANNULA SALTER LABS NASAL ADULT 7FT

## (undated) DEVICE — CATH ELECHMSTAT  INJ 7FR 210CM

## (undated) DEVICE — SENSOR O2 FINGER XL ADULT 24/BX 6BX/CA

## (undated) DEVICE — FORMALIN CUPS 10% BUFFERED

## (undated) DEVICE — MARKER ENDO SPOT EX

## (undated) DEVICE — SNARE LRG

## (undated) DEVICE — SOL IRR POUR NS 0.9% 1000ML

## (undated) DEVICE — SET IV PUMP BLOOD 1VALVE 180FILTER NON-DEHP

## (undated) DEVICE — TUBING ENDO EXT OLYMPUS 160 24HR USE

## (undated) DEVICE — SOL IRR POUR H2O 500ML

## (undated) DEVICE — FORCEP RADIAL JAW 4 W NDL 2.2MM 2.8MM 160CM YELLOW DISP

## (undated) DEVICE — CATH IV SAFE BC 22G X 1" (BLUE)

## (undated) DEVICE — CATH IV SAFE BC 20G X 1.16" (PINK)

## (undated) DEVICE — SUCTION YANKAUER TAPERED BULBOUS NO VENT

## (undated) DEVICE — ELCTR GROUNDING PAD ADULT COVIDIEN

## (undated) DEVICE — SOL IRR NS 0.9% 250ML

## (undated) DEVICE — Device

## (undated) DEVICE — TUBING IV SET SECOND 34" W/O LOK-BLUNT

## (undated) DEVICE — SOL IRR POUR H2O 1000ML

## (undated) DEVICE — TUBING IV SET GRAVITY 3Y 100" MACRO

## (undated) DEVICE — STERIS DEFENDO 3-PIECE KIT (AIR/WATER, SUCTION & BIOPSY VALVES)

## (undated) DEVICE — SYR LUER LOK 30CC

## (undated) DEVICE — SOL IRR BAG H2O 1000ML